# Patient Record
Sex: MALE | Race: WHITE | NOT HISPANIC OR LATINO | Employment: OTHER | URBAN - METROPOLITAN AREA
[De-identification: names, ages, dates, MRNs, and addresses within clinical notes are randomized per-mention and may not be internally consistent; named-entity substitution may affect disease eponyms.]

---

## 2017-02-10 ENCOUNTER — OFFICE VISIT (OUTPATIENT)
Dept: INTERNAL MEDICINE | Facility: CLINIC | Age: 82
End: 2017-02-10

## 2017-02-10 VITALS
TEMPERATURE: 98.2 F | BODY MASS INDEX: 25.99 KG/M2 | DIASTOLIC BLOOD PRESSURE: 73 MMHG | OXYGEN SATURATION: 97 % | HEART RATE: 80 BPM | RESPIRATION RATE: 16 BRPM | WEIGHT: 181.1 LBS | SYSTOLIC BLOOD PRESSURE: 125 MMHG

## 2017-02-10 DIAGNOSIS — H81.10 BPPV (BENIGN PAROXYSMAL POSITIONAL VERTIGO), UNSPECIFIED LATERALITY: Primary | ICD-10-CM

## 2017-02-10 ASSESSMENT — PAIN SCALES - GENERAL: PAINLEVEL: NO PAIN (0)

## 2017-02-10 NOTE — PATIENT INSTRUCTIONS
[facility] Medication Refill Request Information:  * Please contact your pharmacy regarding ANY request for medication refills.  ** Psychiatric Prescription Fax = [phone]  * Please allow 3 business days for routine medication refills.  * Please allow 5 business days for controlled substance medication refills.     [facility] Test Result notification information:  *You will be notified with in 7-10 days of your appointment day regarding the results of your test.  If you are on MyChart you will be notified as soon as the provider has reviewed the results and signed off on them.    [facility] [phone]

## 2017-02-10 NOTE — PROGRESS NOTES
S: Tom is here after having the a URI about 10 days ago. He slept most of that time. After that, he continued to have low blood pressure (which is not unusual for him). Then about 3 days ago, the room started spinning. He has never had this before. It happened getting in bed and lying down. It happened getting up in the morning when sitting on the side of the bed. These episodes lasted seconds. He has had this about 5-6 times.     ROS: No fevers/chills, no numbness/tingling, no weakness    PE:   /73 mmHg  Pulse 80  Temp(Src) 98.2  F (36.8  C) (Oral)  Resp 16  Wt 82.146 kg (181 lb 1.6 oz)  SpO2 97%  General: pleasant male, in NAD  Eyes: EOMI, PERRL  ENT: TMs normal bilaterally, oropharynx clear, MMM  Neck: No LAD  Resp: lungs CAB  CV: Heart RRR, II/VI systolic murmur at RUSB  Neuro: AOX3, CNI-XII intact, rapid-hand motion normal, finger-to-nose normal, no pronator drift.     A/P:   Tom was seen today for dizziness.    Diagnoses and all orders for this visit:    BPPV (benign paroxysmal positional vertigo), unspecified laterality. Suspect BPPV given history, normal neuro exam.   -     PHYSICAL THERAPY REFERRAL    Briseida Hernandez MD  02/10/2017

## 2017-02-10 NOTE — NURSING NOTE
Chief Complaint   Patient presents with     Dizziness     Here for vertigo, dizziness. Patient states had vertigo several times this week     Raphael Killian CMA at 7:36 AM on 2/10/2017

## 2017-02-10 NOTE — MR AVS SNAPSHOT
After Visit Summary   2/10/2017    Tom Saini    MRN: [id number]           Patient Information     Date Of Birth          1935        Visit Information        Provider Department      2/10/2017 7:30 AM Briseida Yanez MD [facility]        Today's Diagnoses     BPPV (benign paroxysmal positional vertigo), unspecified laterality    -  1       Care Instructions    Primary Care Center Medication Refill Request Information:  * Please contact your pharmacy regarding ANY request for medication refills.  ** [facility] Prescription Fax = [phone]  * Please allow 3 business days for routine medication refills.  * Please allow 5 business days for controlled substance medication refills.     Primary Care Center Test Result notification information:  *You will be notified with in 7-10 days of your appointment day regarding the results of your test.  If you are on MyChart you will be notified as soon as the provider has reviewed the results and signed off on them.    Primary Care Center [phone]           Follow-ups after your visit        Additional Services     PHYSICAL THERAPY REFERRAL       *This therapy referral will be filtered to a centralized scheduling office at [facility] and the patient will receive a call to schedule an appointment at a [place] location most convenient for them. *     [facility] provides Physical Therapy evaluation and treatment and many specialty services across the [place] system.  If requesting a specialty program, please choose from the list below.    If you have not heard from the scheduling office within 2 business days, please call [phone] for all locations, with the exception of [place], please call [phone].  Treatment: Evaluation & Treatment  Special Instructions/Modalities: as indicated  Special Programs: Balance/Vestibular    Please be aware that coverage of these services is subject  to the terms and limitations of your health insurance plan.  Call member services at your health plan with any benefit or coverage questions.      **Note to Provider:  If you are referring outside of [place] for the therapy appointment, please list the name of the location in the  special instructions  above, print the referral and give to the patient to schedule the appointment.                  Who to contact     Please call your clinic at [phone] to:    Ask questions about your health    Make or cancel appointments    Discuss your medicines    Learn about your test results    Speak to your doctor   If you have compliments or concerns about an experience at your clinic, or if you wish to file a complaint, please contact [facility] Patient Relations at [phone] or email us at [email or web address]         Additional Information About Your Visit        [employer] Information     [employer] is an electronic gateway that provides easy, online access to your medical records. With [employer], you can request a clinic appointment, read your test results, renew a prescription or communicate with your care team.     To sign up for [employer] visit the website at [email or web address]   You will be asked to enter the access code listed below, as well as some personal information. Please follow the directions to create your username and password.     Your access code is: [email or web address]  Expires: 2017  7:51 AM     Your access code will  in 90 days. If you need help or a new code, please contact your [facility] or call [phone] for assistance.        Care EveryWhere ID     This is your Care EveryWhere ID. This could be used by other organizations to access your [place] medical records  [id number]        Your Vitals Were     Pulse Temperature Respirations Pulse Oximetry          80 98.2  F (36.8  C) (Oral) 16 97%         Blood Pressure from  Last 3 Encounters:   02/10/17 125/73   10/05/16 123/69   02/02/16 118/73    Weight from Last 3 Encounters:   02/10/17 82.146 kg (181 lb 1.6 oz)   10/05/16 83.416 kg (183 lb 14.4 oz)   02/02/16 84.55 kg (186 lb 6.4 oz)              We Performed the Following     PHYSICAL THERAPY REFERRAL        Primary Care Provider Office Phone # Fax #    Angeli Robertson [phone]       [address] [place] [address]        Thank you!     Thank you for choosing [facility]  for your care. Our goal is always to provide you with excellent care. Hearing back from our patients is one way we can continue to improve our services. Please take a few minutes to complete the written survey that you may receive in the mail after your visit with us. Thank you!             Your Updated Medication List - Protect others around you: Learn how to safely use, store and throw away your medicines at www.disposemymeds.org.          This list is accurate as of: 2/10/17  7:51 AM.  Always use your most recent med list.                   Brand Name Dispense Instructions for use    aspirin 81 MG tablet      Take 1 tablet by mouth daily.       atorvastatin 10 MG tablet    LIPITOR    90 tablet    Take 1 tablet (10 mg) by mouth daily       cholecalciferol 1000 UNITS capsule    vitamin  -D     Take 1 capsule by mouth daily.       multivitamin Tabs tablet      Take 2 tablets by mouth 2 times daily.

## 2017-02-17 ENCOUNTER — HOSPITAL ENCOUNTER (OUTPATIENT)
Dept: PHYSICAL THERAPY | Facility: CLINIC | Age: 82
Setting detail: THERAPIES SERIES
End: 2017-02-17
Attending: INTERNAL MEDICINE
Payer: MEDICARE

## 2017-02-17 PROCEDURE — 95992 CANALITH REPOSITIONING PROC: CPT | Mod: GP | Performed by: PHYSICAL THERAPIST

## 2017-02-17 PROCEDURE — 97112 NEUROMUSCULAR REEDUCATION: CPT | Mod: GP | Performed by: PHYSICAL THERAPIST

## 2017-02-17 PROCEDURE — G8982 BODY POS GOAL STATUS: HCPCS | Mod: GP,CI | Performed by: PHYSICAL THERAPIST

## 2017-02-17 PROCEDURE — 97161 PT EVAL LOW COMPLEX 20 MIN: CPT | Mod: GP | Performed by: PHYSICAL THERAPIST

## 2017-02-17 PROCEDURE — G8981 BODY POS CURRENT STATUS: HCPCS | Mod: GP,CK | Performed by: PHYSICAL THERAPIST

## 2017-02-17 PROCEDURE — 40000840 ZZHC STATISTIC PT VESTIBULAR VISIT: Performed by: PHYSICAL THERAPIST

## 2017-02-21 NOTE — PROGRESS NOTES
02/17/17 0800   Quick Adds   Quick Adds Vestibular Eval   General Information   Start of Care Date 02/17/17   Referring Physician Briseida Hernandez   Orders Evaluate and Treat as Indicated   Order Date 02/10/17   Medical Diagnosis BPPV   Onset of illness/injury or Date of Surgery 02/07/17   Surgical/Medical history reviewed Yes   Pertinent history of current problem (include personal factors and/or comorbidities that impact the POC) i have vertigo getting in /out of bed. new to me in 2 wk.  Juaquin is L handed. he is here alone. drove here.  low bp.  glasses.  feels good otherwise.  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  travels alot.  does slide shows.  have been to all continents.  next trip [place], sept.  balance is good.  can walk miles.  sits at cpu alot.     Pertinent Visual History  glasses   Prior level of functional mobility Ambulation   Ambulation can walk longer dist   Current Community Support Family/friend caregiver   Patient role/Employment history Retired   Living environment [facility]   Assistive Devices Comments none   Patient/Family Goals Statement to not be dizzy.   General Information Comments pt Juaquin here alone - drove here; likes travel and do nature photography   Fall Risk Screen   Fall screen completed by PT   Per patient - Fall 2 or more times in past year? No   System Outcome Measures   Outcome Measures BPPV   AM-PAC  Basic Mobility Score Level  (Lower scores equate to lower levels of function) 84.33   AM-PAC  Daily Activity Score Level  (Lower scores equate to lower levels of function) 76.13   AM-PAC  Applied Cognitive Score Level  (Lower scores equate to lower levels of function) 57.54   Dizziness Handicap Inventory (score out of 100) A decrease in score by 17.18 or greater indicates a clinically significant change in symptoms. 6   Pain   Patient currently in pain No   Vital Signs   Vital Signs BP;Pulse   Pulse 78   /72  (121/69)   Cognitive Status  Examination   Orientation orientation to person, place and time   Level of Consciousness alert   Follows Commands and Answers Questions 100% of the time   Personal Safety and Judgment intact   Memory intact   Integumentary   Integumentary No deficits were identified   Posture   Posture Forward head position   Range of Motion (ROM)   ROM Quick Adds no deficits were identified   Strength   Manual Muscle Testing Quick Adds No deficits were identified   Bed Mobility   Bed Mobility Comments slow due to sxs   Transfer Skills   Transfer Comments sit/stand indep. upon sitting after vest testing, got dizzy and fell back on to mat table   Locomotion   Wheel Chair Mobility Comments n/a   Gait Special Tests   Gait Special Tests 25 FOOT TIMED WALK   Gait Special Tests 25 Foot Timed Walk   Seconds 8.4   Steps 13 Steps   Comments nl   Balance   Balance Comments not tested   Sensory Examination   Sensory Perception no deficits were identified   Coordination   Coordination no deficits were identified   Muscle Tone   Muscle Tone no deficits were identified   Cervicogenic Screen   Neck ROM wfls   Vertebral Artery Test Comments no issues   Oculomotor Exam   Smooth Pursuit Normal   Saccades Normal   VOR Normal   Rapid Head Thrust Normal   Infrared Goggle Exam or Frenzel Lense Exam   Vestibular Suppressant in Last 24 Hours? No   Exam completed with Infrared Goggles   Spontaneous Nystagmus Horizontal L   Head Shake Horizontal Nystagmus comments not tested   [place]-Hallpike (right) Upbeating R torsional   [place]-Hallpike (right) comments strong up and R w sxs x 3   [place]-Hallpike (Left) Negative   HSCC Supine Roll Test (Right) Horizontal L   HSCC Supine Roll Test (Right) Comments stronger than spontaneous   HSCC Supine Roll Test (Left) Negative   BPPV Canal(s) R Horizontal;R Posterior   BPPV Type Cupulolithasis;Canalithasis   Planned Therapy Interventions   Planned Therapy Interventions neuromuscular re-education   Planned Therapy Interventions  Comment crm's   Clinical Impression   Criteria for Skilled Therapeutic Interventions Met yes, treatment indicated   PT Diagnosis bppv of R post canalithiasis and R HC cupulolithiasis   Influenced by the following impairments nystagmus, position changes   Functional limitations due to impairments unable to change positions, rish of falling w this   Clinical Presentation Stable/Uncomplicated   Clinical Presentation Rationale above, no major pmh, able to take self to PT, active individual   Clinical Decision Making (Complexity) Low complexity   Therapy Frequency other (see comments)   Predicted Duration of Therapy Intervention (days/wks) up to 6x in 90 days   Risk & Benefits of therapy have been explained Yes   Patient, Family & other staff in agreement with plan of care Yes   Clinical Impression Comments as above   Education Assessment   Preferred Learning Style Demonstration   Barriers to Learning Physical   GOALS   PT Eval Goals 3;1;2   Goal 1   Goal Identifier DHI    Goal Description 2 or less for decreasing sxs for safe bed mobility   Target Date 05/16/17   Goal 2   Goal Identifier functional activities   Goal Description pt to report normal functional bending and reaching w head mov't and no sxs   Target Date 05/16/17   Total Evaluation Time   Total Evaluation Time (Minutes) 32

## 2017-02-21 NOTE — PROGRESS NOTES
[facility]        OUTPATIENT PHYSICAL THERAPY FUNCTIONAL EVALUATION  PLAN OF TREATMENT FOR OUTPATIENT REHABILITATION  (COMPLETE FOR INITIAL CLAIMS ONLY)  Patient's Last Name, First Name, M.I.  YOB: 1935  Tom Saini     Provider's Name   [facility]   Medical Record No.  [id number]     Start of Care Date:  02/17/17   Onset Date:  02/07/17   Type:     _X__PT   ____OT  ____SLP Medical Diagnosis:     bppv   PT Diagnosis:  bppv of R post canalithiasis and R HC cupulolithiasis Visits from SOC:  1                              __________________________________________________________________________________  Plan of Treatment/Functional Goals:  neuromuscular re-education  crm's        GOALS  DHI   2 or less for decreasing sxs for safe bed mobility  05/16/17    functional activities  pt to report normal functional bending and reaching w head mov't and no sxs  05/16/17            Therapy Frequency:  other (see comments)   Predicted Duration of Therapy Intervention:  up to 6x in 90 days    Angella West, PT                                    I CERTIFY THE NEED FOR THESE SERVICES FURNISHED UNDER        THIS PLAN OF TREATMENT AND WHILE UNDER MY CARE     (Physician co-signature of this document indicates review and certification of the therapy plan).                Certification Date From:    2/17/17  Certification Date To:   5/16/17  Referring Provider:  Briseida Hernandez    Initial Assessment  See Epic Evaluation- Start of Care Date: 02/17/17

## 2017-02-24 ENCOUNTER — HOSPITAL ENCOUNTER (OUTPATIENT)
Dept: PHYSICAL THERAPY | Facility: CLINIC | Age: 82
Setting detail: THERAPIES SERIES
End: 2017-02-24
Attending: INTERNAL MEDICINE
Payer: MEDICARE

## 2017-02-24 PROCEDURE — 40000840 ZZHC STATISTIC PT VESTIBULAR VISIT: Performed by: PHYSICAL THERAPIST

## 2017-02-24 PROCEDURE — 97112 NEUROMUSCULAR REEDUCATION: CPT | Mod: GP | Performed by: PHYSICAL THERAPIST

## 2017-02-24 PROCEDURE — 95992 CANALITH REPOSITIONING PROC: CPT | Mod: GP | Performed by: PHYSICAL THERAPIST

## 2017-02-24 NOTE — DISCHARGE INSTRUCTIONS
2/24/17    When sitting at computer / tv or in car -   Before standing -   Pump ankles,  Kick legs and march in place then stand   To move fluids/blood to help blood pressure    Angella ROBERTSON

## 2017-02-24 NOTE — IP AVS SNAPSHOT
"                  MRN:[id number]                      After Visit Summary   2017    Tom Saini    MRN: [id number]           Visit Information        Provider Department      2017 11:00 AM Angella West, PT [employer], [place], Physical Therapy - Outpatient          Further instructions from your care team       17    When sitting at computer / tv or in car -   Before standing -   Pump ankles,  Kick legs and march in place then stand   To move fluids/blood to help blood pressure    Angella  PT      MyChart Information     [employer] lets you send messages to your doctor, view your test results, renew your prescriptions, schedule appointments and more. To sign up, go to [email or web address] . Click on \"Log in\" on the left side of the screen, which will take you to the Welcome page. Then click on \"Sign up Now\" on the right side of the page.     You will be asked to enter the access code listed below, as well as some personal information. Please follow the directions to create your username and password.     Your access code is: [email or web address]  Expires: 2017  7:51 AM     Your access code will  in 90 days. If you need help or a new code, please call your [place] clinic or [phone].        Care EveryWhere ID     This is your Care EveryWhere ID. This could be used by other organizations to access your [place] medical records  [id number]        "

## 2017-03-03 ENCOUNTER — HOSPITAL ENCOUNTER (OUTPATIENT)
Dept: PHYSICAL THERAPY | Facility: CLINIC | Age: 82
Setting detail: THERAPIES SERIES
End: 2017-03-03
Attending: INTERNAL MEDICINE
Payer: MEDICARE

## 2017-03-03 PROCEDURE — 40000840 ZZHC STATISTIC PT VESTIBULAR VISIT: Performed by: PHYSICAL THERAPIST

## 2017-03-03 PROCEDURE — 97112 NEUROMUSCULAR REEDUCATION: CPT | Mod: GP | Performed by: PHYSICAL THERAPIST

## 2017-04-23 DIAGNOSIS — E78.5 HYPERLIPIDEMIA LDL GOAL <100: ICD-10-CM

## 2017-04-23 RX ORDER — ATORVASTATIN CALCIUM 10 MG/1
10 TABLET, FILM COATED ORAL DAILY
Qty: 90 TABLET | Refills: 3 | Status: SHIPPED | OUTPATIENT
Start: 2017-04-23 | End: 2018-03-22

## 2017-04-23 NOTE — TELEPHONE ENCOUNTER
atorvastatin (LIPITOR) 10 MG tablet  Last Written Prescription Date:  2/11/16  Last Fill Quantity: 90,   # refills: 3  Last Office Visit with [facility], [facility] or [facility] prescribing provider: 2/10/17  Future Office visit:   7/3/17

## 2017-04-25 NOTE — TELEPHONE ENCOUNTER
Last lipids 12/15.  Last physical 12/15.  Would not approve 12 months of refills.  Pt due for labs/physical.  Angeli Robertson MD  Internal Medicine

## 2017-04-27 ENCOUNTER — OFFICE VISIT (OUTPATIENT)
Dept: OPHTHALMOLOGY | Facility: CLINIC | Age: 82
End: 2017-04-27
Attending: OPHTHALMOLOGY
Payer: MEDICARE

## 2017-04-27 DIAGNOSIS — H53.031 STRABISMIC AMBLYOPIA OF RIGHT EYE: ICD-10-CM

## 2017-04-27 DIAGNOSIS — Z96.1 PSEUDOPHAKIA OF BOTH EYES: ICD-10-CM

## 2017-04-27 DIAGNOSIS — H35.3210: ICD-10-CM

## 2017-04-27 DIAGNOSIS — H35.30 MACULAR DEGENERATION: Primary | ICD-10-CM

## 2017-04-27 DIAGNOSIS — H35.3122 NONEXUDATIVE AGE-RELATED MACULAR DEGENERATION, LEFT EYE, INTERMEDIATE DRY STAGE: ICD-10-CM

## 2017-04-27 DIAGNOSIS — Z86.69 HISTORY OF RETINAL TEAR: ICD-10-CM

## 2017-04-27 PROCEDURE — 92134 CPTRZ OPH DX IMG PST SGM RTA: CPT | Mod: ZF | Performed by: STUDENT IN AN ORGANIZED HEALTH CARE EDUCATION/TRAINING PROGRAM

## 2017-04-27 ASSESSMENT — VISUAL ACUITY
OS_PH_CC: 20/40+2
OD_CC: 20/60+1
OD_SC: J7
OS_CC: 20/40-1
OS_SC: J2
METHOD: SNELLEN - LINEAR

## 2017-04-27 ASSESSMENT — EXTERNAL EXAM - LEFT EYE: OS_EXAM: NORMAL

## 2017-04-27 ASSESSMENT — CONF VISUAL FIELD
OD_NORMAL: 1
METHOD: COUNTING FINGERS
OS_NORMAL: 1

## 2017-04-27 ASSESSMENT — CUP TO DISC RATIO
OD_RATIO: 0.3
OS_RATIO: 0.3

## 2017-04-27 ASSESSMENT — TONOMETRY
IOP_METHOD: TONOPEN
OD_IOP_MMHG: 15
OS_IOP_MMHG: 13

## 2017-04-27 ASSESSMENT — SLIT LAMP EXAM - LIDS
COMMENTS: NORMAL
COMMENTS: NORMAL

## 2017-04-27 ASSESSMENT — EXTERNAL EXAM - RIGHT EYE: OD_EXAM: NORMAL

## 2017-04-27 NOTE — MR AVS SNAPSHOT
After Visit Summary   4/27/2017    Tom Saini    MRN: [id number]           Patient Information     Date Of Birth          1935        Visit Information        Provider Department      4/27/2017 8:00 AM Ying Infante MD [facility]        Today's Diagnoses     Macular degeneration - Both Eyes    -  1    Age-related macular degeneration, wet, right eye (H)        Nonexudative age-related macular degeneration, left eye, intermediate dry stage        Strabismic amblyopia of right eye        History of retinal tear        Pseudophakia of both eyes           Follow-ups after your visit        Follow-up notes from your care team     Return in about 1 week (around 5/4/2017) for Injection right eye, FA both eyes.      Your next 10 appointments already scheduled     May 04, 2017  8:15 AM CDT   RETURN GENERAL with Brigido Laura MD   Eye Clinic ([facility])    [place] [address] [address]   [phone]            Jun 26, 2017  9:30 AM CDT   (Arrive by 9:15 AM)   PHYSICAL with Angeli Robertson MD   [facility] ([facility])    [address]  [address] [facility] [address] [phone]              Who to contact     Please call your clinic at [phone] to:    Ask questions about your health    Make or cancel appointments    Discuss your medicines    Learn about your test results    Speak to your doctor   If you have compliments or concerns about an experience at your clinic, or if you wish to file a complaint, please contact [facility] Patient Relations at [phone] or email us at [email or web address]         Additional Information About Your Visit        [employer] Information     [employer] is an electronic gateway that provides easy, online access to your medical records. With [employer], you can request a clinic appointment,  read your test results, renew a prescription or communicate with your care team.     To sign up for [employer] visit the website at [email or web address]   You will be asked to enter the access code listed below, as well as some personal information. Please follow the directions to create your username and password.     Your access code is: [email or web address]  Expires: 2017  8:51 AM     Your access code will  in 90 days. If you need help or a new code, please contact your [facility] or call [phone] for assistance.        Care EveryWhere ID     This is your Care EveryWhere ID. This could be used by other organizations to access your [place] medical records  [id number]         Blood Pressure from Last 3 Encounters:   02/10/17 125/73   10/05/16 123/69   16 118/73    Weight from Last 3 Encounters:   02/10/17 82.1 kg (181 lb 1.6 oz)   10/05/16 83.4 kg (183 lb 14.4 oz)   16 84.6 kg (186 lb 6.4 oz)              We Performed the Following     OCT Retina Spectralis OU (both eyes)        Primary Care Provider Office Phone # Fax #    Angeli Robertson [phone]       [address] [address]        Thank you!     Thank you for choosing EYE CLINIC  for your care. Our goal is always to provide you with excellent care. Hearing back from our patients is one way we can continue to improve our services. Please take a few minutes to complete the written survey that you may receive in the mail after your visit with us. Thank you!             Your Updated Medication List - Protect others around you: Learn how to safely use, store and throw away your medicines at www.disposemymeds.org.          This list is accurate as of: 17  9:37 PM.  Always use your most recent med list.                   Brand Name Dispense Instructions for use    aspirin 81 MG tablet      Take 1 tablet by mouth daily.       atorvastatin 10 MG  tablet    LIPITOR    90 tablet    Take 1 tablet (10 mg) by mouth daily       cholecalciferol 1000 UNITS capsule    vitamin  -D     Take 1 capsule by mouth daily.       multivitamin Tabs tablet      Take 2 tablets by mouth 2 times daily.

## 2017-04-27 NOTE — PROGRESS NOTES
"HPI: Tom Saini is a 82 year old male who presents for evaluation of \"wavy lines\" in the left eye when looking at straight edges. He first noted this issue a few weeks ago. Feels that this is stable to mildly worsened over time. Hx of amblyopia in the right eye and unsure if any new waviness in the right eye. Denies eye pain, flashes, or new floaters. Hx of AMD ontinues to take AREDS. Denies hx of wet AMD or injections.     PMH:  BPPV    POHx:  Dry AMD   Hx of retinal tears s/p laser OS and cryo OS  Strabismus/amblyopia, right eye  S/p strabismus surgery in 1947 - right eye only per pt report  S/p cataract surgery OU - 2000/2001 with Dr. Avalos    Current Eye Medications:   None    FH:  Pt denies    SH:  Former smoker, occ EtOH use      OCT macula 4/27/17:  OD: coalescent drusen with areas of RPE dropout involving macula, few small subfoveal cystic collections of fluid, ERM most prominent inferiorly  OS: coalescent drusen with areas of RPE dropout involving subfoveal region of macula, two intraretinal cysts inferior to fovea likely related to underlying involutional changes, mild ERM inferiorly      Assessment & Plan   Tom Saini is a 82 year old male with the following diagnoses:     Wet AMD, right eye:  - Small collections of cystic fluid noted sub-foveally on OCT macula in the right eye today. Hx of previous dx of dry AMD in the right eye. Pt denies any new metamorphopsias in the right eye but does have hx of strabismic amblyopia in that eye (see below)  - Discussed r/b/a of intravitreal injection and pt elects to defer until next week, when he can bring someone with him to his appt  - Also recommend FA at next visit for further w/u  - Pt does not smoke  - Continue ocuvite   - Gave new Amsler grid, recommend daily home monitoring  - Discussed referral to low vision specialist. Pt states he is aware of the service and is not interested in a referral at this time    Dry AMD, left eye:   - Pt complains of " new metamorphopsias in the left eye but no fluid visualized on OCT macula today. Significant increase in amount of drusen compared to last OCT in 2012  - FA at next visit as above  - Continue ocuvite  - Pt does not smoke  - Amsler grid as above    Strabismic amblyopia, right eye:  - Longstanding (since childhood). Pt reports vision has always been worse in right eye  - S/p strab surgery in 1947    Hx of retinal tears, both eyes:  - Hx of myopia prior to cataract surgery  - S/p cryo OD and laser OS  - Discussed sx of retinal tear/detachment. Pt has no complaints today    Pseudophakia, both eyes:  - Stable  - Monitor    Patient seen and discussed with Dr. Laura.    RTC: 1 week for Avastin OD and Spectralis FA Both eyes (transit right); will consult low vision following injection series    Ying Infante MD   Ophthalmology PGY-2    Attending Physician Attestation: Complete documentation of historical and exam elements from today's encounter can be found in the full encounter summary report (not reduplicated in this progress note). I personally obtained the chief complaint(s) and history of present illness.  I confirmed and edited as necessary the review of systems, past medical/surgical history, family history, social history, and examination findings as documented by others; and I examined the patient myself. I personally reviewed the relevant tests, images, and reports as documented above. I formulated and edited as necessary the assessment and plan and discussed the findings and management plan with the patient and family. - Brigido Laura MD  4/27/2017   Attending Physician Image/Tesing Attestation: I have personally view and interpreted all testing/images as documented in imaging/procedures

## 2017-05-04 ENCOUNTER — OFFICE VISIT (OUTPATIENT)
Dept: OPHTHALMOLOGY | Facility: CLINIC | Age: 82
End: 2017-05-04
Attending: OPHTHALMOLOGY
Payer: MEDICARE

## 2017-05-04 DIAGNOSIS — H35.3290: Primary | ICD-10-CM

## 2017-05-04 DIAGNOSIS — H35.3290 AMD (AGE-RELATED MACULAR DEGENERATION), WET (H): ICD-10-CM

## 2017-05-04 DIAGNOSIS — H35.3290 AMD (AGE-RELATED MACULAR DEGENERATION), WET (H): Primary | ICD-10-CM

## 2017-05-04 DIAGNOSIS — H35.3231 EXUDATIVE AGE-RELATED MACULAR DEGENERATION OF BOTH EYES WITH ACTIVE CHOROIDAL NEOVASCULARIZATION (H): ICD-10-CM

## 2017-05-04 PROCEDURE — 92235 FLUORESCEIN ANGRPH MLTIFRAME: CPT | Mod: ZF | Performed by: OPHTHALMOLOGY

## 2017-05-04 PROCEDURE — 67028 INJECTION EYE DRUG: CPT | Mod: ZF | Performed by: OPHTHALMOLOGY

## 2017-05-04 PROCEDURE — 25000128 H RX IP 250 OP 636: Mod: ZF

## 2017-05-04 PROCEDURE — 99212 OFFICE O/P EST SF 10 MIN: CPT | Mod: 25,ZF

## 2017-05-04 PROCEDURE — C9257 BEVACIZUMAB INJECTION: HCPCS | Mod: ZF

## 2017-05-04 ASSESSMENT — TONOMETRY
OD_IOP_MMHG: 18
IOP_METHOD: TONOPEN
OS_IOP_MMHG: 12

## 2017-05-04 ASSESSMENT — CONF VISUAL FIELD
OS_NORMAL: 1
OD_NORMAL: 1

## 2017-05-04 ASSESSMENT — VISUAL ACUITY
OD_CC+: +2
OD_CC: 20/70
OS_CC: 20/40
OS_CC+: +2
METHOD: SNELLEN - LINEAR
OS_PH_CC: 20/30-1

## 2017-05-04 NOTE — NURSING NOTE
Chief Complaints and History of Present Illnesses   Patient presents with     Exudative Macular Degeneration Follow Up     HPI    Affected eye(s):  Both   Symptoms:     No decreased vision   No floaters   No flashes   No tearing   No Dryness   No itching         Do you have eye pain now?:  No      Comments:  Patient notes no changes to vision since last visit.  He denies pain, flashes or floaters in either eye.      JULIET Rollins, Student  I have reviewed all information that was taken. Noemi Epps COT 8:45 AM May 4, 2017

## 2017-05-04 NOTE — MR AVS SNAPSHOT
After Visit Summary   5/4/2017    Tom Saini    MRN: [id number]           Patient Information     Date Of Birth          1935        Visit Information        Provider Department      5/4/2017 8:15 AM Brigido Laura MD [facility]        Today's Diagnoses     Exudative macular degeneration (H) - Right Eye    -  1    Exudative age-related macular degeneration of both eyes with active choroidal neovascularization         AMD (age-related macular degeneration), wet (H)           Follow-ups after your visit        Follow-up notes from your care team     Return in about 1 month (around 6/4/2017) for Avastin right eye.      Your next 10 appointments already scheduled     Jun 06, 2017  8:45 AM CDT   RETURN GENERAL with Brigido Laura MD   Eye Clinic ([facility])    [facility] Richardson [address] [id number] [phone]            Jun 26, 2017  9:30 AM CDT   (Arrive by 9:15 AM)   PHYSICAL with Angeli Robertson MD   [facility] ([facility])    [address] [address] [phone]              Who to contact     Please call your clinic at [phone] to:    Ask questions about your health    Make or cancel appointments    Discuss your medicines    Learn about your test results    Speak to your doctor   If you have compliments or concerns about an experience at your clinic, or if you wish to file a complaint, please contact [facility] Patient Relations at [phone] or email us at [email or web address]         Additional Information About Your Visit        MyChart Information     [employer] is an electronic gateway that provides easy, online access to your medical records. With [employer], you can request a clinic appointment, read your test results, renew a prescription or communicate with your care team.     To sign up for  MyChart visit the website at [email or web address]   You will be asked to enter the access code listed below, as well as some personal information. Please follow the directions to create your username and password.     Your access code is: [email or web address]  Expires: 2017  8:51 AM     Your access code will  in 90 days. If you need help or a new code, please contact your [facility] or call [phone] for assistance.        Care EveryWhere ID     This is your Care EveryWhere ID. This could be used by other organizations to access your [place] medical records  [id number]         Blood Pressure from Last 3 Encounters:   02/10/17 125/73   10/05/16 123/69   16 118/73    Weight from Last 3 Encounters:   02/10/17 82.1 kg (181 lb 1.6 oz)   10/05/16 83.4 kg (183 lb 14.4 oz)   16 84.6 kg (186 lb 6.4 oz)              We Performed the Following     Avastin (Bevacizumab) 1.25MG Intravitreal Injection OD (right eye)     Fluorescein Angiography OU (both eyes)        Primary Care Provider Office Phone # Fax #    Angeli Robertson [phone]       [address]  [facility] [address]        Thank you!     Thank you for choosing EYE CLINIC  for your care. Our goal is always to provide you with excellent care. Hearing back from our patients is one way we can continue to improve our services. Please take a few minutes to complete the written survey that you may receive in the mail after your visit with us. Thank you!             Your Updated Medication List - Protect others around you: Learn how to safely use, store and throw away your medicines at www.disposemymeds.org.          This list is accurate as of: 17 11:59 PM.  Always use your most recent med list.                   Brand Name Dispense Instructions for use    aspirin 81 MG tablet      Take 1 tablet by mouth daily.       atorvastatin 10 MG tablet    LIPITOR    90 tablet     Take 1 tablet (10 mg) by mouth daily       cholecalciferol 1000 UNITS capsule    vitamin  -D     Take 1 capsule by mouth daily.       multivitamin Tabs tablet      Take 2 tablets by mouth 2 times daily.

## 2017-05-04 NOTE — PROGRESS NOTES
HPI: Tom Saini is a 82 year old male who presents for f/u wet AMD OD and dry AMD OS, FA OU, and possible Avastin OD. No new complaints or concerns today.    FA 5/4/17:  OD: Normal filling, macular pigment changes with area of leakage, small area of hypofluorescence superior to nerve  OS: Normal filling, macular pigment changes without leakage      Assessment & Plan   Tom Saini is a 82 year old male with the following diagnoses:     Wet AMD, right eye:  - Small collections of cystic fluid noted sub-foveally on OCT macula in the right eye at last visit. FA today shows small area of leakage in the macula of the right eye  - Discussed r/b/a of intravitreal injection and pt elects to proceed (see procedure note below)  - Pt does not smoke  - Continue ocuvite   - Continue home Amsler grid  - Previously discussed referral to low vision specialist. Pt states he is aware of the service and is not interested in a referral at this time  - Inj 1/3 today - f/u 1mo for repeat injection, 2mo for repeat inj, and 3mo for OCT macula     Dry AMD, left eye:   - Pt complains of new metamorphopsias in the left eye but no fluid visualized on OCT macula at last visit. Significant increase in amount of drusen compared to last OCT in 2012. FA today shows no leakage in the left eye  - Continue ocuvite  - Pt does not smoke  - Amsler grid as above     Strabismic amblyopia, right eye:  - Longstanding (since childhood), s/p strab surgery in 1947     Hx of retinal tears, both eyes:  - Hx of myopia prior to cataract surgery  - S/p cryo OD and laser OS     Pseudophakia, both eyes:  - Stable, monitor    Patient seen and discussed with Dr. Laura  Lot# [id number]    RTC: F/u 1mo for repeat injection right eye  Repeat Fluorescein angiography/OCT macula in 3 mo     Ying Infante MD   Ophthalmology PGY-2      Attending Physician Attestation: Complete documentation of historical and exam elements from today's encounter can be found in the full  encounter summary report (not reduplicated in this progress note). I personally obtained the chief complaint(s) and history of present illness.  I confirmed and edited as necessary the review of systems, past medical/surgical history, family history, social history, and examination findings as documented by others; and I examined the patient myself. I personally reviewed the relevant tests, images, and reports as documented above. I formulated and edited as necessary the assessment and plan and discussed the findings and management plan with the patient and family. - Brigido Laura MD  5/5/2017   Attending Physician Image/Tesing Attestation: I have personally view and interpreted all testing/images as documented in imaging/procedures   Attending Physician Procedure Attestation: I was present for the entire procedure

## 2017-05-05 ASSESSMENT — CUP TO DISC RATIO
OS_RATIO: 0.3
OD_RATIO: 0.3

## 2017-05-05 ASSESSMENT — SLIT LAMP EXAM - LIDS
COMMENTS: NORMAL
COMMENTS: NORMAL

## 2017-05-05 ASSESSMENT — EXTERNAL EXAM - LEFT EYE: OS_EXAM: NORMAL

## 2017-05-05 ASSESSMENT — EXTERNAL EXAM - RIGHT EYE: OD_EXAM: NORMAL

## 2017-06-06 ENCOUNTER — OFFICE VISIT (OUTPATIENT)
Dept: OPHTHALMOLOGY | Facility: CLINIC | Age: 82
End: 2017-06-06
Attending: OPHTHALMOLOGY
Payer: MEDICARE

## 2017-06-06 DIAGNOSIS — H35.3211 EXUDATIVE AGE-RELATED MACULAR DEGENERATION, RIGHT EYE, WITH ACTIVE CHOROIDAL NEOVASCULARIZATION (H): Primary | ICD-10-CM

## 2017-06-06 PROCEDURE — C9257 BEVACIZUMAB INJECTION: HCPCS | Mod: ZF

## 2017-06-06 PROCEDURE — 67028 INJECTION EYE DRUG: CPT | Mod: RT | Performed by: OPHTHALMOLOGY

## 2017-06-06 PROCEDURE — 25000128 H RX IP 250 OP 636: Mod: ZF

## 2017-06-06 PROCEDURE — 99212 OFFICE O/P EST SF 10 MIN: CPT | Mod: 25

## 2017-06-06 ASSESSMENT — REFRACTION_WEARINGRX
SPECS_TYPE: BIFOCAL
OS_SPHERE: -1.75
OD_AXIS: 175
OD_SPHERE: -2.50
OD_CYLINDER: +1.00
OS_AXIS: 175
OS_ADD: +3.00
OS_CYLINDER: +1.00
OD_ADD: +3.00

## 2017-06-06 ASSESSMENT — CUP TO DISC RATIO
OD_RATIO: 0.3
OS_RATIO: 0.3

## 2017-06-06 ASSESSMENT — SLIT LAMP EXAM - LIDS
COMMENTS: NORMAL
COMMENTS: NORMAL

## 2017-06-06 ASSESSMENT — EXTERNAL EXAM - LEFT EYE: OS_EXAM: NORMAL

## 2017-06-06 ASSESSMENT — VISUAL ACUITY
CORRECTION_TYPE: GLASSES
METHOD: SNELLEN - LINEAR
OD_CC: 20/100
OS_CC: 20/40

## 2017-06-06 ASSESSMENT — TONOMETRY
OD_IOP_MMHG: 16
OS_IOP_MMHG: 14
IOP_METHOD: TONOPEN

## 2017-06-06 ASSESSMENT — EXTERNAL EXAM - RIGHT EYE: OD_EXAM: NORMAL

## 2017-06-06 NOTE — PROGRESS NOTES
Assessment & Plan      Tom Saini is a 82 year old male with the following diagnoses:   1. Exudative age-related macular degeneration, right eye, with active choroidal neovascularization         Avastin injection #2 of 3 today  RBA reviewed, consent obtained  Infection precautions reviewed, call urgently should sign/symptoms develop      Patient disposition:   Return in about 4 weeks (around 7/4/2017) for Avastin injection right eye ([place] BALL or [place]).          Attending Physician Attestation: Complete documentation of historical and exam elements from today's encounter can be found in the full encounter summary report (not reduplicated in this progress note). I personally obtained the chief complaint(s) and history of present illness.  I confirmed and edited as necessary the review of systems, past medical/surgical history, family history, social history, and examination findings as documented by others; and I examined the patient myself. I personally reviewed the relevant tests, images, and reports as documented above. I formulated and edited as necessary the assessment and plan and discussed the findings and management plan with the patient and family. - Brigido Laura MD  6/6/2017   Attending Physician Procedure Attestation: I was present for the entire procedure

## 2017-06-06 NOTE — MR AVS SNAPSHOT
After Visit Summary   6/6/2017    Tom Saini    MRN: [id number]           Patient Information     Date Of Birth          1935        Visit Information        Provider Department      6/6/2017 8:45 AM Brigido Laura MD Eye Clinic        Today's Diagnoses     Exudative age-related macular degeneration, right eye, with active choroidal neovascularization    -  1       Follow-ups after your visit        Follow-up notes from your care team     Return in about 4 weeks (around 7/4/2017) for Avastin injection right eye ([place] BALL or [place]).      Your next 10 appointments already scheduled     Jun 26, 2017  9:30 AM CDT   (Arrive by 9:15 AM)   PHYSICAL with Angeli Robertson MD   [facility] ([facility])    [address] [address] [phone]            Jul 13, 2017  1:30 PM CDT   RETURN GENERAL with Brigido Laura MD   Eye Clinic ([facility])    [facility] Richardson [address]  [address]  [facility] [address] [phone]              Who to contact     Please call your clinic at [phone] to:    Ask questions about your health    Make or cancel appointments    Discuss your medicines    Learn about your test results    Speak to your doctor   If you have compliments or concerns about an experience at your clinic, or if you wish to file a complaint, please contact [facility] Patient Relations at [phone] or email us at [email or web address]         Additional Information About Your Visit        MyChart Information     [employer] is an electronic gateway that provides easy, online access to your medical records. With [employer], you can request a clinic appointment, read your test results, renew a prescription or communicate with your care team.     To sign up for [employer] visit the website at [email or web address]   You will be asked to  enter the access code listed below, as well as some personal information. Please follow the directions to create your username and password.     Your access code is: [email or web address]  Expires: 2017  9:54 AM     Your access code will  in 90 days. If you need help or a new code, please contact your [facility] or call [phone] for assistance.        Care EveryWhere ID     This is your Care EveryWhere ID. This could be used by other organizations to access your [place] medical records  [id number]         Blood Pressure from Last 3 Encounters:   02/10/17 125/73   10/05/16 123/69   16 118/73    Weight from Last 3 Encounters:   02/10/17 82.1 kg (181 lb 1.6 oz)   10/05/16 83.4 kg (183 lb 14.4 oz)   16 84.6 kg (186 lb 6.4 oz)              We Performed the Following     Avastin (Bevacizumab) 1.25MG Intravitreal Injection OD (right eye)        Primary Care Provider Office Phone # Fax #    Angeli Robertson [phone]       [address] [place] [id number]        Thank you!     Thank you for choosing EYE CLINIC  for your care. Our goal is always to provide you with excellent care. Hearing back from our patients is one way we can continue to improve our services. Please take a few minutes to complete the written survey that you may receive in the mail after your visit with us. Thank you!             Your Updated Medication List - Protect others around you: Learn how to safely use, store and throw away your medicines at www.disposemymeds.org.          This list is accurate as of: 17  9:54 AM.  Always use your most recent med list.                   Brand Name Dispense Instructions for use    aspirin 81 MG tablet      Take 1 tablet by mouth daily.       atorvastatin 10 MG tablet    LIPITOR    90 tablet    Take 1 tablet (10 mg) by mouth daily       cholecalciferol 1000 UNITS capsule    vitamin  -D     Take 1 capsule by mouth  daily.       multivitamin Tabs tablet      Take 2 tablets by mouth 2 times daily.

## 2017-06-06 NOTE — NURSING NOTE
Chief Complaints and History of Present Illnesses   Patient presents with     Follow Up For     AMD (age-related macular degeneration), wet (H) (Primary Dx)     HPI    Affected eye(s):  Right   Symptoms:     No blurred vision   Decreased vision   Distorted vision   Floaters   No flashes      Duration:  4 weeks   Frequency:  Constant       Do you have eye pain now?:  No      Comments:  States va is the same since last visit.  Humphrey Canales  9:06 AM June 6, 2017

## 2017-06-26 ENCOUNTER — OFFICE VISIT (OUTPATIENT)
Dept: INTERNAL MEDICINE | Facility: CLINIC | Age: 82
End: 2017-06-26

## 2017-06-26 VITALS
WEIGHT: 186.7 LBS | OXYGEN SATURATION: 95 % | RESPIRATION RATE: 20 BRPM | SYSTOLIC BLOOD PRESSURE: 123 MMHG | HEART RATE: 80 BPM | BODY MASS INDEX: 26.79 KG/M2 | DIASTOLIC BLOOD PRESSURE: 67 MMHG

## 2017-06-26 DIAGNOSIS — Z00.00 ROUTINE GENERAL MEDICAL EXAMINATION AT A HEALTH CARE FACILITY: ICD-10-CM

## 2017-06-26 DIAGNOSIS — E78.5 HYPERLIPIDEMIA LDL GOAL <100: ICD-10-CM

## 2017-06-26 DIAGNOSIS — Z71.84 TRAVEL ADVICE ENCOUNTER: ICD-10-CM

## 2017-06-26 DIAGNOSIS — L98.9 SKIN LESION: Primary | ICD-10-CM

## 2017-06-26 LAB
ALBUMIN SERPL-MCNC: 3.6 G/DL (ref 3.4–5)
ALBUMIN UR-MCNC: NEGATIVE MG/DL
ALP SERPL-CCNC: 73 U/L (ref 40–150)
ALT SERPL W P-5'-P-CCNC: 18 U/L (ref 0–70)
ANION GAP SERPL CALCULATED.3IONS-SCNC: 6 MMOL/L (ref 3–14)
APPEARANCE UR: CLEAR
AST SERPL W P-5'-P-CCNC: 15 U/L (ref 0–45)
BILIRUB SERPL-MCNC: 0.5 MG/DL (ref 0.2–1.3)
BILIRUB UR QL STRIP: NEGATIVE
BUN SERPL-MCNC: 18 MG/DL (ref 7–30)
CALCIUM SERPL-MCNC: 8.8 MG/DL (ref 8.5–10.1)
CHLORIDE SERPL-SCNC: 106 MMOL/L (ref 94–109)
CHOLEST SERPL-MCNC: 149 MG/DL
CO2 SERPL-SCNC: 28 MMOL/L (ref 20–32)
COLOR UR AUTO: NORMAL
CREAT SERPL-MCNC: 0.91 MG/DL (ref 0.66–1.25)
ERYTHROCYTE [DISTWIDTH] IN BLOOD BY AUTOMATED COUNT: 13.3 % (ref 10–15)
GFR SERPL CREATININE-BSD FRML MDRD: 80 ML/MIN/1.7M2
GLUCOSE SERPL-MCNC: 73 MG/DL (ref 70–99)
GLUCOSE UR STRIP-MCNC: NEGATIVE MG/DL
HCT VFR BLD AUTO: 42.4 % (ref 40–53)
HDLC SERPL-MCNC: 61 MG/DL
HGB BLD-MCNC: 13.8 G/DL (ref 13.3–17.7)
HGB UR QL STRIP: NEGATIVE
KETONES UR STRIP-MCNC: NEGATIVE MG/DL
LDLC SERPL CALC-MCNC: 70 MG/DL
LEUKOCYTE ESTERASE UR QL STRIP: NEGATIVE
MCH RBC QN AUTO: 30.7 PG (ref 26.5–33)
MCHC RBC AUTO-ENTMCNC: 32.5 G/DL (ref 31.5–36.5)
MCV RBC AUTO: 94 FL (ref 78–100)
NITRATE UR QL: NEGATIVE
NONHDLC SERPL-MCNC: 88 MG/DL
PH UR STRIP: 7 PH (ref 5–7)
PLATELET # BLD AUTO: 235 10E9/L (ref 150–450)
POTASSIUM SERPL-SCNC: 4.3 MMOL/L (ref 3.4–5.3)
PROT SERPL-MCNC: 7 G/DL (ref 6.8–8.8)
RBC # BLD AUTO: 4.5 10E12/L (ref 4.4–5.9)
RBC #/AREA URNS AUTO: 0 /HPF (ref 0–2)
SODIUM SERPL-SCNC: 140 MMOL/L (ref 133–144)
SP GR UR STRIP: 1.01 (ref 1–1.03)
TRIGL SERPL-MCNC: 90 MG/DL
URN SPEC COLLECT METH UR: NORMAL
UROBILINOGEN UR STRIP-MCNC: 0 MG/DL (ref 0–2)
WBC # BLD AUTO: 6.9 10E9/L (ref 4–11)
WBC #/AREA URNS AUTO: 0 /HPF (ref 0–2)

## 2017-06-26 RX ORDER — CIPROFLOXACIN 500 MG/1
500 TABLET, FILM COATED ORAL 2 TIMES DAILY
Qty: 6 TABLET | Refills: 0 | Status: SHIPPED | OUTPATIENT
Start: 2017-06-26 | End: 2017-09-28

## 2017-06-26 RX ORDER — ATOVAQUONE AND PROGUANIL HYDROCHLORIDE 250; 100 MG/1; MG/1
1 TABLET, FILM COATED ORAL DAILY
Qty: 23 TABLET | Refills: 0 | Status: SHIPPED | OUTPATIENT
Start: 2017-06-26 | End: 2017-09-28

## 2017-06-26 ASSESSMENT — PAIN SCALES - GENERAL: PAINLEVEL: NO PAIN (0)

## 2017-06-26 NOTE — MR AVS SNAPSHOT
After Visit Summary   6/26/2017    Tom Saini    MRN: [id number]           Patient Information     Date Of Birth          1935        Visit Information        Provider Department      6/26/2017 9:30 AM Angeli Robertson MD [facility]        Today's Diagnoses     Skin lesion    -  1    Travel advice encounter        Hyperlipidemia LDL goal <100        Routine general medical examination at a health care facility          Care Instructions    Primary Care Center Phone Number [phone]  Primary Care Center Medication Refill Request Information:  * Please contact your pharmacy regarding ANY request for medication refills.  ** [facility] Prescription Fax = [phone]  * Please allow 3 business days for routine medication refills.  * Please allow 5 business days for controlled substance medication refills.     Primary Care Center Test Result notification information:  *You will be notified with in 7-10 days of your appointment day regarding the results of your test.  If you are on MyChart you will be notified as soon as the provider has reviewed the results and signed off on them.            Schedule a Dermatology visit--Dr. Oscar  Labs today  I will send in a prescription for Malaria medication and antibiotics for traveler diarrhea--keep on file at pharmacy    Please schedule the following appointments:  Dermatology [phone] ([facility] 3rd floor)  [facility] [phone] ([address] [place] [address] 208)            Follow-ups after your visit        Additional Services     DERMATOLOGY REFERRAL       Your provider has referred you to: [facility]: Dermatology Clinic [facility] [phone]   [email or web address]    Please be aware that coverage of these services is subject to the terms and limitations of your health insurance plan.  Call member services at your health plan with any benefit or coverage questions.      Please bring the following  with you to your appointment:    (1) Any X-Rays, CTs or MRIs which have been performed.  Contact the facility where they were done to arrange for  prior to your scheduled appointment.    (2) List of current medications  (3) This referral request   (4) Any documents/labs given to you for this referral                  Your next 10 appointments already scheduled     Jun 26, 2017 10:15 AM CDT   LAB with  LAB   [facility] ([facility])    [address]  1st Floor  [facility] [id number] [phone]           Patient must bring picture ID.  Patient should be prepared to give a urine specimen  Please do not eat 10-12 hours before your appointment if you are coming in fasting for labs on lipids, cholesterol, or glucose (sugar).  Pregnant women should follow their Care Team instructions. Water with medications is okay. Do not drink coffee or other fluids.   If you have concerns about taking  your medications, please ask at office or if scheduling via Possibility Spacehart, send a message by clicking on Secure Messaging, Message Your Care Team.            Jul 13, 2017  1:30 PM CDT   RETURN GENERAL with Brigido Laura MD   Eye Clinic ([facility])    [address]  [address]  [address] [address]  [facility] [id number] [phone]              Future tests that were ordered for you today     Open Future Orders        Priority Expected Expires Ordered    UA with Micro reflex to Culture Routine 6/26/2017 6/26/2018 6/26/2017    Comprehensive metabolic panel Routine 6/26/2017 7/10/2017 6/26/2017    Lipid panel reflex to direct LDL Routine 6/26/2017 7/10/2017 6/26/2017    CBC with platelets Routine 6/26/2017 7/10/2017 6/26/2017            Who to contact     Please call your clinic at [phone] to:    Ask questions about your health    Make or cancel appointments    Discuss your medicines    Learn about your test results    Speak to your doctor   If you have  compliments or concerns about an experience at your clinic, or if you wish to file a complaint, please contact [facility] Patient Relations at [phone] or email us at [email or web address]         Additional Information About Your Visit        MeetMehart Information     Pathways Platformt is an electronic gateway that provides easy, online access to your medical records. With DTT, you can request a clinic appointment, read your test results, renew a prescription or communicate with your care team.     To sign up for DTT visit the website at [email or web address]   You will be asked to enter the access code listed below, as well as some personal information. Please follow the directions to create your username and password.     Your access code is: [email or web address]  Expires: 2017  9:54 AM     Your access code will  in 90 days. If you need help or a new code, please contact your [facility] Clinic or call [phone] for assistance.        Care EveryWhere ID     This is your Care EveryWhere ID. This could be used by other organizations to access your [place] medical records  [id number]        Your Vitals Were     Pulse Respirations Pulse Oximetry BMI (Body Mass Index)          80 20 95% 26.79 kg/m2         Blood Pressure from Last 3 Encounters:   17 123/67   02/10/17 125/73   10/05/16 123/69    Weight from Last 3 Encounters:   17 84.7 kg (186 lb 11.2 oz)   02/10/17 82.1 kg (181 lb 1.6 oz)   10/05/16 83.4 kg (183 lb 14.4 oz)              We Performed the Following     DERMATOLOGY REFERRAL     TYPHOID VACCINE, IM          Today's Medication Changes          These changes are accurate as of: 17 10:13 AM.  If you have any questions, ask your nurse or doctor.               Start taking these medicines.        Dose/Directions    atovaquone-proguanil 250-100 MG per tablet   Commonly known as:  MALARONE   Used for:  Travel advice  encounter   Started by:  Angeli Robertson MD        Dose:  1 tablet   Take 1 tablet by mouth daily Start 2 days before travel and continue 7 days after return.   Quantity:  23 tablet   Refills:  0       ciprofloxacin 500 MG tablet   Commonly known as:  CIPRO   Used for:  Travel advice encounter   Started by:  Angeli Robertson MD        Dose:  500 mg   Take 1 tablet (500 mg) by mouth 2 times daily If needed for severe travelers diarrhea   Quantity:  6 tablet   Refills:  0            Where to get your medicines      Some of these will need a paper prescription and others can be bought over the counter.  Ask your nurse if you have questions.     Bring a paper prescription for each of these medications     atovaquone-proguanil 250-100 MG per tablet    ciprofloxacin 500 MG tablet                Primary Care Provider Office Phone # Fax #    Angeli Robertson [phone]       [address] [place] [address]        Equal Access to Services     RAEANN WALLACE AH: Hadii kelly ku hadasho Soomaali, waaxda luqadaha, qaybta kaalmada adeegyada, waxay giovanyin haytennillen mary jo smith. So [facility] [phone].    ATENCIÓN: Si habla español, tiene a milton disposición servicios gratuitos de asistencia lingüística. [facility] [phone].    We comply with applicable federal civil rights laws and [place] laws. We do not discriminate on the basis of race, color, national origin, age, disability sex, sexual orientation or gender identity.            Thank you!     Thank you for choosing [facility] CL[facility]  for your care. Our goal is always to provide you with excellent care. Hearing back from our patients is one way we can continue to improve our services. Please take a few minutes to complete the written survey that you may receive in the mail after your visit with us. Thank you!             Your Updated Medication List - Protect others around you: Learn how to safely use,  store and throw away your medicines at www.disposemymeds.org.          This list is accurate as of: 6/26/17 10:13 AM.  Always use your most recent med list.                   Brand Name Dispense Instructions for use Diagnosis    aspirin 81 MG tablet      Take 1 tablet by mouth daily.        atorvastatin 10 MG tablet    LIPITOR    90 tablet    Take 1 tablet (10 mg) by mouth daily    Hyperlipidemia LDL goal <100       atovaquone-proguanil 250-100 MG per tablet    MALARONE    23 tablet    Take 1 tablet by mouth daily Start 2 days before travel and continue 7 days after return.    Travel advice encounter       cholecalciferol 1000 UNITS capsule    vitamin  -D     Take 1 capsule by mouth daily.        ciprofloxacin 500 MG tablet    CIPRO    6 tablet    Take 1 tablet (500 mg) by mouth 2 times daily If needed for severe travelers diarrhea    Travel advice encounter       multivitamin Tabs tablet      Take 2 tablets by mouth 2 times daily.

## 2017-06-26 NOTE — PATIENT INSTRUCTIONS
Primary Care Center Phone Number [phone]  Primary Care Center Medication Refill Request Information:  * Please contact your pharmacy regarding ANY request for medication refills.  ** [facility] Prescription Fax = [phone]  * Please allow 3 business days for routine medication refills.  * Please allow 5 business days for controlled substance medication refills.     Primary Care Center Test Result notification information:  *You will be notified with in 7-10 days of your appointment day regarding the results of your test.  If you are on MyChart you will be notified as soon as the provider has reviewed the results and signed off on them.            Schedule a Dermatology visit--Dr. Oscar  Labs today  I will send in a prescription for Malaria medication and antibiotics for traveler diarrhea--keep on file at pharmacy    Please schedule the following appointments:  Dermatology [phone] ([facility] 3rd floor)  [facility] [phone] ([address] [place] [address])

## 2017-06-26 NOTE — PROGRESS NOTES
Mr. Saini is a 82 year old male here for general follow up.    History of Present Illness:  Mr. Saini is a sixto retired  who is an avid  and traveler. He is here today for general health check-in.  He has no major concerns today.    No concerns or changes in health that he reports.  States energy good, he is making photo books of his life lately to document his history using in-design software.  He goes to group computer courses.  He does walk for exercise but no dedicated cardio/weights.  There are 27 steps to house which he walks.  He denies cardiopulmonary symptoms, no GI symptoms.  Has been seeing ophtho for macular degeneration lately.  Noted trouble reading small numbers in cross word puzzle.  Denies leg swelling, urinary symptoms, skin concerns.  Though he does have numerous SKs/nevi.  Reports last colonoscopy--no polyps.  No report on file.    He is going on a photo [employer] for 2 weeks in September.  Will be staying in [place], will be gone for 2 weeks.  He is going to [facility], [facility].  He got a new Sigma lens, Codility camera.  Wife is not going.    A full 10-pt Review of Systems was performed and is negative except as indicated in the HPI.      Past Medical History:  Past Medical History:   Diagnosis Date     Aortic insufficiency      Erectile dysfunction      Hyperlipidemia      Low back pain     disc disease s/p laminectomy in past     Macular degeneration      Siobhan 2013       Past Surgical History:  Past Surgical History:   Procedure Laterality Date     CATARACT IOL, RT/LT  2001     EYE SURGERY       LAMINECTOMY LUMBAR TWO LEVELS  1993    L4-L5     RECESSION RESECTION (REPAIR STRABISMUS)  1949       Active Meds:  Current Outpatient Prescriptions   Medication     atorvastatin (LIPITOR) 10 MG tablet     cholecalciferol (VITAMIN D3) 1000 UNITS capsule     multivitamin (OCUVITE) TABS     aspirin 81 MG tablet     Current Facility-Administered  Medications   Medication     bevacizumab (AVASTIN) intravitreal inj 1.25 mg     bevacizumab (AVASTIN) intravitreal inj 1.25 mg        Allergies:  Codeine sulfate    Family History:  family history includes CANCER in his father and mother; Skin Cancer in his mother.    Social History:  Social History   Substance Use Topics     Smoking status: Former Smoker     Packs/day: 2.00     Years: 2.00     Quit date: 6/25/1954     Smokeless tobacco: Never Used     Alcohol use Yes       Physical Exam:  Vitals: /67  Pulse 80  Resp 20  Wt 84.7 kg (186 lb 11.2 oz)  SpO2 95%  BMI 26.79 kg/m2  Constitutional: Alert, oriented, pleasant, no acute distress  Head: Normocephalic, atraumatic  ENT: EOMI, pupils reactive, OP with fair dentition, MMM  Neck: supple, no LAD  Cardiovascular: Regular rate and rhythm, 3/6 blowing systolic ejection murmur R/L SB, 1/6 early diastolic murmur, no rubs or gallops, peripheral pulses full/symmetric  Respiratory: Good air movement bilaterally, lungs clear, no wheezes/rales/rhonchi  Abd: Soft, nondistended, nontender, no masses  Musculoskeletal: Trace bilat pitting ankle/tibia edema, normal muscle tone, normal gait  Skin: Multiple dark colored SK on trunk, raised flesh colored papule on R forehead  Psychiatric: normal mentation, affect and mood          Assessment and Plan:  Tom was seen today for physical and travel clinic.    Diagnoses and all orders for this visit:    Skin lesion: Difficult to assess, numerous SK, some inflamed.  Lesion on forehead inflamed today and difficult to assess malignant potential. Advised that he should have Dermatology assessment given multiple lesions. Advised sun screen and hats nesha when traveling.  -     DERMATOLOGY REFERRAL    Travel advice encounter  -     TYPHOID VACCINE, IM  -     atovaquone-proguanil (MALARONE) 250-100 MG per tablet; Take 1 tablet by mouth daily Start 2 days before travel and continue 7 days after return.  -     ciprofloxacin (CIPRO)  500 MG tablet; Take 1 tablet (500 mg) by mouth 2 times daily If needed for severe travelers diarrhea    Hyperlipidemia LDL goal <100  -     Comprehensive metabolic panel; Future  -     Lipid panel reflex to direct LDL; Future    Routine general medical examination at a health care facility  -     UA with Micro reflex to Culture; Future--father had TCC but was also smoker  -     Comprehensive metabolic panel; Future  -     CBC with platelets; Future    #Routine Health Maintenence:  Immunizations (zoster, pneumovax, flu, Tdap, Hep A/B):   Most Recent Immunizations   Administered Date(s) Administered     Hepatitis B 08/19/2016     Influenza (High Dose) 3 valent vaccine 10/05/2016     Influenza (IIV3) 10/23/2014     Influenza Vaccine IM 3yrs+ 4 Valent IIV4 10/01/2013     Japanese Encephalitis IM 02/02/2016     Pneumococcal (PCV 13) 06/25/2015     Pneumococcal 23 valent 06/10/2014     TD (ADULT, 7+) 01/31/2011     TDAP Vaccine (Boostrix) 06/10/2014     Typhoid IM 06/25/2015     Yellow Fever 01/26/2011     Zoster vaccine, live 07/02/2009   Deferred Date(s) Deferred     Singaporean Encephalitis IM 08/19/2016     Lipids:   Recent Labs   Lab Test  12/04/13   0811  12/18/12   0806   CHOL  161  161   HDL  53  50   LDL  96  98   TRIG  62  68   CHOLHDLRATIO  3.1  3.2     PSA (50-75 yrs):   PSA   Date Value Ref Range Status   10/22/2008 1.07 0 - 4 ug/L Final      AAA Screening (65-75 yrs): n/a  Lung Ca Screening (>30 pk age 55-79 or >20 py age 50-79 + RF): n/a  Colonoscopy (50-75 yrs): previously normal per patient, no results  Dexa (>65W or 70M yrs): deferred  Advanced Directive: not discussed    Return to clinic:  Annually or prn    Angeli Robertson MD  Internal Medicine

## 2017-06-26 NOTE — NURSING NOTE
Chief Complaint   Patient presents with     Physical     pt is here for an annual physical     Travel Clinic     pt is here to discuss upcoming travel to [facility]        Kay Singh CMA at 9:29 AM on 6/26/2017

## 2017-07-13 ENCOUNTER — OFFICE VISIT (OUTPATIENT)
Dept: OPHTHALMOLOGY | Facility: CLINIC | Age: 82
End: 2017-07-13
Attending: OPHTHALMOLOGY
Payer: MEDICARE

## 2017-07-13 DIAGNOSIS — H35.3210 EXUDATIVE AGE-RELATED MACULAR DEGENERATION OF RIGHT EYE (H): Primary | ICD-10-CM

## 2017-07-13 PROCEDURE — 40000269 ZZH STATISTIC NO CHARGE FACILITY FEE: Mod: ZF

## 2017-07-13 PROCEDURE — 25000128 H RX IP 250 OP 636: Mod: ZF | Performed by: OPHTHALMOLOGY

## 2017-07-13 PROCEDURE — C9257 BEVACIZUMAB INJECTION: HCPCS | Mod: ZF | Performed by: OPHTHALMOLOGY

## 2017-07-13 PROCEDURE — 67028 INJECTION EYE DRUG: CPT | Mod: ZF | Performed by: OPHTHALMOLOGY

## 2017-07-13 RX ADMIN — Medication 1.25 MG: at 14:20

## 2017-07-13 ASSESSMENT — REFRACTION_WEARINGRX
OD_CYLINDER: +1.00
OD_AXIS: 175
OD_ADD: +3.00
OS_ADD: +3.00
OS_SPHERE: -1.75
SPECS_TYPE: BIFOCAL
OD_SPHERE: -2.50
OS_AXIS: 175
OS_CYLINDER: +1.00

## 2017-07-13 ASSESSMENT — TONOMETRY
OD_IOP_MMHG: 13
IOP_METHOD: TONOPEN
OS_IOP_MMHG: 11

## 2017-07-13 ASSESSMENT — VISUAL ACUITY
CORRECTION_TYPE: GLASSES
OS_PH_CC: 20/40-2
OS_CC: 20/50
OS_CC+: +1
METHOD: SNELLEN - LINEAR
OD_CC: 20/100
OD_PH_CC: 20/60-2
OD_CC+: -1

## 2017-07-13 ASSESSMENT — CONF VISUAL FIELD
METHOD: COUNTING FINGERS
OS_NORMAL: 1
OD_NORMAL: 1

## 2017-07-13 NOTE — PROGRESS NOTES
Assessment & Plan      Tom Saini is a 82 year old male with the following diagnoses:   1. Exudative age-related macular degeneration of right eye (H)         Avastin injection #3 of 3 today  RBA reviewed, consent obtained  Infection precautions reviewed, call urgently should sign/symptoms develop      Patient disposition:   Return in about 4 weeks (around 8/10/2017) for DFE, OCT Macula, FA transit right.          Attending Physician Attestation: Complete documentation of historical and exam elements from today's encounter can be found in the full encounter summary report (not reduplicated in this progress note). I personally obtained the chief complaint(s) and history of present illness.  I confirmed and edited as necessary the review of systems, past medical/surgical history, family history, social history, and examination findings as documented by others; and I examined the patient myself. I personally reviewed the relevant tests, images, and reports as documented above. I formulated and edited as necessary the assessment and plan and discussed the findings and management plan with the patient and family. - Brigido Laura MD  6/6/2017   Attending Physician Procedure Attestation: I was present for the entire procedure

## 2017-07-13 NOTE — MR AVS SNAPSHOT
After Visit Summary   7/13/2017    Tom Saini    MRN: [id number]           Patient Information     Date Of Birth          1935        Visit Information        Provider Department      7/13/2017 1:30 PM Brigido Laura MD Eye Clinic        Today's Diagnoses     Exudative age-related macular degeneration of right eye (H)    -  1       Follow-ups after your visit        Follow-up notes from your care team     Return in about 4 weeks (around 8/10/2017) for DFE, OCT Macula, FA transit right.      Your next 10 appointments already scheduled     Aug 10, 2017 10:30 AM CDT   RETURN GENERAL with Brigido Laura MD   Eye Clinic ([facility])    [address]  [address] [address]  [facility] [id number] [phone]            Sep 28, 2017  2:45 PM CDT   (Arrive by 2:30 PM)   Return Visit with King Oscar MD   [facility] ([facility])    [address]  [address] [facility] [address] [phone]              Who to contact     Please call your clinic at [phone] to:    Ask questions about your health    Make or cancel appointments    Discuss your medicines    Learn about your test results    Speak to your doctor   If you have compliments or concerns about an experience at your clinic, or if you wish to file a complaint, please contact [facility] Patient Relations at [phone] or email us at [email or web address]         Additional Information About Your Visit        MyChart Information     [employer] is an electronic gateway that provides easy, online access to your medical records. With [employer], you can request a clinic appointment, read your test results, renew a prescription or communicate with your care team.     To sign up for E-[employer] visit the website at [email or web address]   You will be asked to enter the access code listed below, as well as some  personal information. Please follow the directions to create your username and password.     Your access code is: [email or web address]  Expires: 2017  9:54 AM     Your access code will  in 90 days. If you need help or a new code, please contact your [facility] or call [phone] for assistance.        Care EveryWhere ID     This is your Care EveryWhere ID. This could be used by other organizations to access your [place] medical records  [id number]         Blood Pressure from Last 3 Encounters:   17 123/67   02/10/17 125/73   10/05/16 123/69    Weight from Last 3 Encounters:   17 84.7 kg (186 lb 11.2 oz)   02/10/17 82.1 kg (181 lb 1.6 oz)   10/05/16 83.4 kg (183 lb 14.4 oz)              We Performed the Following     Avastin (Bevacizumab) 1.25MG Intravitreal Injection OD (right eye)        Primary Care Provider Office Phone # Fax #    [place]illa Robertson [phone]       [address] [place] [address]        Equal Access to Services     RAEANN WALLACE AH: Hadii kelly nazarioo Soanna, waaxda luqadaha, qaybta kaalmada adeegyada, ayan smith. So [facility] [phone].    ATENCIÓN: Si habla español, tiene a milton disposición servicios gratuitos de asistencia lingüística. [facility] [phone].    We comply with applicable federal civil rights laws and [place] laws. We do not discriminate on the basis of race, color, national origin, age, disability sex, sexual orientation or gender identity.            Thank you!     Thank you for choosing EYE CLINIC  for your care. Our goal is always to provide you with excellent care. Hearing back from our patients is one way we can continue to improve our services. Please take a few minutes to complete the written survey that you may receive in the mail after your visit with us. Thank you!             Your Updated Medication List - Protect others around you: Learn  how to safely use, store and throw away your medicines at www.disposemymeds.org.          This list is accurate as of: 7/13/17  2:33 PM.  Always use your most recent med list.                   Brand Name Dispense Instructions for use Diagnosis    aspirin 81 MG tablet      Take 1 tablet by mouth daily.        atorvastatin 10 MG tablet    LIPITOR    90 tablet    Take 1 tablet (10 mg) by mouth daily    Hyperlipidemia LDL goal <100       atovaquone-proguanil 250-100 MG per tablet    MALARONE    23 tablet    Take 1 tablet by mouth daily Start 2 days before travel and continue 7 days after return.    Travel advice encounter       cholecalciferol 1000 UNITS capsule    vitamin  -D     Take 1 capsule by mouth daily.        ciprofloxacin 500 MG tablet    CIPRO    6 tablet    Take 1 tablet (500 mg) by mouth 2 times daily If needed for severe travelers diarrhea    Travel advice encounter       multivitamin Tabs tablet      Take 2 tablets by mouth 2 times daily.

## 2017-08-10 ENCOUNTER — OFFICE VISIT (OUTPATIENT)
Dept: OPHTHALMOLOGY | Facility: CLINIC | Age: 82
End: 2017-08-10
Attending: OPHTHALMOLOGY
Payer: MEDICARE

## 2017-08-10 DIAGNOSIS — H35.3210 EXUDATIVE AGE-RELATED MACULAR DEGENERATION OF RIGHT EYE (H): Primary | ICD-10-CM

## 2017-08-10 DIAGNOSIS — H35.3210 EXUDATIVE AGE-RELATED MACULAR DEGENERATION OF RIGHT EYE (H): ICD-10-CM

## 2017-08-10 PROCEDURE — 92134 CPTRZ OPH DX IMG PST SGM RTA: CPT | Mod: ZF | Performed by: OPHTHALMOLOGY

## 2017-08-10 PROCEDURE — 92235 FLUORESCEIN ANGRPH MLTIFRAME: CPT | Mod: ZF | Performed by: OPHTHALMOLOGY

## 2017-08-10 PROCEDURE — 99213 OFFICE O/P EST LOW 20 MIN: CPT | Mod: ZF

## 2017-08-10 ASSESSMENT — CUP TO DISC RATIO
OS_RATIO: 0.3
OD_RATIO: 0.3

## 2017-08-10 ASSESSMENT — TONOMETRY
OD_IOP_MMHG: 13
IOP_METHOD: TONOPEN
OS_IOP_MMHG: 11

## 2017-08-10 ASSESSMENT — REFRACTION_WEARINGRX
OS_SPHERE: -1.75
SPECS_TYPE: BIFOCAL
OD_SPHERE: -2.50
OS_ADD: +3.00
OS_CYLINDER: +1.00
OD_AXIS: 175
OS_AXIS: 175
OD_CYLINDER: +1.00
OD_ADD: +3.00

## 2017-08-10 ASSESSMENT — CONF VISUAL FIELD
METHOD: COUNTING FINGERS
OD_NORMAL: 1
OS_NORMAL: 1

## 2017-08-10 ASSESSMENT — VISUAL ACUITY
OS_CC+: -1
METHOD: SNELLEN - LINEAR
OD_PH_CC: 20/50+2
OD_CC: 20/50
OD_CC+: -1
OS_CC: 20/40

## 2017-08-10 ASSESSMENT — EXTERNAL EXAM - LEFT EYE: OS_EXAM: NORMAL

## 2017-08-10 ASSESSMENT — SLIT LAMP EXAM - LIDS
COMMENTS: NORMAL
COMMENTS: NORMAL

## 2017-08-10 ASSESSMENT — EXTERNAL EXAM - RIGHT EYE: OD_EXAM: NORMAL

## 2017-08-10 NOTE — PROGRESS NOTES
Assessment & Plan      Tom Saini is a 82 year old male with the following diagnoses:   1. Exudative age-related macular degeneration of right eye (H)         S/P Avastin injection x 3   Fluid resolved, no leakage on Fluorescein angiography  Discussed continued treatment vs treat and extend  Wishes to monitor and extend tx   Plan to recheck in 4 weeks with injection and OCT mac  Q8 week series if tolerated  Amsler/AREDS to continue    Patient disposition:   Return in about 4 weeks (around 9/7/2017) for Avastin Right, OCT Macula.          Attending Physician Attestation:  Complete documentation of historical and exam elements from today's encounter can be found in the full encounter summary report (not reduplicated in this progress note).  I personally obtained the chief complaint(s) and history of present illness.  I confirmed and edited as necessary the review of systems, past medical/surgical history, family history, social history, and examination findings as documented by others; and I examined the patient myself.  I personally reviewed the relevant tests, images, and reports as documented above.  I formulated and edited as necessary the assessment and plan and discussed the findings and management plan with the patient and family. Attending Physician Image/Tesing Attestation: I personally reviewed the ophthalmic test(s) associated with this encounter, agree with the interpretation(s) as documented by the resident/fellow, and have edited the corresponding report(s) as necessary.  . - Brigido Laura MD

## 2017-08-10 NOTE — MR AVS SNAPSHOT
After Visit Summary   8/10/2017    Tom Saini    MRN: [id number]           Patient Information     Date Of Birth          1935        Visit Information        Provider Department      8/10/2017 10:30 AM Brigido Laura MD Eye Clinic        Today's Diagnoses     Exudative age-related macular degeneration of right eye (H)           Follow-ups after your visit        Your next 10 appointments already scheduled     Aug 11, 2017  2:00 PM CDT   TECH with [facility] EYE TECH   Eye Clinic ([facility])    [address]  [address] [address]   [phone]            Sep 19, 2017  1:45 PM CDT   RETURN GENERAL with Brigido Laura MD   Eye Clinic ([facility])    [address]  [address] [place] [address]   [phone]            Sep 28, 2017  2:45 PM CDT   (Arrive by 2:30 PM)   Return Visit with King Oscar MD   [facility] Dermatology ([facility] [place])    [address] [place] [id number] [phone]              Who to contact     Please call your clinic at [phone] to:    Ask questions about your health    Make or cancel appointments    Discuss your medicines    Learn about your test results    Speak to your doctor   If you have compliments or concerns about an experience at your clinic, or if you wish to file a complaint, please contact [facility] Patient Relations at [phone] or email us at [email or web address]         Additional Information About Your Visit        MyChart Information     [employer] is an electronic gateway that provides easy, online access to your medical records. With Radius Networks, you can request a clinic appointment, read your test results, renew a prescription or communicate with your care team.     To sign up for [employer] visit the website at [email or web address]   You will  be asked to enter the access code listed below, as well as some personal information. Please follow the directions to create your username and password.     Your access code is: [email or web address]  Expires: 2017  9:54 AM     Your access code will  in 90 days. If you need help or a new code, please contact your [facility] or call [phone] for assistance.        Care EveryWhere ID     This is your Care EveryWhere ID. This could be used by other organizations to access your [place] medical records  [id number]         Blood Pressure from Last 3 Encounters:   17 123/67   02/10/17 125/73   10/05/16 123/69    Weight from Last 3 Encounters:   17 84.7 kg (186 lb 11.2 oz)   02/10/17 82.1 kg (181 lb 1.6 oz)   10/05/16 83.4 kg (183 lb 14.4 oz)              We Performed the Following     Fluorescein Angiography OU (both eyes)     OCT Retina Spectralis OU (both eyes)        Primary Care Provider Office Phone # Fax #    Angeli Robertson [phone]       [address] [place] [address] [address] [place] [address]        Equal Access to Services     RAEANN WALLACE AH: Hadii aad ku hadasho Soomaali, waaxda luqadaha, qaybta kaalmada adeegyada, ayan adairin hayanup smith. So [facility] [phone].    ATENCIÓN: Si habla español, tiene a milton disposición servicios gratuitos de asistencia lingüística. Llame al [phone].    We comply with applicable federal civil rights laws and [place] laws. We do not discriminate on the basis of race, color, national origin, age, disability sex, sexual orientation or gender identity.            Thank you!     Thank you for choosing EYE CLINIC  for your care. Our goal is always to provide you with excellent care. Hearing back from our patients is one way we can continue to improve our services. Please take a few minutes to complete the written survey that you may receive in the mail after your visit with us. Thank you!              Your Updated Medication List - Protect others around you: Learn how to safely use, store and throw away your medicines at www.disposemymeds.org.          This list is accurate as of: 8/10/17 12:00 PM.  Always use your most recent med list.                   Brand Name Dispense Instructions for use Diagnosis    aspirin 81 MG tablet      Take 1 tablet by mouth daily.        atorvastatin 10 MG tablet    LIPITOR    90 tablet    Take 1 tablet (10 mg) by mouth daily    Hyperlipidemia LDL goal <100       atovaquone-proguanil 250-100 MG per tablet    MALARONE    23 tablet    Take 1 tablet by mouth daily Start 2 days before travel and continue 7 days after return.    Travel advice encounter       cholecalciferol 1000 UNITS capsule    vitamin  -D     Take 1 capsule by mouth daily.        ciprofloxacin 500 MG tablet    CIPRO    6 tablet    Take 1 tablet (500 mg) by mouth 2 times daily If needed for severe travelers diarrhea    Travel advice encounter       multivitamin Tabs tablet      Take 2 tablets by mouth 2 times daily.

## 2017-08-10 NOTE — NURSING NOTE
Chief Complaints and History of Present Illnesses   Patient presents with     Follow Up For     Exudative age-related macular degeneration of right eye      HPI    Last Eye Exam:  7/13/17   Affected eye(s):  Right   Symptoms:        Unknown duration    Frequency:  Constant       Do you have eye pain now?:  No      Comments:  He is here today for a follow up of Exudative age-related macular degeneration of right eye   He is also here for an OCT and FA right transit.  He feels his vision has declined since last visit.    Tom Armijo COT 10:45 AM August 10, 2017

## 2017-08-11 ENCOUNTER — ALLIED HEALTH/NURSE VISIT (OUTPATIENT)
Dept: OPHTHALMOLOGY | Facility: CLINIC | Age: 82
End: 2017-08-11
Attending: OPHTHALMOLOGY
Payer: MEDICARE

## 2017-08-11 DIAGNOSIS — H35.3290 AMD (AGE-RELATED MACULAR DEGENERATION), WET (H): ICD-10-CM

## 2017-08-11 DIAGNOSIS — Z96.1 PSEUDOPHAKIA OF BOTH EYES: Primary | ICD-10-CM

## 2017-08-11 PROCEDURE — 99213 OFFICE O/P EST LOW 20 MIN: CPT | Mod: ZF

## 2017-08-11 PROCEDURE — 40000809 ZZH STATISTIC NO DOCUMENTATION TO SUPPORT CHARGE

## 2017-08-11 PROCEDURE — 92015 DETERMINE REFRACTIVE STATE: CPT | Mod: GY,ZF

## 2017-08-11 ASSESSMENT — REFRACTION_MANIFEST
OD_AXIS: 167
OD_SPHERE: -2.75
OD_ADD: +2.75
OS_AXIS: 010
OS_SPHERE: -1.50
OS_ADD: +2.75
OS_CYLINDER: +0.50
OD_CYLINDER: +1.00

## 2017-08-11 ASSESSMENT — REFRACTION_WEARINGRX
OS_CYLINDER: +1.00
OD_SPHERE: -2.50
SPECS_TYPE: BIFOCAL
OS_AXIS: 175
OD_ADD: +3.00
OD_CYLINDER: +1.00
OD_AXIS: 175
OS_SPHERE: -1.75
OS_ADD: +3.00

## 2017-08-11 ASSESSMENT — VISUAL ACUITY
OS_CC: 20/40
CORRECTION_TYPE: GLASSES
OD_CC+: -1
METHOD: SNELLEN - LINEAR
OS_CC+: -1
OD_CC: 20/50

## 2017-08-11 ASSESSMENT — CONF VISUAL FIELD
METHOD: COUNTING FINGERS
OD_NORMAL: 1
OS_NORMAL: 1

## 2017-08-11 NOTE — MR AVS SNAPSHOT
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 [id number] [phone]            Sep 28, 2017  2:45 PM CDT   (Arrive by 2:30 PM)   Return Visit with King Oscar MD   [facility] Dermatology ([facility] [place])    [address] [facility]  [address] [facility] [address] [phone]              Who to contact     Please call your clinic at [phone] to:    Ask questions about your health    Make or cancel appointments    Discuss your medicines    Learn about your test results    Speak to your doctor   If you have compliments or concerns about an experience at your clinic, or if you wish to file a complaint, please contact [facility] Patient Relations at [phone] or email us at [email or web address]         Additional Information About Your Visit        MyChart Information     IntelliDOTt is an electronic gateway that provides easy, online access to your medical records. With [employer], you can request a clinic appointment, read your test results, renew a prescription or communicate with your care team.     To sign up for IntelliDOTt visit the website at [email or web address]   You will be asked to enter the access code listed below, as well as some personal information. Please follow the directions to create your username and password.     Your access code is:  [email or web address]  Expires: 2017  9:54 AM     Your access code will  in 90 days. If you need help or a new code, please contact your [facility] or call [phone] for assistance.        Care EveryWhere ID     This is your Care EveryWhere ID. This could be used by other organizations to access your [facility] medical records  [id number]         Blood Pressure from Last 3 Encounters:   17 123/67   02/10/17 125/73   10/05/16 123/69    Weight from Last 3 Encounters:   17 84.7 kg (186 lb 11.2 oz)   02/10/17 82.1 kg (181 lb 1.6 oz)   10/05/16 83.4 kg (183 lb 14.4 oz)              Today, you had the following     No orders found for display       Primary Care Provider Office Phone # Fax #    Angeli Robertson [phone]       [address] [place] [address] [address] [place] [address]        Equal Access to Services     MILAN WALLACE : Hadii aad ku hadasho Soomaali, waaxda luqadaha, qaybta kaalmada adeegyada, ayan muniz hayanup taylor . So [facility] [phone].    ATENCIÓN: Si habla español, tiene a milton disposición servicios gratuitos de asistencia lingüística. Llame al [phone].    We comply with applicable federal civil rights laws and [place] laws. We do not discriminate on the basis of race, color, national origin, age, disability sex, sexual orientation or gender identity.            Thank you!     Thank you for choosing EYE CLINIC  for your care. Our goal is always to provide you with excellent care. Hearing back from our patients is one way we can continue to improve our services. Please take a few minutes to complete the written survey that you may receive in the mail after your visit with us. Thank you!             Your Updated Medication List - Protect others around you: Learn how to safely use, store and throw away your medicines at www.disposemymeds.org.          This list is accurate as of: 17 11:59 PM.  Always use your most recent  med list.                   Brand Name Dispense Instructions for use Diagnosis    aspirin 81 MG tablet      Take 1 tablet by mouth daily.        atorvastatin 10 MG tablet    LIPITOR    90 tablet    Take 1 tablet (10 mg) by mouth daily    Hyperlipidemia LDL goal <100       atovaquone-proguanil 250-100 MG per tablet    MALARONE    23 tablet    Take 1 tablet by mouth daily Start 2 days before travel and continue 7 days after return.    Travel advice encounter       cholecalciferol 1000 UNITS capsule    vitamin  -D     Take 1 capsule by mouth daily.        ciprofloxacin 500 MG tablet    CIPRO    6 tablet    Take 1 tablet (500 mg) by mouth 2 times daily If needed for severe travelers diarrhea    Travel advice encounter       multivitamin Tabs tablet      Take 2 tablets by mouth 2 times daily.

## 2017-08-11 NOTE — NURSING NOTE
Chief Complaints and History of Present Illnesses   Patient presents with     Follow Up For     Exudative age-related macular degeneration of right eye, refraction OU     HPI    Last Eye Exam:  8/10/17   Affected eye(s):  Both   Symptoms:        Unknown duration    Frequency:  Constant       Do you have eye pain now?:  No      Comments:  He is here today for a refraction from his last visit for Exudative age-related macular degeneration of right eye  His vision is the same as yesterday and he states no pain or discomfort.   His glasses do not work well for him and he is in need of a new glasses RX.    Tom Armijo COT 2:09 PM August 11, 2017

## 2017-08-11 NOTE — Clinical Note
We took our time with refraction since he is a  and was particular about choosing between 1 or 2.  Please review and close

## 2017-08-14 NOTE — PROGRESS NOTES
Tech visit for prescription only, not seen by physician.      Brigido Laura MD  , Comprehensive Ophthalmology  Department of Ophthalmology and Visual Neurosciences  [facility]

## 2017-09-19 ENCOUNTER — OFFICE VISIT (OUTPATIENT)
Dept: OPHTHALMOLOGY | Facility: CLINIC | Age: 82
End: 2017-09-19
Attending: OPHTHALMOLOGY
Payer: MEDICARE

## 2017-09-19 DIAGNOSIS — H35.3290 EXUDATIVE SENILE MACULAR DEGENERATION OF RETINA (H): Primary | ICD-10-CM

## 2017-09-19 DIAGNOSIS — H35.3211 EXUDATIVE AGE-RELATED MACULAR DEGENERATION OF RIGHT EYE WITH ACTIVE CHOROIDAL NEOVASCULARIZATION (H): ICD-10-CM

## 2017-09-19 DIAGNOSIS — H35.3122 NONEXUDATIVE AGE-RELATED MACULAR DEGENERATION, LEFT EYE, INTERMEDIATE DRY STAGE: ICD-10-CM

## 2017-09-19 PROCEDURE — 99212 OFFICE O/P EST SF 10 MIN: CPT | Mod: ZF

## 2017-09-19 PROCEDURE — 92134 CPTRZ OPH DX IMG PST SGM RTA: CPT | Mod: ZF | Performed by: OPHTHALMOLOGY

## 2017-09-19 PROCEDURE — C9257 BEVACIZUMAB INJECTION: HCPCS | Mod: ZF | Performed by: OPHTHALMOLOGY

## 2017-09-19 PROCEDURE — 25000128 H RX IP 250 OP 636: Mod: ZF | Performed by: OPHTHALMOLOGY

## 2017-09-19 PROCEDURE — 67028 INJECTION EYE DRUG: CPT | Mod: ZF | Performed by: OPHTHALMOLOGY

## 2017-09-19 RX ADMIN — Medication 1.25 MG: at 14:50

## 2017-09-19 ASSESSMENT — CUP TO DISC RATIO
OS_RATIO: 0.3
OD_RATIO: 0.3

## 2017-09-19 ASSESSMENT — VISUAL ACUITY
OS_CC: 20/30
OD_CC: 20/50-2
METHOD: SNELLEN - LINEAR
OD_PH_CC: 20/50-2+2
CORRECTION_TYPE: GLASSES

## 2017-09-19 ASSESSMENT — CONF VISUAL FIELD
OS_NORMAL: 1
METHOD: COUNTING FINGERS
OD_NORMAL: 1

## 2017-09-19 ASSESSMENT — TONOMETRY
OS_IOP_MMHG: 09
OD_IOP_MMHG: 10
IOP_METHOD: ICARE

## 2017-09-19 ASSESSMENT — EXTERNAL EXAM - RIGHT EYE: OD_EXAM: NORMAL

## 2017-09-19 ASSESSMENT — SLIT LAMP EXAM - LIDS
COMMENTS: NORMAL
COMMENTS: NORMAL

## 2017-09-19 ASSESSMENT — EXTERNAL EXAM - LEFT EYE: OS_EXAM: NORMAL

## 2017-09-19 NOTE — MR AVS SNAPSHOT
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      Your next 10 appointments already scheduled     Sep 28, 2017  2:45 PM CDT   (Arrive by 2:30 PM)   Return Visit with King Oscar MD   [facility] Dermatology ([facility])    [address] [facility]  3rd [facility] [id number] [phone]            Nov 21, 2017  1:15 PM CST   RETURN GENERAL with Brigido Laura MD   Eye Clinic ([facility])    [address]  [address] Clin 9a  [facility] [id number] [phone]              Future tests that were ordered for you today     Open Future Orders        Priority Expected Expires Ordered    OCT Retina Spectralis OU (both eyes) Routine  3/21/2019 9/19/2017            Who to contact     Please call your clinic at [phone] to:    Ask questions about your health    Make or cancel appointments    Discuss your medicines    Learn about your test results    Speak to your doctor   If you have compliments or concerns about an experience at your clinic, or if you wish to file a complaint, please contact [facility] Patient Relations at [phone] or email us at [email or web address]         Additional Information About Your Visit        MyChart Information      [employer] is an electronic gateway that provides easy, online access to your medical records. With Oktal[employer], you can request a clinic appointment, read your test results, renew a prescription or communicate with your care team.     To sign up for Oktalogic visit the website at [email or web address]   You will be asked to enter the access code listed below, as well as some personal information. Please follow the directions to create your username and password.     Your access code is: [email or web address]  Expires: 2017  6:30 AM     Your access code will  in 90 days. If you need help or a new code, please contact your [facility] or call [phone] for assistance.        Care EveryWhere ID     This is your Care EveryWhere ID. This could be used by other organizations to access your [facility] medical records  [id number]         Blood Pressure from Last 3 Encounters:   17 123/67   02/10/17 125/73   10/05/16 123/69    Weight from Last 3 Encounters:   17 84.7 kg (186 lb 11.2 oz)   02/10/17 82.1 kg (181 lb 1.6 oz)   10/05/16 83.4 kg (183 lb 14.4 oz)              We Performed the Following     Avastin (Bevacizumab) 1.25MG Intravitreal Injection OD (right eye)     OCT Retina Spectralis OU (both eyes)        Primary Care Provider Office Phone # Fax #    [place] Robertson [phone]       [address] [place] [address]        Equal Access to Services     RAEANN WALLACE : Hadtiffany nazarioo Soanna, waaxda luqadaha, qaybta kaalmada nilda, ayan smith. So [facility] [phone].    ATENCIÓN: Si habla español, tiene a milton disposición servicios gratuitos de asistencia lingüística. Llame al [phone].    We comply with applicable federal civil rights laws and [place] laws. We do not discriminate on the basis of race, color, national origin, age, disability sex, sexual orientation or gender identity.             Thank you!     Thank you for choosing EYE CLINIC  for your care. Our goal is always to provide you with excellent care. Hearing back from our patients is one way we can continue to improve our services. Please take a few minutes to complete the written survey that you may receive in the mail after your visit with us. Thank you!             Your Updated Medication List - Protect others around you: Learn how to safely use, store and throw away your medicines at www.disposemymeds.org.          This list is accurate as of: 9/19/17  3:16 PM.  Always use your most recent med list.                   Brand Name Dispense Instructions for use Diagnosis    aspirin 81 MG tablet      Take 1 tablet by mouth daily.        atorvastatin 10 MG tablet    LIPITOR    90 tablet    Take 1 tablet (10 mg) by mouth daily    Hyperlipidemia LDL goal <100       atovaquone-proguanil 250-100 MG per tablet    MALARONE    23 tablet    Take 1 tablet by mouth daily Start 2 days before travel and continue 7 days after return.    Travel advice encounter       cholecalciferol 1000 UNITS capsule    vitamin  -D     Take 1 capsule by mouth daily.        ciprofloxacin 500 MG tablet    CIPRO    6 tablet    Take 1 tablet (500 mg) by mouth 2 times daily If needed for severe travelers diarrhea    Travel advice encounter       multivitamin Tabs tablet      Take 2 tablets by mouth 2 times daily.

## 2017-09-19 NOTE — PROGRESS NOTES
Assessment & Plan      Tom Saini is a 82 year old male with the following diagnoses:   1. 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 Nonexudative age-related macular degeneration, left eye, intermediate dry stage         S/P Avastin injection x 3 right eye   Currently on treat and extend right eye   OCT remains dry with stable choroidal neovascular membrane subfoveally   RBA to repeat avastin today, right eye.  Consent obtained  Continue to see Q8 weeks with OCT mac and planned series x 3  Amsler/AREDS to continue    Patient disposition:   Return in about 8 weeks (around 11/14/2017) for OCT Macula, avastin RIGHT eye.          Attending Physician Attestation:  Complete documentation of historical and exam elements from today's encounter can be found in the full encounter summary report (not reduplicated in this progress note).  I personally obtained the chief complaint(s) and history of present illness.  I confirmed and edited as necessary the review of systems, past medical/surgical history, family history, social history, and examination findings as documented by others; and I examined the patient myself.  I personally reviewed the relevant tests, images, and reports as documented above.  I formulated and edited as necessary the assessment and plan and discussed the findings and management plan with the patient and family. Attending Physician Image/Tesing Attestation: I personally reviewed the ophthalmic test(s) associated with this encounter, agree with the interpretation(s) as documented by the resident/fellow, and have edited the corresponding report(s) as necessary.  Attending Physician Procedure Attestation: I was present for the entire procedure. - Brigido Laura MD

## 2017-09-19 NOTE — NURSING NOTE
Chief Complaints and History of Present Illnesses   Patient presents with     Exudative Macular Degeneration Follow Up     HPI    Affected eye(s):  Both   Symptoms:     Blurred vision   No decreased vision         Do you have eye pain now?:  No      Comments:  Pt just got back from [facility]. Got new glasses, seems to help. No VA changes.     Shirley BECKFORD September 19, 2017 1:49 PM

## 2017-09-28 ENCOUNTER — OFFICE VISIT (OUTPATIENT)
Dept: DERMATOLOGY | Facility: CLINIC | Age: 82
End: 2017-09-28

## 2017-09-28 DIAGNOSIS — D23.9 DERMATOFIBROMA: ICD-10-CM

## 2017-09-28 DIAGNOSIS — L82.0 SEBORRHEIC KERATOSES, INFLAMED: Primary | ICD-10-CM

## 2017-09-28 ASSESSMENT — PAIN SCALES - GENERAL
PAINLEVEL: MILD PAIN (2)
PAINLEVEL: MILD PAIN (2)

## 2017-09-28 NOTE — NURSING NOTE
Dermatology Rooming Note    Tom Saini's goals for this visit include:   Chief Complaint   Patient presents with     Derm Problem     Tom is here today for a skin check.  States he has no areas of concern today.         Renetta Hauser LPN

## 2017-09-28 NOTE — MR AVS SNAPSHOT
After Visit Summary   9/28/2017    Tom Saini    MRN: [id number]           Patient Information     Date Of Birth          1935        Visit Information        Provider Department      9/28/2017 2:45 PM King Oscar MD [facility] Dermatology        Care Instructions    Cryotherapy    What is it?    Use of a very cold liquid, such as liquid nitrogen, to freeze and destroy abnormal skin cells that need to be removed    What should I expect?    Tenderness and redness    A small blister that might grow and fill with dark purple blood. There may be crusting.    More than one treatment may be needed if the lesions do not go away.    How do I care for the treated area?    Gently wash the area with your hands when bathing.    Use a thin layer of Vaseline to help with healing. You may use a Band-Aid.     The area should heal within 7-10 days and may leave behind a pink or lighter color.     Do not use an antibiotic or Neosporin ointment.     You may take acetaminophen (Tylenol) for pain.     Call your Doctor if you have:    Severe pain    Signs of infection (warmth, redness, cloudy yellow drainage, and or a bad smell)    Questions or concerns    Who should I call with questions?       [facility]: [phone]       [facility]: [phone]       For urgent needs outside of business hours call the [facility] at [phone]        and ask for the dermatology resident on call          Follow-ups after your visit        Follow-up notes from your care team     Return in about 1 year (around 9/28/2018).      Your next 10 appointments already scheduled     Nov 21, 2017  1:15 PM CST   RETURN GENERAL with Brigido Laura MD   Eye Clinic ([facility])    [facility]  [address]  [address] [address] [facility] [address]  [facility] [address]   [phone]              Who to contact     Please call your clinic at [phone]  to:    Ask questions about your health    Make or cancel appointments    Discuss your medicines    Learn about your test results    Speak to your doctor   If you have compliments or concerns about an experience at your clinic, or if you wish to file a complaint, please contact [facility] Patient Relations at [phone] or email us at [email or web address]         Additional Information About Your Visit        Care EveryWhere ID     This is your Care EveryWhere ID. This could be used by other organizations to access your [place] medical records  [id number]         Blood Pressure from Last 3 Encounters:   06/26/17 123/67   02/10/17 125/73   10/05/16 123/69    Weight from Last 3 Encounters:   06/26/17 84.7 kg (186 lb 11.2 oz)   02/10/17 82.1 kg (181 lb 1.6 oz)   10/05/16 83.4 kg (183 lb 14.4 oz)              Today, you had the following     No orders found for display         Today's Medication Changes          These changes are accurate as of: 9/28/17  3:53 PM.  If you have any questions, ask your nurse or doctor.               Stop taking these medicines if you haven't already. Please contact your care team if you have questions.     atovaquone-proguanil 250-100 MG per tablet   Commonly known as:  MALARONE   Stopped by:  King Oscar MD           ciprofloxacin 500 MG tablet   Commonly known as:  CIPRO   Stopped by:  King Oscar MD                    Primary Care Provider Office Phone # Fax #    [place] [phone]       [address] [place] [address]        Equal Access to Services     [facility]: Hadii aad ku hadasho Soomaali, waaxda luqadaha, qaybta kaalmada ayan munguia . So [facility] [phone].    ATENCIÓN: Si habla español, tiene a milton disposición servicios gratuitos de asistencia lingüística. Llame al [phone].    We comply with applicable federal civil rights laws and  [place] laws. We do not discriminate on the basis of race, color, national origin, age, disability sex, sexual orientation or gender identity.            Thank you!     Thank you for choosing [facility] DERMATOLOGY  for your care. Our goal is always to provide you with excellent care. Hearing back from our patients is one way we can continue to improve our services. Please take a few minutes to complete the written survey that you may receive in the mail after your visit with us. Thank you!             Your Updated Medication List - Protect others around you: Learn how to safely use, store and throw away your medicines at www.disposemymeds.org.          This list is accurate as of: 9/28/17  3:53 PM.  Always use your most recent med list.                   Brand Name Dispense Instructions for use Diagnosis    aspirin 81 MG tablet      Take 1 tablet by mouth daily.        atorvastatin 10 MG tablet    LIPITOR    90 tablet    Take 1 tablet (10 mg) by mouth daily    Hyperlipidemia LDL goal <100       cholecalciferol 1000 UNITS capsule    vitamin  -D     Take 1 capsule by mouth daily.        multivitamin Tabs tablet      Take 2 tablets by mouth 2 times daily.

## 2017-09-28 NOTE — PROGRESS NOTES
[facility] Dermatology Note    Dermatology Problem List:  1. Seborrheic keratosis    Encounter Date: Sep 28, 2017    CC:  Chief Complaint   Patient presents with     Derm Problem     Tom is here today for a skin check.  States he has no areas of concern today.         History of Present Illness:  Mr. Tom Saini is a 82 year old male with no history of skin cancer who presents as a follow-up for an annual skin exam. The patient was last seen 8/23/2016 when we biopsied a few lesions that all came back as SKs.    Today, he states that he has no lesions of concern. He states that one SK near his left nipple and one on his left shoulder have been very itchy. He has been doing well. No spots have been growing, bleeding or changing. Overall he feels well and denies fevers, chills, night sweats and weight loss.    Past Medical History:   Patient Active Problem List   Diagnosis     Eczema     Pure hypercholesterolemia     Male erectile disorder     Past Medical History:   Diagnosis Date     Aortic insufficiency      Erectile dysfunction      Hyperlipidemia      Low back pain     disc disease s/p laminectomy in past     Macular degeneration      Shingles 2013     Past Surgical History:   Procedure Laterality Date     CATARACT IOL, RT/LT  2001     EYE SURGERY       LAMINECTOMY LUMBAR TWO LEVELS  1993    L4-L5     RECESSION RESECTION (REPAIR STRABISMUS)  1949       Social History:  The patient is retired. He is an was an  that worked at the [employer] for 32 years. He went to [employer].    Family History:  There is no family history of skin cancer.    Medications:  Current Outpatient Prescriptions   Medication Sig Dispense Refill     atorvastatin (LIPITOR) 10 MG tablet Take 1 tablet (10 mg) by mouth daily 90 tablet 3     cholecalciferol (VITAMIN D3) 1000 UNITS capsule Take 1 capsule by mouth daily.       multivitamin (OCUVITE) TABS Take 2 tablets by mouth 2 times daily.       aspirin 81 MG tablet Take 1  tablet by mouth daily.  3     Allergies   Allergen Reactions     Codeine Sulfate Nausea and Vomiting       Review of Systems:  -As per HPI  -Constitutional: The patient denies fatigue, fevers.  -Skin: As above in HPI. No additional skin concerns.    Physical exam:  Vitals: There were no vitals taken for this visit.  GEN: This is a well developed, well-nourished male in no acute distress, in a pleasant mood.    SKIN: Total skin excluding the undergarment areas was performed. The exam included the head/face, neck, both arms, chest, back, abdomen, both legs, digits and/or nails.   -Benign, uniformly dark pigmented, papules diffusely throughout upper arms, chest, abdomen, and back. Several are large >1cm, raised, stuck on appearance.  -Benign appearing nevi throughout chest, abdomen, back, and upper arms  -Left leg with a firm pink papule with positive dimple sign.  -Benign appearing lentigines along arms, neck   -No other lesions of concern on areas examined.     Impression/Plan:  1. Seborrheic keratosis, symptomatic with itchiness left shoulder and above left nipple.   -Cryotherapy procedure note (performed by faculty): After verbal consent and discussion of risks and benefits including but no limited to dyspigmentation/scar, blister, infection, recurrence, 2 were treated with 1-2mm freeze border for 2 cycles with liquid nitrogen. Post cryotherapy instructions were provided.     2.   Multiple seborrheic keratoses, non-irritated.   - Benign nature was discussed. No further intervention required at this time    3.   Dermatofibroma.    -Benign nature was discussed. No further intervention required at this time    CC Dr. Angeli Robertson on close of this encounter.  Follow-up in one year.    Staff Involved:  Scribed by Fransisco Ho, MS4 for Dr. Oscar.      The medical student acted as a scribe with respect to this patient.  I have performed all the key elements of the history and physical, as well as all  procedures.    King Oscar MD  Dermatology Attending

## 2017-09-28 NOTE — LETTER
9/28/2017       RE: Tom Saini  [address] [facility] [address]     Dear Colleague,    Thank you for referring your patient, Tom Saini, to the [facility] DERMATOLOGY at [facility]. Please see a copy of my visit note below.    [facility] Dermatology Note    Dermatology Problem List:  1. Seborrheic keratosis    Encounter Date: Sep 28, 2017    CC:  Chief Complaint   Patient presents with     Derm Problem     Tom is here today for a skin check.  States he has no areas of concern today.         History of Present Illness:  Mr. Tom Saini is a 82 year old male with no history of skin cancer who presents as a follow-up for an annual skin exam. The patient was last seen 8/23/2016 when we biopsied a few lesions that all came back as SKs.    Today, he states that he has no lesions of concern. He states that one SK near his left nipple and one on his left shoulder have been very itchy. He has been doing well. No spots have been growing, bleeding or changing. Overall he feels well and denies fevers, chills, night sweats and weight loss.    Past Medical History:   Patient Active Problem List   Diagnosis     Eczema     Pure hypercholesterolemia     Male erectile disorder     Past Medical History:   Diagnosis Date     Aortic insufficiency      Erectile dysfunction      Hyperlipidemia      Low back pain     disc disease s/p laminectomy in past     Macular degeneration      Shingles 2013     Past Surgical History:   Procedure Laterality Date     CATARACT IOL, RT/LT  2001     EYE SURGERY       LAMINECTOMY LUMBAR TWO LEVELS  1993    L4-L5     RECESSION RESECTION (REPAIR STRABISMUS)  1949       Social History:  The patient is retired. He is an was an  that worked at the [employer] for 32 years. He went to [employer].    Family History:  There is no family history of skin cancer.    Medications:  Current Outpatient Prescriptions   Medication Sig Dispense Refill      atorvastatin (LIPITOR) 10 MG tablet Take 1 tablet (10 mg) by mouth daily 90 tablet 3     cholecalciferol (VITAMIN D3) 1000 UNITS capsule Take 1 capsule by mouth daily.       multivitamin (OCUVITE) TABS Take 2 tablets by mouth 2 times daily.       aspirin 81 MG tablet Take 1 tablet by mouth daily.  3     Allergies   Allergen Reactions     Codeine Sulfate Nausea and Vomiting       Review of Systems:  -As per HPI  -Constitutional: The patient denies fatigue, fevers.  -Skin: As above in HPI. No additional skin concerns.    Physical exam:  Vitals: There were no vitals taken for this visit.  GEN: This is a well developed, well-nourished male in no acute distress, in a pleasant mood.    SKIN: Total skin excluding the undergarment areas was performed. The exam included the head/face, neck, both arms, chest, back, abdomen, both legs, digits and/or nails.   -Benign, uniformly dark pigmented, papules diffusely throughout upper arms, chest, abdomen, and back. Several are large >1cm, raised, stuck on appearance.  -Benign appearing nevi throughout chest, abdomen, back, and upper arms  -Left leg with a firm pink papule with positive dimple sign.  -Benign appearing lentigines along arms, neck   -No other lesions of concern on areas examined.     Impression/Plan:  1. Seborrheic keratosis, symptomatic with itchiness left shoulder and above left nipple.   -Cryotherapy procedure note (performed by faculty): After verbal consent and discussion of risks and benefits including but no limited to dyspigmentation/scar, blister, infection, recurrence, 2 were treated with 1-2mm freeze border for 2 cycles with liquid nitrogen. Post cryotherapy instructions were provided.     2.   Multiple seborrheic keratoses, non-irritated.   - Benign nature was discussed. No further intervention required at this time    3.   Dermatofibroma.    -Benign nature was discussed. No further intervention required at this time    CC Dr. Angeli Robertson on close of  this encounter.  Follow-up in one year.    Staff Involved:  Scribed by Fransisco Ho, MS4 for Dr. Oscar.      The medical student acted as a scribe with respect to this patient.  I have performed all the key elements of the history and physical, as well as all procedures.    King Oscar MD  Dermatology Attending

## 2017-09-28 NOTE — PATIENT INSTRUCTIONS
Cryotherapy    What is it?    Use of a very cold liquid, such as liquid nitrogen, to freeze and destroy abnormal skin cells that need to be removed    What should I expect?    Tenderness and redness    A small blister that might grow and fill with dark purple blood. There may be crusting.    More than one treatment may be needed if the lesions do not go away.    How do I care for the treated area?    Gently wash the area with your hands when bathing.    Use a thin layer of Vaseline to help with healing. You may use a Band-Aid.     The area should heal within 7-10 days and may leave behind a pink or lighter color.     Do not use an antibiotic or Neosporin ointment.     You may take acetaminophen (Tylenol) for pain.     Call your Doctor if you have:    Severe pain    Signs of infection (warmth, redness, cloudy yellow drainage, and or a bad smell)    Questions or concerns    Who should I call with questions?       [facility]: [phone]       [facility]: [phone]       For urgent needs outside of business hours call the [facility] at [phone]        and ask for the dermatology resident on call

## 2017-10-15 PROBLEM — L82.0 SEBORRHEIC KERATOSES, INFLAMED: Status: ACTIVE | Noted: 2017-10-15

## 2017-10-15 PROBLEM — D23.9 DERMATOFIBROMA: Status: ACTIVE | Noted: 2017-10-15

## 2017-11-21 ENCOUNTER — OFFICE VISIT (OUTPATIENT)
Dept: OPHTHALMOLOGY | Facility: CLINIC | Age: 82
End: 2017-11-21
Attending: OPHTHALMOLOGY
Payer: MEDICARE

## 2017-11-21 DIAGNOSIS — H35.3211 EXUDATIVE AGE-RELATED MACULAR DEGENERATION OF RIGHT EYE WITH ACTIVE CHOROIDAL NEOVASCULARIZATION (H): Primary | ICD-10-CM

## 2017-11-21 DIAGNOSIS — H35.3290 EXUDATIVE SENILE MACULAR DEGENERATION OF RETINA (H): Primary | ICD-10-CM

## 2017-11-21 DIAGNOSIS — H35.3122 NONEXUDATIVE AGE-RELATED MACULAR DEGENERATION, LEFT EYE, INTERMEDIATE DRY STAGE: ICD-10-CM

## 2017-11-21 PROCEDURE — 25000128 H RX IP 250 OP 636: Mod: ZF | Performed by: OPHTHALMOLOGY

## 2017-11-21 PROCEDURE — 99213 OFFICE O/P EST LOW 20 MIN: CPT | Mod: ZF

## 2017-11-21 PROCEDURE — C9257 BEVACIZUMAB INJECTION: HCPCS | Mod: ZF | Performed by: OPHTHALMOLOGY

## 2017-11-21 PROCEDURE — 92134 CPTRZ OPH DX IMG PST SGM RTA: CPT | Mod: ZF | Performed by: OPHTHALMOLOGY

## 2017-11-21 PROCEDURE — 67028 INJECTION EYE DRUG: CPT | Mod: ZF | Performed by: OPHTHALMOLOGY

## 2017-11-21 RX ADMIN — Medication 1.25 MG: at 14:30

## 2017-11-21 ASSESSMENT — VISUAL ACUITY
OS_CC: 20/30
CORRECTION_TYPE: GLASSES
OS_CC+: -3
OD_CC: 20/60
OD_PH_CC: 20/40-3
METHOD: SNELLEN - LINEAR

## 2017-11-21 ASSESSMENT — EXTERNAL EXAM - LEFT EYE: OS_EXAM: NORMAL

## 2017-11-21 ASSESSMENT — CONF VISUAL FIELD
OS_NORMAL: 1
OD_NORMAL: 1

## 2017-11-21 ASSESSMENT — REFRACTION_WEARINGRX
OS_SPHERE: -1.75
OS_CYLINDER: +1.00
OD_ADD: +3.00
OD_SPHERE: -2.50
OD_CYLINDER: +1.00
OD_AXIS: 175
OS_AXIS: 175
OS_ADD: +3.00

## 2017-11-21 ASSESSMENT — TONOMETRY
IOP_METHOD: TONOPEN
OS_IOP_MMHG: 12
OD_IOP_MMHG: 10

## 2017-11-21 ASSESSMENT — CUP TO DISC RATIO
OS_RATIO: 0.3
OD_RATIO: 0.3

## 2017-11-21 ASSESSMENT — SLIT LAMP EXAM - LIDS
COMMENTS: NORMAL
COMMENTS: NORMAL

## 2017-11-21 ASSESSMENT — EXTERNAL EXAM - RIGHT EYE: OD_EXAM: NORMAL

## 2017-11-21 NOTE — NURSING NOTE
Chief Complaints and History of Present Illnesses   Patient presents with     Follow Up For     AMD      HPI    Last Eye Exam:  9/19/17   Affected eye(s):  Both   Symptoms:     Blurred vision   (Comment: Still look the same as befor, come and go )   No flashes      Duration:  2 months   Frequency:  Constant       Do you have eye pain now?:  No      Comments:  Pt returns for Injection only RE. Pt notes that vision is slowly decreasing and feels that he cannot focus or see fine detail anymore RE. Still seeing some floaters, which come and go. Denies any flashing lights. ILDEFONSO YU, COA 1:56 PM 11/21/2017

## 2017-11-21 NOTE — MR AVS SNAPSHOT
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      Your next 10 appointments already scheduled     Jan 16, 2018  1:15 PM CST   RETURN GENERAL with Brigido Laura MD   Eye Clinic ([facility])    [address]  [address]  [facility] [id number] [phone]              Future tests that were ordered for you today     Open Future Orders        Priority Expected Expires Ordered    OCT Retina Spectralis OU (both eyes) Routine  5/23/2019 11/21/2017            Who to contact     Please call your clinic at [phone] to:    Ask questions about your health    Make or cancel appointments    Discuss your medicines    Learn about your test results    Speak to your doctor   If you have compliments or concerns about an experience at your clinic, or if you wish to file a complaint, please contact [facility] Patient Relations at [phone] or email us at [email or web address]         Additional Information About Your Visit        Care EveryWhere ID     This is your Care EveryWhere ID. This could be used by other organizations to access your [place] medical records  [id number]         Blood Pressure from Last 3 Encounters:   06/26/17 123/67   02/10/17 125/73   10/05/16 123/69    Weight from Last 3 Encounters:   06/26/17 84.7 kg (186 lb 11.2 oz)   02/10/17 82.1 kg (181 lb 1.6 oz)    10/05/16 83.4 kg (183 lb 14.4 oz)              We Performed the Following     OCT Retina Spectralis OU (both eyes)        Primary Care Provider Office Phone # Fax #    Angeli Robertson [phone]       [address] [place] [address]        Equal Access to Services     RAE[facility] WALLACE : Hadii aad ku hadasho Soomaali, waaxda luqadaha, qaybta kaalmada adeegyada, waxay idiin hayaan adeginny kharash lajennifern . So [facility] [phone].    ATENCIÓN: Si habla español, tiene a milton disposición servicios gratuitos de asistencia lingüística. Llame al [phone].    We comply with applicable federal civil rights laws and [place] laws. We do not discriminate on the basis of race, color, national origin, age, disability, sex, sexual orientation, or gender identity.            Thank you!     Thank you for choosing EYE CLINIC  for your care. Our goal is always to provide you with excellent care. Hearing back from our patients is one way we can continue to improve our services. Please take a few minutes to complete the written survey that you may receive in the mail after your visit with us. Thank you!             Your Updated Medication List - Protect others around you: Learn how to safely use, store and throw away your medicines at www.disposemymeds.org.          This list is accurate as of: 11/21/17  2:41 PM.  Always use your most recent med list.                   Brand Name Dispense Instructions for use Diagnosis    aspirin 81 MG tablet      Take 1 tablet by mouth daily.        atorvastatin 10 MG tablet    LIPITOR    90 tablet    Take 1 tablet (10 mg) by mouth daily    Hyperlipidemia LDL goal <100       cholecalciferol 1000 UNITS capsule    vitamin  -D     Take 1 capsule by mouth daily.        multivitamin Tabs tablet      Take 2 tablets by mouth 2 times daily.

## 2017-11-21 NOTE — PROGRESS NOTES
Assessment & Plan      Tom Saini is a 82 year old male with the following diagnoses:   1. 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  Consent obtained  Continue to see Q8 weeks with OCT mac and planned series x 3  Amsler/AREDS to continue    Patient disposition:   Return in about 2 months (around 1/21/2018) for OCT Macula; Avastin RIGHT eye.          Attending Physician Attestation:  Complete documentation of historical and exam elements from today's encounter can be found in the full encounter summary report (not reduplicated in this progress note).  I personally obtained the chief complaint(s) and history of present illness.  I confirmed and edited as necessary the review of systems, past medical/surgical history, family history, social history, and examination findings as documented by others; and I examined the patient myself.  I personally reviewed the relevant tests, images, and reports as documented above.  I formulated and edited as necessary the assessment and plan and discussed the findings and management plan with the patient and family. Attending Physician Image/Tesing Attestation: I personally reviewed the ophthalmic test(s) associated with this encounter, agree with the interpretation(s) as documented by the resident/fellow, and have edited the corresponding report(s) as necessary.  Attending Physician Procedure Attestation: I was present for the entire procedure. - Brigido Laura MD

## 2018-01-15 DIAGNOSIS — H35.3290 EXUDATIVE SENILE MACULAR DEGENERATION OF RETINA (H): Primary | ICD-10-CM

## 2018-01-16 ENCOUNTER — OFFICE VISIT (OUTPATIENT)
Dept: OPHTHALMOLOGY | Facility: CLINIC | Age: 83
End: 2018-01-16
Attending: OPHTHALMOLOGY
Payer: MEDICARE

## 2018-01-16 DIAGNOSIS — Z96.1 PSEUDOPHAKIA OF BOTH EYES: ICD-10-CM

## 2018-01-16 DIAGNOSIS — H35.3290 EXUDATIVE SENILE MACULAR DEGENERATION OF RETINA (H): Primary | ICD-10-CM

## 2018-01-16 DIAGNOSIS — H02.413 MECHANICAL PTOSIS OF EYELID OF BOTH EYES: ICD-10-CM

## 2018-01-16 DIAGNOSIS — L30.9 PERIOCULAR DERMATITIS: ICD-10-CM

## 2018-01-16 DIAGNOSIS — H35.3122 NONEXUDATIVE AGE-RELATED MACULAR DEGENERATION, LEFT EYE, INTERMEDIATE DRY STAGE: ICD-10-CM

## 2018-01-16 DIAGNOSIS — H53.031 STRABISMIC AMBLYOPIA OF RIGHT EYE: ICD-10-CM

## 2018-01-16 DIAGNOSIS — H52.212 IRREGULAR ASTIGMATISM OF LEFT EYE: ICD-10-CM

## 2018-01-16 PROCEDURE — 92015 DETERMINE REFRACTIVE STATE: CPT | Mod: 52,ZF

## 2018-01-16 PROCEDURE — 92134 CPTRZ OPH DX IMG PST SGM RTA: CPT | Mod: ZF | Performed by: OPHTHALMOLOGY

## 2018-01-16 PROCEDURE — 92025 CPTRIZED CORNEAL TOPOGRAPHY: CPT | Mod: ZF | Performed by: OPHTHALMOLOGY

## 2018-01-16 PROCEDURE — G0463 HOSPITAL OUTPT CLINIC VISIT: HCPCS | Mod: ZF

## 2018-01-16 ASSESSMENT — VISUAL ACUITY
OD_CC+: -2
OS_CC+: -1
CORRECTION_TYPE: GLASSES
OD_CC: 20/40
OS_CC: 20/30
METHOD: SNELLEN - LINEAR

## 2018-01-16 ASSESSMENT — REFRACTION_MANIFEST
OS_AXIS: 174
OS_SPHERE: -1.25
OS_ADD: +2.75
OD_AXIS: 170
OS_CYLINDER: +2.25
OD_CYLINDER: +1.00
OD_ADD: +2.75
OD_SPHERE: -2.75

## 2018-01-16 ASSESSMENT — CONF VISUAL FIELD
OD_NORMAL: 1
OS_NORMAL: 1

## 2018-01-16 ASSESSMENT — REFRACTION_WEARINGRX
OS_SPHERE: -1.50
OS_ADD: +2.75
OD_ADD: +2.75
OD_SPHERE: -2.75
OD_CYLINDER: +1.00
OD_AXIS: 170
OS_AXIS: 010
OS_CYLINDER: +0.50

## 2018-01-16 ASSESSMENT — TONOMETRY
IOP_METHOD: TONOPEN
OS_IOP_MMHG: 15
OD_IOP_MMHG: 15

## 2018-01-16 ASSESSMENT — CUP TO DISC RATIO
OD_RATIO: 0.3
OS_RATIO: 0.3

## 2018-01-16 ASSESSMENT — SLIT LAMP EXAM - LIDS: COMMENTS: PTOSIS OS > OD

## 2018-01-16 ASSESSMENT — EXTERNAL EXAM - RIGHT EYE: OD_EXAM: NORMAL

## 2018-01-16 ASSESSMENT — EXTERNAL EXAM - LEFT EYE: OS_EXAM: ET

## 2018-01-16 NOTE — MR AVS SNAPSHOT
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)           Follow-ups after your visit        Your next 10 appointments already scheduled     Jan 30, 2018  2:00 PM CST   TECH with [facility] EYE TECH   Eye Clinic ([facility])    [address]  [address]  [address] [address]  [facility] [address]   [phone]            Mar 13, 2018  1:30 PM CDT   RETURN GENERAL with Brigido Laura MD   Eye Clinic ([facility])    [address]  [address]  [address] [address]  [facility] [address]   [phone]              Future tests that were ordered for you today     Open Future Orders        Priority Expected Expires Ordered    OCT Retina Spectralis OU (both eyes) Routine  7/17/2019 1/15/2018            Who to contact     Please call your clinic at [phone] to:    Ask questions about your health    Make or cancel appointments    Discuss your medicines    Learn about your test results    Speak to your doctor   If you have compliments or concerns about an experience at your clinic, or if you wish to file a complaint, please contact [facility] Physicians Patient Relations at [phone] or email us at [email or web address]         Additional Information About Your Visit        MyChart Information     Mashalot is an electronic gateway that provides easy, online access to your medical records. With Mashalot, you can request a clinic appointment, read your test results, renew a prescription or communicate with your care team.     To sign up for Mashalot visit the website at [email or web address]   You will be asked to enter the access code listed below, as well as  some personal information. Please follow the directions to create your username and password.     Your access code is: [email or web address]  Expires: 2018  4:18 PM     Your access code will  in 90 days. If you need help or a new code, please contact your [facility] or call [phone] for assistance.        Care EveryWhere ID     This is your Care EveryWhere ID. This could be used by other organizations to access your [place] medical records  [id number]         Blood Pressure from Last 3 Encounters:   17 123/67   02/10/17 125/73   10/05/16 123/69    Weight from Last 3 Encounters:   17 84.7 kg (186 lb 11.2 oz)   02/10/17 82.1 kg (181 lb 1.6 oz)   10/05/16 83.4 kg (183 lb 14.4 oz)              We Performed the Following     OCT Retina Spectralis OU (both eyes)        Primary Care Provider Office Phone # Fax #    [place] Robertson [phone]       [address] [place] [address]        Equal Access to Services     [facility]: Hadii aad ku hadasho Sopriscilaali, waaxda luqadaha, qaybta kaalmada adeegyada, ayan taylor . So [facility] [phone].    ATENCIÓN: Si habla español, tiene a milton disposición servicios gratuitos de asistencia lingüística. Ll[facility] [phone].    We comply with applicable federal civil rights laws and [place] laws. We do not discriminate on the basis of race, color, national origin, age, disability, sex, sexual orientation, or gender identity.            Thank you!     Thank you for choosing EYE CLINIC  for your care. Our goal is always to provide you with excellent care. Hearing back from our patients is one way we can continue to improve our services. Please take a few minutes to complete the written survey that you may receive in the mail after your visit with us. Thank you!             Your Updated Medication List - Protect others around you: Learn how to safely use, store and throw  away your medicines at www.disposemymeds.org.          This list is accurate as of: 1/16/18  4:18 PM.  Always use your most recent med list.                   Brand Name Dispense Instructions for use Diagnosis    aspirin 81 MG tablet      Take 1 tablet by mouth daily.        atorvastatin 10 MG tablet    LIPITOR    90 tablet    Take 1 tablet (10 mg) by mouth daily    Hyperlipidemia LDL goal <100       cholecalciferol 1000 UNITS capsule    vitamin  -D     Take 1 capsule by mouth daily.        multivitamin Tabs tablet      Take 2 tablets by mouth 2 times daily.

## 2018-01-16 NOTE — NURSING NOTE
Chief Complaints and History of Present Illnesses   Patient presents with     Macular Degeneration Follow Up     HPI    Affected eye(s):  Both   Symptoms:     No decreased vision   No floaters   No flashes         Do you have eye pain now?:  No      Comments:  Macular degeneration follow up and possible injection.    SHAKEEL Luna 1:33 PM 01/16/2018

## 2018-01-16 NOTE — PROGRESS NOTES
Assessment & Plan      Tom Saini is a 82 year old male with the following diagnoses:   1. 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 Nonexudative age-related macular degeneration, left eye, intermediate dry stage    3. Pseudophakia of both eyes    4. Strabismic amblyopia of right eye    5. Irregular astigmatism of left eye    6. 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 Periocular dermatitis - Both Eyes         S/P Avastin injection x 5 right eye   Now on injection #3/3 at Q8 weeks (treat and extend right eye)  OCT remains dry with stable choroidal neovascular membrane subfoveally; new tubulations seen today right eye   RBA to repeat avastin today, right eye.  Consent obtained  Continue to see Q8 weeks with OCT mac for now; if still dry next check may consider further extending to [id number]  Amsler/AREDS to continue    Significant change in visual acuity left eye with monocular diplopia and large cylinder change  Some dryness and mechanical upper lid position contribution  Recommend artificial tears twice a day and as needed   Recheck refraction in 2-3 weeks; if stable update refraction  Not interested in lid surgery at this time    Patient disposition:   Return in about 2 months (around 3/16/2018) for OCT Macula.          Attending Physician Attestation:  Complete documentation of historical and exam elements from today's encounter can be found in the full encounter summary report (not reduplicated in this progress note).  I personally obtained the chief complaint(s) and history of present illness.  I confirmed and edited as necessary the review of systems, past medical/surgical history, family history, social history, and examination findings as documented by others; and I examined the patient myself.  I personally reviewed the relevant tests, images, and reports as documented above.  I formulated and edited as necessary the assessment and plan and discussed the findings and management plan  with the patient and family. Attending Physician Image/Tesing Attestation: I personally reviewed the ophthalmic test(s) associated with this encounter, agree with the interpretation(s) as documented by the resident/fellow, and have edited the corresponding report(s) as necessary.  Attending Physician Procedure Attestation: I was present for the entire procedure. - Brigido Laura MD

## 2018-01-31 ENCOUNTER — ALLIED HEALTH/NURSE VISIT (OUTPATIENT)
Dept: OPHTHALMOLOGY | Facility: CLINIC | Age: 83
End: 2018-01-31
Attending: OPHTHALMOLOGY
Payer: MEDICARE

## 2018-01-31 DIAGNOSIS — H35.3290 EXUDATIVE SENILE MACULAR DEGENERATION OF RETINA (H): Primary | ICD-10-CM

## 2018-01-31 PROCEDURE — 40000269 ZZH STATISTIC NO CHARGE FACILITY FEE: Mod: ZF

## 2018-01-31 ASSESSMENT — REFRACTION_WEARINGRX
OD_AXIS: 170
OD_CYLINDER: +1.00
OD_ADD: +2.75
OS_ADD: +2.75
OS_SPHERE: -1.50
OS_AXIS: 010
OS_CYLINDER: +0.50
OD_SPHERE: -2.75

## 2018-01-31 ASSESSMENT — REFRACTION_MANIFEST
OS_AXIS: 176
OS_ADD: +2.75
OD_ADD: +2.75
OD_AXIS: 170
OD_CYLINDER: +1.00
OS_SPHERE: -1.50
OD_SPHERE: -2.75
OS_CYLINDER: +2.50

## 2018-01-31 NOTE — MR AVS SNAPSHOT
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)    -  1       Follow-ups after your visit        Your next 10 appointments already scheduled     Mar 15, 2018  1:15 PM CDT   RETURN GENERAL with Brigido Laura MD   Eye Clinic ([facility])    [address]  [address]  [address] Clin [address]  [facility] [address]   [phone]              Who to contact     Please call your clinic at [phone] to:    Ask questions about your health    Make or cancel appointments    Discuss your medicines    Learn about your test results    Speak to your doctor   If you have compliments or concerns about an experience at your clinic, or if you wish to file a complaint, please contact [facility] Physicians Patient Relations at [phone] or email us at [email or web address]         Additional Information About Your Visit        MyChart Information     Team-Match is an electronic gateway that provides easy, online access to your medical records. With Team-Match, you can request a clinic appointment, read your test results, renew a prescription or communicate with your care team.     To sign up for Team-Match visit the website at [email or web address]   You will be asked to enter the access code listed below, as well as some personal information. Please follow the directions to create your username and password.     Your access code is: [email or web address]  Expires: 2018  4:18 PM     Your access code will  in 90 days. If you need help or a new code, please contact your [facility] Clinic or call [phone] for assistance.        Care EveryWhere ID     This is your Care EveryWhere  ID. This could be used by other organizations to access your [place] medical records  [id number]         Blood Pressure from Last 3 Encounters:   06/26/17 123/67   02/10/17 125/73   10/05/16 123/69    Weight from Last 3 Encounters:   06/26/17 84.7 kg (186 lb 11.2 oz)   02/10/17 82.1 kg (181 lb 1.6 oz)   10/05/16 83.4 kg (183 lb 14.4 oz)              Today, you had the following     No orders found for display       Primary Care Provider Office Phone # Fax #    Angeli Robertson [phone]       [address] [place] [address]  [facility] [address]        Equal Access to Services     RAEANN WALLACE : Hadii kelly nazarioo Soanna, waaxda luqadaha, qaybta kaalmada adeegyada, ayan taylor . So [facility] [phone].    ATENCIÓN: Si habla español, tiene a milton disposición servicios gratuitos de asistencia lingüística. Manuel al [phone].    We comply with applicable federal civil rights laws and [place] laws. We do not discriminate on the basis of race, color, national origin, age, disability, sex, sexual orientation, or gender identity.            Thank you!     Thank you for choosing EYE CLINIC  for your care. Our goal is always to provide you with excellent care. Hearing back from our patients is one way we can continue to improve our services. Please take a few minutes to complete the written survey that you may receive in the mail after your visit with us. Thank you!             Your Updated Medication List - Protect others around you: Learn how to safely use, store and throw away your medicines at www.disposemymeds.org.          This list is accurate as of 1/31/18  2:38 PM.  Always use your most recent med list.                   Brand Name Dispense Instructions for use Diagnosis    aspirin 81 MG tablet      Take 1 tablet by mouth daily.        atorvastatin 10 MG tablet    LIPITOR    90 tablet    Take 1 tablet (10 mg) by mouth daily    Hyperlipidemia LDL goal <100        cholecalciferol 1000 UNITS capsule    vitamin  -D     Take 1 capsule by mouth daily.        multivitamin Tabs tablet      Take 2 tablets by mouth 2 times daily.

## 2018-01-31 NOTE — NURSING NOTE
Chief Complaints and History of Present Illnesses   Patient presents with     Follow Up For     Refraction today.     HPI    Affected eye(s):  Both   Symptoms:        Duration:  2 weeks   Frequency:  Constant       Do you have eye pain now?:  No      Comments:  Pt. States that he has been seeing double with LE lately.  BE are still dry,  No change in VA BE.  Lorna Turner COT 1:47 PM January 31, 2018

## 2018-01-31 NOTE — PROGRESS NOTES
Tech visit for prescription only, not seen by physician.      Brigido Laura MD  , Comprehensive Ophthalmology  Department of Ophthalmology and Visual Neurosciences  [facility]

## 2018-02-08 NOTE — TELEPHONE ENCOUNTER
APPT INFO     Date /Time:  02/15/18 8:00am   Reason for Appt: Bilateral Drooping Eyes   Ref Provider/Clinic: Tim Laura   Internal Records 01/16/18 - Office Notes  11/21/17- Office notes  09/19/17- Office Notes   External Records    Records Requested?:    Done?: YES

## 2018-02-15 ENCOUNTER — PRE VISIT (OUTPATIENT)
Dept: OPHTHALMOLOGY | Facility: CLINIC | Age: 83
End: 2018-02-15

## 2018-02-15 ENCOUNTER — OFFICE VISIT (OUTPATIENT)
Dept: OPHTHALMOLOGY | Facility: CLINIC | Age: 83
End: 2018-02-15
Payer: MEDICARE

## 2018-02-15 ENCOUNTER — DOCUMENTATION ONLY (OUTPATIENT)
Dept: OPHTHALMOLOGY | Facility: CLINIC | Age: 83
End: 2018-02-15

## 2018-02-15 VITALS — HEIGHT: 71 IN | WEIGHT: 185 LBS | BODY MASS INDEX: 25.9 KG/M2

## 2018-02-15 DIAGNOSIS — H35.3290 EXUDATIVE SENILE MACULAR DEGENERATION OF RETINA (H): ICD-10-CM

## 2018-02-15 DIAGNOSIS — H02.423 MYOGENIC PTOSIS OF EYELID OF BOTH EYES: ICD-10-CM

## 2018-02-15 DIAGNOSIS — H02.836 DERMATOCHALASIS OF EYELIDS OF BOTH EYES: Primary | ICD-10-CM

## 2018-02-15 DIAGNOSIS — H02.833 DERMATOCHALASIS OF EYELIDS OF BOTH EYES: Primary | ICD-10-CM

## 2018-02-15 ASSESSMENT — EXTERNAL EXAM - LEFT EYE: OS_EXAM: ET

## 2018-02-15 ASSESSMENT — SLIT LAMP EXAM - LIDS
COMMENTS: HEAVY DERMATOCHALASIS RESTING ON LASHES, TRUE PTOSIS
COMMENTS: HEAVY DERMATOCHALASIS RESTING ON LASHES, TRUE PTOSIS

## 2018-02-15 ASSESSMENT — VISUAL ACUITY
OD_CC: 20/40
METHOD: SNELLEN - LINEAR
OS_CC: 20/40
CORRECTION_TYPE: GLASSES
OD_CC+: -2
OS_CC+: +2

## 2018-02-15 ASSESSMENT — CONF VISUAL FIELD
METHOD: COUNTING FINGERS
OD_NORMAL: 1
OS_NORMAL: 1

## 2018-02-15 ASSESSMENT — TONOMETRY
OD_IOP_MMHG: 10
IOP_METHOD: ICARE
OS_IOP_MMHG: 10

## 2018-02-15 ASSESSMENT — MARGIN REFLEX DISTANCE
OD_MRD1: .5
OS_MRD1: .5

## 2018-02-15 ASSESSMENT — EXTERNAL EXAM - RIGHT EYE: OD_EXAM: NORMAL

## 2018-02-15 NOTE — PROGRESS NOTES
Oculoplastic Clinic New Patient    Patient: Tom Saini MRN# [id number]   YOB: 1935 Age: 83 year old   Date of Visit: Feb 15, 2018    CC: Droopy eyelids obstructing vision.  Chief Complaints and History of Present Illnesses   Patient presents with     Dermatochalasis Evaluation                 HPI:     Tom Saini is a 83 year old male who has noted gradual onset of droopy eyelids over the past years. The droopy eyelid is interfering with activities of daily living including driving, and reading. He notes monocular diplopia. The patient denies binocular double vision or variability of the eyelid position. With Dr. Laura he did have corneal topography performed and repeated with the eyelid taped up, and his aberations improved. Additionally he notes the double vision gets better when he pulls up on his forehead. This indicated that his monocular diplopia is related to the weight of his upper eyelid, and obstruction of the visual axis.     He does have his tory of strab surgery decades ago in [place], retinal tears, and is receiving avastin for wet Age related macular degeneration.    EXAM:     MRD1: 0.5mm/0.5mm  Dermatochalasis with excess skin touching eyelashes   Brow ptosis with brow resting below superior orbital rim    VISUAL FIELD:  Right eye untaped:10 degrees Right eye taped:40 degrees  Left eye untaped:10 degrees Left eye taped:40 degrees    Assessment & Plan     Tom Saini is a 83 year old male with the following diagnoses:   1. Dermatochalasis of eyelids of both eyes    2. Myogenic ptosis of eyelid of both eyes    3. Brow ptosis - Both Eyes    4. 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 I personally obtained the chief complaint(s) and history of present illness.  I confirmed and edited as necessary the review of systems, past medical/surgical history, family history, social history, and examination findings as documented by others; and I examined the patient myself. I personally reviewed the relevant tests, images, and reports as documented above. I formulated and edited as necessary the assessment and plan and discussed the findings and management plan with the patient and family. - Anuj Good MD      Today with Tom Saini, I reviewed the indications, risks, benefits, and alternatives of the proposed surgical procedure including, but not limited to, failure obtain the desired result  and need for additional surgery, bleeding, infection, loss of vision, loss of the eye, and the remote possibility of permanent damage to any organ system or death with the use of anesthesia.  I provided multiple opportunities for the questions, answered all questions to the best of my ability, and confirmed that my answers and my discussion were understood.

## 2018-02-15 NOTE — PROGRESS NOTES
Met with patient to schedule surgery with Dr. Anuj Good.   Surgery was scheduled on 04/06 @ [facility] due to 's schedule    Patient will have H&P at [facility]  Post-Op care appointment was scheduled on 04/20 w/Dr Rainey  Patient is aware a / is needed day of surgery.   Patient received surgery packet has my direct contact information for any further questions.

## 2018-02-15 NOTE — NURSING NOTE
Chief Complaints and History of Present Illnesses   Patient presents with     Dermatochalasis Evaluation     HPI    Affected eye(s):  Both   Symptoms:     No blurred vision      Frequency:  Constant       Do you have eye pain now?:  No      Comments:  Pt states is getting double vision because of how eyelids are resting on eyes. Pt would like lids to be lifted. No pain.     Yamini Quach COT 7:59 AM February 15, 2018

## 2018-02-15 NOTE — LETTER
2/15/2018         RE:  :  MRN: Tom Saini  1935  [id number]     Dear Dr. Laura,    Thank you for asking me to see your patient, Tom Saini, for an oculoplastic   consultation.  My assessment and plan are below.  For further details, please see my attached clinic note.               HPI:     Tom Saini is a 83 year old male who has noted gradual onset of droopy eyelids over the past years. The droopy eyelid is interfering with activities of daily living including driving, and reading. He notes monocular diplopia. The patient denies binocular double vision or variability of the eyelid position. With Dr. Laura he did have corneal topography performed and repeated with the eyelid taped up, and his aberations improved. Additionally he notes the double vision gets better when he pulls up on his forehead. This indicated that his monocular diplopia is related to the weight of his upper eyelid, and obstruction of the visual axis.     He does have his tory of strab surgery decades ago in [place], retinal tears, and is receiving avastin for wet Age related macular degeneration.    EXAM:     MRD1: 0.5mm/0.5mm  Dermatochalasis with excess skin touching eyelashes   Brow ptosis with brow resting below superior orbital rim    VISUAL FIELD:  Right eye untaped:10 degrees Right eye taped:40 degrees  Left eye untaped:10 degrees Left eye taped:40 degrees    Assessment & Plan     Tom Saini is a 83 year old male with the following diagnoses:   1. Dermatochalasis of eyelids of both eyes    2. Myogenic ptosis of eyelid of both eyes    3. Brow ptosis - Both Eyes    4. 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      Sincerely,    Anuj Good MD  Department of  Ophthalmology and Visual Neurosciences  [facility]    CC: Brigido Laura MD  [address] [place] [address]  VIA In Basket

## 2018-02-15 NOTE — MR AVS SNAPSHOT
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) - Both Eyes           Follow-ups after your visit        Your next 10 appointments already scheduled     Mar 15, 2018  1:15 PM CDT   RETURN GENERAL with Brigido Laura MD   Eye Clinic ([facility])    [address]  [address] Clin [address]  [facility] [address] [phone]            2018  9:30 AM CDT   (Arrive by 9:15 AM)   Post-Op with Kalyani Rainey MD   [facility] Ophthalmology ([facility])    [address]  [address] [facility] [address] [phone]              Who to contact     Please call your clinic at [phone] to:    Ask questions about your health    Make or cancel appointments    Discuss your medicines    Learn about your test results    Speak to your doctor            Additional Information About Your Visit        MyChart Information     [email or web address] is an electronic gateway that provides easy, online access to your medical records. With Taglocity, you can request a clinic appointment, read your test results, renew a prescription or communicate with your care team.     To sign up for [email or web address] visit the website at [email or web address]   You will be asked to enter the access code listed below, as well as some personal information. Please follow the directions to create your username and password.     Your access code is: [email or web address]  Expires: 2018  4:18 PM     Your access code will  in 90 days. If you need help or a new code, please contact your  "[facility] or call [phone] for assistance.        Care EveryWhere ID     This is your Care EveryWhere ID. This could be used by other organizations to access your [place] medical records  [id number]        Your Vitals Were     Height BMI (Body Mass Index)                1.803 m (5' 11\") 25.8 kg/m2           Blood Pressure from Last 3 Encounters:   06/26/17 123/67   02/10/17 125/73   10/05/16 123/69    Weight from Last 3 Encounters:   02/15/18 83.9 kg (185 lb)   06/26/17 84.7 kg (186 lb 11.2 oz)   02/10/17 82.1 kg (181 lb 1.6 oz)              We Performed the Following     External Photos OU (both eyes)     Encinas VF Ptosis OU     Kaylene-Operative Worksheet (Plastics)        Primary Care Provider Office Phone # Fax #    Angeli Robertson [phone]       [address] [address]        Equal Access to Services     MILAN WALLACE : Hadii aad ku hadasho Soomaali, waaxda luqadaha, qaybta kaalmada adeegyada, ayan taylor . So [facility] [phone].    ATENCIÓN: Si habla español, tiene a milton disposición servicios gratuitos de asistencia lingüística. Ll[facility] [phone].    We comply with applicable federal civil rights laws and [place] laws. We do not discriminate on the basis of race, color, national origin, age, disability, sex, sexual orientation, or gender identity.            Thank you!     Thank you for choosing [facility] OPHTHALMOLOGY  for your care. Our goal is always to provide you with excellent care. Hearing back from our patients is one way we can continue to improve our services. Please take a few minutes to complete the written survey that you may receive in the mail after your visit with us. Thank you!             Your Updated Medication List - Protect others around you: Learn how to safely use, store and throw away your medicines at www.disposemymeds.org.          This list is accurate as of 2/15/18  " 9:13 AM.  Always use your most recent med list.                   Brand Name Dispense Instructions for use Diagnosis    aspirin 81 MG tablet      Take 1 tablet by mouth daily.        atorvastatin 10 MG tablet    LIPITOR    90 tablet    Take 1 tablet (10 mg) by mouth daily    Hyperlipidemia LDL goal <100       cholecalciferol 1000 UNITS capsule    vitamin  -D     Take 1 capsule by mouth daily.        multivitamin Tabs tablet      Take 2 tablets by mouth 2 times daily.

## 2018-03-15 ENCOUNTER — OFFICE VISIT (OUTPATIENT)
Dept: OPHTHALMOLOGY | Facility: CLINIC | Age: 83
End: 2018-03-15
Attending: OPHTHALMOLOGY
Payer: MEDICARE

## 2018-03-15 DIAGNOSIS — H35.3122 NONEXUDATIVE AGE-RELATED MACULAR DEGENERATION, LEFT EYE, INTERMEDIATE DRY STAGE: Primary | ICD-10-CM

## 2018-03-15 DIAGNOSIS — H35.3290 EXUDATIVE SENILE MACULAR DEGENERATION OF RETINA (H): Primary | ICD-10-CM

## 2018-03-15 DIAGNOSIS — Z96.1 PSEUDOPHAKIA OF BOTH EYES: ICD-10-CM

## 2018-03-15 DIAGNOSIS — H02.413 MECHANICAL PTOSIS OF EYELID OF BOTH EYES: ICD-10-CM

## 2018-03-15 DIAGNOSIS — H35.3211 EXUDATIVE AGE-RELATED MACULAR DEGENERATION OF RIGHT EYE WITH ACTIVE CHOROIDAL NEOVASCULARIZATION (H): ICD-10-CM

## 2018-03-15 DIAGNOSIS — H35.3290 EXUDATIVE SENILE MACULAR DEGENERATION OF RETINA (H): ICD-10-CM

## 2018-03-15 PROCEDURE — 25000128 H RX IP 250 OP 636: Mod: ZF | Performed by: OPHTHALMOLOGY

## 2018-03-15 PROCEDURE — 92134 CPTRZ OPH DX IMG PST SGM RTA: CPT | Mod: ZF | Performed by: OPHTHALMOLOGY

## 2018-03-15 PROCEDURE — 67028 INJECTION EYE DRUG: CPT | Mod: RT | Performed by: OPHTHALMOLOGY

## 2018-03-15 PROCEDURE — G0463 HOSPITAL OUTPT CLINIC VISIT: HCPCS | Mod: ZF

## 2018-03-15 PROCEDURE — C9257 BEVACIZUMAB INJECTION: HCPCS | Mod: ZF | Performed by: OPHTHALMOLOGY

## 2018-03-15 RX ADMIN — Medication 1.25 MG: at 15:24

## 2018-03-15 ASSESSMENT — EXTERNAL EXAM - RIGHT EYE: OD_EXAM: NORMAL

## 2018-03-15 ASSESSMENT — VISUAL ACUITY
METHOD: SNELLEN - LINEAR
OD_CC: 20/50
OS_CC: 20/40
OD_CC+: +2
OD_PH_CC: 20/40-1
CORRECTION_TYPE: GLASSES
OS_CC+: +2

## 2018-03-15 ASSESSMENT — SLIT LAMP EXAM - LIDS: COMMENTS: PTOSIS OS > OD

## 2018-03-15 ASSESSMENT — TONOMETRY
OS_IOP_MMHG: 15
OD_IOP_MMHG: 14
IOP_METHOD: APPLANATION

## 2018-03-15 ASSESSMENT — CUP TO DISC RATIO
OD_RATIO: 0.3
OS_RATIO: 0.3

## 2018-03-15 ASSESSMENT — CONF VISUAL FIELD
OS_SUPERIOR_TEMPORAL_RESTRICTION: 3
OS_INFERIOR_TEMPORAL_RESTRICTION: 3

## 2018-03-15 ASSESSMENT — EXTERNAL EXAM - LEFT EYE: OS_EXAM: ET

## 2018-03-15 NOTE — PROGRESS NOTES
Assessment & Plan      Tom Saini is a 83 year old male with the following diagnoses:   1. Nonexudative age-related macular degeneration, left eye, intermediate dry stage    2. 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 Pseudophakia of both eyes    5. 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  Consent obtained  Extend to Q10 weeks with planned series of 3   Amsler/AREDS to continue     Significant change in visual acuity left eye with monocular diplopia and large cylinder change  Some dryness and mechanical upper lid position contribution  Recommend artificial tears twice a day and as needed     Patient to have lid ptosis repair with Dr. Good in April      Patient disposition:   Return in about 10 weeks (around 5/24/2018) for OCT Macula.         Tony Breaux M.D.  PGY-2, Ophthalmology    Attending Physician Attestation:  Complete documentation of historical and exam elements from today's encounter can be found in the full encounter summary report (not reduplicated in this progress note).  I personally obtained the chief complaint(s) and history of present illness.  I confirmed and edited as necessary the review of systems, past medical/surgical history, family history, social history, and examination findings as documented by others; and I examined the patient myself.  I personally reviewed the relevant tests, images, and reports as documented above.  I formulated and edited as necessary the assessment and plan and discussed the findings and management plan with the patient and family. Attending Physician Image/Tesing Attestation: I  personally reviewed the ophthalmic test(s) associated with this encounter, agree with the interpretation(s) as documented by the resident/fellow, and have edited the corresponding report(s) as necessary.  Attending Physician Procedure Attestation: I was present for the entire procedure      . - Brigido Laura MD

## 2018-03-15 NOTE — MR AVS SNAPSHOT
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      Your next 10 appointments already scheduled     Mar 22, 2018  8:55 AM CDT   (Arrive by 8:40 AM)   PRE-OP with Angeli Robertson MD   [facility] ([facility] [place])    [address]  4th [facility] [id number] [phone]            Apr 06, 2018   Procedure with Anuj Good MD   [facility] PeriOP Services (--)    [address] [place] [address] Ave, [address]  [facility] [address]   [phone]            Apr 20, 2018  9:30 AM CDT   (Arrive by 9:15 AM)   Post-Op with Kalyani Rainey MD   [facility] Ophthalmology ([facility] [place])    [address]  4th [facility] [address] [phone]            May 24, 2018  9:15 AM CDT   RETURN GENERAL with Brigido Larua MD   Eye Clinic ([facility])    [address]  [facility] [id number] [phone]              Future tests that were ordered for you today     Open Future Orders        Priority Expected Expires Ordered    OCT Retina Spectralis OU (both eyes) Routine  9/14/2019 3/15/2018            Who to contact     Please call your clinic at [phone] to:    Ask questions about your health    Make or cancel appointments    Discuss your medicines    Learn about your test results    Speak to your  doctor            Additional Information About Your Visit        [employer] Information     [employer] gives you secure access to your electronic health record. If you see a primary care provider, you can also send messages to your care team and make appointments. If you have questions, please call your primary care clinic.  If you do not have a primary care provider, please call [phone] and they will assist you.      [employer] is an electronic gateway that provides easy, online access to your medical records. With [employer], you can request a clinic appointment, read your test results, renew a prescription or communicate with your care team.     To access your existing account, please contact your [facility] or call [phone] for assistance.        Care EveryWhere ID     This is your Care EveryWhere ID. This could be used by other organizations to access your [place] medical records  [id number]         Blood Pressure from Last 3 Encounters:   06/26/17 123/67   02/10/17 125/73   10/05/16 123/69    Weight from Last 3 Encounters:   02/15/18 83.9 kg (185 lb)   06/26/17 84.7 kg (186 lb 11.2 oz)   02/10/17 82.1 kg (181 lb 1.6 oz)              We Performed the Following     OCT Retina Spectralis OU (both eyes)        Primary Care Provider Office Phone # Fax #    SewellCamilla Robertson [phone] [address] [address]        Equal Access to Services     RAEANN WALLACE : Hadii aad ku hadasho Sopriscilaali, waaxda luqadaha, qaybta kaalmada adeegyada, ayan taylor . So [facility] [phone].    ATENCIÓN: Si habla español, tiene a milton disposición servicios gratuitos de asistencia lingüística. Llame al [phone].    We comply with applicable federal civil rights laws and [place] laws. We do not discriminate on the basis of race, color, national origin, age, disability, sex, sexual orientation, or gender identity.            Thank  you!     Thank you for choosing EYE CLINIC  for your care. Our goal is always to provide you with excellent care. Hearing back from our patients is one way we can continue to improve our services. Please take a few minutes to complete the written survey that you may receive in the mail after your visit with us. Thank you!             Your Updated Medication List - Protect others around you: Learn how to safely use, store and throw away your medicines at www.disposemymeds.org.          This list is accurate as of 3/15/18  3:45 PM.  Always use your most recent med list.                   Brand Name Dispense Instructions for use Diagnosis    aspirin 81 MG tablet      Take 1 tablet by mouth daily.        atorvastatin 10 MG tablet    LIPITOR    90 tablet    Take 1 tablet (10 mg) by mouth daily    Hyperlipidemia LDL goal <100       cholecalciferol 1000 UNITS capsule    vitamin  -D     Take 1 capsule by mouth daily.        multivitamin Tabs tablet      Take 2 tablets by mouth 2 times daily.

## 2018-03-15 NOTE — NURSING NOTE
Chief Complaints and History of Present Illnesses   Patient presents with     Follow Up For     2mo f/u Exudative senile macular degeneration OU, OCT     HPI    Affected eye(s):  Both   Symptoms:     No blurred vision   Double vision   No floaters   No flashes         Do you have eye pain now?:  No      Comments:  2mo f/u Exudative senile macular degeneration OU, OCT.    Pt updated gls last month with most current  Does still have a bit of monocular diplopia OS, gls help.   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

## 2018-03-22 ENCOUNTER — OFFICE VISIT (OUTPATIENT)
Dept: INTERNAL MEDICINE | Facility: CLINIC | Age: 83
End: 2018-03-22
Payer: MEDICARE

## 2018-03-22 VITALS
HEART RATE: 98 BPM | SYSTOLIC BLOOD PRESSURE: 107 MMHG | DIASTOLIC BLOOD PRESSURE: 64 MMHG | BODY MASS INDEX: 25.4 KG/M2 | WEIGHT: 182.1 LBS

## 2018-03-22 DIAGNOSIS — Z01.818 PRE-OPERATIVE GENERAL PHYSICAL EXAMINATION: Primary | ICD-10-CM

## 2018-03-22 DIAGNOSIS — Z71.84 TRAVEL ADVICE ENCOUNTER: ICD-10-CM

## 2018-03-22 DIAGNOSIS — E78.5 HYPERLIPIDEMIA LDL GOAL <100: ICD-10-CM

## 2018-03-22 RX ORDER — ATORVASTATIN CALCIUM 10 MG/1
10 TABLET, FILM COATED ORAL DAILY
Qty: 90 TABLET | Refills: 3 | Status: SHIPPED | OUTPATIENT
Start: 2018-03-22 | End: 2019-05-07

## 2018-03-22 ASSESSMENT — PAIN SCALES - GENERAL: PAINLEVEL: NO PAIN (0)

## 2018-03-22 NOTE — PROGRESS NOTES
Surgeon (please enter first and last name):  Dr. Anuj Good  Fax number for Preop Evaluation:  [phone]  Location of Surgery: [facility]  Date of Surgery:  4/6/18  Procedure:  BILATERAL UPPER EYELIDS BLEPHAROPLASTY AND PTOSIS REPAIR   History of reaction to anesthesia?  No

## 2018-03-22 NOTE — NURSING NOTE
Chief Complaint   Patient presents with     Pre-Op Exam     Patient here for pre op exam.       Heaven Krause CMA at 8:58 AM on 3/22/2018.

## 2018-03-22 NOTE — MR AVS SNAPSHOT
After Visit Summary   3/22/2018    Tom Saini    MRN: [id number]           Patient Information     Date Of Birth          1935        Visit Information        Provider Department      3/22/2018 8:55 AM Angeli Robertson MD [facility]        Today's Diagnoses     Hyperlipidemia LDL goal <100          Care Instructions    Primary [facility]: [phone]     Primary Care Center Medication Refill Request Information:  * Please contact your pharmacy regarding ANY request for medication refills.  ** [facility] Prescription Fax = [phone]  * Please allow 3 business days for routine medication refills.  * Please allow 5 business days for controlled substance medication refills.     Primary Care Center Test Result notification information:  *You will be notified with in 7-10 days of your appointment day regarding the results of your test.  If you are on MyChart you will be notified as soon as the provider has reviewed the results and signed off on them.      Call clinic in October for flu shot, typhoid shot and malaria prophylaxis.    Stop your aspirin 7 days prior to your eye surgery.          Follow-ups after your visit        Your next 10 appointments already scheduled     Apr 06, 2018   Procedure with Anuj Good MD   [facility] PeriOP Services (--)    [address]  [facility] [address]   [phone]            Apr 20, 2018  9:30 AM CDT   (Arrive by 9:15 AM)   Post-Op with Kalyani Rainey MD   [facility] Op[facility] ([facility])    [address] [facility]  4th [facility] [id number] [phone]            May 24, 2018  3:00 PM CDT   RETURN GENERAL with Brigido Laura MD   Eye Clinic ([facility])    [place] [address]  [address] [address]  [facility] [id number] [phone]              Who to contact     Please call your clinic at [phone] to:    Ask questions  about your health    Make or cancel appointments    Discuss your medicines    Learn about your test results    Speak to your doctor            Additional Information About Your Visit        [employer] Information     [employer] gives you secure access to your electronic health record. If you see a primary care provider, you can also send messages to your care team and make appointments. If you have questions, please call your primary care clinic.  If you do not have a primary care provider, please call [phone] and they will assist you.      [employer]abybox is an electronic gateway that provides easy, online access to your medical records. With [employer]box, you can request a clinic appointment, read your test results, renew a prescription or communicate with your care team.     To access your existing account, please contact your [facility] or call [phone] for assistance.        Care EveryWhere ID     This is your Care EveryWhere ID. This could be used by other organizations to access your [place] medical records  [id number]        Your Vitals Were     Pulse BMI (Body Mass Index)                98 25.4 kg/m2           Blood Pressure from Last 3 Encounters:   03/22/18 107/64   06/26/17 123/67   02/10/17 125/73    Weight from Last 3 Encounters:   03/22/18 82.6 kg (182 lb 1.6 oz)   02/15/18 83.9 kg (185 lb)   06/26/17 84.7 kg (186 lb 11.2 oz)              Today, you had the following     No orders found for display         Where to get your medicines      These medications were sent to [address] [address]     Phone:  [phone]     atorvastatin 10 MG tablet          Primary Care Provider Office Phone # Fax #    Angeli Robertson [phone]       [address] [address]        Equal Access to Services     RAEANN WALLACE AH: liza Lamb qaybta  ayan alanginny taylor ah. So [facility] [phone].    ATENCIÓN: Si alexia garcia, tiene a milton disposición servicios gratuitos de asistencia lingüística. Manuel al [phone].    We comply with applicable federal civil rights laws and [place] laws. We do not discriminate on the basis of race, color, national origin, age, disability, sex, sexual orientation, or gender identity.            Thank you!     Thank you for choosing [facility]IMARY [facility]ARE CLINIC  for your care. Our goal is always to provide you with excellent care. Hearing back from our patients is one way we can continue to improve our services. Please take a few minutes to complete the written survey that you may receive in the mail after your visit with us. Thank you!             Your Updated Medication List - Protect others around you: Learn how to safely use, store and throw away your medicines at www.disposemymeds.org.          This list is accurate as of 3/22/18  9:23 AM.  Always use your most recent med list.                   Brand Name Dispense Instructions for use Diagnosis    aspirin 81 MG tablet      Take 1 tablet by mouth daily.        atorvastatin 10 MG tablet    LIPITOR    90 tablet    Take 1 tablet (10 mg) by mouth daily    Hyperlipidemia LDL goal <100       cholecalciferol 1000 UNITS capsule    vitamin  -D     Take 1 capsule by mouth daily.        multivitamin Tabs tablet      Take 2 tablets by mouth 2 times daily.

## 2018-03-22 NOTE — PATIENT INSTRUCTIONS
[facility]: [phone]     [facility] Medication Refill Request Information:  * Please contact your pharmacy regarding ANY request for medication refills.  ** [facility] Prescription Fax = [phone]  * Please allow 3 business days for routine medication refills.  * Please allow 5 business days for controlled substance medication refills.     [facility] Test Result notification information:  *You will be notified with in 7-10 days of your appointment day regarding the results of your test.  If you are on MyChart you will be notified as soon as the provider has reviewed the results and signed off on them.      Call clinic in October for flu shot, typhoid shot and malaria prophylaxis.    Stop your aspirin 7 days prior to your eye surgery.

## 2018-03-22 NOTE — PROGRESS NOTES
"[facility] [place]   Angeli Robertson MD  03/22/2018     Chief Complaint:   Pre-Op Exam.      History of Present Illness:   Tom Saini is 83 year old male with a history of aortic insufficiency and hyperlipidemia here at the request of Dr. Anuj Good for cardiovascular, pulmonary, and perioperative risk assessment prior to surgery. The intended surgical procedure is bilateral upper eyelid blepharoplasty and ptosis repair to be performed on 4/6/2018. A copy of this note will be sent to the surgeon. He denies any recent bowel or bladder changes, or abdominal pain.     Other concerns discussed:  1. The patient plants to travel to [place] and [facility] next summer. He has questions regarding future travel vaccinations.   2. He further mentions concern for worsening \"ridges\" on his toenails; denies any recent injuries.      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 This is beginning to obstruct his activities of daily living as he occasionally \"sees double.\" Specifically, he has difficulties with driving and reading.     Cardiovascular Risk:  This patient does not have chest pain with ambulation or walking up a flight of stairs. There is not any chest pain with exercise. He does not have a history of known cardiac disease. There is not a history " of stroke or valvular disease. He had a normal echo in 5/14.    Pulmonary Risk:  The patient does not have a history of lung problems.    Perioperative Complications:  The patient does not have a history of bleeding or clotting problems in the past. He has not had complications from past surgeries. The patient does not have a family history of any anesthesia or surgical complications.    Review of Systems:   Pertinent items are noted in HPI, remainder of complete ROS is negative.      Active Medications:      atorvastatin (LIPITOR) 10 MG tablet, Take 1 tablet (10 mg) by mouth daily, Disp: 90 tablet, Rfl: 3     cholecalciferol (VITAMIN D3) 1000 UNITS capsule, Take 1 capsule by mouth daily., Disp: , Rfl:      multivitamin (OCUVITE) TABS, Take 2 tablets by mouth 2 times daily., Disp: , Rfl:      aspirin 81 MG tablet, Take 1 tablet by mouth daily., Disp: , Rfl: 3     Allergies:   Codeine sulfate.      Past Medical History:  Aortic insufficiency.  Erectile dysfunction.   Hyperlipidemia.   Low back pain secondary to disc disease.   Macular degeneration.   Shingles.   Eczema.   Dermatofibroma.   Seborrheic keratoses, inflamed.       Past Surgical History:  Cataract removal.   Laminectomy, lumbar two levels.   Recession resection, repair strabismus.     Family History:   Father: Positive for bladder cancer.   Mother: Positive for skin cancer.       Social History:   The patient is a former smoker; he quit in 1954. He endorses alcohol use. He enjoys photography. Plans to travel to [facility] and [facility] this summer.      Physical Exam:   /64  Pulse 98  Wt 82.6 kg (182 lb 1.6 oz)  BMI 25.4 kg/m2   Constitutional: Alert, oriented, pleasant, no acute distress  Head: Normocephalic, atraumatic  Eyes: Extra-ocular movements intact, no scleral icterus  ENT: Oropharynx clear, moist mucus membranes, good dentition  Neck: Supple, no lymphadenopathy, no thyromegaly   Cardiovascular: Regular rate and rhythm, no murmurs, rubs or  gallops, peripheral pulses full/symmetric  Respiratory: Good air movement bilaterally, lungs clear, no wheezes/rales/rhonchi  GI: Abdomen soft, bowel sounds present, nondistended, nontender, no organomegaly or masses, no rebound/guarding  Musculoskeletal: No edema, normal muscle tone, normal gait  Neurologic: Alert and oriented, cranial nerves 2-12 intact.  Skin: No rashes/lesions  Psychiatric: normal mentation, affect and mood      EKG:    EKG was not indicated based on risk assessment.      Assessment and Plan:    Pre-operative assessment for bilateral upper eyelid blepharoplasty and ptosis repair to be performed on 4/6/2018  The patient is at LOW risk for cardiovascular complications and at LOW risk for pulmonary complications of this LOW risk surgery.   --Approval given to proceed with proposed procedure, without further diagnostic evaluation    The patient has been told to not take any aspirin or NSAIDs for 10 days prior to surgery. The patient has been instructed as to what to do with medications prior to surgery.    Hyperlipidemia:   I have refilled the patient's prescription for Lipitor.   - atorvastatin (LIPITOR) 10 MG tablet  Dispense: 90 tablet; Refill: 3    Travel advice encounter:   Reviewed his upcoming trips and necessary vaccination and precautions. The only vaccine he needs is typhoid. He will wait closer to his departure to obtain this. He will also need malaria prophylaxis for a two week trip to [facility]. He will call the clinic next fall for these orders.       Follow-up: Will return for typhoid vaccination, flu vaccination, and malaria prophylaxis prior to traveling to [place] next summer.         Scribe Disclosure:   Nell HASTINGS, am serving as a scribe to document services personally performed by Angeli Robertson MD at this visit, based upon the provider's statements to me. All documentation has been reviewed by the aforementioned provider prior to being entered into the official  medical record.     Portions of this medical record were completed by a scribe. UPON MY REVIEW AND AUTHENTICATION BY ELECTRONIC SIGNATURE, this confirms (a) I performed the applicable clinical services, and (b) the record is accurate.    Angeli Robertson MD  Internal Medicine

## 2018-04-03 NOTE — H&P (VIEW-ONLY)
"[facility]   Angeli Robertson MD  03/22/2018     Chief Complaint:   Pre-Op Exam.      History of Present Illness:   Tom Saini is 83 year old male with a history of aortic insufficiency and hyperlipidemia here at the request of Dr. Anuj Good for cardiovascular, pulmonary, and perioperative risk assessment prior to surgery. The intended surgical procedure is bilateral upper eyelid blepharoplasty and ptosis repair to be performed on 4/6/2018. A copy of this note will be sent to the surgeon. He denies any recent bowel or bladder changes, or abdominal pain.     Other concerns discussed:  1. The patient plants to travel to [facility] and [facility] next summer. He has questions regarding future travel vaccinations.   2. He further mentions concern for worsening \"ridges\" on his toenails; denies any recent injuries.      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 This is beginning to obstruct his activities of daily living as he occasionally \"sees double.\" Specifically, he has difficulties with driving and reading.     Cardiovascular Risk:  This patient does not have chest pain with ambulation or walking up a flight of stairs. There is not any chest pain with exercise. He does not have a history of known cardiac disease. There is not a history " of stroke or valvular disease. He had a normal echo in 5/14.    Pulmonary Risk:  The patient does not have a history of lung problems.    Perioperative Complications:  The patient does not have a history of bleeding or clotting problems in the past. He has not had complications from past surgeries. The patient does not have a family history of any anesthesia or surgical complications.    Review of Systems:   Pertinent items are noted in HPI, remainder of complete ROS is negative.      Active Medications:      atorvastatin (LIPITOR) 10 MG tablet, Take 1 tablet (10 mg) by mouth daily, Disp: 90 tablet, Rfl: 3     cholecalciferol (VITAMIN D3) 1000 UNITS capsule, Take 1 capsule by mouth daily., Disp: , Rfl:      multivitamin (OCUVITE) TABS, Take 2 tablets by mouth 2 times daily., Disp: , Rfl:      aspirin 81 MG tablet, Take 1 tablet by mouth daily., Disp: , Rfl: 3     Allergies:   Codeine sulfate.      Past Medical History:  Aortic insufficiency.  Erectile dysfunction.   Hyperlipidemia.   Low back pain secondary to disc disease.   Macular degeneration.   Shingles.   Eczema.   Dermatofibroma.   Seborrheic keratoses, inflamed.       Past Surgical History:  Cataract removal.   Laminectomy, lumbar two levels.   Recession resection, repair strabismus.     Family History:   Father: Positive for bladder cancer.   Mother: Positive for skin cancer.       Social History:   The patient is a former smoker; he quit in 1954. He endorses alcohol use. He enjoys photography. Plans to travel to [facility] and [facility] this summer.      Physical Exam:   /64  Pulse 98  Wt 82.6 kg (182 lb 1.6 oz)  BMI 25.4 kg/m2   Constitutional: Alert, oriented, pleasant, no acute distress  Head: Normocephalic, atraumatic  Eyes: Extra-ocular movements intact, no scleral icterus  ENT: Oropharynx clear, moist mucus membranes, good dentition  Neck: Supple, no lymphadenopathy, no thyromegaly   Cardiovascular: Regular rate and rhythm, no murmurs, rubs or  gallops, peripheral pulses full/symmetric  Respiratory: Good air movement bilaterally, lungs clear, no wheezes/rales/rhonchi  GI: Abdomen soft, bowel sounds present, nondistended, nontender, no organomegaly or masses, no rebound/guarding  Musculoskeletal: No edema, normal muscle tone, normal gait  Neurologic: Alert and oriented, cranial nerves 2-12 intact.  Skin: No rashes/lesions  Psychiatric: normal mentation, affect and mood      EKG:    EKG was not indicated based on risk assessment.      Assessment and Plan:    Pre-operative assessment for bilateral upper eyelid blepharoplasty and ptosis repair to be performed on 4/6/2018  The patient is at LOW risk for cardiovascular complications and at LOW risk for pulmonary complications of this LOW risk surgery.   --Approval given to proceed with proposed procedure, without further diagnostic evaluation    The patient has been told to not take any aspirin or NSAIDs for 10 days prior to surgery. The patient has been instructed as to what to do with medications prior to surgery.    Hyperlipidemia:   I have refilled the patient's prescription for Lipitor.   - atorvastatin (LIPITOR) 10 MG tablet  Dispense: 90 tablet; Refill: 3    Travel advice encounter:   Reviewed his upcoming trips and necessary vaccination and precautions. The only vaccine he needs is typhoid. He will wait closer to his departure to obtain this. He will also need malaria prophylaxis for a two week trip to [facility]. He will call the clinic next fall for these orders.       Follow-up: Will return for typhoid vaccination, flu vaccination, and malaria prophylaxis prior to traveling to [place] next summer.         Scribe Disclosure:   Nell HASTINGS, am serving as a scribe to document services personally performed by Angeli Robertson MD at this visit, based upon the provider's statements to me. All documentation has been reviewed by the aforementioned provider prior to being entered into the official  medical record.     Portions of this medical record were completed by a scribe. UPON MY REVIEW AND AUTHENTICATION BY ELECTRONIC SIGNATURE, this confirms (a) I performed the applicable clinical services, and (b) the record is accurate.    Angeli Robertson MD  Internal Medicine

## 2018-04-06 ENCOUNTER — HOSPITAL ENCOUNTER (OUTPATIENT)
Facility: CLINIC | Age: 83
Discharge: HOME | End: 2018-04-06
Attending: OPHTHALMOLOGY | Admitting: OPHTHALMOLOGY
Payer: MEDICARE

## 2018-04-06 ENCOUNTER — ANESTHESIA (OUTPATIENT)
Dept: SURGERY | Facility: CLINIC | Age: 83
End: 2018-04-06
Payer: MEDICARE

## 2018-04-06 ENCOUNTER — ANESTHESIA EVENT (OUTPATIENT)
Dept: SURGERY | Facility: CLINIC | Age: 83
End: 2018-04-06
Payer: MEDICARE

## 2018-04-06 ENCOUNTER — SURGERY (OUTPATIENT)
Age: 83
End: 2018-04-06

## 2018-04-06 VITALS
RESPIRATION RATE: 16 BRPM | OXYGEN SATURATION: 96 % | SYSTOLIC BLOOD PRESSURE: 119 MMHG | HEIGHT: 71 IN | WEIGHT: 182 LBS | TEMPERATURE: 97.4 F | BODY MASS INDEX: 25.48 KG/M2 | DIASTOLIC BLOOD PRESSURE: 81 MMHG

## 2018-04-06 DIAGNOSIS — Z98.890 POSTOPERATIVE EYE STATE: Primary | ICD-10-CM

## 2018-04-06 PROCEDURE — 25000125 ZZHC RX 250: Performed by: OPHTHALMOLOGY

## 2018-04-06 PROCEDURE — 25000128 H RX IP 250 OP 636: Performed by: NURSE ANESTHETIST, CERTIFIED REGISTERED

## 2018-04-06 PROCEDURE — 36000056 ZZH SURGERY LEVEL 3 1ST 30 MIN: Performed by: OPHTHALMOLOGY

## 2018-04-06 PROCEDURE — 71000027 ZZH RECOVERY PHASE 2 EACH 15 MINS: Performed by: OPHTHALMOLOGY

## 2018-04-06 PROCEDURE — 37000009 ZZH ANESTHESIA TECHNICAL FEE, EACH ADDTL 15 MIN: Performed by: OPHTHALMOLOGY

## 2018-04-06 PROCEDURE — 71000012 ZZH RECOVERY PHASE 1 LEVEL 1 FIRST HR: Performed by: OPHTHALMOLOGY

## 2018-04-06 PROCEDURE — 36000058 ZZH SURGERY LEVEL 3 EA 15 ADDTL MIN: Performed by: OPHTHALMOLOGY

## 2018-04-06 PROCEDURE — 27210794 ZZH OR GENERAL SUPPLY STERILE: Performed by: OPHTHALMOLOGY

## 2018-04-06 PROCEDURE — 40000170 ZZH STATISTIC PRE-PROCEDURE ASSESSMENT II: Performed by: OPHTHALMOLOGY

## 2018-04-06 PROCEDURE — 25000125 ZZHC RX 250: Performed by: NURSE ANESTHETIST, CERTIFIED REGISTERED

## 2018-04-06 PROCEDURE — 37000008 ZZH ANESTHESIA TECHNICAL FEE, 1ST 30 MIN: Performed by: OPHTHALMOLOGY

## 2018-04-06 RX ORDER — FENTANYL CITRATE 50 UG/ML
25-50 INJECTION, SOLUTION INTRAMUSCULAR; INTRAVENOUS
Status: DISCONTINUED | OUTPATIENT
Start: 2018-04-06 | End: 2018-04-06 | Stop reason: HOSPADM

## 2018-04-06 RX ORDER — SODIUM CHLORIDE, SODIUM LACTATE, POTASSIUM CHLORIDE, CALCIUM CHLORIDE 600; 310; 30; 20 MG/100ML; MG/100ML; MG/100ML; MG/100ML
INJECTION, SOLUTION INTRAVENOUS CONTINUOUS
Status: DISCONTINUED | OUTPATIENT
Start: 2018-04-06 | End: 2018-04-06 | Stop reason: HOSPADM

## 2018-04-06 RX ORDER — SODIUM CHLORIDE, SODIUM LACTATE, POTASSIUM CHLORIDE, CALCIUM CHLORIDE 600; 310; 30; 20 MG/100ML; MG/100ML; MG/100ML; MG/100ML
INJECTION, SOLUTION INTRAVENOUS CONTINUOUS PRN
Status: DISCONTINUED | OUTPATIENT
Start: 2018-04-06 | End: 2018-04-06

## 2018-04-06 RX ORDER — PHYSOSTIGMINE SALICYLATE 1 MG/ML
1.2 INJECTION INTRAVENOUS
Status: DISCONTINUED | OUTPATIENT
Start: 2018-04-06 | End: 2018-04-06 | Stop reason: HOSPADM

## 2018-04-06 RX ORDER — NALOXONE HYDROCHLORIDE 0.4 MG/ML
.1-.4 INJECTION, SOLUTION INTRAMUSCULAR; INTRAVENOUS; SUBCUTANEOUS
Status: DISCONTINUED | OUTPATIENT
Start: 2018-04-06 | End: 2018-04-06 | Stop reason: HOSPADM

## 2018-04-06 RX ORDER — FENTANYL CITRATE 50 UG/ML
25-50 INJECTION, SOLUTION INTRAMUSCULAR; INTRAVENOUS EVERY 5 MIN PRN
Status: DISCONTINUED | OUTPATIENT
Start: 2018-04-06 | End: 2018-04-06 | Stop reason: HOSPADM

## 2018-04-06 RX ORDER — LIDOCAINE HYDROCHLORIDE 20 MG/ML
INJECTION, SOLUTION INFILTRATION; PERINEURAL PRN
Status: DISCONTINUED | OUTPATIENT
Start: 2018-04-06 | End: 2018-04-06

## 2018-04-06 RX ORDER — LIDOCAINE HYDROCHLORIDE AND EPINEPHRINE BITARTRATE 20; .01 MG/ML; MG/ML
INJECTION, SOLUTION SUBCUTANEOUS
Status: DISCONTINUED
Start: 2018-04-06 | End: 2018-04-06 | Stop reason: HOSPADM

## 2018-04-06 RX ORDER — DEXAMETHASONE SODIUM PHOSPHATE 4 MG/ML
INJECTION, SOLUTION INTRA-ARTICULAR; INTRALESIONAL; INTRAMUSCULAR; INTRAVENOUS; SOFT TISSUE PRN
Status: DISCONTINUED | OUTPATIENT
Start: 2018-04-06 | End: 2018-04-06

## 2018-04-06 RX ORDER — BUPIVACAINE HYDROCHLORIDE 5 MG/ML
INJECTION, SOLUTION EPIDURAL; INTRACAUDAL
Status: DISCONTINUED
Start: 2018-04-06 | End: 2018-04-06 | Stop reason: HOSPADM

## 2018-04-06 RX ORDER — NEOMYCIN SULFATE, POLYMYXIN B SULFATE AND DEXAMETHASONE 3.5; 10000; 1 MG/ML; [USP'U]/ML; MG/ML
1 SUSPENSION/ DROPS OPHTHALMIC 3 TIMES DAILY
Qty: 1 BOTTLE | Refills: 0 | Status: SHIPPED | OUTPATIENT
Start: 2018-04-06 | End: 2018-08-14

## 2018-04-06 RX ORDER — MEPERIDINE HYDROCHLORIDE 25 MG/ML
12.5 INJECTION INTRAMUSCULAR; INTRAVENOUS; SUBCUTANEOUS
Status: DISCONTINUED | OUTPATIENT
Start: 2018-04-06 | End: 2018-04-06 | Stop reason: HOSPADM

## 2018-04-06 RX ORDER — ERYTHROMYCIN 5 MG/G
OINTMENT OPHTHALMIC PRN
Status: DISCONTINUED | OUTPATIENT
Start: 2018-04-06 | End: 2018-04-06 | Stop reason: HOSPADM

## 2018-04-06 RX ORDER — PROPOFOL 10 MG/ML
INJECTION, EMULSION INTRAVENOUS PRN
Status: DISCONTINUED | OUTPATIENT
Start: 2018-04-06 | End: 2018-04-06

## 2018-04-06 RX ORDER — ERYTHROMYCIN 5 MG/G
OINTMENT OPHTHALMIC
Status: DISCONTINUED
Start: 2018-04-06 | End: 2018-04-06 | Stop reason: HOSPADM

## 2018-04-06 RX ORDER — FENTANYL CITRATE 50 UG/ML
INJECTION, SOLUTION INTRAMUSCULAR; INTRAVENOUS PRN
Status: DISCONTINUED | OUTPATIENT
Start: 2018-04-06 | End: 2018-04-06

## 2018-04-06 RX ORDER — ONDANSETRON 4 MG/1
4 TABLET, ORALLY DISINTEGRATING ORAL EVERY 30 MIN PRN
Status: DISCONTINUED | OUTPATIENT
Start: 2018-04-06 | End: 2018-04-06 | Stop reason: HOSPADM

## 2018-04-06 RX ORDER — ERYTHROMYCIN 5 MG/G
OINTMENT OPHTHALMIC
Qty: 3.5 G | Refills: 0 | Status: SHIPPED | OUTPATIENT
Start: 2018-04-06 | End: 2018-08-14

## 2018-04-06 RX ORDER — TETRACAINE HYDROCHLORIDE 5 MG/ML
SOLUTION OPHTHALMIC
Status: DISCONTINUED
Start: 2018-04-06 | End: 2018-04-06 | Stop reason: HOSPADM

## 2018-04-06 RX ORDER — HYDROMORPHONE HYDROCHLORIDE 1 MG/ML
.3-.5 INJECTION, SOLUTION INTRAMUSCULAR; INTRAVENOUS; SUBCUTANEOUS EVERY 10 MIN PRN
Status: DISCONTINUED | OUTPATIENT
Start: 2018-04-06 | End: 2018-04-06 | Stop reason: HOSPADM

## 2018-04-06 RX ORDER — TETRACAINE HYDROCHLORIDE 5 MG/ML
SOLUTION OPHTHALMIC PRN
Status: DISCONTINUED | OUTPATIENT
Start: 2018-04-06 | End: 2018-04-06 | Stop reason: HOSPADM

## 2018-04-06 RX ORDER — ONDANSETRON 2 MG/ML
INJECTION INTRAMUSCULAR; INTRAVENOUS PRN
Status: DISCONTINUED | OUTPATIENT
Start: 2018-04-06 | End: 2018-04-06

## 2018-04-06 RX ORDER — ONDANSETRON 2 MG/ML
4 INJECTION INTRAMUSCULAR; INTRAVENOUS EVERY 30 MIN PRN
Status: DISCONTINUED | OUTPATIENT
Start: 2018-04-06 | End: 2018-04-06 | Stop reason: HOSPADM

## 2018-04-06 RX ADMIN — DEXAMETHASONE SODIUM PHOSPHATE 4 MG: 4 INJECTION, SOLUTION INTRA-ARTICULAR; INTRALESIONAL; INTRAMUSCULAR; INTRAVENOUS; SOFT TISSUE at 07:32

## 2018-04-06 RX ADMIN — DEXMEDETOMIDINE HYDROCHLORIDE 4 MCG: 100 INJECTION, SOLUTION INTRAVENOUS at 07:38

## 2018-04-06 RX ADMIN — ERYTHROMYCIN 2 G: 5 OINTMENT OPHTHALMIC at 07:34

## 2018-04-06 RX ADMIN — FENTANYL CITRATE 25 MCG: 50 INJECTION, SOLUTION INTRAMUSCULAR; INTRAVENOUS at 07:21

## 2018-04-06 RX ADMIN — FENTANYL CITRATE 25 MCG: 50 INJECTION, SOLUTION INTRAMUSCULAR; INTRAVENOUS at 07:50

## 2018-04-06 RX ADMIN — PROPOFOL 10 MG: 10 INJECTION, EMULSION INTRAVENOUS at 07:38

## 2018-04-06 RX ADMIN — LIDOCAINE HYDROCHLORIDE,EPINEPHRINE BITARTRATE 7 ML: 20; .01 INJECTION, SOLUTION INFILTRATION; PERINEURAL at 07:34

## 2018-04-06 RX ADMIN — DEXMEDETOMIDINE HYDROCHLORIDE 8 MCG: 100 INJECTION, SOLUTION INTRAVENOUS at 07:27

## 2018-04-06 RX ADMIN — SODIUM CHLORIDE, POTASSIUM CHLORIDE, SODIUM LACTATE AND CALCIUM CHLORIDE: 600; 310; 30; 20 INJECTION, SOLUTION INTRAVENOUS at 07:20

## 2018-04-06 RX ADMIN — DEXMEDETOMIDINE HYDROCHLORIDE 4 MCG: 100 INJECTION, SOLUTION INTRAVENOUS at 07:44

## 2018-04-06 RX ADMIN — LIDOCAINE HYDROCHLORIDE 40 MG: 20 INJECTION, SOLUTION INFILTRATION; PERINEURAL at 07:26

## 2018-04-06 RX ADMIN — FENTANYL CITRATE 25 MCG: 50 INJECTION, SOLUTION INTRAMUSCULAR; INTRAVENOUS at 07:45

## 2018-04-06 RX ADMIN — FENTANYL CITRATE 25 MCG: 50 INJECTION, SOLUTION INTRAMUSCULAR; INTRAVENOUS at 07:54

## 2018-04-06 RX ADMIN — TETRACAINE HYDROCHLORIDE 2 DROP: 5 SOLUTION OPHTHALMIC at 07:25

## 2018-04-06 RX ADMIN — PROPOFOL 10 MG: 10 INJECTION, EMULSION INTRAVENOUS at 07:29

## 2018-04-06 RX ADMIN — ONDANSETRON 4 MG: 2 INJECTION INTRAMUSCULAR; INTRAVENOUS at 07:32

## 2018-04-06 RX ADMIN — PROPOFOL 30 MG: 10 INJECTION, EMULSION INTRAVENOUS at 07:27

## 2018-04-06 RX ADMIN — DEXMEDETOMIDINE HYDROCHLORIDE 4 MCG: 100 INJECTION, SOLUTION INTRAVENOUS at 07:47

## 2018-04-06 RX ADMIN — MIDAZOLAM 0.5 MG: 1 INJECTION INTRAMUSCULAR; INTRAVENOUS at 07:21

## 2018-04-06 NOTE — IP AVS SNAPSHOT
[facility] Day [facility]    [address]    [place] [address]    Phone:  [phone]    Fax:  [phone]                                       After Visit Summary   4/6/2018    Tom Saini    MRN: [id number]           After Visit Summary Signature Page     I have received my discharge instructions, and my questions have been answered. I have discussed any challenges I see with this plan with the nurse or doctor.    ..........................................................................................................................................  Patient/Patient Representative Signature      ..........................................................................................................................................  Patient Representative Print Name and Relationship to Patient    ..................................................               ................................................  Date                                            Time    ..........................................................................................................................................  Reviewed by Signature/Title    ...................................................              ..............................................  Date                                                            Time

## 2018-04-06 NOTE — IP AVS SNAPSHOT
MRN:[id number]                      After Visit Summary   4/6/2018    Tom Saini    MRN: [id number]           Thank you!     Thank you for choosing [facility] for your care. Our goal is always to provide you with excellent care. Hearing back from our patients is one way we can continue to improve our services. Please take a few minutes to complete the written survey that you may receive in the mail after you visit with us. Thank you!        Patient Information     Date Of Birth          1935        About your hospital stay     You were admitted on:  April 6, 2018 You last received care in the:  [facility]    You were discharged on:  April 6, 2018       Who to Call     For medical emergencies, please call 911.  For non-urgent questions about your medical care, please call your primary care provider or clinic, [phone]  For questions related to your surgery, please call your surgery clinic        Attending Provider     Provider Specialty    Anuj Good MD Ophthalmology       Primary Care Provider Office Phone # Fax #    [place] MD Marcelo [phone]      After Care Instructions     Discharge Medication Instructions       Do NOT take aspirin or medications containing NSAIDS for 72 hours after procedure.            Ice to affected area       Apply cold pack for 15 minutes on, 15 minutes off, for 48 hours while awake.                  Your next 10 appointments already scheduled     Apr 20, 2018  9:30 AM CDT   (Arrive by 9:15 AM)   Post-Op with Kalyani Rainey MD   [facility] ([facility])    [address]  [address] [facility] [id number] [phone]            May 24, 2018  3:00 PM CDT   RETURN GENERAL with Brigido Laura MD   Eye Clinic ([facility])    [address]  [address] [address]  [facility] [id number] [phone]               Further instructions from your care team       Post-operative Instructions  Ophthalmic Plastic and Reconstructive Surgery    Anuj Good M.D.     All instructions apply to the operated eye(s) or eyelid(s).    Wound care and personal care  ? If a patch or bandage has been placed, please leave this in place until seen by your physician. Ensure that the bandage does not get wet when you take a shower.  ? Apply ice compresses 15 minutes on 15 minutes off while awake for 2 days, then switch to warm water compresses 4 times a day until seen by your physician. For warm packs you can place a cup of dry uncooked rice in a clean cotton sock. Then place sock in microwave 30 seconds to one minute. Next place the warm sock into a plastic bag and wrap the bag with clean warm wet washcloth and place over operated eye.    ? You may shower or wash your hair the day after surgery. Do not bathe or go swimming for 1 week to prevent contamination of your wounds.  ? Do not apply make-up to the eyes or eyelids for 2 weeks after surgery.  ? Expect some swelling, bruising, black eye (even into the lower eyelids and cheeks). Also expect serum caking, crusting and discharge from the eye and/or incisions. A small amount of surface bleeding is normal for the first 48 hours.  ? Your eye(s) and eyelid(s) may be painful and tender. This is normal after surgery.  Use the pain medication as prescribed. If your pain does not improve despite the  medication, contact the office.    Contact information and follow-up  ? Return to the Eye Clinic for a follow-up appointment with your physician as  scheduled. If no appointment has been scheduled:   - [facility] eye clinic: [phone] for an appointment with Dr. Good within 1 to 2 weeks from your date of surgery.   -  [facility] eye clinic: [phone] for an appointment with Dr. Good within 1 to 2 weeks from your date of surgery.     ? For severe pain,  bleeding, or loss of vision, call the [facility] at [phone] or [facility] at [phone].     After hours or on weekends and holidays, call [phone] and ask to speak with the ophthalmologist on call.    An on call person can be reached after hours for concerns. The on call doctor should not call in medication refill requests after hours or on weekends, so please plan accordingly. An effort has been made to provide adequate pain medications following every surgery, and refills will not be provided in most instances. Narcotic pain medications cannot be called in.     Activity restrictions and driving  ? Avoid heavy lifting, bending, exercise or strenuous activity for 1 week after surgery.  You may resume other activities and return to work as tolerated.  ? You may not resume driving until have you stopped using narcotic pain medications (such as Norco, Percocet, Tylenol #3).    Medications  ? Restart all your regular home medications and eye drops. If you take Plavix or  Aspirin on a regular basis, wait for 72 hours after your surgery before restarting these in order to decrease the risk of bleeding complications.  ? Avoid aspirin and aspirin-like medications (Motrin, Aleve, Ibuprofen, Odilia-  Ballwin etc) for 72 hours to reduce the risk of bleeding. You may take Tylenol  (acetaminophen) for pain.  ? In addition to your home medications, take the following post-operative medications as prescribed by your physician.    ? Apply antibiotic ointment to all sutures three times a day, and into the operated eye(s) at night.  ? Instill eye drops 3 times a day for 10 days.   ? Take tylenol (acetaminophen) as needed for pain.    ? WARNING:  You must not take more than 4,000 mg of acetaminophen per  24-hour period. If you take other over-the-counter medications containing acetaminophen, you must take the amount of acetaminophen into account and reduce the number of prescribed pain  pills accordingly.      Same Day Surgery Discharge Instructions for  Sedation and General Anesthesia       It's not unusual to feel dizzy, light-headed or faint for up to 24 hours after surgery or while taking pain medication.  If you have these symptoms: sit for a few minutes before standing and have someone assist you when you get up to walk or use the bathroom.      You should rest and relax for the next 24 hours. We recommend you make arrangements to have an adult stay with you for at least 24 hours after your discharge.  Avoid hazardous and strenuous activity.      DO NOT DRIVE any vehicle or operate mechanical equipment for 24 hours following the end of your surgery.  Even though you may feel normal, your reactions may be affected by the medication you have received.      Do not drink alcoholic beverages for 24 hours following surgery.       Slowly progress to your regular diet as you feel able. It's not unusual to feel nauseated and/or vomit after receiving anesthesia.  If you develop these symptoms, drink clear liquids (apple juice, ginger ale, broth, 7-up, etc. ) until you feel better.  If your nausea and vomiting persists for 24 hours, please notify your surgeon.        All narcotic pain medications, along with inactivity and anesthesia, can cause constipation. Drinking plenty of liquids and increasing fiber intake will help.      For any questions of a medical nature, call your surgeon.      Do not make important decisions for 24 hours.      If you had general anesthesia, you may have a sore throat for a couple of days related to the breathing tube used during surgery.  You may use Cepacol lozenges to help with this discomfort.  If it worsens or if you develop a fever, contact your surgeon.       If you feel your pain is not well managed with the pain medications prescribed by your surgeon, please contact your surgeon's office to let them know so they can address your concerns.           Pending Results   "   No orders found from 4/4/2018 to 4/7/2018.            Admission Information     Date & Time Provider Department Dept. Phone    4/6/2018 Anuj Good MD [facility] Same Day Surgery [phone]      Your Vitals Were     Blood Pressure Temperature Respirations Height Weight Pulse Oximetry    112/66 97.4  F (36.3  C) (Temporal) 16 1.803 m (5' 11\") 82.6 kg (182 lb) 96%    BMI (Body Mass Index)                   25.38 kg/m2           MyChart Information     [employer] gives you secure access to your electronic health record. If you see a primary care provider, you can also send messages to your care team and make appointments. If you have questions, please call your primary care clinic.  If you do not have a primary care provider, please call [phone] and they will assist you.        Care EveryWhere ID     This is your Care EveryWhere ID. This could be used by other organizations to access your [facility] medical records  [id number]        Equal Access to Services     RAEANN WALLACE AH: Hadii kelly nazarioo Soanna, waaxda luqadaha, qaybta kaalmada adewilma, ayan smith. So [facility] [phone].    ATENCIÓN: Si habla español, tiene a milton disposición servicios gratuitos de asistencia lingüística. Llame al [phone].    We comply with applicable federal civil rights laws and [place] laws. We do not discriminate on the basis of race, color, national origin, age, disability, sex, sexual orientation, or gender identity.               Review of your medicines      START taking        Dose / Directions    erythromycin ophthalmic ointment   Commonly known as:  ROMYCIN   Used for:  Postoperative eye state        Apply thin strip to incision site three times a day and a thin strip into the eye at bedtime.   Quantity:  3.5 g   Refills:  0       neomycin-polymyxin-dexamethasone 3.5-53264-2.1 Susp ophthalmic susp   Commonly known as:  MAXITROL   Used for:  Postoperative eye state        " Dose:  1 drop   Place 1 drop into both eyes 3 times daily   Quantity:  1 Bottle   Refills:  0         CONTINUE these medicines which have NOT CHANGED        Dose / Directions    aspirin 81 MG tablet        Dose:  1 tablet   Take 1 tablet by mouth daily.   Refills:  3       atorvastatin 10 MG tablet   Commonly known as:  LIPITOR   Used for:  Hyperlipidemia LDL goal <100        Dose:  10 mg   Take 1 tablet (10 mg) by mouth daily   Quantity:  90 tablet   Refills:  3       cholecalciferol 1000 UNITS capsule   Commonly known as:  vitamin  -D        Dose:  1 capsule   Take 1 capsule by mouth daily.   Refills:  0       multivitamin Tabs tablet        Dose:  2 tablet   Take 2 tablets by mouth 2 times daily.   Refills:  0            Where to get your medicines      These medications were sent to [facility] - [address]  [address], [place] [address]     Phone:  [phone]     erythromycin ophthalmic ointment    neomycin-polymyxin-dexamethasone 3.5-32841-4.1 Susp ophthalmic susp                Protect others around you: Learn how to safely use, store and throw away your medicines at www.disposemymeds.org.             Medication List: This is a list of all your medications and when to take them. Check marks below indicate your daily home schedule. Keep this list as a reference.      Medications           Morning Afternoon Evening Bedtime As Needed    aspirin 81 MG tablet   Take 1 tablet by mouth daily.                                atorvastatin 10 MG tablet   Commonly known as:  LIPITOR   Take 1 tablet (10 mg) by mouth daily                                cholecalciferol 1000 UNITS capsule   Commonly known as:  vitamin  -D   Take 1 capsule by mouth daily.                                erythromycin ophthalmic ointment   Commonly known as:  ROMYCIN   Apply thin strip to incision site three times a day and a thin strip into the eye at bedtime.   Last time this was given:  2 g  on 4/6/2018  7:34 AM                                multivitamin Tabs tablet   Take 2 tablets by mouth 2 times daily.                                neomycin-polymyxin-dexamethasone 3.5-95540-6.1 Susp ophthalmic susp   Commonly known as:  MAXITROL   Place 1 drop into both eyes 3 times daily

## 2018-04-06 NOTE — OP NOTE
PREOPERATIVE DIAGNOSIS:   1. Bilateral upper eyelid dermatochalasis.   2. Bilateral upper eyelid ptosis.   POSTOPERATIVE DIAGNOSES:   1. Bilateral upper eyelid dermatochalasis.   2. Bilateral upper eyelid ptosis.   PROCEDURE PERFORMED:   1. Bilateral upper blepharoplasty.   2. Bilateral upper eyelid ptosis repair by Bradshaw's muscle conjunctival resection.   SURGEON: Anuj Good MD   ASSISTANT: Lisa Machado MD  ANESTHESIA: Monitored with local infiltration of a 50/50 mixture of 2% lidocaine with epinephrine and 0.5% Marcaine.   COMPLICATIONS: None.   ESTIMATED BLOOD LOSS: Less than 5 mL.   HISTORY: Tom Saini  presented with upper lid drooping interfering with his superior visual field and activities of daily living. After the risks, benefits and alternatives to the proposed procedure were explained, informed consent was obtained.   DESCRIPTION OF PROCEDURE: Tom Saini  was brought to the operating room and placed supine on the operating table. IV sedation was given. The upper eyelid crease and excess upper eyelid skin was marked with a marking pen and infiltrated with local anesthetic.  The area was prepped and draped in the typical sterile ophthalmic fashion. Attention was directed to the right side. Skin was incised following marked lines. Skin and orbicularis muscle flap were excised with high temperature cautery. A 4-0 silk suture was placed to the eyelid margin and the eyelid everted over a Desmarres retractor. A 6-0 silk suture was threaded 4.75 mm from the superior tarsal border and used to elevate the conjunctiva and Bradshaw's muscle which was clamped with a Putterman clamp. A 6-0 plain gut suture was run in a horizontal mattress fashion 1 mm below the clamp. This was run from lateral to medial and then medial to lateral and the clamped tissues were excised with a 15 blade. The suture was externalized through the blepharoplasty incision and tied in a permanent fashion. The blepharoplasty  incision was closed with running 6-0 plain gut suture. The same procedure was performed on the left side.The patient tolerated the procedure well. Antibiotic ointment was applied. Tom Saini left the operating room in stable condition.   Anuj Good MD

## 2018-04-06 NOTE — ANESTHESIA PREPROCEDURE EVALUATION
"  Anesthesia Evaluation     . Pt has had prior anesthetic.            ROS/MED HX    ENT/Pulmonary:       Neurologic:     (+)other neuro macular degeneration    Cardiovascular:     (+) Dyslipidemia, ----. : . . . :. 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       METS/Exercise Tolerance:     Hematologic:         Musculoskeletal:         GI/Hepatic:         Renal/Genitourinary:         Endo:         Psychiatric:         Infectious Disease:         Malignancy:         Other:                     Physical Exam  Normal systems: cardiovascular and pulmonary    Airway   Mallampati: I  TM distance: >3 FB  Neck ROM: limited    Dental     Cardiovascular       Pulmonary                     Anesthesia Plan      History & Physical Review  History and physical reviewed and following examination; no interval change.    ASA Status:  2 .    NPO Status:  > 8 hours    Plan for MAC          Postoperative Care  Postoperative pain management:  IV analgesics and Oral pain medications.      Consents  Anesthetic plan, risks, benefits and alternatives discussed with:  Patient..      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   Current Outpatient Prescriptions on File Prior to Encounter:  cholecalciferol (VITAMIN D3) 1000 UNITS capsule Take 1 capsule by mouth daily.   multivitamin (OCUVITE) TABS Take 2 tablets by mouth 2 times daily.   aspirin 81 MG tablet Take 1 tablet by mouth daily.       "

## 2018-04-06 NOTE — BRIEF OP NOTE
[facility] Brief Operative Note    Pre-operative diagnosis: DERMMATOCHALASIS  OF BOTH EYELIDS MYOGENIC PTOSIS OF BOTH EYES    Post-operative diagnosis Same   Procedure: Procedure(s):  BILATERAL UPPER EYELIDS BLEPHAROPLASTY AND PTOSIS REPAIR  - Wound Class: I-Clean   Surgeon: Anuj Good M.D.    Assistants(s): Lisa Machado M.D.   Estimated blood loss: Less than 10 mL   Specimens: None   Findings: As expected

## 2018-04-06 NOTE — ANESTHESIA CARE TRANSFER NOTE
Patient: Tom Saini    Procedure(s):  BILATERAL UPPER EYELIDS BLEPHAROPLASTY AND PTOSIS REPAIR  - Wound Class: I-Clean    Diagnosis: DERMMATOCHALASIS  OF BOTH EYELIDS MYOGENIC PTOSIS OF BOTH EYES   Diagnosis Additional Information: No value filed.    Anesthesia Type:   MAC     Note:  Airway :Room Air  Patient transferred to:PACU  Comments: At end of procedure, spontaneous respirations, patient alert to voice, able to follow commands. Patient breathing room air to PACU. Oxygen tubing connected to wall O2 in PACU, SpO2, NiBP, and EKG monitors and alarms on and functioning, Rachel Hugger warmer connected to patient gown, report on patient's clinical status given to PACU RN, RN questions answered.Handoff Report: Identifed the Patient, Identified the Reponsible Provider, Reviewed the pertinent medical history, Discussed the surgical course, Reviewed Intra-OP anesthesia mangement and issues during anesthesia, Set expectations for post-procedure period and Allowed opportunity for questions and acknowledgement of understanding      Vitals: (Last set prior to Anesthesia Care Transfer)    CRNA VITALS  4/6/2018 0734 - 4/6/2018 0809      4/6/2018             Resp Rate (set): 10                Electronically Signed By: HEYDI Gleason CRNA  April 6, 2018  8:09 AM

## 2018-04-06 NOTE — ANESTHESIA POSTPROCEDURE EVALUATION
Patient: Tom Saini    Procedure(s):  BILATERAL UPPER EYELIDS BLEPHAROPLASTY AND PTOSIS REPAIR  - Wound Class: I-Clean    Diagnosis:DERMMATOCHALASIS  OF BOTH EYELIDS MYOGENIC PTOSIS OF BOTH EYES   Diagnosis Additional Information: No value filed.    Anesthesia Type:  MAC    Note:  Anesthesia Post Evaluation    Patient location during evaluation: PACU  Patient participation: Able to fully participate in evaluation  Level of consciousness: awake  Pain management: adequate  Airway patency: patent  Cardiovascular status: acceptable  Respiratory status: acceptable  Hydration status: acceptable  PONV: none     Anesthetic complications: None          Last vitals:  Vitals:    04/06/18 0800 04/06/18 0815 04/06/18 0830   BP: 112/66 114/71 122/73   Resp: 16 13 14   Temp: 36.3  C (97.4  F)     SpO2: 96% 97% 94%         Electronically Signed By: Yazan Crawford MD  April 6, 2018  8:55 AM

## 2018-04-06 NOTE — DISCHARGE INSTRUCTIONS
Post-operative Instructions  Ophthalmic Plastic and Reconstructive Surgery    Anuj Good M.D.     All instructions apply to the operated eye(s) or eyelid(s).    Wound care and personal care  ? If a patch or bandage has been placed, please leave this in place until seen by your physician. Ensure that the bandage does not get wet when you take a shower.  ? Apply ice compresses 15 minutes on 15 minutes off while awake for 2 days, then switch to warm water compresses 4 times a day until seen by your physician. For warm packs you can place a cup of dry uncooked rice in a clean cotton sock. Then place sock in microwave 30 seconds to one minute. Next place the warm sock into a plastic bag and wrap the bag with clean warm wet washcloth and place over operated eye.    ? You may shower or wash your hair the day after surgery. Do not bathe or go swimming for 1 week to prevent contamination of your wounds.  ? Do not apply make-up to the eyes or eyelids for 2 weeks after surgery.  ? Expect some swelling, bruising, black eye (even into the lower eyelids and cheeks). Also expect serum caking, crusting and discharge from the eye and/or incisions. A small amount of surface bleeding is normal for the first 48 hours.  ? Your eye(s) and eyelid(s) may be painful and tender. This is normal after surgery.  Use the pain medication as prescribed. If your pain does not improve despite the  medication, contact the office.    Contact information and follow-up  ? Return to the Eye Clinic for a follow-up appointment with your physician as  scheduled. If no appointment has been scheduled:   - [facility] eye clinic: [phone] for an appointment with Dr. Good within 1 to 2 weeks from your date of surgery.   -  [facility] eye clinic: [phone] for an appointment with Dr. Good within 1 to 2 weeks from your date of surgery.     ? For severe pain, bleeding, or loss of vision, call the [facility]  [place] [facility] at [phone] or [facility] at [phone].     After hours or on weekends and holidays, call [phone] and ask to speak with the ophthalmologist on call.    An on call person can be reached after hours for concerns. The on call doctor should not call in medication refill requests after hours or on weekends, so please plan accordingly. An effort has been made to provide adequate pain medications following every surgery, and refills will not be provided in most instances. Narcotic pain medications cannot be called in.     Activity restrictions and driving  ? Avoid heavy lifting, bending, exercise or strenuous activity for 1 week after surgery.  You may resume other activities and return to work as tolerated.  ? You may not resume driving until have you stopped using narcotic pain medications (such as Norco, Percocet, Tylenol #3).    Medications  ? Restart all your regular home medications and eye drops. If you take Plavix or  Aspirin on a regular basis, wait for 72 hours after your surgery before restarting these in order to decrease the risk of bleeding complications.  ? Avoid aspirin and aspirin-like medications (Motrin, Aleve, Ibuprofen, Odilia-  [place] etc) for 72 hours to reduce the risk of bleeding. You may take Tylenol  (acetaminophen) for pain.  ? In addition to your home medications, take the following post-operative medications as prescribed by your physician.    ? Apply antibiotic ointment to all sutures three times a day, and into the operated eye(s) at night.  ? Instill eye drops 3 times a day for 10 days.   ? Take tylenol (acetaminophen) as needed for pain.    ? WARNING:  You must not take more than 4,000 mg of acetaminophen per  24-hour period. If you take other over-the-counter medications containing acetaminophen, you must take the amount of acetaminophen into account and reduce the number of prescribed pain pills accordingly.      Same Day Surgery Discharge  Instructions for  Sedation and General Anesthesia       It's not unusual to feel dizzy, light-headed or faint for up to 24 hours after surgery or while taking pain medication.  If you have these symptoms: sit for a few minutes before standing and have someone assist you when you get up to walk or use the bathroom.      You should rest and relax for the next 24 hours. We recommend you make arrangements to have an adult stay with you for at least 24 hours after your discharge.  Avoid hazardous and strenuous activity.      DO NOT DRIVE any vehicle or operate mechanical equipment for 24 hours following the end of your surgery.  Even though you may feel normal, your reactions may be affected by the medication you have received.      Do not drink alcoholic beverages for 24 hours following surgery.       Slowly progress to your regular diet as you feel able. It's not unusual to feel nauseated and/or vomit after receiving anesthesia.  If you develop these symptoms, drink clear liquids (apple juice, ginger ale, broth, 7-up, etc. ) until you feel better.  If your nausea and vomiting persists for 24 hours, please notify your surgeon.        All narcotic pain medications, along with inactivity and anesthesia, can cause constipation. Drinking plenty of liquids and increasing fiber intake will help.      For any questions of a medical nature, call your surgeon.      Do not make important decisions for 24 hours.      If you had general anesthesia, you may have a sore throat for a couple of days related to the breathing tube used during surgery.  You may use Cepacol lozenges to help with this discomfort.  If it worsens or if you develop a fever, contact your surgeon.       If you feel your pain is not well managed with the pain medications prescribed by your surgeon, please contact your surgeon's office to let them know so they can address your concerns.

## 2018-04-10 ENCOUNTER — TELEPHONE (OUTPATIENT)
Dept: OPHTHALMOLOGY | Facility: CLINIC | Age: 83
End: 2018-04-10

## 2018-04-10 NOTE — TELEPHONE ENCOUNTER
[facility] Call Center    Phone Message    May a detailed message be left on voicemail: yes    Reason for Call: Other: The pt is asking about erythromycin (ROMYCIN) ophthalmic ointment. He wants to know how long - how many days he is to use this med. Is it until he comes back in, etc. Please call the pt - you can leave a detailed mssg on his #     Action Taken: Message routed to:  [facility]  eye clinic-plastics

## 2018-04-10 NOTE — TELEPHONE ENCOUNTER
Pt may use erythromycin ointment until gone and change to lubricating eye ointment (e.g. Petroleum jelly/aquaphor ointment)     Left message at 1047 stated to change to opal lubricating ointment/Vaseline for example until post-op visit to incision site  Direct number provided for further questions    Josh Araiza RN 10:26 AM 04/10/18

## 2018-04-17 ENCOUNTER — TELEPHONE (OUTPATIENT)
Dept: OPHTHALMOLOGY | Facility: CLINIC | Age: 83
End: 2018-04-17

## 2018-04-17 NOTE — TELEPHONE ENCOUNTER
[facility] Call Center    Phone Message    May a detailed message be left on voicemail: yes    Reason for Call: Other: Pt wondering if he can drive himself to appt, or if he should have wife drive him if they are testing his eyes, please call pt at home #.     Action Taken: Message routed to:  [facility] Ophthamology

## 2018-04-18 NOTE — TELEPHONE ENCOUNTER
He will not be having any testing at the postop visit. He can drive himself. He may be dilated at his appointment with Adeola in May.

## 2018-04-18 NOTE — TELEPHONE ENCOUNTER
Reviewed with pt  May drive himself if comfortable  No testing/dilation planned this Friday and post-op    Pt aware may be dilated in may with dr. Keya Araiza RN 3:34 PM 04/18/18

## 2018-04-20 ENCOUNTER — OFFICE VISIT (OUTPATIENT)
Dept: OPHTHALMOLOGY | Facility: CLINIC | Age: 83
End: 2018-04-20
Payer: MEDICARE

## 2018-04-20 DIAGNOSIS — Z98.890 POSTOPERATIVE EYE STATE: Primary | ICD-10-CM

## 2018-04-20 ASSESSMENT — VISUAL ACUITY
CORRECTION_TYPE: GLASSES
METHOD: SNELLEN - LINEAR
OS_CC: 20/30
OD_CC: 20/40
OD_CC+: -2
OS_CC+: -2

## 2018-04-20 ASSESSMENT — REFRACTION_WEARINGRX
OS_CYLINDER: +0.50
OD_AXIS: 170
OD_CYLINDER: +1.00
OD_SPHERE: -2.75
OD_ADD: +2.75
OS_ADD: +2.75
OS_SPHERE: -1.50
OS_AXIS: 010

## 2018-04-20 ASSESSMENT — EXTERNAL EXAM - LEFT EYE: OS_EXAM: ET

## 2018-04-20 ASSESSMENT — TONOMETRY
IOP_METHOD: ICARE
OD_IOP_MMHG: 11
OS_IOP_MMHG: 12

## 2018-04-20 ASSESSMENT — SLIT LAMP EXAM - LIDS
COMMENTS: INCISION C/D/I
COMMENTS: INCISION C/D/I

## 2018-04-20 ASSESSMENT — EXTERNAL EXAM - RIGHT EYE: OD_EXAM: NORMAL

## 2018-04-20 NOTE — MR AVS SNAPSHOT
After Visit Summary   4/20/2018    Tom Saini    MRN: [id number]           Patient Information     Date Of Birth          1935        Visit Information        Provider Department      4/20/2018 9:30 AM Kalyani Rainey MD [facility] Ophthalmology        Today's Diagnoses     Postoperative eye state    -  1       Follow-ups after your visit        Your next 10 appointments already scheduled     May 24, 2018  3:00 PM CDT   RETURN GENERAL with Brigido Laura MD   Eye Clinic ([facility])    [address]  [facility] [address]   [phone]              Who to contact     Please call your clinic at [phone] to:    Ask questions about your health    Make or cancel appointments    Discuss your medicines    Learn about your test results    Speak to your doctor            Additional Information About Your Visit        MyChart Information     [employer] gives you secure access to your electronic health record. If you see a primary care provider, you can also send messages to your care team and make appointments. If you have questions, please call your primary care clinic.  If you do not have a primary care provider, please call [phone] and they will assist you.      [employer] is an electronic gateway that provides easy, online access to your medical records. With [employer], you can request a clinic appointment, read your test results, renew a prescription or communicate with your care team.     To access your existing account, please contact your [facility] or call [phone] for assistance.        Care EveryWhere ID     This is your Care EveryWhere ID. This could be used by other organizations to access your [facility] medical records  [id number]         Blood Pressure from Last 3 Encounters:   04/06/18 119/81   03/22/18 107/64   06/26/17 123/67    Weight from Last 3 Encounters:   04/06/18 82.6 kg  (182 lb)   03/22/18 82.6 kg (182 lb 1.6 oz)   02/15/18 83.9 kg (185 lb)              Today, you had the following     No orders found for display       Primary Care Provider Office Phone # Fax #    Angeli Robertson [phone] [address] [address]        Equal Access to Services     RAEANN WALLACE : Hadii aad ku hadasho Soomaali, waaxda luqadaha, qaybta kaalmada adeegyada, waxay idiin hayaan adeeg kharash la'aan ah. So [facility] [phone].    ATENCIÓN: Si habphill garcia, tiene a milton disposición servicios gratuitos de asistencia lingüística. Llame al [phone].    We comply with applicable federal civil rights laws and [place] laws. We do not discriminate on the basis of race, color, national origin, age, disability, sex, sexual orientation, or gender identity.            Thank you!     Thank you for choosing [facility] OPHTHALMOLOGY  for your care. Our goal is always to provide you with excellent care. Hearing back from our patients is one way we can continue to improve our services. Please take a few minutes to complete the written survey that you may receive in the mail after your visit with us. Thank you!             Your Updated Medication List - Protect others around you: Learn how to safely use, store and throw away your medicines at www.disposemymeds.org.          This list is accurate as of 4/20/18  9:33 AM.  Always use your most recent med list.                   Brand Name Dispense Instructions for use Diagnosis    aspirin 81 MG tablet      Take 1 tablet by mouth daily.        atorvastatin 10 MG tablet    LIPITOR    90 tablet    Take 1 tablet (10 mg) by mouth daily    Hyperlipidemia LDL goal <100       cholecalciferol 1000 units capsule    vitamin  -D     Take 1 capsule by mouth daily.        erythromycin ophthalmic ointment    ROMYCIN    3.5 g    Apply thin strip to incision site three times a day and a thin strip into the eye at bedtime.    Postoperative  eye state       multivitamin Tabs tablet      Take 2 tablets by mouth 2 times daily.        neomycin-polymyxin-dexamethasone [id number] Susp ophthalmic susp    MAXITROL    1 Bottle    Place 1 drop into both eyes 3 times daily    Postoperative eye state

## 2018-04-20 NOTE — PROGRESS NOTES
Tom Saini is 2.5 weeks status post Bilateral upper lid blepharoplasty and MMCR.  Doing well, no issues.  The incision(s) are healing well.  The lid(s)  are  in excellent position.    Plan:  -Continue antibiotic ointment or bland lubricating ointment (eg vaseline or aquaphor) to the incision site BID.  -Massage along the incision BID.  -Warm soaks QID until all edema and ecchymoses resolve  -Return to clinic in 6 weeks     Donell Lemus MD  PGY-2 Ophthalmology Resident    Attending Physician Attestation:  Complete documentation of historical and exam elements from today's encounter can be found in the full encounter summary report (not reduplicated in this progress note). I personally obtained the chief complaint(s) and history of present illness.  I confirmed and edited as necessary the review of systems, past medical/surgical history, family history, social history, and examination findings as documented by others; and I examined the patient myself. I personally reviewed the relevant tests, images, and reports as documented above. I formulated and edited as necessary the assessment and plan and discussed the findings and management plan with the patient and family.  -Kalyani Rainey MD 9:32 AM 4/20/2018

## 2018-04-20 NOTE — NURSING NOTE
Chief Complaints and History of Present Illnesses   Patient presents with     Post Op (Ophthalmology) Both Eyes     POW#2 s/p BULB and BUL ptosis repair by MMCR     HPI    Affected eye(s):  Both   Symptoms:     No blurred vision   No floaters   No flashes   Foreign body sensation (Comment: patient notes scratchyness LE>RE)         Do you have eye pain now?:  No      Comments:    Patient notes he is not using EES as often as he should because it blurs VA.    Kristel Farooq April 20, 2018 9:11 AM

## 2018-05-03 ENCOUNTER — TELEPHONE (OUTPATIENT)
Dept: INTERNAL MEDICINE | Facility: CLINIC | Age: 83
End: 2018-05-03

## 2018-05-03 ENCOUNTER — RECORDS - HEALTHEAST (OUTPATIENT)
Dept: LAB | Facility: CLINIC | Age: 83
End: 2018-05-03

## 2018-05-03 LAB
ANION GAP SERPL CALCULATED.3IONS-SCNC: 8 MMOL/L (ref 5–18)
BASOPHILS # BLD AUTO: 0.1 THOU/UL (ref 0–0.2)
BASOPHILS NFR BLD AUTO: 1 % (ref 0–2)
BUN SERPL-MCNC: 21 MG/DL (ref 8–28)
CALCIUM SERPL-MCNC: 9.1 MG/DL (ref 8.5–10.5)
CHLORIDE BLD-SCNC: 104 MMOL/L (ref 98–107)
CO2 SERPL-SCNC: 25 MMOL/L (ref 22–31)
CREAT SERPL-MCNC: 0.77 MG/DL (ref 0.7–1.3)
EOSINOPHIL # BLD AUTO: 0.2 THOU/UL (ref 0–0.4)
EOSINOPHIL NFR BLD AUTO: 2 % (ref 0–6)
ERYTHROCYTE [DISTWIDTH] IN BLOOD BY AUTOMATED COUNT: 13.2 % (ref 11–14.5)
GFR SERPL CREATININE-BSD FRML MDRD: >60 ML/MIN/1.73M2
GLUCOSE BLD-MCNC: 89 MG/DL (ref 70–125)
HCT VFR BLD AUTO: 40.4 % (ref 40–54)
HGB BLD-MCNC: 13.1 G/DL (ref 14–18)
LYMPHOCYTES # BLD AUTO: 1.5 THOU/UL (ref 0.8–4.4)
LYMPHOCYTES NFR BLD AUTO: 16 % (ref 20–40)
MCH RBC QN AUTO: 31.6 PG (ref 27–34)
MCHC RBC AUTO-ENTMCNC: 32.4 G/DL (ref 32–36)
MCV RBC AUTO: 97 FL (ref 80–100)
MONOCYTES # BLD AUTO: 0.9 THOU/UL (ref 0–0.9)
MONOCYTES NFR BLD AUTO: 10 % (ref 2–10)
NEUTROPHILS # BLD AUTO: 6.5 THOU/UL (ref 2–7.7)
NEUTROPHILS NFR BLD AUTO: 71 % (ref 50–70)
PLATELET # BLD AUTO: 337 THOU/UL (ref 140–440)
PMV BLD AUTO: 10.2 FL (ref 8.5–12.5)
POTASSIUM BLD-SCNC: 4.3 MMOL/L (ref 3.5–5)
RBC # BLD AUTO: 4.15 MILL/UL (ref 4.4–6.2)
SODIUM SERPL-SCNC: 137 MMOL/L (ref 136–145)
WBC: 9.2 THOU/UL (ref 4–11)

## 2018-05-03 NOTE — LETTER
Tom PATTERSON Parveen  [address]  [facility] [address]  : 1935  MRN:  [id number]      May 7, 2018          Dear Tom,    We have not been able to reach you by phone.  Due to your recent hospital stay, we would like to reach out to you to suggest a visit with Dr. Robertson.  This visit would go over your hospital visit and coordinate any care that needs to be done at this time.  Also, to follow up on how you are doing after discharge. Please contact our scheduling department at [phone], they will help you make an appointment.    Sincerely,    Katlin LING LPN

## 2018-05-03 NOTE — TELEPHONE ENCOUNTER
I received notice that patient was recently hospitalized at [facility] for multiple trauma and abormal CT findings. I would like to offer him a post-hospital discharge f/u appt.  Can we call to coordinate cares and schedule appt?  Thanks,  Angeli Robertson MD  Internal Medicine

## 2018-05-07 ENCOUNTER — TELEPHONE (OUTPATIENT)
Dept: INTERNAL MEDICINE | Facility: CLINIC | Age: 83
End: 2018-05-07

## 2018-05-07 NOTE — TELEPHONE ENCOUNTER
[facility] Call Center    Phone Message    May a detailed message be left on voicemail: yes    Reason for Call: Other: Pt wife returned missed call from Dr. Robertson. Pt wife didnt want to schedule hosp follow becasue pt is in rehab and not able to make it but would still like to speak to a nurse. Please follow up.     Action Taken: Message routed to:  [facility] roxana pcc

## 2018-06-05 DIAGNOSIS — H35.3290 EXUDATIVE SENILE MACULAR DEGENERATION OF RETINA (H): Primary | ICD-10-CM

## 2018-06-05 DIAGNOSIS — H35.3231 EXUDATIVE AGE-RELATED MACULAR DEGENERATION OF BOTH EYES WITH ACTIVE CHOROIDAL NEOVASCULARIZATION (H): ICD-10-CM

## 2018-06-06 ENCOUNTER — OFFICE VISIT (OUTPATIENT)
Dept: OPHTHALMOLOGY | Facility: CLINIC | Age: 83
End: 2018-06-06
Attending: OPHTHALMOLOGY
Payer: MEDICARE

## 2018-06-06 DIAGNOSIS — H35.3290 EXUDATIVE SENILE MACULAR DEGENERATION OF RETINA (H): Primary | ICD-10-CM

## 2018-06-06 PROCEDURE — G0463 HOSPITAL OUTPT CLINIC VISIT: HCPCS | Mod: ZF

## 2018-06-06 PROCEDURE — 40000809 ZZH STATISTIC NO DOCUMENTATION TO SUPPORT CHARGE

## 2018-06-06 PROCEDURE — 67028 INJECTION EYE DRUG: CPT | Mod: ZF | Performed by: OPHTHALMOLOGY

## 2018-06-06 PROCEDURE — 25000128 H RX IP 250 OP 636: Mod: ZF | Performed by: OPHTHALMOLOGY

## 2018-06-06 RX ADMIN — AFLIBERCEPT 2 MG: 40 INJECTION, SOLUTION INTRAVITREAL at 13:20

## 2018-06-06 ASSESSMENT — VISUAL ACUITY
OS_CC: 20/40
CORRECTION_TYPE: GLASSES
OD_CC: 20/40
METHOD: SNELLEN - LINEAR
OD_CC+: +2
OS_CC+: +3

## 2018-06-06 ASSESSMENT — REFRACTION_WEARINGRX
OD_ADD: +2.75
OS_ADD: +2.75
OS_SPHERE: -1.50
OS_AXIS: 010
OD_CYLINDER: +1.00
OD_SPHERE: -2.75
OD_AXIS: 170
OS_CYLINDER: +0.50

## 2018-06-06 ASSESSMENT — CONF VISUAL FIELD
OS_SUPERIOR_TEMPORAL_RESTRICTION: 3
OS_INFERIOR_TEMPORAL_RESTRICTION: 3

## 2018-06-06 ASSESSMENT — TONOMETRY
OD_IOP_MMHG: 17
OS_IOP_MMHG: 14
IOP_METHOD: TONOPEN

## 2018-06-06 NOTE — PROGRESS NOTES
Assessment & Plan      Tom Saini is a 83 year old male with the following diagnoses:   1. 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  Consent obtained  Injection #1/3 of Q10 weeks with planned series of 3   Repeat OCT mac at next visit and continue treat and extend if stable  Amsler/AREDS to continue           Patient disposition:   Return in about 10 weeks (around 8/15/2018) for OCT Macula.           Attending Physician Attestation:  Complete documentation of historical and exam elements from today's encounter can be found in the full encounter summary report (not reduplicated in this progress note).  I personally obtained the chief complaint(s) and history of present illness.  I confirmed and edited as necessary the review of systems, past medical/surgical history, family history, social history, and examination findings as documented by others; and I examined the patient myself.  I personally reviewed the relevant tests, images, and reports as documented above.  I formulated and edited as necessary the assessment and plan and discussed the findings and management plan with the patient and family.  Attending Physician Procedure Attestation: I was present for the entire procedure  . - Brigido Laura MD

## 2018-06-06 NOTE — NURSING NOTE
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     HPI    Affected eye(s):  Both   Symptoms:     No blurred vision   No decreased vision   No floaters   No flashes      Duration:  3 months   Frequency:  Constant       Do you have eye pain now?:  No      Comments:  States va is the same since last visit.  Using no drops  Humphrey Canales  12:58 PM June 6, 2018

## 2018-06-06 NOTE — MR AVS SNAPSHOT
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      Your next 10 appointments already scheduled     Jun 26, 2018  1:15 PM CDT   (Arrive by 1:00 PM)   RETURN PLASTICS with Anuj Good MD   [facility] ([facility] [place])    [address] [place] [address]  4th [facility] [id number] [phone]            Jun 29, 2018  8:25 AM CDT   (Arrive by 8:10 AM)   Return Visit with Angeli Robertson MD   [facility] ([place] [facility])    [address] [place] [address]  4th [facility] [address] [phone]            Jul 05, 2018  2:15 PM CDT   RETURN GENERAL with Brigido Laura MD   Eye Clinic ([facility])    [address]  [address] Clin 9a  [facility] [address] [phone]              Who to contact     Please call your clinic at [phone] to:    Ask questions about your health    Make or cancel appointments    Discuss your medicines    Learn about your test results    Speak to your doctor            Additional Information About Your Visit        [employer] Information     Kofax gives you secure access to your electronic health record. If you see a primary care provider, you can also send messages to your care team and make appointments. If you have questions, please call your primary care clinic.  If you do not have a primary care provider, please call [phone] and they will assist you.      Kofax is an electronic gateway that provides easy, online  access to your medical records. With [employer], you can request a clinic appointment, read your test results, renew a prescription or communicate with your care team.     To access your existing account, please contact your [facility] or call [phone] for assistance.        Care EveryWhere ID     This is your Care EveryWhere ID. This could be used by other organizations to access your [place] medical records  [id number]         Blood Pressure from Last 3 Encounters:   04/06/18 119/81   03/22/18 107/64   06/26/17 123/67    Weight from Last 3 Encounters:   04/06/18 82.6 kg (182 lb)   03/22/18 82.6 kg (182 lb 1.6 oz)   02/15/18 83.9 kg (185 lb)              We Performed the Following     Eylea (Aflibercept) 2 mg Intravitreal Injection OD (right eye)        Primary Care Provider Office Phone # Fax #    [place] [phone]        [address] [address]        Equal Access to Services     [facility] : Hadii aad ku hadasho Soomaali, waaxda luqadaha, qaybta kaalmada adeegyada, waxay cristy hayanup taylor . So [facility] [phone].    ATENCIÓN: Si habla español, tiene a milton disposición servicios gratuitos de asistencia lingüística. Llame al [phone].    We comply with applicable federal civil rights laws and [place] laws. We do not discriminate on the basis of race, color, national origin, age, disability, sex, sexual orientation, or gender identity.            Thank you!     Thank you for choosing EYE CLINIC  for your care. Our goal is always to provide you with excellent care. Hearing back from our patients is one way we can continue to improve our services. Please take a few minutes to complete the written survey that you may receive in the mail after your visit with us. Thank you!             Your Updated Medication List - Protect others around you: Learn how to safely use, store and throw away your medicines at  www.disposemymeds.org.          This list is accurate as of 6/6/18  2:35 PM.  Always use your most recent med list.                   Brand Name Dispense Instructions for use Diagnosis    aspirin 81 MG tablet      Take 1 tablet by mouth daily.        atorvastatin 10 MG tablet    LIPITOR    90 tablet    Take 1 tablet (10 mg) by mouth daily    Hyperlipidemia LDL goal <100       cholecalciferol 1000 units capsule    vitamin  -D     Take 1 capsule by mouth daily.        erythromycin ophthalmic ointment    ROMYCIN    3.5 g    Apply thin strip to incision site three times a day and a thin strip into the eye at bedtime.    Postoperative eye state       multivitamin Tabs tablet      Take 2 tablets by mouth 2 times daily.        neomycin-polymyxin-dexamethasone 3.5-68019-2.1 Susp ophthalmic susp    MAXITROL    1 Bottle    Place 1 drop into both eyes 3 times daily    Postoperative eye state

## 2018-06-26 ENCOUNTER — OFFICE VISIT (OUTPATIENT)
Dept: OPHTHALMOLOGY | Facility: CLINIC | Age: 83
End: 2018-06-26
Payer: MEDICARE

## 2018-06-26 DIAGNOSIS — Z98.890 POSTOPERATIVE EYE STATE: Primary | ICD-10-CM

## 2018-06-26 ASSESSMENT — VISUAL ACUITY
OD_CC+: -2
METHOD: SNELLEN - LINEAR
CORRECTION_TYPE: GLASSES
OD_CC: 20/30
OS_CC: 20/30

## 2018-06-26 ASSESSMENT — SLIT LAMP EXAM - LIDS
COMMENTS: NORMAL
COMMENTS: NORMAL

## 2018-06-26 ASSESSMENT — TONOMETRY
OD_IOP_MMHG: 10
OS_IOP_MMHG: 10
IOP_METHOD: ICARE

## 2018-06-26 ASSESSMENT — EXTERNAL EXAM - RIGHT EYE: OD_EXAM: NORMAL

## 2018-06-26 ASSESSMENT — EXTERNAL EXAM - LEFT EYE: OS_EXAM: ET

## 2018-06-26 NOTE — MR AVS SNAPSHOT
After Visit Summary   6/26/2018    Tom Saini    MRN: [id number]           Patient Information     Date Of Birth          1935        Visit Information        Provider Department      6/26/2018 1:15 PM Anuj Good MD [facility] Op[facility]        Today's Diagnoses     Postoperative eye state    -  1       Follow-ups after your visit        Follow-up notes from your care team     Return if symptoms worsen or fail to improve.      Your next 10 appointments already scheduled     Jun 29, 2018  8:25 AM CDT   (Arrive by 8:10 AM)   Return Visit with Angeli Robertson MD   [facility] ([facility])    [address] [facility] [address]  4th Floor  [facility] [address] [phone]            Aug 14, 2018  9:45 AM CDT   RETURN GENERAL with Brigido Laura MD   Eye Clinic ([facility])    [address]  [address] Clin 9a  [facility] [address] [phone]              Who to contact     Please call your clinic at [phone] to:    Ask questions about your health    Make or cancel appointments    Discuss your medicines    Learn about your test results    Speak to your doctor            Additional Information About Your Visit        [employer] Information     Flapshare gives you secure access to your electronic health record. If you see a primary care provider, you can also send messages to your care team and make appointments. If you have questions, please call your primary care clinic.  If you do not have a primary care provider, please call [phone] and they will assist you.      Flapshare is an electronic gateway that provides easy, online access to your medical records. With Flap[employer], you can request a clinic appointment, read your test results, renew a prescription or communicate with your care team.     To access your existing account, please contact your [facility] or call  [phone] for assistance.        Care EveryWhere ID     This is your Care EveryWhere ID. This could be used by other organizations to access your [place] medical records  [id number]         Blood Pressure from Last 3 Encounters:   04/06/18 119/81   03/22/18 107/64   06/26/17 123/67    Weight from Last 3 Encounters:   04/06/18 82.6 kg (182 lb)   03/22/18 82.6 kg (182 lb 1.6 oz)   02/15/18 83.9 kg (185 lb)              Today, you had the following     No orders found for display       Primary Care Provider Office Phone # Fax #    Angeli Robertson [phone]       [address] [address]        Equal Access to Services     RAEANN WALLACE : Edna nazarioo Soanna, waaxda luqadaha, qaybta kaalmada adeegyada, ayan taylor . So [facility] [phone].    ATENCIÓN: Si habla español, tiene a milton disposición servicios gratuitos de asistencia lingüística. Llame al [phone].    We comply with applicable federal civil rights laws and [place] laws. We do not discriminate on the basis of race, color, national origin, age, disability, sex, sexual orientation, or gender identity.            Thank you!     Thank you for choosing [facility] OPHTHALMOLOGY  for your care. Our goal is always to provide you with excellent care. Hearing back from our patients is one way we can continue to improve our services. Please take a few minutes to complete the written survey that you may receive in the mail after your visit with us. Thank you!             Your Updated Medication List - Protect others around you: Learn how to safely use, store and throw away your medicines at www.disposemymeds.org.          This list is accurate as of 6/26/18  1:56 PM.  Always use your most recent med list.                   Brand Name Dispense Instructions for use Diagnosis    aspirin 81 MG tablet      Take 1 tablet by mouth daily.        atorvastatin 10 MG tablet    LIPITOR    90  tablet    Take 1 tablet (10 mg) by mouth daily    Hyperlipidemia LDL goal <100       cholecalciferol 1000 units capsule    vitamin  -D     Take 1 capsule by mouth daily.        erythromycin ophthalmic ointment    ROMYCIN    3.5 g    Apply thin strip to incision site three times a day and a thin strip into the eye at bedtime.    Postoperative eye state       multivitamin Tabs tablet      Take 2 tablets by mouth 2 times daily.        neomycin-polymyxin-dexamethasone 3.5-19865-1.1 Susp ophthalmic susp    MAXITROL    1 Bottle    Place 1 drop into both eyes 3 times daily    Postoperative eye state

## 2018-06-26 NOTE — PROGRESS NOTES
Tom Saini is ~3 months status post Bilateral upper lid blepharoplasty and MMCR.  Doing well, no issues.  The incision(s) are healing well.  The lid(s)  are  in excellent position.    Plan:  -Can follow with Dr. Laura  -Follow here as needed    Donell Lemus MD  PGY-2 Ophthalmology Resident    Attending Physician Attestation:  Complete documentation of historical and exam elements from today's encounter can be found in the full encounter summary report (not reduplicated in this progress note).  I personally obtained the chief complaint(s) and history of present illness.  I confirmed and edited as necessary the review of systems, past medical/surgical history, family history, social history, and examination findings as documented by others; and I examined the patient myself.  I personally reviewed the relevant tests, images, and reports as documented above.  I formulated and edited as necessary the assessment and plan and discussed the findings and management plan with the patient and family. I personally reviewed the ophthalmic test(s) associated with this encounter, agree with the interpretation(s) as documented by the resident/fellow, and have edited the corresponding report(s) as necessary. - Anuj Good MD

## 2018-06-26 NOTE — NURSING NOTE
Chief Complaints and History of Present Illnesses   Patient presents with     Follow Up For     status post Bilateral upper lid blepharoplasty and MMCR     HPI    Affected eye(s):  Both   Symptoms:     No blurred vision      Frequency:  Constant       Do you have eye pain now?:  No      Comments:  status post Bilateral upper lid blepharoplasty and MMCR 4/6/18. Patient states vision has been stable since last eye exam, both eyes. Denies eye pain or irritation. Does not take eye drops.      Asia Ragsdale COT 1:26 PM June 26, 2018

## 2018-06-29 ENCOUNTER — OFFICE VISIT (OUTPATIENT)
Dept: INTERNAL MEDICINE | Facility: CLINIC | Age: 83
End: 2018-06-29
Payer: MEDICARE

## 2018-06-29 VITALS
RESPIRATION RATE: 16 BRPM | DIASTOLIC BLOOD PRESSURE: 61 MMHG | SYSTOLIC BLOOD PRESSURE: 105 MMHG | BODY MASS INDEX: 23.72 KG/M2 | WEIGHT: 170.1 LBS | HEART RATE: 94 BPM

## 2018-06-29 DIAGNOSIS — R93.89 ABNORMAL CT SCAN: ICD-10-CM

## 2018-06-29 DIAGNOSIS — T07.XXXA MULTIPLE TRAUMA: Primary | ICD-10-CM

## 2018-06-29 DIAGNOSIS — I87.2 VENOUS (PERIPHERAL) INSUFFICIENCY: ICD-10-CM

## 2018-06-29 DIAGNOSIS — B35.3 TINEA PEDIS OF BOTH FEET: ICD-10-CM
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
## 2018-06-29 LAB
ALBUMIN SERPL-MCNC: 3.2 G/DL (ref 3.4–5)
ANION GAP SERPL CALCULATED.3IONS-SCNC: 8 MMOL/L (ref 3–14)
BUN SERPL-MCNC: 15 MG/DL (ref 7–30)
CALCIUM SERPL-MCNC: 9 MG/DL (ref 8.5–10.1)
CHLORIDE SERPL-SCNC: 106 MMOL/L (ref 94–109)
CO2 SERPL-SCNC: 27 MMOL/L (ref 20–32)
CREAT SERPL-MCNC: 0.99 MG/DL (ref 0.66–1.25)
GFR SERPL CREATININE-BSD FRML MDRD: 72 ML/MIN/1.7M2
GLUCOSE SERPL-MCNC: 90 MG/DL (ref 70–99)
POTASSIUM SERPL-SCNC: 4.6 MMOL/L (ref 3.4–5.3)
SODIUM SERPL-SCNC: 141 MMOL/L (ref 133–144)

## 2018-06-29 ASSESSMENT — PAIN SCALES - GENERAL: PAINLEVEL: NO PAIN (0)

## 2018-06-29 NOTE — MR AVS SNAPSHOT
After Visit Summary   6/29/2018    Tom Saini    MRN: [id number]           Patient Information     Date Of Birth          1935        Visit Information        Provider Department      6/29/2018 8:25 AM Angeli Robertson MD [facility]        Today's Diagnoses     Multiple trauma    -  1    Fall, sequela        Venous (peripheral) insufficiency        Abnormal CT scan        Tinea pedis of both feet          Care Instructions    Primary Care Center: [phone]     Primary Care Center Medication Refill Request Information:  * Please contact your pharmacy regarding ANY request for medication refills.  ** [facility] Prescription Fax = [phone]  * Please allow 3 business days for routine medication refills.  * Please allow 5 business days for controlled substance medication refills.     Primary Care Center Test Result notification information:  *You will be notified with in 7-10 days of your appointment day regarding the results of your test.  If you are on MyChart you will be notified as soon as the provider has reviewed the results and signed off on them.            Follow-ups after your visit        Follow-up notes from your care team     Return if symptoms worsen or fail to improve.      Your next 10 appointments already scheduled     Jul 07, 2018  7:00 AM CDT   MR ABDOMEN & PELVIS W/O & W CONTRAST with [address] [facility] ([facility])    [address] [place] [address] [phone]           Take your medicines as usual, unless your doctor tells you not to. Bring a list of your current medicines to your exam (including vitamins, minerals and over-the-counter drugs). Also bring the results of similar scans you may have had.    You may or may not receive IV contrast for this exam pending the discretion of the Radiologist.   Do not eat or drink for 6 hours prior to exam.  The MRI machine uses a strong  magnet. Please wear clothes without metal (snaps, zippers). A sweatsuit works well, or we may give you a hospital gown.  Please remove any body piercings and hair extensions before you arrive. You will also remove watches, jewelry, hairpins, wallets, dentures, partial dental plates and hearing aids. You may wear contact lenses, and you may be able to wear your rings. We have a safe place to keep your personal items, but it is safer to leave them at home.  **IMPORTANT** THE INSTRUCTIONS BELOW ARE ONLY FOR THOSE PATIENTS WHO HAVE BEEN PRESCRIBED SEDATION OR GENERAL ANESTHESIA DURING THEIR MRI PROCEDURE:  IF YOUR DOCTOR PRESCRIBED ORAL SEDATION (take medicine to help you relax during your exam):   You must get the medicine from your doctor (oral medication) before you arrive. Bring the medicine to the exam. Do not take it at home. You ll be told when to take it upon arriving for your exam.   Arrive one hour early. Bring someone who can take you home after the test. Your medicine will make you sleepy. After the exam, you may not drive, take a bus or take a taxi by yourself.  IF YOUR DOCTOR PRESCRIBED IV SEDATION:   Arrive one hour early. Bring someone who can take you home after the test. Your medicine will make you sleepy. After the exam, you may not drive, take a bus or take a taxi by yourself.   No eating 6 hours before your exam. You may have clear liquids up until 4 hours before your exam. (Clear liquids include water, clear tea, black coffee and fruit juice without pulp.)  IF YOUR DOCTOR PRESCRIBED ANESTHESIA (be asleep for your exam):   Arrive 1 1/2 hours early. Bring someone who can take you home after the test. You may not drive, take a bus or take a taxi by yourself.   No eating 8 hours before your exam. You may have clear liquids up until 4 hours before your exam. (Clear liquids include water, clear tea, black coffee and fruit juice without pulp.)   You will spend four to five hours in the recovery room.  If  you have any questions, please contact your Imaging Department exam site.            Aug 14, 2018  9:45 AM CDT   RETURN GENERAL with Brigido Laura MD   Eye Clinic ([facility])    [address]  [facility] [address]   [phone]              Future tests that were ordered for you today     Open Future Orders        Priority Expected Expires Ordered    MRI Abdomen & Pelvis w/o & w contrast Routine  6/29/2019 6/29/2018    Basic metabolic panel Routine 6/29/2018 7/13/2018 6/29/2018    Albumin level Routine 6/29/2018 6/29/2019 6/29/2018            Who to contact     Please call your clinic at [phone] to:    Ask questions about your health    Make or cancel appointments    Discuss your medicines    Learn about your test results    Speak to your doctor            Additional Information About Your Visit        [employer] Medical Information     [employer]te[employer]ream gives you secure access to your electronic health record. If you see a primary care provider, you can also send messages to your care team and make appointments. If you have questions, please call your primary care clinic.  If you do not have a primary care provider, please call [phone] and they will assist you.      ConteX[employer]ream is an electronic gateway that provides easy, online access to your medical records. With [employer], you can request a clinic appointment, read your test results, renew a prescription or communicate with your care team.     To access your existing account, please contact your [facility] or call [phone] for assistance.        Care EveryWhere ID     This is your Care EveryWhere ID. This could be used by other organizations to access your [place] medical records  [id number]        Your Vitals Were     Pulse Respirations BMI (Body Mass Index)             94 16 23.72 kg/m2          Blood Pressure from Last 3 Encounters:   06/29/18 105/61   04/06/18 119/81    03/22/18 107/64    Weight from Last 3 Encounters:   06/29/18 77.2 kg (170 lb 1.6 oz)   04/06/18 82.6 kg (182 lb)   03/22/18 82.6 kg (182 lb 1.6 oz)                 Today's Medication Changes          These changes are accurate as of 6/29/18  9:31 AM.  If you have any questions, ask your nurse or doctor.               Start taking these medicines.        Dose/Directions    order for DME   Used for:  Venous (peripheral) insufficiency   Started by:  Angeli Robertson MD        Equipment being ordered: Compression stockings, knee high, measured for patient, 20-30mmHg   Quantity:  3 Units   Refills:  3            Where to get your medicines      Some of these will need a paper prescription and others can be bought over the counter.  Ask your nurse if you have questions.     Bring a paper prescription for each of these medications     order for DME                Primary Care Provider Office Phone # Fax #    Angeli Robertson [phone]       [address] [address]        Equal Access to Services     [facility]: Hadii kelly sanches hadasho Soomaali, waaxda luqadaha, qaybta kaalmada adeegyada, ayan taylor . So [facility] [phone].    ATENCIÓN: Si habla español, tiene a milton disposición servicios gratuitos de asistencia lingüística. Llame al [phone].    We comply with applicable federal civil rights laws and [place] laws. We do not discriminate on the basis of race, color, national origin, age, disability, sex, sexual orientation, or gender identity.            Thank you!     Thank you for choosing [facility]IMARY [facility]ARE CLINIC  for your care. Our goal is always to provide you with excellent care. Hearing back from our patients is one way we can continue to improve our services. Please take a few minutes to complete the written survey that you may receive in the mail after your visit with us. Thank you!             Your Updated Medication List -  Protect others around you: Learn how to safely use, store and throw away your medicines at www.disposemymeds.org.          This list is accurate as of 6/29/18  9:31 AM.  Always use your most recent med list.                   Brand Name Dispense Instructions for use Diagnosis    aspirin 81 MG tablet      Take 1 tablet by mouth daily.        atorvastatin 10 MG tablet    LIPITOR    90 tablet    Take 1 tablet (10 mg) by mouth daily    Hyperlipidemia LDL goal <100       cholecalciferol 1000 units capsule    vitamin  -D     Take 1 capsule by mouth daily.        erythromycin ophthalmic ointment    ROMYCIN    3.5 g    Apply thin strip to incision site three times a day and a thin strip into the eye at bedtime.    Postoperative eye state       multivitamin Tabs tablet      Take 2 tablets by mouth 2 times daily.        neomycin-polymyxin-dexamethasone 3.5-23327-8.1 Susp ophthalmic susp    MAXITROL    1 Bottle    Place 1 drop into both eyes 3 times daily    Postoperative eye state       order for DME     3 Units    Equipment being ordered: Compression stockings, knee high, measured for patient, 20-30mmHg    Venous (peripheral) insufficiency

## 2018-06-29 NOTE — NURSING NOTE
Chief Complaint   Patient presents with     Fall     pt states he is here following a big fall     Mass     pt states he has a mass on lower abdomin     Derm Problem     pt would like a spot on left arm       Eva Szymanski CMA at 8:31 AM on 6/29/2018.

## 2018-06-29 NOTE — PATIENT INSTRUCTIONS
[facility]: [phone]     [facility] Medication Refill Request Information:  * Please contact your pharmacy regarding ANY request for medication refills.  ** [facility] Prescription Fax = [phone]  * Please allow 3 business days for routine medication refills.  * Please allow 5 business days for controlled substance medication refills.     [facility] Test Result notification information:  *You will be notified with in 7-10 days of your appointment day regarding the results of your test.  If you are on MyChart you will be notified as soon as the provider has reviewed the results and signed off on them.

## 2018-06-29 NOTE — PROGRESS NOTES
[facility]   Angeli Robertson MD  06/29/2018      Chief Complaint:   Fall; Mass; and Derm Problem       History of Present Illness:   Tom Saini is a 83 year old male with a history of aortic insufficiency and is ~3 months s/p bilateral upper lid blepharoplasty and MMCR who presents with his wife Annamarie for an ER follow up.     Fall: In April, the patient spent 4 nights in the ER at [facility] after falling 16 feet off a wall while trying to fix a sign. After his fall, he obtained a right radial fracture, a left thumb dislocation, a crack in his pelvis, and a crack in 8th rib. He had surgery done on his right wrist and left thumb dislocation during his stay. Today he notes his right hand feels very stiff, however his wife notes that his mobility has improved as he has been doing exercises that [facility] advised him to do. He notes that he cannot flatten his palms at all. He expresses some concern as he is unable to cup his hands to splash water in his face. However, he is starting hand therapy soon at [facility] with hopes of further increasing the mobility of his hand.     Abnormal CT finding:   Impression:   1. Right superior and inferior pubic rami fracture, the former demonstrating question intra-articular extension to the right medial acetabular wall.  2.Homogeneous 3.9 x 2.3 cm soft tissue mass adjacent to the right adrenal gland. Dedicated abdominal MRI with gadolinium if possible recommended for more complete characterization.  3.Multiple nonobstructing left renal calculi.  4.Diverticulosis without acute diverticulitis.  5.Question medullary lesion in the left subtrochanteric femur. Although the superior extent of this region can be seen with normal yellow marrow, in light of the right retroperitoneal soft tissue mass a metastatic lesion should be excluded.  During his stay at [facility], the patient had a CT done in which a questionable right adrenal mass. He notes that he sleeps on his right side which  hurts at times but believes this is due to his rib fracture and notes this pain is alleviated as time goes on. He denies any flank pain. He denies hematuria and dysuria. He denies having any problems with bowel movements.     Athlete's Foot: The patient's wife expresses concern for dryness and peeling on the bottoms of both of his feet. He has been using a powder for this.His wife also expresses concern for a possible toenail fungus. He notes he has to wear orthotics and thus his feet do not receive much air.       Other concerns discussed:  1. Annual dermatology visits  2. Lost 14 lbs     Review of Systems:   Pertinent items are noted in HPI, remainder of complete ROS is negative.      Active Medications:      aspirin 81 MG tablet, Take 1 tablet by mouth daily., Disp: , Rfl: 3     atorvastatin (LIPITOR) 10 MG tablet, Take 1 tablet (10 mg) by mouth daily, Disp: 90 tablet, Rfl: 3     cholecalciferol (VITAMIN D3) 1000 UNITS capsule, Take 1 capsule by mouth daily., Disp: , Rfl:      erythromycin (ROMYCIN) ophthalmic ointment, Apply thin strip to incision site three times a day and a thin strip into the eye at bedtime., Disp: 3.5 g, Rfl: 0     multivitamin (OCUVITE) TABS, Take 2 tablets by mouth 2 times daily., Disp: , Rfl:      neomycin-polymyxin-dexamethasone (MAXITROL) 3.5-10630-4.1 SUSP ophthalmic susp, Place 1 drop into both eyes 3 times daily, Disp: 1 Bottle, Rfl: 0     Allergies:   Codeine sulfate      Past Medical History:  Aortic insufficiency   Erectile dysfunction  Hyperlipidemia  Low back pain   Macular degeneration  Shingles  Eczema  Pure hypercholesterolemia  Dermatofibroma  Seborrheic keratoses, inflamed     Past Surgical History:  Cataract IOL, RT/LT  Combined repair ptosis with blepharoplasty bilateral  Laminectomy lumbar two levels, L4-L5  Recession resection (repair strabismus)    Family History:   Bladder cancer (father)  Skin cancer (mother)      Social History:   The patient is , a former  smoker (quit date 6/25/1954), and consumes alcohol. He is a retired  and an avid  and traveler.        Physical Exam:   /61  Pulse 94  Resp 16  Wt 77.2 kg (170 lb 1.6 oz)  BMI 23.72 kg/m2   Constitutional: Alert, oriented, pleasant, no acute distress  Head: Normocephalic, atraumatic  Eyes: Extra-ocular movements intact, no scleral icterus  ENT: Oropharynx clear, moist mucus membranes, good dentition  Neck: Supple, no lymphadenopathy, no thyromegaly   Cardiovascular: Regular rate and rhythm, 1/6 systolic ejection murmur, rubs or gallops, peripheral pulses full/symmetric  Respiratory: Good air movement bilaterally, lungs clear, no wheezes/rales/rhonchi  GI: Abdomen soft, bowel sounds present, nondistended, nontender, no organomegaly or masses, no rebound/guarding  Musculoskeletal: Bilateral 1+ pinning edema above his sock line, right distal radius scar well healed with some residual swelling and minimal erythema and warmth, active range of motion of his bilateral wrist and hands is limited, normal muscle tone, normal gait  Neurologic: Alert and oriented, cranial nerves 2-12 intact.  Skin: No rashes/lesions, right arm pigmented lesion consistent with SK, several Sks on scalp unchanged  Psychiatric: normal mentation, affect and mood        Assessment and Plan:  1. Fall with multiple traumas including Rt distal radial fracture, left thumb dislocation, pelvic rami fracture  He just returned from 50 days in rehab, he is progressing well. He will start hand therapy next week. Pain is controlled, no further concerns at this time.     2. Abnormal CT scan  At [facility] in April, he was found to have a questionable right adrenal mass vs hematoma which needs further follow up.   - Basic metabolic panel  - MRI Abdomen & Pelvis w/o & w contrast    3. Venous (peripheral) insufficiency  Discussed options for management including increased protein intake, elevation, and compression.  -  order for DME  Dispense: 3 Units; Refill: 3  - Albumin level    4. Athlete's foot  Recommended Lotrimin powder       Routine Health Maintenance  Immunizations (zoster, pneumovax, flu, Tdap, Hep A/B):        Most Recent Immunizations   Administered Date(s) Administered     Hepatitis B 08/19/2016     Influenza (High Dose) 3 valent vaccine 10/05/2016     Influenza (IIV3) 10/23/2014     Influenza Vaccine IM 3yrs+ 4 Valent IIV4 10/01/2013     Japanese Encephalitis IM 02/02/2016     Pneumococcal (PCV 13) 06/25/2015     Pneumococcal 23 valent 06/10/2014     TD (ADULT, 7+) 01/31/2011     TDAP Vaccine (Boostrix) 06/10/2014     Typhoid IM 06/25/2015     Yellow Fever 01/26/2011     Zoster vaccine, live 07/02/2009   Deferred Date(s) Deferred     Russian Encephalitis IM 08/19/2016      Lipids:        Recent Labs   Lab Test  12/04/13   0811  12/18/12   0806   CHOL  161  161   HDL  53  50   LDL  96  98   TRIG  62  68   CHOLHDLRATIO  3.1  3.2      PSA (50-75 yrs):         PSA   Date Value Ref Range Status   10/22/2008 1.07 0 - 4 ug/L Final      AAA Screening (65-75 yrs): n/a  Lung Ca Screening (>30 pk age 55-79 or >20 py age 50-79 + RF): n/a  Colonoscopy (50-75 yrs): previously normal per patient, no results  Dexa (>65W or 70M yrs): deferred  Advanced Directive: not discussed    Follow-up: Return if symptoms worsen or fail to improve.         Scribe Disclosure:   I, Jacki Stevenson, am serving as a scribe to document services personally performed by Angeli Robertson MD at this visit, based upon the provider's statements to me. All documentation has been reviewed by the aforementioned provider prior to being entered into the official medical record.     Portions of this medical record were completed by a scribe. UPON MY REVIEW AND AUTHENTICATION BY ELECTRONIC SIGNATURE, this confirms (a) I performed the applicable clinical services, and (b) the record is accurate.   Angeli Robertson MD  Internal Medicine    40 min spent face to  face, of which >50% time spent on counseling/coordinating care exclusive of any procedure time

## 2018-07-07 ENCOUNTER — RADIANT APPOINTMENT (OUTPATIENT)
Dept: MRI IMAGING | Facility: CLINIC | Age: 83
End: 2018-07-07
Attending: INTERNAL MEDICINE
Payer: MEDICARE

## 2018-07-07 DIAGNOSIS — R93.89 ABNORMAL CT SCAN: ICD-10-CM

## 2018-07-07 RX ORDER — GADOBUTROL 604.72 MG/ML
7.5 INJECTION INTRAVENOUS ONCE
Status: COMPLETED | OUTPATIENT
Start: 2018-07-07 | End: 2018-07-07

## 2018-07-07 RX ADMIN — GADOBUTROL 7.5 ML: 604.72 INJECTION INTRAVENOUS at 07:44

## 2018-07-07 NOTE — DISCHARGE INSTRUCTIONS

## 2018-07-11 ENCOUNTER — TELEPHONE (OUTPATIENT)
Dept: INTERNAL MEDICINE | Facility: CLINIC | Age: 83
End: 2018-07-11

## 2018-07-11 NOTE — TELEPHONE ENCOUNTER
[facility] Call Center    Phone Message    May a detailed message be left on voicemail: yes    Reason for Call: Other: Pt requesting that Dr. Angeli Robertson call him regarding the results of the MRI from Saturday, 7/7/18.  Please call pt on his home ph#.     Action Taken: Message routed to:  [facility] ([facility] Clinic

## 2018-08-14 ENCOUNTER — OFFICE VISIT (OUTPATIENT)
Dept: OPHTHALMOLOGY | Facility: CLINIC | Age: 83
End: 2018-08-14
Attending: OPHTHALMOLOGY
Payer: MEDICARE

## 2018-08-14 DIAGNOSIS — H35.3211 EXUDATIVE AGE-RELATED MACULAR DEGENERATION OF RIGHT EYE WITH ACTIVE CHOROIDAL NEOVASCULARIZATION (H): Primary | ICD-10-CM

## 2018-08-14 DIAGNOSIS — H35.3290 EXUDATIVE SENILE MACULAR DEGENERATION OF RETINA (H): ICD-10-CM

## 2018-08-14 PROCEDURE — 92134 CPTRZ OPH DX IMG PST SGM RTA: CPT | Mod: ZF | Performed by: OPHTHALMOLOGY

## 2018-08-14 PROCEDURE — G0463 HOSPITAL OUTPT CLINIC VISIT: HCPCS | Mod: 25,ZF

## 2018-08-14 PROCEDURE — 67028 INJECTION EYE DRUG: CPT | Mod: RT,ZF | Performed by: OPHTHALMOLOGY

## 2018-08-14 PROCEDURE — C9257 BEVACIZUMAB INJECTION: HCPCS | Mod: ZF | Performed by: OPHTHALMOLOGY

## 2018-08-14 PROCEDURE — 25000128 H RX IP 250 OP 636: Mod: ZF | Performed by: OPHTHALMOLOGY

## 2018-08-14 RX ADMIN — Medication 1.25 MG: at 11:33

## 2018-08-14 ASSESSMENT — VISUAL ACUITY
OD_PH_CC: 20/30-1+2
OD_CC: 20/30
OS_CC: 20/30
OS_PH_CC: 20/25+3
OD_CC+: -1
METHOD: SNELLEN - LINEAR

## 2018-08-14 ASSESSMENT — TONOMETRY
OD_IOP_MMHG: 15
IOP_METHOD: TONOPEN
OD_IOP_MMHG: 18
OS_IOP_MMHG: 15
IOP_METHOD: TONOPEN

## 2018-08-14 ASSESSMENT — CONF VISUAL FIELD
OD_NORMAL: 1
OS_INFERIOR_TEMPORAL_RESTRICTION: 3
OS_SUPERIOR_TEMPORAL_RESTRICTION: 3
METHOD: COUNTING FINGERS

## 2018-08-14 NOTE — PROGRESS NOTES
Assessment & Plan      Tom Saini is a 83 year old male with the following diagnoses:   1. 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  Consent obtained  Injection #2/3 of Q10 weeks with planned series of 3   Amsler/AREDS to continue           Patient disposition:   Return in about 10 weeks (around 10/23/2018) for AVASTIN RIGHT EYE, VT only.  Due for OCT and dilated fundus exam in 5 mo           Attending Physician Attestation:  Complete documentation of historical and exam elements from today's encounter can be found in the full encounter summary report (not reduplicated in this progress note).  I personally obtained the chief complaint(s) and history of present illness.  I confirmed and edited as necessary the review of systems, past medical/surgical history, family history, social history, and examination findings as documented by others; and I examined the patient myself.  I personally reviewed the relevant tests, images, and reports as documented above.  I formulated and edited as necessary the assessment and plan and discussed the findings and management plan with the patient and family.  Attending Physician Procedure Attestation: I was present for the entire procedure  . - Brigido Laura MD

## 2018-08-14 NOTE — MR AVS SNAPSHOT
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      Your next 10 appointments already scheduled     Oct 23, 2018  1:15 PM CDT   RETURN GENERAL with Brigido Laura MD   Eye Clinic ([facility])    [address] [id number] [phone]              Who to contact     Please call your clinic at [phone] to:    Ask questions about your health    Make or cancel appointments    Discuss your medicines    Learn about your test results    Speak to your doctor            Additional Information About Your Visit        MyChart Information     [employer] gives you secure access to your electronic health record. If you see a primary care provider, you can also send messages to your care team and make appointments. If you have questions, please call your primary care clinic.  If you do not have a primary care provider, please call [phone] and they will assist you.      In[employer]atel is an electronic gateway that provides easy, online access to your medical records. With [employer], you can request a clinic appointment, read your test results, renew a prescription or communicate with your care team.     To access your existing account, please contact your [facility] or call [phone] for assistance.        Care EveryWhere ID     This is your Care EveryWhere ID.  This could be used by other organizations to access your [facility] medical records  [id number]         Blood Pressure from Last 3 Encounters:   06/29/18 105/61   04/06/18 119/81   03/22/18 107/64    Weight from Last 3 Encounters:   06/29/18 77.2 kg (170 lb 1.6 oz)   04/06/18 82.6 kg (182 lb)   03/22/18 82.6 kg (182 lb 1.6 oz)              We Performed the Following     Avastin (Bevacizumab) 1.25MG Intravitreal Injection OD (right eye)     OCT Retina Spectralis OU (both eyes)        Primary Care Provider Office Phone # Fax #    Angeli Robertson [phone]       [address] [address]        Equal Access to Services     RAEANN WALLACE : Edna lawton Soanna, waaxda luqadaha, qaybta kaalmada adeginnyyaradha, ayan smith. So [facility] [phone].    ATENCIÓN: Si habla español, tiene a milton disposición servicios gratuitos de asistencia lingüística. Llame al [phone].    We comply with applicable federal civil rights laws and [place] laws. We do not discriminate on the basis of race, color, national origin, age, disability, sex, sexual orientation, or gender identity.            Thank you!     Thank you for choosing EYE CLINIC  for your care. Our goal is always to provide you with excellent care. Hearing back from our patients is one way we can continue to improve our services. Please take a few minutes to complete the written survey that you may receive in the mail after your visit with us. Thank you!             Your Updated Medication List - Protect others around you: Learn how to safely use, store and throw away your medicines at www.disposemymeds.org.          This list is accurate as of 8/14/18 11:37 AM.  Always use your most recent med list.                   Brand Name Dispense Instructions for use Diagnosis    aspirin 81 MG tablet      Take 1 tablet by mouth daily.        atorvastatin 10 MG tablet    LIPITOR    90 tablet    Take 1  tablet (10 mg) by mouth daily    Hyperlipidemia LDL goal <100       cholecalciferol 1000 units capsule    vitamin  -D     Take 1 capsule by mouth daily.        multivitamin Tabs tablet      Take 2 tablets by mouth 2 times daily.        order for DME     3 Units    Equipment being ordered: Compression stockings, knee high, measured for patient, 20-30mmHg    Venous (peripheral) insufficiency

## 2018-08-14 NOTE — NURSING NOTE
Chief Complaints and History of Present Illnesses   Patient presents with     Follow Up For     Exudative senile macular degeneration of retina, OCT Macula     HPI    Affected eye(s):  Both   Symptoms:     No floaters   No flashes   No tearing   No Dryness   No itching   No burning         Do you have eye pain now?:  No      Comments:  Pt states vision is getting worse when reading.      Issa Barrera August 14, 2018 10:24 AM  Alma WHYTE August 14, 2018 10:56 AM

## 2018-10-18 ENCOUNTER — ALLIED HEALTH/NURSE VISIT (OUTPATIENT)
Dept: INTERNAL MEDICINE | Facility: CLINIC | Age: 83
End: 2018-10-18
Payer: MEDICARE

## 2018-10-18 DIAGNOSIS — Z23 NEED FOR INFLUENZA VACCINATION: Primary | ICD-10-CM

## 2018-10-18 NOTE — NURSING NOTE
Tom Saini comes into clinic today at the request of Dr Robertson Ordering Provider for flu vaccine.        This service provided today was under the supervising provider of the day Dr Jimenez, who was available if needed.    Eva Szymanski

## 2018-10-18 NOTE — NURSING NOTE
Tom Saini      1.  Has the patient received the information for the influenza vaccine? YES    2.  Does the patient have any of the following contraindications?     Allergy to eggs? No     Allergic reaction to previous influenza vaccines? No     Any other problems to previous influenza vaccines? No     Paralyzed by Guillain-Effie syndrome? No     Currently pregnant? NO     Current moderate or severe illness? No     Allergy to contact lens solution? No    3.  The vaccine has been administered in the usual fashion and the patient was instructed to wait 20 minutes before leaving the building in the event of an allergic reaction: YES    Vaccination given by Eva Szymanski CMA at 9:06 AM on 10/18/2018.    Recorded by Eva Szymanski

## 2018-10-18 NOTE — MR AVS SNAPSHOT
After Visit Summary   10/18/2018    Tom Saini    MRN: [id number]           Patient Information     Date Of Birth          1935        Visit Information        Provider Department      10/18/2018 9:00 AM Nurse, Jonny [facility]        Today's Diagnoses     Need for influenza vaccination    -  1       Follow-ups after your visit        Your next 10 appointments already scheduled     Oct 23, 2018  1:15 PM CDT   RETURN GENERAL with Brigido Laura MD   Eye Clinic ([facility])    [address]  [address] [address]  [facility] [id number] [phone]            Dec 04, 2018 10:00 AM CST   (Arrive by 9:45 AM)   Return Visit with HEYDI Aguirre [facility] ([facility])    [address] [facility]  [address] [facility] [address] [phone]              Who to contact     Please call your clinic at [phone] to:    Ask questions about your health    Make or cancel appointments    Discuss your medicines    Learn about your test results    Speak to your doctor            Additional Information About Your Visit        MyChart Information     Rebls gives you secure access to your electronic health record. If you see a primary care provider, you can also send messages to your care team and make appointments. If you have questions, please call your primary care clinic.  If you do not have a primary care provider, please call [phone] and they will assist you.      Rebls is an electronic gateway that provides easy, online access to your medical records. With Rebl[employer], you can request a clinic appointment, read your test results, renew a prescription or communicate with your care team.     To access your existing account, please contact your [facility] or call [phone] for assistance.        Care EveryWhere ID     This is your Care  EveryWhere ID. This could be used by other organizations to access your [facility] medical records  [id number]         Blood Pressure from Last 3 Encounters:   06/29/18 105/61   04/06/18 119/81   03/22/18 107/64    Weight from Last 3 Encounters:   06/29/18 77.2 kg (170 lb 1.6 oz)   04/06/18 82.6 kg (182 lb)   03/22/18 82.6 kg (182 lb 1.6 oz)              We Performed the Following     FLU VACCINE HIGH DOSE PRESERVATIVE FREE, AGE =>65 YR        Primary Care Provider Office Phone # Fax #    Angeli Robertson [phone]       [address] [address]        Equal Access to Services     RAEANN WALLACE : Hadtiffany nazarioo Soanna, waaxda luqadaha, qaybta kaalmada adeegyada, ayan taylor . So [facility] [phone].    ATENCIÓN: Si habla español, tiene a milton disposición servicios gratuitos de asistencia lingüística. LlGalion [facility] [phone].    We comply with applicable federal civil rights laws and [place] laws. We do not discriminate on the basis of race, color, national origin, age, disability, sex, sexual orientation, or gender identity.            Thank you!     Thank you for choosing [facility]IMARY [facility] CLINIC  for your care. Our goal is always to provide you with excellent care. Hearing back from our patients is one way we can continue to improve our services. Please take a few minutes to complete the written survey that you may receive in the mail after your visit with us. Thank you!             Your Updated Medication List - Protect others around you: Learn how to safely use, store and throw away your medicines at www.disposemymeds.org.          This list is accurate as of 10/18/18  9:28 AM.  Always use your most recent med list.                   Brand Name Dispense Instructions for use Diagnosis    aspirin 81 MG tablet      Take 1 tablet by mouth daily.        atorvastatin 10 MG tablet    LIPITOR    90 tablet    Take 1 tablet (10 mg) by mouth  daily    Hyperlipidemia LDL goal <100       cholecalciferol 1000 units capsule    vitamin  -D     Take 1 capsule by mouth daily.        multivitamin Tabs tablet      Take 2 tablets by mouth 2 times daily.        order for DME     3 Units    Equipment being ordered: Compression stockings, knee high, measured for patient, 20-30mmHg    Venous (peripheral) insufficiency

## 2018-10-23 ENCOUNTER — OFFICE VISIT (OUTPATIENT)
Dept: OPHTHALMOLOGY | Facility: CLINIC | Age: 83
End: 2018-10-23
Attending: OPHTHALMOLOGY
Payer: MEDICARE

## 2018-10-23 DIAGNOSIS — H35.3211 EXUDATIVE AGE-RELATED MACULAR DEGENERATION OF RIGHT EYE WITH ACTIVE CHOROIDAL NEOVASCULARIZATION (H): Primary | ICD-10-CM

## 2018-10-23 PROCEDURE — G0463 HOSPITAL OUTPT CLINIC VISIT: HCPCS | Mod: ZF

## 2018-10-23 PROCEDURE — C9257 BEVACIZUMAB INJECTION: HCPCS | Mod: ZF | Performed by: OPHTHALMOLOGY

## 2018-10-23 PROCEDURE — 25000128 H RX IP 250 OP 636: Mod: ZF | Performed by: OPHTHALMOLOGY

## 2018-10-23 PROCEDURE — 40000269 ZZH STATISTIC NO CHARGE FACILITY FEE: Mod: ZF

## 2018-10-23 PROCEDURE — 67028 INJECTION EYE DRUG: CPT | Mod: ZF | Performed by: OPHTHALMOLOGY

## 2018-10-23 RX ADMIN — Medication 1.25 MG: at 14:43

## 2018-10-23 ASSESSMENT — VISUAL ACUITY
CORRECTION_TYPE: GLASSES
OD_CC+: -1
METHOD: SNELLEN - LINEAR
OD_CC: 20/30
OS_CC: 20/25

## 2018-10-23 ASSESSMENT — TONOMETRY
OS_IOP_MMHG: 11
IOP_METHOD: TONOPEN
OD_IOP_MMHG: 12

## 2018-10-23 ASSESSMENT — CONF VISUAL FIELD
OS_INFERIOR_TEMPORAL_RESTRICTION: 3
OD_NORMAL: 1

## 2018-10-23 NOTE — PROGRESS NOTES
Assessment & Plan      Tom Saini is a 83 year old male with the following diagnoses:   1. Exudative age related macular degeneration (H)    2. 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  Consent obtained  Injection #3/3 of Q10 weeks with planned series of 3   Amsler/AREDS to continue       Patient disposition:   Return in about 10 weeks (around 1/1/2019) for DFE, OCT Macula, OCT-A.         Attending Physician Attestation:  Complete documentation of historical and exam elements from today's encounter can be found in the full encounter summary report (not reduplicated in this progress note).  I personally obtained the chief complaint(s) and history of present illness.  I confirmed and edited as necessary the review of systems, past medical/surgical history, family history, social history, and examination findings as documented by others; and I examined the patient myself.  I personally reviewed the relevant tests, images, and reports as documented above.  I formulated and edited as necessary the assessment and plan and discussed the findings and management plan with the patient and family.  Attending Physician Procedure Attestation: I was present for the entire procedure  . - Brigido Laura MD

## 2018-10-23 NOTE — NURSING NOTE
Chief Complaints and History of Present Illnesses   Patient presents with     Macular Degeneration Follow Up     HPI    Affected eye(s):  Both   Symptoms:     No blurred vision   No flashes      Duration:  2 months      Do you have eye pain now?:  No      Comments:  Macular degeneration follow up.    SHAKEEL Luna 1:49 PM 10/23/2018

## 2018-10-23 NOTE — MR AVS SNAPSHOT
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 [address] [place] [address] [phone]              Who to contact     Please call your clinic at [phone] to:    Ask questions about your health    Make or cancel appointments    Discuss your medicines    Learn about your test results    Speak to your doctor            Additional Information About Your Visit        Mobile [employer] Information     [employer] gives you secure access to your electronic health record. If you see a primary care provider, you can also send messages to your care team and make appointments. If you have questions, please call your primary care clinic.  If you do not have a primary care provider, please call [phone] and they will assist you.      [employer] is an electronic gateway that provides easy, online access to your medical records. With [employer], you can request a clinic appointment, read your test results, renew a prescription or communicate with your care team.     To access your existing account, please contact your [facility]  [place] [facility] or call [phone] for assistance.        Care EveryWhere ID     This is your Care EveryWhere ID. This could be used by other organizations to access your [place] medical records  [id number]         Blood Pressure from Last 3 Encounters:   06/29/18 105/61   04/06/18 119/81   03/22/18 107/64    Weight from Last 3 Encounters:   06/29/18 77.2 kg (170 lb 1.6 oz)   04/06/18 82.6 kg (182 lb)   03/22/18 82.6 kg (182 lb 1.6 oz)              Today, you had the following     No orders found for display       Primary Care Provider Office Phone # Fax #    Angeli Robertson [phone]       [address] [address]        Equal Access to Services     RAEANN WALLACE : Hadii aad ku hadasho Soanna, waaxda luqadaha, qaybta kaalmada adeginnyyaradha, ayan taylor . So [facility] [phone].    ATENCIÓN: Si habla español, tiene a milton disposición servicios gratuitos de asistencia lingüística. Manuel al [phone].    We comply with applicable federal civil rights laws and [place] laws. We do not discriminate on the basis of race, color, national origin, age, disability, sex, sexual orientation, or gender identity.            Thank you!     Thank you for choosing EYE CLINIC  for your care. Our goal is always to provide you with excellent care. Hearing back from our patients is one way we can continue to improve our services. Please take a few minutes to complete the written survey that you may receive in the mail after your visit with us. Thank you!             Your Updated Medication List - Protect others around you: Learn how to safely use, store and throw away your medicines at www.disposemymeds.org.          This list is accurate as of 10/23/18  2:58 PM.  Always use your most recent med list.                   Brand Name Dispense Instructions for use Diagnosis    aspirin 81 MG tablet      Take 1 tablet by mouth daily.        atorvastatin 10  MG tablet    LIPITOR    90 tablet    Take 1 tablet (10 mg) by mouth daily    Hyperlipidemia LDL goal <100       cholecalciferol 1000 units capsule    vitamin  -D     Take 1 capsule by mouth daily.        multivitamin Tabs tablet      Take 2 tablets by mouth 2 times daily.        order for DME     3 Units    Equipment being ordered: Compression stockings, knee high, measured for patient, 20-30mmHg    Venous (peripheral) insufficiency

## 2018-12-04 ENCOUNTER — OFFICE VISIT (OUTPATIENT)
Dept: INTERNAL MEDICINE | Facility: CLINIC | Age: 83
End: 2018-12-04
Payer: MEDICARE

## 2018-12-04 VITALS
WEIGHT: 180.8 LBS | DIASTOLIC BLOOD PRESSURE: 67 MMHG | OXYGEN SATURATION: 97 % | BODY MASS INDEX: 25.22 KG/M2 | HEART RATE: 85 BPM | SYSTOLIC BLOOD PRESSURE: 109 MMHG

## 2018-12-04 DIAGNOSIS — Z71.84 COUNSELING FOR TRAVEL: Primary | ICD-10-CM

## 2018-12-04 RX ORDER — AZITHROMYCIN 500 MG/1
TABLET, FILM COATED ORAL
Qty: 3 TABLET | Refills: 0 | Status: SHIPPED | OUTPATIENT
Start: 2018-12-04 | End: 2019-03-19

## 2018-12-04 RX ORDER — ATOVAQUONE AND PROGUANIL HYDROCHLORIDE 250; 100 MG/1; MG/1
1 TABLET, FILM COATED ORAL DAILY
Qty: 25 TABLET | Refills: 0 | Status: SHIPPED | OUTPATIENT
Start: 2018-12-04 | End: 2019-03-19

## 2018-12-04 ASSESSMENT — PAIN SCALES - GENERAL: PAINLEVEL: NO PAIN (0)

## 2018-12-04 NOTE — NURSING NOTE
Chief Complaint   Patient presents with     Travel Clinic     [facility] in Feb, [place] in June/July. Needs malaria pills and typhoid shot       Hung Garcia, EMT 10:13 AM on 12/4/2018.

## 2018-12-04 NOTE — MR AVS SNAPSHOT
After Visit Summary   12/4/2018    Tom Saini    MRN: [id number]           Patient Information     Date Of Birth          1935        Visit Information        Provider Department      12/4/2018 10:00 AM Angella Dodson, APRN CNP [facility]        Today's Diagnoses     Counseling for travel    -  1      Care Instructions    Primary Care Center Medication Refill Request Information:  * Please contact your pharmacy regarding ANY request for medication refills.  ** [facility] Prescription Fax = [phone]  * Please allow 3 business days for routine medication refills.  * Please allow 5 business days for controlled substance medication refills.     Primary Care Center Test Result notification information:  *You will be notified with in 7-10 days of your appointment day regarding the results of your test.  If you are on MyChart you will be notified as soon as the provider has reviewed the results and signed off on them.    [facility]: [phone]             Follow-ups after your visit        Your next 10 appointments already scheduled     Jan 03, 2019  9:30 AM CST   RETURN GENERAL with Brigido Laura MD   Eye Clinic ([facility])    [address] [address] [phone]              Who to contact     Please call your clinic at [phone] to:    Ask questions about your health    Make or cancel appointments    Discuss your medicines    Learn about your test results    Speak to your doctor            Additional Information About Your Visit        MyChart Information     MyChart gives you secure access to your electronic health record. If you see a primary care provider, you can also send messages to your care team and make appointments. If you have questions, please call your primary care clinic.  If you do not have a primary care provider, please call [phone] and they will assist you.       [employer] is an electronic gateway that provides easy, online access to your medical records. With [employer], you can request a clinic appointment, read your test results, renew a prescription or communicate with your care team.     To access your existing account, please contact your [facility] or call [phone] for assistance.        Care EveryWhere ID     This is your Care EveryWhere ID. This could be used by other organizations to access your [place] medical records  [id number]        Your Vitals Were     Pulse Pulse Oximetry BMI (Body Mass Index)             85 97% 25.22 kg/m2          Blood Pressure from Last 3 Encounters:   12/04/18 109/67   06/29/18 105/61   04/06/18 119/81    Weight from Last 3 Encounters:   12/04/18 82 kg (180 lb 12.8 oz)   06/29/18 77.2 kg (170 lb 1.6 oz)   04/06/18 82.6 kg (182 lb)              Today, you had the following     No orders found for display         Today's Medication Changes          These changes are accurate as of 12/4/18 10:53 AM.  If you have any questions, ask your nurse or doctor.               Start taking these medicines.        Dose/Directions    atovaquone-proguanil 250-100 MG tablet   Commonly known as:  MALARONE   Used for:  Counseling for travel   Started by:  Angella Dodson APRN CNP        Dose:  1 tablet   Take 1 tablet by mouth daily Start 2 days before travel and continue 7 days after return.   Quantity:  25 tablet   Refills:  0       azithromycin 500 MG tablet   Commonly known as:  ZITHROMAX   Used for:  Counseling for travel   Started by:  Angella Dodson APRN CNP        Take 1 tablet a day until severe diarrhea resolves.   Quantity:  3 tablet   Refills:  0            Where to get your medicines      Some of these will need a paper prescription and others can be bought over the counter.  Ask your nurse if you have questions.     Bring a paper prescription for each of these medications     atovaquone-proguanil  250-100 MG tablet    azithromycin 500 MG tablet                Primary Care Provider Office Phone # Fax #    CalciumCamilla Robertson [phone]       [address] [address]        Equal Access to Services     RAEANN WALLACE : Edna kelly sanches mansio Sopriscilaali, waaxda luqadaha, qaybta kaalmada adeegyada, ayan menezesn mary jo vazquez laMisaanup smith. So [facility] [phone].    ATENCIÓN: Si habla español, tiene a milton disposición servicios gratuitos de asistencia lingüística. Llame al [phone].    We comply with applicable federal civil rights laws and [place] laws. We do not discriminate on the basis of race, color, national origin, age, disability, sex, sexual orientation, or gender identity.            Thank you!     Thank you for choosing [facility]  for your care. Our goal is always to provide you with excellent care. Hearing back from our patients is one way we can continue to improve our services. Please take a few minutes to complete the written survey that you may receive in the mail after your visit with us. Thank you!             Your Updated Medication List - Protect others around you: Learn how to safely use, store and throw away your medicines at www.disposemymeds.org.          This list is accurate as of 12/4/18 10:53 AM.  Always use your most recent med list.                   Brand Name Dispense Instructions for use Diagnosis    aspirin 81 MG tablet    ASA     Take 1 tablet by mouth daily.        atorvastatin 10 MG tablet    LIPITOR    90 tablet    Take 1 tablet (10 mg) by mouth daily    Hyperlipidemia LDL goal <100       atovaquone-proguanil 250-100 MG tablet    MALARONE    25 tablet    Take 1 tablet by mouth daily Start 2 days before travel and continue 7 days after return.    Counseling for travel       azithromycin 500 MG tablet    ZITHROMAX    3 tablet    Take 1 tablet a day until severe diarrhea resolves.    Counseling for travel        cholecalciferol 1000 units (25 mcg) capsule    VITAMIN D3     Take 1 capsule by mouth daily.        multivitamin Tabs tablet      Take 2 tablets by mouth 2 times daily.        order for DME     3 Units    Equipment being ordered: Compression stockings, knee high, measured for patient, 20-30mmHg    Venous (peripheral) insufficiency

## 2018-12-04 NOTE — PROGRESS NOTES
[facility]  Primary Care Center   Angella LOPEZJorge BettyHEYDI rosario CNP  12/04/2018      Chief Complaint:   Travel consultation    Tom Saini is a 83 year old year old male seen today in preparation for travel, leaving on February 8th and staying for 14 days in [facility] and 15 days in [facility] [place]. Patient will be staying at a hotel in [place] and [facility] [place]. Reason for travel: Wildlife photography group. Country of birth: [place]. They will be traveling with: Wildlife photography group. Planned activities during travel: Wildlife photography expedition.     History of Present Illness:   Tom Saini is a 83 year old male with a history of aortic insufficiency and is ~3 months s/p bilateral upper lid blepharoplasty and MMCR  who presents for evaluation of travel clinic.    Fall: He states that he fell in April 16 feet off a wall where he had a right radial fracture, a left thumb dislocation, a crack in his pelvis, and a crack in his right rib. He states that he was in rehabilitation for 50 days after this. Futher he states that he was suppose to have a follow-up MRI in 6 months, and believes this is coming up. He would like to schedule this.    Travel: He is going to [place] for 14 days and [facility] [place] for 15 days, and is leaving for [place] on February 8th . He would like to get up to date on the typhoid vaccine and preventative medication for malaria. 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     Other concerns discussed:  - He would like to know if he should be getting the new Shingles vaccine.   - He would like to know if he should continue taking a baby Aspirin.  - His wife states there is a spot on his head, and he would like this looked at.        Risk level qualifier: low     REVIEW OF SYSTEMS:    Mefloquine contraindications: none  Current problems which may be worsened by Mefloquine: none     Possible vaccine contraindications: none      Angeli Valadez for his  primary care.     Active Medications:      aspirin 81 MG tablet, Take 1 tablet by mouth daily., Disp: , Rfl: 3     atorvastatin (LIPITOR) 10 MG tablet, Take 1 tablet (10 mg) by mouth daily, Disp: 90 tablet, Rfl: 3     cholecalciferol (VITAMIN D3) 1000 UNITS capsule, Take 1 capsule by mouth daily., Disp: , Rfl:      multivitamin (OCUVITE) TABS, Take 2 tablets by mouth 2 times daily., Disp: , Rfl:      Aflibercept (EYLEA) injection 2 mg, 2 mg, Intravitreal, Q28 Days, Brigido Laura MD, 2 mg at 06/06/18 1320     bevacizumab (AVASTIN) intravitreal inj 1.25 mg, 1.25 mg, Intravitreal, Q28 Days, Brigido Laura MD, 1.25 mg at 10/23/18 1443      Allergies:   Codeine sulfate      Past Medical History:  Aortic insufficiency  Erectile dysfunction  Hyperlipidemia   Low back pain  Macular degeneration  Pancreatic cyst  Shingles   Eczema  Hypercholesterolemia  Dermatofibroma   Seborrheic keratoses, inflamed    ADDITIONAL PAST MEDICAL HISTORY:   Allergies:  Penicillins, Sulfonamide derivatives, Amoxicillin, Clindamycin hcl and Shellfish allergy  Cancers or Blood Disorders: none  Endocrine System: none  Heart or Cardiovascular: hyperlipidemia and aortic insufficiency  Immune System: none  Intestinal: pancreatic cyst  Joint: low back pain  Kidney: none  Liver: none  Lung: none  Neurologic or Psychiatric:none  Obstetric and Gynecologic: none  Skin: Dermatofibroma and seborrheic keratoses, inflamed  Does the patient live or work with anyone with immune deficiency? No      Past Surgical History:  Cataract  Combined repair ptosis with blepharoplasty bilateral  Eye surgery  Laminectomy lumbar two levels, L4-5  Recession resection (repair strabismus)    Family History:   Bladder cancer - father  Skin cancer - mother      Social History:   This patient presented alone.   Smoking status: former smoker of 2 years, 2 packs per day, sonya 6/25/1954  Smokeless tobacco:  never  Alcohol use: yes     Immunization History   Administered Date(s) Administered     HepB 12/31/2015, 02/02/2016, 08/19/2016     Influenza (High Dose) 3 valent vaccine 10/23/2014, 10/16/2015, 10/05/2016, 10/18/2018     Influenza (IIV3) PF 11/13/2003, 10/25/2004, 11/01/2006, 10/29/2007, 10/22/2008, 01/25/2010, 10/01/2012, 10/23/2014     Influenza Vaccine IM 3yrs+ 4 Valent IIV4 10/01/2013     Japanese Encephalitis IM 01/06/2016, 02/02/2016     Pneumo Conj 13-V (2010&after) 06/25/2015     Pneumococcal 23 valent 10/26/2004, 06/10/2014     TD (ADULT, 7+) 02/12/1999, 01/31/2011     TDAP Vaccine (Boostrix) 06/10/2014     Typhoid IM 02/12/1999, 10/22/2008, 06/25/2015, 06/26/2017     Yellow Fever 07/19/2006, 01/26/2011     Zoster vaccine, live 07/02/2009       Physical Exam:   /67 (BP Location: Right arm, Patient Position: Sitting, Cuff Size: Adult Regular)  Pulse 85  Wt 82 kg (180 lb 12.8 oz)  SpO2 97%  BMI 25.22 kg/m2   Wt Readings from Last 1 Encounters:   12/04/18 82 kg (180 lb 12.8 oz)     Constitutional: no distress, comfortable, pleasant   Eyes: anicteric   MS: ambulates w/o aids  Skin:grossly normal.   Neurological:  normal speech, no tremor. A and O x 3, good historian.  Psychological: appropriate mood, good eye contact, normal affect    ASSESSMENT AND PLAN:    Tom Saini is a 83 year old year old male whom I had the pleasure of seeing today for counseling for international travel.    Counseling on vaccinations for travel:     Typhoid: Discussed and immunity thought to actually be longer than 2 years.  Other vaccines UTD    Counseling on malaria and other insect borne diseases     He should begin taking Malarone 2 days before leaving, continue taking throughout his trip and 7 days after his trip.      We also discussed general precautions for mosquito, other insect borne and ecto-parasite avoidance.    Counseling on traveler's diarrhea    We discussed precautions for clean water and food  preparation.    We discussed medications for management of traveler's diarrhea, once symptomatic:  - Provided him with antibiotics (aithromycin 500 mg tabs)  if develops severe diarrhea > 4 stools a day.     Counseling on the epidemiology of travel related morbidity and mortality    Discussed precautions for accident avoidance: n/a    Discussed flying: no problems    Discussed health concerns overseas    Discussed safety and security    Other counseling for travel    Recommended checking medicine cabinet - looking for any meds normally used at home for common illnesses.    Provided education regarding sun exposure and use of high SPF sunscreen.    High altitude sickness: n/a     Motion sickness: n/a     Jet lag: n/a     HIV Post Exposure : n/a    Assessment and Plan:  Counseling for travel  We discussed his travel plans. He is leaving for [facility] on February 8th for 14 days and [facility] for 15 afterwards. He should begin taking Malarone 2 days before leaving, continue taking throughout his trip and 7 days after his trip. His typhoid vaccination is up to date. He was given a prescription of antibiotics in case of developing infection.   - atovaquone-proguanil (MALARONE) 250-100 MG tablet  Dispense: 25 tablet; Refill: 0  - azithromycin (ZITHROMAX) 500 MG tablet  Dispense: 3 tablet; Refill: 0       Recommended that he talk to Dr. Robertson about continuing the baby Aspirin, given that she is his PCC and knows him better. Also, will tell Dr. Robertson's nurse to schedule his abdomen MRI to ensure she will get the results.     FOLLOW-UP PLAN:    The patient was also encouraged to be seen for any post-travel illness, or with future travel plans.    The total time spent during this visit for individual counseling was 30 minutes, all of which was spent in counseling time, including reviewing the past medical and vaccination history, the travel itinerary, the risks of travel and suggested precautions, and the recommended vaccines  and medicines with their side effects -- all for upcoming travel.     Scribe Disclosure:   I, Gisel Stovall, am serving as a scribe to document services personally performed by HEYDI Anderson CNP at this visit, based upon the provider's statements to me. All documentation has been reviewed by the aforementioned provider prior to being entered into the official medical record.     Portions of this medical record were completed by a scribe. UPON MY REVIEW AND AUTHENTICATION BY ELECTRONIC SIGNATURE, this confirms (a) I performed the applicable clinical services, and (b) the record is accurate.   Angella SOLIZ, CNP

## 2018-12-04 NOTE — PATIENT INSTRUCTIONS
[facility] Medication Refill Request Information:  * Please contact your pharmacy regarding ANY request for medication refills.  ** [facility] Prescription Fax = [phone]  * Please allow 3 business days for routine medication refills.  * Please allow 5 business days for controlled substance medication refills.     [facility] Test Result notification information:  *You will be notified with in 7-10 days of your appointment day regarding the results of your test.  If you are on MyChart you will be notified as soon as the provider has reviewed the results and signed off on them.    [facility]: [phone]

## 2018-12-05 ENCOUNTER — TELEPHONE (OUTPATIENT)
Dept: INTERNAL MEDICINE | Facility: CLINIC | Age: 83
End: 2018-12-05

## 2018-12-05 DIAGNOSIS — K86.9 LESION OF PANCREAS: Primary | ICD-10-CM

## 2018-12-05 NOTE — TELEPHONE ENCOUNTER
Pt is wondering if he could take asa 81 mg continuously. Also he wants to have 6 month f/u MRI.   Thanks.    Soon-Mi

## 2018-12-06 NOTE — TELEPHONE ENCOUNTER
MRI pancreas ordered for January.  He can take ASA as long as not upsetting stomach or causing bleeding.  Happy to discuss in more detail in office visit.  Thanks  Angeli Robertson MD  Internal Medicine

## 2018-12-31 DIAGNOSIS — H35.3290 EXUDATIVE AGE RELATED MACULAR DEGENERATION (H): Primary | ICD-10-CM

## 2019-01-03 ENCOUNTER — ANCILLARY PROCEDURE (OUTPATIENT)
Dept: MRI IMAGING | Facility: CLINIC | Age: 84
End: 2019-01-03
Attending: INTERNAL MEDICINE
Payer: MEDICARE

## 2019-01-03 ENCOUNTER — OFFICE VISIT (OUTPATIENT)
Dept: OPHTHALMOLOGY | Facility: CLINIC | Age: 84
End: 2019-01-03
Attending: OPHTHALMOLOGY
Payer: MEDICARE

## 2019-01-03 DIAGNOSIS — H35.3290 EXUDATIVE AGE RELATED MACULAR DEGENERATION (H): ICD-10-CM

## 2019-01-03 DIAGNOSIS — K86.9 LESION OF PANCREAS: ICD-10-CM

## 2019-01-03 PROCEDURE — 92134 CPTRZ OPH DX IMG PST SGM RTA: CPT | Mod: ZF | Performed by: OPHTHALMOLOGY

## 2019-01-03 PROCEDURE — 25000128 H RX IP 250 OP 636: Mod: ZF | Performed by: OPHTHALMOLOGY

## 2019-01-03 PROCEDURE — 67028 INJECTION EYE DRUG: CPT | Mod: RT,ZF | Performed by: OPHTHALMOLOGY

## 2019-01-03 PROCEDURE — C9257 BEVACIZUMAB INJECTION: HCPCS | Mod: ZF | Performed by: OPHTHALMOLOGY

## 2019-01-03 PROCEDURE — G0463 HOSPITAL OUTPT CLINIC VISIT: HCPCS | Mod: ZF,25

## 2019-01-03 RX ORDER — GADOBUTROL 604.72 MG/ML
7.5 INJECTION INTRAVENOUS ONCE
Status: COMPLETED | OUTPATIENT
Start: 2019-01-03 | End: 2019-01-03

## 2019-01-03 RX ADMIN — GADOBUTROL 7.5 ML: 604.72 INJECTION INTRAVENOUS at 14:29

## 2019-01-03 RX ADMIN — Medication 1.25 MG: at 11:08

## 2019-01-03 ASSESSMENT — REFRACTION_WEARINGRX
OS_AXIS: 010
OD_CYLINDER: +1.00
OS_ADD: +2.75
OD_ADD: +2.75
OS_CYLINDER: +0.50
OD_SPHERE: -2.75
OD_AXIS: 170
OS_SPHERE: -1.50

## 2019-01-03 ASSESSMENT — VISUAL ACUITY
OD_CC+: +1
OD_CC: 20/30
METHOD: SNELLEN - LINEAR
OS_CC+: +2
CORRECTION_TYPE: GLASSES
OS_CC: 20/30

## 2019-01-03 ASSESSMENT — CONF VISUAL FIELD
OS_NORMAL: 1
OD_NORMAL: 1
METHOD: COUNTING FINGERS

## 2019-01-03 ASSESSMENT — TONOMETRY
OS_IOP_MMHG: 14
OD_IOP_MMHG: 14
IOP_METHOD: TONOPEN

## 2019-01-03 NOTE — NURSING NOTE
Chief Complaints and History of Present Illnesses   Patient presents with     Macular Degeneration Follow Up     Chief Complaint(s) and History of Present Illness(es)     Macular Degeneration Follow Up     Laterality: both eyes    Onset: gradual    Onset: months ago    Location: States va is the same since last visit      Frequency: constantly    Associated symptoms: Negative for floaters, flashes, dryness and redness    Treatments tried: no treatments    Pain scale: 0/10              Comments     Noemi Epps COT 9:58 AM January 3, 2019

## 2019-01-03 NOTE — DISCHARGE INSTRUCTIONS
MRI Contrast Discharge Instructions    The IV contrast you received today will pass out of your body in your  urine. This will happen in the next 24 hours. You will not feel this process.  Your urine will not change color.    Drink at least 4 extra glasses of water or juice today (unless your doctor  has restricted your fluids). This reduces the stress on your kidneys.  You may take your regular medicines.    If you are on dialysis: It is best to have dialysis today.    If you have a reaction: Most reactions happen right away. If you have  any new symptoms after leaving the hospital (such as hives or swelling),  call your hospital at the correct number below. Or call your family doctor.  If you have breathing distress or wheezing, call 911.    Special instructions: ***    I have read and understand the above information.    Signature:______________________________________ Date:___________    Staff:__________________________________________ Date:___________     Time:__________    [facility] Departments:    ___[place]: [phone]  ___[facility]: [phone]  ___[place]: [phone] ___[facility]: [phone]  ___[facility]: [phone]  ___[facility]: [phone]  ___[phone]  ___[facility]: [phone]  ___[phone]

## 2019-01-04 NOTE — PROGRESS NOTES
Chief Complaint(s) and History of Present Illness(es)     Macular Degeneration Follow Up     Laterality: both eyes    Onset: gradual    Onset: months ago    Location: [place] is the same since last visit      Frequency: constantly    Associated symptoms: Negative for floaters, flashes, dryness and redness    Treatments tried: no treatments    Pain scale: 0/10              Comments     Noemi Epps COT 9:58 AM January 3, 2019               Review of systems for the eyes was negative other than the pertinent positives/negatives listed in the HPI.      Assessment & Plan      Tom Saini is a 83 year old male with the following diagnoses:   1. 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  Consent obtained  Injection #1/3 of Q10 weeks with planned series of 3   Amsler/AREDS to continue       Patient disposition:   Return in about 10 weeks (around 3/14/2019) for VT only; AVASTIN RIGHT.          Attending Physician Attestation:  Complete documentation of historical and exam elements from today's encounter can be found in the full encounter summary report (not reduplicated in this progress note).  I personally obtained the chief complaint(s) and history of present illness.  I confirmed and edited as necessary the review of systems, past medical/surgical history, family history, social history, and examination findings as documented by others; and I examined the patient myself.  I personally reviewed the relevant tests, images, and reports as documented above.  I formulated and edited as necessary the assessment and plan and discussed the findings and management plan with the  patient and family. . - Brigido Laura MD

## 2019-03-14 ENCOUNTER — OFFICE VISIT (OUTPATIENT)
Dept: OPHTHALMOLOGY | Facility: CLINIC | Age: 84
End: 2019-03-14
Attending: OPHTHALMOLOGY
Payer: MEDICARE

## 2019-03-14 DIAGNOSIS — H35.3212 EXUDATIVE AGE-RELATED MACULAR DEGENERATION OF RIGHT EYE WITH INACTIVE CHOROIDAL NEOVASCULARIZATION (H): Primary | ICD-10-CM

## 2019-03-14 PROCEDURE — C9257 BEVACIZUMAB INJECTION: HCPCS | Mod: ZF | Performed by: OPHTHALMOLOGY

## 2019-03-14 PROCEDURE — 40000269 ZZH STATISTIC NO CHARGE FACILITY FEE: Mod: ZF

## 2019-03-14 PROCEDURE — 67028 INJECTION EYE DRUG: CPT | Mod: RT,ZF | Performed by: OPHTHALMOLOGY

## 2019-03-14 PROCEDURE — 25000128 H RX IP 250 OP 636: Mod: ZF | Performed by: OPHTHALMOLOGY

## 2019-03-14 RX ADMIN — Medication 1.25 MG: at 09:55

## 2019-03-14 ASSESSMENT — CONF VISUAL FIELD
OD_NORMAL: 1
OS_NORMAL: 1

## 2019-03-14 ASSESSMENT — REFRACTION_WEARINGRX
OD_SPHERE: -2.75
OD_CYLINDER: +1.00
OD_AXIS: 170
OD_ADD: +2.75
OS_CYLINDER: +0.50
OS_ADD: +2.75
OS_AXIS: 010
OS_SPHERE: -1.50

## 2019-03-14 ASSESSMENT — VISUAL ACUITY
OD_CC: 20/40
METHOD: SNELLEN - LINEAR
OS_CC: 20/30
OD_CC+: +2

## 2019-03-14 ASSESSMENT — TONOMETRY
OS_IOP_MMHG: 13
IOP_METHOD: TONOPEN
OD_IOP_MMHG: 12

## 2019-03-14 NOTE — PROGRESS NOTES
Chief Complaint(s) and History of Present Illness(es)     Macular Degeneration Follow Up     Laterality: both eyes    Onset: gradual    Onset: months ago    Location: [place] is [place] same since last visit      Frequency: constantly    Associated symptoms: Negative for floaters, flashes, dryness and redness    Treatments tried: no treatments    Pain scale: 0/10              Comments     Noemi Epps COT 9:58 AM January 3, 2019               Review of systems for the eyes was negative other than the pertinent positives/negatives listed in the HPI.      Assessment & Plan      Tom Saini is a 84 year old male with the following diagnoses:   1. 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  Consent obtained  Injection #2/3 of Q10-12 weeks with planned series of 3   Amsler/AREDS to continue       Patient disposition:   Return in about 3 months (around 6/14/2019) for VT only, AVASTIN right.          Attending Physician Attestation:  Complete documentation of historical and exam elements from today's encounter can be found in the full encounter summary report (not reduplicated in this progress note).  I personally obtained the chief complaint(s) and history of present illness.  I confirmed and edited as necessary the review of systems, past medical/surgical history, family history, social history, and examination findings as documented by others; and I examined the patient myself.  I personally reviewed the relevant tests, images, and reports as documented above.  I formulated and edited as necessary the assessment and plan and discussed the findings and management plan with the patient and family. . - Brigido Laura MD

## 2019-03-14 NOTE — NURSING NOTE
Chief Complaints and History of Present Illnesses   Patient presents with     Follow Up     Chief Complaint(s) and History of Present Illness(es)     Follow Up     Laterality: both eyes    Associated symptoms: floaters.  Negative for eye pain, flashes, haloes and glare    Pain scale: 0/10              Comments     Macular Degeneration Follow Up  Pt reports no change or concerns  Dulce BECKFORD 9:16 AM March 14, 2019

## 2019-03-19 ENCOUNTER — APPOINTMENT (OUTPATIENT)
Dept: CT IMAGING | Facility: CLINIC | Age: 84
End: 2019-03-19
Attending: EMERGENCY MEDICINE
Payer: MEDICARE

## 2019-03-19 ENCOUNTER — HOSPITAL ENCOUNTER (OUTPATIENT)
Facility: CLINIC | Age: 84
Setting detail: OBSERVATION
Discharge: HOME | End: 2019-03-20
Attending: EMERGENCY MEDICINE | Admitting: EMERGENCY MEDICINE
Payer: MEDICARE

## 2019-03-19 ENCOUNTER — TELEPHONE (OUTPATIENT)
Dept: CALL CENTER | Age: 84
End: 2019-03-19

## 2019-03-19 ENCOUNTER — APPOINTMENT (OUTPATIENT)
Dept: GENERAL RADIOLOGY | Facility: CLINIC | Age: 84
End: 2019-03-19
Attending: EMERGENCY MEDICINE
Payer: MEDICARE

## 2019-03-19 DIAGNOSIS — R07.89 OTHER CHEST PAIN: ICD-10-CM

## 2019-03-19 DIAGNOSIS — M25.551 RIGHT HIP PAIN: ICD-10-CM

## 2019-03-19 DIAGNOSIS — R07.9 CHEST PAIN, UNSPECIFIED TYPE: ICD-10-CM

## 2019-03-19 DIAGNOSIS — E78.5 HYPERLIPIDEMIA, UNSPECIFIED HYPERLIPIDEMIA TYPE: ICD-10-CM

## 2019-03-19 DIAGNOSIS — M79.644 PAIN IN FINGER OF RIGHT HAND: ICD-10-CM

## 2019-03-19 LAB
ANION GAP SERPL CALCULATED.3IONS-SCNC: 7 MMOL/L (ref 3–14)
BASOPHILS # BLD AUTO: 0.1 10E9/L (ref 0–0.2)
BASOPHILS NFR BLD AUTO: 1.6 %
BUN SERPL-MCNC: 19 MG/DL (ref 7–30)
CALCIUM SERPL-MCNC: 8.7 MG/DL (ref 8.5–10.1)
CHLORIDE SERPL-SCNC: 107 MMOL/L (ref 94–109)
CO2 SERPL-SCNC: 27 MMOL/L (ref 20–32)
CREAT SERPL-MCNC: 0.92 MG/DL (ref 0.66–1.25)
D DIMER PPP FEU-MCNC: 0.8 UG/ML FEU (ref 0–0.5)
DIFFERENTIAL METHOD BLD: ABNORMAL
EOSINOPHIL # BLD AUTO: 0.3 10E9/L (ref 0–0.7)
EOSINOPHIL NFR BLD AUTO: 4.4 %
ERYTHROCYTE [DISTWIDTH] IN BLOOD BY AUTOMATED COUNT: 13.3 % (ref 10–15)
GFR SERPL CREATININE-BSD FRML MDRD: 77 ML/MIN/{1.73_M2}
GLUCOSE SERPL-MCNC: 105 MG/DL (ref 70–99)
HCT VFR BLD AUTO: 43 % (ref 40–53)
HGB BLD-MCNC: 13.4 G/DL (ref 13.3–17.7)
IMM GRANULOCYTES # BLD: 0 10E9/L (ref 0–0.4)
IMM GRANULOCYTES NFR BLD: 0.3 %
LYMPHOCYTES # BLD AUTO: 2 10E9/L (ref 0.8–5.3)
LYMPHOCYTES NFR BLD AUTO: 28.7 %
MCH RBC QN AUTO: 30.5 PG (ref 26.5–33)
MCHC RBC AUTO-ENTMCNC: 31.2 G/DL (ref 31.5–36.5)
MCV RBC AUTO: 98 FL (ref 78–100)
MONOCYTES # BLD AUTO: 0.7 10E9/L (ref 0–1.3)
MONOCYTES NFR BLD AUTO: 9.7 %
NEUTROPHILS # BLD AUTO: 3.8 10E9/L (ref 1.6–8.3)
NEUTROPHILS NFR BLD AUTO: 55.3 %
NRBC # BLD AUTO: 0 10*3/UL
NRBC BLD AUTO-RTO: 0 /100
PLATELET # BLD AUTO: 220 10E9/L (ref 150–450)
POTASSIUM SERPL-SCNC: 4.4 MMOL/L (ref 3.4–5.3)
RBC # BLD AUTO: 4.4 10E12/L (ref 4.4–5.9)
SODIUM SERPL-SCNC: 142 MMOL/L (ref 133–144)
TROPONIN I SERPL-MCNC: <0.015 UG/L (ref 0–0.04)
TROPONIN I SERPL-MCNC: <0.015 UG/L (ref 0–0.04)
WBC # BLD AUTO: 6.8 10E9/L (ref 4–11)

## 2019-03-19 PROCEDURE — 25000128 H RX IP 250 OP 636: Performed by: EMERGENCY MEDICINE

## 2019-03-19 PROCEDURE — 99220 ZZC INITIAL OBSERVATION CARE,LEVL III: CPT | Mod: Z6 | Performed by: EMERGENCY MEDICINE

## 2019-03-19 PROCEDURE — 25000132 ZZH RX MED GY IP 250 OP 250 PS 637: Mod: GY | Performed by: NURSE PRACTITIONER

## 2019-03-19 PROCEDURE — 71260 CT THORAX DX C+: CPT

## 2019-03-19 PROCEDURE — 80048 BASIC METABOLIC PNL TOTAL CA: CPT | Performed by: EMERGENCY MEDICINE

## 2019-03-19 PROCEDURE — 93010 ELECTROCARDIOGRAM REPORT: CPT | Mod: Z6 | Performed by: EMERGENCY MEDICINE

## 2019-03-19 PROCEDURE — 85379 FIBRIN DEGRADATION QUANT: CPT | Performed by: EMERGENCY MEDICINE

## 2019-03-19 PROCEDURE — A9270 NON-COVERED ITEM OR SERVICE: HCPCS | Mod: GY | Performed by: NURSE PRACTITIONER

## 2019-03-19 PROCEDURE — 93005 ELECTROCARDIOGRAM TRACING: CPT | Performed by: EMERGENCY MEDICINE

## 2019-03-19 PROCEDURE — G0378 HOSPITAL OBSERVATION PER HR: HCPCS

## 2019-03-19 PROCEDURE — 84484 ASSAY OF TROPONIN QUANT: CPT | Performed by: EMERGENCY MEDICINE

## 2019-03-19 PROCEDURE — 85025 COMPLETE CBC W/AUTO DIFF WBC: CPT | Performed by: EMERGENCY MEDICINE

## 2019-03-19 PROCEDURE — 71046 X-RAY EXAM CHEST 2 VIEWS: CPT

## 2019-03-19 PROCEDURE — 84484 ASSAY OF TROPONIN QUANT: CPT | Mod: 91 | Performed by: NURSE PRACTITIONER

## 2019-03-19 PROCEDURE — 99285 EMERGENCY DEPT VISIT HI MDM: CPT | Mod: 25 | Performed by: EMERGENCY MEDICINE

## 2019-03-19 PROCEDURE — 36415 COLL VENOUS BLD VENIPUNCTURE: CPT | Performed by: NURSE PRACTITIONER

## 2019-03-19 RX ORDER — ASPIRIN 81 MG/1
81 TABLET ORAL DAILY
Status: DISCONTINUED | OUTPATIENT
Start: 2019-03-20 | End: 2019-03-20 | Stop reason: HOSPADM

## 2019-03-19 RX ORDER — NITROGLYCERIN 0.4 MG/1
0.4 TABLET SUBLINGUAL EVERY 5 MIN PRN
Status: DISCONTINUED | OUTPATIENT
Start: 2019-03-19 | End: 2019-03-20 | Stop reason: HOSPADM

## 2019-03-19 RX ORDER — NALOXONE HYDROCHLORIDE 0.4 MG/ML
.1-.4 INJECTION, SOLUTION INTRAMUSCULAR; INTRAVENOUS; SUBCUTANEOUS
Status: DISCONTINUED | OUTPATIENT
Start: 2019-03-19 | End: 2019-03-20 | Stop reason: HOSPADM

## 2019-03-19 RX ORDER — ATORVASTATIN CALCIUM 10 MG/1
10 TABLET, FILM COATED ORAL DAILY
Status: DISCONTINUED | OUTPATIENT
Start: 2019-03-20 | End: 2019-03-20 | Stop reason: HOSPADM

## 2019-03-19 RX ORDER — LIDOCAINE 40 MG/G
CREAM TOPICAL
Status: DISCONTINUED | OUTPATIENT
Start: 2019-03-19 | End: 2019-03-20 | Stop reason: HOSPADM

## 2019-03-19 RX ORDER — ACETAMINOPHEN 325 MG/1
650 TABLET ORAL EVERY 4 HOURS PRN
Status: DISCONTINUED | OUTPATIENT
Start: 2019-03-19 | End: 2019-03-20 | Stop reason: HOSPADM

## 2019-03-19 RX ORDER — ASPIRIN 81 MG/1
81 TABLET ORAL DAILY
Status: DISCONTINUED | OUTPATIENT
Start: 2019-03-20 | End: 2019-03-19

## 2019-03-19 RX ORDER — IOPAMIDOL 755 MG/ML
62 INJECTION, SOLUTION INTRAVASCULAR ONCE
Status: COMPLETED | OUTPATIENT
Start: 2019-03-19 | End: 2019-03-19

## 2019-03-19 RX ORDER — ASPIRIN 81 MG/1
162 TABLET, CHEWABLE ORAL ONCE
Status: COMPLETED | OUTPATIENT
Start: 2019-03-19 | End: 2019-03-19

## 2019-03-19 RX ORDER — ALUMINA, MAGNESIA, AND SIMETHICONE 2400; 2400; 240 MG/30ML; MG/30ML; MG/30ML
30 SUSPENSION ORAL EVERY 4 HOURS PRN
Status: DISCONTINUED | OUTPATIENT
Start: 2019-03-19 | End: 2019-03-20 | Stop reason: HOSPADM

## 2019-03-19 RX ADMIN — IOPAMIDOL 62 ML: 755 INJECTION, SOLUTION INTRAVENOUS at 20:00

## 2019-03-19 RX ADMIN — ASPIRIN 81 MG CHEWABLE TABLET 162 MG: 81 TABLET CHEWABLE at 23:45

## 2019-03-19 ASSESSMENT — ENCOUNTER SYMPTOMS
PALPITATIONS: 0
DIFFICULTY URINATING: 0
WEAKNESS: 0
BRUISES/BLEEDS EASILY: 0
NAUSEA: 0
LIGHT-HEADEDNESS: 0
DIAPHORESIS: 0
EYE REDNESS: 0
COLOR CHANGE: 0
ADENOPATHY: 0
BACK PAIN: 0
AGITATION: 0
DIZZINESS: 0
SHORTNESS OF BREATH: 0
POLYDIPSIA: 0

## 2019-03-19 ASSESSMENT — MIFFLIN-ST. JEOR: SCORE: 1516.37

## 2019-03-19 NOTE — ED NOTES
[facility], [place]   ED Nurse to Floor Handoff     Tom Saini is a 84 year old male who speaks English and lives with a spouse,  in a home  They arrived in the ED by car from home    ED Chief Complaint: Chest Pain    ED Dx;   Final diagnoses:   Chest pain, unspecified type         Needed?: No    Allergies:   Allergies   Allergen Reactions     Codeine Sulfate Nausea and Vomiting   .  Past Medical Hx:   Past Medical History:   Diagnosis Date     Aortic insufficiency      Erectile dysfunction      Hyperlipidemia      Low back pain     disc disease s/p laminectomy in past     Macular degeneration      Pancreatic cyst 07/2018    13 mm, needs 6 months follow up MRI     Shingles 2013      Baseline Mental status: WDL  Current Mental Status changes: at basesline    Infection present or suspected this encounter: no  Sepsis suspected: No  Isolation type: No active isolations     Activity level - Baseline/Home:  Independent  Activity Level - Current:   Independent    Bariatric equipment needed?: No    In the ED these meds were given: Medications - No data to display    Drips running?  No    Home pump  No    Current LDAs  Peripheral IV 03/19/19 Left Upper forearm (Active)   Site Assessment [facility] 3/19/2019  4:29 PM   Number of days: 0       Incision/Surgical Site 04/06/18 Left Upper Eyelid (Active)   Number of days: 347       Incision/Surgical Site 04/06/18 Right Upper Eyelid (Active)   Number of days: 347       Labs results:   Labs Ordered and Resulted from Time of ED Arrival Up to the Time of Departure from the ED   BASIC METABOLIC PANEL - Abnormal; Notable for the following components:       Result Value    Glucose 105 (*)     All other components within normal limits   CBC WITH PLATELETS DIFFERENTIAL - Abnormal; Notable for the following components:    MCHC 31.2 (*)     All other components within normal limits   TROPONIN I   PERIPHERAL IV CATHETER   CARDIAC CONTINUOUS MONITORING  "  PULSE OXIMETRY NURSING       Imaging Studies:   Recent Results (from the past 24 hour(s))   XR Chest 2 Views    Narrative    PRELIMINARY REPORT - The following report is a preliminary  interpretation.      Impression    IMPRESSION: No acute cardiopulmonary findings.        Recent vital signs:   /58   Pulse 67   Temp 97.5  F (36.4  C) (Oral)   Resp 18   Ht 1.803 m (5' 11\")   Wt 79.9 kg (176 lb 3.2 oz)   SpO2 99%   BMI 24.57 kg/m      Cardiac Rhythm: Normal Sinus  Pt needs tele? Yes  Skin/wound Issues: None    Code Status: Full Code    Pain control: good    Nausea control: pt had none    Abnormal labs/tests/findings requiring intervention: NA    Family present during ED course? Yes   Family Comments/Social Situation comments: NA    Tasks needing completion: None    Antonietta Kelly RN    [phone] [facility]    "

## 2019-03-19 NOTE — ED TRIAGE NOTES
Pt arrived in triage with concerns of having a couple short episodes of chest pain earlier that have since resolved. Pt called the clinic and was told to come in to get checked out.    VSS.

## 2019-03-19 NOTE — TELEPHONE ENCOUNTER
"[facility]: Nurse Triage Note  SITUATION/BACKGROUND                                                      Tom Saini is a 84 year old male who calls with pain with heart beat - new on set.    Description:  Pain he can feel in his chest with each heart beat - not constant  Onset/duration:  This happened for a few minutes 2-3 hours earlier today for the first time and then again just now for a few minutes  Precip. factors:  None identified  Associated symptoms:  none  Improves/worsens symptoms:  Resolves on its own.  Pain scale (0-10)   2/10    MEDICATIONS:   Taking medication(s) as prescribed? N/A  Taking over the counter medication(s?) N/A  Any barriers to taking medication(s) as prescribed?  N/A  Medication(s) improving/managing symptoms?  N/A    Allergies:   Allergies   Allergen Reactions     Codeine Sulfate Nausea and Vomiting       ASSESSMENT      Patient calls today with first time \"chest pain\". It has happened only twice and both times for only 2-3 minutes (it had stopped by the time he called. It also happened once 2-3 hours earlier. The pain is only a 2/10 and he feels the pain with the heart beat - not constant. His heart beat is not fast or slow - it feels normal. He is not weak, light headed or dizzy. No nausea, does not feel ill in any way. No numbness or tingling of any kind, no SOB.  Patient is given an appointment for tomorrow in [facility] and given instructions to go to the ER should this happen again today.    RECOMMENDATION/PLAN                                                      RECOMMENDED DISPOSITION:  See in 24 hours - 8:30 tomorrow morning with Dr BRANDON Escudero  Will comply with recommendation: Yes    If further questions/concerns or if symptoms do not improve, worsen or new symptoms develop, call your PCP or [phone] to talk with the Resident on call, as soon as possible.    Guideline used: Chest Pain and Heart Rate Problems  Telephone Triage Protocols for Nurses, Fifth " Edition, Julie Briggs Kathleen M. Doege, RN

## 2019-03-19 NOTE — ED PROVIDER NOTES
[facility] EMERGENCY DEPARTMENT ([facility])  March 19, 2019    History     Chief Complaint   Patient presents with     Chest Pain     The history is provided by the patient.     Tom Saini is a 84 year old male with history of aortic insufifficency and hyperlipidemia, who presents to the Emergency Department accompanied by spouse for evaluation of chest pain. Patient complains of acute onset of brief, intermittent, mild, nonradiating left-sided chest pain that he describes as a pulsating sensation.  Nothing made the pain better or worse.  Patient reports that he has had three episodes in total with the first occurring 10:00 am while he was sitting and lasting approximately two to three minutes before subsiding. He had another episode identical to previous right after eating lunch around noon. Patient states that he subsequently contacted his clinic after that who instructed he go to the ED if he has a recurrent episode. Patient states that he had a shorter episode around 3:30 PM prompting his arrival. He denies any palpitations, shortness of breath, light-headedness, dizziness, nausea, diaphoresis, weakness or numbness. He denies previous history of MI or stenting. He has not had any recent stress testing and denies any recent surgeries or hospitalizations. He denies any aggravating or alleviating factors. Of note, patient states that he was in [place] last month. Patient has not attempted any over the counter therapies for management.     Per chart review, patient's last echocardiogram was in 2014 and showed EF of 60-65% and otherwise normal.     PAST MEDICAL HISTORY  Past Medical History:   Diagnosis Date     Aortic insufficiency      Erectile dysfunction      Hyperlipidemia      Low back pain     disc disease s/p laminectomy in past     Macular degeneration      Pancreatic cyst 07/2018    13 mm, needs 6 months follow up MRI     Shingles 2013     PAST SURGICAL HISTORY  Past Surgical History:    Procedure Laterality Date     CATARACT IOL, RT/LT       COMBINED REPAIR PTOSIS WITH BLEPHAROPLASTY BILATERAL Bilateral 2018    Procedure: COMBINED REPAIR PTOSIS WITH BLEPHAROPLASTY BILATERAL;  BILATERAL UPPER EYELIDS BLEPHAROPLASTY AND PTOSIS REPAIR ;  Surgeon: Anuj Good MD;  Location: [facility]     EYE SURGERY       LAMINECTOMY LUMBAR TWO LEVELS      L4-L5     RECESSION RESECTION (REPAIR STRABISMUS)  1949     FAMILY HISTORY  Family History   Problem Relation Age of Onset     Cancer Father         bladder     Cancer Mother         unknown skin cancer     Skin Cancer Mother      Glaucoma No family hx of      Macular Degeneration No family hx of      SOCIAL HISTORY  Social History     Tobacco Use     Smoking status: Former Smoker     Packs/day: 2.00     Years: 2.00     Pack years: 4.00     Last attempt to quit: 1954     Years since quittin.7     Smokeless tobacco: Never Used   Substance Use Topics     Alcohol use: Yes     MEDICATIONS  Current Facility-Administered Medications   Medication     bevacizumab (AVASTIN) intravitreal inj 1.25 mg     Current Outpatient Medications   Medication     aspirin 81 MG tablet     atorvastatin (LIPITOR) 10 MG tablet     Cholecalciferol (VITAMIN D3) 1000 units CAPS     multivitamin (OCUVITE) TABS     order for DME     ALLERGIES  Allergies   Allergen Reactions     Codeine Sulfate Nausea and Vomiting         I have reviewed the Medications, Allergies, Past Medical and Surgical History, and Social History in the Epic system.    Review of Systems   Constitutional: Negative for diaphoresis.   Eyes: Negative for redness and visual disturbance.   Respiratory: Negative for shortness of breath.    Cardiovascular: Positive for chest pain (left-sided). Negative for palpitations.   Gastrointestinal: Negative for nausea.   Endocrine: Negative for polydipsia and polyuria.   Genitourinary: Negative for difficulty urinating.   Musculoskeletal: Negative for back pain.  "  Skin: Negative for color change.   Neurological: Negative for dizziness, weakness and light-headedness.   Hematological: Negative for adenopathy. Does not bruise/bleed easily.   Psychiatric/Behavioral: Negative for agitation.   All other systems reviewed and are negative.      Physical Exam   BP: 111/58  Pulse: 67  Temp: 97.5  F (36.4  C)  Resp: 18  Height: 180.3 cm (5' 11\")  Weight: 79.9 kg (176 lb 3.2 oz)  SpO2: 99 %      Physical Exam   Constitutional: He is oriented to person, place, and time. He appears well-developed and well-nourished. No distress.   HENT:   Head: Normocephalic and atraumatic.   Mouth/Throat: Oropharynx is clear and moist.   Eyes: EOM are normal. No scleral icterus.   Neck: Normal range of motion.   Cardiovascular: Normal rate, normal heart sounds and intact distal pulses.   Pulmonary/Chest: Effort normal and breath sounds normal. No respiratory distress.   Abdominal: Soft. Bowel sounds are normal. There is no tenderness.   Musculoskeletal: Normal range of motion. He exhibits no edema or tenderness.   Neurological: He is alert and oriented to person, place, and time. No cranial nerve deficit. He exhibits normal muscle tone. Coordination normal.   Skin: Skin is warm. No rash noted. He is not diaphoretic.   Psychiatric: He has a normal mood and affect. His behavior is normal. Judgment and thought content normal.   Nursing note and vitals reviewed.      ED Course        Procedures             EKG Interpretation:      Interpreted by Taras Love  Time reviewed: 4:13 PM  Symptoms at time of EKG: Chest pain  Rhythm: normal sinus   Rate: normal  Axis: LAD  Ectopy: none  Conduction: Incomplete RBBB  ST Segments/ T Waves: No ST-T wave changes  Q Waves: none  Comparison to prior: Unchanged from old EKG done on June 9, 2013    Clinical Impression: NSR with no acute ischemia          Critical Care time:  none             Labs Ordered and Resulted from Time of ED Arrival Up to the Time of Departure " from the ED   BASIC METABOLIC PANEL - Abnormal; Notable for the following components:       Result Value    Glucose 105 (*)     All other components within normal limits   CBC WITH PLATELETS DIFFERENTIAL - Abnormal; Notable for the following components:    MCHC 31.2 (*)     All other components within normal limits   TROPONIN I   D DIMER QUANTITATIVE   PERIPHERAL IV CATHETER   CARDIAC CONTINUOUS MONITORING   PULSE OXIMETRY NURSING            Assessments & Plan (with Medical Decision Making)   This is a 84 year old male presenting with intermittent left sided chest pain since this mornig. Differential diagnosis: ACS, GERD, pneumonia, pneumothorax, esophageal spasm, unlikely PE or aortic dissection.    After thorough history and physical examination, patient appears to be in no acute distress. I will obtain laboratory studies, EKG, chest x-ray. He and his wife agree with the plan.    Patient's laboratory studies returned without any evidence of leukocytosis, WBC is normal at 6800. There is no evidence of anemia, hemoglobin is normal at 13.4.  Electrolytes show no evidence of dehydration, creatinine is normal at 0.92. Troponin is undetectable and EKG showed no acute ischemic changes. I reviewed patient's chest x-ray and I read the Radiology report; there is no evidence of pneumonia, pneumothorax, or widened mediastinum.at this time, patient will be admitted to the observation unit for further evaluation and management; he and his wife agree with the plan.    8:33 PM  Patient's d-dimer returned somewhat elevated.  CT angiogram was obtained which I reviewed along with the radiology report; no evidence of pulmonary embolus noted.     This part of the medical record was transcribed by Soy Melo Scribyuliya, from a dictation done by Jermaine Love MD.           I have reviewed the nursing notes.    I have reviewed the findings, diagnosis, plan and need for follow up with the patient.       Medication List      There are  no discharge medications for this visit.         Final diagnoses:   Chest pain, unspecified type     I, Melissa Hughes, am serving as a trained medical scribe to document services personally performed by Jermaine Love MD, based on the provider's statements to me.   I, Jermaine Love MD, was physically present and have reviewed and verified the accuracy of this note documented by Melissa Hughes.    3/19/2019   [facility], [place], EMERGENCY DEPARTMENT     Taras Love MD  03/19/19 1840       Taras Love MD  03/19/19 2034

## 2019-03-19 NOTE — PHARMACY-ADMISSION MEDICATION HISTORY
Admission medication history interview status for the 3/19/2019 admission is complete. See Epic admission navigator for allergy information, pharmacy, prior to admission medications and immunization status.     Medication history interview sources:  Patient, wife at bedside, chart review    Changes made to PTA medication list (reason)  Added: None  Deleted: None  Changed: Ocuvite (updated instructions)    Additional medication history information (including reliability of information, actions taken by pharmacist):  -Patient is excellent historian  -He received last Avastin intravitreal injection on 3/14/19 at [facility]    Prior to Admission medications    Medication Sig Last Dose Taking? Auth Provider   aspirin 81 MG tablet Take 1 tablet by mouth daily. 3/19/2019 at AM Yes Mercedes Riddle MD   atorvastatin (LIPITOR) 10 MG tablet Take 1 tablet (10 mg) by mouth daily 3/18/2019 at PM Yes Angeli Robertson MD   cholecalciferol (VITAMIN D3) 1000 UNITS capsule Take 1 capsule by mouth daily. 3/19/2019 at AM Yes Reported, Patient   multivitamin (OCUVITE) TABS Take 1 tablet by mouth 2 times daily  3/19/2019 at AM Yes Reported, Patient   order for DME Equipment being ordered: Compression stockings, knee high, measured for patient, 20-30mmHg   Angeli Robertson MD       Medication history completed by: Bri Bess, PD4 pharmacy student

## 2019-03-20 ENCOUNTER — APPOINTMENT (OUTPATIENT)
Dept: NUCLEAR MEDICINE | Facility: CLINIC | Age: 84
End: 2019-03-20
Attending: NURSE PRACTITIONER
Payer: MEDICARE

## 2019-03-20 ENCOUNTER — APPOINTMENT (OUTPATIENT)
Dept: CARDIOLOGY | Facility: CLINIC | Age: 84
End: 2019-03-20
Attending: NURSE PRACTITIONER
Payer: MEDICARE

## 2019-03-20 VITALS
OXYGEN SATURATION: 98 % | BODY MASS INDEX: 24.67 KG/M2 | TEMPERATURE: 98 F | RESPIRATION RATE: 16 BRPM | HEART RATE: 63 BPM | DIASTOLIC BLOOD PRESSURE: 74 MMHG | SYSTOLIC BLOOD PRESSURE: 120 MMHG | WEIGHT: 176.2 LBS | HEIGHT: 71 IN

## 2019-03-20 LAB
GLUCOSE BLDC GLUCOMTR-MCNC: 71 MG/DL (ref 70–99)
INTERPRETATION ECG - MUSE: NORMAL
TROPONIN I SERPL-MCNC: <0.015 UG/L (ref 0–0.04)

## 2019-03-20 PROCEDURE — 78452 HT MUSCLE IMAGE SPECT MULT: CPT | Mod: 26 | Performed by: INTERNAL MEDICINE

## 2019-03-20 PROCEDURE — 99217 ZZC OBSERVATION CARE DISCHARGE: CPT | Mod: Z6 | Performed by: NURSE PRACTITIONER

## 2019-03-20 PROCEDURE — 93016 CV STRESS TEST SUPVJ ONLY: CPT | Performed by: INTERNAL MEDICINE

## 2019-03-20 PROCEDURE — G0378 HOSPITAL OBSERVATION PER HR: HCPCS

## 2019-03-20 PROCEDURE — 00000146 ZZHCL STATISTIC GLUCOSE BY METER IP

## 2019-03-20 PROCEDURE — 78452 HT MUSCLE IMAGE SPECT MULT: CPT

## 2019-03-20 PROCEDURE — 93018 CV STRESS TEST I&R ONLY: CPT | Performed by: INTERNAL MEDICINE

## 2019-03-20 PROCEDURE — A9502 TC99M TETROFOSMIN: HCPCS | Performed by: EMERGENCY MEDICINE

## 2019-03-20 PROCEDURE — A9270 NON-COVERED ITEM OR SERVICE: HCPCS | Mod: GY | Performed by: NURSE PRACTITIONER

## 2019-03-20 PROCEDURE — 34300033 ZZH RX 343: Performed by: EMERGENCY MEDICINE

## 2019-03-20 PROCEDURE — 93017 CV STRESS TEST TRACING ONLY: CPT

## 2019-03-20 PROCEDURE — 25000132 ZZH RX MED GY IP 250 OP 250 PS 637: Mod: GY | Performed by: NURSE PRACTITIONER

## 2019-03-20 RX ADMIN — ASPIRIN 81 MG: 81 TABLET, COATED ORAL at 08:15

## 2019-03-20 RX ADMIN — TETROFOSMIN 10.5 MCI.: 1.38 INJECTION, POWDER, LYOPHILIZED, FOR SOLUTION INTRAVENOUS at 16:10

## 2019-03-20 RX ADMIN — TETROFOSMIN 41 MCI.: 1.38 INJECTION, POWDER, LYOPHILIZED, FOR SOLUTION INTRAVENOUS at 16:11

## 2019-03-20 NOTE — H&P
ED OBSERVATION HISTORY & PHYSICAL    Admission Date: 03/19/2019    REASON FOR ADMISSION:   Chief Complaint   Patient presents with     Chest Pain         HPI:    Tom Saini is a 84 year old male with history of aortic insufifficency and hyperlipidemia who presented tot he ED with chest pain.     Tom reports 4 episodes of brief chest pain today. Three prior to presentation to the ED and one since arrival. He currently denies pain. He He describes the pain as acute onset of brief, intermittent, mild, nonradiating left-sided chest pain. States this a pulsating sensation or pocking an open wound.  Nothing made the pain better or worse. The first episode occurred at 10:00 am while he was sitting and lasted ~ 2-3 minutes before subsiding. He had another episode identical to previous right after eating lunch around noon. He then contacted his clinic who instructed him to report to the ED if he had a recurrent episode. He then had an episode ~ 1530 PM prompting his arrival. He denies any palpitations, shortness of breath, light-headedness, dizziness, nausea, diaphoresis, weakness or numbness. He denies previous history of MI or stenting. He has not had any recent stress testing and denies any recent surgeries or hospitalizations. He denies any aggravating or alleviating factors. Of note, patient was in [place] last month. He returned at the end of February. Denies acute lower extremity pain or edema. Patient has not attempted any over the counter therapies for management.      Per chart review, patient's last echocardiogram was in 2014 and showed EF of 60-65% and otherwise normal.     In the ED his troponin was negative x 2, EKG without acute changes. No leukocytosis or anemia. Chest x-ray was clear. D. Dimer was elevated and CT showed was negative for PE. Did show pulmonary nodules.     On admission to the observation unit the patient was stable. Cal chest pain/pressure. Feels back to baseline. He had chronic  BLE edema, denies acute changes. No calf redness or tenderness.     Of note, he fell on the ice ~ 1 week ago. Notes some right hip pain and right thumb pain. Able to walk without difficulty. Primarily notes the pain when he he tries to go to sleep.     ROS:    CONSTITUTIONAL: Generally feels well. Denies fever, chills  SKIN: Denies rash  EYES: Denies visual changes  EARS/NOSE/THROAT: Denies sinus pressure/drainage, PND, nasal congestion  RESPIRATORY: Denies dyspnea at rest or with activity, cough, or hemoptysis.  CARDIOVASCULAR: Denies palpitations,  orthopnea, edema or open areas on extremities.  GASTROINTESTINAL: Good appetite and PO intake. Denies dysphagia, heartburn, nausea, vomiting, abdominal pain, constipation, or diarrhea.  MUSCULOSKELTAL: Denies focal muscle/joint pain and weakness  NEUROLOGIC: Denies headaches, numbness or tingling of hands and feet, confusion, memory changes, lightheadedness/dizziness or difficulties with balance.  PSYCHIATRIC: Denies  change in mood.  HEME/LYMPH: Denies active bleeding, swollen nodes  VASCULAR ACCESS: Denies pain, redness, or discharge.    ROS negative other than the symptoms noted above.    History:    Past Medical History:   Diagnosis Date     Aortic insufficiency      Erectile dysfunction      Hyperlipidemia      Low back pain     disc disease s/p laminectomy in past     Macular degeneration      Pancreatic cyst 07/2018    13 mm, needs 6 months follow up MRI     Shingles 2013       Past Surgical History:   Procedure Laterality Date     CATARACT IOL, RT/LT  2001     COMBINED REPAIR PTOSIS WITH BLEPHAROPLASTY BILATERAL Bilateral 4/6/2018    Procedure: COMBINED REPAIR PTOSIS WITH BLEPHAROPLASTY BILATERAL;  BILATERAL UPPER EYELIDS BLEPHAROPLASTY AND PTOSIS REPAIR ;  Surgeon: Anuj Good MD;  Location: [facility]     EYE SURGERY       LAMINECTOMY LUMBAR TWO LEVELS  1993    L4-L5     RECESSION RESECTION (REPAIR STRABISMUS)  1949       Family History   Problem Relation  Age of Onset     Cancer Father         bladder     Cancer Mother         unknown skin cancer     Skin Cancer Mother      Glaucoma No family hx of      Macular Degeneration No family hx of        Social History     Socioeconomic History     Marital status:      Spouse name: Not on file     Number of children: Not on file     Years of education: Not on file     Highest education level: Not on file   Occupational History     Not on file   Social Needs     Financial resource strain: Not on file     Food insecurity:     Worry: Not on file     Inability: Not on file     Transportation needs:     Medical: Not on file     Non-medical: Not on file   Tobacco Use     Smoking status: Former Smoker     Packs/day: 2.00     Years: 2.00     Pack years: 4.00     Last attempt to quit: 1954     Years since quittin.7     Smokeless tobacco: Never Used   Substance and Sexual Activity     Alcohol use: Yes     Drug use: No     Sexual activity: Not on file   Lifestyle     Physical activity:     Days per week: Not on file     Minutes per session: Not on file     Stress: Not on file   Relationships     Social connections:     Talks on phone: Not on file     Gets together: Not on file     Attends Yarsani service: Not on file     Active member of club or organization: Not on file     Attends meetings of clubs or organizations: Not on file     Relationship status: Not on file     Intimate partner violence:     Fear of current or ex partner: Not on file     Emotionally abused: Not on file     Physically abused: Not on file     Forced sexual activity: Not on file   Other Topics Concern     Parent/sibling w/ CABG, MI or angioplasty before 65F 55M? Not Asked   Social History Narrative    Retired     [employer]  and traveler         Current Facility-Administered Medications on File Prior to Encounter:  bevacizumab (AVASTIN) intravitreal inj 1.25 mg     Current Outpatient Medications on File  "Prior to Encounter:  aspirin 81 MG tablet Take 1 tablet by mouth daily.   atorvastatin (LIPITOR) 10 MG tablet Take 1 tablet (10 mg) by mouth daily   Cholecalciferol (VITAMIN D3) 1000 units CAPS Take 1 capsule by mouth daily.   multivitamin (OCUVITE) TABS Take 1 tablet by mouth 2 times daily    order for DME Equipment being ordered: Compression stockings, knee high, measured for patient, 20-30mmHg       Exam:  /69   Pulse 63   Temp 97.5  F (36.4  C) (Oral)   Resp 18   Ht 1.803 m (5' 11\")   Wt 79.9 kg (176 lb 3.2 oz)   SpO2 97%   BMI 24.57 kg/m        All vital signs were reviewed.  GENERAL APPEARENCE:  A/O x4. NAD.  SKIN: Clean, dry, and intact   HEENT: NCAT w/out masses, lesions, or abnormalities. Sclera anicteric, PERRLA, EOMI.  Oral mucosa pink and moist without erythema, exudate, lesions, ulcerations, or thrush. Teeth and gums normal.    NECK: Supple, no masses. No jugular venous distention.   CARDIOVASCULAR: S1, S2 RRR. No murmurs, rubs, or gallops.   RESPIRATORY: Respiratory effort WNL. CTA  bilaterally without crackles/rales/wheeze   GI: Active BS in all 4 quadrants. Abdomen soft and non-tender. No masses or hepatosplenomegaly.  : Deferred  MUSCULOSKELETAL:  Strength 5/5 in major muscle groups of bilateral UE and LE.  No TTP to bilateral hips. No TTP to thumb. Extremities normal, no gross deformities noted, non-tender to palpation.   PV: 2+ bilateral radial and pedal pulses. Sock line edema bilaterally. No edema noted.   NEURO: CN II-XII grossly intact. Speech normal. Appropriate throughout interview.   HEME/LYMPH: No visible bleeding.  PSYCHIATRIC: Mentation and affect appear normal  VASCULAR ACCESS: CDI without erythema or discharge. Non-tender.    Data:    Results for orders placed or performed during the hospital encounter of 03/19/19   XR Chest 2 Views    Narrative    Exam: XR CHEST 2 VW, 3/19/2019 4:41 PM    Indication: chest pain    Comparison: 6/23/2014    PA and lateral upright view of " the chest. The cardiomediastinal  silhouette and upper abdomen are unremarkable.    There is no pleural effusion. No pneumothorax. No focal airspace  opacities. Mild elevation of the right hemidiaphragm, unchanged.      Impression    IMPRESSION: No acute cardiopulmonary findings.     I have personally reviewed the examination and initial interpretation  and I agree with the findings.    DARIUS PRICE MD   CT Chest Pulmonary Embolism w Contrast    Narrative    CT CHEST PULMONARY EMBOLISM W CONTRAST, 3/19/2019 8:11 PM    History: elevated D-dimer    Comparison: Correlations made with same-day chest x-ray.    Technique: Helical acquisition of CT images of the chest from the lung  apices to the kidneys were acquired after the administration of  intravenous contrast according to the CT pulmonary angiogram protocol.  Axial images were reconstructed in 1 and 3 mm slice thickness. Coronal  reconstructions performed. Three-dimensional (3D) post-processed  angiographic images were reconstructed, archived to PACS and used in  the interpretation of this study.    Contrast dose: iopamidol (ISOVUE-370) solution 62 mL     Findings:   The contrast bolus is adnexal.  There is no pulmonary embolism. No  evidence of infection. Multiple punctate pulmonary nodules the largest  in the right upper lobe measuring 2 mm (series 8 image 163). Bibasilar  atelectasis.  No pleural effusion or pneumothorax.    Mediastinum: Normal thyroid. Normal configuration of the great  vessels. Diffuse atherosclerotic changes of the thoracic aorta and  coronary vasculature. Central tracheobronchial tree is patent.  Scattered calcified mediastinal lymph nodes. Normal-sized heart, aorta  and pulmonary artery.    Upper abdomen: The appearance of the upper abdomen is unremarkable.    Bones and soft tissues: Atherosclerotic changes of the thoracolumbar  spine.       Impression    Impression:  1. No pulmonary embolism identified.  2. Several punctate solid  pulmonary nodules. Per 2017 Fleischner  criteria in the low risk patients no follow-up is necessary, and in  the high risk patient optional 12 month CT follow-up.    I have personally reviewed the examination and initial interpretation  and I agree with the findings.    DARIUS PRICE MD   Basic metabolic panel   Result Value Ref Range    Sodium 142 133 - 144 mmol/L    Potassium 4.4 3.4 - 5.3 mmol/L    Chloride 107 94 - 109 mmol/L    Carbon Dioxide 27 20 - 32 mmol/L    Anion Gap 7 3 - 14 mmol/L    Glucose 105 (H) 70 - 99 mg/dL    Urea Nitrogen 19 7 - 30 mg/dL    Creatinine 0.92 0.66 - 1.25 mg/dL    GFR Estimate 77 >60 mL/min/[1.73_m2]    GFR Estimate If Black 89 >60 mL/min/[1.73_m2]    Calcium 8.7 8.5 - 10.1 mg/dL   Troponin I   Result Value Ref Range    Troponin I ES <0.015 0.000 - 0.045 ug/L   CBC with platelets differential   Result Value Ref Range    WBC 6.8 4.0 - 11.0 10e9/L    RBC Count 4.40 4.4 - 5.9 10e12/L    Hemoglobin 13.4 13.3 - 17.7 g/dL    Hematocrit 43.0 40.0 - 53.0 %    MCV 98 78 - 100 fl    MCH 30.5 26.5 - 33.0 pg    MCHC 31.2 (L) 31.5 - 36.5 g/dL    RDW 13.3 10.0 - 15.0 %    Platelet Count 220 150 - 450 10e9/L    Diff Method Automated Method     % Neutrophils 55.3 %    % Lymphocytes 28.7 %    % Monocytes 9.7 %    % Eosinophils 4.4 %    % Basophils 1.6 %    % Immature Granulocytes 0.3 %    Nucleated RBCs 0 0 /100    Absolute Neutrophil 3.8 1.6 - 8.3 10e9/L    Absolute Lymphocytes 2.0 0.8 - 5.3 10e9/L    Absolute Monocytes 0.7 0.0 - 1.3 10e9/L    Absolute Eosinophils 0.3 0.0 - 0.7 10e9/L    Absolute Basophils 0.1 0.0 - 0.2 10e9/L    Abs Immature Granulocytes 0.0 0 - 0.4 10e9/L    Absolute Nucleated RBC 0.0    D dimer quantitative   Result Value Ref Range    D Dimer 0.8 (H) 0.0 - 0.50 ug/ml FEU   Troponin I   Result Value Ref Range    Troponin I ES <0.015 0.000 - 0.045 ug/L   EKG 12 lead   Result Value Ref Range    Interpretation ECG Click View Image link to view waveform and result              EKG  Interpretation:      Interpreted by Taras Love  Time reviewed: 4:13 PM  Symptoms at time of EKG: Chest pain  Rhythm: normal sinus   Rate: normal  Axis: LAD  Ectopy: none  Conduction: Incomplete RBBB  ST Segments/ T Waves: No ST-T wave changes  Q Waves: none  Comparison to prior: Unchanged from old EKG done on June 9, 2013     Clinical Impression: NSR with no acute ischemia    Assessment/Plan:  Tom Saini is a 84 year old male with history of aortic insufifficency and hyperlipidemia who presented tot he ED with chest pain.     1. Chest Pain: Now resolved. 4 episodes of brief pain. Denies radiation. Chest x-ray, EKG, and Troponin negative.  D. Dimer elevated, CT negative for PE. Patient believes he can exercise. Discussed with Cardiology who recommended Exercise Lexiscan in the am. Risk factors: age, gender, HLD.   -Brent to observation  -Trend troponin x 2 more  -TELE  -EKG PRN Pain  -Exercise Lexiscan in the am.  -ASA   -NPO at 0000    2. HLD: Resume hope lipitor    3. Fall: Right hip and thumb pain.  Full ROM. Able to ambulate without difficulty. Right hip pain when trying to sleep only.   -x-ray right hand, Pelvis, and right hip     FEN:  -Cardiac Diet, no caffeine    -Monitor BMP     Prophy:  -No VTE prophy as patient is up ad magdalena and anticipate short observation stay   -Encourage ambulation as tolerated     Consults: Cardiology PRN     CODE STATUS:  FULL CODE       DISPOSITION: Pending Cardiac work-up, anticipate back to previous living situation.       Abiola Day APRN, CNP  Nurse Practitioner   Emergency Department Observation Unit

## 2019-03-20 NOTE — PLAN OF CARE
Outpatient/Observation goals to be met before discharge home:    - Serial troponins and stress test complete: NO, stress test in AM    - Seen and cleared by consultant if applicable: NA    - Adequate pain control on oral analgesia: YES    - Vital signs normal or at patient baseline: YES    - Safe disposition plan has been identified: PENDING

## 2019-03-20 NOTE — PROGRESS NOTES
"S: Patient admitted for 4 episodes of some chest pain.  Symptoms now resolved at this point.  Patient evaluated CT scan of the chest was negative for PE troponins been negative etc.  Patient currently feeling fine at this point.  O:/74 (BP Location: Left arm)   Pulse 63   Temp 98  F (36.7  C) (Oral)   Resp 16   Ht 1.803 m (5' 11\")   Wt 79.9 kg (176 lb 3.2 oz)   SpO2 98%   BMI 24.57 kg/m    In general patient is alert and oriented he is eating breakfast currently at this point.  In no distress symptom-free lungs are clear chest nontender cardiac exam regular rhythm.  Abdomen nontender extremities negative  Results for orders placed or performed during the hospital encounter of 03/19/19   XR Chest 2 Views    Narrative    Exam: XR CHEST 2 VW, 3/19/2019 4:41 PM    Indication: chest pain    Comparison: 6/23/2014    PA and lateral upright view of the chest. The cardiomediastinal  silhouette and upper abdomen are unremarkable.    There is no pleural effusion. No pneumothorax. No focal airspace  opacities. Mild elevation of the right hemidiaphragm, unchanged.      Impression    IMPRESSION: No acute cardiopulmonary findings.     I have personally reviewed the examination and initial interpretation  and I agree with the findings.    DARIUS PRICE MD   CT Chest Pulmonary Embolism w Contrast    Narrative    CT CHEST PULMONARY EMBOLISM W CONTRAST, 3/19/2019 8:11 PM    History: elevated D-dimer    Comparison: Correlations made with same-day chest x-ray.    Technique: Helical acquisition of CT images of the chest from the lung  apices to the kidneys were acquired after the administration of  intravenous contrast according to the CT pulmonary angiogram protocol.  Axial images were reconstructed in 1 and 3 mm slice thickness. Coronal  reconstructions performed. Three-dimensional (3D) post-processed  angiographic images were reconstructed, archived to PACS and used in  the interpretation of this study.    Contrast dose: " iopamidol (ISOVUE-370) solution 62 mL     Findings:   The contrast bolus is adnexal.  There is no pulmonary embolism. No  evidence of infection. Multiple punctate pulmonary nodules the largest  in the right upper lobe measuring 2 mm (series 8 image 163). Bibasilar  atelectasis.  No pleural effusion or pneumothorax.    Mediastinum: Normal thyroid. Normal configuration of the great  vessels. Diffuse atherosclerotic changes of the thoracic aorta and  coronary vasculature. Central tracheobronchial tree is patent.  Scattered calcified mediastinal lymph nodes. Normal-sized heart, aorta  and pulmonary artery.    Upper abdomen: The appearance of the upper abdomen is unremarkable.    Bones and soft tissues: Atherosclerotic changes of the thoracolumbar  spine.       Impression    Impression:  1. No pulmonary embolism identified.  2. Several punctate solid pulmonary nodules. Per 2017 Fleischner  criteria in the low risk patients no follow-up is necessary, and in  the high risk patient optional 12 month CT follow-up.    I have personally reviewed the examination and initial interpretation  and I agree with the findings.    DARIUS PRICE MD   NM Exercise stress test (nuc card)    Addendum: 3/20/2019    Correction.  Indication for this study was Chest Pain.    SHAHZAD KELLY MD      Narrative    INDICATION:  Pre-op evaluation.    PROTOCOL:    Rest and stress myocardial perfusion imaging was performed using  Tc-99m tetrafosmin intravenously. Pharmacological stress was performed  with 0.4 mg of regadenoson intravenously. The EKG showed normal sinus  rhythm at rest. No significant abnormalities were noted with infusion  of regadenoson.    FINDINGS:  1.  Overall quality of the study: Diagnostic.   2.  Left ventricular cavity is Normal on the rest and stress studies.  3.  SPECT images demonstrate uniform radiotracer uptake of the  myocardium on both stress and rest images.  4.  Left ventricular ejection fraction is 71%. Left  ventricular  end-diastolic volume is 64 mL. End-systolic volume is 24 mL      Impression    IMPRESSION:  1.  Normal  myocardial SPECT study with a summed stress score of   1 .  No ischemia or infarct identified. Normal LV function.    SHAHZAD KELLY MD   Basic metabolic panel   Result Value Ref Range    Sodium 142 133 - 144 mmol/L    Potassium 4.4 3.4 - 5.3 mmol/L    Chloride 107 94 - 109 mmol/L    Carbon Dioxide 27 20 - 32 mmol/L    Anion Gap 7 3 - 14 mmol/L    Glucose 105 (H) 70 - 99 mg/dL    Urea Nitrogen 19 7 - 30 mg/dL    Creatinine 0.92 0.66 - 1.25 mg/dL    GFR Estimate 77 >60 mL/min/[1.73_m2]    GFR Estimate If Black 89 >60 mL/min/[1.73_m2]    Calcium 8.7 8.5 - 10.1 mg/dL   Troponin I   Result Value Ref Range    Troponin I ES <0.015 0.000 - 0.045 ug/L   CBC with platelets differential   Result Value Ref Range    WBC 6.8 4.0 - 11.0 10e9/L    RBC Count 4.40 4.4 - 5.9 10e12/L    Hemoglobin 13.4 13.3 - 17.7 g/dL    Hematocrit 43.0 40.0 - 53.0 %    MCV 98 78 - 100 fl    MCH 30.5 26.5 - 33.0 pg    MCHC 31.2 (L) 31.5 - 36.5 g/dL    RDW 13.3 10.0 - 15.0 %    Platelet Count 220 150 - 450 10e9/L    Diff Method Automated Method     % Neutrophils 55.3 %    % Lymphocytes 28.7 %    % Monocytes 9.7 %    % Eosinophils 4.4 %    % Basophils 1.6 %    % Immature Granulocytes 0.3 %    Nucleated RBCs 0 0 /100    Absolute Neutrophil 3.8 1.6 - 8.3 10e9/L    Absolute Lymphocytes 2.0 0.8 - 5.3 10e9/L    Absolute Monocytes 0.7 0.0 - 1.3 10e9/L    Absolute Eosinophils 0.3 0.0 - 0.7 10e9/L    Absolute Basophils 0.1 0.0 - 0.2 10e9/L    Abs Immature Granulocytes 0.0 0 - 0.4 10e9/L    Absolute Nucleated RBC 0.0    D dimer quantitative   Result Value Ref Range    D Dimer 0.8 (H) 0.0 - 0.50 ug/ml FEU   Troponin I   Result Value Ref Range    Troponin I ES <0.015 0.000 - 0.045 ug/L   Troponin I - Now then in 4 hours x 2   Result Value Ref Range    Troponin I ES <0.015 0.000 - 0.045 ug/L   Glucose by meter   Result Value Ref Range    Glucose  71 70 - 99 mg/dL   EKG 12 lead   Result Value Ref Range    Interpretation ECG Click View Image link to view waveform and result        A: Chest pain.  Currently awaiting stress Lexiscan.  P: Pending results will make disposition if normal will treat his atypical pain it may be more chest wall type etiology also.

## 2019-03-20 NOTE — PROGRESS NOTES
"/74 (BP Location: Left arm)   Pulse 63   Temp 98  F (36.7  C) (Oral)   Resp 16   Ht 1.803 m (5' 11\")   Wt 79.9 kg (176 lb 3.2 oz)   SpO2 98%   BMI 24.57 kg/m    Patient's condition and vital Signs are stable/WNL.  Discharge instructions reviewed with patient and questions answered. Patient verbalizes understanding. IV removed. Pain under control.  Patient is tolerating regular diet and denies any N/V. Patient to be discharged to home via spouse. Patient has all belongings.      "

## 2019-03-20 NOTE — PROGRESS NOTES
Outpatient/Observation goals to be met before discharge home:     - Serial troponins and stress test complete: No, troponin negative x 2. Stress test scheduled for 1330.   - Seen and cleared by consultant if applicable: No    - Adequate pain control on oral analgesia: YES    - Vital signs normal or at patient baseline: YES    - Safe disposition plan has been identified: PENDING

## 2019-03-20 NOTE — DISCHARGE SUMMARY
[facility], [place]  Hospitalist Discharge Summary       Date of Admission:  3/19/2019  Date of Discharge:  3/20/2019  Discharging Provider: HEYDI Lock CNP  Discharge Team: Emergency Department Observation Unit    Discharge Diagnoses   Chest pain    Follow-ups Needed After Discharge   Follow-up Appointments     Adult [facility] Follow-up and recommended labs and tests      Follow up with PCP in one week, return to the ER if having chest pain,   syncope, shortness of breath or fever greater than 101F.     Appointments on [place] and/or [facility] (with [facility] or [facility]   provider or service). Call [phone] if you haven't heard regarding   these appointments within 7 days of discharge.         {Additional follow-up instructions/to-do's for PCP    : Hospital follow up.     Unresulted Labs Ordered in the Past 30 Days of this Admission     No orders found for last 61 day(s).      These results will be followed up by PCP    Discharge Disposition   Discharged to home  Condition at discharge: Stable    Hospital Course   Assessment/Plan:  Tom Saini is a 84 year old male with history of aortic insufifficency and hyperlipidemia who presented tot he ED with chest pain.      1. Chest Pain: Now resolved. 4 episodes of brief pain. Denies radiation. Chest x-ray, EKG, and Troponin negative x3.   D. Dimer elevated, CT negative for PE. Showed pulmonary nodules. Patient recommended to follow up with PCP.  Patient believes he can exercise. Discussed with Cardiology who recommended Exercise Lexiscan which showed  Normal  myocardial SPECT study with a summed stress score of 1 .  No ischemia or infarct identified. Normal LV function     2. HLD: Resume hope lipitor     3. Fall: Right hip and thumb pain.  Full ROM. Able to ambulate without difficulty. Patient declined xrays.     Consultations This Hospital Stay   MEDICATION HISTORY IP PHARMACY CONSULT    Code Status   No Order    Time Spent  on this Encounter   I, Bri Carreon, personally saw the patient today and spent less than or equal to 30 minutes discharging this patient.       HEYDI Lock [facility], [place]  ______________________________________________________________________    Physical Exam   Vital Signs: Temp: 98  F (36.7  C) Temp src: Oral BP: 120/74 Pulse: 63 Heart Rate: 57 Resp: 16 SpO2: 98 % O2 Device: None (Room air)    Weight: 176 lbs 3.2 oz  GENERAL APPEARENCE:  A/O x4. NAD.  SKIN: Clean, dry, and intact   HEENT: NCAT w/out masses, lesions, or abnormalities. Sclera anicteric, PERRLA, EOMI.  Oral mucosa pink and moist without erythema, exudate, lesions, ulcerations, or thrush. Teeth and gums normal.    NECK: Supple, no masses. No jugular venous distention.   CARDIOVASCULAR: S1, S2 RRR. No murmurs, rubs, or gallops.   RESPIRATORY: Respiratory effort WNL. CTA  bilaterally without crackles/rales/wheeze   GI: Active BS in all 4 quadrants. Abdomen soft and non-tender. No masses or hepatosplenomegaly.  : Deferred  MUSCULOSKELETAL:  Strength 5/5 in major muscle groups of bilateral UE and LE.  No TTP to bilateral hips. No TTP to thumb. Extremities normal, no gross deformities noted, non-tender to palpation.   PV: 2+ bilateral radial and pedal pulses. Sock line edema bilaterally. No edema noted.   NEURO: CN II-XII grossly intact. Speech normal. Appropriate throughout interview.   HEME/LYMPH: No visible bleeding.           Primary Care Physician   Angeli Robertson    Immunizations       Discharge Orders      Reason for your hospital stay    Chest pain     Adult [facility] Follow-up and recommended labs and tests    Follow up with PCP in one week, return to the ER if having chest pain, syncope, shortness of breath or fever greater than 101F.     Appointments on [place] and/or [facility] (with [facility] or [facility] provider or service). Call [phone] if you haven't heard regarding these  appointments within 7 days of discharge.     Activity    Your activity upon discharge: activity as tolerated     When to contact your care team    Please go to your nearest emergency room If you were to have a change in the type of chest pain i.e more severe, lasting longer or radiating to your shoulder, arm, neck, jaw or back, shortness of breath or increased pain with breathing, coughing up blood, feel dizzy or lightheaded, or notice swelling in one leg.     Discharge Instructions    The chest pain you came into the emergency room for does not appear to be cardiac chest pain. Your heart enzymes were normal which tells us there was no damage to the heart muscle. Your stress test was normal. Your CT was negative for blood clots in the lungs. Recommend follow up with your primary care provider for further follow up. Follow up with your primary care doctor in one week for hospital follow up in one week.     Diet    Follow this diet upon discharge: Regular       Significant Results and Procedures   Results for orders placed or performed during the hospital encounter of 03/19/19   XR Chest 2 Views    Narrative    Exam: XR CHEST 2 VW, 3/19/2019 4:41 PM    Indication: chest pain    Comparison: 6/23/2014    PA and lateral upright view of the chest. The cardiomediastinal  silhouette and upper abdomen are unremarkable.    There is no pleural effusion. No pneumothorax. No focal airspace  opacities. Mild elevation of the right hemidiaphragm, unchanged.      Impression    IMPRESSION: No acute cardiopulmonary findings.     I have personally reviewed the examination and initial interpretation  and I agree with the findings.    DARIUS PRICE MD   CT Chest Pulmonary Embolism w Contrast    Narrative    CT CHEST PULMONARY EMBOLISM W CONTRAST, 3/19/2019 8:11 PM    History: elevated D-dimer    Comparison: Correlations made with same-day chest x-ray.    Technique: Helical acquisition of CT images of the chest from the lung  apices to  the kidneys were acquired after the administration of  intravenous contrast according to the CT pulmonary angiogram protocol.  Axial images were reconstructed in 1 and 3 mm slice thickness. Coronal  reconstructions performed. Three-dimensional (3D) post-processed  angiographic images were reconstructed, archived to PACS and used in  the interpretation of this study.    Contrast dose: iopamidol (ISOVUE-370) solution 62 mL     Findings:   The contrast bolus is adnexal.  There is no pulmonary embolism. No  evidence of infection. Multiple punctate pulmonary nodules the largest  in the right upper lobe measuring 2 mm (series 8 image 163). Bibasilar  atelectasis.  No pleural effusion or pneumothorax.    Mediastinum: Normal thyroid. Normal configuration of the great  vessels. Diffuse atherosclerotic changes of the thoracic aorta and  coronary vasculature. Central tracheobronchial tree is patent.  Scattered calcified mediastinal lymph nodes. Normal-sized heart, aorta  and pulmonary artery.    Upper abdomen: The appearance of the upper abdomen is unremarkable.    Bones and soft tissues: Atherosclerotic changes of the thoracolumbar  spine.       Impression    Impression:  1. No pulmonary embolism identified.  2. Several punctate solid pulmonary nodules. Per 2017 Fleischner  criteria in the low risk patients no follow-up is necessary, and in  the high risk patient optional 12 month CT follow-up.    I have personally reviewed the examination and initial interpretation  and I agree with the findings.    DARIUS PRICE MD   NM Exercise stress test (nuc card)    Addendum: 3/20/2019    Correction.  Indication for this study was Chest Pain.    SHAHZAD KELLY MD      Narrative    INDICATION:  Pre-op evaluation.    PROTOCOL:    Rest and stress myocardial perfusion imaging was performed using  Tc-99m tetrafosmin intravenously. Pharmacological stress was performed  with 0.4 mg of regadenoson intravenously. The EKG showed normal  sinus  rhythm at rest. No significant abnormalities were noted with infusion  of regadenoson.    FINDINGS:  1.  Overall quality of the study: Diagnostic.   2.  Left ventricular cavity is Normal on the rest and stress studies.  3.  SPECT images demonstrate uniform radiotracer uptake of the  myocardium on both stress and rest images.  4.  Left ventricular ejection fraction is 71%. Left ventricular  end-diastolic volume is 64 mL. End-systolic volume is 24 mL      Impression    IMPRESSION:  1.  Normal  myocardial SPECT study with a summed stress score of   1 .  No ischemia or infarct identified. Normal LV function.    SHAHZAD KELLY MD       Discharge Medications   Current Discharge Medication List      CONTINUE these medications which have NOT CHANGED    Details   aspirin 81 MG tablet Take 1 tablet by mouth daily.  Refills: 3      atorvastatin (LIPITOR) 10 MG tablet Take 1 tablet (10 mg) by mouth daily  Qty: 90 tablet, Refills: 3    Associated Diagnoses: Hyperlipidemia LDL goal <100      Cholecalciferol (VITAMIN D3) 1000 units CAPS Take 1 capsule by mouth daily.      multivitamin (OCUVITE) TABS Take 1 tablet by mouth 2 times daily       order for DME Equipment being ordered: Compression stockings, knee high, measured for patient, 20-30mmHg  Qty: 3 Units, Refills: 3    Associated Diagnoses: Venous (peripheral) insufficiency           Allergies   Allergies   Allergen Reactions     Codeine Sulfate Nausea and Vomiting

## 2019-03-20 NOTE — PLAN OF CARE
Outpatient/Observation goals to be met before discharge home:     - Serial troponins and stress test complete: NO, stress test scheduled for 1330  - Seen and cleared by consultant if applicable: No    - Adequate pain control on oral analgesia: YES    - Vital signs normal or at patient baseline: YES    - Safe disposition plan has been identified: PENDING

## 2019-03-22 ENCOUNTER — OFFICE VISIT (OUTPATIENT)
Dept: INTERNAL MEDICINE | Facility: CLINIC | Age: 84
End: 2019-03-22
Payer: MEDICARE

## 2019-03-22 VITALS
OXYGEN SATURATION: 95 % | DIASTOLIC BLOOD PRESSURE: 61 MMHG | HEIGHT: 71 IN | HEART RATE: 81 BPM | BODY MASS INDEX: 24.36 KG/M2 | WEIGHT: 174 LBS | SYSTOLIC BLOOD PRESSURE: 105 MMHG

## 2019-03-22 DIAGNOSIS — Z09 HOSPITAL DISCHARGE FOLLOW-UP: Primary | ICD-10-CM

## 2019-03-22 DIAGNOSIS — R91.8 PULMONARY NODULES: ICD-10-CM

## 2019-03-22 ASSESSMENT — MIFFLIN-ST. JEOR: SCORE: 1506.13

## 2019-03-22 ASSESSMENT — PAIN SCALES - GENERAL: PAINLEVEL: NO PAIN (0)

## 2019-03-22 NOTE — PATIENT INSTRUCTIONS
[facility] Medication Refill Request Information:  * Please contact your pharmacy regarding ANY request for medication refills.  ** [facility] Prescription Fax = [phone]  * Please allow 3 business days for routine medication refills.  * Please allow 5 business days for controlled substance medication refills.     [facility] Test Result notification information:  *You will be notified with in 7-10 days of your appointment day regarding the results of your test.  If you are on MyChart you will be notified as soon as the provider has reviewed the results and signed off on them.    [facility]: [phone]

## 2019-03-22 NOTE — PROGRESS NOTES
[facility]   Marlene MCWILLIAMSJorge Levin, HEYDI CNP  03/22/2019      Chief Complaint:   Hospital follow-up      History of Present Illness:   Tom Saini is a 84 year old male with a history of aortic insufficiency and hyperlipidemia who presents for follow-up after hospitalization. The patient was recently hospitalized from 3/19-3/20 for evaluation of chest pain. Patient underwent chest XR, Troponin x 3, and myocardial SPECT study each without any remarkable findings. He had an elevated d-dimer with subsequent CT that was negative for PE, though did show pulmonary nodules. He was discharged with no new medications.     Today, the patient reports that he has been well since hospitalization. He has had no chest pain since that time and denies any appetite change or worsening leg swelling. He has no other concerns.     Review of Systems:   Pertinent items are noted in HPI, remainder of complete ROS is negative.      Active Medications:     Current Outpatient Medications:      aspirin 81 MG tablet, Take 1 tablet by mouth daily., Disp: , Rfl: 3     atorvastatin (LIPITOR) 10 MG tablet, Take 1 tablet (10 mg) by mouth daily, Disp: 90 tablet, Rfl: 3     Cholecalciferol (VITAMIN D3) 1000 units CAPS, Take 1 capsule by mouth daily., Disp: , Rfl:      multivitamin (OCUVITE) TABS, Take 1 tablet by mouth 2 times daily , Disp: , Rfl:      order for DME, Equipment being ordered: Compression stockings, knee high, measured for patient, 20-30mmHg, Disp: 3 Units, Rfl: 3    Current Facility-Administered Medications:      bevacizumab (AVASTIN) intravitreal inj 1.25 mg, 1.25 mg, Intravitreal, Q28 Days, Brigido Laura MD, 1.25 mg at 03/14/19 8398      Allergies:   Codeine sulfate       Past Medical History:  Aortic insufficiency   Erectile dysfunction   Hyperlipidemia   Macular degeneration   Pancreatic cyst  Shingles   Eczema   Dermatofibroma   Seborrheic keratoses      Past Surgical History:  Cataract   Repair ptosis  "with blepharoplasty bilateral   Eye surgery   Laminectomy lumbar two levels L4-5   Recession resection     Family History:   Father - bladder cancer   Mother - skin cancer       Social History:   Presents alone    Tobacco use: Former smoker, quit 1954   Alcohol use: Yes   PCP: Angeli Robertson       Physical Exam:   /61 (BP Location: Right arm, Patient Position: Sitting, Cuff Size: Adult Regular)   Pulse 81   Ht 1.803 m (5' 10.98\")   Wt 78.9 kg (174 lb)   SpO2 95%   BMI 24.28 kg/m     Constitutional: Alert, oriented, pleasant, no acute distress  Head: Normocephalic, atraumatic  Eyes: Extra-ocular movements intact, pupils equally round and reactive bilaterally, no scleral icterus  Ears: tympanic membranes pearly gray with positive light reflex  ENT: Oropharynx clear, moist mucus membranes, good dentition  Neck: Supple, no lymphadenopathy, no thyromegaly  Cardiovascular: Regular rate and rhythm, no murmurs, rubs or gallops  Respiratory: Good air movement bilaterally, lungs clear, no wheezes/rales/rhonchi  GI: Abdomen soft, bowel sounds present, nondistended, nontender, no organomegaly or masses, no rebound/guarding  Musculoskeletal: Mild edema in BLE, normal muscle tone, normal gait  Neurologic: Alert and oriented, cranial nerves 2-12 grossly intact, clear speech, no tremor.   Psychiatric: normal mentation, affect and mood       Assessment and Plan:  Hospital discharge follow-up  Pulmonary nodules  Reviewed with pt lab results, cardiac studies and CT results. Pulmonary nodules are small, with the largest measuring 2 mm. Per Fleischner criteria, no further follow-up is necessary; he does have a remote 4-pack smoking history (quit >60 years ago). Reviewed if any chronic cough or respiratory symptoms develop, would have low threshold for repeating low-dose CT for screening. He is otherwise doing very well since his hospital discharge.     Follow-up: as needed       Scribe Disclosure:   I, Emigdio Seals, " am serving as a scribe to document services personally performed by HEYDI Escamilla CNP at this visit, based upon the provider's statements to me. All documentation has been reviewed by the aforementioned provider prior to being entered into the official medical record.     Portions of this medical record were completed by a scribe. UPON MY REVIEW AND AUTHENTICATION BY ELECTRONIC SIGNATURE, this confirms (a) I performed the applicable clinical services, and (b) the record is accurate.      HEYDI Escamilla CNP

## 2019-03-22 NOTE — NURSING NOTE
Chief Complaint   Patient presents with     Hospital F/U     patient is here after a hospital visit for chest pain      Recheck Medication     patient would like to discuss recent imaging where lung nodules were found        Radha Tobin EMT at 1:43 PM on 3/22/2019.

## 2019-05-06 DIAGNOSIS — E78.5 HYPERLIPIDEMIA LDL GOAL <100: ICD-10-CM

## 2019-05-07 RX ORDER — ATORVASTATIN CALCIUM 10 MG/1
10 TABLET, FILM COATED ORAL DAILY
Qty: 90 TABLET | Refills: 0 | Status: SHIPPED | OUTPATIENT
Start: 2019-05-07 | End: 2019-08-08

## 2019-05-07 NOTE — TELEPHONE ENCOUNTER
atorvastatin (LIPITOR) 10 MG tablet      Last Written Prescription Date:  3/22/18  Last Fill Quantity: 90,   # refills: 3  Last Office Visit : 3/22/19  Future Office visit:  none    90 day to pharmacy. Scheduling has been notified to contact the pt for labs.

## 2019-05-29 ENCOUNTER — ANCILLARY PROCEDURE (OUTPATIENT)
Dept: ULTRASOUND IMAGING | Facility: CLINIC | Age: 84
End: 2019-05-29
Attending: NURSE PRACTITIONER
Payer: MEDICARE

## 2019-05-29 ENCOUNTER — OFFICE VISIT (OUTPATIENT)
Dept: INTERNAL MEDICINE | Facility: CLINIC | Age: 84
End: 2019-05-29
Payer: MEDICARE

## 2019-05-29 ENCOUNTER — HOSPITAL ENCOUNTER (EMERGENCY)
Facility: CLINIC | Age: 84
Discharge: HOME | End: 2019-05-29
Payer: MEDICARE

## 2019-05-29 ENCOUNTER — ANCILLARY PROCEDURE (OUTPATIENT)
Dept: GENERAL RADIOLOGY | Facility: CLINIC | Age: 84
End: 2019-05-29
Attending: NURSE PRACTITIONER
Payer: MEDICARE

## 2019-05-29 VITALS
BODY MASS INDEX: 25.02 KG/M2 | OXYGEN SATURATION: 97 % | HEART RATE: 67 BPM | DIASTOLIC BLOOD PRESSURE: 65 MMHG | SYSTOLIC BLOOD PRESSURE: 112 MMHG | WEIGHT: 179.3 LBS

## 2019-05-29 VITALS
HEART RATE: 65 BPM | RESPIRATION RATE: 16 BRPM | HEIGHT: 71 IN | WEIGHT: 179.4 LBS | TEMPERATURE: 97.9 F | BODY MASS INDEX: 25.11 KG/M2 | DIASTOLIC BLOOD PRESSURE: 67 MMHG | SYSTOLIC BLOOD PRESSURE: 131 MMHG | OXYGEN SATURATION: 99 %

## 2019-05-29 DIAGNOSIS — M79.662 PAIN OF LEFT CALF: ICD-10-CM

## 2019-05-29 DIAGNOSIS — I82.432 ACUTE DEEP VEIN THROMBOSIS (DVT) OF POPLITEAL VEIN OF LEFT LOWER EXTREMITY (H): ICD-10-CM

## 2019-05-29 DIAGNOSIS — M25.562 ACUTE PAIN OF LEFT KNEE: ICD-10-CM

## 2019-05-29 DIAGNOSIS — M25.562 ACUTE PAIN OF LEFT KNEE: Primary | ICD-10-CM

## 2019-05-29 LAB — RADIOLOGIST FLAGS: ABNORMAL

## 2019-05-29 ASSESSMENT — MIFFLIN-ST. JEOR: SCORE: 1530.88

## 2019-05-29 ASSESSMENT — PAIN SCALES - GENERAL: PAINLEVEL: MILD PAIN (2)

## 2019-05-29 NOTE — NURSING NOTE
Chief Complaint   Patient presents with     Musculoskeletal Problem     difficulty bending left knee        Raphael Killian CMA (AAMA) at 3:44 PM on 5/29/2019

## 2019-05-29 NOTE — Clinical Note
Fouzia Cohen, I'm sending this to you since Jailene is out. I've tried calling Tom twice today and not been able to reach him. Can you clarify: -that he picked up his Eliquis, and is taking as prescribed (10 mg BID x7 days, then 5 mg BID)-see if he would rather follow-up with Dr. Robertson or if he wants me to put a hematology referral in? I talked with Dr. Robertson, she said either would be fine. -right now he has an appointment with me in 1 week. That is fine if he has further questions/concerns, but would want him to still f/u with PCP or heme.Thanks!Marlene

## 2019-05-29 NOTE — ED NOTES
Spoke with clinic physician.  She will start him on oral blood thinners.  Prescription called into his pharmacy. Does not require to be seen in ED.  Will f/u in clinic in about 1 week.  Pt aware and understands and does not want to be seen in ED at this time.

## 2019-05-29 NOTE — PATIENT INSTRUCTIONS
Try Tylenol 500-1000 mg for the knee pain.    Use the Voltaren gel to the knee up to 4 times per day.    Heat or cold application for 20 minutes at a time as needed, just allow for 2 hour break in between.

## 2019-05-29 NOTE — ED TRIAGE NOTES
Sent from [facility]. Patient reports that he has blood clot in left calf and was sent here for blood thinners.

## 2019-05-29 NOTE — PROGRESS NOTES
[facility]  Primary Care Center   Marlene LJorge Levin, HEYDI CNP  05/29/2019      Chief Complaint:   Musculoskeletal Problem     History of Present Illness:   Tom Saini is a 84 year old male with a history of aortic insufficiency, hyperlipidemia and vitamin D deficiency who presents for evaluation of left knee pain.     Left knee pain: 5 days ago he started to experience gradually increasing pain in his left knee and calf which he notices when putting weight on it or walking up steps. Today, he had to put both feet on each step to be able to get up the stairs d/t limited bending in the left knee. He has not tried any OTC medications as he was waiting to be seen in the clinic for recommendations. He has not changed his activity level recently; he is fairly sedentary, sitting at the computer much of the day. He denies pain at rest, bruising, CP or shortness of breath.      Review of Systems:   Pertinent items are noted in HPI, remainder of complete ROS is negative.      Active Medications:      aspirin 81 MG tablet, Take 1 tablet by mouth daily., Disp: , Rfl: 3     atorvastatin (LIPITOR) 10 MG tablet, Take 1 tablet (10 mg) by mouth daily, Disp: 90 tablet, Rfl: 0     Cholecalciferol (VITAMIN D3) 1000 units CAPS, Take 1 capsule by mouth daily., Disp: , Rfl:      multivitamin (OCUVITE) TABS, Take 1 tablet by mouth 2 times daily , Disp: , Rfl:     Current Facility-Administered Medications:      bevacizumab (AVASTIN) intravitreal inj 1.25 mg, 1.25 mg, Intravitreal, Q28 Days, Brigido Laura MD, 1.25 mg at 03/14/19 0991      Allergies:   Codeine sulfate      Past Medical History:  Aortic insufficiency   Hyperlipidemia   Trigger finger   Vitamin D deficiency   Erectile dysfunction   Pancreatic cyst   Eczema   Seborrheic keratosis      Past Surgical History:  Strabismus repair-1949   Laminectomy-1993   Cataract IOL; bilateral-2001   Ptosis repair with Blepharoplasty; bilateral-2018     Family History:   Father: bladder  cancer   Mother: skin cancer      Social History:   The patient was alone  Smoking Status: former; 2 packs per day for 2 years; 65 years since quitting   Smokeless Tobacco: never    Alcohol Use: yes       Physical Exam:   /65 (BP Location: Right arm, Patient Position: Sitting, Cuff Size: Adult Regular)   Pulse 67   Wt 81.3 kg (179 lb 4.8 oz)   SpO2 97%   BMI 25.02 kg/m     Constitutional: no distress, comfortable, pleasant, well-groomed  Cardiovascular: regular rate and rhythm, normal S1 and S2, 2/6 systolic murmur, no rubs or gallops  Respiratory: clear to auscultation with good air movement bilaterally, no wheezes or crackles, non-labored  Musculoskeletal: full range of motion, strength 5/5,mild edema in LLE, negative Festus's and negative anterior/posterior drawer, tenderness over L proximal anterior tibia, mild edema and tenderness over popliteal fossa on left, negative Kuldip's sign   Upper calf measurements:   Left: 41.5 cm   Right: 41.5 cm   Lower calf measurements:   Left: 29 cm   Right: 28.5     Assessment and Plan:  Acute pain of left knee  5 days of gradual increase of left knee and calf pain with weight and bending limiting his mobility. He will have an X-ray performed today to assess for osteoarthritis. Given mild swelling in his left lower leg and associated calf pain will also evaluate for DVT, though my suspicion is low for this. While results are pending he was recommended Tylenol, Voltaren gel and heat/ice as needed for pain.  - XR Knee Left 3 Views  - diclofenac (VOLTAREN) 1 % topical gel  Dispense: 100 g; Refill: 1  - US Lower Extremity Venous Duplex Left    I was paged 1 hour later with US results that showed a non-obstructing DVT in the peroneal vein. He was initially sent to the ED to start anticoagulation as I was no longer in clinic, but I was able to reach him from home while he waited to be seen in the ED. Will start Apixaban 10 mg BID x7 days, then 5 mg BID for a minimum of 3  months. Encouraged gradually increase activity as tolerated.    Will have him follow-up with his PCP or hematology to determine length of treatment.       Scribe Disclosure:  I, Gerri Ruiz, am serving as a scribe to document services personally performed by HEYDI Escamilla CNP at this visit, based upon the provider's statements to me. All documentation has been reviewed by the aforementioned provider prior to being entered into the official medical record.     Portions of this medical record were completed by a scribe. UPON MY REVIEW AND AUTHENTICATION BY ELECTRONIC SIGNATURE, this confirms (a) I performed the applicable clinical services, and (b) the record is accurate.     HEYDI Escamilla CNP

## 2019-05-30 ENCOUNTER — TELEPHONE (OUTPATIENT)
Dept: INTERNAL MEDICINE | Facility: CLINIC | Age: 84
End: 2019-05-30

## 2019-05-30 DIAGNOSIS — I82.432 ACUTE DEEP VEIN THROMBOSIS (DVT) OF POPLITEAL VEIN OF LEFT LOWER EXTREMITY (H): Primary | ICD-10-CM

## 2019-05-30 NOTE — TELEPHONE ENCOUNTER
Prior Authorization Retail Medication Request    Medication/Dose: diclofenac (VOLTAREN) 1 % topical gel   ICD code (if different than what is on RX):    Previously Tried and Failed:  unknown  Rationale:  none    Insurance Name:  Medicare  Insurance ID:  [id number]       Pharmacy Information (if different than what is on RX)  Name:    Phone:

## 2019-05-30 NOTE — TELEPHONE ENCOUNTER
Spoke to patient to relay providers message. Patient states verbal understanding and that he will follow up with Marlene Levin next week. Sally Marks LPN 5/30/2019 2:21 PM

## 2019-05-30 NOTE — TELEPHONE ENCOUNTER
Health Call Center    Phone Message    May a detailed message be left on voicemail: yes    Reason for Call: Other: .  pt found out he has a blood clot yesterday at 5pm. He is requesting a call back to discuss what he should or shouldn't be doing until his next appt.    Pt is taking apixaban ANTICOAGULANT (ELIQUIS) 5 MG tablet but fine print states issues with a spine surgery - which the pt had 26 years ago.    Action Taken: Message routed to:  [facility] ([facility] DANA

## 2019-05-30 NOTE — TELEPHONE ENCOUNTER
Prior Authorization Approval    Authorization Effective Date: 3/1/2019  Authorization Expiration Date: 5/29/2020  Medication: diclofenac (VOLTAREN) 1 % topical gel-PA APPROVED    Approved Dose/Quantity:   Reference #:     Insurance Company: [employer] - Phone [phone] Fax [phone]  Expected CoPay:       CoPay Card Available:      Foundation Assistance Needed:    Which Pharmacy is filling the prescription (Not needed for infusion/clinic administered): [facility] [address] [place] [address] [address] [place] AT [facility]  Pharmacy Notified: Yes- **Instructed pharmacy to notify patient when script is ready to /ship.**  Patient Notified: Yes

## 2019-05-30 NOTE — TELEPHONE ENCOUNTER
Central Prior Authorization Team   [phone]    PA Initiation    Medication: diclofenac (VOLTAREN) 1 % topical gel   Insurance Company: [employer] - Phone [phone] Fax [phone]  Pharmacy Filling the Rx: [facility] [address] [place] [address] [address] AT [facility]  Filling Pharmacy Phone: [phone]  Filling Pharmacy Fax: [phone]  Start Date: 5/30/2019    Initiated PA by phone at [phone]. Case # [id number]

## 2019-05-30 NOTE — TELEPHONE ENCOUNTER
Marlene saw this patient and I believe she was going to contact him, but I see no contraindication to taking eliquis as long as no recent surgeries/injections in last 1-2 weeks. He should start it and schedule f/u with me in a month or so.  Thanks,  Angeli Robertson MD  Internal Medicine

## 2019-06-04 ENCOUNTER — OFFICE VISIT (OUTPATIENT)
Dept: HEMATOLOGY | Facility: CLINIC | Age: 84
End: 2019-06-04
Attending: PHYSICIAN ASSISTANT
Payer: MEDICARE

## 2019-06-04 VITALS
HEART RATE: 85 BPM | OXYGEN SATURATION: 95 % | TEMPERATURE: 97.8 F | HEIGHT: 71 IN | WEIGHT: 178.4 LBS | BODY MASS INDEX: 24.98 KG/M2 | SYSTOLIC BLOOD PRESSURE: 123 MMHG | DIASTOLIC BLOOD PRESSURE: 72 MMHG | RESPIRATION RATE: 18 BRPM

## 2019-06-04 DIAGNOSIS — I82.4Z2 DEEP VEIN THROMBOSIS (DVT) OF DISTAL VEIN OF LEFT LOWER EXTREMITY, UNSPECIFIED CHRONICITY (H): Primary | ICD-10-CM

## 2019-06-04 PROCEDURE — 99204 OFFICE O/P NEW MOD 45 MIN: CPT | Performed by: PHYSICIAN ASSISTANT

## 2019-06-04 PROCEDURE — G0463 HOSPITAL OUTPT CLINIC VISIT: HCPCS

## 2019-06-04 PROCEDURE — 99207 ZZC CONSULT E&M CHANGED TO INITIAL LEVEL: CPT | Performed by: PHYSICIAN ASSISTANT

## 2019-06-04 ASSESSMENT — PAIN SCALES - GENERAL: PAINLEVEL: NO PAIN (0)

## 2019-06-04 ASSESSMENT — MIFFLIN-ST. JEOR: SCORE: 1526.35

## 2019-06-04 NOTE — PROGRESS NOTES
[facility]  [address] [place] [id number]  Main: [phone], Fax: [phone]    [facility] at: [facility] at [address] [place] [address]    Outpatient Visit Note:    Patient: Tom Saini  MRN: [id number]  : 1935  KEN: 2019    Reason:  Recent diagnosis of left distal lower extremity DVT. Here to discuss anticoagulation therapy.     HPI:  This is a 84 year old male with a history of hyperlipidemia, aortic insufficiency, who recently is diagnosed with a left distal lower extremity DVT back on 2019, referred by his primary care provider, Dr. Angeli Robertson for consultation in regard to duration of anticoagulation therapy. Tom had been apparently having about a one week history of pain of the left calf and was having difficulty bending his left knee due to pain prior to his diagnosis of left distal lower extremity DVT on 2019. He denies any long distance travel or any trauma prior to his diagnosis of DVT. On 2019, he was evaluated by his primary care clinic for which he was sent for an ultrasound of the left leg. The ultrasound showed: nonocclusive DVT of one of the paired peroneal veins in the mid calf and a Baker's cyst measuring 4.4 x 1.8 x 3.4 cm. Because of the DVT, he was started on Eliquis at 10 mg PO BID x 7 days, then 5 mg PO BID thereafter.     He is also then referred to this clinic for further evaluation / consultation.     Past Medical History:  Past Medical History:   Diagnosis Date     Aortic insufficiency      Erectile dysfunction      Hyperlipidemia      Low back pain     disc disease s/p laminectomy in past     Macular degeneration      Pancreatic cyst 2018    13 mm, needs 6 months follow up MRI     Shingles 2013       Past Surgical History:  Past Surgical History:   Procedure Laterality Date     CATARACT IOL, RT/LT       COMBINED REPAIR PTOSIS WITH BLEPHAROPLASTY  BILATERAL Bilateral 4/6/2018    Procedure: COMBINED REPAIR PTOSIS WITH BLEPHAROPLASTY BILATERAL;  BILATERAL UPPER EYELIDS BLEPHAROPLASTY AND PTOSIS REPAIR ;  Surgeon: Anuj Good MD;  Location: [facility]     EYE SURGERY       LAMINECTOMY LUMBAR TWO LEVELS  1993    L4-L5     RECESSION RESECTION (REPAIR STRABISMUS)  1949       Medications:  Current Outpatient Medications   Medication Sig Dispense Refill     apixaban ANTICOAGULANT (ELIQUIS) 5 MG tablet Take 1 tablet (5 mg) by mouth 2 times daily 120 tablet 0     apixaban ANTICOAGULANT (ELIQUIS) 5 MG tablet Take 2 tablets (10 mg) by mouth 2 times daily for 7 days, THEN 1 tablet (5 mg) 2 times daily for 23 days. 74 tablet 0     aspirin 81 MG tablet Take 1 tablet by mouth daily.  3     atorvastatin (LIPITOR) 10 MG tablet Take 1 tablet (10 mg) by mouth daily 90 tablet 0     Cholecalciferol (VITAMIN D3) 1000 units CAPS Take 1 capsule by mouth daily.       diclofenac (VOLTAREN) 1 % topical gel Place 4 g onto the skin 4 times daily as needed for moderate pain (apply to left knee) 100 g 1     multivitamin (OCUVITE) TABS Take 1 tablet by mouth 2 times daily        order for DME Equipment being ordered: Compression stockings, knee high, measured for patient, 20-30mmHg 3 Units 3        Allergies:  Allergies   Allergen Reactions     Codeine Sulfate Nausea and Vomiting       ROS:  Denies any bleeding issues. No gum bleeding, No nose bleed. Denies any hematuria or blood in stools. Denies any ecchymosis. Denies any lower extremities swelling or pain. Denies any fever, no chest pain. Denies any shortness of breath.     Social History:  Denies any tobacco use. No significant alcohol use. Denies any illicit drug use. Patient is mostly retired but is doing a lot of photography as a hobby and he does travel quite a bit around the world. He is planning to go to [place] in a month to watch the total eclipse.     Family History:  He reports no family history of DVT. He has 3 daughters in  "their 40-50s with no history of DVTs.     Objectives:  Pleasant 84 year old male in no acute distress.  Vitals: /72 (BP Location: Right arm, Patient Position: Sitting, Cuff Size: Adult Regular)   Pulse 85   Temp 97.8  F (36.6  C) (Oral)   Resp 18   Ht 1.803 m (5' 11\")   Wt 80.9 kg (178 lb 6.4 oz)   SpO2 95%   BMI 24.88 kg/m    Exam:   There is some trace amount of pitting edema in both lower extremities. There is no significant swelling of the left leg. There is no calf pain on palpation. There is no pain on palpation on the posterior knee fossa. No evidence of any venous stasis changes of the lower extremity.     Labs:  Component      Latest Ref Rng & Units 3/19/2019   WBC      4.0 - 11.0 10e9/L 6.8   RBC Count      4.4 - 5.9 10e12/L 4.40   Hemoglobin      13.3 - 17.7 g/dL 13.4   Hematocrit      40.0 - 53.0 % 43.0   MCV      78 - 100 fl 98   MCH      26.5 - 33.0 pg 30.5   MCHC      31.5 - 36.5 g/dL 31.2 (L)   RDW      10.0 - 15.0 % 13.3   Platelet Count      150 - 450 10e9/L 220   Diff Method       Automated Method   % Neutrophils      % 55.3   % Lymphocytes      % 28.7   % Monocytes      % 9.7   % Eosinophils      % 4.4   % Basophils      % 1.6   % Immature Granulocytes      % 0.3   Nucleated RBCs      0 /100 0   Absolute Neutrophil      1.6 - 8.3 10e9/L 3.8   Absolute Lymphocytes      0.8 - 5.3 10e9/L 2.0   Absolute Monocytes      0.0 - 1.3 10e9/L 0.7   Absolute Eosinophils      0.0 - 0.7 10e9/L 0.3   Absolute Basophils      0.0 - 0.2 10e9/L 0.1   Abs Immature Granulocytes      0 - 0.4 10e9/L 0.0   Absolute Nucleated RBC       0.0   Sodium      133 - 144 mmol/L 142   Potassium      3.4 - 5.3 mmol/L 4.4   Chloride      94 - 109 mmol/L 107   Carbon Dioxide      20 - 32 mmol/L 27   Anion Gap      3 - 14 mmol/L 7   Glucose      70 - 99 mg/dL 105 (H)   Urea Nitrogen      7 - 30 mg/dL 19   Creatinine      0.66 - 1.25 mg/dL 0.92   GFR Estimate      >60 mL/min/1.73:m2 77   GFR Estimate If Black      >60 " mL/min/1.73:m2 89   Calcium      8.5 - 10.1 mg/dL 8.7       Imagin2019, Ultrasound of the left leg:  In the left lower extremity, the common femoral, femoral, popliteal and posterior tibial veins demonstrate normal compressibility and  blood flow. Nonocclusive thrombus of one of the peroneal veins in the mid calf. Baker's cyst cyst measuring 4.4 x 1.8 x 3.4 cm.    Assessment:  In summary, Tom is a 84 year old male who has a history of aortic insufficiency, who is found to have an unprovoked left distal lower extremity DVT on 2019, referred to this clinic for consultation in regard to anticoagulation therapy management. He has been started on anticoagulation therapy with Eliquis since 2019 and has been doing well without any bleeding complications. Although his DVT was an unprovoked event, this thrombus is confined to the distal portion of his left leg.     Diagnosis:  1. Isolated distal left lower extremity DVT found on 2019.    Plan:  This writer had a long discussion with the patient today in regard to our plan and recommendation.     This writer also answered most of the patient's questions to his satisfaction today.    I indicate to Tom that usually distal lower extremity (below the knee) DVTs are more benign and less symptomatic as compare to proximal lower extremity DVTs. As such, current CHEST guidelines recommends 3 months of anticoagulation therapy should be suffice regardless if the thrombus was provoked or unprovoked. Our clinic's usual approach is to have the patient obtain a repeat ultrasound of the affected leg after 3 months of full anticoagulation therapy, then if there are any residual thrombus, might consider extending the therapy for another 3 months of a total of 6 months.     We have no opposition for this patient to be on a DOAC (I.e. Eliquis) for his mainstay of anticoagulation therapy for this acute DVT. Thus, he will complete his 7 days of Eliquis of 10 mg PO  BID dosing and then switch over to 5 mg PO BID for the remainder of his anticoagulation therapy duration.     We educated the patient about anticoagulation and it's potential risk of bleeding as a complications. The patient is planning to travel to [facility] in a month for which we educated him about mechanical DVT/PE prophylaxis strategy during his long distance flight. We have no opposition for him to travel or to do snorkeling while in [place]. However, we do recommend against deep sea diving.     Will plan on seeing him back in 3 months in our clinic and with repeat ultrasound done a few days prior or on the same day of his return clinic visit with me.     It is our clinic's recommendation that all patients with a history of DVT/PE should undergo age appropriate cancer screening. Will defer this to his primary care provider.     The patient is given our center's contact information and is instructed to call if he should have any further questions or concerns.  Otherwise, we will plan on seeing him back in 3 months as mentioned above.     Radha Maher, nurse clinician is present throughout the entire clinic visit with the patient today.  Patient understands and agrees with the above plan and recommendation.    Total Time Spent:  49 minutes, all 49 minutes was spent on face-to-face consultation of the patient and coordination of care in regard to his recent diagnosis of distal left leg DVT and anticoagulation therapy management.     Time IN: 12:49  Time OUT: 13:31      Ahmet Márquez PA-C, MPAS  Physician Assistant  [facility].

## 2019-06-04 NOTE — PATIENT INSTRUCTIONS
1. Please start taking 5 mg Eliquis every 12 hours starting tomorrow evening.   2. Do not take aspirin until you are off of your blood thinner.  3. Things to remember/ be aware of:   A. While traveling please try to get up and move around every 2 hours.   B. While using your computer try to get up 1x every hour.   C. Try to cross your legs less, this can cut off circulation.   D. Staying hydrated and wearing compression stockings can also help decrease your risk of developing a new blood clot.  4. After 3 months we would like to see you back in clinic. Prior to this visit we will get an ultrasound of your leg to see the state of your clot. We will use this to guide whether or not we need to continue anticoagulation.  5. If you are still experiencing instability at this time we may consider having you talk to your primary care provider about an orthopedic surgery referral to determine whether or not the bakers cyst could be contributing to this.  6. Please call our [facility] with any further questions, comments or concerns, [phone].    Radha Maher RN - Nurse Clinician - [facility] - [phone]

## 2019-06-04 NOTE — PROGRESS NOTES
Tom LEONARDO Parveen is here for a consult visit with PALLAVI Smith. Juaquin is being seen for anticoagulation guidance for recent distal DVT.     RN welcomed and introduced Juaquin to our center and provided him with a contact card containing our information.    Medications, and allergies were then reviewed, updated and reconciled with outside records.    I asked Juaquin if he had any specific concerns that he wanted to address and communicated these with the provider. I then reviewed which provider he was going to be seeing today and the expected timing of that provider.    Together we reviewed the plan that he would continue taking Eliquis 5 mg BID for 3 months. After 3 months he will have an ultrasound and then revisit Ahmet in clinic to determine whether or not he needs to continue with anticoagulation. The AVS instructions were then updated and given to the patient.    I then thanked Juaquin for coming and encouraged him to call should he have any questions or concerns.    Radha Maher RN - Nurse Clinician - [facility] - [phone]

## 2019-06-05 ENCOUNTER — OFFICE VISIT (OUTPATIENT)
Dept: INTERNAL MEDICINE | Facility: CLINIC | Age: 84
End: 2019-06-05
Payer: MEDICARE

## 2019-06-05 VITALS
TEMPERATURE: 97.5 F | SYSTOLIC BLOOD PRESSURE: 108 MMHG | DIASTOLIC BLOOD PRESSURE: 61 MMHG | RESPIRATION RATE: 18 BRPM | WEIGHT: 178.8 LBS | HEART RATE: 70 BPM | BODY MASS INDEX: 24.94 KG/M2

## 2019-06-05 DIAGNOSIS — I82.4Z2 ACUTE DEEP VEIN THROMBOSIS (DVT) OF DISTAL VEIN OF LEFT LOWER EXTREMITY (H): Primary | ICD-10-CM

## 2019-06-05 ASSESSMENT — PAIN SCALES - GENERAL: PAINLEVEL: NO PAIN (0)

## 2019-06-05 NOTE — PROGRESS NOTES
[facility]  Primary Care Center   Marlene MCWILLIAMSJorge Levin, HEYDI CNP  06/06/2019      Chief Complaint: DVT Follow Up     History of Present Illness:   Tom Saini is a 84 year old male with a history of aortic insufficiency, hypercholesterolemia, and recent DVT who presents for follow-up on his DVT. The patient reported to clinic on 5/29 with 1 week of left knee and calf pain with trouble bending at the knee. He was sent for ultrasound with results showing nonocclusive DVT of one of the paired peroneal veins in the mid calf and a Baker's cyst measuring 4.4 x 1.8 x 3.4 cm. He was started on Eliquis at 10 mg PO BID x 7 days, then 5 mg PO BID thereafter. He followed up with hematology yesterday and they agreed to a 3 month regimen of Eliquis 5 mg BID. At that time he will have an ultrasound and return to the hematology clinic to determine if continued anticoagulation is indicated.     Today the patient reports he is doing better than prior. He is feeling slightly weaker today than yesterday, but denies any s/s bleeding. He endorses some anterior knee pain, especially with climbing the stairs. He is still having to put both feet on each step to be able to get up the stairs secondary to limited bending in the left knee, but he notes less so than last week. He denies shortness of breath. He is currently holding his aspirin d/t concerns of bleeding risk. He is taking several trips throughout the month of June to visit family, which he is looking forward to.    Review of Systems:   Pertinent items are noted in HPI, remainder of complete ROS is negative.      Active Medications:      apixaban ANTICOAGULANT (ELIQUIS) 5 MG tablet, Take 1 tablet (5 mg) by mouth 2 times daily, Disp: 120 tablet, Rfl: 0     aspirin 81 MG tablet, Take 1 tablet by mouth daily., Disp: , Rfl: 3     atorvastatin (LIPITOR) 10 MG tablet, Take 1 tablet (10 mg) by mouth daily, Disp: 90 tablet, Rfl: 0     Cholecalciferol (VITAMIN D3) 1000 units CAPS, Take 1  capsule by mouth daily., Disp: , Rfl:      multivitamin (OCUVITE) TABS, Take 1 tablet by mouth 2 times daily , Disp: , Rfl:      Allergies:   Codeine sulfate      Past Medical History:  Aortic insufficiency  Macular degeneration   Pancreatic cyst  Shingles   Eczema  Pure hypercholesterolemia  Male erectile disorder  Dermatofibroma  Seborrheic keratoses, inflamed     Past Surgical History:  Cataract IOL  Combined ptosis repair with blepharoplasty, bilateral  Lumbar laminectomy L4-L5  Strabismus repair    Family History:   Father - bladder cancer  Mother - skin cancer     Social History:   Presents to clinic alone.   Tobacco Use: Former smoker, quit in 1954  Alcohol Use: Yes  PCP: Angeli Robertson     Physical Exam:   /61 (BP Location: Right arm, Patient Position: Sitting, Cuff Size: Adult Regular)   Pulse 70   Temp 97.5  F (36.4  C) (Oral)   Resp 18   Wt 81.1 kg (178 lb 12.8 oz)   BMI 24.94 kg/m     Constitutional: Alert, oriented, pleasant, no acute distress  Head: Normocephalic, atraumatic  Cardiovascular: Regular rate and rhythm, no murmurs, rubs or gallops  Respiratory: Good air movement bilaterally, lungs clear, no wheezes/rales/rhonchi  Musculoskeletal: 2+ edema in bilateral lower extremities, normal muscle tone, normal gait, no calf tenderness elicited on exam  Skin: No rashes/lesions, no erythema of LLE  Psychiatric: normal mentation, affect and mood      Assessment and Plan:  Acute deep vein thrombosis (DVT) of distal vein of left lower extremity (H)  Patient doing well since starting Eliquis. Continue with 5 mg BID dosing for 3 months and follow-up with Ahmet Márquez in Hematology and US as scheduled. Elevate leg when sitting to help reduce residual swelling, and activity a tolerated.    Follow-up: as needed for any changes or concerns        Scribe Disclosure:  Renetta HASTINGS, am serving as a scribe to document services personally performed by HEYDI Escamilla CNP at this visit,  based upon the provider's statements to me. All documentation has been reviewed by the aforementioned provider prior to being entered into the official medical record.    Portions of this medical record were completed by a scribe. UPON MY REVIEW AND AUTHENTICATION BY ELECTRONIC SIGNATURE, this confirms (a) I performed the applicable clinical services, and (b) the record is accurate.     HEYDI Escamilla CNP

## 2019-06-05 NOTE — NURSING NOTE
Chief Complaint   Patient presents with     Deep Vein Thrombosis     follow up       Raphael Killian CMA (AAMA) at 12:21 PM on 6/5/2019

## 2019-06-18 ENCOUNTER — OFFICE VISIT (OUTPATIENT)
Dept: OPHTHALMOLOGY | Facility: CLINIC | Age: 84
End: 2019-06-18
Attending: OPHTHALMOLOGY
Payer: MEDICARE

## 2019-06-18 DIAGNOSIS — H35.3211 EXUDATIVE AGE-RELATED MACULAR DEGENERATION OF RIGHT EYE WITH ACTIVE CHOROIDAL NEOVASCULARIZATION (H): Primary | ICD-10-CM

## 2019-06-18 DIAGNOSIS — H35.3211 EXUDATIVE AGE-RELATED MACULAR DEGENERATION OF RIGHT EYE WITH ACTIVE CHOROIDAL NEOVASCULARIZATION (H): ICD-10-CM

## 2019-06-18 PROCEDURE — 25000128 H RX IP 250 OP 636: Mod: ZF | Performed by: OPHTHALMOLOGY

## 2019-06-18 PROCEDURE — 67028 INJECTION EYE DRUG: CPT | Mod: RT,ZF | Performed by: OPHTHALMOLOGY

## 2019-06-18 PROCEDURE — 40000269 ZZH STATISTIC NO CHARGE FACILITY FEE: Mod: ZF

## 2019-06-18 PROCEDURE — C9257 BEVACIZUMAB INJECTION: HCPCS | Mod: ZF | Performed by: OPHTHALMOLOGY

## 2019-06-18 RX ADMIN — Medication 1.25 MG: at 13:31

## 2019-06-18 ASSESSMENT — VISUAL ACUITY
OS_CC: 20/30
OS_CC+: -2
OD_CC+: -1
OD_PH_CC: 20/30
CORRECTION_TYPE: GLASSES
METHOD: SNELLEN - LINEAR
OD_CC: 20/40

## 2019-06-18 ASSESSMENT — REFRACTION_WEARINGRX
OS_ADD: +2.75
OD_AXIS: 170
OS_SPHERE: -1.50
OD_CYLINDER: +1.00
OS_AXIS: 010
OS_CYLINDER: +0.50
OD_SPHERE: -2.75
OD_ADD: +2.75

## 2019-06-18 ASSESSMENT — TONOMETRY
OD_IOP_MMHG: 15
IOP_METHOD: TONOPEN
OS_IOP_MMHG: 14

## 2019-06-18 NOTE — PROGRESS NOTES
Chief Complaint(s) and History of Present Illness(es)     Follow Up     Laterality: right eye    Onset: 3 months ago    Associated symptoms: Negative for flashes, floaters and eye pain    Pain scale: 0/10              Comments     Pt here for macular degeneration f/u - reports the vision seems to be   getting worse    Of note, pt had a DVT in his leg discovered a few weeks ago and was   started on Eliquis    Odalys Beaner COA 12:43 PM June 18, 2019             Review of systems for the eyes was negative other than the pertinent positives/negatives listed in the HPI.      Assessment & Plan      Tom Saini is a 84 year old male with the following diagnoses:   1. Exudative age-related macular degeneration of right eye with active choroidal neovascularization (H)     2. Exudative age-related macular degeneration of right eye with active choroidal neovascularization (H)         S/P Avastin injection x 10 right eye - last injection 3/14/2019  S/P Eylea x 1 (during avastin syringe back order)    RBA to repeat Avastin today, right eye.  Consent obtained  Injection #3/3 of Q12 weeks with planned series of 3   Amsler/AREDS to continue       Patient disposition:   Return in about 3 months (around 9/18/2019) for DFE, OCT Macula.           Attending Physician Attestation:  Complete documentation of historical and exam elements from today's encounter can be found in the full encounter summary report (not reduplicated in this progress note).  I personally obtained the chief complaint(s) and history of present illness.  I confirmed and edited as necessary the review of systems, past medical/surgical history, family history, social history, and examination findings as documented by others; and I examined the patient myself.  I personally reviewed the relevant tests, images, and reports as documented above.  I formulated and edited as necessary the assessment and plan and discussed the findings and management plan with the  patient and family. . - Brigido Laura MD

## 2019-06-18 NOTE — NURSING NOTE
Chief Complaints and History of Present Illnesses   Patient presents with     Follow Up     Chief Complaint(s) and History of Present Illness(es)     Follow Up     Laterality: right eye    Onset: 3 months ago    Associated symptoms: Negative for flashes, floaters and eye pain    Pain scale: 0/10              Comments     Pt here for macular degeneration f/u - reports the vision seems to be getting worse    Of note, pt had a DVT in his leg discovered a few weeks ago and was started on Eliquis    Odalys Beaner COA 12:43 PM June 18, 2019

## 2019-08-08 DIAGNOSIS — E78.5 HYPERLIPIDEMIA LDL GOAL <100: ICD-10-CM

## 2019-08-09 RX ORDER — ATORVASTATIN CALCIUM 10 MG/1
10 TABLET, FILM COATED ORAL DAILY
Qty: 90 TABLET | Refills: 0 | Status: SHIPPED | OUTPATIENT
Start: 2019-08-09 | End: 2019-11-11

## 2019-08-09 NOTE — TELEPHONE ENCOUNTER
Last Clinic Visit: 6/5/2019  [facility]  LDL overdue - clinic notified

## 2019-08-27 ENCOUNTER — OFFICE VISIT (OUTPATIENT)
Dept: HEMATOLOGY | Facility: CLINIC | Age: 84
End: 2019-08-27
Attending: PHYSICIAN ASSISTANT
Payer: MEDICARE

## 2019-08-27 ENCOUNTER — HOSPITAL ENCOUNTER (OUTPATIENT)
Dept: ULTRASOUND IMAGING | Facility: CLINIC | Age: 84
Discharge: HOME | End: 2019-08-27
Attending: PHYSICIAN ASSISTANT | Admitting: PHYSICIAN ASSISTANT
Payer: MEDICARE

## 2019-08-27 VITALS
BODY MASS INDEX: 25.47 KG/M2 | HEART RATE: 71 BPM | WEIGHT: 181.9 LBS | SYSTOLIC BLOOD PRESSURE: 132 MMHG | TEMPERATURE: 98.1 F | RESPIRATION RATE: 14 BRPM | HEIGHT: 71 IN | DIASTOLIC BLOOD PRESSURE: 63 MMHG | OXYGEN SATURATION: 96 %

## 2019-08-27 DIAGNOSIS — I82.5Z2 CHRONIC DEEP VEIN THROMBOSIS (DVT) OF DISTAL VEIN OF LEFT LOWER EXTREMITY (H): Primary | ICD-10-CM

## 2019-08-27 DIAGNOSIS — I82.4Z2 DEEP VEIN THROMBOSIS (DVT) OF DISTAL VEIN OF LEFT LOWER EXTREMITY, UNSPECIFIED CHRONICITY (H): ICD-10-CM

## 2019-08-27 DIAGNOSIS — I82.4Z2 DEEP VEIN THROMBOSIS (DVT) OF DISTAL VEIN OF LEFT LOWER EXTREMITY, UNSPECIFIED CHRONICITY (H): Primary | ICD-10-CM

## 2019-08-27 LAB — RADIOLOGIST FLAGS: NORMAL

## 2019-08-27 PROCEDURE — 93971 EXTREMITY STUDY: CPT | Mod: LT

## 2019-08-27 PROCEDURE — 99213 OFFICE O/P EST LOW 20 MIN: CPT | Performed by: PHYSICIAN ASSISTANT

## 2019-08-27 RX ORDER — DABIGATRAN ETEXILATE 150 MG/1
150 CAPSULE ORAL 2 TIMES DAILY
Qty: 90 CAPSULE | Refills: 0 | Status: SHIPPED | OUTPATIENT
Start: 2019-08-27 | End: 2019-09-23

## 2019-08-27 ASSESSMENT — PAIN SCALES - GENERAL: PAINLEVEL: NO PAIN (0)

## 2019-08-27 ASSESSMENT — MIFFLIN-ST. JEOR: SCORE: 1542.22

## 2019-08-27 NOTE — PROGRESS NOTES
[facility]  [address] [place] [address], [place] [address]  Main: [phone], Fax: [phone]    Patient seen at: [facility] at [address] [place] [address]    Outpatient Visit Note:    Patient: Tom Saini  MRN: [id number]  : 1935  KEN: 2019    Reason:  Left distal lower extremity DVT at the end of May 2019. Here for his follow up clinic visit completed about 3 months of anticoagulation therapy.      Clinical History Summary:  This is a 84 year old male with a history of hyperlipidemia, aortic insufficiency, who recently is diagnosed with a left distal lower extremity DVT back on 2019, returns to clinic today after his 3 months of anticoagulation therapy with Eliquis. This writer saw this patient last back in 2019, please see my previous note for this patient's detail clinical history.    In summary, Tom had been apparently having about a one week history of pain of the left calf and was having difficulty bending his left knee due to pain prior to his diagnosis of left distal lower extremity DVT on 2019. He did not have any recent long distance travel or any trauma prior to his diagnosis of DVT. On 2019, he was evaluated by his primary care clinic for which he was sent for an ultrasound of the left leg. The ultrasound showed: nonocclusive DVT of one of the paired peroneal veins in the mid calf and a Baker's cyst measuring 4.4 x 1.8 x 3.4 cm. Because of the DVT, he was started on Eliquis at 10 mg PO BID x 7 days, then 5 mg PO BID thereafter.      He is also then referred to this clinic for further evaluation / consultation.     Interim History:  During my visit with Tom back in 2019, my recommendation is to have him complete 3 months of anticoagulation therapy with Eliquis and then repeat an ultrasound of the left leg. He returns today after 3 months of anticoagulation therapy. He reports no  "issues with bleeding complications while on Eliquis in the past 3 months.    ROS:  Denies any frequent epistaxis. No oral mucosal bleeding. Denies any hematuria or blood in stools. Denies any chest pain. No abdominal pain. Denies any lower extremity pain or swelling.     Medication, Allergies and PmHx:  All have been reviewed by this writer in the electronic medical records.    Social History and Family History:  Deferred.    Objective:  Pleasant in no acute distress.  Vitals: /63 (BP Location: Right arm, Patient Position: Sitting, Cuff Size: Adult Regular)   Pulse 71   Temp 98.1  F (36.7  C) (Oral)   Resp 14   Ht 1.803 m (5' 11\")   Wt 82.5 kg (181 lb 14.4 oz)   SpO2 96%   BMI 25.37 kg/m    Exam:  There is very mild swelling over both of his lower extremities. No pre-tibial area venous stasis changes.     Imaging:  US venous left leg done on 5/29/2019:  Nonocclusive DVT of one of the paired peroneal veins in the mid calf. Baker's cyst.    Assessment:  In summary, Tom is a 84 year old male who has a history of aortic insufficiency, who is found to have an unprovoked left distal lower extremity DVT on 5/29/2019, returns to clinic today after close to 3 months of anticoagulation therapy. His left calf pain symptoms has completely resolved. He has done well on Eliquis in the past 3 months without any bleeding complications.     Diagnosis:  Isolated distal left lower extremity DVT found on 5/29/2019 (unprovoked event).    Plan:  Will repeat ultrasound of the left leg today. If there thrombus has resolved, will stop anticoagulation therapy. If there is residual thrombus, will consider extending therapy for another 3 months, then repeat ultrasound and consider stopping anticoagulation at that time.     We will contact the patient once his ultrasound result is back from today.    Radha Maher, nurse clinician is present for part of today's clinic visit.      Ahmet Márquez PA-C, MPAS  Physician " Assistant  [facility].     Addendum 2019 at 16:50:  Ultrasound done today of the left le.  There is completely occlusive thrombus the paired peroneal veins from the proximal to distal calf, this has progressed from prior. 2.  Unchanged Baker's cyst.    This writer spoke with the radiologist who read the ultrasound report today and confirmed with her that his thrombus has extended since his previous imaging study done back in May 2019.     Because of the extension of the thrombus, I called the patient personally at 16:50 on 2019 and communicated to the patient about the result and the plan.     I confirm with the patient that he has been taking his Eliquis as directed. Thus, he clearly has failed Eliquis. Will have him stop Eliquis and start Pradaxa (which has a different mechanism of action as compared to Eliquis, Xarelto and Edoxaban). Rx of Pradaxa at 150 mg PO BID is sent to patient's preferred pharmacy. I will have the patient return to clinic to see me and to arrange for a repeat ultrasound of the left leg in 6 weeks.     Component      Latest Ref Rng & Units 3/19/2019   Sodium      133 - 144 mmol/L 142   Potassium      3.4 - 5.3 mmol/L 4.4   Chloride      94 - 109 mmol/L 107   Carbon Dioxide      20 - 32 mmol/L 27   Anion Gap      3 - 14 mmol/L 7   Glucose      70 - 99 mg/dL 105 (H)   Urea Nitrogen      7 - 30 mg/dL 19   Creatinine      0.66 - 1.25 mg/dL 0.92   GFR Estimate      >60 mL/min/1.73:m2 77   GFR Estimate If Black      >60 mL/min/1.73:m2 89   Calcium      8.5 - 10.1 mg/dL 8.7       Ahmet Márquez PA-C, MPAS  Physician Assistant  [facility].

## 2019-09-19 ENCOUNTER — NURSE TRIAGE (OUTPATIENT)
Dept: NURSING | Facility: CLINIC | Age: 84
End: 2019-09-19

## 2019-09-19 ENCOUNTER — HOSPITAL ENCOUNTER (EMERGENCY)
Facility: CLINIC | Age: 84
Discharge: HOME | End: 2019-09-19
Attending: EMERGENCY MEDICINE | Admitting: EMERGENCY MEDICINE
Payer: MEDICARE

## 2019-09-19 ENCOUNTER — TELEPHONE (OUTPATIENT)
Dept: INTERNAL MEDICINE | Facility: CLINIC | Age: 84
End: 2019-09-19

## 2019-09-19 ENCOUNTER — APPOINTMENT (OUTPATIENT)
Dept: CT IMAGING | Facility: CLINIC | Age: 84
End: 2019-09-19
Attending: EMERGENCY MEDICINE
Payer: MEDICARE

## 2019-09-19 VITALS
OXYGEN SATURATION: 100 % | DIASTOLIC BLOOD PRESSURE: 64 MMHG | TEMPERATURE: 97 F | SYSTOLIC BLOOD PRESSURE: 130 MMHG | HEIGHT: 71 IN | WEIGHT: 180.6 LBS | BODY MASS INDEX: 25.28 KG/M2 | RESPIRATION RATE: 18 BRPM | HEART RATE: 60 BPM

## 2019-09-19 DIAGNOSIS — R20.2 PARESTHESIA: Primary | ICD-10-CM

## 2019-09-19 LAB
ANION GAP SERPL CALCULATED.3IONS-SCNC: 5 MMOL/L (ref 3–14)
APTT PPP: 52 SEC (ref 22–37)
BASOPHILS # BLD AUTO: 0.1 10E9/L (ref 0–0.2)
BASOPHILS NFR BLD AUTO: 1.8 %
BUN SERPL-MCNC: 17 MG/DL (ref 7–30)
CALCIUM SERPL-MCNC: 8.6 MG/DL (ref 8.5–10.1)
CHLORIDE SERPL-SCNC: 108 MMOL/L (ref 94–109)
CHOLEST SERPL-MCNC: 138 MG/DL
CO2 SERPL-SCNC: 28 MMOL/L (ref 20–32)
CREAT BLD-MCNC: 0.9 MG/DL (ref 0.66–1.25)
CREAT SERPL-MCNC: 0.9 MG/DL (ref 0.66–1.25)
DIFFERENTIAL METHOD BLD: ABNORMAL
EOSINOPHIL # BLD AUTO: 0.4 10E9/L (ref 0–0.7)
EOSINOPHIL NFR BLD AUTO: 5.6 %
ERYTHROCYTE [DISTWIDTH] IN BLOOD BY AUTOMATED COUNT: 13.6 % (ref 10–15)
GFR SERPL CREATININE-BSD FRML MDRD: 78 ML/MIN/{1.73_M2}
GFR SERPL CREATININE-BSD FRML MDRD: 81 ML/MIN/{1.73_M2}
GLUCOSE SERPL-MCNC: 85 MG/DL (ref 70–99)
HBA1C MFR BLD: 5.7 % (ref 0–5.6)
HCT VFR BLD AUTO: 41.9 % (ref 40–53)
HDLC SERPL-MCNC: 61 MG/DL
HGB BLD-MCNC: 13.1 G/DL (ref 13.3–17.7)
IMM GRANULOCYTES # BLD: 0 10E9/L (ref 0–0.4)
IMM GRANULOCYTES NFR BLD: 0.3 %
LDLC SERPL CALC-MCNC: 66 MG/DL
LYMPHOCYTES # BLD AUTO: 2.2 10E9/L (ref 0.8–5.3)
LYMPHOCYTES NFR BLD AUTO: 32.5 %
MCH RBC QN AUTO: 30.2 PG (ref 26.5–33)
MCHC RBC AUTO-ENTMCNC: 31.3 G/DL (ref 31.5–36.5)
MCV RBC AUTO: 97 FL (ref 78–100)
MONOCYTES # BLD AUTO: 0.6 10E9/L (ref 0–1.3)
MONOCYTES NFR BLD AUTO: 8.8 %
NEUTROPHILS # BLD AUTO: 3.4 10E9/L (ref 1.6–8.3)
NEUTROPHILS NFR BLD AUTO: 51 %
NONHDLC SERPL-MCNC: 77 MG/DL
NRBC # BLD AUTO: 0 10*3/UL
NRBC BLD AUTO-RTO: 0 /100
PLATELET # BLD AUTO: 240 10E9/L (ref 150–450)
POTASSIUM SERPL-SCNC: 4.1 MMOL/L (ref 3.4–5.3)
RBC # BLD AUTO: 4.34 10E12/L (ref 4.4–5.9)
SODIUM SERPL-SCNC: 141 MMOL/L (ref 133–144)
TRIGL SERPL-MCNC: 53 MG/DL
WBC # BLD AUTO: 6.7 10E9/L (ref 4–11)

## 2019-09-19 PROCEDURE — 99285 EMERGENCY DEPT VISIT HI MDM: CPT | Mod: 25 | Performed by: EMERGENCY MEDICINE

## 2019-09-19 PROCEDURE — 85730 THROMBOPLASTIN TIME PARTIAL: CPT | Performed by: EMERGENCY MEDICINE

## 2019-09-19 PROCEDURE — 82565 ASSAY OF CREATININE: CPT | Mod: 91

## 2019-09-19 PROCEDURE — 93005 ELECTROCARDIOGRAM TRACING: CPT | Performed by: EMERGENCY MEDICINE

## 2019-09-19 PROCEDURE — 85025 COMPLETE CBC W/AUTO DIFF WBC: CPT | Performed by: EMERGENCY MEDICINE

## 2019-09-19 PROCEDURE — 25000128 H RX IP 250 OP 636: Performed by: EMERGENCY MEDICINE

## 2019-09-19 PROCEDURE — 70498 CT ANGIOGRAPHY NECK: CPT

## 2019-09-19 PROCEDURE — 93010 ELECTROCARDIOGRAM REPORT: CPT | Mod: Z6 | Performed by: EMERGENCY MEDICINE

## 2019-09-19 PROCEDURE — 80061 LIPID PANEL: CPT | Performed by: EMERGENCY MEDICINE

## 2019-09-19 PROCEDURE — 83036 HEMOGLOBIN GLYCOSYLATED A1C: CPT | Performed by: EMERGENCY MEDICINE

## 2019-09-19 PROCEDURE — 70450 CT HEAD/BRAIN W/O DYE: CPT | Mod: XS

## 2019-09-19 PROCEDURE — 80048 BASIC METABOLIC PNL TOTAL CA: CPT | Performed by: EMERGENCY MEDICINE

## 2019-09-19 RX ORDER — IOPAMIDOL 755 MG/ML
75 INJECTION, SOLUTION INTRAVASCULAR ONCE
Status: COMPLETED | OUTPATIENT
Start: 2019-09-19 | End: 2019-09-19

## 2019-09-19 RX ADMIN — IOPAMIDOL 75 ML: 755 INJECTION, SOLUTION INTRAVENOUS at 21:14

## 2019-09-19 ASSESSMENT — ENCOUNTER SYMPTOMS
FEVER: 0
EYE REDNESS: 0
HEADACHES: 0
BACK PAIN: 0
COLOR CHANGE: 0
NUMBNESS: 1
ABDOMINAL PAIN: 0
CONFUSION: 0
DIFFICULTY URINATING: 0
NECK PAIN: 0
DIZZINESS: 0
ARTHRALGIAS: 0
SHORTNESS OF BREATH: 0
NECK STIFFNESS: 0

## 2019-09-19 ASSESSMENT — MIFFLIN-ST. JEOR: SCORE: 1536.33

## 2019-09-19 NOTE — ED AVS SNAPSHOT
[facility], [place], Emergency Department  [address] [place] [id number]  Phone:  [phone]                                    Tom Saini   MRN: [id number]    Department:  [facility], Emergency Department   Date of Visit:  9/19/2019           After Visit Summary Signature Page    I have received my discharge instructions, and my questions have been answered. I have discussed any challenges I see with this plan with the nurse or doctor.    ..........................................................................................................................................  Patient/Patient Representative Signature      ..........................................................................................................................................  Patient Representative Print Name and Relationship to Patient    ..................................................               ................................................  Date                                   Time    ..........................................................................................................................................  Reviewed by Signature/Title    ...................................................              ..............................................  Date                                               Time          22EPIC Rev 08/18

## 2019-09-19 NOTE — TELEPHONE ENCOUNTER
[facility]: Nurse Triage Note  SITUATION/BACKGROUND                                                      Tom Saini is a 84 year old male who calls with left cheek numbness from side of nose to curve of cheek and left eyelid feeling swollen. He awoke with this today at 4 AM.  -Facial sensation has not changed since that time. No pain noted.  -He can close LEFT eye purposefully. He is not by mirror so cannot see if smile or facial movements are asymmetric, however, he is not drooling nor having oral incompetency.  -He can lift arms and legs equally.No headache.  -No numbness or tingling in any extremity noted.  -No chest pain nor SOB,palpitations, dizziness, weakness, confusion or slurred speech, headache, confusion.Negative FAST symptoms.  He is being treated for LLE DVT since 5/29/2019 , recent change ( 8/27/2019) from Eliquis to Pradaxa.     -No change in vision: ( generally worsening vision see Optho,Avastin injection for macular degeneration) but no acute change, negative field cut,blurriness, diplopia, floaters/ flasher.    -No change in hearing, ear pain, facial pain or rashes noted.  PMH:  hyperlipidemia, aortic insufficiency, who recently is diagnosed with a left distal lower extremity DVT     MEDICATIONS: Pradaxa, Lipitor  Taking medication(s) as prescribed? Yes       Allergies:   Allergies   Allergen Reactions     Codeine Sulfate Nausea and Vomiting       ASSESSMENT       LEFT upper cheek numbness from nose to curve of cheek and sensation of swelling left eye lid since 0400 today without resolution nor worsening. Closes Left eye purposefully.  No FAST Sx, rash/shingles type pain.  Positive for DVT LLE 5/29/19 now treated with Pradaxa( changed on 8/27/2019)  Will go to ED to be assessed, concern for facial palsy vs vascular event.    RECOMMENDATION/PLAN                                                      RECOMMENDED DISPOSITION:  To ED, another person to drive.  Will comply with  recommendation: Yes    If further questions/concerns or if symptoms do not improve, worsen or new symptoms develop, call your PCP or [phone] to talk with the Resident on call, as soon as possible.    Guideline used: pp numbness and tingling  Telephone Triage Protocols for Nurses, Fifth Edition, Cynthia Gonzalez, RN, RN

## 2019-09-19 NOTE — TELEPHONE ENCOUNTER
Left cheek by nose not quite by his ear numb  Left eye can close it has to be purpose.   Not confused, taking pradaxa  Taking for a DVT.   Possibly some sort of palsy however I recommended an ER. Griselda Sandoval Paramedic on 9/19/2019 at 2:05 PM

## 2019-09-19 NOTE — ED TRIAGE NOTES
Pt arrives to triage with complaints of L cheek numbness. Pt noticed numbness at 4:00 this morning when he went to the bathroom. Pt called clinic and was instructed to come to ED. Pt denies any numbness or tingling anywhere else. Denies any vision changes. A&O, VSS in triage.

## 2019-09-20 NOTE — ED PROVIDER NOTES
[facility] DE[facility] ([facility])  September 19, 2019    History     Chief Complaint   Patient presents with     Numbness     HPI  Tom Saini is a 84 year old male with a history of aortic insufficiency, hyperlipidemia, lower back pain s/p laminectomy, shingles, and macular degeneration who presents to the ED for evaluation of left-sided facial numbness. Patient reports he woke up this morning at 4:00 AM and noticed his face felt numb. He states he called his clinic and they recommended he come to the ED, because it could be Bell's palsy or something else. Here, patient reports his face is not as numb as this morning, but it is still numb. He denies previous history of facial numbness. Patient denies headache, dizziness, neck pain, or back pain. He denies previous history of heart problems or stroke. Of note, patient reports he has a DVT in his left leg and is taking Pradaxa.      PAST MEDICAL HISTORY  Past Medical History:   Diagnosis Date     Aortic insufficiency      Erectile dysfunction      Hyperlipidemia      Low back pain     disc disease s/p laminectomy in past     Macular degeneration      Pancreatic cyst 07/2018    13 mm, needs 6 months follow up MRI     Shingles 2013     PAST SURGICAL HISTORY  Past Surgical History:   Procedure Laterality Date     CATARACT IOL, RT/LT  2001     COMBINED REPAIR PTOSIS WITH BLEPHAROPLASTY BILATERAL Bilateral 4/6/2018    Procedure: COMBINED REPAIR PTOSIS WITH BLEPHAROPLASTY BILATERAL;  BILATERAL UPPER EYELIDS BLEPHAROPLASTY AND PTOSIS REPAIR ;  Surgeon: Anuj Good MD;  Location: [facility]     EYE SURGERY       LAMINECTOMY LUMBAR TWO LEVELS  1993    L4-L5     RECESSION RESECTION (REPAIR STRABISMUS)  1949     FAMILY HISTORY  Family History   Problem Relation Age of Onset     Cancer Father         bladder     Cancer Mother         unknown skin cancer     Skin Cancer Mother      Glaucoma No family hx of      Macular Degeneration No family hx of   "    SOCIAL HISTORY  Social History     Tobacco Use     Smoking status: Former Smoker     Packs/day: 2.00     Years: 2.00     Pack years: 4.00     Last attempt to quit: 1954     Years since quittin.2     Smokeless tobacco: Never Used   Substance Use Topics     Alcohol use: Yes     Comment: glass of wine with dinner     MEDICATIONS  No current facility-administered medications for this encounter.      Current Outpatient Medications   Medication     dabigatran ANTICOAGULANT (PRADAXA) 150 MG capsule     aspirin 81 MG tablet     atorvastatin (LIPITOR) 10 MG tablet     Cholecalciferol (VITAMIN D3) 1000 units CAPS     multivitamin (OCUVITE) TABS     order for DME     ALLERGIES  Allergies   Allergen Reactions     Codeine Sulfate Nausea and Vomiting       I have reviewed the Medications, Allergies, Past Medical and Surgical History, and Social History in the Epic system.    Review of Systems   Constitutional: Negative for fever.   HENT: Negative for congestion.    Eyes: Negative for redness.   Respiratory: Negative for shortness of breath.    Cardiovascular: Negative for chest pain.   Gastrointestinal: Negative for abdominal pain.   Genitourinary: Negative for difficulty urinating.   Musculoskeletal: Negative for arthralgias, back pain, neck pain and neck stiffness.   Skin: Negative for color change.   Neurological: Positive for numbness. Negative for dizziness and headaches.   Psychiatric/Behavioral: Negative for confusion.       Physical Exam   BP: 116/67  Pulse: 74  Temp: 97.9  F (36.6  C)  Resp: 16  Height: 180.3 cm (5' 11\")  Weight: 81.9 kg (180 lb 9.6 oz)  SpO2: 95 %      Physical Exam   Constitutional: He is oriented to person, place, and time. No distress.   HENT:   Head: Atraumatic.   Mouth/Throat: Oropharynx is clear and moist.   Eyes: Pupils are equal, round, and reactive to light. No scleral icterus.   Neck: Normal range of motion. Neck supple.   Cardiovascular: Normal heart sounds and intact distal " pulses.   Pulmonary/Chest: Breath sounds normal. No respiratory distress.   Abdominal: Soft. Bowel sounds are normal. There is no tenderness.   Musculoskeletal: He exhibits no edema or tenderness.   Neurological: He is alert and oriented to person, place, and time. A sensory deficit is present. No cranial nerve deficit. He exhibits normal muscle tone. Coordination normal.   Left-sided facial numbness.  NIH 1.   Skin: Skin is warm. Capillary refill takes less than 2 seconds. No rash noted. He is not diaphoretic.   Psychiatric: He has a normal mood and affect.       ED Course        Procedures             EKG Interpretation:      Interpreted by Maycol Mercado DO  Time reviewed: 2045  Symptoms at time of EKG: numbness   Rhythm: normal sinus   Rate: normal  Axis: normal  Ectopy: none  Conduction: normal  ST Segments/ T Waves: No ST-T wave changes  Q Waves: none  Comparison to prior: No old EKG available    Clinical Impression: normal EKG                 Labs Ordered and Resulted from Time of ED Arrival Up to the Time of Departure from the ED   CBC WITH PLATELETS DIFFERENTIAL - Abnormal; Notable for the following components:       Result Value    RBC Count 4.34 (*)     Hemoglobin 13.1 (*)     MCHC 31.3 (*)     All other components within normal limits   PARTIAL THROMBOPLASTIN TIME - Abnormal; Notable for the following components:    PTT 52 (*)     All other components within normal limits   HEMOGLOBIN A1C - Abnormal; Notable for the following components:    Hemoglobin A1C 5.7 (*)     All other components within normal limits   BASIC METABOLIC PANEL   LIPID REFLEX TO DIRECT LDL PANEL   CREATININE POCT   ISTAT CREATININE NURSING POCT            Assessments & Plan (with Medical Decision Making)   Presented for evaluation of left-sided facial numbness.  On arrival, he has normal vital signs.  Is not particularly distressed.  He does have an NIH of 1 just for the subjective sensory deficit.  The rest of his physical exam is  unremarkable.  CT and CT angios which were unremarkable.  His lab work is unremarkable as well.  I consulted neuro who evaluated patient at bedside.  They felt that patient would be safe to follow-up as an outpatient.  They have ordered an outpatient MRI and echocardiogram with bubble study.  They will see the patient in their office.  He also requested that I order a lipid panel and A1c which I have ordered.  On reassessment, patient states his symptoms have improved.  It is possible that he had a mild TIA, but he is low risk for subsequent stroke, is already on anticoagulation.  Neurology recommended outpatient follow-up which I believe is appropriate.  All findings discussed with the patient, he understands and is comfortable with this plan.      I have reviewed the nursing notes.    I have reviewed the findings, diagnosis, plan and need for follow up with the patient.    Discharge Medication List as of 9/19/2019 11:39 PM          Final diagnoses:   Paresthesia     Ariela HASTINGS, am serving as a trained medical scribe to document services personally performed by Maycol Mercado DO, based on the provider's statements to me.      Maycol HASTINGS DO, was physically present and have reviewed and verified the accuracy of this note documented by Ariela Sumner.     9/19/2019   [facility], [place], EMERGENCY DEPARTMENT     Maycol Mercado DO  09/20/19 0022

## 2019-09-20 NOTE — CONSULTS
[facility], [place]    Stroke Consult Note    Reason for Consult:  Left facial numbness    Chief Complaint: Numbness       HPI  Tom Saini is a 84 year old left handed male with history of HTN, HLD, aortic insufficiency, and shingles who presents with left facial tingling concerning for stroke. It was first noted when he awoke at 0400 today and has persisted throughout the day, but is somewhat better now than prior. It involves the left cheek and eyelid, but does not affect the forehead, jaw, or inside of the mouth. He denies associated facial droop, arm or leg numbness, tingling, or weakness, speech difficulty, or visual complaints. He denies recent illness, palpitations, chest pain, dyspnea, rash, or trauma.     He has history of LLE DVT for which he previously took apixaban, but it was found to have extended despite this and he now takes dabigatran and is to follow up with hematology in a few weeks to determine further duration. It is not clear if this is thought to be provoked DVT from sedentary behavior, or if he is thought to have hypercoagulability. 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 He denies bruising or bleeding side effects secondary to either of these anticoagulants.    He is a former smoker (4 pack years), and has no personal history of stroke or cardiovascular disease, however his younger brother had what sounds like a L MCA distribution ischemic stroke at age 77.    Impression  Mild stroke vs TIA, likely lacunar/small vessel occlusion if there is in fact diffusion restriction. Symptoms are mild, improving spontaneously, and non-disabling. Not eligible for acute stroke intervention due to time and NIHSS score 1. CT/CTA reassuring. Already on therapeutic anticoagulation, 81 mg ASA, and atorvastatin. ABCD2 score 3, low risk  for recurrent stroke or TIA in the next 2, 7, 90 days. Outpatient expedited stroke evaluation is reasonable as the outcome of MRI and echo will not acutely .    Recommendations  CT/CTA head and neck obtained in ED  HgbA1c and lipid panel obtained in ED  MRI brain stroke protocol as an outpatient  TTE with bubble study as an outpatient  Continue dabigitran, ASA 81 mg daily, and atorvastatin 10 mg daily  Follow up in General Neurology clinic, Dr. Johnson, appts available 10/24, will f/u studies prior and communicate over the phone as necessary.    Patient Follow-up    - in 2-4 weeks weeks at the [facility]  [phone]     Thank you for this consult. This patient will be followed in the clinic.    The patient was discussed with Stroke Fellow, Dr. Mitchell.  The Stroke Staff is Dr. Ortega.    Bela Johnson MD  Neurology Resident, PGY-4  Pager:  [phone]  _____________________________________________________    Past Medical History   Past Medical History:   Diagnosis Date     Aortic insufficiency      Erectile dysfunction      Hyperlipidemia      Low back pain     disc disease s/p laminectomy in past     Macular degeneration      Pancreatic cyst 07/2018    13 mm, needs 6 months follow up MRI     Shingles 2013     Past Surgical History   Past Surgical History:   Procedure Laterality Date     CATARACT IOL, RT/LT  2001     COMBINED REPAIR PTOSIS WITH BLEPHAROPLASTY BILATERAL Bilateral 4/6/2018    Procedure: COMBINED REPAIR PTOSIS WITH BLEPHAROPLASTY BILATERAL;  BILATERAL UPPER EYELIDS BLEPHAROPLASTY AND PTOSIS REPAIR ;  Surgeon: Anuj Good MD;  Location: [facility]     EYE SURGERY       LAMINECTOMY LUMBAR TWO LEVELS  1993    L4-L5     RECESSION RESECTION (REPAIR STRABISMUS)  1949     Medications   Home Meds  Prior to Admission medications    Medication Sig Start Date End Date Taking? Authorizing Provider   dabigatran ANTICOAGULANT (PRADAXA) 150 MG capsule Take 1 capsule (150 mg) by mouth  2 times daily Store in original 's bottle or blister pack; use within 120 days of opening. 8/27/19 10/11/19 Yes Ahmet Márquez, EDWIN   apixaban ANTICOAGULANT (ELIQUIS) 5 MG tablet Take 2 tablets (10 mg) by mouth 2 times daily for 7 days, THEN 1 tablet (5 mg) 2 times daily for 23 days. 19  Marlene Levin APRN CNP   aspirin 81 MG tablet Take 1 tablet by mouth daily. 11   Mercedes Riddle MD   atorvastatin (LIPITOR) 10 MG tablet Take 1 tablet (10 mg) by mouth daily 19   Angeli Robertson MD   Cholecalciferol (VITAMIN D3) 1000 units CAPS Take 1 capsule by mouth daily.    Reported, Patient   multivitamin (OCUVITE) TABS Take 1 tablet by mouth 2 times daily     Reported, Patient   order for DME Equipment being ordered: Compression stockings, knee high, measured for patient, 20-30mmHg 18   Angeli Robertson MD     Allergies   Allergies   Allergen Reactions     Codeine Sulfate Nausea and Vomiting     Family History   Family History   Problem Relation Age of Onset     Cancer Father         bladder     Cancer Mother         unknown skin cancer     Skin Cancer Mother      Glaucoma No family hx of      Macular Degeneration No family hx of      Social History   Social History     Tobacco Use     Smoking status: Former Smoker     Packs/day: 2.00     Years: 2.00     Pack years: 4.00     Last attempt to quit: 1954     Years since quittin.2     Smokeless tobacco: Never Used   Substance Use Topics     Alcohol use: Yes     Comment: glass of wine with dinner     Drug use: No       Review of Systems   The 10 point Review of Systems is negative other than noted in the HPI or here.      PHYSICAL EXAMINATION   Temp:  [97.9  F (36.6  C)] 97.9  F (36.6  C)  Pulse:  [74] 74  Resp:  [16] 16  BP: (116)/(67) 116/67  SpO2:  [95 %] 95 %    Neurologic  Mental Status:  alert, oriented x 3, follows commands, speech clear and fluent, naming and repetition normal. No extinction of double  simultaneous visual, auditory, or tactile stimuli.  Cranial Nerves:  visual fields intact, PERRL, EOMI with normal smooth pursuit, facial sensation intact and symmetric to light touch, pin, and vibration, facial movements symmetric, hearing not formally tested but intact to conversation, palate elevation symmetric and uvula midline, no dysarthria, shoulder shrug strong bilaterally, tongue protrusion midline.  Motor:  normal muscle tone and bulk, no abnormal movements, able to move all limbs spontaneously, strength 5/5 throughout upper and lower extremities, no pronator drift. No slowing of rapid finger tapping.  Reflexes:  2+ and symmetric at biceps, brachioradialis, and patellae, absent at ankles, toes down  Sensory:  sensation intact to light touch, pin, and vibration in bilateral upper and lower extremities, diminished to pin in distal lowers, but symmetrically so. Minimally off target finger to nose with eyes closed on L, on target on R.  Coordination:  normal finger-to-nose and heel-to-shin bilaterally without dysmetria, rapid alternating movements symmetric  Station/Gait:  normal width, turn, arm swing    Dysphagia Screen  Passed screening, no dysarthria - Regular Diet with thin liquids  9/19/19 2235    Stroke Scales    NIHSS  Interval baseline (09/19/19 2248)   Interval Comments     1a. Level of Consciousness 0-->Alert, keenly responsive   1b. LOC Questions 0-->Answers both questions correctly   1c. LOC Commands 0-->Performs both tasks correctly   2.   Best Gaze 0-->Normal   3.   Visual 0-->No visual loss   4.   Facial Palsy 0-->Normal symmetrical movements   5a. Motor Arm, Left 0-->No drift, limb holds 90 (or 45) degrees for full 10 secs   5b. Motor Arm, Right 0-->No drift, limb holds 90 (or 45) degrees for full 10 secs   6a. Motor Leg, Left 0-->No drift, leg holds 30 degree position for full 5 secs   6b. Motor Leg, right 0-->No drift, leg holds 30 degree position for full 5 secs   7.   Limb Ataxia  0-->Absent   8.   Sensory 0-->Normal, no sensory loss   9.   Best Language 0-->No aphasia, normal   10. Dysarthria 0-->Normal   11. Extinction and Inattention  0-->No abnormality   Total 0 (09/19/19 2248)       Imaging  I personally reviewed all imaging; relevant findings per HPI.    Labs Data   CBC  No results for input(s): WBC, RBC, HGB, HCT, PLT in the last 168 hours.  Basic Metabolic Panel   No results for input(s): NA, POTASSIUM, CHLORIDE, CO2, BUN, CR, GLC, DAVIDE in the last 168 hours.  Liver Panel  Recent Labs   Lab Test 06/29/18  0949 06/26/17  1041 12/28/15  0837 05/16/13  1509   PROTTOTAL  --  7.0 6.8 7.2   ALBUMIN 3.2* 3.6 3.7 3.8   BILITOTAL  --  0.5 0.5 0.4   ALKPHOS  --  73 75 61   AST  --  15 17 23   ALT  --  18 16 24     INRNo lab results found.   Lipid Profile  Recent Labs   Lab Test 06/26/17  1041 12/28/15  0837 12/04/13  0811 12/18/12  0806 08/05/11  0835   CHOL 149 151 161 161 139   HDL 61 54 53 50 50   LDL 70 84 96 98 79   TRIG 90 64 62 68 51   CHOLHDLRATIO  --   --  3.1 3.2 2.8     A1CNo lab results found.  Troponin Yara results for input(s): TROPI in the last 168 hours.       Stroke Code / Stroke Consult Data Data This was a non-emergent, non-tele stroke consult.

## 2019-09-20 NOTE — DISCHARGE INSTRUCTIONS
Please make an appointment to follow up with Neurology Clinic (phone: [phone]) as soon as possible to schedule your follow up appointment.  They have ordered an MRI and cardiac echocardiogram which will be scheduled/performed prior to your appointment.  They should contact you regarding scheduling of these tests, but call their office to confirm if you do not hear from them within 2 days.  Return to the ED with any new or worsening symptoms.

## 2019-09-23 DIAGNOSIS — I82.5Z2 CHRONIC DEEP VEIN THROMBOSIS (DVT) OF DISTAL VEIN OF LEFT LOWER EXTREMITY (H): ICD-10-CM

## 2019-09-23 LAB — INTERPRETATION ECG - MUSE: NORMAL

## 2019-09-23 RX ORDER — DABIGATRAN ETEXILATE 150 MG/1
150 CAPSULE ORAL 2 TIMES DAILY
Qty: 90 CAPSULE | Refills: 0 | Status: SHIPPED | OUTPATIENT
Start: 2019-09-23 | End: 2019-10-11

## 2019-09-23 NOTE — TELEPHONE ENCOUNTER
Last seen 8/27/19. Plan to repeat ultrasound on 10/3, if residual thrombus present continue for 3 months, if no thrombus left then stop. Will give 1 month prescription to get patient to this ultrasound.     Radha Maher RN - Nurse Clinician - [facility] - [phone]

## 2019-09-24 ENCOUNTER — PRE VISIT (OUTPATIENT)
Dept: NEUROLOGY | Facility: CLINIC | Age: 84
End: 2019-09-24

## 2019-09-24 NOTE — TELEPHONE ENCOUNTER
FUTURE VISIT INFORMATION      FUTURE VISIT INFORMATION:    Date: 10/24/2019    Time: 1pm    [place]: [facility] [place]  REFERRAL INFORMATION:    Referring provider:  Dr. Johnson    Referring providers clinic:  [facility] ED     Reason for visit/diagnosis  Paresthesia     RECORDS REQUESTED FROM:       Clinic name Comments Records Status Imaging Status   [facility] [place]   Care Everywhere N/A

## 2019-09-26 ENCOUNTER — OFFICE VISIT (OUTPATIENT)
Dept: OPHTHALMOLOGY | Facility: CLINIC | Age: 84
End: 2019-09-26
Attending: OPHTHALMOLOGY
Payer: MEDICARE

## 2019-09-26 DIAGNOSIS — H35.3211 EXUDATIVE AGE-RELATED MACULAR DEGENERATION OF RIGHT EYE WITH ACTIVE CHOROIDAL NEOVASCULARIZATION (H): Primary | ICD-10-CM

## 2019-09-26 DIAGNOSIS — Z96.1 PSEUDOPHAKIA: ICD-10-CM

## 2019-09-26 DIAGNOSIS — H35.3122 NONEXUDATIVE AGE-RELATED MACULAR DEGENERATION, LEFT EYE, INTERMEDIATE DRY STAGE: ICD-10-CM

## 2019-09-26 PROCEDURE — 92134 CPTRZ OPH DX IMG PST SGM RTA: CPT | Mod: ZF | Performed by: OPHTHALMOLOGY

## 2019-09-26 PROCEDURE — G0463 HOSPITAL OUTPT CLINIC VISIT: HCPCS | Mod: ZF

## 2019-09-26 ASSESSMENT — VISUAL ACUITY
CORRECTION_TYPE: GLASSES
OS_PH_CC+: -2
OD_CC: 20/40
OD_CC+: -2
OS_CC+: -1
OS_PH_CC: 20/25
OS_CC: 20/30
METHOD: SNELLEN - LINEAR

## 2019-09-26 ASSESSMENT — CUP TO DISC RATIO
OD_RATIO: 0.2
OS_RATIO: 0.2

## 2019-09-26 ASSESSMENT — SLIT LAMP EXAM - LIDS
COMMENTS: PTOSIS OS > OD
COMMENTS: PTOSIS OS > OD

## 2019-09-26 ASSESSMENT — REFRACTION_WEARINGRX
OS_CYLINDER: +0.50
SPECS_TYPE: BIFOCAL
OD_SPHERE: -2.75
OS_ADD: +2.75
OD_AXIS: 170
OS_AXIS: 010
OS_SPHERE: -1.50
OD_CYLINDER: +1.00
OD_ADD: +2.75

## 2019-09-26 ASSESSMENT — EXTERNAL EXAM - RIGHT EYE: OD_EXAM: NORMAL

## 2019-09-26 ASSESSMENT — EXTERNAL EXAM - LEFT EYE: OS_EXAM: ET

## 2019-09-26 ASSESSMENT — TONOMETRY
OS_IOP_MMHG: 10
OD_IOP_MMHG: 13
IOP_METHOD: TONOPEN

## 2019-09-26 ASSESSMENT — CONF VISUAL FIELD
OS_INFERIOR_NASAL_RESTRICTION: 3
METHOD: COUNTING FINGERS
OD_NORMAL: 1

## 2019-09-26 NOTE — NURSING NOTE
Chief Complaint(s) and History of Present Illness(es)     Follow Up     In right eye.  Associated symptoms include Negative for dryness, eye pain, redness and tearing.              Comments     3 month f/u for Exudative age-related macular degeneration of right eye with active choroidal neovascularization. Pt feels like his vision is getting gradually worse over the last 3 months with glasses. Focus with glasses is not as clear x the last 3 months.     Ocular meds: none    PATO Rodriguez 1:57 PM September 26, 2019

## 2019-09-26 NOTE — PROGRESS NOTES
Chief Complaint(s) and History of Present Illness(es)     Follow Up     Laterality: right eye    Associated symptoms: Negative for dryness, eye pain, redness and tearing              Comments     3 month f/u for Exudative age-related macular degeneration of right eye   with active choroidal neovascularization. Pt feels like his vision is   getting gradually worse over the last 3 months with glasses. Focus with   glasses is not as clear x the last 3 months.     Ocular meds: none    Polly Rangel, COMT 1:57 PM September 26, 2019              Review of systems for the eyes was negative other than the pertinent positives/negatives listed in the HPI.      Assessment & Plan      Tom Saini is a 84 year old male with the following diagnoses:   1. Exudative age-related macular degeneration of right eye with active choroidal neovascularization (H)     2. Nonexudative age-related macular degeneration, left eye, intermediate dry stage    3. Pseudophakia - Both Eyes       S/P Avastin injection x 11 right eye - last injection 6/18/2019  S/P Eylea x 1 (during avastin syringe back order)    Doing great, no fluid again today.  Will extend to Q4mo injections if tolerated   A[place]ler/AREDS to continue  Low vision consult for new glasses prescription       Patient disposition:   Return in about 1 month (around 10/26/2019) for Low vision refraction with Dickens first, then OCT macula, Avastin Right with [place], [place] only.      Jordon Presley MD  Ophthalmology PGY-2  [facility]  Pager: [phone]     Attending Physician Attestation:  Complete documentation of historical and exam elements from today's encounter can be found in the full encounter summary report (not reduplicated in this progress note).  I personally obtained the chief complaint(s) and history of present illness.  I confirmed and edited as necessary the review of systems, past medical/surgical history, family history, social history, and examination findings as  documented by others; and I examined the patient myself.  I personally reviewed the relevant tests, images, and reports as documented above.  I formulated and edited as necessary the assessment and plan and discussed the findings and management plan with the patient and family. Attending Physician Image/Tesing Attestation: I personally reviewed the ophthalmic test(s) associated with this encounter, agree with the interpretation(s) as documented by the resident/fellow, and have edited the corresponding report(s) as necessary.  . - Brigido Laura MD

## 2019-10-01 ENCOUNTER — HEALTH MAINTENANCE LETTER (OUTPATIENT)
Age: 84
End: 2019-10-01

## 2019-10-03 ENCOUNTER — ANCILLARY PROCEDURE (OUTPATIENT)
Dept: MRI IMAGING | Facility: CLINIC | Age: 84
End: 2019-10-03
Attending: STUDENT IN AN ORGANIZED HEALTH CARE EDUCATION/TRAINING PROGRAM
Payer: MEDICARE

## 2019-10-03 DIAGNOSIS — R20.2 PARESTHESIA: ICD-10-CM

## 2019-10-03 RX ORDER — GADOBUTROL 604.72 MG/ML
7.5 INJECTION INTRAVENOUS ONCE
Status: COMPLETED | OUTPATIENT
Start: 2019-10-03 | End: 2019-10-03

## 2019-10-03 RX ADMIN — GADOBUTROL 7 ML: 604.72 INJECTION INTRAVENOUS at 17:50

## 2019-10-03 NOTE — DISCHARGE INSTRUCTIONS
MRI Contrast Discharge Instructions    The IV contrast you received today will pass out of your body in your  urine. This will happen in the next 24 hours. You will not feel this process.  Your urine will not change color.    Drink at least 4 extra glasses of water or juice today (unless your doctor  has restricted your fluids). This reduces the stress on your kidneys.  You may take your regular medicines.    If you are on dialysis: It is best to have dialysis today.    If you have a reaction: Most reactions happen right away. If you have  any new symptoms after leaving the hospital (such as hives or swelling),  call your hospital at the correct number below. Or call your family doctor.  If you have breathing distress or wheezing, call 911.    Special instructions: ***    I have read and understand the above information.    Signature:______________________________________ Date:___________    Staff:__________________________________________ Date:___________     Time:__________    [place] [facility] Departments:    ___[place]: [phone]  ___[facility]: [phone]  ___[place]: [phone] ___[facility]: [phone]  ___[facility]: [phone]  ___[facility]: [phone]  ___[phone]  ___[facility]: [phone]  ___[phone]

## 2019-10-11 ENCOUNTER — OFFICE VISIT (OUTPATIENT)
Dept: HEMATOLOGY | Facility: CLINIC | Age: 84
End: 2019-10-11
Attending: PHYSICIAN ASSISTANT
Payer: MEDICARE

## 2019-10-11 ENCOUNTER — HOSPITAL ENCOUNTER (OUTPATIENT)
Dept: ULTRASOUND IMAGING | Facility: CLINIC | Age: 84
Discharge: HOME | End: 2019-10-11
Attending: PHYSICIAN ASSISTANT | Admitting: PHYSICIAN ASSISTANT
Payer: MEDICARE

## 2019-10-11 VITALS
OXYGEN SATURATION: 99 % | SYSTOLIC BLOOD PRESSURE: 138 MMHG | BODY MASS INDEX: 24.9 KG/M2 | HEIGHT: 71 IN | RESPIRATION RATE: 12 BRPM | TEMPERATURE: 97.8 F | WEIGHT: 177.9 LBS | DIASTOLIC BLOOD PRESSURE: 72 MMHG | HEART RATE: 69 BPM

## 2019-10-11 DIAGNOSIS — I82.5Z2 CHRONIC DEEP VEIN THROMBOSIS (DVT) OF DISTAL VEIN OF LEFT LOWER EXTREMITY (H): Primary | ICD-10-CM

## 2019-10-11 DIAGNOSIS — Z79.01 ANTICOAGULATED: ICD-10-CM

## 2019-10-11 DIAGNOSIS — I82.4Z2 DEEP VEIN THROMBOSIS (DVT) OF DISTAL VEIN OF LEFT LOWER EXTREMITY, UNSPECIFIED CHRONICITY (H): ICD-10-CM

## 2019-10-11 PROCEDURE — G0463 HOSPITAL OUTPT CLINIC VISIT: HCPCS

## 2019-10-11 PROCEDURE — G0463 HOSPITAL OUTPT CLINIC VISIT: HCPCS | Mod: 25

## 2019-10-11 PROCEDURE — 99214 OFFICE O/P EST MOD 30 MIN: CPT | Performed by: PHYSICIAN ASSISTANT

## 2019-10-11 PROCEDURE — 93971 EXTREMITY STUDY: CPT | Mod: LT

## 2019-10-11 RX ORDER — DABIGATRAN ETEXILATE 150 MG/1
150 CAPSULE ORAL 2 TIMES DAILY
Qty: 60 CAPSULE | Refills: 0 | Status: SHIPPED | OUTPATIENT
Start: 2019-10-11 | End: 2019-12-05

## 2019-10-11 ASSESSMENT — PAIN SCALES - GENERAL: PAINLEVEL: NO PAIN (0)

## 2019-10-11 ASSESSMENT — MIFFLIN-ST. JEOR: SCORE: 1524.08

## 2019-10-11 NOTE — PROGRESS NOTES
[facility]  [address] [place] [address], [place] [address]  Main: [phone], Fax: [phone]    Patient seen at: [facility] at [address] [place] [address]    Outpatient Visit Note:    Patient: Tom Saini  MRN: [id number]  : 1935  KEN: 2019    Reason:  Left distal lower extremity DVT at the end of May 2019 with extension of left leg DVT despite 3 months of anticoagulation with Eliquis. Now on Pradaxa, here for follow up visit.      Clinical History Summary:  This is a 84 year old male with a history of hyperlipidemia, aortic insufficiency, who recently is diagnosed with a left distal lower extremity DVT back on 2019, who found to have an extension of his left leg DVT despite being on 3 months of apixaban, returns to clinic today after he was placed on dabigatran back on Aug 2019. This writer saw this patient last back in 2019, please see my previous note for this patient's detail clinical history.     In summary, Tom had been apparently having about a one week history of pain of the left calf and was having difficulty bending his left knee due to pain prior to his diagnosis of left distal lower extremity DVT on 2019. He did not have any long distance travel or any trauma prior to his diagnosis of DVT. On 2019, he was evaluated by his primary care clinic for which he was sent for an ultrasound of the left leg. The ultrasound showed: nonocclusive DVT of one of the paired peroneal veins in the mid calf and a Baker's cyst measuring 4.4 x 1.8 x 3.4 cm. Because of the DVT, he was started on Eliquis at 10 mg PO BID x 7 days, then 5 mg PO BID thereafter.      He is also then referred to this clinic for further evaluation / consultation.      During my visit with Tom back in 2019, my recommendation is to have him complete 3 months of anticoagulation therapy with Eliquis and then repeat an  "ultrasound of the left leg.     Interim History:  Then during my visit with him back on Aug 2019, the repeat ultrasound at the time showed an unexpected clear extension of his thrombus within the left peroneal vein. Thus it was determined that he had failed Eliquis and this writer (after discussion with Dr. Josh Mayes, staff hematologist) switched the patient to Pradaxa and planned to have him return in 6 weeks for repeat ultrasound of the left leg.     The patient has completed 6 weeks of Pradaxa. He did well and he has no issues with bleeding. He reports that he only had missed one dose. He denies any bleeding issues while on Pradaxa.     Most recently, on 9/19/2019, he presented to the emergency department for left sided facial numbness. MRI head on 10/3/2019 showed no evidence for CVA.      ROS:  Denies any shortness of breath. No chest pain. Denies any abdominal pain. Denies any fever. No recent weight gain or loss. Denies any loss of appetite. Denies any leg pain. No pain of the knees. No frequent epistaxis. Denies any oral mucosal bleeding. Denies any hematuria or blood in stools.     Medication, Allergies and PmHx:  All have been reviewed by this writer in the electronic medical records.    Social History and Family History:  Deferred.    Objective:  Pleasant in no acute distress.  Vitals: /72 (BP Location: Right arm, Patient Position: Sitting, Cuff Size: Adult Regular)   Pulse 69   Temp 97.8  F (36.6  C) (Oral)   Resp 12   Ht 1.803 m (5' 11\")   Wt 80.7 kg (177 lb 14.4 oz)   SpO2 99%   BMI 24.81 kg/m    Exam:    Lungs: Clear to auscultation bilaterally.    Card: S1 S2. RRR. No murmur rubs or gallops.    Abd: Non tender. Non distended. Positive bowel sound. Soft.    Ext: no obvious swelling of the lower extremities. There is no obvious mass of the left knee.     Labs:  Component      Latest Ref Rng & Units 9/19/2019   WBC      4.0 - 11.0 10e9/L 6.7   RBC Count      4.4 - 5.9 10e12/L 4.34 (L) "   Hemoglobin      13.3 - 17.7 g/dL 13.1 (L)   Hematocrit      40.0 - 53.0 % 41.9   MCV      78 - 100 fl 97   MCH      26.5 - 33.0 pg 30.2   MCHC      31.5 - 36.5 g/dL 31.3 (L)   RDW      10.0 - 15.0 % 13.6   Platelet Count      150 - 450 10e9/L 240   Diff Method       Automated Method   % Neutrophils      % 51.0   % Lymphocytes      % 32.5   % Monocytes      % 8.8   % Eosinophils      % 5.6   % Basophils      % 1.8   % Immature Granulocytes      % 0.3   Nucleated RBCs      0 /100 0   Absolute Neutrophil      1.6 - 8.3 10e9/L 3.4   Absolute Lymphocytes      0.8 - 5.3 10e9/L 2.2   Absolute Monocytes      0.0 - 1.3 10e9/L 0.6   Absolute Eosinophils      0.0 - 0.7 10e9/L 0.4   Absolute Basophils      0.0 - 0.2 10e9/L 0.1   Abs Immature Granulocytes      0 - 0.4 10e9/L 0.0   Absolute Nucleated RBC       0.0   Sodium      133 - 144 mmol/L 141   Potassium      3.4 - 5.3 mmol/L 4.1   Chloride      94 - 109 mmol/L 108   Carbon Dioxide      20 - 32 mmol/L 28   Anion Gap      3 - 14 mmol/L 5   Glucose      70 - 99 mg/dL 85   Urea Nitrogen      7 - 30 mg/dL 17   Creatinine      0.66 - 1.25 mg/dL 0.90   GFR Estimate      >60 mL/min/1.73:m2 78   GFR Estimate If Black      >60 mL/min/1.73:m2 >90   Calcium      8.5 - 10.1 mg/dL 8.6   PTT      22 - 37 sec 52 (H)       Imaging:  US venous left leg done on 2019:  Nonocclusive DVT of one of the paired peroneal veins in the mid calf. Baker's cyst.    Ultrasound done on 2019 of the left le.  There is completely occlusive thrombus the paired peroneal veins from the proximal to distal calf, this has progressed from prior. 2.  Unchanged Baker's cyst.    Ultrasound done today of the left le. Left leg peroneal vein thrombus has decreased proximally but remains present in the mid peroneal veins. Distal peroneal veins are not well seen and likely remain occluded. 2. Continued medial knee cystic structures which may represent extension of a Baker's cyst. If this is clinically  symptomatic consider MRI evaluation.     Assessment:  In summary, Tom is an 84 year old male who has a history of aortic insufficiency, who is found to have an unprovoked left distal lower extremity DVT on 5/29/2019, who was found to have an extension of his DVT despite being on 3 months of Eliquis, currently on Pradaxa for the past 6 weeks, returns to clinic today for a follow up visit. His repeat ultrasound today showed improvement of his left leg DVT suggesting Pradaxa is effective. This also deemed that this patient has failed Eliquis.     He is tolerating Pradaxa well without any bleeding issues.      Diagnosis:  1. Isolated distal left lower extremity DVT found on 5/29/2019 (unprovoked event).  2. Extension of left leg DVT found on 8/27/2019 despite being on Eliquis.   3. Improvement of DVT on today's ultrasound while on Pradaxa for the past 6 weeks.     Plan:  Since there is documented improvement of his left leg DVT while on Pradaxa, we will continue him with Pradaxa at this time to complete a total of 3 months Pradaxa course. He will have 6 more weeks to complete the 3 months course. Will repeat ultrasound again at the end of Nov 2019. Then will likely consider stopping anticoagulation therapy at that time.     We do recommend age appropriate cancer screening for all of our patients with a history of DVT/PE. The patient reports to me that his last colonoscopy was done about 5 years ago and it was normal. He was told at the time that he does not need further screening colonoscopy. I will defer further age appropriate cancer screening to his primary care physician, for whom Tom has not seen for quite some time. I have instructed the patient to call Dr. Robertson' office to schedule an appointment with her for routine physical evaluation.     Florence Ramires, nurse clinician is present throughout the entire clinic visit.    Case discussed with Dr. Josh Mayes, staff hematologist. He agrees with the above  plan and recommendation.       Ahmet Márquez PA-C, MPAS  Physician Assistant  [facility].

## 2019-10-11 NOTE — PATIENT INSTRUCTIONS
1. Continue Pradaxa 150 mg every 12 hours until next appointment.   2. Return to clinic on November 26th to see Ahmet Márquez PA-C after a repeat ultrasound

## 2019-10-22 ENCOUNTER — ANCILLARY PROCEDURE (OUTPATIENT)
Dept: CARDIOLOGY | Facility: CLINIC | Age: 84
End: 2019-10-22
Attending: STUDENT IN AN ORGANIZED HEALTH CARE EDUCATION/TRAINING PROGRAM
Payer: MEDICARE

## 2019-10-22 DIAGNOSIS — R20.2 PARESTHESIA: ICD-10-CM

## 2019-10-22 RX ORDER — ACYCLOVIR 200 MG/1
10 CAPSULE ORAL ONCE
Status: COMPLETED | OUTPATIENT
Start: 2019-10-22 | End: 2019-10-22

## 2019-10-22 RX ADMIN — ACYCLOVIR 10 ML: 200 CAPSULE ORAL at 18:25

## 2019-10-24 ENCOUNTER — OFFICE VISIT (OUTPATIENT)
Dept: NEUROLOGY | Facility: CLINIC | Age: 84
End: 2019-10-24
Attending: STUDENT IN AN ORGANIZED HEALTH CARE EDUCATION/TRAINING PROGRAM
Payer: MEDICARE

## 2019-10-24 VITALS — DIASTOLIC BLOOD PRESSURE: 59 MMHG | SYSTOLIC BLOOD PRESSURE: 117 MMHG | HEART RATE: 72 BPM | OXYGEN SATURATION: 97 %

## 2019-10-24 DIAGNOSIS — R20.0 FACIAL NUMBNESS: Primary | ICD-10-CM

## 2019-10-24 SDOH — HEALTH STABILITY: MENTAL HEALTH: HOW MANY DRINKS CONTAINING ALCOHOL DO YOU HAVE ON A TYPICAL DAY WHEN YOU ARE DRINKING?: 1 OR 2

## 2019-10-24 SDOH — HEALTH STABILITY: MENTAL HEALTH: HOW OFTEN DO YOU HAVE A DRINK CONTAINING ALCOHOL?: 4 OR MORE TIMES A WEEK

## 2019-10-24 ASSESSMENT — PAIN SCALES - GENERAL: PAINLEVEL: NO PAIN (0)

## 2019-10-24 NOTE — NURSING NOTE
Chief Complaint   Patient presents with     Consult     [facility] - PARESTHESIA, HOSPITAL FOLLOW UP, PARESTHESIA       Ilir Keen, EMT

## 2019-10-24 NOTE — LETTER
10/24/2019       RE: Tom Saini  [address] [facility] [id number]     Dear Colleague,    Thank you for referring your patient, Tom Saini, to the [facility] NEUROLOGY at [facility]. Please see a copy of my visit note below.    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 He underwent CT/CTA in the ED which were without abnormality or high grade stenosis. He was discharged prior to MRI because he was already anticoagulated and treatment would not change based on MRI. He has since undergone MRI which was without evidence of restricted diffusion consistent with stroke. His symptoms are largely resolved, but sometimes recur when he brushes his left V1 or V2 distribution, it feels about 5% as numb as it did when he was first evaluated. TTE was also performed and there was no evidence of intracardiac embolic source or PFO, however his previously diagnosed aortic insufficiency was demonstrated to be somewhat progressed. He denies chest pain, palpitations, dyspnea, lightheadedness, or loss of consciousness. He has had no further or different neurologic symptoms in the mean time. A1c is 5.7, LDL is 66 on atorvastatin 10 mg daily. He has some dry eyes, but this has only been since he started avastin injections for macular degeneration, and he denies dry mouth.    ASSESSMENT AND PLAN:  #Left facial sensory disturbance, V1 and V2 distribution- persistent symptoms and no DWI lesion rules out  stroke, other than that a small lesion could be missed by 5 mm slices through the brainstem, however V1 innnervation is crossed and a single brainstem infarct would not cause sensory loss in V1. No evidence of contrast enhancement of the cranial nerves as they exit the brainstem to suggest a demyelinating process such as sarcoid or Sjogren's. Low index of suspicion for neuro lyme. No suggestion of DM from A1c 5.7 to cause a diabetic cranial neuropathy. A mild peripheral cranial trigeminal neuropathy could explain his symptoms, but high risk etiologies have been ruled out and symptoms have been improving/minimal without any treatment.     TTE negative bubble, mild LAE  MRI negative for DWI lesion, no superficial siderosis  A1c 5.7  LDL 66    Plan:  - no need for further evaluation at this time  - no need for follow up, can return to clinic PRN  - will forward TTE result to PCP to discuss progression of AI (appointment in December for a physical)  - if Heme/Onc decides to discontinue anticoagulation, this is fine from a neurologic perspective as there has been no stroke, and TIA does not fit with timing/pattern/anatomy, but would continue ASA 81 mg lifelong.    Patient was seen and discussed with Dr. Mon.    Bela Johnson MD  PGY-4 Resident  [facility] Department of Neurology  Pager: [phone]    PHYSICAL EXAMINATION:  Vitals: /59 (BP Location: Right arm, Patient Position: Sitting, Cuff Size: Adult Regular)   Pulse 72   SpO2 97%   General: elderly male patient, seated on chair, NAD, unaccompanied  HEENT: at/nc, oropharynx clear, mucosa moist  Cardiac: RRR, no m/r/g  Pulmonary: CTAB, nonlabored on RA  Abdomen: soft, nontender, nondistended, BS+  Extremities: warm, dry, w/o edema  Skin: no jaundice or rash  Psych: pleasant affect and congruent mood, thought content w/o abnormality, process linear and logical  Neuro: fundi benign   Mental status: alert and oriented to person, place,  and time; language is fluent with intact comprehension and naming   Cranial nerves: VFF, PERRL, EOMI, no facial asymmetry, facial sensation minimally diminished to light touch L V1, V2, intact to pin, vibration and temperature there and elsewhere, tongue and uvula are midline, no dysarthria   Motor: No abnormal posture, tone, atrophy, or movements/fasciculations.        Biceps Triceps Deltoid Hip flexor Knee extension Knee flexion Ankle extension Ankle flexion   Right 5 5 5 5 5 5 5 5 5   Left 5 5 5 5 5 5 5 5 5    Reflexes: Absent Babinski. Absent Davidson.   Brachioradialis Biceps Triceps Patellar Achilles   Right 2+ 2+ 2+ 2+ absent   Left 2+ 2+ 2+ 2+ absent    Sensory: Intact to light touch, pin prick, and vibration in all extremities. Romberg exam within normal limits.   Coordination: Intact FNF and heel-shin. No Dysmetria. No Dysdiadochokinesia.   Gait: Normal width, stride length, turn, and arm swing.    INVESTIGATIONS:   MRI brain w/ and w/o for stroke 10/3/19  Impression:  1. No acute intracranial pathology. No evidence of superficial  siderosis.  2. No evidence of vascular compression of the trigeminal nerves.  3. Mild leukoaraiosis.  4. Head MRA demonstrates no definite aneurysm or stenosis of the major  intracranial arteries.  5. Neck MRA demonstrates patent major cervical arteries.    TTE with bubble study 10/22/19  Interpretation Summary  Global and regional left ventricular function is normal with an EF of 60-65%.  The right ventricle is normal in function and size.  The atrial septum is intact as assessed by color Doppler and agitated dextrose  bubble study .  Mild aortic insufficiency is present.  No pericardial effusion is present.  Proximal ascending aorta is dilated measuring 4cm (indexed value of 2cm/m2).  Sinuses of valsalva measure 3.7cm.     This study was compared with the study from 5/23/14 the aorta is larger in  size.  .  _____________________________________________________________________________     Left Ventricle  Left ventricular size is normal. Global and regional left ventricular function  is normal with an EF of 60-65%. Relative wall thickness is increased  consistent with concentric remodeling. Left ventricular diastolic function is  indeterminate. No regional wall motion abnormalities are seen.     Right Ventricle  The right ventricle is normal size. Global right ventricular function is  normal.     Atria  The right atria appears normal. Mild left atrial enlargement is present. The  atrial septum is intact as assessed by color Doppler and agitated dextrose  bubble study .     Mitral Valve  Mild mitral annular calcification is present. Trace mitral insufficiency is  present.     Aortic Valve  The aortic valve is tricuspid. Trileaflet aortic sclerosis without stenosis.  Mild aortic insufficiency is present.     Tricuspid Valve  The tricuspid valve is normal. Trace to mild tricuspid insufficiency is  present. Right ventricular systolic pressure is 26mmHg above the right atrial  pressure.     Pulmonic Valve  The pulmonic valve is normal.     Vessels  Proximal ascending aorta is dilated measuring 4cm (indexed value of 2cm/m2).  Sinuses of valsalva measure 3.7cm. The inferior vena cava was normal in size  with preserved respiratory variability. The pulmonary artery and bifurcation  cannot be assessed. IVC diameter <2.1 cm collapsing >50% with sniff suggests a  normal RA pressure of 3 mmHg.     Pericardium  No pericardial effusion is present.     Compared to Previous Study  This study was compared with the study from 5/23/14 the aorta is larger in  size. . There has been no change.    REVIEW OF SYSTEMS: 12-point RoS negative except as per HPI.    ALLERGIES:  Allergies   Allergen Reactions     Codeine Sulfate Nausea and Vomiting     MEDICATIONS:  Current Outpatient Medications   Medication Sig Dispense Refill     atorvastatin  (LIPITOR) 10 MG tablet Take 1 tablet (10 mg) by mouth daily 90 tablet 0     Cholecalciferol (VITAMIN D3) 1000 units CAPS Take 1 capsule by mouth daily.       dabigatran ANTICOAGULANT (PRADAXA) 150 MG capsule Take 1 capsule (150 mg) by mouth 2 times daily Store in original 's bottle or blister pack; use within 120 days of opening. 60 capsule 0     multivitamin (OCUVITE) TABS Take 1 tablet by mouth 2 times daily        aspirin 81 MG tablet Take 1 tablet by mouth daily.  3     order for DME Equipment being ordered: Compression stockings, knee high, measured for patient, 20-30mmHg 3 Units 3       PAST MEDICAL HISTORY:  Past Medical History:   Diagnosis Date     Aortic insufficiency      Erectile dysfunction      Hyperlipidemia      Low back pain     disc disease s/p laminectomy in past     Macular degeneration      Pancreatic cyst 07/2018    13 mm, needs 6 months follow up MRI     Shingles 2013       PAST SURGICAL HISTORY:  Past Surgical History:   Procedure Laterality Date     CATARACT IOL, RT/LT  2001     COMBINED REPAIR PTOSIS WITH BLEPHAROPLASTY BILATERAL Bilateral 4/6/2018    Procedure: COMBINED REPAIR PTOSIS WITH BLEPHAROPLASTY BILATERAL;  BILATERAL UPPER EYELIDS BLEPHAROPLASTY AND PTOSIS REPAIR ;  Surgeon: Anuj Good MD;  Location: [facility]     EYE SURGERY       LAMINECTOMY LUMBAR TWO LEVELS  1993    L4-L5     RECESSION RESECTION (REPAIR STRABISMUS)  1949             SOCIAL HISTORY:  Social History     Socioeconomic History     Marital status:      Spouse name: Not on file     Number of children: Not on file     Years of education: Not on file     Highest education level: Not on file   Occupational History     Not on file   Social Needs     Financial resource strain: Not on file     Food insecurity:     Worry: Not on file     Inability: Not on file     Transportation needs:     Medical: Not on file     Non-medical: Not on file   Tobacco Use     Smoking status: Former Smoker     Packs/day:  2.00     Years: 2.00     Pack years: 4.00     Last attempt to quit: 1954     Years since quittin.3     Smokeless tobacco: Never Used   Substance and Sexual Activity     Alcohol use: Yes     Frequency: 4 or more times a week     Drinks per session: 1 or 2     Comment: glass of wine with dinner     Drug use: No     Sexual activity: Not on file   Lifestyle     Physical activity:     Days per week: Not on file     Minutes per session: Not on file     Stress: Not on file   Relationships     Social connections:     Talks on phone: Not on file     Gets together: Not on file     Attends Yarsani service: Not on file     Active member of club or organization: Not on file     Attends meetings of clubs or organizations: Not on file     Relationship status: Not on file     Intimate partner violence:     Fear of current or ex partner: Not on file     Emotionally abused: Not on file     Physically abused: Not on file     Forced sexual activity: Not on file   Other Topics Concern     Parent/sibling w/ CABG, MI or angioplasty before 65F 55M? Not Asked   Social History Narrative    Retired     [employer]  and traveler       FAMILY HISTORY:  Family History   Problem Relation Age of Onset     Cancer Father         bladder     Cancer Mother         unknown skin cancer     Skin Cancer Mother      Glaucoma No family hx of      Macular Degeneration No family hx of        Again, thank you for allowing me to participate in the care of your patient.  Sincerely,    Bela Johnson MD

## 2019-10-24 NOTE — PATIENT INSTRUCTIONS
No need for further testing or treatment at this time.    If your symptoms change or worsen (new sensory abnormalities, numbness, tingling, weakness, speech difficulty) please contact us and we would be happy to see you again.    I will notify your primary doctor about the results of your echocardiogram.

## 2019-10-24 NOTE — PROGRESS NOTES
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 He underwent CT/CTA in the ED which were without abnormality or high grade stenosis. He was discharged prior to MRI because he was already anticoagulated and treatment would not change based on MRI. He has since undergone MRI which was without evidence of restricted diffusion consistent with stroke. His symptoms are largely resolved, but sometimes recur when he brushes his left V1 or V2 distribution, it feels about 5% as numb as it did when he was first evaluated. TTE was also performed and there was no evidence of intracardiac embolic source or PFO, however his previously diagnosed aortic insufficiency was demonstrated to be somewhat progressed. He denies chest pain, palpitations, dyspnea, lightheadedness, or loss of consciousness. He has had no further or different neurologic symptoms in the mean time. A1c is 5.7, LDL is 66 on atorvastatin 10 mg daily. He has some dry eyes, but this has only been since he started avastin injections for macular degeneration, and he denies dry mouth.    ASSESSMENT AND PLAN:  #Left facial sensory disturbance, V1 and V2 distribution- persistent symptoms and no DWI lesion rules out stroke, other than that a small lesion could be missed by 5 mm slices through the brainstem, however V1 innnervation is crossed and a single brainstem infarct would not cause sensory loss in V1. No evidence of contrast enhancement of the cranial nerves as they exit the brainstem  to suggest a demyelinating process such as sarcoid or Sjogren's. Low index of suspicion for neuro lyme. No suggestion of DM from A1c 5.7 to cause a diabetic cranial neuropathy. A mild peripheral cranial trigeminal neuropathy could explain his symptoms, but high risk etiologies have been ruled out and symptoms have been improving/minimal without any treatment.     TTE negative bubble, mild LAE  MRI negative for DWI lesion, no superficial siderosis  A1c 5.7  LDL 66    Plan:  - no need for further evaluation at this time  - no need for follow up, can return to clinic PRN  - will forward TTE result to PCP to discuss progression of AI (appointment in December for a physical)  - if Heme/Onc decides to discontinue anticoagulation, this is fine from a neurologic perspective as there has been no stroke, and TIA does not fit with timing/pattern/anatomy, but would continue ASA 81 mg lifelong.    Patient was seen and discussed with Dr. Mon.    Bela Johnson MD  PGY-4 Resident  [facility] Department of Neurology  Pager: [phone]    This encounter is being closed for administrative reasons. Dr. Kassi Villalobos no longer works at the institution nor has access to Epic. I was not involved in supervising the resident physicians care. No charge for visit.   Orquidea Forbes MD    PHYSICAL EXAMINATION:  Vitals: /59 (BP Location: Right arm, Patient Position: Sitting, Cuff Size: Adult Regular)   Pulse 72   SpO2 97%   General: elderly male patient, seated on chair, NAD, unaccompanied  HEENT: at/nc, oropharynx clear, mucosa moist  Cardiac: RRR, no m/r/g  Pulmonary: CTAB, nonlabored on RA  Abdomen: soft, nontender, nondistended, BS+  Extremities: warm, dry, w/o edema  Skin: no jaundice or rash  Psych: pleasant affect and congruent mood, thought content w/o abnormality, process linear and logical  Neuro: fundi benign   Mental status: alert and oriented to person, place, and time; language is fluent with  intact comprehension and naming   Cranial nerves: VFF, PERRL, EOMI, no facial asymmetry, facial sensation minimally diminished to light touch L V1, V2, intact to pin, vibration and temperature there and elsewhere, tongue and uvula are midline, no dysarthria   Motor: No abnormal posture, tone, atrophy, or movements/fasciculations.    Biceps Triceps Deltoid Hip flexor Knee extension Knee flexion Ankle extension Ankle flexion   Right 5 5 5 5 5 5 5 5 5   Left 5 5 5 5 5 5 5 5 5    Reflexes: Absent Babinski. Absent Davidson.   Brachioradialis Biceps Triceps Patellar Achilles   Right 2+ 2+ 2+ 2+ absent   Left 2+ 2+ 2+ 2+ absent    Sensory: Intact to light touch, pin prick, and vibration in all extremities. Romberg exam within normal limits.   Coordination: Intact FNF and heel-shin. No Dysmetria. No Dysdiadochokinesia.   Gait: Normal width, stride length, turn, and arm swing.    INVESTIGATIONS:   MRI brain w/ and w/o for stroke 10/3/19  Impression:  1. No acute intracranial pathology. No evidence of superficial  siderosis.  2. No evidence of vascular compression of the trigeminal nerves.  3. Mild leukoaraiosis.  4. Head MRA demonstrates no definite aneurysm or stenosis of the major  intracranial arteries.  5. Neck MRA demonstrates patent major cervical arteries.    TTE with bubble study 10/22/19  Interpretation Summary  Global and regional left ventricular function is normal with an EF of 60-65%.  The right ventricle is normal in function and size.  The atrial septum is intact as assessed by color Doppler and agitated dextrose  bubble study .  Mild aortic insufficiency is present.  No pericardial effusion is present.  Proximal ascending aorta is dilated measuring 4cm (indexed value of 2cm/m2).  Sinuses of valsalva measure 3.7cm.     This study was compared with the study from 5/23/14 the aorta is larger in  size. .        _____________________________________________________________________________  __        Left  Ventricle  Left ventricular size is normal. Global and regional left ventricular function  is normal with an EF of 60-65%. Relative wall thickness is increased  consistent with concentric remodeling. Left ventricular diastolic function is  indeterminate. No regional wall motion abnormalities are seen.     Right Ventricle  The right ventricle is normal size. Global right ventricular function is  normal.     Atria  The right atria appears normal. Mild left atrial enlargement is present. The  atrial septum is intact as assessed by color Doppler and agitated dextrose  bubble study .     Mitral Valve  Mild mitral annular calcification is present. Trace mitral insufficiency is  present.        Aortic Valve  The aortic valve is tricuspid. Trileaflet aortic sclerosis without stenosis.  Mild aortic insufficiency is present.     Tricuspid Valve  The tricuspid valve is normal. Trace to mild tricuspid insufficiency is  present. Right ventricular systolic pressure is 26mmHg above the right atrial  pressure.     Pulmonic Valve  The pulmonic valve is normal.     Vessels  Proximal ascending aorta is dilated measuring 4cm (indexed value of 2cm/m2).  Sinuses of valsalva measure 3.7cm. The inferior vena cava was normal in size  with preserved respiratory variability. The pulmonary artery and bifurcation  cannot be assessed. IVC diameter <2.1 cm collapsing >50% with sniff suggests a  normal RA pressure of 3 mmHg.     Pericardium  No pericardial effusion is present.        Compared to Previous Study  This study was compared with the study from 5/23/14 the aorta is larger in  size. . There has been no change.    REVIEW OF SYSTEMS: 12-point RoS negative except as per HPI.    ALLERGIES:  Allergies   Allergen Reactions     Codeine Sulfate Nausea and Vomiting     MEDICATIONS:  Current Outpatient Medications   Medication Sig Dispense Refill     atorvastatin (LIPITOR) 10 MG tablet Take 1 tablet (10 mg) by mouth daily 90 tablet 0      Cholecalciferol (VITAMIN D3) 1000 units CAPS Take 1 capsule by mouth daily.       dabigatran ANTICOAGULANT (PRADAXA) 150 MG capsule Take 1 capsule (150 mg) by mouth 2 times daily Store in original 's bottle or blister pack; use within 120 days of opening. 60 capsule 0     multivitamin (OCUVITE) TABS Take 1 tablet by mouth 2 times daily        aspirin 81 MG tablet Take 1 tablet by mouth daily.  3     order for DME Equipment being ordered: Compression stockings, knee high, measured for patient, 20-30mmHg 3 Units 3     PAST MEDICAL HISTORY:  Past Medical History:   Diagnosis Date     Aortic insufficiency      Erectile dysfunction      Hyperlipidemia      Low back pain     disc disease s/p laminectomy in past     Macular degeneration      Pancreatic cyst 07/2018    13 mm, needs 6 months follow up MRI     Shingles 2013     PAST SURGICAL HISTORY:  Past Surgical History:   Procedure Laterality Date     CATARACT IOL, RT/LT  2001     COMBINED REPAIR PTOSIS WITH BLEPHAROPLASTY BILATERAL Bilateral 4/6/2018    Procedure: COMBINED REPAIR PTOSIS WITH BLEPHAROPLASTY BILATERAL;  BILATERAL UPPER EYELIDS BLEPHAROPLASTY AND PTOSIS REPAIR ;  Surgeon: Anuj Good MD;  Location: [facility]     EYE SURGERY       LAMINECTOMY LUMBAR TWO LEVELS  1993    L4-L5     RECESSION RESECTION (REPAIR STRABISMUS)  1949     SOCIAL HISTORY:  Social History     Socioeconomic History     Marital status:      Spouse name: Not on file     Number of children: Not on file     Years of education: Not on file     Highest education level: Not on file   Occupational History     Not on file   Social Needs     Financial resource strain: Not on file     Food insecurity:     Worry: Not on file     Inability: Not on file     Transportation needs:     Medical: Not on file     Non-medical: Not on file   Tobacco Use     Smoking status: Former Smoker     Packs/day: 2.00     Years: 2.00     Pack years: 4.00     Last attempt to quit: 6/25/1954     Years  since quittin.3     Smokeless tobacco: Never Used   Substance and Sexual Activity     Alcohol use: Yes     Frequency: 4 or more times a week     Drinks per session: 1 or 2     Comment: glass of wine with dinner     Drug use: No     Sexual activity: Not on file   Lifestyle     Physical activity:     Days per week: Not on file     Minutes per session: Not on file     Stress: Not on file   Relationships     Social connections:     Talks on phone: Not on file     Gets together: Not on file     Attends Holiness service: Not on file     Active member of club or organization: Not on file     Attends meetings of clubs or organizations: Not on file     Relationship status: Not on file     Intimate partner violence:     Fear of current or ex partner: Not on file     Emotionally abused: Not on file     Physically abused: Not on file     Forced sexual activity: Not on file   Other Topics Concern     Parent/sibling w/ CABG, MI or angioplasty before 65F 55M? Not Asked   Social History Narrative    Retired     [employer]  and traveler     FAMILY HISTORY:  Family History   Problem Relation Age of Onset     Cancer Father         bladder     Cancer Mother         unknown skin cancer     Skin Cancer Mother      Glaucoma No family hx of      Macular Degeneration No family hx of

## 2019-10-24 NOTE — Clinical Note
10/24/2019       RE: Tom Saini  [address] [facility] [address]     Dear Colleague,    Thank you for referring your patient, Tom Saini, to the [facility] NEUROLOGY at [facility]. Please see a copy of my visit note below.      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 He underwent CT/CTA in the ED which were without abnormality or high grade stenosis. He was discharged prior to MRI because he was already anticoagulated and treatment would not change based on MRI. He has since undergone MRI which was without evidence of restricted diffusion consistent with stroke. His symptoms are largely resolved, but sometimes recur when he brushes his left V1 or V2 distribution, it feels about 5% as numb as it did when he was first evaluated. TTE was also performed and there was no evidence of intracardiac embolic source or PFO, however his previously diagnosed aortic insufficiency was demonstrated to be somewhat progressed. He denies chest pain, palpitations, dyspnea, lightheadedness, or loss of consciousness. He has had no further or different neurologic symptoms in the mean time. A1c is 5.7, LDL is 66 on atorvastatin 10 mg daily. He has some dry eyes, but this has only been since he started avastin injections for macular degeneration, and he denies dry mouth.    ASSESSMENT AND PLAN:  #Left facial sensory disturbance, V1 and V2 distribution- persistent symptoms and no DWI lesion rules out  stroke, other than that a small lesion could be missed by 5 mm slices through the brainstem, however V1 innnervation is crossed and a single brainstem infarct would not cause sensory loss in V1. No evidence of contrast enhancement of the cranial nerves as they exit the brainstem to suggest a demyelinating process such as sarcoid or Sjogren's. Low index of suspicion for neuro lyme. No suggestion of DM from A1c 5.7 to cause a diabetic cranial neuropathy. A mild peripheral cranial trigeminal neuropathy could explain his symptoms, but high risk etiologies have been ruled out and symptoms have been improving/minimal without any treatment.     TTE negative bubble, mild LAE  MRI negative for DWI lesion, no superficial siderosis  A1c 5.7  LDL 66    Plan:  - no need for further evaluation at this time  - no need for follow up, can return to clinic PRN  - will forward TTE result to PCP to discuss progression of AI (appointment in December for a physical)  - if Heme/Onc decides to discontinue anticoagulation, this is fine from a neurologic perspective as there has been no stroke, and TIA does not fit with timing/pattern/anatomy, but would continue ASA 81 mg lifelong.    Patient was seen and discussed with Dr. Mon.    eBla Johnson MD  PGY-4 Resident  [facility] Department of Neurology  Pager: [phone]    PHYSICAL EXAMINATION:  Vitals: /59 (BP Location: Right arm, Patient Position: Sitting, Cuff Size: Adult Regular)   Pulse 72   SpO2 97%   General: elderly male patient, seated on chair, NAD, unaccompanied  HEENT: at/nc, oropharynx clear, mucosa moist  Cardiac: RRR, no m/r/g  Pulmonary: CTAB, nonlabored on RA  Abdomen: soft, nontender, nondistended, BS+  Extremities: warm, dry, w/o edema  Skin: no jaundice or rash  Psych: pleasant affect and congruent mood, thought content w/o abnormality, process linear and logical  Neuro: fundi benign   Mental status: alert and oriented to person, place,  and time; language is fluent with intact comprehension and naming   Cranial nerves: VFF, PERRL, EOMI, no facial asymmetry, facial sensation minimally diminished to light touch L V1, V2, intact to pin, vibration and temperature there and elsewhere, tongue and uvula are midline, no dysarthria   Motor: No abnormal posture, tone, atrophy, or movements/fasciculations.    Biceps Triceps Deltoid Hip flexor Knee extension Knee flexion Ankle extension Ankle flexion   Right 5 5 5 5 5 5 5 5 5   Left 5 5 5 5 5 5 5 5 5    Reflexes: Absent Babinski. Absent Davidson.   Brachioradialis Biceps Triceps Patellar Achilles   Right 2+ 2+ 2+ 2+ absent   Left 2+ 2+ 2+ 2+ absent    Sensory: Intact to light touch, pin prick, and vibration in all extremities. Romberg exam within normal limits.   Coordination: Intact FNF and heel-shin. No Dysmetria. No Dysdiadochokinesia.   Gait: Normal width, stride length, turn, and arm swing.    INVESTIGATIONS:   MRI brain w/ and w/o for stroke 10/3/19  Impression:  1. No acute intracranial pathology. No evidence of superficial  siderosis.  2. No evidence of vascular compression of the trigeminal nerves.  3. Mild leukoaraiosis.  4. Head MRA demonstrates no definite aneurysm or stenosis of the major  intracranial arteries.  5. Neck MRA demonstrates patent major cervical arteries.    TTE with bubble study 10/22/19  Interpretation Summary  Global and regional left ventricular function is normal with an EF of 60-65%.  The right ventricle is normal in function and size.  The atrial septum is intact as assessed by color Doppler and agitated dextrose  bubble study .  Mild aortic insufficiency is present.  No pericardial effusion is present.  Proximal ascending aorta is dilated measuring 4cm (indexed value of 2cm/m2).  Sinuses of valsalva measure 3.7cm.     This study was compared with the study from 5/23/14 the aorta is larger in  size. .         _____________________________________________________________________________  __        Left Ventricle  Left ventricular size is normal. Global and regional left ventricular function  is normal with an EF of 60-65%. Relative wall thickness is increased  consistent with concentric remodeling. Left ventricular diastolic function is  indeterminate. No regional wall motion abnormalities are seen.     Right Ventricle  The right ventricle is normal size. Global right ventricular function is  normal.     Atria  The right atria appears normal. Mild left atrial enlargement is present. The  atrial septum is intact as assessed by color Doppler and agitated dextrose  bubble study .     Mitral Valve  Mild mitral annular calcification is present. Trace mitral insufficiency is  present.        Aortic Valve  The aortic valve is tricuspid. Trileaflet aortic sclerosis without stenosis.  Mild aortic insufficiency is present.     Tricuspid Valve  The tricuspid valve is normal. Trace to mild tricuspid insufficiency is  present. Right ventricular systolic pressure is 26mmHg above the right atrial  pressure.     Pulmonic Valve  The pulmonic valve is normal.     Vessels  Proximal ascending aorta is dilated measuring 4cm (indexed value of 2cm/m2).  Sinuses of valsalva measure 3.7cm. The inferior vena cava was normal in size  with preserved respiratory variability. The pulmonary artery and bifurcation  cannot be assessed. IVC diameter <2.1 cm collapsing >50% with sniff suggests a  normal RA pressure of 3 mmHg.     Pericardium  No pericardial effusion is present.        Compared to Previous Study  This study was compared with the study from 5/23/14 the aorta is larger in  size. . There has been no change.    REVIEW OF SYSTEMS: 12-point RoS negative except as per HPI.    ALLERGIES:  Allergies   Allergen Reactions     Codeine Sulfate Nausea and Vomiting     MEDICATIONS:  Current Outpatient Medications   Medication Sig Dispense Refill      atorvastatin (LIPITOR) 10 MG tablet Take 1 tablet (10 mg) by mouth daily 90 tablet 0     Cholecalciferol (VITAMIN D3) 1000 units CAPS Take 1 capsule by mouth daily.       dabigatran ANTICOAGULANT (PRADAXA) 150 MG capsule Take 1 capsule (150 mg) by mouth 2 times daily Store in original 's bottle or blister pack; use within 120 days of opening. 60 capsule 0     multivitamin (OCUVITE) TABS Take 1 tablet by mouth 2 times daily        aspirin 81 MG tablet Take 1 tablet by mouth daily.  3     order for DME Equipment being ordered: Compression stockings, knee high, measured for patient, 20-30mmHg 3 Units 3     PAST MEDICAL HISTORY:  Past Medical History:   Diagnosis Date     Aortic insufficiency      Erectile dysfunction      Hyperlipidemia      Low back pain     disc disease s/p laminectomy in past     Macular degeneration      Pancreatic cyst 07/2018    13 mm, needs 6 months follow up MRI     Shingles 2013     PAST SURGICAL HISTORY:  Past Surgical History:   Procedure Laterality Date     CATARACT IOL, RT/LT  2001     COMBINED REPAIR PTOSIS WITH BLEPHAROPLASTY BILATERAL Bilateral 4/6/2018    Procedure: COMBINED REPAIR PTOSIS WITH BLEPHAROPLASTY BILATERAL;  BILATERAL UPPER EYELIDS BLEPHAROPLASTY AND PTOSIS REPAIR ;  Surgeon: Anuj Good MD;  Location: [facility]     EYE SURGERY       LAMINECTOMY LUMBAR TWO LEVELS  1993    L4-L5     RECESSION RESECTION (REPAIR STRABISMUS)  1949     SOCIAL HISTORY:  Social History     Socioeconomic History     Marital status:      Spouse name: Not on file     Number of children: Not on file     Years of education: Not on file     Highest education level: Not on file   Occupational History     Not on file   Social Needs     Financial resource strain: Not on file     Food insecurity:     Worry: Not on file     Inability: Not on file     Transportation needs:     Medical: Not on file     Non-medical: Not on file   Tobacco Use     Smoking status: Former Smoker     Packs/day:  2.00     Years: 2.00     Pack years: 4.00     Last attempt to quit: 1954     Years since quittin.3     Smokeless tobacco: Never Used   Substance and Sexual Activity     Alcohol use: Yes     Frequency: 4 or more times a week     Drinks per session: 1 or 2     Comment: glass of wine with dinner     Drug use: No     Sexual activity: Not on file   Lifestyle     Physical activity:     Days per week: Not on file     Minutes per session: Not on file     Stress: Not on file   Relationships     Social connections:     Talks on phone: Not on file     Gets together: Not on file     Attends [employer] service: Not on file     Active member of club or organization: Not on file     Attends meetings of clubs or organizations: Not on file     Relationship status: Not on file     Intimate partner violence:     Fear of current or ex partner: Not on file     Emotionally abused: Not on file     Physically abused: Not on file     Forced sexual activity: Not on file   Other Topics Concern     Parent/sibling w/ CABG, MI or angioplasty before 65F 55M? Not Asked   Social History Narrative    Retired     [employer]  and traveler     FAMILY HISTORY:  Family History   Problem Relation Age of Onset     Cancer Father         bladder     Cancer Mother         unknown skin cancer     Skin Cancer Mother      Glaucoma No family hx of      Macular Degeneration No family hx of        Again, thank you for allowing me to participate in the care of your patient.      Sincerely,    Bela Johnson MD

## 2019-10-31 ENCOUNTER — OFFICE VISIT (OUTPATIENT)
Dept: OPTOMETRY | Facility: CLINIC | Age: 84
End: 2019-10-31
Payer: MEDICARE

## 2019-10-31 ENCOUNTER — OFFICE VISIT (OUTPATIENT)
Dept: OPHTHALMOLOGY | Facility: CLINIC | Age: 84
End: 2019-10-31
Attending: OPHTHALMOLOGY
Payer: MEDICARE

## 2019-10-31 DIAGNOSIS — H35.3211 EXUDATIVE AGE-RELATED MACULAR DEGENERATION OF RIGHT EYE WITH ACTIVE CHOROIDAL NEOVASCULARIZATION (H): Primary | ICD-10-CM

## 2019-10-31 DIAGNOSIS — H52.4 PRESBYOPIA: ICD-10-CM

## 2019-10-31 DIAGNOSIS — H35.3122 INTERMEDIATE STAGE NONEXUDATIVE AGE-RELATED MACULAR DEGENERATION OF LEFT EYE: ICD-10-CM

## 2019-10-31 DIAGNOSIS — H35.3211 EXUDATIVE AGE-RELATED MACULAR DEGENERATION OF RIGHT EYE WITH ACTIVE CHOROIDAL NEOVASCULARIZATION (H): ICD-10-CM

## 2019-10-31 PROCEDURE — C9257 BEVACIZUMAB INJECTION: HCPCS | Mod: ZF | Performed by: OPHTHALMOLOGY

## 2019-10-31 PROCEDURE — 25000128 H RX IP 250 OP 636: Mod: ZF | Performed by: OPHTHALMOLOGY

## 2019-10-31 PROCEDURE — 40000269 ZZH STATISTIC NO CHARGE FACILITY FEE: Mod: ZF

## 2019-10-31 PROCEDURE — 67028 INJECTION EYE DRUG: CPT | Mod: RT,ZF | Performed by: OPHTHALMOLOGY

## 2019-10-31 PROCEDURE — 92134 CPTRZ OPH DX IMG PST SGM RTA: CPT | Mod: ZF | Performed by: OPHTHALMOLOGY

## 2019-10-31 RX ADMIN — Medication 1.25 MG: at 15:13

## 2019-10-31 ASSESSMENT — SLIT LAMP EXAM - LIDS
COMMENTS: PTOSIS OS > OD

## 2019-10-31 ASSESSMENT — TONOMETRY
OD_IOP_MMHG: 10
OS_IOP_MMHG: 10
OD_IOP_MMHG: 10
OS_IOP_MMHG: 10
IOP_METHOD: TONOPEN
IOP_METHOD: TONOPEN

## 2019-10-31 ASSESSMENT — REFRACTION_WEARINGRX
OD_AXIS: 170
OD_SPHERE: -2.75
OS_AXIS: 010
OD_CYLINDER: +1.00
OS_SPHERE: -1.50
OD_ADD: +2.75
OS_ADD: +2.75
OS_CYLINDER: +0.50
SPECS_TYPE: BIFOCAL

## 2019-10-31 ASSESSMENT — VISUAL ACUITY
OD_CC+: -2
METHOD: SNELLEN - LINEAR
METHOD: SNELLEN - LINEAR
OS_CC: 20/30
OD_CC: 20/40
CORRECTION_TYPE: GLASSES
OS_CC: 20/30
OD_CC+: -2
CORRECTION_TYPE: GLASSES
OD_CC: 20/40

## 2019-10-31 ASSESSMENT — EXTERNAL EXAM - RIGHT EYE
OD_EXAM: NORMAL
OD_EXAM: NORMAL

## 2019-10-31 ASSESSMENT — CONF VISUAL FIELD
OD_NORMAL: 1
METHOD: COUNTING FINGERS
OS_NORMAL: 1
OD_NORMAL: 1
OS_NORMAL: 1

## 2019-10-31 ASSESSMENT — REFRACTION_MANIFEST
OS_CYLINDER: +2.50
OD_ADD: +2.25
OS_AXIS: 180
OD_CYLINDER: +1.75
OS_ADD: +2.25
OD_AXIS: 178
OD_SPHERE: -3.00
OS_SPHERE: -1.25

## 2019-10-31 ASSESSMENT — EXTERNAL EXAM - LEFT EYE
OS_EXAM: ET
OS_EXAM: ET

## 2019-10-31 NOTE — NURSING NOTE
Chief Complaints and History of Present Illnesses   Patient presents with     Macular Degeneration Follow Up     Chief Complaint(s) and History of Present Illness(es)     Macular Degeneration Follow Up     Laterality: both eyes    Onset: gradual    Onset: months ago    Quality: Difficulty with dist and near    Frequency: constantly    Associated symptoms: Negative for floaters and flashes    Pain scale: 0/10              Comments     Noemi Epps COT 2:47 PM October 31, 2019

## 2019-10-31 NOTE — PROGRESS NOTES
Chief Complaint(s) and History of Present Illness(es)     Macular Degeneration Follow Up     Laterality: both eyes    Onset: gradual    Onset: months ago    Quality: Difficulty with dist and near    Frequency: constantly    Associated symptoms: Negative for floaters and flashes    Pain scale: 0/10              Comments     Noemi Mich COT 2:47 PM October 31, 2019               Review of systems for the eyes was negative other than the pertinent positives/negatives listed in the HPI.      Assessment & Plan      Tom Saini is a 84 year old male with the following diagnoses:   1. Exudative age-related macular degeneration of right eye with active choroidal neovascularization (H)     2. Exudative age-related macular degeneration of right eye with active choroidal neovascularization (H)          S/P Avastin injection x 12 right eye - last injection 6/18/2019  S/P Eylea x 1 (during avastin syringe back order)    Doing great, stable OCT today  No change in vision   Ok to extend to Q4mo injections   Amsler/AREDS to continue  Appreciate Low vision consult for new glasses prescription     RBA to Avastin right eye discussed, consent obtained, will proceed today.   Dilate annually (due 9/2020)    Patient disposition:   Return in about 4 months (around 2/29/2020) for VT only, OCT Macula, Avastin RIGHT.           Attending Physician Attestation:  Complete documentation of historical and exam elements from today's encounter can be found in the full encounter summary report (not reduplicated in this progress note).  I personally obtained the chief complaint(s) and history of present illness.  I confirmed and edited as necessary the review of systems, past medical/surgical history, family history, social history, and examination findings as documented by others; and I examined the patient myself.  I personally reviewed the relevant tests, images, and reports as documented above.  I formulated and edited as necessary the  assessment and plan and discussed the findings and management plan with the patient and family. . - Brigido Laura MD

## 2019-10-31 NOTE — PROGRESS NOTES
Assessment/Plan  (H35.8746) Exudative age-related macular degeneration of right eye with active choroidal neovascularization (H)  (primary encounter diagnosis)  Comment: Best corrected vision 20/40- OD, 20/25- OS  Plan: Discussed findings with patient. Patient is aware that ocular health remains the source of his visual blur. Some improvement to both distance and near vision were seen today with careful updated refraction. Patient is following up with Dr. Laura later this afternoon for Avastin injection OD. Patient is welcome to return to clinic as needed if vision changes or with prolonged adaptation difficulties to new glasses.     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     (H52.4) Presbyopia  Plan: REFRACTION [29909]        See above.       Complete documentation of historical and exam elements from today's encounter can  be found in the full encounter summary report (not reduplicated in this progress  note). I personally obtained the chief complaint(s) and history of present illness. I  confirmed and edited as necessary the review of systems, past medical/surgical  history, family history, social history, and examination findings as documented by  others; and I examined the patient myself. I personally reviewed the relevant tests,  images, and reports as documented above. I formulated and edited as necessary the  assessment and plan and discussed the findings and management plan with the  patient and family.    Ayan Dickens, OD

## 2019-11-01 ENCOUNTER — TELEPHONE (OUTPATIENT)
Dept: OPHTHALMOLOGY | Facility: CLINIC | Age: 84
End: 2019-11-01

## 2019-11-01 NOTE — TELEPHONE ENCOUNTER
Spoke to pt at 1700    Pt would like and needs per acutual update prescription for Computer distance on top and reading on bottom bifocal    Stated would ask Dr. Dickens to amend last note and another prescription (Rx 2) for computer distance and reading bottom    :[facility] fax:  [phone]

## 2019-11-01 NOTE — TELEPHONE ENCOUNTER
[facility] Call Center    Phone Message    May a detailed message be left on voicemail: yes    Reason for Call: Symptoms or Concerns     If patient has red-flag symptoms, warm transfer to triage line    Current symptom or concern: Patient states he has the Rx that is on MyChart and [facility] are requesting one that include rx for biofocals where top part sees at a two foot distance so he can read the computer. Patient states he was at [facility] for an hour and a half today and still wasn't able to order his glasses. Please fax rx to [facility].     [address]  [place] [address]  Phone: [phone]  Fax: [phone]      Action Taken: Message routed to:  [facility] Ophthalmology

## 2019-11-01 NOTE — TELEPHONE ENCOUNTER
[facility]    Phone Message    May a detailed message be left on voicemail: yes    Reason for Call: Other: Pt saw Maninder yesterday for an eye exam. Pt forgot to ask for a rx for a single vision, computer glasses for a distance of two ft.     Pt would like this to be sent to [facility], [address] [place] [address], [facility].  #: [phone]. Pt unable to provide fax number.     Action Taken: Message routed to:  [facility] eye

## 2019-11-01 NOTE — TELEPHONE ENCOUNTER
Glasses prescription has information for distance prescription and add power for near vision     Left message at 1634  Reviewed glasses Rx has enough information for optical shop to make distance, reading, lined bifocal, progressive bifocal    Pt may take glasses RX to optica shop of choosing and review type of glasses would like to purchase    Reviewed may print from [employer] if does not have Rx already in hand  Josh Araiza RN RN 4:38 PM 11/01/19

## 2019-11-01 NOTE — TELEPHONE ENCOUNTER
[facility]    Phone Message    May a detailed message be left on voicemail: yes    Reason for Call: Other: Pt called to change his previous message - he would like to request an Rx for bifocal lenses instead of single vision.    Action Taken: Message routed to:  [facility] Eye Clinic

## 2019-11-06 ENCOUNTER — TELEPHONE (OUTPATIENT)
Dept: INTERNAL MEDICINE | Facility: CLINIC | Age: 84
End: 2019-11-06

## 2019-11-06 NOTE — TELEPHONE ENCOUNTER
[facility]    Phone Message    May a detailed message be left on voicemail: yes    Reason for Call: Symptoms or Concerns     If patient has red-flag symptoms, warm transfer to triage line    Current symptom or concern: Blister On Harriet     Symptoms have been present for:  10 day(s)    Has patient previously been seen for this? No    By : n/a    Date: n/a    Are there any new or worsening symptoms? Yes: The Blister on right side of harriet on hairline, it s getting more painful, its not due to any Allergic Reaction, to the patients current knowledge.       Action Taken: Message routed to:  [facility] josue

## 2019-11-07 ENCOUNTER — OFFICE VISIT (OUTPATIENT)
Dept: DERMATOLOGY | Facility: CLINIC | Age: 84
End: 2019-11-07
Payer: MEDICARE

## 2019-11-07 ENCOUNTER — OFFICE VISIT (OUTPATIENT)
Dept: FAMILY MEDICINE | Facility: CLINIC | Age: 84
End: 2019-11-07
Payer: MEDICARE

## 2019-11-07 VITALS
TEMPERATURE: 98 F | SYSTOLIC BLOOD PRESSURE: 103 MMHG | OXYGEN SATURATION: 96 % | HEART RATE: 85 BPM | HEIGHT: 71 IN | RESPIRATION RATE: 16 BRPM | WEIGHT: 185.3 LBS | DIASTOLIC BLOOD PRESSURE: 68 MMHG | BODY MASS INDEX: 25.94 KG/M2

## 2019-11-07 DIAGNOSIS — R21 RASH: Primary | ICD-10-CM

## 2019-11-07 DIAGNOSIS — L82.0 SEBORRHEIC KERATOSES, INFLAMED: Primary | ICD-10-CM

## 2019-11-07 DIAGNOSIS — R20.8 SKIN TENDERNESS: ICD-10-CM

## 2019-11-07 ASSESSMENT — MIFFLIN-ST. JEOR: SCORE: 1557.65

## 2019-11-07 ASSESSMENT — ENCOUNTER SYMPTOMS
FATIGUE: 0
FEVER: 0
CHILLS: 0

## 2019-11-07 ASSESSMENT — PAIN SCALES - GENERAL
PAINLEVEL: NO PAIN (0)
PAINLEVEL: NO PAIN (1)

## 2019-11-07 NOTE — PROGRESS NOTES
"       HPI       Tom Saini is a 84 year old man who presents with some changes to his skin on his right temple area. This area is tender to the touch and feels like a \"lump.\" Tom denies any history of skin cancer. He presents today for examination.   Chief Complaint   Patient presents with     Mass     Pt complains of a painful lump on the side of head.     Problem, Medication and Allergy Lists were reviewed and updated if needed.    Patient is an established patient of this clinic.           Review of Systems:   Review of Systems     Constitutional:  Negative for fever, chills and fatigue.   Skin:  Positive for rash.               Physical Exam:     Vitals:    11/07/19 1025   BP: 103/68   BP Location: Right arm   Patient Position: Sitting   Cuff Size: Adult Regular   Pulse: 85   Resp: 16   Temp: 98  F (36.7  C)   TempSrc: Oral   SpO2: 96%   Weight: 84.1 kg (185 lb 4.8 oz)   Height: 1.803 m (5' 11\")     Body mass index is 25.84 kg/m .  Vitals were reviewed and were normal     Physical Exam  Constitutional:       Appearance: Normal appearance.   HENT:      Head: Normocephalic.   Musculoskeletal: Normal range of motion.   Skin:     General: Skin is warm and dry.      Findings: Lesion and rash present.             Comments: Skin color changes with palpable vesicular like lesion, right temple.    Neurological:      General: No focal deficit present.      Mental Status: He is alert and oriented to person, place, and time.   Psychiatric:         Mood and Affect: Mood normal.         Behavior: Behavior normal.           Results:     No diagnostics ordered today.   Assessment and Plan     1. Rash      2. Skin tenderness          There are no discontinued medications.  Fortunately, Dermatology graciously agreed to see patient today. Tom will go to dermatology from this appt. He will call our clinic with any concerns. Options for treatment and follow-up care were reviewed with the patient. Tom Saini  engaged " in the decision making process and verbalized understanding of the options discussed and agreed with the final plan.  HEYDI Rojas, CNP

## 2019-11-07 NOTE — NURSING NOTE
Chief Complaint   Patient presents with     Mass     Pt complains of a painful lump on the side of head.         Stewart Viera, EMT on 11/7/2019 at 10:25 AM

## 2019-11-07 NOTE — PATIENT INSTRUCTIONS

## 2019-11-07 NOTE — PATIENT INSTRUCTIONS
Cryotherapy    What is it?    Use of a very cold liquid, such as liquid nitrogen, to freeze and destroy abnormal skin cells that need to be removed    What should I expect?    Tenderness and redness    A small blister that might grow and fill with dark purple blood. There may be crusting.    More than one treatment may be needed if the lesions do not go away.    How do I care for the treated area?    Gently wash the area with your hands when bathing.    Use a thin layer of Vaseline to help with healing. You may use a Band-Aid.     The area should heal within 7-10 days and may leave behind a pink or lighter color.     Do not use an antibiotic or Neosporin ointment.     You may take acetaminophen (Tylenol) for pain.     Call your Doctor if you have:    Severe pain    Signs of infection (warmth, redness, cloudy yellow drainage, and or a bad smell)    Questions or concerns    Who should I call with questions?       [facility]: [phone]       [facility]: [phone]       For urgent needs outside of business hours call the [facility] at [phone]        and ask for the dermatology resident on call

## 2019-11-07 NOTE — NURSING NOTE
Chief Complaint   Patient presents with     Derm Problem     Patient is here today for a spot on right temple, no personal history of skin cancer.      Melodie Root CMA

## 2019-11-07 NOTE — PROGRESS NOTES
[facility] Dermatology Note      Dermatology Problem List:  1. SKs. Cryo.    CC:   Chief Complaint   Patient presents with     Derm Problem     Patient is here today for a spot on right temple, no personal history of skin cancer.          Encounter Date: Nov 7, 2019    History of Present Illness:  Mr. Tom Saini is a 84 year old male who presents for evaluation of lesion on right temple. Has been present for many years but about 10 days ago, it began to get irritated and painful. He is considered it is basal cell. No other painful, bleeding, non-healing, or otherwise symptomatic lesions. No personal history of skin cancer, mom had unknown type. Otherwise in usual state of health. No additional skin concerns.    Past Medical History:   Patient Active Problem List   Diagnosis     Eczema     Pure hypercholesterolemia     Male erectile disorder     Dermatofibroma     Seborrheic keratoses, inflamed     Past Medical History:   Diagnosis Date     Aortic insufficiency      Erectile dysfunction      Hyperlipidemia      Low back pain     disc disease s/p laminectomy in past     Macular degeneration      Pancreatic cyst 07/2018    13 mm, needs 6 months follow up MRI     Past Surgical History:   Procedure Laterality Date     CATARACT IOL, RT/LT  2001     COMBINED REPAIR PTOSIS WITH BLEPHAROPLASTY BILATERAL Bilateral 4/6/2018    Procedure: COMBINED REPAIR PTOSIS WITH BLEPHAROPLASTY BILATERAL;  BILATERAL UPPER EYELIDS BLEPHAROPLASTY AND PTOSIS REPAIR ;  Surgeon: Anuj Good MD;  Location: [facility]     EYE SURGERY       LAMINECTOMY LUMBAR TWO LEVELS  1993    L4-L5     RECESSION RESECTION (REPAIR STRABISMUS)  1949       Social History:  Patient reports that he quit smoking about 65 years ago. He has a 4.00 pack-year smoking history. He has never used smokeless tobacco. He reports current alcohol use. He reports that he does not use drugs.    Family History:  Family History   Problem Relation Age of Onset      Cancer Father         bladder     Cancer Mother         unknown skin cancer     Skin Cancer Mother      Glaucoma No family hx of      Macular Degeneration No family hx of        Medications:  Current Outpatient Medications   Medication Sig Dispense Refill     atorvastatin (LIPITOR) 10 MG tablet Take 1 tablet (10 mg) by mouth daily 90 tablet 0     Cholecalciferol (VITAMIN D3) 1000 units CAPS Take 1 capsule by mouth daily.       dabigatran ANTICOAGULANT (PRADAXA) 150 MG capsule Take 1 capsule (150 mg) by mouth 2 times daily Store in original 's bottle or blister pack; use within 120 days of opening. 60 capsule 0     multivitamin (OCUVITE) TABS Take 1 tablet by mouth 2 times daily        aspirin 81 MG tablet Take 1 tablet by mouth daily.  3     order for DME Equipment being ordered: Compression stockings, knee high, measured for patient, 20-30mmHg (Patient not taking: Reported on 11/7/2019) 3 Units 3     Allergies   Allergen Reactions     Codeine Sulfate Nausea and Vomiting         Review of Systems:  -Constitutional: Otherwise feeling well today, in usual state of health.  -Skin: As above in HPI. No additional skin concerns.    Physical exam:  GEN: This is a well developed, well-nourished male in no acute distress, in a pleasant mood.    SKIN: Focused examination of the face was performed.  -Cota skin type: II  -There is a large tan to brown waxy stuck on papule with surrounding erythema on the right temple.   -No other lesions of concern on areas examined.     Impression/Plan:  1. Seborrheic keratosis, inflamed. Given symptomatic nature, will treat with cryo today. May need multiple cycles given large size.    Cryotherapy procedure note: After verbal consent and discussion of risks and benefits including but no limited to dyspigmentation/scar, blister, and pain, 1 lesion was(were) treated with 1-2mm freeze border for 2 cycles with liquid nitrogen. Post cryotherapy instructions were  provided.    Recheck in 1 month with FBSE.      CC Nayana Lira, OZZY  [facility] NP CLINIC [facility]  [address], [address]  [place] [address] on close of this encounter.    Follow-up in 1 months, earlier for new or changing lesions.       Staff Involved:  Staff Only    Pauline Freed MD    Department of Dermatology  [facility]: Phone: [phone], Fax: [phone]

## 2019-11-07 NOTE — TELEPHONE ENCOUNTER
Spoke to patient who states that he has a tender spot above right ear his ear about 3 1/4inch, slightly raised. Wife states that it has been there for a while. Advised patient that he would need to be seen in clinic to have it looked at. Scheduled appointment for patient to be seen in clinic 11/7/2019. Sally Marks LPN 11/7/2019 8:43 AM

## 2019-11-11 DIAGNOSIS — E78.5 HYPERLIPIDEMIA LDL GOAL <100: Primary | ICD-10-CM

## 2019-11-11 RX ORDER — ATORVASTATIN CALCIUM 10 MG/1
10 TABLET, FILM COATED ORAL DAILY
Qty: 90 TABLET | Refills: 2 | Status: SHIPPED | OUTPATIENT
Start: 2019-11-11 | End: 2020-08-20

## 2019-11-26 ENCOUNTER — OFFICE VISIT (OUTPATIENT)
Dept: HEMATOLOGY | Facility: CLINIC | Age: 84
End: 2019-11-26
Attending: PHYSICIAN ASSISTANT
Payer: MEDICARE

## 2019-11-26 ENCOUNTER — HOSPITAL ENCOUNTER (OUTPATIENT)
Dept: ULTRASOUND IMAGING | Facility: CLINIC | Age: 84
Discharge: HOME | End: 2019-11-26
Attending: PHYSICIAN ASSISTANT | Admitting: PHYSICIAN ASSISTANT
Payer: MEDICARE

## 2019-11-26 VITALS
DIASTOLIC BLOOD PRESSURE: 68 MMHG | RESPIRATION RATE: 14 BRPM | HEART RATE: 79 BPM | SYSTOLIC BLOOD PRESSURE: 126 MMHG | OXYGEN SATURATION: 96 % | WEIGHT: 178.1 LBS | BODY MASS INDEX: 24.93 KG/M2 | HEIGHT: 71 IN | TEMPERATURE: 97.7 F

## 2019-11-26 DIAGNOSIS — I82.5Z2 CHRONIC DEEP VEIN THROMBOSIS (DVT) OF DISTAL VEIN OF LEFT LOWER EXTREMITY (H): ICD-10-CM

## 2019-11-26 DIAGNOSIS — Z86.718 PERSONAL HISTORY OF DVT (DEEP VEIN THROMBOSIS): Primary | ICD-10-CM

## 2019-11-26 DIAGNOSIS — Z79.01 ANTICOAGULATED: ICD-10-CM

## 2019-11-26 PROCEDURE — G0463 HOSPITAL OUTPT CLINIC VISIT: HCPCS | Mod: 25

## 2019-11-26 PROCEDURE — G0463 HOSPITAL OUTPT CLINIC VISIT: HCPCS

## 2019-11-26 PROCEDURE — 93971 EXTREMITY STUDY: CPT | Mod: LT

## 2019-11-26 PROCEDURE — 99211 OFF/OP EST MAY X REQ PHY/QHP: CPT | Performed by: PHYSICIAN ASSISTANT

## 2019-11-26 ASSESSMENT — MIFFLIN-ST. JEOR: SCORE: 1524.99

## 2019-11-26 ASSESSMENT — PAIN SCALES - GENERAL: PAINLEVEL: NO PAIN (0)

## 2019-11-26 NOTE — PROGRESS NOTES
[facility]  [address] [place] [address], [place] [address]  Main: [phone], Fax: [phone]    Patient seen at: [facility] at [address] [place] [address]    Outpatient Visit Note:    Patient: Tom Saini  MRN: [id number]  : 1935  KEN: 2019    Reason:  Left distal lower extremity DVT at the end of May 2019 with extension of left leg DVT despite 3 months of anticoagulation with Eliquis. Now on Pradaxa, here for follow up visit.      Clinical History Summary:  This is a 84 year old male with a history of hyperlipidemia, aortic insufficiency, who was diagnosed with a left distal lower extremity DVT back on 2019, who found to have an extension of his left leg DVT despite being on 3 months of apixaban, returns to clinic today after he was placed on dabigatran back on Aug 2019. This writer saw this patient last back in 2019, please see my previous note for this patient's detail clinical history.     In summary, Tom had been apparently having about a one week history of pain of the left calf and was having difficulty bending his left knee due to pain prior to his diagnosis of left distal lower extremity DVT on 2019. He did not have any long distance travel or any trauma prior to his diagnosis of DVT. On 2019, he was evaluated by his primary care clinic for which he was sent for an ultrasound of the left leg. The ultrasound showed: nonocclusive DVT of one of the paired peroneal veins in the mid calf and a Baker's cyst measuring 4.4 x 1.8 x 3.4 cm. Because of the DVT, he was started on Eliquis at 10 mg PO BID x 7 days, then 5 mg PO BID thereafter.      He is also then referred to this clinic for further evaluation / consultation.      During my visit with Tom back in 2019, my recommendation is to have him complete 3 months of anticoagulation therapy with Eliquis and then repeat an ultrasound of  "the left leg.      Then during my visit with him back on Aug 2019, the repeat ultrasound at the time showed an unexpected clear extension of his thrombus within the left peroneal vein. Thus it was determined that he had failed Eliquis and this writer (after discussion with Dr. Josh Mayes, staff hematologist) switched the patient to Pradaxa and planned to have him return in 6 weeks for repeat ultrasound of the left leg.      Interim History:  During his visit with this writer back on 10/11/2019, the repeat ultrasound at the time showed improvement of the thrombus. Thus, this writer recommend that he stays on Pradaxa for another 6 weeks to complete a total of 3 months of effective anticoagulation therapy. He returns today after his repeat ultrasound done this morning.     ROS:  He has no issues with bleeding while either Eliquis or Pradaxa. He denies any shortness of breath or chest pain. Denies any issues with lower extremity swelling.     Medication, Allergies and PmHx:  All have been reviewed by this writer in the electronic medical records.    Social History and Family History:  Deferred.    Objective:  Pleasant in no acute distress.  Vitals: /68 (BP Location: Right arm, Patient Position: Sitting, Cuff Size: Adult Regular)   Pulse 79   Temp 97.7  F (36.5  C) (Oral)   Resp 14   Ht 1.803 m (5' 11\")   Wt 80.8 kg (178 lb 1.6 oz)   SpO2 96%   BMI 24.84 kg/m    Exam:  Complete exam is not performed today.    Imaging:  Ultrasound done today left lower extremity:  No DVT in the left lower extremity.     Assessment:  In summary, Tom is an 84 year old male who has a history of aortic insufficiency, who is found to have an unprovoked left distal lower extremity DVT on 5/29/2019, who was found to have an extension of his DVT despite being on 3 months of Eliquis, completed Pradaxa for the past 3 months, returns to clinic today for a follow up visit.      He has now completed the 3 months treatment of his left " distal lower extremity DVT with the effective anticoagulation medication of Pradaxa. It is unclear why he'd failed Eliquis.      Diagnosis:  1. Isolated distal left lower extremity DVT found on 5/29/2019 (unprovoked event).  2. Extension of left leg DVT found on 8/27/2019 despite being on Eliquis.   3. S/P 3 months of Pradaxa and repeat ultrasound showed resolved DVT.     Plan:  Although his DVT event back in May 2019 was an unprovoked event, it was confined to the distal portion of his left leg and thus 3 months of effective anticoagulation therapy should be suffice and it is also in accordance to current guidelines. It is unclear why Eliquis was not effective for this patient but Pradaxa was deemed to be effective. Considering his age, he does have risk of bleeding, and considering that this DVT event was confined to the distal portion of his left leg and that the thrombus has completely resolved, I do not feel that there is any further indications for anticoagulation therapy. Will stop all his anticoagulation therapy as of today. He can resume is daily Aspirin as he was taking prior to starting anticoagulation therapy.     I do recommend that this patient should receive aggressive mechanical DVT/PE and/or pharmacological DVT/PE prophylaxis in the future if he should be in situation for increase risk of venous thromboembolism. These situations include but not limited to: 1) Prolong immobility or hospitalization of >24 hours; 2) Surgery, especially orthopedic type surgery; 3) Traumatic injury etc....     Otherwise, at this time, I have no further plans to see this patient back on a routine basis.     Total Time Spent:   5 minutes, all 5 minutes was spent on face-to-face consultation with the patient and coordination of care in regard to this patient's DVT and anticoagulation therapy management.    Time IN: 14:13  Time OUT: 14:18      Ahmet Márquez PA-C, MPAS  Physician Assistant  [facility]  [facility].

## 2019-12-03 ENCOUNTER — OFFICE VISIT (OUTPATIENT)
Dept: DERMATOLOGY | Facility: CLINIC | Age: 84
End: 2019-12-03
Payer: MEDICARE

## 2019-12-03 DIAGNOSIS — L82.0 SEBORRHEIC KERATOSIS, INFLAMED: ICD-10-CM

## 2019-12-03 DIAGNOSIS — L82.1 SEBORRHEIC KERATOSIS: Primary | ICD-10-CM

## 2019-12-03 DIAGNOSIS — L57.0 ACTINIC KERATOSIS: ICD-10-CM

## 2019-12-03 ASSESSMENT — PAIN SCALES - GENERAL: PAINLEVEL: NO PAIN (0)

## 2019-12-03 NOTE — LETTER
12/3/2019       RE: Tom Saini  [address] [facility] [address]     Dear Colleague,    Thank you for referring your patient, Tom Saini, to the [facility] DERMATOLOGY at [facility]. Please see a copy of my visit note below.    [facility] Dermatology Note      Dermatology Problem List:  1. SKs.   - Large symptomatic SK, cryo 11/07/2019, repeat on 12/03/2019  2. AKs. Cryo.    Encounter Date: Dec 3, 2019    CC:  Chief Complaint   Patient presents with     Skin Check     Tom is here today for a skin check. He is still concerned about the area on the right side of his head         History of Present Illness:  Mr. Tom Saini is a 84 year old male who presents today for a FBSE.  He was last seen on 11/07/19 where he had an inflamed SK on his right temple treated with cryotherapy. Today he reports the previously treated SK is still sore because his glasses run up against his temples, and he also sleeps on his right side. He notes that the spots appear slightly smaller. He has not noticed any other new bothersome lesions. He denies a history of skin cancer but reports his mother had skin cancer, but is unsure of what kind of skin cancer. Patient is otherwise feeling well, no additional skin concerns.    Patient's [birth year] birthday was on the inauguration on FDR's second term.  He went to [employer] and someone had his same name and created a scholarship. His mom's neighbor thought that he made the scholarship.    Past Medical History:   Patient Active Problem List   Diagnosis     Eczema     Pure hypercholesterolemia     Male erectile disorder     Dermatofibroma     Seborrheic keratoses, inflamed     Past Medical History:   Diagnosis Date     Aortic insufficiency      Erectile dysfunction      Hyperlipidemia      Low back pain     disc disease s/p laminectomy in past     Macular degeneration      Pancreatic cyst 07/2018    13 mm, needs 6 months follow up MRI      Past Surgical History:   Procedure Laterality Date     CATARACT IOL, RT/LT  2001     COMBINED REPAIR PTOSIS WITH BLEPHAROPLASTY BILATERAL Bilateral 4/6/2018    Procedure: COMBINED REPAIR PTOSIS WITH BLEPHAROPLASTY BILATERAL;  BILATERAL UPPER EYELIDS BLEPHAROPLASTY AND PTOSIS REPAIR ;  Surgeon: Anuj Good MD;  Location: [facility]     EYE SURGERY       LAMINECTOMY LUMBAR TWO LEVELS  1993    L4-L5     RECESSION RESECTION (REPAIR STRABISMUS)  1949       Social History:  Patient reports that he quit smoking about 65 years ago. He has a 4.00 pack-year smoking history. He has never used smokeless tobacco. He reports current alcohol use. He reports that he does not use drugs.    Family History:  Family History   Problem Relation Age of Onset     Cancer Father         bladder     Cancer Mother         unknown skin cancer     Skin Cancer Mother      Glaucoma No family hx of      Macular Degeneration No family hx of        Medications:  Current Outpatient Medications   Medication Sig Dispense Refill     aspirin 81 MG tablet Take 1 tablet by mouth daily.  3     atorvastatin (LIPITOR) 10 MG tablet Take 1 tablet (10 mg) by mouth daily 90 tablet 2     Cholecalciferol (VITAMIN D3) 1000 units CAPS Take 1 capsule by mouth daily.       multivitamin (OCUVITE) TABS Take 1 tablet by mouth 2 times daily        order for DME Equipment being ordered: Compression stockings, knee high, measured for patient, 20-30mmHg 3 Units 3     dabigatran ANTICOAGULANT (PRADAXA) 150 MG capsule Take 1 capsule (150 mg) by mouth 2 times daily Store in original 's bottle or blister pack; use within 120 days of opening. (Patient not taking: Reported on 12/3/2019) 60 capsule 0       Allergies   Allergen Reactions     Codeine Sulfate Nausea and Vomiting       Review of Systems:  -Skin Establ Pt: The patient denies any new rash, pruritus, or lesions that are symptomatic, changing or bleeding, except as per HPI.  -Constitutional: The patient is  feeling generally well.    Physical exam:  GEN: This is a well developed, well-nourished male in no acute distress, in a pleasant mood.    SKIN: Total skin excluding the undergarment areas was performed. The exam included the head/face, neck, both arms, chest, back, abdomen, both legs, digits and/or nails.   - There are many waxy stuck on tan to brown papule on the trunk and extremities, including lesion on right temple.  - There is an erythematous macule with overyling adherent scale on the right forehead.   - No other lesions of concern on areas examined.           Impression/Plan:  1. Seborrheic keratosis, symptomatic, responding to cryo. Given symptomatic nature, will treat with cryo again today. May need multiple cycles given large size.  - Cryotherapy procedure note: After verbal consent and discussion of risks and benefits including but no limited to dyspigmentation/scar, blister, and pain, 1 lesion was(were) treated with 1-2mm freeze border for 2 cycles with liquid nitrogen. Post cryotherapy instructions were provided.  - Photo taken today to aid in monitoring.    2. Actinic keratosis.  - Cryotherapy procedure note: After verbal consent and discussion of risks and benefits including but no limited to dyspigmentation/scar, blister, and pain, 1 lesion was(were) treated with 1-2mm freeze border for 2 cycles with liquid nitrogen. Post cryotherapy instructions were provided.    Follow-up in 1 month to treat SK on R temple., earlier for new or changing lesions.       Staff Involved:    Scribe Disclosure  I, Narda Crane, am serving as a scribe to document services personally performed by Dr. Pauline Freed MD, based on data collection and the provider's statements to me.     Provider Disclosure:   The documentation recorded by the scribe accurately reflects the services I personally performed and the decisions made by me.    Pauline Freed MD    Department of Dermatology  [facility]  [facility]: Phone: [phone], Fax: [phone]

## 2019-12-03 NOTE — NURSING NOTE
.  Dermatology Rooming Note    Tom Saini's goals for this visit include:   Chief Complaint   Patient presents with     Skin Check     Tom is here today for a skin check. He is still concerned about the area on the right side of his head     Edwin Gipson CMA

## 2019-12-03 NOTE — PROGRESS NOTES
[facility] Dermatology Note      Dermatology Problem List:  1. SKs.   - Large symptomatic SK, cryo 11/07/2019, repeat on 12/03/2019  2. AKs. Cryo.    Encounter Date: Dec 3, 2019    CC:  Chief Complaint   Patient presents with     Skin Check     Tom is here today for a skin check. He is still concerned about the area on the right side of his head         History of Present Illness:  Mr. Tom Saini is a 84 year old male who presents today for a FBSE.  He was last seen on 11/07/19 where he had an inflamed SK on his right temple treated with cryotherapy. Today he reports the previously treated SK is still sore because his glasses run up against his temples, and he also sleeps on his right side. He notes that the spots appear slightly smaller. He has not noticed any other new bothersome lesions. He denies a history of skin cancer but reports his mother had skin cancer, but is unsure of what kind of skin cancer. Patient is otherwise feeling well, no additional skin concerns.    Patient's [birth year] birthday was on the inauguration on FDR's second term.  He went to [employer] and someone had his same name and created a scholarship. His mom's neighbor thought that he made the scholarship.    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 He has a 4.00 pack-year smoking history. He has never used smokeless tobacco. He reports current alcohol use. He reports that he does not use drugs.    Family History:  Family History   Problem Relation Age of Onset     Cancer Father         bladder     Cancer Mother         unknown skin cancer     Skin Cancer Mother      Glaucoma No family hx of      Macular Degeneration No family hx of        Medications:  Current Outpatient Medications   Medication Sig Dispense Refill     aspirin 81 MG tablet Take 1 tablet by mouth daily.  3     atorvastatin (LIPITOR) 10 MG tablet Take 1 tablet (10 mg) by mouth daily 90 tablet 2     Cholecalciferol (VITAMIN D3) 1000 units CAPS Take 1 capsule by mouth daily.       multivitamin (OCUVITE) TABS Take 1 tablet by mouth 2 times daily        order for DME Equipment being ordered: Compression stockings, knee high, measured for patient, 20-30mmHg 3 Units 3     dabigatran ANTICOAGULANT (PRADAXA) 150 MG capsule Take 1 capsule (150 mg) by mouth 2 times daily Store in original 's bottle or blister pack; use within 120 days of opening. (Patient not taking: Reported on 12/3/2019) 60 capsule 0       Allergies   Allergen Reactions     Codeine Sulfate Nausea and Vomiting       Review of Systems:  -Skin Establ Pt: The patient denies any new rash, pruritus, or lesions that are symptomatic, changing or bleeding, except as per HPI.  -Constitutional: The patient is feeling generally well.    Physical exam:  GEN: This is a well developed, well-nourished male in no acute distress, in a pleasant mood.    SKIN: Total skin excluding the undergarment areas was performed. The exam included the head/face, neck, both arms, chest, back, abdomen, both  legs, digits and/or nails.   - There are many waxy stuck on tan to brown papule on the trunk and extremities, including lesion on right temple.  - There is an erythematous macule with overyling adherent scale on the right forehead.   - No other lesions of concern on areas examined.           Impression/Plan:  1. Seborrheic keratosis, symptomatic, responding to cryo. Given symptomatic nature, will treat with cryo again today. May need multiple cycles given large size.  - Cryotherapy procedure note: After verbal consent and discussion of risks and benefits including but no limited to dyspigmentation/scar, blister, and pain, 1 lesion was(were) treated with 1-2mm freeze border for 2 cycles with liquid nitrogen. Post cryotherapy instructions were provided.  - Photo taken today to aid in monitoring.    2. Actinic keratosis.  - Cryotherapy procedure note: After verbal consent and discussion of risks and benefits including but no limited to dyspigmentation/scar, blister, and pain, 1 lesion was(were) treated with 1-2mm freeze border for 2 cycles with liquid nitrogen. Post cryotherapy instructions were provided.    Follow-up in 1 month to treat SK on R temple., earlier for new or changing lesions.       Staff Involved:    Scribe Disclosure  I, Narda Crane, am serving as a scribe to document services personally performed by Dr. Pauline Freed MD, based on data collection and the provider's statements to me.     Provider Disclosure:   The documentation recorded by the scribe accurately reflects the services I personally performed and the decisions made by me.    Pauline Freed MD    Department of Dermatology  [facility]: Phone: [phone], Fax: [phone]

## 2019-12-03 NOTE — PATIENT INSTRUCTIONS
Cryotherapy    What is it?    Use of a very cold liquid, such as liquid nitrogen, to freeze and destroy abnormal skin cells that need to be removed    What should I expect?    Tenderness and redness    A small blister that might grow and fill with dark purple blood. There may be crusting.    More than one treatment may be needed if the lesions do not go away.    How do I care for the treated area?    Gently wash the area with your hands when bathing.    Use a thin layer of Vaseline to help with healing. You may use a Band-Aid.     The area should heal within 7-10 days and may leave behind a pink or lighter color.     Do not use an antibiotic or Neosporin ointment.     You may take acetaminophen (Tylenol) for pain.     Call your Doctor if you have:    Severe pain    Signs of infection (warmth, redness, cloudy yellow drainage, and or a bad smell)    Questions or concerns    Who should I call with questions?       [facility]: [phone]       [facility]: [phone]       For urgent needs outside of business hours call the [facility] at [phone]        and ask for the dermatology resident on call

## 2019-12-05 ENCOUNTER — OFFICE VISIT (OUTPATIENT)
Dept: INTERNAL MEDICINE | Facility: CLINIC | Age: 84
End: 2019-12-05
Payer: MEDICARE

## 2019-12-05 VITALS
TEMPERATURE: 97.8 F | RESPIRATION RATE: 18 BRPM | DIASTOLIC BLOOD PRESSURE: 67 MMHG | SYSTOLIC BLOOD PRESSURE: 116 MMHG | BODY MASS INDEX: 25.38 KG/M2 | HEART RATE: 68 BPM | HEIGHT: 71 IN | WEIGHT: 181.3 LBS | OXYGEN SATURATION: 97 %

## 2019-12-05 DIAGNOSIS — Z13.820 SCREENING FOR OSTEOPOROSIS: ICD-10-CM

## 2019-12-05 DIAGNOSIS — I82.5Z2 CHRONIC DEEP VEIN THROMBOSIS (DVT) OF DISTAL VEIN OF LEFT LOWER EXTREMITY (H): ICD-10-CM

## 2019-12-05 DIAGNOSIS — S32.9XXS CLOSED NONDISPLACED FRACTURE OF PELVIS, UNSPECIFIED PART OF PELVIS, SEQUELA: ICD-10-CM

## 2019-12-05 DIAGNOSIS — Z00.00 ENCOUNTER FOR MEDICARE ANNUAL WELLNESS EXAM: Primary | ICD-10-CM

## 2019-12-05 DIAGNOSIS — R93.5 ABNORMAL MRI OF ABDOMEN: ICD-10-CM

## 2019-12-05 DIAGNOSIS — R29.890 LOSS OF HEIGHT: ICD-10-CM

## 2019-12-05 DIAGNOSIS — M89.9 DISORDER OF BONE, UNSPECIFIED: ICD-10-CM

## 2019-12-05 ASSESSMENT — PAIN SCALES - GENERAL: PAINLEVEL: NO PAIN (0)

## 2019-12-05 ASSESSMENT — MIFFLIN-ST. JEOR: SCORE: 1531.56

## 2019-12-05 NOTE — PROGRESS NOTES
[facility]  Primary Care Center   Angeli Robertson MD  12/05/2019      Chief Complaint:   Wellness Visit     History of Present Illness:   Tom Saini is a 84 year old male with a history of aortic insufficiency, hypercholesterolemia, and DVT who presents for an annual physical exam.     Other concerns discussed:  ***     Health Maintenance   Mammogram (females age 40 - 75): yearly or every 2 years - {Health Maintenance options:990051}  Pap (females age 21 - 65): every 3 years, every 5 years after age 35 if cotesting done - {Health Maintenance options:341982}  Colon Cancer screening (age 50 - 75): yearly FIT or colonoscopy every 5 - 10 years - {Health Maintenance options:788441}  Lung Cancer screening (age 55 - 80 with 30 pack-year history who currently smoke or quit in last 15 years: yearly or per recommendation - {Health Maintenance options:444634}  Dexa (females age ? 65, males age ? 70): every 2 years - {Health Maintenance options:529647}  AAA Screening (males ages 65 - 75 who have ever smoked): once per lifetime - {Health Maintenance options:674403}  Glucose: every 5 years, yearly if risk factors - {Health Maintenance options:976948}  Lipid panel: every 5 years, yearly if risk factors - {Health Maintenance options:907306}  GC/Chlamydia (sexually active females age <24 years): yearly - {Health Maintenance options:316371}  Hepatitis C (adults born 1944 - 1965): once per lifetime - {Health Maintenance options:746500}  HIV (ages 13 - 64): once per lifetime, yearly if MSM or other risk factors - {Health Maintenance options:367695}  Immunizations (Tetanus every 10 years, Influenza yearly, Pneumonia PPV-23 and PCV-13 age ? 65 or sooner if risk factors) - {Health Maintenance options:072216}  Immunization History   Administered Date(s) Administered     HepB 12/31/2015, 02/02/2016, 08/19/2016     Influenza (High Dose) 3 valent vaccine 10/23/2014, 10/16/2015, 10/05/2016, 10/18/2018, 10/11/2019     Influenza (IIV3) PF  11/13/2003, 10/25/2004, 11/01/2006, 10/29/2007, 10/22/2008, 01/25/2010, 10/01/2012, 10/23/2014     Influenza Vaccine IM > 6 months Valent IIV4 10/01/2013     Japanese Encephalitis IM 01/06/2016, 02/02/2016     Pneumo Conj 13-V (2010&after) 06/25/2015     Pneumococcal 23 valent 10/26/2004, 06/10/2014     TD (ADULT, 7+) 02/12/1999, 01/31/2011     TDAP Vaccine (Boostrix) 06/10/2014     Typhoid IM 02/12/1999, 10/22/2008, 06/25/2015, 06/26/2017     Yellow Fever 07/19/2006, 01/26/2011     Zoster vaccine recombinant adjuvanted (SHINGRIX) 10/11/2019     Zoster vaccine, live 07/02/2009        The following health maintenance issues were also reviewed and addressed as needed:  Blood pressure (>18 years annually)  Depression - PHQ-2 Score:   PHQ-2 ( 1999 [employer]) 9/26/2019 5/29/2019   Q1: Little interest or pleasure in doing things 0 0   Q2: Feeling down, depressed or hopeless 0 0   PHQ-2 Score 0 0   Q1: Little interest or pleasure in doing things - -   Q2: Feeling down, depressed or hopeless - -   PHQ-2 Score - -     ***/6  Lifestyle habits (including exercise, diet, weight)  Health risk behaviors (sexual history, alcohol, tobacco, drug use, partner violence)  Routine eye, dental, hearing, and skin exams  Advanced directives    Review of Systems:   Pertinent items are noted in HPI or as in patient entered ROS below, remainder of complete ROS is negative.     Active Medications:     Current Outpatient Medications:      aspirin 81 MG tablet, Take 1 tablet by mouth daily., Disp: , Rfl: 3     atorvastatin (LIPITOR) 10 MG tablet, Take 1 tablet (10 mg) by mouth daily, Disp: 90 tablet, Rfl: 2     Cholecalciferol (VITAMIN D3) 1000 units CAPS, Take 1 capsule by mouth daily., Disp: , Rfl:      multivitamin (OCUVITE) TABS, Take 1 tablet by mouth 2 times daily , Disp: , Rfl:      order for DME, Equipment being ordered: Compression stockings, knee high, measured for patient, 20-30mmHg, Disp: 3 Units, Rfl: 3      Allergies:   Codeine  sulfate      Past Medical History:  Aortic insufficiency  Macular degeneration   Pancreatic cyst  Shingles            Eczema  Pure hypercholesterolemia  Male erectile disorder  Dermatofibroma  Seborrheic keratoses, inflamed     Past Surgical History:  Cataract IOL  Combined ptosis repair with blepharoplasty, bilateral  Lumbar laminectomy L4-L5  Strabismus repair    Family History:   Father - bladder cancer  Mother - skin cancer      Social History:   The patient was accompanied to the appointment by: *** .  Smoking Status: Former smoker, 2 PPD for 4 years, quit 1954   Smokeless Tobacco: Never used   Alcohol Use: Yes  Marital Status: ***   Employment status: ***   Home: ***       Physical Exam:   There were no vitals taken for this visit.     Constitutional: Alert, oriented, pleasant, no acute distress  Head: Normocephalic, atraumatic  Eyes: Extra-ocular movements intact, no scleral icterus  ENT: Oropharynx clear, moist mucus membranes, good dentition  Neck: Supple, no lymphadenopathy  Cardiovascular: Regular rate and rhythm, no murmurs, rubs or gallops, peripheral pulses full/symmetric  Respiratory: Good air movement bilaterally, lungs clear, no wheezes/rales/rhonchi  Musculoskeletal: No edema, normal muscle tone, normal gait  Neurologic: Alert and oriented, cranial nerves 2-12 intact.  Skin: No rashes/lesions  Psychiatric: normal mentation, affect and mood   ***      Assessment and Plan:  There are no diagnoses linked to this encounter.     Follow-up: No follow-ups on file.       ***  Scribe Disclosure:  Geri HASTINGS, am serving as a scribe to document services personally performed by Angeli Robertson MD at this visit, based upon the provider's statements to me. All documentation has been reviewed by the aforementioned provider prior to being entered into the official medical record.     Portions of this medical record were completed by a scribe. UPON MY REVIEW AND AUTHENTICATION BY ELECTRONIC SIGNATURE,  this confirms (a) I performed the applicable clinical services, and (b) the record is accurate.

## 2019-12-05 NOTE — PATIENT INSTRUCTIONS
Radiology (Imaging Center) [phone] ([facility] [place] 1st Floor S) - Complete Smart Text for MRI, CT ask fasting status  Radiology ([facility]) [phone] (2nd Floor [facility])   Primary Care Center Medication Refill Request Information:  * Please contact your pharmacy regarding ANY request for medication refills.  ** [facility] Fax = [phone]  * Please allow 3 business days for routine medication refills.  * Please allow 5 business days for controlled substance medication refills.     Primary Care Center Test Result notification information:  *You will be notified with in 7-10 days of your appointment day regarding the results of your test.  If you are on MyChart you will be notified as soon as the provider has reviewed the results and signed off on them.    Personalized Prevention Plan  You are due for the preventive services outlined below.  Your care team is available to assist you in scheduling these services.  If you have already completed any of these items, please share that information with your care team to update in your medical record.  Health Maintenance Due   Topic Date Due     Discuss Advance Care Planning  1935     Annual Wellness Visit  06/26/2018     Zoster (Shingles) Vaccine (3 of 3) 12/06/2019     Preventive Health Recommendations  See your health care provider every year to    Review health changes.     Discuss preventive care.      Review your medicines if your doctor has prescribed any.    Talk with your health care provider about whether you should have a test to screen for prostate cancer (PSA).    Every 3 years, have a diabetes test (fasting glucose). If you are at risk for diabetes, you should have this test more often.    Every 5 years, have a cholesterol test. Have this test more often if you are at risk for high cholesterol or heart disease.     Every 10 years, have a colonoscopy. Or, have a yearly FIT test (stool test). These exams will check for colon cancer.    Talk  to with your health care provider about screening for Abdominal Aortic Aneurysm if you have a family history of AAA or have a history of smoking.    Shots:     Get a flu shot each year.     Get a tetanus shot every 10 years.     Talk to your doctor about your pneumonia vaccines. There are now two you should receive - Pneumovax (PPSV 23) and Prevnar (PCV 13).    Talk to your pharmacist about a shingles vaccine.     Talk to your doctor about the hepatitis B vaccine.    Nutrition:     Eat at least 5 servings of fruits and vegetables each day.     Eat whole-grain bread, whole-wheat pasta and brown rice instead of white grains and rice.     Get adequate Calcium and Vitamin D.     Lifestyle    Exercise for at least 150 minutes a week (30 minutes a day, 5 days a week). This will help you control your weight and prevent disease.     Limit alcohol to one drink per day.     No smoking.     Wear sunscreen to prevent skin cancer.     See your dentist every six months for an exam and cleaning.     See your eye doctor every 1 to 2 years to screen for conditions such as glaucoma, macular degeneration and cataracts.    Personalized Prevention Plan  You are due for the preventive services outlined below.  Your care team is available to assist you in scheduling these services.  If you have already completed any of these items, please share that information with your care team to update in your medical record.  Health Maintenance Due   Topic Date Due     Discuss Advance Care Planning  1935     Annual Wellness Visit  06/26/2018     Zoster (Shingles) Vaccine (3 of 3) 12/06/2019

## 2019-12-05 NOTE — PROGRESS NOTES
"[facility]  Primary Care Center   Established Patient Encounter   Angeli Robertson MD  12/05/2019     Medicare Annual Wellness Visit     History of Present Illness:   Tom Saini is a 84 year old male with a history of aortic insufficiency, hypercholesterolemia, and DVT who presents for an annual medicare wellness exam.      DVT  He was diagnosed with a left distal lower extremity DVT back on 5/29/2019, who found to have an extension of his left leg DVT despite being on 3 months of apixaban. He was placed on dabigatran back on Aug 2019. He saw VARUN Márquez in hematology on 11/26/19 and was taken off Pradaxa. He is now taking a baby aspirin daily. He recalls that he originally felt odd when he struggled to walk up the steps and saw Marlene here in May 2019. Knee XR was stable, but leg ultrasound revealed DVT.     Facial Numbness   He additionally saw neurology on 10/24/19 for left facial numbness and tingling sensation around his left eye that started around 6 months ago. There is no twitching, weak, or droopy feeling. He does not notice any nerve compression. He has never had a rash, herpes, or shingles in the area. He has not had any eye irritation or swelling, but endorses dry eyes. Brain MRI on 10/3/19 was unremarkable. Dr. Johnson ruled out high risk etiologies and did not have a plan for follow-up.     Macular Degeneration   He has macular degeneration and has received Avastin injections in his right eye for treatment. He notes losing his vision more, which is more difficult to treat. He finds this frustrating, as he is a \"visual\" person.     Other Concerns Discussed:  1. Memory - He has trouble with names, which has worsened over the years.   2. Pancreatic Cyst - He had an MRI Pancreas (see results below) on 1/3/19 which revealed multiple cysts in the abdomen.   3. Immunizations - He received the first Shingrix vaccine and is due for the second one in the series in one week.   4. Cardiology  - His energy is good and " he has not had any cardiac symptoms. Denies feeling lightheaded.   5. Urinary - He does use the restroom once at nighttime. He denies any urgency or frequency symptoms.   6. Height Loss - He notes he used to be 6 feet and is around 5 foot 10 now and wonders why. Denies back pain and recent fractures not related to a fall off a wall this past year.      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  Fall with multiple traumas including Rt distal radial fracture, left thumb dislocation, pelvic rami fracture in 2018  Pancreatic cystic neoplasm: Stable on imaging 2018     Routine Health Maintenance  Immunizations (zoster, pneumovax, flu, Tdap, Hep A/B):           Most Recent Immunizations   Administered Date(s) Administered     Hepatitis B 08/19/2016     Influenza (High Dose) 3 valent vaccine 10/05/2016     Influenza (IIV3) 10/23/2014     Influenza Vaccine IM 3yrs+ 4 Valent IIV4 10/01/2013     Japanese Encephalitis IM 02/02/2016     Pneumococcal (PCV 13) 06/25/2015     Pneumococcal 23 valent 06/10/2014     TD (ADULT, 7+) 01/31/2011     TDAP Vaccine (Boostrix) 06/10/2014     Typhoid IM 06/25/2015     Yellow Fever 01/26/2011     Zoster vaccine, live 07/02/2009   Deferred Date(s) Deferred     Yoruba Encephalitis IM 08/19/2016      Lipids:           Recent Labs   Lab Test  12/04/13   0811  12/18/12   0806   CHOL  161  161   HDL  53  50   LDL  96  98   TRIG  62  68   CHOLHDLRATIO  3.1  3.2      Recent Labs   Lab Test 09/19/19  2026 06/26/17  1041  12/04/13  0811 12/18/12  0806   CHOL 138 149   < > 161 161   HDL 61 61   < > 53 50   LDL 66 70   < > 96 98   TRIG 53 90   < > 62 68   CHOLHDLRATIO  --   --   --  3.1 3.2    < > = values in this interval not displayed.     PSA (50-75 yrs):             PSA   Date Value Ref Range Status   10/22/2008 1.07 0 - 4 ug/L Final      AAA Screening (65-75 yrs): n/a  Lung Ca Screening (>30  pk age 55-79 or >20 py age 50-79 + RF): n/a  Colonoscopy (50-75 yrs): previously normal per patient, no results  Dexa (>65W or 70M yrs): ordered  Advanced Directive: not discussed    Review of Systems:   Constitutional:  Fevers, night sweats or unintentional weight change ?  NO      Eyes:  Vision change, diplopia or red eyes?  NO      Ears, Nose, Mouth, Throat:  Tinnitus or hearing change, epistaxis or nasal discharge, oral lesions, throat pain ?  NO      Neck:  Stiffness?  NO           Cardiovascular:   Chest pain, palpitations, or pain with walking, orthopnea or PND?  NO   Breasts:  Any bumps or unusual discharge?     NO         Respiratory:  Dyspnea, cough, shortness of breath or wheezing?  NO         GI:  Nausea, vomiting, diarrhea or constipation, abdominal pain ?  NO         :  Change in urine,  dysuria or hematuria, sexual dysfunction ?  NO        Musculoskeletal:   Joint or muscle pain or swelling?  NO            Skin:  Concerning lesions or moles?  NO           Nervous System:  Loss of strength or sensation, numbness or tingling, tremor,  dizziness,  headache?  YES  Endocrine/Hormone:  Polyuria or polydipsia, temperature intolerance?  NO            Blood and Lymphnodes:  Concerning bumps, bleeding problems?  NO            Allergy:  Environmental allergies?  NO            Mental Health: Depression or anxiety, sleep problems?  NO              Medical Care      Have you been to an ER or a hospital in the last year? Yes  What other specialists or organizations are involved in your medical care?  See below   Current providers sharing in care for this patient include:  Patient Care Team:  Patient Care Team       Relationship Specialty Notifications Start End    Dayanaeakuldip, Angeli Sampson MD PCP - General Internal Medicine  6/10/14     Phone: [phone] Fax: [phone]         [address] [address]    Jignesh Ruiz MD MD Dermatology  5/8/15     Phone: [phone] Fax: [phone]          [facility] [address] [facility] [address] [facility] [address]    Haider Macedo MD Referring Physician [facility]  5/28/15     REFERRING TO HAND SURGERY    Phone: [phone] Fax: [phone]         [address] [facility] [address]    Kalyan Suero MD MD Internal Medicine  5/28/15     Phone: [phone] Pager: [phone] Fax: [phone]        [address] [facility] [address] [facility] [address]    Lacie Boswell MD MD Ophthalmology  5/28/15     Phone: [phone] Fax: [phone]         [address] [facility] [address] [facility] [address]    Cecy Polanco MD MD Orthopedics  11/16/15     Phone: [phone] Fax: [phone]         [address] [place] [address] [address] [facility] [address]    Briseida Yanez MD MD Internal Medicine  2/9/17     Phone: [phone] Fax: [phone]         [address] [facility] [address]    Brigido Laura MD MD Ophthalmology  7/10/17     Phone: [phone] Fax: [phone]         [address] [facility] [address]    King Oscar MD MD Dermapathology  7/10/17     Phone: [phone] Fax: [phone]         [address] [facility] [address] [facility] [address]    Anuj Good MD Resident Ophthalmology  2/7/18     Phone: [phone] Fax: [phone]         [address] [facility] [address] [facility] [address]    Marlene Levin APRN CNP Nurse Practitioner Nurse Practitioner - Family  3/22/19     Phone: [phone] Fax: [phone]         [address] [facility] [address]         Active Medications:      aspirin 81 MG tablet, Take 1 tablet by mouth daily., Disp: , Rfl: 3     atorvastatin (LIPITOR) 10 MG tablet, Take 1 tablet (10 mg) by mouth daily, Disp: 90 tablet, Rfl: 2     Cholecalciferol (VITAMIN D3) 1000 units CAPS, Take 1 capsule by mouth daily., Disp: , Rfl:      multivitamin (OCUVITE) TABS, Take 1 tablet by mouth 2 times daily , Disp: , Rfl:       Allergies:   Codeine sulfate      Past  Medical History:  Aortic insufficiency  Macular degeneration   Pancreatic cyst  Shingles            Eczema  Pure hypercholesterolemia  Male erectile disorder  Dermatofibroma  Seborrheic keratoses, inflamed     Past Surgical History:  Cataract IOL  Combined ptosis repair with blepharoplasty, bilateral  Lumbar laminectomy L4-L5  Strabismus repair    Family History:   Father - bladder cancer  Mother - skin cancer     He has 3 children and 2 step-children.   He has 7 grandchildren.      Social History:   The patient was alone.   Smoking Status: Former smoker, 2 PPD for 4 years, quit 1954   Smokeless Tobacco: Never used   Alcohol Use: Yes     Marital Status:   Who lives in your household? Wife, Kylah   Does your home have any of the following safety concerns? Loose rugs in the hallway, no grab bars in the bathroom, no handrails on the stairs or have poorly lit areas? Yes, loose rugs, no grab bars   Do you feel threatened or controlled by a partner, ex-partner or anyone in your life? No  Has anyone hurt you physically, for example by pushing, hitting, slapping or kicking you   or forcing you to have sex? No  Do you need help with the phone, transportation, shopping, preparing meals, housework, laundry, medications or managing money? No   Have you noticed any hearing difficulties? No      Risk Behaviors and Healthy Habits      How many servings of fruits and vegetables do you eat a day? 2 servings a day  How often do you exercise and what do you do? 20 min a week of walking  Do you frequently ride without a seatbelt? No  Do you use tobacco?  No  Do you use any other drugs? No                                                                                                             Do you use alcohol? Yes, 1 glass of wine with dinner 7 days a week    PHQ-2 Score:     PHQ-2 ( 1999 Pfizer) 12/5/2019 9/26/2019   Q1: Little interest or pleasure in doing things 0 0   Q2: Feeling down, depressed or hopeless 0 0   PHQ-2  Score 0 0   Q1: Little interest or pleasure in doing things - -   Q2: Feeling down, depressed or hopeless - -   PHQ-2 Score - -     Sexual Health      Are you sexually active?  No      FUNCTIONAL ABILITY/SAFETY SCREENING      Was the patient's timed Up & Go test (Get up from chair walk, 10 feet turn, return to chair and sit down) unsteady or longer than 30 seconds? No     Hearing evaluation if done: No     EVALUATION OF COGNITIVE FUNCTION      Mood/affect: Normal  Appearance: Normal    Mini Cog Scoring   Normal, Recall 2 points  Clock Draw Test result:  Normal     SCREENING FOR PREVENTION and EARLY DETECTION      ECG (if done): not performed     Corrected Visual acuity: UTD    CV Risk based on Pooled Cohort Risk:  The ASCVD Risk score (Rj JIMENEZ Jr., et al., 2013) failed to calculate for the following reasons:    The 2013 ASCVD risk score is only valid for ages 40 to 79      Advanced Directives: Discussed and patient desires to to have further information and discuss with family.      Immunization status: up to date and documented.  Immunization History   Administered Date(s) Administered     HepB 12/31/2015, 02/02/2016, 08/19/2016     Influenza (High Dose) 3 valent vaccine 10/23/2014, 10/16/2015, 10/05/2016, 10/18/2018, 10/11/2019     Influenza (IIV3) PF 11/13/2003, 10/25/2004, 11/01/2006, 10/29/2007, 10/22/2008, 01/25/2010, 10/01/2012, 10/23/2014     Influenza Vaccine IM > 6 months Valent IIV4 10/01/2013     Japanese Encephalitis IM 01/06/2016, 02/02/2016     Pneumo Conj 13-V (2010&after) 06/25/2015     Pneumococcal 23 valent 10/26/2004, 06/10/2014     TD (ADULT, 7+) 02/12/1999, 01/31/2011     TDAP Vaccine (Boostrix) 06/10/2014     Typhoid IM 02/12/1999, 10/22/2008, 06/25/2015, 06/26/2017     Yellow Fever 07/19/2006, 01/26/2011     Zoster vaccine recombinant adjuvanted (SHINGRIX) 10/11/2019     Zoster vaccine, live 07/02/2009      Reviewed Immunization Record Today  Pneumoccocal Vaccine: UTD  Varicella Vaccine:  "UTD  TDaP:UTD    Physical Exam:   /67 (BP Location: Right arm, Patient Position: Chair, Cuff Size: Adult Regular)   Pulse 68   Temp 97.8  F (36.6  C) (Oral)   Resp 18   Ht 1.791 m (5' 10.5\")   Wt 82.2 kg (181 lb 4.8 oz)   SpO2 97%   BMI 25.65 kg/m       Constitutional: Alert, oriented, pleasant, no acute distress  Head: Normocephalic, atraumatic  Eyes: Extra-ocular movements intact, no scleral icterus  ENT: Oropharynx clear, moist mucus membranes, good dentition  Neck: Supple, no lymphadenopathy  Cardiovascular: Regular rate and rhythm, no murmurs, rubs or gallops, peripheral pulses full/symmetric  Respiratory: Good air movement bilaterally, lungs clear, no wheezes/rales/rhonchi  GI: Abdomen soft, bowel sounds present, nondistended, nontender, no organomegaly or masses, no rebound/guarding  Musculoskeletal: No edema, normal muscle tone, normal gait  Neurologic: Alert and oriented, cranial nerves 2-12 intact.  Psychiatric: normal mentation, affect and mood     Recent Imaging:   From MR Pancreas on 1/3/19  IMPRESSION:   1. 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 No  suspicious features. See below for follow-up recommendations.  2. Few hepatic hemangiomas. Additional linear T2 hyperintense area at  the right hepatic dome is indeterminate but unchanged from prior and  does not demonstrate suspicious features.  3. 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 No abdominal aortic aneurysm.     I have personally reviewed the examination and initial interpretation  and I agree with the findings.     JACKIE ANGELO MD     Assessment and Plan:    Encounter for Medicare annual wellness exam    Abnormal MRI of abdomen  Due for monitoring of pancreatic cystic lesions.   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 He has completed 6 months of anticoagulation and is without current symptoms.     Screening for osteoporosis  Loss of height  Closed nondisplaced fracture of pelvis, unspecified part of pelvis, sequela  Disorder of bone, unspecified   Given he is an active elderly male, we discussed the benefits of osteoporosis screening and management and he is amenable, particularly given recent fractures.   -     Dexa hip/pelvis/spine*; Future  -     Dexa vertebral fracture analysis; Future    Follow-up: PRN     Scribe Disclosure:  I, Geri Soto, am serving as a scribe to document services personally performed by Angeli Robertson MD at this visit, based upon the provider's statements to me. All documentation has been reviewed by the aforementioned provider prior to being entered into the official medical record.    Portions of this medical record were completed by a scribe. UPON MY REVIEW AND AUTHENTICATION BY ELECTRONIC SIGNATURE, this confirms (a) I performed the applicable clinical services, and (b) the record is accurate.  Angeli Robertson MD  Internal Medicine

## 2019-12-13 NOTE — LETTER
11/7/2019       RE: Tom Saiin  [address] [facility] [address]     Dear Colleague,    Thank you for referring your patient, Tom Saini, to the [facility] DERMATOLOGY at [facility]. Please see a copy of my visit note below.    [facility] Dermatology Note      Dermatology Problem List:  1. SKs. Cryo.    CC:   Chief Complaint   Patient presents with     Derm Problem     Patient is here today for a spot on right temple, no personal history of skin cancer.          Encounter Date: Nov 7, 2019    History of Present Illness:  Mr. Tom Saini is a 84 year old male who presents for evaluation of lesion on right temple. Has been present for many years but about 10 days ago, it began to get irritated and painful. He is considered it is basal cell. No other painful, bleeding, non-healing, or otherwise symptomatic lesions. No personal history of skin cancer, mom had unknown type. Otherwise in usual state of health. No additional skin concerns.    Past Medical History:   Patient Active Problem List   Diagnosis     Eczema     Pure hypercholesterolemia     Male erectile disorder     Dermatofibroma     Seborrheic keratoses, inflamed     Past Medical History:   Diagnosis Date     Aortic insufficiency      Erectile dysfunction      Hyperlipidemia      Low back pain     disc disease s/p laminectomy in past     Macular degeneration      Pancreatic cyst 07/2018    13 mm, needs 6 months follow up MRI     Past Surgical History:   Procedure Laterality Date     CATARACT IOL, RT/LT  2001     COMBINED REPAIR PTOSIS WITH BLEPHAROPLASTY BILATERAL Bilateral 4/6/2018    Procedure: COMBINED REPAIR PTOSIS WITH BLEPHAROPLASTY BILATERAL;  BILATERAL UPPER EYELIDS BLEPHAROPLASTY AND PTOSIS REPAIR ;  Surgeon: Anuj Good MD;  Location: [facility]     EYE SURGERY       LAMINECTOMY LUMBAR TWO LEVELS  1993    L4-L5     RECESSION RESECTION (REPAIR STRABISMUS)  1949       Social  Requested Prescriptions     Pending Prescriptions Disp Refills    atenolol-chlorthalidone (TENORETIC) 100-25 mg per tablet [Pharmacy Med Name: ATENOLOL-CHLORTHALIDONE 100-25] 90 Tab 0     Sig: take 1 tablet by mouth every morning      Last OV 03/14/2017  Next OV  12/18/2019    Pt request please process refill or enough to last until he sees you for OV 12/18/2019. Per pt you know his history and he can't be without his blood pressure medication. He said he has 4 pills left    He is requesting a return call once you have reviewed.      Pharmacy is [facility], [address] History:  Patient reports that he quit smoking about 65 years ago. He has a 4.00 pack-year smoking history. He has never used smokeless tobacco. He reports current alcohol use. He reports that he does not use drugs.    Family History:  Family History   Problem Relation Age of Onset     Cancer Father         bladder     Cancer Mother         unknown skin cancer     Skin Cancer Mother      Glaucoma No family hx of      Macular Degeneration No family hx of        Medications:  Current Outpatient Medications   Medication Sig Dispense Refill     atorvastatin (LIPITOR) 10 MG tablet Take 1 tablet (10 mg) by mouth daily 90 tablet 0     Cholecalciferol (VITAMIN D3) 1000 units CAPS Take 1 capsule by mouth daily.       dabigatran ANTICOAGULANT (PRADAXA) 150 MG capsule Take 1 capsule (150 mg) by mouth 2 times daily Store in original 's bottle or blister pack; use within 120 days of opening. 60 capsule 0     multivitamin (OCUVITE) TABS Take 1 tablet by mouth 2 times daily        aspirin 81 MG tablet Take 1 tablet by mouth daily.  3     order for DME Equipment being ordered: Compression stockings, knee high, measured for patient, 20-30mmHg (Patient not taking: Reported on 11/7/2019) 3 Units 3     Allergies   Allergen Reactions     Codeine Sulfate Nausea and Vomiting         Review of Systems:  -Constitutional: Otherwise feeling well today, in usual state of health.  -Skin: As above in HPI. No additional skin concerns.    Physical exam:  GEN: This is a well developed, well-nourished male in no acute distress, in a pleasant mood.    SKIN: Focused examination of the face was performed.  -Cota skin type: II  -There is a large tan to brown waxy stuck on papule with surrounding erythema on the right temple.   -No other lesions of concern on areas examined.     Impression/Plan:  1. Seborrheic keratosis, inflamed. Given symptomatic nature, will treat with cryo today. May need multiple cycles given large  size.    Cryotherapy procedure note: After verbal consent and discussion of risks and benefits including but no limited to dyspigmentation/scar, blister, and pain, 1 lesion was(were) treated with 1-2mm freeze border for 2 cycles with liquid nitrogen. Post cryotherapy instructions were provided.    Recheck in 1 month with FBSE.      CC Nayana Lira NP  [facility] NP [facility] [address], [address]  [place] [address] on close of this encounter.    Follow-up in 1 months, earlier for new or changing lesions.       Staff Involved:  Staff Only    Pauline Freed MD    Department of Dermatology  [facility]: Phone: [phone], Fax: [phone]

## 2019-12-19 ENCOUNTER — ANCILLARY PROCEDURE (OUTPATIENT)
Dept: BONE DENSITY | Facility: CLINIC | Age: 84
End: 2019-12-19
Attending: INTERNAL MEDICINE
Payer: MEDICARE

## 2019-12-19 ENCOUNTER — ANCILLARY PROCEDURE (OUTPATIENT)
Dept: MRI IMAGING | Facility: CLINIC | Age: 84
End: 2019-12-19
Attending: INTERNAL MEDICINE
Payer: MEDICARE

## 2019-12-19 DIAGNOSIS — M89.9 DISORDER OF BONE, UNSPECIFIED: ICD-10-CM

## 2019-12-19 DIAGNOSIS — R29.890 LOSS OF HEIGHT: ICD-10-CM

## 2019-12-19 DIAGNOSIS — R93.5 ABNORMAL MRI OF ABDOMEN: ICD-10-CM

## 2019-12-19 DIAGNOSIS — S32.9XXS CLOSED NONDISPLACED FRACTURE OF PELVIS, UNSPECIFIED PART OF PELVIS, SEQUELA: ICD-10-CM

## 2019-12-19 DIAGNOSIS — Z13.820 SCREENING FOR OSTEOPOROSIS: ICD-10-CM

## 2019-12-19 RX ORDER — GADOBUTROL 604.72 MG/ML
7.5 INJECTION INTRAVENOUS ONCE
Status: COMPLETED | OUTPATIENT
Start: 2019-12-19 | End: 2019-12-19

## 2019-12-19 RX ADMIN — GADOBUTROL 7.5 ML: 604.72 INJECTION INTRAVENOUS at 16:08

## 2019-12-20 NOTE — DISCHARGE INSTRUCTIONS
MRI Contrast Discharge Instructions    The IV contrast you received today will pass out of your body in your  urine. This will happen in the next 24 hours. You will not feel this process.  Your urine will not change color.    Drink at least 4 extra glasses of water or juice today (unless your doctor  has restricted your fluids). This reduces the stress on your kidneys.  You may take your regular medicines.    If you are on dialysis: It is best to have dialysis today.    If you have a reaction: Most reactions happen right away. If you have  any new symptoms after leaving the hospital (such as hives or swelling),  call your hospital at the correct number below. Or call your family doctor.  If you have breathing distress or wheezing, call 911.    Special instructions: ***    I have read and understand the above information.    Signature:______________________________________ Date:___________    Staff:__________________________________________ Date:___________     Time:__________    [facility] Departments:    ___[place]: [phone]  ___[facility]: [phone]  ___[place]: [phone] ___[facility]: [phone]  ___[facility]: [phone]  ___[facility]: [phone]  ___[phone]  ___[facility]: [phone]  ___[phone]

## 2020-01-07 ENCOUNTER — ALLIED HEALTH/NURSE VISIT (OUTPATIENT)
Dept: INTERNAL MEDICINE | Facility: CLINIC | Age: 85
End: 2020-01-07
Payer: MEDICARE

## 2020-01-07 ENCOUNTER — OFFICE VISIT (OUTPATIENT)
Dept: DERMATOLOGY | Facility: CLINIC | Age: 85
End: 2020-01-07
Payer: MEDICARE

## 2020-01-07 DIAGNOSIS — L82.1 SEBORRHEIC KERATOSIS: Primary | ICD-10-CM

## 2020-01-07 DIAGNOSIS — L57.0 ACTINIC KERATOSIS: ICD-10-CM

## 2020-01-07 DIAGNOSIS — Z23 NEED FOR SHINGLES VACCINE: Primary | ICD-10-CM

## 2020-01-07 ASSESSMENT — PAIN SCALES - GENERAL: PAINLEVEL: NO PAIN (0)

## 2020-01-07 NOTE — NURSING NOTE
Dermatology Rooming Note    Tom Saini's goals for this visit include:   Chief Complaint   Patient presents with     Derm Problem     Seborrheic keratosis f/u - Tom states that things are going well     Юлия Hughes, EMT

## 2020-01-07 NOTE — PATIENT INSTRUCTIONS
I am glad spots look good.    Return in 1 year (~December 2020) for skin check, sooner if concerns.    The ABCDEs of Melanoma  Asymmetry, Border (irregularity), Color (not uniform, changes in color), Diameter (greater than 6 mm which is about the size of a pencil eraser), and Evolving (any changes in preexisting moles)    Skin cancer can develop anywhere on the skin. Ask someone for help when checking your skin, especially in hard to see places. If you notice a mole different from others, or that changes, enlarges, itches, or bleeds (even if it is small), you should see a dermatologist.

## 2020-01-07 NOTE — PROGRESS NOTES
[facility] Dermatology Note      Dermatology Problem List:  1. SKs.   - Large symptomatic SK, cryo 11/07/2019, repeat on 12/03/2019.   2. AKs.   - R forehead, cryo 12/03/2019.    Encounter Date: Jan 7, 2020    CC:  Chief Complaint   Patient presents with     Derm Problem     Seborrheic keratosis f/u - Tom states that things are going well         History of Present Illness:  Mr. Tom Saini is a 84 year old male who presents today as a follow up of an SK on his R temple. He was last seen on 12/3/2019 where he received cryotherapy for symptomatic SK on his R temple, as well as an AK on his R forehead. Today he notes that the AK on his R forehead did not scab over but he can't feel any roughness. The SK on his R temple has improved and is not bothering him anymore. No other concerns today. Patient is otherwise feeling well, no additional skin concerns.    Past Medical History:   Patient Active Problem List   Diagnosis     Eczema     Pure hypercholesterolemia     Male erectile disorder     Dermatofibroma     Seborrheic keratoses, inflamed     Past Medical History:   Diagnosis Date     Aortic insufficiency      DVT (deep vein thrombosis) in pregnancy 05/2019    distal DVT with extension, completed 6 months of AC (failed eliquis)     Erectile dysfunction      History of pelvic fracture      Hyperlipidemia      Low back pain     disc disease s/p laminectomy in past     Macular degeneration     on Avastin, R eye     Pancreatic cyst 07/2018    next MR due 12/20, then consider 2 year interval imaging     Radius fracture 2018    after fall from wall     Past Surgical History:   Procedure Laterality Date     CATARACT IOL, RT/LT  2001     COMBINED REPAIR PTOSIS WITH BLEPHAROPLASTY BILATERAL Bilateral 4/6/2018    Procedure: COMBINED REPAIR PTOSIS WITH BLEPHAROPLASTY BILATERAL;  BILATERAL UPPER EYELIDS BLEPHAROPLASTY AND PTOSIS REPAIR ;  Surgeon: Anuj Good MD;  Location: [facility]     EYE SURGERY        LAMINECTOMY LUMBAR TWO LEVELS  1993    L4-L5     RECESSION RESECTION (REPAIR STRABISMUS)  1949       Social History:  Patient reports that he quit smoking about 65 years ago. He has a 4.00 pack-year smoking history. He has never used smokeless tobacco. He reports current alcohol use. He reports that he does not use drugs.    Family History:  Family History   Problem Relation Age of Onset     Cancer Father         bladder     Cancer Mother         unknown skin cancer     Skin Cancer Mother      Glaucoma No family hx of      Macular Degeneration No family hx of        Medications:  Current Outpatient Medications   Medication Sig Dispense Refill     aspirin 81 MG tablet Take 1 tablet by mouth daily.  3     atorvastatin (LIPITOR) 10 MG tablet Take 1 tablet (10 mg) by mouth daily 90 tablet 2     Cholecalciferol (VITAMIN D3) 1000 units CAPS Take 1 capsule by mouth daily.       multivitamin (OCUVITE) TABS Take 1 tablet by mouth 2 times daily          Allergies   Allergen Reactions     Codeine Sulfate Nausea and Vomiting       Review of Systems:  -Skin Establ Pt: The patient denies any new rash, pruritus, or lesions that are symptomatic, changing or bleeding, except as per HPI.  -Constitutional: The patient is feeling generally well.    Physical exam:  GEN: This is a well developed, well-nourished male in no acute distress, in a pleasant mood.    SKIN: Focused examination of the face and scalp was performed.  - No grittiness on forehead.  - Large waxy stuck on brown plaque on R temple, improved from prior.  - No other lesions of concern on areas examined.     Impression/Plan:  1. Seborrheic keratosis, R temple. Improved with cryo. No longer symptomatic. He is not interested in cryo today. Advised to return to clinic for repeat of cryo if becomes symptomatic again.      2. Actinic keratosis. Resolved with cryo. No further treatment required.    Follow-up in December 2020 for FBSE, earlier for new or changing lesions.        Staff Involved:    Scribe Disclosure  I, Narda Crane, am serving as a scribe to document services personally performed by Dr. Pauline Freed MD, based on data collection and the provider's statements to me.     Provider Disclosure:   The documentation recorded by the scribe accurately reflects the services I personally performed and the decisions made by me.    Pauline Freed MD    Department of Dermatology  [facility]: Phone: [phone], Fax: [phone]

## 2020-01-07 NOTE — LETTER
1/7/2020       RE: Tom Saini  [address] [facility] [address]     Dear Colleague,    Thank you for referring your patient, Tom Saini, to the [facility] DERMATOLOGY at [facility]. Please see a copy of my visit note below.    [facility] Dermatology Note      Dermatology Problem List:  1. SKs.   - Large symptomatic SK, cryo 11/07/2019, repeat on 12/03/2019.   2. AKs.   - R forehead, cryo 12/03/2019.    Encounter Date: Jan 7, 2020    CC:  Chief Complaint   Patient presents with     Derm Problem     Seborrheic keratosis f/u - Tom states that things are going well         History of Present Illness:  Mr. Tom Saini is a 84 year old male who presents today as a follow up of an SK on his R temple. He was last seen on 12/3/2019 where he received cryotherapy for symptomatic SK on his R temple, as well as an AK on his R forehead. Today he notes that the AK on his R forehead did not scab over but he can't feel any roughness. The SK on his R temple has improved and is not bothering him anymore. No other concerns today. Patient is otherwise feeling well, no additional skin concerns.    Past Medical History:   Patient Active Problem List   Diagnosis     Eczema     Pure hypercholesterolemia     Male erectile disorder     Dermatofibroma     Seborrheic keratoses, inflamed     Past Medical History:   Diagnosis Date     Aortic insufficiency      DVT (deep vein thrombosis) in pregnancy 05/2019    distal DVT with extension, completed 6 months of AC (failed eliquis)     Erectile dysfunction      History of pelvic fracture      Hyperlipidemia      Low back pain     disc disease s/p laminectomy in past     Macular degeneration     on Avastin, R eye     Pancreatic cyst 07/2018    next MR due 12/20, then consider 2 year interval imaging     Radius fracture 2018    after fall from wall     Past Surgical History:   Procedure Laterality Date     CATARACT IOL,  RT/LT  2001     COMBINED REPAIR PTOSIS WITH BLEPHAROPLASTY BILATERAL Bilateral 4/6/2018    Procedure: COMBINED REPAIR PTOSIS WITH BLEPHAROPLASTY BILATERAL;  BILATERAL UPPER EYELIDS BLEPHAROPLASTY AND PTOSIS REPAIR ;  Surgeon: Anuj Good MD;  Location: [facility]     EYE SURGERY       LAMINECTOMY LUMBAR TWO LEVELS  1993    L4-L5     RECESSION RESECTION (REPAIR STRABISMUS)  1949       Social History:  Patient reports that he quit smoking about 65 years ago. He has a 4.00 pack-year smoking history. He has never used smokeless tobacco. He reports current alcohol use. He reports that he does not use drugs.    Family History:  Family History   Problem Relation Age of Onset     Cancer Father         bladder     Cancer Mother         unknown skin cancer     Skin Cancer Mother      Glaucoma No family hx of      Macular Degeneration No family hx of        Medications:  Current Outpatient Medications   Medication Sig Dispense Refill     aspirin 81 MG tablet Take 1 tablet by mouth daily.  3     atorvastatin (LIPITOR) 10 MG tablet Take 1 tablet (10 mg) by mouth daily 90 tablet 2     Cholecalciferol (VITAMIN D3) 1000 units CAPS Take 1 capsule by mouth daily.       multivitamin (OCUVITE) TABS Take 1 tablet by mouth 2 times daily          Allergies   Allergen Reactions     Codeine Sulfate Nausea and Vomiting       Review of Systems:  -Skin Establ Pt: The patient denies any new rash, pruritus, or lesions that are symptomatic, changing or bleeding, except as per HPI.  -Constitutional: The patient is feeling generally well.    Physical exam:  GEN: This is a well developed, well-nourished male in no acute distress, in a pleasant mood.    SKIN: Focused examination of the face and scalp was performed.  - No grittiness on forehead.  - Large waxy stuck on brown plaque on R temple, improved from prior.  - No other lesions of concern on areas examined.     Impression/Plan:  1. Seborrheic keratosis, R temple. Improved with cryo. No longer  symptomatic. He is not interested in cryo today. Advised to return to clinic for repeat of cryo if becomes symptomatic again.      2. Actinic keratosis. Resolved with cryo. No further treatment required.    Follow-up in December 2020 for FBSE, earlier for new or changing lesions.       Staff Involved:    Scribe Disclosure  I, Narda Crane, am serving as a scribe to document services personally performed by Dr. Pauline Freed MD, based on data collection and the provider's statements to me.     Provider Disclosure:   The documentation recorded by the scribe accurately reflects the services I personally performed and the decisions made by me.    Pauline Freed MD    Department of Dermatology  [facility]: Phone: [phone], Fax: [phone]

## 2020-01-07 NOTE — NURSING NOTE
Chief Complaint   Patient presents with     Allied Health Visit     Pt comes in for second round of shingles vaccine, first dose received in clinic      MAVERICK Crespo at 12:28 PM sign on 1/7/2020      Patient received SHINGRIX vaccine. Vaccine was given under the supervision of Dr. Jimenez. See immunization list for administration details. Pt was advised to remain in [facility] lobby for 15 minutes in case of an averse reaction. Given by Monica Worley CMA, EMT 12:28 PM 1/7/2020

## 2020-01-15 ENCOUNTER — ALLIED HEALTH/NURSE VISIT (OUTPATIENT)
Dept: PHARMACY | Facility: CLINIC | Age: 85
End: 2020-01-15
Payer: COMMERCIAL

## 2020-01-15 DIAGNOSIS — Z79.82 LONG-TERM USE OF ASPIRIN THERAPY: ICD-10-CM

## 2020-01-15 DIAGNOSIS — Z78.9 TAKES DIETARY SUPPLEMENTS: ICD-10-CM

## 2020-01-15 DIAGNOSIS — E78.00 PURE HYPERCHOLESTEROLEMIA: ICD-10-CM

## 2020-01-15 DIAGNOSIS — M81.0 AGE-RELATED OSTEOPOROSIS WITHOUT CURRENT PATHOLOGICAL FRACTURE: Primary | ICD-10-CM

## 2020-01-15 PROCEDURE — 99605 MTMS BY PHARM NP 15 MIN: CPT | Performed by: PHARMACIST

## 2020-01-15 PROCEDURE — 99607 MTMS BY PHARM ADDL 15 MIN: CPT | Performed by: PHARMACIST

## 2020-01-15 RX ORDER — CALCIUM CARBONATE 500(1250)
1 TABLET ORAL 2 TIMES DAILY
Status: CANCELLED | OUTPATIENT
Start: 2020-01-15

## 2020-01-15 NOTE — PROGRESS NOTES
SUBJECTIVE/OBJECTIVE:                           Tom Saini is a 84 year old male called for an initial visit for Medication Therapy Management.  He was referred to me from Angeli Robertson.    Chief Complaint: Osteoporosis treatment.    Allergies/ADRs: Reviewed in Epic  Tobacco:  reports that he quit smoking about 65 years ago. He has a 4.00 pack-year smoking history. He has never used smokeless tobacco.  Alcohol: 7-9 beverages / week  Caffeine: 4-5 cups/day of coffee  Activity: walks many days of the week  PMH: Reviewed in Epic    Medication Adherence/Access:  no issues reported    Osteoporosis: Current therapy includes: Vitamin D supplements: 1000 international unit(s) dial. Pt is not experiencing side effects. He has had previous DEXA scans with an epidemiology study but he is under the understanding that they cannot be compared because these scans were completed using a different machine. We spend the majority of our times discussing treatment for osteoporosis and how treatment is beneficial. When we discuss the various options, he reports that he prefers the once-weekly tablet. He isn't sure if he is getting calcium in his food; he reports little dairy intake. He would like more information regarding this.   Pt is getting the following sources of dietary calcium: none that the patient can identify  Last vitamin D level:     Lab Results   Component Value Date    VITDT 39 12/18/2012     DEXA History: 12/19/19 T score = -2.9 at R Femoral neck  Risk factors: history of fragility fractures, pt-reported loss in height    Hyperlipidemia: Current therapy includes atorvastatin 10 mg once daily.  Pt reports no significant myalgias or other side effects..  The ASCVD Risk score ([place] TONY Jr., et al., 2013) failed to calculate for the following reasons:    The 2013 ASCVD risk score is only valid for ages 40 to 79     Recent Labs   Lab Test 09/19/19 2026 06/26/17  1041  12/04/13  0811 12/18/12  0806   CHOL 138 149   < > 161  "161   HDL 61 61   < > 53 50   LDL 66 70   < > 96 98   TRIG 53 90   < > 62 68   CHOLHDLRATIO  --   --   --  3.1 3.2    < > = values in this interval not displayed.     Liver Function Studies -   Recent Labs   Lab Test 06/29/18  0949 06/26/17  1041   PROTTOTAL  --  7.0   ALBUMIN 3.2* 3.6   BILITOTAL  --  0.5   ALKPHOS  --  73   AST  --  15   ALT  --  18     DVT prevention: Current medications include aspirin 81 mg daily. He has historically been on DOACs for treatment but remains on aspirin long-term per hematology. He has no concerns or complaints today.     Vitamins: Current medications include Ocuvite daily. He has not questions or concerns today.     Today's Vitals: There were no vitals taken for this visit. - telemed     BP Readings from Last 1 Encounters:   12/05/19 116/67     Pulse Readings from Last 1 Encounters:   12/05/19 68     Wt Readings from Last 1 Encounters:   12/05/19 181 lb 4.8 oz (82.2 kg)     Ht Readings from Last 1 Encounters:   12/05/19 5' 10.5\" (1.791 m)     Estimated body mass index is 25.65 kg/m  as calculated from the following:    Height as of 12/5/19: 5' 10.5\" (1.791 m).    Weight as of 12/5/19: 181 lb 4.8 oz (82.2 kg).    Temp Readings from Last 1 Encounters:   12/05/19 97.8  F (36.6  C) (Oral)     ASSESSMENT:                              Medication Adherence: excellent, no issues identified    Osteoporosis: Needs Improvement. Pt is not meeting RDI of calcium 1200mg/day. He would benefit from assessing his diet for how much calcium he is getting and adding a supplement to make 1200 mg/d. Pt would benefit from:bisphosphonate therapy (once-weekly alendronate 70 mg). IF he does not tolerate oral bisphosphonate therapy, consider IV therapy next. Could also consider teriparatide or denosumab.     Hyperlipidemia: Stable.     DVT prevention: Stable.     Vitamins: Stable.     PLAN:                            1. Will discuss starting alendronate 70 mg once weekly with Dr. Robertson.  2. Will send " patient list of calcium rich foods so we can establish how much he needs in a supplement.     I spent 60 minutes with this patient today. A copy of the visit note was provided to the patient's primary care provider.    Will follow up when I have heard from Dr. Robertson.    The patient was sent via igobubble a summary of these recommendations as an after visit summary.     Moriah Wells, Pharm.D., [facility]  Medication Therapy Management Pharmacist  Page/VM:  [phone]

## 2020-01-20 NOTE — PATIENT INSTRUCTIONS
Recommendations from today's MTM visit:                                                    MTM (medication therapy management) is a service provided by a clinical pharmacist designed to help you get the most of out of your medicines.   Today we reviewed what your medicines are for, how to know if they are working, that your medicines are safe and how to make your medicine regimen as easy as possible.     1. We will likely start Fosamax 70 mg once weekly to help build up your bones (reduce additional bone loss). I will let you know when this prescription is approved.    2. Here is a list of calcium-rich foods. Take a look and estimate your servings of calcium daily. We can establish a good supplement dose after that.        A more comprehensive list can be found at this link:    https://www.nof.org/patients/treatment/calciumvitamin-d/a-guide-to-calcium-rich-foods/        It was great to speak with you today.  I value your experience and would be very thankful for your time with providing feedback on our clinic survey. You may receive a survey via email or text message in the next few days.     Next MTM visit: I will call you when I have heard back from Dr. Robertson.     To schedule another MTM appointment, please call the clinic directly or you may call the MTM scheduling line at [phone] or toll-free at [phone].     My Clinical Pharmacist's contact information:                                                      It was a pleasure talking with you today!  Please feel free to contact me with any questions or concerns you have.      Moriah Wells, Pharm.D., [facility]  Medication Therapy Management Pharmacist  Page/VM:  [phone]

## 2020-01-22 ENCOUNTER — TELEPHONE (OUTPATIENT)
Dept: PHARMACY | Facility: CLINIC | Age: 85
End: 2020-01-22

## 2020-01-22 DIAGNOSIS — M81.0 AGE-RELATED OSTEOPOROSIS WITHOUT CURRENT PATHOLOGICAL FRACTURE: Primary | ICD-10-CM

## 2020-01-22 RX ORDER — ALENDRONATE SODIUM 70 MG/1
70 TABLET ORAL
Qty: 12 TABLET | Refills: 3 | Status: SHIPPED | OUTPATIENT
Start: 2020-01-22 | End: 2020-02-17 | Stop reason: ALTCHOICE

## 2020-01-24 ENCOUNTER — TELEPHONE (OUTPATIENT)
Dept: INTERNAL MEDICINE | Facility: CLINIC | Age: 85
End: 2020-01-24

## 2020-01-24 NOTE — TELEPHONE ENCOUNTER
Health Call Center    Phone Message    May a detailed message be left on voicemail: yes    Reason for Call: Medication Question or concern regarding medication   Prescription Clarification  Name of Medication: alendronate (FOSAMAX) 70 MG tablet     Prescribing Provider: Angeli Robertson MD    Pharmacy: [id number] [place], [place] - [address] AT [facility]   What on the order needs clarification? The patient wanted to speak to the nurse because he took this medication and now it feels like its stuck in his throat, he said he will eat a cracker because that always helps him to swallow pills. Please call patient to provide medical direction.          Action Taken: Message routed to:  [facility] pcc

## 2020-01-24 NOTE — TELEPHONE ENCOUNTER
patient feels the medication has been stuck behind his tongue. He has had 3 glasses of water a cracker. Still feels it. Advised that sometimes he will feel a residual feeling of a pill being there after it gets stuck. Try a bite a bread to get it go to down, hydrate the mouth and throat prior to taking medications. He does not taste a bitter taste which suggests to me that it is not stuck behind his tongue. He will call back if any more problems. Griselda Sandoval Paramedic on 1/24/2020 at 9:05 AM

## 2020-02-12 ENCOUNTER — ALLIED HEALTH/NURSE VISIT (OUTPATIENT)
Dept: PHARMACY | Facility: CLINIC | Age: 85
End: 2020-02-12
Payer: COMMERCIAL

## 2020-02-12 DIAGNOSIS — M81.0 AGE-RELATED OSTEOPOROSIS WITHOUT CURRENT PATHOLOGICAL FRACTURE: Primary | ICD-10-CM

## 2020-02-12 PROCEDURE — 99606 MTMS BY PHARM EST 15 MIN: CPT | Performed by: PHARMACIST

## 2020-02-12 NOTE — PROGRESS NOTES
[facility]  SUBJECTIVE/OBJECTIVE:                Tom Saini is a 85 year old male called for a follow-up visit.  He was referred to me from Angeli Robertson.     Chief Complaint: Follow up from [facility] visit on 1/15/20.  We are following up on new start of alendronate.     Tobacco:  reports that he quit smoking about 65 years ago. He has a 4.00 pack-year smoking history. He has never used smokeless tobacco.  Alcohol: 7-9 beverages / week    Medication Adherence/Access:  no issues reported    Osteoporosis: Current therapy includes: Calcium 600 mg/800 units 1 tablet in the morning and 0.5 tablet in the evening (recent dose increase), alendronate (Fosamax) 70mg weekly (Pt has been on current therapy for 0 years; started last month). When he takes the alendronate, he feels that it gets stuck at the back of his tongue. He doesn't think it is not related to pill size but more the outside texture. Other than this, he is not having problems with side effects. He has also started taking calcium tablets (see above) based on our last conversation.  Otherwise, pt is not experiencing side effects.   Pt is getting the following sources of dietary calcium: no significant sources  Last vitamin D level:     Lab Results   Component Value Date    VITDT 39 12/18/2012     DEXA History: 12/19/19 T score = -2.9 at R Femoral neck  Risk factors: history of fragility fractures, pt-reported loss in height    Today's Vitals: There were no vitals taken for this visit. - telemed    BP Readings from Last 1 Encounters:   12/05/19 116/67     Pulse Readings from Last 1 Encounters:   12/05/19 68     Wt Readings from Last 1 Encounters:   12/05/19 181 lb 4.8 oz (82.2 kg)     Ht Readings from Last 1 Encounters:   12/05/19 5' 10.5\" (1.791 m)     Estimated body mass index is 25.65 kg/m  as calculated from the following:    Height as of 12/5/19: 5' 10.5\" (1.791 m).    Weight as of 12/5/19: 181 lb 4.8 oz (82.2 kg).    Temp Readings from Last 1 Encounters: "   12/05/19 97.8  F (36.6  C) (Oral)     ASSESSMENT:                  Medication Adherence: good, no issues identified    Osteoporosis: Stable. Sensation of the tablet getting stuck behind is tongue is likely a side effect of alendronate.  recommendations suggest that patient should avoid eating with the medication if possible, limiting options to make administration more comfortable with the tablet. Alendronate is manufactured as an effervescent tablet and may be useful to avoid this discomfort and encourage long-term adherence.     PLAN:                  1. Effervescent alendronate is offered to the patient.     I spent 15 minutes with this patient today. All changes were made via collaborative practice agreement with Angeli Robertson. A copy of the visit note was provided to the patient's primary care provider.     Will follow up when he has responded on MyChart.    The patient declined a summary of these recommendations.     Moriah Wells, Pharm.D., [facility]  Medication Therapy Management Pharmacist  Page/VM:  [phone]

## 2020-02-26 ENCOUNTER — TELEPHONE (OUTPATIENT)
Dept: PHARMACY | Facility: CLINIC | Age: 85
End: 2020-02-26

## 2020-02-26 DIAGNOSIS — M81.0 AGE-RELATED OSTEOPOROSIS WITHOUT CURRENT PATHOLOGICAL FRACTURE: Primary | ICD-10-CM

## 2020-02-26 RX ORDER — ALENDRONATE SODIUM 70 MG/75ML
70 SOLUTION ORAL
Qty: 900 ML | Refills: 3 | Status: SHIPPED | OUTPATIENT
Start: 2020-02-26 | End: 2020-04-08 | Stop reason: ALTCHOICE

## 2020-02-26 NOTE — TELEPHONE ENCOUNTER
Subjective/Objective:  Patient unable to  prescription for effervescent Alendronate tablets. Spoke with Imelda at pharmacy and she reported that the pharmacy is unable to fill this prescription because their vendor can't supply it and it is on backorder. They can get alendronate 70 mg/75 mL oral solution and per them, patient is willing to take this.     Assessment/Plan:  Sent a new prescription for Alendronate 70 mg/75 mL oral solution to patient's pharmacy.     Silvia Syed, Pharm-D4.     Moriah Wells Pharm.D., [facility]  Medication Therapy Management Pharmacist  Page/VM:  [phone]

## 2020-02-26 NOTE — TELEPHONE ENCOUNTER
[facility] Call Center    Phone Message    May a detailed message be left on voicemail: no     Reason for Call: Medication Question or concern regarding medication   Prescription Clarification  Name of Medication: Alendronate Sodium 70 MG TBEF     Prescribing Provider: Moriah Wells   [facility]acy: [facility]   What on the order needs clarification? Pharmacy needing call back to see if this script can be changed to a liquid, pt has a hard time swallowing pills          Action Taken: Message routed to:  [facility] pcc    Travel Screening: Not Applicable

## 2020-03-03 DIAGNOSIS — H35.3211 EXUDATIVE AGE-RELATED MACULAR DEGENERATION OF RIGHT EYE WITH ACTIVE CHOROIDAL NEOVASCULARIZATION (H): Primary | ICD-10-CM

## 2020-03-05 ENCOUNTER — OFFICE VISIT (OUTPATIENT)
Dept: OPHTHALMOLOGY | Facility: CLINIC | Age: 85
End: 2020-03-05
Attending: OPHTHALMOLOGY
Payer: MEDICARE

## 2020-03-05 DIAGNOSIS — H35.3122 NONEXUDATIVE AGE-RELATED MACULAR DEGENERATION, LEFT EYE, INTERMEDIATE DRY STAGE: ICD-10-CM

## 2020-03-05 DIAGNOSIS — H35.3212 EXUDATIVE AGE-RELATED MACULAR DEGENERATION OF RIGHT EYE WITH INACTIVE CHOROIDAL NEOVASCULARIZATION (H): Primary | ICD-10-CM

## 2020-03-05 DIAGNOSIS — Z96.1 PSEUDOPHAKIA OF BOTH EYES: ICD-10-CM

## 2020-03-05 PROCEDURE — 92235 FLUORESCEIN ANGRPH MLTIFRAME: CPT | Mod: ZF | Performed by: OPHTHALMOLOGY

## 2020-03-05 PROCEDURE — G0463 HOSPITAL OUTPT CLINIC VISIT: HCPCS | Mod: ZF

## 2020-03-05 PROCEDURE — 92134 CPTRZ OPH DX IMG PST SGM RTA: CPT | Mod: ZF | Performed by: OPHTHALMOLOGY

## 2020-03-05 ASSESSMENT — VISUAL ACUITY
OD_CC+: -2
OD_CC: 20/40
OS_CC: 20/30
OS_CC+: -2
METHOD: SNELLEN - LINEAR
CORRECTION_TYPE: GLASSES

## 2020-03-05 ASSESSMENT — TONOMETRY
IOP_METHOD: TONOPEN
OS_IOP_MMHG: 11
OD_IOP_MMHG: 14

## 2020-03-05 ASSESSMENT — REFRACTION_WEARINGRX
OS_SPHERE: -1.50
OD_ADD: +2.75
SPECS_TYPE: BIFOCAL
OD_SPHERE: -2.75
OD_AXIS: 170
OS_AXIS: 010
OS_ADD: +2.75
OS_CYLINDER: +0.50
OD_CYLINDER: +1.00

## 2020-03-05 ASSESSMENT — CUP TO DISC RATIO
OD_RATIO: 0.2
OS_RATIO: 0.2

## 2020-03-05 ASSESSMENT — EXTERNAL EXAM - LEFT EYE: OS_EXAM: ET

## 2020-03-05 ASSESSMENT — SLIT LAMP EXAM - LIDS
COMMENTS: PTOSIS OS > OD
COMMENTS: PTOSIS OS > OD

## 2020-03-05 ASSESSMENT — CONF VISUAL FIELD
METHOD: COUNTING FINGERS
OS_NORMAL: 1
OD_NORMAL: 1

## 2020-03-05 ASSESSMENT — EXTERNAL EXAM - RIGHT EYE: OD_EXAM: NORMAL

## 2020-03-05 NOTE — NURSING NOTE
Chief Complaints and History of Present Illnesses   Patient presents with     Macular Degeneration Follow Up     Chief Complaint(s) and History of Present Illness(es)     Macular Degeneration Follow Up     Laterality: both eyes    Onset: gradual    Onset: months ago    Quality: States va is the same since last visit      Associated symptoms: dryness.  Negative for floaters, flashes, redness and tearing    Treatments tried: artificial tears    Pain scale: 0/10              Comments     Noemi Epps COT 2:54 PM March 5, 2020

## 2020-03-06 NOTE — PROGRESS NOTES
Chief Complaint(s) and History of Present Illness(es)     Macular Degeneration Follow Up     Laterality: both eyes    Onset: gradual    Onset: months ago    Quality: States va is the same since last visit      Associated symptoms: dryness.  Negative for floaters, flashes, redness   and tearing    Treatments tried: artificial tears    Pain scale: 0/10              Comments     Noemi Munozrashadon COT 2:54 PM March 5, 2020               Review of systems for the eyes was negative other than the pertinent positives/negatives listed in the HPI.      Assessment & Plan      Tom Saini is a 85 year old male with the following diagnoses:   1. 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 Nonexudative age-related macular degeneration, left eye, intermediate dry stage    3. Pseudophakia of both eyes       S/P Avastin injection x 13 right eye - last injection 10/31/2019  S/P Eylea x 1 (during avastin syringe back order)    Doing great, stable OCT today  No change in vision   Now 4 mo from last injection.  Fluorescein angiography shows no evidence of leakage  Offered continued injection for prophylaxis versus close observation  Importance of Amsler reviewed   AREDS to continue    Wishes to monitor closely  Return precautions reviewed     Patient disposition:   Return in about 2 months (around 5/5/2020) for VT only, OCT Macula.           Attending Physician Attestation:  Complete documentation of historical and exam elements from today's encounter can be found in the full encounter summary report (not reduplicated in this progress note).  I personally obtained the chief complaint(s) and history of present illness.  I confirmed and edited as necessary the review of systems, past medical/surgical history, family history, social history, and examination findings as documented by others; and I examined the patient myself.  I personally reviewed the relevant tests,  images, and reports as documented above.  I formulated and edited as necessary the assessment and plan and discussed the findings and management plan with the patient and family. . - Brigido Laura MD

## 2020-04-08 ENCOUNTER — TELEPHONE (OUTPATIENT)
Dept: OPHTHALMOLOGY | Facility: CLINIC | Age: 85
End: 2020-04-08

## 2020-04-08 NOTE — TELEPHONE ENCOUNTER
H/o macular degeneratin and last injection about 5 months ago    Spoke to pt at 1300  Rescheduled 8 week f/u to may 12th with Dr. Laura from April 30th    No new vision/amsler grid changes noted    Pt aware to call for any changes in vision/amsler changes    Able to review any COVID scheduling changes again in may if indication    Pt verbally demonstrated understanding and seemed comfortable with plan    Note to Dr. Laura for review/amendmend if applicable  Josh Araiza RN 1:14 PM 04/08/20          [facility]    Phone Message    May a detailed message be left on voicemail: yes     Reason for Call: Other: pt requesting call back from his care team to verify if he still needs to be seen at the clinic on 4/30 by Dr. laura or can the appt be pushed out     Action Taken: Message routed to:  [facility]    Travel Screening: Not Applicable

## 2020-04-28 ENCOUNTER — TELEPHONE (OUTPATIENT)
Dept: OPHTHALMOLOGY | Facility: CLINIC | Age: 85
End: 2020-04-28

## 2020-04-28 NOTE — TELEPHONE ENCOUNTER
H/o wet macular degeneration  Last injection in octobe 2019  Last visit 3-5-2020 with recommended f/u in 8 weeks    Pt would prefer to hold on appt during COVID    Scheduled in July after reviewing possible timeframe and pt comfortable with July.    Pt has Amsler grid and will periodically use each week and call for any changes in amsler grid or vision changes    Note to Dr. Laura for review and amendment if applicable  Josh Araiza RN 3:21 PM 04/28/20      [facility]    Phone Message    May a detailed message be left on voicemail: yes     Reason for Call: Other: Pt calling to see If he should Keep Appt would like to discuss with Clinic     Thank you,    Action Taken: Message routed to:  [facility]    Travel Screening: Not Applicable                                                                       no

## 2020-06-18 ENCOUNTER — VIRTUAL VISIT (OUTPATIENT)
Dept: INTERNAL MEDICINE | Facility: CLINIC | Age: 85
End: 2020-06-18
Payer: MEDICARE

## 2020-06-18 DIAGNOSIS — M72.2 PLANTAR FASCIITIS: Primary | ICD-10-CM

## 2020-06-18 ASSESSMENT — PAIN SCALES - GENERAL: PAINLEVEL: NO PAIN (1)

## 2020-06-18 NOTE — PROGRESS NOTES
"  This patient is being evaluated via a billable telephone visit; THIS VISIT WAS INITIATED BY THE PT, as AN ALTERNATIVE TO IN PERSON VISIT .       The patient has has been notified of following:      \"This billable telephone visit will be conducted via a call between you and your physician/provider. We have found that certain health care needs can be provided without the need for a physical exam.  This service lets us provide the care you need with a short phone conversation.  If a prescription is necessary we can send it directly to your pharmacy.  If lab work is needed we can place an order for that and you can then stop by our lab to have the test done at a later time. We can also place orders for a limited number of imaging tests if they are deemed urgently necessary.     If during the course of the call the physician/provider feels a telephone visit is not appropriate, you will not be charged for this service.\"     Due to efforts to reduce the spread of COVID-19 in the clinic, state, nation, telephone visits are encouraged currently. Patient understands that diagnose and advice is limited by the inability to exam him/her/them face-to-face.    Patient has given verbal consent for the virtual audio visit? Yes  Did patient initiate this virtual visit? Yes    Person spoken to: patient    This was a synchronous virtual visit  Time call initiated:  8:35  Time call ended:  8:55  Total length of call: 20                               [facility]       SUBJECTIVE:  Tom Saini is a 85 year old m here with foot pan      Left foot, started 2 weeks ago,  was much worse, after switching from the boots he is been using for the past 30 years. Now much better after going back to the boots. Usually wear ankle rock edison boots with orthotics because he has one leg shorter than the other. Was wearing sandals when this pain started. Pain is sharp / dull going from his heel at the center of his foot towards his toes. " "\"feels like a bone in that area is about to pop out\". Pain and this sensation has been improving after he goes on walks. Going downstairs makes it worse. No traumas. He last saw podiatry for his orthotic over 20 years ago and is wondering about another options for the summer.         Medications and allergies reviewed by me today.     ROS:   Constitutional, HEENT, cardiovascular, pulmonary, gi and gu systems are negative, except as otherwise noted.    OBJECTIVE:    There were no vitals taken for this visit.   Wt Readings from Last 1 Encounters:   12/05/19 82.2 kg (181 lb 4.8 oz)       This is a phone virtual visit. Exam differed     ASSESSMENT/PLAN:    Tom was seen today for ankle/foot left.    Diagnoses and all orders for this visit:    Plantar fasciitis  Hx is highly suspicious for plantar fasciitis. Might have been exacerbated in changes in mechanics while not wearing his orthotics. Recommended to continue with his boots for now as they seem to have helped. Also discussed stretching exercises and recommended heel cup. He would also benefit from seeing podiatry to discuss possible new options for the orthotic he has been wearing for the past 20 years.   -     [facility]  Referral; Future           Tamara Norman MD  Jun 18, 2020    Pt was seen and plan of care discussed with Dr. Jacques.     Pt was reviewed and discussed with Dr. Norman.  I agree with his documentation as noted above.    My additional comments: None    Moustapha Jacques MD, MD      "

## 2020-06-18 NOTE — NURSING NOTE
Chief Complaint   Patient presents with     Ankle/Foot left     Pt is in left foot pain duration about 1 week.      Fiorella Valdivia LPN at 7:47 AM on 6/18/2020.

## 2020-06-19 ENCOUNTER — TELEPHONE (OUTPATIENT)
Dept: INTERNAL MEDICINE | Facility: CLINIC | Age: 85
End: 2020-06-19

## 2020-07-09 ENCOUNTER — OFFICE VISIT (OUTPATIENT)
Dept: OPHTHALMOLOGY | Facility: CLINIC | Age: 85
End: 2020-07-09
Attending: OPHTHALMOLOGY
Payer: MEDICARE

## 2020-07-09 DIAGNOSIS — H35.3212 EXUDATIVE AGE-RELATED MACULAR DEGENERATION OF RIGHT EYE WITH INACTIVE CHOROIDAL NEOVASCULARIZATION (H): ICD-10-CM

## 2020-07-09 DIAGNOSIS — H35.3221 EXUDATIVE AGE-RELATED MACULAR DEGENERATION OF LEFT EYE WITH ACTIVE CHOROIDAL NEOVASCULARIZATION (H): Primary | ICD-10-CM

## 2020-07-09 PROCEDURE — G0463 HOSPITAL OUTPT CLINIC VISIT: HCPCS | Mod: 25

## 2020-07-09 PROCEDURE — 92134 CPTRZ OPH DX IMG PST SGM RTA: CPT | Mod: ZF | Performed by: OPHTHALMOLOGY

## 2020-07-09 PROCEDURE — 67028 INJECTION EYE DRUG: CPT | Mod: LT,ZF | Performed by: OPHTHALMOLOGY

## 2020-07-09 PROCEDURE — 25000128 H RX IP 250 OP 636: Mod: ZF | Performed by: OPHTHALMOLOGY

## 2020-07-09 RX ADMIN — Medication 1.25 MG: at 11:02

## 2020-07-09 ASSESSMENT — EXTERNAL EXAM - LEFT EYE: OS_EXAM: ET

## 2020-07-09 ASSESSMENT — CUP TO DISC RATIO
OS_RATIO: 0.2
OD_RATIO: 0.2

## 2020-07-09 ASSESSMENT — VISUAL ACUITY
OD_CC: 20/40
OD_CC+: -2
OS_CC: 20/30
OS_CC+: -1
OD_PH_CC: 20/30
METHOD: SNELLEN - LINEAR

## 2020-07-09 ASSESSMENT — REFRACTION_WEARINGRX
OS_AXIS: 010
OD_CYLINDER: +1.00
OD_ADD: +2.75
OD_AXIS: 170
OS_ADD: +2.75
OS_CYLINDER: +0.50
OS_SPHERE: -1.50
SPECS_TYPE: BIFOCAL
OD_SPHERE: -2.75

## 2020-07-09 ASSESSMENT — CONF VISUAL FIELD
METHOD: COUNTING FINGERS
OS_NORMAL: 1
OD_NORMAL: 1

## 2020-07-09 ASSESSMENT — SLIT LAMP EXAM - LIDS
COMMENTS: PTOSIS OS > OD
COMMENTS: PTOSIS OS > OD

## 2020-07-09 ASSESSMENT — TONOMETRY
OS_IOP_MMHG: 12
OD_IOP_MMHG: 13
IOP_METHOD: TONOPEN

## 2020-07-09 ASSESSMENT — EXTERNAL EXAM - RIGHT EYE: OD_EXAM: NORMAL

## 2020-07-09 NOTE — PROGRESS NOTES
Chief Complaint(s) and History of Present Illness(es)     Macular Degeneration Follow Up     Associated symptoms: floaters (no new ones) and dryness (uses gtts).    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   He feels vision is about the same as last visit.   He checks his Amsler grid at home and has seen no change.     Tom Armijo COT 8:56 AM July 9, 2020               Review of systems for the eyes was negative other than the pertinent positives/negatives listed in the HPI.      Assessment & Plan      Tom Saini is a 85 year old male with the following diagnoses:   1. Exudative age-related macular degeneration of left eye with active choroidal neovascularization (H)    2. 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   #1/3, plan for [id number]    Continue Amsler reviewed   AREDS to continue  Return precautions reviewed       Patient disposition:   Return in about 4 weeks (around 8/6/2020) for VT only, Avastin LEFT.           Attending Physician Attestation:  Complete documentation of historical and exam elements from today's encounter can be found in the full encounter summary report (not reduplicated in this progress note).  I personally obtained the chief complaint(s) and history of present illness.  I confirmed and edited as necessary the review of systems, past medical/surgical history, family history, social history, and examination findings as documented  by others; and I examined the patient myself.  I personally reviewed the relevant tests, images, and reports as documented above.  I formulated and edited as necessary the assessment and plan and discussed the findings and management plan with the patient and family. . - Brigido Laura MD

## 2020-07-09 NOTE — NURSING NOTE
Chief Complaints and History of Present Illnesses   Patient presents with     Macular Degeneration Follow Up     Chief Complaint(s) and History of Present Illness(es)     Macular Degeneration Follow Up     Associated symptoms: floaters (no new ones) and dryness (uses gtts).  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 He feels vision is about the same as last visit.   He checks his Amsler grid at home and has seen no change.     Tom Armijo COT 8:56 AM July 9, 2020

## 2020-08-17 DIAGNOSIS — E78.5 HYPERLIPIDEMIA LDL GOAL <100: ICD-10-CM

## 2020-08-20 ENCOUNTER — OFFICE VISIT (OUTPATIENT)
Dept: OPHTHALMOLOGY | Facility: CLINIC | Age: 85
End: 2020-08-20
Attending: OPHTHALMOLOGY
Payer: MEDICARE

## 2020-08-20 DIAGNOSIS — H35.3221 EXUDATIVE AGE-RELATED MACULAR DEGENERATION OF LEFT EYE WITH ACTIVE CHOROIDAL NEOVASCULARIZATION (H): Primary | ICD-10-CM

## 2020-08-20 PROCEDURE — 40000269 ZZH STATISTIC NO CHARGE FACILITY FEE: Mod: ZF

## 2020-08-20 PROCEDURE — 67028 INJECTION EYE DRUG: CPT | Mod: LT,ZF | Performed by: OPHTHALMOLOGY

## 2020-08-20 PROCEDURE — G0463 HOSPITAL OUTPT CLINIC VISIT: HCPCS | Mod: 25,ZF

## 2020-08-20 PROCEDURE — 25000128 H RX IP 250 OP 636: Mod: ZF | Performed by: OPHTHALMOLOGY

## 2020-08-20 RX ORDER — ATORVASTATIN CALCIUM 10 MG/1
10 TABLET, FILM COATED ORAL DAILY
Qty: 90 TABLET | Refills: 0 | Status: SHIPPED | OUTPATIENT
Start: 2020-08-20 | End: 2020-11-23

## 2020-08-20 RX ADMIN — Medication 1.25 MG: at 09:54

## 2020-08-20 ASSESSMENT — REFRACTION_WEARINGRX
OD_SPHERE: -2.75
OS_CYLINDER: +0.50
OD_AXIS: 170
OS_SPHERE: -1.50
OS_AXIS: 010
SPECS_TYPE: BIFOCAL
OD_ADD: +2.75
OD_CYLINDER: +1.00
OS_ADD: +2.75

## 2020-08-20 ASSESSMENT — TONOMETRY
IOP_METHOD: TONOPEN
OD_IOP_MMHG: 15
OS_IOP_MMHG: 15

## 2020-08-20 ASSESSMENT — VISUAL ACUITY
OS_CC: 20/30
CORRECTION_TYPE: GLASSES
OD_CC: 20/30
METHOD: SNELLEN - LINEAR
OD_CC+: -2

## 2020-08-20 ASSESSMENT — CONF VISUAL FIELD
METHOD: COUNTING FINGERS
OD_NORMAL: 1
OS_NORMAL: 1

## 2020-08-20 NOTE — PROGRESS NOTES
Chief Complaint(s) and History of Present Illness(es)     Macular Degeneration Follow Up     Laterality: both eyes    Onset: gradual    Onset: months ago    Quality: States va is the same since last visit      Associated symptoms: Negative for floaters, flashes and photophobia    Pain scale: 0/10              Comments     Noemi Mich COT 9:07 AM August 20, 2020               Review of systems for the eyes was negative other than the pertinent positives/negatives listed in the HPI.      Assessment & Plan      Tom Saini is a 85 year old male with the following diagnoses:   1. Exudative age-related macular degeneration of left eye with active choroidal neovascularization (H)         S/P Avastin injection x 13 right eye - last injection 10/31/2019  S/P Avastin injection x 1 left eye - last injection 7/9/2020  S/P Eylea x 1 (during avastin syringe back order)    No vision changes  OCT with new intraretinal fluid left eye   OCT-A new Choroidal neovascular membrane left eye     RBA to avastin left eye discussed, consent obtained, will proceed today.   #2/3, plan for [id number]    Continue Amsler reviewed   AREDS to continue  Return precautions reviewed       Patient disposition:   Return in about 4 weeks (around 9/17/2020) for VT only, Avastin left.           Attending Physician Attestation:  Complete documentation of historical and exam elements from today's encounter can be found in the full encounter summary report (not reduplicated in this progress note).  I personally obtained the chief complaint(s) and history of present illness.  I confirmed and edited as necessary the review of systems, past medical/surgical history, family history, social history, and examination findings as documented by others; and I examined the patient myself.  I personally reviewed the relevant tests, images, and reports as documented above.  I formulated and edited as necessary the assessment and plan and discussed the findings and  management plan with the patient and family. . - Brigido Laura MD

## 2020-08-20 NOTE — NURSING NOTE
Chief Complaints and History of Present Illnesses   Patient presents with     Macular Degeneration Follow Up     Chief Complaint(s) and History of Present Illness(es)     Macular Degeneration Follow Up     Laterality: both eyes    Onset: gradual    Onset: months ago    Quality: States va is the same since last visit      Associated symptoms: Negative for floaters, flashes and photophobia    Pain scale: 0/10              Comments     Noemi Epps COT 9:07 AM August 20, 2020

## 2020-09-17 ENCOUNTER — OFFICE VISIT (OUTPATIENT)
Dept: OPHTHALMOLOGY | Facility: CLINIC | Age: 85
End: 2020-09-17
Attending: OPHTHALMOLOGY
Payer: MEDICARE

## 2020-09-17 DIAGNOSIS — H35.3221 EXUDATIVE AGE-RELATED MACULAR DEGENERATION OF LEFT EYE WITH ACTIVE CHOROIDAL NEOVASCULARIZATION (H): Primary | ICD-10-CM

## 2020-09-17 PROCEDURE — 67028 INJECTION EYE DRUG: CPT | Mod: LT,ZF | Performed by: OPHTHALMOLOGY

## 2020-09-17 PROCEDURE — G0463 HOSPITAL OUTPT CLINIC VISIT: HCPCS | Mod: ZF

## 2020-09-17 PROCEDURE — 25000128 H RX IP 250 OP 636: Mod: ZF | Performed by: OPHTHALMOLOGY

## 2020-09-17 RX ADMIN — Medication 1.25 MG: at 10:40

## 2020-09-17 ASSESSMENT — VISUAL ACUITY
OD_CC+: -1/+2
METHOD: SNELLEN - LINEAR
OD_PH_CC: 20/40
OS_PH_CC+: +2
OS_CC+: -1
CORRECTION_TYPE: GLASSES
OD_PH_CC+: +2
OD_CC: 20/50
OS_CC: 20/30
OS_PH_CC: 20/30

## 2020-09-17 ASSESSMENT — TONOMETRY
OD_IOP_MMHG: 9
IOP_METHOD: ICARE
OS_IOP_MMHG: 8

## 2020-09-17 ASSESSMENT — CONF VISUAL FIELD
OS_NORMAL: 1
OD_NORMAL: 1

## 2020-09-17 NOTE — NURSING NOTE
Chief Complaints and History of Present Illnesses   Patient presents with     Macular Degeneration Follow Up     Chief Complaint(s) and History of Present Illness(es)     Macular Degeneration Follow Up     Laterality: left eye    Quality: blurred vision    Frequency: constantly    Timing: throughout the day    Course: stable    Associated symptoms: Negative for eye pain, tearing, itching and burning    Pain scale: 0/10              Comments     Probable Avastin injection to LE today.  Pt states no problems following last visit's injection.  Feels no changes since last eye exam.  BRI Martin COT 10:05 AM 09/17/2020

## 2020-09-17 NOTE — PROGRESS NOTES
Chief Complaint(s) and History of Present Illness(es)     Macular Degeneration Follow Up     Laterality: left eye    Quality: blurred vision    Frequency: constantly    Timing: throughout the day    Course: stable    Associated symptoms: Negative for eye pain, tearing, itching and burning    Pain scale: 0/10              Comments     Probable Avastin injection to LE today.  Pt states no problems following last visit's injection.  Feels no changes since last eye exam.  Nataly Singh, COT COT 10:05 AM 09/17/2020              Review of systems for the eyes was negative other than the pertinent positives/negatives listed in the HPI.      Assessment & Plan      Tom Saini is a 85 year old male with the following diagnoses:   1. 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   #3/3, plan for [id number]    Continue Amsler reviewed   AREDS to continue  Return precautions reviewed         Patient disposition:   Return in about 4 weeks (around 10/15/2020) for OCT Macula, DFE.           Attending Physician Attestation:  Complete documentation of historical and exam elements from today's encounter can be found in the full encounter summary report (not reduplicated in this progress note).  I personally obtained the chief complaint(s) and history of present illness.  I confirmed and edited as necessary the review of systems, past medical/surgical history, family history, social history, and examination findings as documented by others; and I examined the patient myself.  I personally reviewed the  relevant tests, images, and reports as documented above.  I formulated and edited as necessary the assessment and plan and discussed the findings and management plan with the patient and family. . - Brigido Laura MD

## 2020-10-22 ENCOUNTER — OFFICE VISIT (OUTPATIENT)
Dept: OPHTHALMOLOGY | Facility: CLINIC | Age: 85
End: 2020-10-22
Attending: OPHTHALMOLOGY
Payer: MEDICARE

## 2020-10-22 DIAGNOSIS — H35.3221 EXUDATIVE AGE-RELATED MACULAR DEGENERATION OF LEFT EYE WITH ACTIVE CHOROIDAL NEOVASCULARIZATION (H): Primary | ICD-10-CM

## 2020-10-22 PROCEDURE — 67028 INJECTION EYE DRUG: CPT | Mod: LT | Performed by: OPHTHALMOLOGY

## 2020-10-22 PROCEDURE — 92134 CPTRZ OPH DX IMG PST SGM RTA: CPT | Performed by: OPHTHALMOLOGY

## 2020-10-22 PROCEDURE — 250N000011 HC RX IP 250 OP 636: Performed by: OPHTHALMOLOGY

## 2020-10-22 PROCEDURE — 99213 OFFICE O/P EST LOW 20 MIN: CPT | Mod: 25 | Performed by: OPHTHALMOLOGY

## 2020-10-22 PROCEDURE — G0463 HOSPITAL OUTPT CLINIC VISIT: HCPCS | Mod: 25

## 2020-10-22 RX ADMIN — Medication 1.25 MG: at 11:04

## 2020-10-22 ASSESSMENT — REFRACTION_WEARINGRX
OD_CYLINDER: +1.00
OS_CYLINDER: +0.50
OS_ADD: +2.75
OD_ADD: +2.75
OS_AXIS: 010
OD_SPHERE: -2.75
SPECS_TYPE: BIFOCAL
OS_SPHERE: -1.50
OD_AXIS: 170

## 2020-10-22 ASSESSMENT — CONF VISUAL FIELD
OD_NORMAL: 1
METHOD: COUNTING FINGERS
OS_NORMAL: 1

## 2020-10-22 ASSESSMENT — VISUAL ACUITY
OS_CC+: -1
OS_CC: 20/30
OD_CC: 20/30
OD_CC+: -1+1
METHOD: SNELLEN - LINEAR
OS_PH_CC+: +1
CORRECTION_TYPE: GLASSES
OS_PH_CC: 20/30

## 2020-10-22 ASSESSMENT — SLIT LAMP EXAM - LIDS
COMMENTS: PTOSIS OS > OD
COMMENTS: PTOSIS OS > OD

## 2020-10-22 ASSESSMENT — TONOMETRY
IOP_METHOD: ICARE
OD_IOP_MMHG: 10
OS_IOP_MMHG: 10

## 2020-10-22 ASSESSMENT — EXTERNAL EXAM - RIGHT EYE: OD_EXAM: NORMAL

## 2020-10-22 ASSESSMENT — CUP TO DISC RATIO
OS_RATIO: 0.2
OD_RATIO: 0.2

## 2020-10-22 ASSESSMENT — EXTERNAL EXAM - LEFT EYE: OS_EXAM: ET

## 2020-10-22 NOTE — NURSING NOTE
Chief Complaints and History of Present Illnesses   Patient presents with     Follow Up     Exudative age-related macular degeneration of left eye with active choroidal neovascularization      Chief Complaint(s) and History of Present Illness(es)     Follow Up     Laterality: left eye    Onset: gradual    Onset: years ago    Course: stable    Associated symptoms: floaters (No change.) and dryness.  Negative for flashes, eye pain, tearing, photophobia, glare and haloes    Treatments tried: artificial tears    Pain scale: 0/10    Comments: Exudative age-related macular degeneration of left eye with active choroidal neovascularization               Comments     Pt states vision is stable since last visit.  Pt only uses AT's.    BRI Mejia October 22, 2020 9:46 AM

## 2020-10-22 NOTE — PROGRESS NOTES
Chief Complaint(s) and History of Present Illness(es)     Follow Up     Laterality: left eye    Onset: gradual    Onset: years ago    Course: stable    Associated symptoms: floaters (No change.) and dryness.  Negative for   flashes, eye pain, tearing, photophobia, glare and haloes    Treatments tried: artificial tears    Pain scale: 0/10    Comments: Exudative age-related macular degeneration of left eye with   active choroidal neovascularization               Comments     Pt states vision is stable since last visit.  Pt only uses AT's.    BRI Mejia October 22, 2020 9:46 AM              Review of systems for the eyes was negative other than the pertinent positives/negatives listed in the HPI.      Assessment & Plan      Tom Saini is a 85 year old male with the following diagnoses:   1. 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    Nery Cooper MD  Ophthalmology Resident, PGY-4    Attending Physician Attestation:  Complete documentation of historical and exam elements from today's encounter can be found in the full encounter summary report (not reduplicated in this progress note).  I personally obtained the chief complaint(s) and history of present illness.  I confirmed and edited as necessary the review of systems, past medical/surgical history, family history, social history, and examination findings as documented by others; and I examined the patient myself.  I personally reviewed the relevant tests, images, and reports as documented above.  I formulated and edited as necessary the  assessment and plan and discussed the findings and management plan with the patient and family. Attending Physician Image/Tesing Attestation: I personally reviewed the ophthalmic test(s) associated with this encounter, agree with the interpretation(s) as documented by the resident/fellow, and have edited the corresponding report(s) as necessary.  Attending Physician Procedure Attestation: I was present for the entire procedure. - Brigido Laura MD

## 2020-11-19 DIAGNOSIS — E78.5 HYPERLIPIDEMIA LDL GOAL <100: ICD-10-CM

## 2020-11-23 RX ORDER — ATORVASTATIN CALCIUM 10 MG/1
10 TABLET, FILM COATED ORAL DAILY
Qty: 90 TABLET | Refills: 0 | Status: SHIPPED | OUTPATIENT
Start: 2020-11-23 | End: 2021-02-24

## 2020-11-23 NOTE — TELEPHONE ENCOUNTER
Last Clinic Visit: 6/18/20, no upcoming appointments.  Overdue for LDL.  90 day refill provided, routed to clinic for follow up

## 2020-12-03 ENCOUNTER — OFFICE VISIT (OUTPATIENT)
Dept: OPHTHALMOLOGY | Facility: CLINIC | Age: 85
End: 2020-12-03
Attending: OPHTHALMOLOGY
Payer: MEDICARE

## 2020-12-03 DIAGNOSIS — H35.3221 EXUDATIVE AGE-RELATED MACULAR DEGENERATION OF LEFT EYE WITH ACTIVE CHOROIDAL NEOVASCULARIZATION (H): Primary | ICD-10-CM

## 2020-12-03 PROCEDURE — G0463 HOSPITAL OUTPT CLINIC VISIT: HCPCS

## 2020-12-03 PROCEDURE — 92134 CPTRZ OPH DX IMG PST SGM RTA: CPT | Performed by: OPHTHALMOLOGY

## 2020-12-03 PROCEDURE — 67028 INJECTION EYE DRUG: CPT | Mod: LT | Performed by: OPHTHALMOLOGY

## 2020-12-03 PROCEDURE — 250N000011 HC RX IP 250 OP 636: Performed by: OPHTHALMOLOGY

## 2020-12-03 RX ADMIN — Medication 1.25 MG: at 10:18

## 2020-12-03 ASSESSMENT — VISUAL ACUITY
OS_CC: 20/25
METHOD: SNELLEN - LINEAR
OD_PH_CC: 20/30
OD_CC: 20/40
OS_CC+: -2
OD_CC+: -2
CORRECTION_TYPE: GLASSES
OD_PH_CC+: -1

## 2020-12-03 ASSESSMENT — TONOMETRY
IOP_METHOD: TONOPEN
OD_IOP_MMHG: 16
OS_IOP_MMHG: 13

## 2020-12-03 ASSESSMENT — REFRACTION_WEARINGRX
OS_ADD: +2.75
OS_CYLINDER: +0.50
OD_ADD: +2.75
OD_SPHERE: -2.75
OS_AXIS: 010
OD_CYLINDER: +1.00
SPECS_TYPE: BIFOCAL
OS_SPHERE: -1.50
OD_AXIS: 170

## 2020-12-03 ASSESSMENT — CONF VISUAL FIELD
OS_NORMAL: 1
METHOD: COUNTING FINGERS
OD_NORMAL: 1

## 2020-12-03 NOTE — NURSING NOTE
Chief Complaints and History of Present Illnesses   Patient presents with     Macular Degeneration Follow Up     6 week follow up AMD, both eyes     Chief Complaint(s) and History of Present Illness(es)     Macular Degeneration Follow Up     Laterality: both eyes    Course: stable    Associated symptoms: Negative for eye pain, tearing, flashes, floaters and discharge    Pain scale: 0/10    Comments: 6 week follow up AMD, both eyes              Comments     Pt denies any significant vision changes in either eye since last visit.  Denies any flashes, floaters, pain, irritation, discharge, and tearing.  Ocular Meds: AT's at bedtime BE    Edna Eric OT 9:15 AM December 3, 2020

## 2020-12-03 NOTE — PROGRESS NOTES
Chief Complaint(s) and History of Present Illness(es)     Macular Degeneration Follow Up     Laterality: both eyes    Course: stable    Associated symptoms: Negative for eye pain, tearing, flashes, floaters   and discharge    Pain scale: 0/10    Comments: 6 week follow up AMD, both eyes              Comments     Pt denies any significant vision changes in either eye since last visit.  Denies any flashes, floaters, pain, irritation, discharge, and tearing.  Ocular Meds: AT's at bedtime BE    Edna Eric OT 9:15 AM December 3, 2020               Review of systems for the eyes was negative other than the pertinent positives/negatives listed in the HPI.      Assessment & Plan      Tom Saini is a 85 year old male with the following diagnoses:   1. 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           Attending Physician Attestation:  Complete documentation of historical and exam elements from today's encounter can be found in the full encounter summary report (not reduplicated in this progress note).  I personally obtained the chief complaint(s) and history of present illness.  I confirmed and edited as necessary the review of systems, past medical/surgical history, family history, social history, and examination findings as documented by others; and I examined the patient myself.  I personally reviewed the relevant tests, images, and reports as documented above.  I formulated and edited as necessary the assessment and plan and discussed the findings and management plan with the patient and family. . - Brigido Laura MD

## 2021-01-15 ENCOUNTER — HEALTH MAINTENANCE LETTER (OUTPATIENT)
Age: 86
End: 2021-01-15

## 2021-01-21 ENCOUNTER — OFFICE VISIT (OUTPATIENT)
Dept: OPHTHALMOLOGY | Facility: CLINIC | Age: 86
End: 2021-01-21
Attending: OPHTHALMOLOGY
Payer: MEDICARE

## 2021-01-21 DIAGNOSIS — H35.3221 EXUDATIVE AGE-RELATED MACULAR DEGENERATION OF LEFT EYE WITH ACTIVE CHOROIDAL NEOVASCULARIZATION (H): Primary | ICD-10-CM

## 2021-01-21 PROCEDURE — G0463 HOSPITAL OUTPT CLINIC VISIT: HCPCS

## 2021-01-21 PROCEDURE — 67028 INJECTION EYE DRUG: CPT | Mod: LT | Performed by: OPHTHALMOLOGY

## 2021-01-21 PROCEDURE — 250N000011 HC RX IP 250 OP 636: Performed by: OPHTHALMOLOGY

## 2021-01-21 PROCEDURE — 999N000103 HC STATISTIC NO CHARGE FACILITY FEE

## 2021-01-21 RX ADMIN — Medication 1.25 MG: at 11:12

## 2021-01-21 ASSESSMENT — VISUAL ACUITY
METHOD: SNELLEN - LINEAR
CORRECTION_TYPE: GLASSES
OS_CC: 20/25
OD_PH_CC: 20/30
OD_CC: 20/40
OS_CC+: -2
OD_CC+: -2+1
OD_PH_CC+: -2

## 2021-01-21 ASSESSMENT — REFRACTION_WEARINGRX
OS_ADD: +2.75
OS_SPHERE: -1.50
OS_CYLINDER: +0.50
OD_ADD: +2.75
OS_AXIS: 010
OD_CYLINDER: +1.00
OD_SPHERE: -2.75
OD_AXIS: 170
SPECS_TYPE: BIFOCAL

## 2021-01-21 ASSESSMENT — TONOMETRY
OD_IOP_MMHG: 16
OS_IOP_MMHG: 14
IOP_METHOD: TONOPEN

## 2021-01-21 ASSESSMENT — CONF VISUAL FIELD
OD_NORMAL: 1
METHOD: COUNTING FINGERS
OS_NORMAL: 1

## 2021-01-21 NOTE — PROGRESS NOTES
Chief Complaint(s) and History of Present Illness(es)     Follow Up     Laterality: both eyes    Course: stable    Associated symptoms: Negative for floaters, flashes and eye pain    Pain scale: 0/10    Comments: 7 week follow up Exudative age-related macular degeneration of   left eye with active choroidal neovascularization               Comments     Pt denies any significant vision changes in either eye since last visit.  Denies any flashes, floaters, pain, or irritation.  Ocular meds: AT's at bedtime BE    Edna Eric OT 10:38 AM January 21, 2021               Review of systems for the eyes was negative other than the pertinent positives/negatives listed in the HPI.      Assessment & Plan      Tom Saini is a 86 year old male with the following diagnoses:   1. Exudative age-related macular degeneration of left eye with active choroidal neovascularization (H)         Finishing 3/3 Q6week avastin left eye today   RBA reviewed, consent obtained  Return precautions reviewed       Patient disposition:   Return in about 6 weeks (around 3/4/2021) for VT only, OCT Macula.           Attending Physician Attestation:  Complete documentation of historical and exam elements from today's encounter can be found in the full encounter summary report (not reduplicated in this progress note).  I personally obtained the chief complaint(s) and history of present illness.  I confirmed and edited as necessary the review of systems, past medical/surgical history, family history, social history, and examination findings as documented by others; and I examined the patient myself.  I personally reviewed the relevant tests, images, and reports as documented above.  I formulated and edited as necessary the assessment and plan and discussed the findings and management plan with the patient and family. . - Brigido Laura MD

## 2021-01-21 NOTE — NURSING NOTE
Chief Complaints and History of Present Illnesses   Patient presents with     Follow Up     7 week follow up Exudative age-related macular degeneration of left eye with active choroidal neovascularization      Chief Complaint(s) and History of Present Illness(es)     Follow Up     Laterality: both eyes    Course: stable    Associated symptoms: Negative for floaters, flashes and eye pain    Pain scale: 0/10    Comments: 7 week follow up Exudative age-related macular degeneration of left eye with active choroidal neovascularization               Comments     Pt denies any significant vision changes in either eye since last visit.  Denies any flashes, floaters, pain, or irritation.  Ocular meds: AT's at bedtime ERYN Eric OT 10:38 AM January 21, 2021

## 2021-01-30 ENCOUNTER — IMMUNIZATION (OUTPATIENT)
Dept: NURSING | Facility: CLINIC | Age: 86
End: 2021-01-30
Payer: MEDICARE

## 2021-01-30 PROCEDURE — 0001A PR COVID VAC PFIZER DIL RECON 30 MCG/0.3 ML IM: CPT

## 2021-01-30 PROCEDURE — 91300 PR COVID VAC PFIZER DIL RECON 30 MCG/0.3 ML IM: CPT

## 2021-02-01 ENCOUNTER — OFFICE VISIT (OUTPATIENT)
Dept: INTERNAL MEDICINE | Facility: CLINIC | Age: 86
End: 2021-02-01
Payer: MEDICARE

## 2021-02-01 VITALS
HEIGHT: 61 IN | HEART RATE: 72 BPM | WEIGHT: 162 LBS | DIASTOLIC BLOOD PRESSURE: 72 MMHG | BODY MASS INDEX: 30.58 KG/M2 | SYSTOLIC BLOOD PRESSURE: 119 MMHG | OXYGEN SATURATION: 98 %

## 2021-02-01 DIAGNOSIS — R93.5 ABNORMAL MRI OF ABDOMEN: ICD-10-CM

## 2021-02-01 DIAGNOSIS — M81.0 AGE-RELATED OSTEOPOROSIS WITHOUT CURRENT PATHOLOGICAL FRACTURE: ICD-10-CM

## 2021-02-01 DIAGNOSIS — Z12.5 ENCOUNTER FOR SCREENING FOR MALIGNANT NEOPLASM OF PROSTATE: ICD-10-CM

## 2021-02-01 DIAGNOSIS — R63.4 LOSS OF WEIGHT: Primary | ICD-10-CM

## 2021-02-01 DIAGNOSIS — R79.9 ABNORMAL FINDING OF BLOOD CHEMISTRY, UNSPECIFIED: ICD-10-CM

## 2021-02-01 PROCEDURE — 99215 OFFICE O/P EST HI 40 MIN: CPT | Performed by: INTERNAL MEDICINE

## 2021-02-01 RX ORDER — ALENDRONATE SODIUM 70 MG/1
70 TABLET ORAL WEEKLY
Status: CANCELLED | OUTPATIENT
Start: 2021-02-01

## 2021-02-01 RX ORDER — ZOLEDRONIC ACID 5 MG/100ML
5 INJECTION, SOLUTION INTRAVENOUS ONCE
Qty: 100 ML | Refills: 0 | Status: CANCELLED | OUTPATIENT
Start: 2021-02-01 | End: 2021-02-01

## 2021-02-01 ASSESSMENT — MIFFLIN-ST. JEOR: SCORE: 1288.29

## 2021-02-01 NOTE — PROGRESS NOTES
"  History of Present Illness:    Tom is a 86 y.o. male with history of osteoporosis, hyperlipidemia, seborrheic and actinic keratoses, and asymptomatic pancreatic cysts who presents to clinic today with 2 main concerns, outlined below. Otherwise, states he is doing well. He received the first dose of his COVID vaccine a few days ago.     Osteoporosis:   Tom takes Fosamax for his osteoporosis but states he has difficulty swallowing the pills. No issues with choking but just has the sensation it is stuck at the back of his throat. He has always had this issue with pills but usually gets around it by eating food immediately after, which he is aware you cannot do with bisphosphonates. Several glasses of water do not improve the sensation. He states he got a prescription for the liquid version of Fosamax but it is not covered by his insurance. Tom is also wondering when his next DEXA scan is since he is wondering when the efficacy of the medication is checked. Last scan was in 12/2019.     Weight loss:   Tom noticed he lost weight recently since he has continually had to go down a size in his belt and his wedding ring no longer fits. He does not know how much weight or what the time course of this has been, though notes he weighs 162 lbs in clinic today and is usually around 180-185. He states this is not purposeful and there have been no major changes to his activity level or diet. He and his wife have been trying to eat less carbs for a long time, and he is very \"particular\" about what foods he can eat since many give him GI upset, but no significant recent changes. He states he has a long history of diarrhea and rectal bleeding after he eats certain foods (examples included specific vegetables) but this is well-controlled with diet. Last episode of rectal bleeding was around one month ago. He states all his colonoscopies have been normal, with no history of polyps, and he was told he was done with " screening. Aside from the episodes of diarrhea, he denies loose stools or constipation, nausea, vomiting, and abdominal pain. No urinary symptoms including frequency, urgency, incontinence, and hematuria. Denies fatigue, and states energy level and mood have been good. No recent or recurrent illnesses. Denies fevers, chills, night sweats, swelling of lymph nodes, rashes, headaches, or URI symptoms. He does sometimes have a dry cough, but thinks this is normal for him and is unsure how long it has been going on for. Denies chest pain or SOB. No musculoskeletal pain. He really has no complaints that he can think of. No recent changes to health history.       A full 10-pt Review of Systems was performed, verified and is negative except as documented in the HPI.  All health questionnaires were reviewed, verified and relevant information documented above.      Past Medical History:  Past Medical History:   Diagnosis Date     Aortic insufficiency      DVT (deep vein thrombosis) in pregnancy 05/2019    distal DVT with extension, completed 6 months of AC (failed eliquis)     Erectile dysfunction      History of pelvic fracture      Hyperlipidemia      Low back pain     disc disease s/p laminectomy in past     Macular degeneration     on Avastin, R eye     Pancreatic cyst 07/2018    next MR due 12/20, then consider 2 year interval imaging     Radius fracture 2018    after fall from wall       Past Surgical History:  Past Surgical History:   Procedure Laterality Date     CATARACT IOL, RT/LT  2001     COMBINED REPAIR PTOSIS WITH BLEPHAROPLASTY BILATERAL Bilateral 4/6/2018    Procedure: COMBINED REPAIR PTOSIS WITH BLEPHAROPLASTY BILATERAL;  BILATERAL UPPER EYELIDS BLEPHAROPLASTY AND PTOSIS REPAIR ;  Surgeon: Anuj Good MD;  Location: [facility]     EYE SURGERY       LAMINECTOMY LUMBAR TWO LEVELS  1993    L4-L5     RECESSION RESECTION (REPAIR STRABISMUS)  1949       Active Meds:  Current Outpatient Medications   Medication  "    alendronate (FOSAMAX) 70 MG tablet     aspirin 81 MG tablet     atorvastatin (LIPITOR) 10 MG tablet     calcium carbonate (OS-DAVIDE) 1500 (600 Ca) MG tablet     Cholecalciferol (VITAMIN D3) 1000 units CAPS     multivitamin (OCUVITE) TABS     Current Facility-Administered Medications   Medication     bevacizumab (AVASTIN) intravitreal inj 1.25 mg      Allergies:  Codeine sulfate    Family History:  family history includes Cancer in his father and mother; Skin Cancer in his mother.    Social History:  Social History     Tobacco Use     Smoking status: Former Smoker     Packs/day: 2.00     Years: 2.00     Pack years: 4.00     Quit date: 1954     Years since quittin.6     Smokeless tobacco: Never Used   Substance Use Topics     Alcohol use: Yes     Frequency: 4 or more times a week     Drinks per session: 1 or 2     Comment: glass of wine with dinner     Drug use: No       Physical Exam:  Vitals: /72 (BP Location: Right arm, Patient Position: Sitting, Cuff Size: Adult Regular)   Pulse 72   Ht 1.558 m (5' 1.32\")   Wt 73.5 kg (162 lb)   SpO2 98%   BMI 30.29 kg/m    Constitutional: Alert, oriented, pleasant, no acute distress  Head: Normocephalic, atraumatic  Eyes: Extra-ocular movements intact, no scleral icterus  ENT: Oropharynx clear, moist mucus membranes, good dentition  Neck: Supple, no lymphadenopathy, no thyromegaly, no JVD  Cardiovascular: Regular rate and rhythm, no murmurs, rubs or gallops  Respiratory: Good air movement bilaterally, lungs clear, no wheezes/rales/rhonchi  GI: Abdomen soft, nondistended, nontender, no organomegaly or masses, no rebound/guarding  Musculoskeletal: No edema, normal muscle tone  Neurologic: Alert and oriented, cranial nerves 2-12 grossly intact  Skin: No rashes/lesions, not jaundiced  Psychiatric: Normal mentation, affect and mood      Assessment and Plan:    Tom is an 86 y.o. male who was seen today for the following.    Diagnoses and all orders for this " "visit:    Age-related osteoporosis without current pathological fracture  To avoid oTm's difficulty with taking Fosfamax pills, we discussed either trying for his insurance to cover the liquid form or IV bisphosphonate treatment annually. Tom would like to pursue the IV route, so we will set that up for him to start soon. His most recent DEXA scan was in 12/2019 with recommended follow-up in two years, so reviewed with Tom that his next one will be around 12/21.     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 Per guidelines he needs one more study without significant change and then can lengthen interval of follow-up.   -     MRI Abdomen w/o & w contrast; Future    Loss of weight  Tom has unintentionally lost 19 lbs in the past year, raising concern for the possibility of malignancy. As does not have specific risk factors in history and has an unremarkable exam today, the differential for malignancy remains broad. His somewhat unclear history of diarrhea and rectal bleeding is worrisome for colorectal cancer. He reports all his routine colonoscopies were negative without polyps, but unknown date of last one and cannot find any on chart review. The only other symptom endorsed in full ROS was dry cough for an unknown period of time. Lung cancer is possible, though he only has <5 pack year smoking history several decades ago. His incidentally diagnosed IPMNs raise concern for pancreatic cancer, though stable on last imaging and have characteristics less often associated with malignancy. History of multiple seborrheic and actinic keratoses could make melanoma more difficult to detect. There are no other signs or symptoms to point towards any of these or any other " specific malignancy, including no B symptoms to indicate leukemia or lymphoma, and no symptoms of prostate cancer. Also nothing on history or exam to point towards any type of chronic inflammatory disease. Depression was considered as cause of weight loss, and though he reported good mood and energy today, no formal screening or evaluation was done. With this broad differential, plan today is to order basic labs including CBC with diff, CMP, TSH, ESR and ferritin to assess for inflammation, and PSA. Abdominal MRI ordered (per above) may also ascertain any concerning features compared to one year ago.   -     CBC with platelets differential; Future  -     Erythrocyte sedimentation rate; Future  -     Comprehensive metabolic panel; Future  -     TSH with free T4 reflex; Future  -     Ferritin; Future  -     PSA screen; Future    #Routine Health Maintenence:  Immunizations (zoster, pneumovax, flu, Tdap, Hep A/B):   Most Recent Immunizations   Administered Date(s) Administered     COVID-19,PF,Pfizer 01/30/2021     HepB 08/19/2016     Influenza (High Dose) 3 valent vaccine 10/11/2019     Influenza (IIV3) PF 10/23/2014     Influenza Vaccine IM > 6 months Valent IIV4 10/01/2013     Japanese Encephalitis IM 02/02/2016     Pneumo Conj 13-V (2010&after) 06/25/2015     Pneumococcal 23 valent 06/10/2014     TD (ADULT, 7+) 01/31/2011     TDAP Vaccine (Boostrix) 06/10/2014     Typhoid IM 06/26/2017     Yellow Fever 01/26/2011     Zoster vaccine recombinant adjuvanted (SHINGRIX) 01/07/2020     Zoster vaccine, live 07/02/2009   Deferred Date(s) Deferred     German Encephalitis IM 08/19/2016     Lipids:   Recent Labs   Lab Test 09/19/19 2026 06/26/17  1041 12/04/13  0811 12/04/13  0811 12/18/12  0806   CHOL 138 149   < > 161 161   HDL 61 61   < > 53 50   LDL 66 70   < > 96 98   TRIG 53 90   < > 62 68   CHOLHDLRATIO  --   --   --  3.1 3.2    < > = values in this interval not displayed.     PSA (50-75 yrs):   PSA   Date Value Ref  Range Status   10/22/2008 1.07 0 - 4 ug/L Final      Lung Ca Screening (>30 pk age 55-79 or >20 py age 50-79 + RF): n/a   Colonoscopy (50-75 yrs): reportedly all negative (could not find in chart review)  Dexa (>65W or 70M yrs): last scan in 12/2019, due in 12/2021   Safety/Lifestyle: lives at home with wife, healthy diet, stays active with walking     Return to clinic: for follow-up of test results     The patient was seen and discussed with Dr. Marcelo Mccord, MS3      Attending Addendum:  The medical student acted as scribe.  The patient was seen and examined with medical student.  The history and physical were independently verified by myself.  The above documentation represents our joint assessment and plan.  Weight loss may also be related to dietary changes in setting of COVID 19 pandemic. Garcia chairez as per above.  Angeli Robertson MD  Internal Medicine        >40 minutes spent today performing chart review, history and exam, documentation and further activities as noted above.

## 2021-02-01 NOTE — NURSING NOTE
Chief Complaint   Patient presents with     RECHECK     follow up       Zenobia Thomson MA, at 3:50 PM on 2/1/2021.

## 2021-02-02 ENCOUNTER — TELEPHONE (OUTPATIENT)
Dept: INTERNAL MEDICINE | Facility: CLINIC | Age: 86
End: 2021-02-02

## 2021-02-02 DIAGNOSIS — Z13.9 SCREENING FOR CONDITION: Primary | ICD-10-CM

## 2021-02-02 RX ORDER — HEPARIN SODIUM,PORCINE 10 UNIT/ML
5 VIAL (ML) INTRAVENOUS
Status: CANCELLED | OUTPATIENT
Start: 2021-02-22

## 2021-02-02 RX ORDER — HEPARIN SODIUM (PORCINE) LOCK FLUSH IV SOLN 100 UNIT/ML 100 UNIT/ML
5 SOLUTION INTRAVENOUS
Status: CANCELLED | OUTPATIENT
Start: 2021-02-22

## 2021-02-02 RX ORDER — ZOLEDRONIC ACID 5 MG/100ML
5 INJECTION, SOLUTION INTRAVENOUS ONCE
Status: CANCELLED
Start: 2021-02-22 | End: 2021-02-22

## 2021-02-02 NOTE — TELEPHONE ENCOUNTER
Please call patient to help him schedule:  MRI  Labs    After labs[facility]--will need covid test prior.    Thanks,  Angeli Robertson MD  Internal Medicine

## 2021-02-03 NOTE — TELEPHONE ENCOUNTER
Patient already schedule MRI for Saturday Feb 27 at [facility]. Adding labs prior to MRI.  Patient aware.     Give patient phone number for scheduling Infusion. Also route to [facility] to assist.

## 2021-02-04 DIAGNOSIS — Z11.59 ENCOUNTER FOR SCREENING FOR OTHER VIRAL DISEASES: Primary | ICD-10-CM

## 2021-02-04 DIAGNOSIS — Z13.9 SCREENING FOR CONDITION: ICD-10-CM

## 2021-02-05 DIAGNOSIS — R63.4 LOSS OF WEIGHT: ICD-10-CM

## 2021-02-05 DIAGNOSIS — Z12.5 ENCOUNTER FOR SCREENING FOR MALIGNANT NEOPLASM OF PROSTATE: ICD-10-CM

## 2021-02-05 DIAGNOSIS — R79.9 ABNORMAL FINDING OF BLOOD CHEMISTRY, UNSPECIFIED: ICD-10-CM

## 2021-02-05 LAB
LABORATORY COMMENT REPORT: NORMAL
SARS-COV-2 RNA RESP QL NAA+PROBE: NEGATIVE
SARS-COV-2 RNA RESP QL NAA+PROBE: NORMAL
SPECIMEN SOURCE: NORMAL
SPECIMEN SOURCE: NORMAL

## 2021-02-05 PROCEDURE — U0003 INFECTIOUS AGENT DETECTION BY NUCLEIC ACID (DNA OR RNA); SEVERE ACUTE RESPIRATORY SYNDROME CORONAVIRUS 2 (SARS-COV-2) (CORONAVIRUS DISEASE [COVID-19]), AMPLIFIED PROBE TECHNIQUE, MAKING USE OF HIGH THROUGHPUT TECHNOLOGIES AS DESCRIBED BY CMS-2020-01-R: HCPCS | Performed by: INTERNAL MEDICINE

## 2021-02-07 LAB
SARS-COV-2 RNA RESP QL NAA+PROBE: NORMAL
SPECIMEN SOURCE: NORMAL

## 2021-02-08 RX ORDER — HEPARIN SODIUM,PORCINE 10 UNIT/ML
5 VIAL (ML) INTRAVENOUS
Status: CANCELLED | OUTPATIENT
Start: 2021-02-08

## 2021-02-08 RX ORDER — HEPARIN SODIUM (PORCINE) LOCK FLUSH IV SOLN 100 UNIT/ML 100 UNIT/ML
5 SOLUTION INTRAVENOUS
Status: CANCELLED | OUTPATIENT
Start: 2021-02-08

## 2021-02-08 RX ORDER — ZOLEDRONIC ACID 5 MG/100ML
5 INJECTION, SOLUTION INTRAVENOUS ONCE
Status: CANCELLED
Start: 2021-02-08 | End: 2021-02-08

## 2021-02-09 ENCOUNTER — APPOINTMENT (OUTPATIENT)
Dept: LAB | Facility: CLINIC | Age: 86
End: 2021-02-09
Attending: INTERNAL MEDICINE
Payer: MEDICARE

## 2021-02-09 ENCOUNTER — INFUSION THERAPY VISIT (OUTPATIENT)
Dept: INFUSION THERAPY | Facility: CLINIC | Age: 86
End: 2021-02-09
Attending: INTERNAL MEDICINE
Payer: MEDICARE

## 2021-02-09 VITALS
RESPIRATION RATE: 16 BRPM | TEMPERATURE: 96.8 F | SYSTOLIC BLOOD PRESSURE: 122 MMHG | HEART RATE: 64 BPM | OXYGEN SATURATION: 94 % | DIASTOLIC BLOOD PRESSURE: 73 MMHG | BODY MASS INDEX: 30.65 KG/M2 | WEIGHT: 163.9 LBS

## 2021-02-09 DIAGNOSIS — M81.0 AGE-RELATED OSTEOPOROSIS WITHOUT CURRENT PATHOLOGICAL FRACTURE: Primary | ICD-10-CM

## 2021-02-09 LAB
CALCIUM SERPL-MCNC: 9.3 MG/DL (ref 8.5–10.1)
CREAT SERPL-MCNC: 0.84 MG/DL (ref 0.66–1.25)
GFR SERPL CREATININE-BSD FRML MDRD: 80 ML/MIN/{1.73_M2}

## 2021-02-09 PROCEDURE — 82310 ASSAY OF CALCIUM: CPT | Performed by: INTERNAL MEDICINE

## 2021-02-09 PROCEDURE — 250N000011 HC RX IP 250 OP 636: Performed by: INTERNAL MEDICINE

## 2021-02-09 PROCEDURE — 36415 COLL VENOUS BLD VENIPUNCTURE: CPT

## 2021-02-09 PROCEDURE — 96365 THER/PROPH/DIAG IV INF INIT: CPT

## 2021-02-09 PROCEDURE — 82565 ASSAY OF CREATININE: CPT | Performed by: INTERNAL MEDICINE

## 2021-02-09 RX ORDER — ZOLEDRONIC ACID 5 MG/100ML
5 INJECTION, SOLUTION INTRAVENOUS ONCE
Status: COMPLETED | OUTPATIENT
Start: 2021-02-09 | End: 2021-02-09

## 2021-02-09 RX ADMIN — ZOLEDRONIC ACID 5 MG: 0.05 INJECTION, SOLUTION INTRAVENOUS at 16:02

## 2021-02-09 ASSESSMENT — PAIN SCALES - GENERAL: PAINLEVEL: NO PAIN (0)

## 2021-02-09 NOTE — PROGRESS NOTES
Nursing Note  Tom Saini presents [facility] for:   Chief Complaint   Patient presents with     Infusion     IV Reclast     During today's [facility] appointment, orders from Dr. Robertson were completed.  Frequency: once, first dose    Progress note:  Patient identification verified by name and date of birth.  Assessment completed.  Vitals recorded in Doc Flowsheets.  Patient was provided with education regarding medication/procedure and possible side effects.  Patient verbalized understanding.     present during visit today: Not Applicable.    Treatment Conditions: Patient's Creatinine Clearance and calcium are within paramaters to administer medication per orders.     Premedications: were not ordered.    Drug Waste Record: No    Infusion length and rate:  infusion given over approximately 15 minutes    Labs: drawn today, prior to appointment in second floor lab.    Vascular access: peripheral IV placed today in lab.     Is the next appt scheduled? No, therapy complete.   Asymptomatic COVID test completed? No    Post Infusion Assessment:  Patient tolerated infusion without incident.  Patient observed for 20 minutes post Reclast due to first dose.   Blood return noted pre and post infusion.  Site patent and intact, free from redness, edema or discomfort.  No evidence of extravasations.  Access discontinued per protocol.     Discharge Plan:   AVS given to patient.   Follow up plan of care with: ordering provider.  Discharge instructions were reviewed with patient.  Patient/representative verbalized understanding of discharge instructions and all questions answered.  Patient discharged from [facility] in stable condition.    Administrations This Visit     zoledronic Acid (RECLAST) infusion 5 mg     Admin Date  02/09/2021 Action  New Bag Dose  5 mg Rate  400 mL/hr Route  Intravenous Administered By  Yesica  "ALIVIA Baxter              Vital signs:  Temp: 96.8  F (36  C) Temp src: Oral BP: 130/75 Pulse: 71     SpO2: 94 %       Weight: 74.3 kg (163 lb 14.4 oz)  Estimated body mass index is 30.65 kg/m  as calculated from the following:    Height as of 2/1/21: 1.558 m (5' 1.32\").    Weight as of this encounter: 74.3 kg (163 lb 14.4 oz).          "

## 2021-02-09 NOTE — PATIENT INSTRUCTIONS
Dear Tom Saini    Thank you for choosing [facility] for your infusion.  The following information is a summary of our appointment as well as important reminders.    What is this medicine?  ZOLEDRONIC ACID (LEXIEMARYLU clemons ik AS id) lowers the amount of calcium loss from bone. It is used to treat Paget's disease and osteoporosis in women.  This medicine may be used for other purposes; ask your health care provider or pharmacist if you have questions.  What should I tell my health care provider before I take this medicine?  They need to know if you have any of these conditions:    aspirin-sensitive asthma    cancer, especially if you are receiving medicines used to treat cancer    dental disease or wear dentures    infection    kidney disease    low levels of calcium in the blood    past surgery on the parathyroid gland or intestines    receiving corticosteroids like dexamethasone or prednisone    an unusual or allergic reaction to zoledronic acid, other medicines, foods, dyes, or preservatives    pregnant or trying to get pregnant    breast-feeding  How should I use this medicine?  This medicine is for infusion into a vein. It is given by a health care professional in a hospital or clinic setting.  Talk to your pediatrician regarding the use of this medicine in children. This medicine is not approved for use in children.  Overdosage: If you think you have taken too much of this medicine contact a poison control center or emergency room at once.  NOTE: This medicine is only for you. Do not share this medicine with others.  What if I miss a dose?  It is important not to miss your dose. Call your doctor or health care professional if you are unable to keep an appointment.  What may interact with this medicine?    certain antibiotics given by injection    NSAIDs, medicines for pain and inflammation, like ibuprofen or naproxen    some diuretics like  bumetanide, furosemide    teriparatide  This list may not describe all possible interactions. Give your health care provider a list of all the medicines, herbs, non-prescription drugs, or dietary supplements you use. Also tell them if you smoke, drink alcohol, or use illegal drugs. Some items may interact with your medicine.  What should I watch for while using this medicine?  Visit your doctor or health care professional for regular checkups. It may be some time before you see the benefit from this medicine. Do not stop taking your medicine unless your doctor tells you to. Your doctor may order blood tests or other tests to see how you are doing.  Women should inform their doctor if they wish to become pregnant or think they might be pregnant. There is a potential for serious side effects to an unborn child. Talk to your health care professional or pharmacist for more information.  You should make sure that you get enough calcium and vitamin D while you are taking this medicine. Discuss the foods you eat and the vitamins you take with your health care professional.  Some people who take this medicine have severe bone, joint, and/or muscle pain. This medicine may also increase your risk for jaw problems or a broken thigh bone. Tell your doctor right away if you have severe pain in your jaw, bones, joints, or muscles. Tell your doctor if you have any pain that does not go away or that gets worse.  Tell your dentist and dental surgeon that you are taking this medicine. You should not have major dental surgery while on this medicine. See your dentist to have a dental exam and fix any dental problems before starting this medicine. Take good care of your teeth while on this medicine. Make sure you see your dentist for regular follow-up appointments.  What side effects may I notice from receiving this medicine?  Side effects that you should report to your doctor or health care professional as soon as possible:    allergic  reactions like skin rash, itching or hives, swelling of the face, lips, or tongue    anxiety, confusion, or depression    breathing problems    changes in vision    eye pain    feeling faint or lightheaded, falls    jaw pain, especially after dental work    mouth sores    muscle cramps, stiffness, or weakness    redness, blistering, peeling or loosening of the skin, including inside the mouth    trouble passing urine or change in the amount of urine  Side effects that usually do not require medical attention (report to your doctor or health care professional if they continue or are bothersome):    bone, joint, or muscle pain    constipation    diarrhea    fever    hair loss    irritation at site where injected    loss of appetite    nausea, vomiting    stomach upset    trouble sleeping    trouble swallowing    weak or tired  This list may not describe all possible side effects. Call your doctor for medical advice about side effects. You may report side effects to FDA at 4-111-BPZ-7570.  Where should I keep my medicine?  This drug is given in a hospital or clinic and will not be stored at home.  NOTE:This sheet is a summary. It may not cover all possible information. If you have questions about this medicine, talk to your doctor, pharmacist, or health care provider. Copyright  2016 Gold Standard             We look forward in seeing you on your next appointment here at [facility] ([facility]).  Please don t hesitate to call us at [phone] to reschedule any of your appointments or to speak with one of the [facility] registered nurses.  It was a pleasure taking care of you today.    Sincerely,    [facility]  [facility]  [address] [place] [address]  [address]  Phone:  [phone]

## 2021-02-09 NOTE — PROGRESS NOTES
Chief Complaint   Patient presents with     Infusion     IV Reclast     Blood Draw     PIV placed, labs drawn, vitals taken and checked into next appointment     Blood drawn from right arm.    -Chanell LAWS, CMA

## 2021-02-09 NOTE — LETTER
2/9/2021         RE: Tom Saiin  [address] [facility] [id number]        Dear Colleague,    Thank you for referring your patient, Tom Saini, to the [facility]. Please see a copy of my visit note below.    Chief Complaint   Patient presents with     Infusion     IV Reclast     Blood Draw     PIV placed, labs drawn, vitals taken and checked into next appointment     Blood drawn from right arm.    -Chanell LAWS CMA    Nursing Note  Tom Saini presents [facility] for:   Chief Complaint   Patient presents with     Infusion     IV Reclast     During today's [facility] appointment, orders from Dr. Robertson were completed.  Frequency: once, first dose    Progress note:  Patient identification verified by name and date of birth.  Assessment completed.  Vitals recorded in Doc Flowsheets.  Patient was provided with education regarding medication/procedure and possible side effects.  Patient verbalized understanding.     present during visit today: Not Applicable.    Treatment Conditions: Patient's Creatinine Clearance and calcium are within paramaters to administer medication per orders.     Premedications: were not ordered.    Drug Waste Record: No    Infusion length and rate:  infusion given over approximately 15 minutes    Labs: drawn today, prior to appointment in second floor lab.    Vascular access: peripheral IV placed today in lab.     Is the next appt scheduled? No, therapy complete.   Asymptomatic COVID test completed? No    Post Infusion Assessment:  Patient tolerated infusion without incident.  Patient observed for 20 minutes post Reclast due to first dose.   Blood return noted pre and post infusion.  Site patent and intact, free from redness, edema or discomfort.  No evidence of extravasations.  Access discontinued per protocol.     Discharge Plan:   AVS given to patient.  "  Follow up plan of care with: ordering provider.  Discharge instructions were reviewed with patient.  Patient/representative verbalized understanding of discharge instructions and all questions answered.  Patient discharged [facility] in stable condition.    Administrations This Visit     zoledronic Acid (RECLAST) infusion 5 mg     Admin Date  02/09/2021 Action  New Bag Dose  5 mg Rate  400 mL/hr Route  Intravenous Administered By  Vee Robert RN              Vital signs:  Temp: 96.8  F (36  C) Temp src: Oral BP: 130/75 Pulse: 71     SpO2: 94 %       Weight: 74.3 kg (163 lb 14.4 oz)  Estimated body mass index is 30.65 kg/m  as calculated from the following:    Height as of 2/1/21: 1.558 m (5' 1.32\").    Weight as of this encounter: 74.3 kg (163 lb 14.4 oz).              Again, thank you for allowing me to participate in the care of your patient.        Sincerely,        [facility] [place]    "

## 2021-02-20 ENCOUNTER — IMMUNIZATION (OUTPATIENT)
Dept: NURSING | Facility: CLINIC | Age: 86
End: 2021-02-20
Attending: INTERNAL MEDICINE
Payer: MEDICARE

## 2021-02-20 PROCEDURE — 91300 PR COVID VAC PFIZER DIL RECON 30 MCG/0.3 ML IM: CPT

## 2021-02-20 PROCEDURE — 0002A PR COVID VAC PFIZER DIL RECON 30 MCG/0.3 ML IM: CPT

## 2021-02-23 DIAGNOSIS — E78.5 HYPERLIPIDEMIA LDL GOAL <100: ICD-10-CM

## 2021-02-24 RX ORDER — ATORVASTATIN CALCIUM 10 MG/1
10 TABLET, FILM COATED ORAL DAILY
Qty: 90 TABLET | Refills: 0 | Status: SHIPPED | OUTPATIENT
Start: 2021-02-24 | End: 2021-05-26

## 2021-02-24 NOTE — TELEPHONE ENCOUNTER
Last Clinic Visit: 2/1/2021  [facility] [place]    Lab(s)- LDL past due.  Alexa refill of Atorvastatin was sent for a 90 day supply and FYI to [facility] clinic.

## 2021-02-27 ENCOUNTER — ANCILLARY PROCEDURE (OUTPATIENT)
Dept: MRI IMAGING | Facility: CLINIC | Age: 86
End: 2021-02-27
Attending: INTERNAL MEDICINE
Payer: MEDICARE

## 2021-02-27 ENCOUNTER — MYC MEDICAL ADVICE (OUTPATIENT)
Dept: INTERNAL MEDICINE | Facility: CLINIC | Age: 86
End: 2021-02-27

## 2021-02-27 DIAGNOSIS — R63.4 LOSS OF WEIGHT: ICD-10-CM

## 2021-02-27 DIAGNOSIS — Z12.5 ENCOUNTER FOR SCREENING FOR MALIGNANT NEOPLASM OF PROSTATE: ICD-10-CM

## 2021-02-27 DIAGNOSIS — R79.9 ABNORMAL FINDING OF BLOOD CHEMISTRY, UNSPECIFIED: ICD-10-CM

## 2021-02-27 DIAGNOSIS — R93.5 ABNORMAL MRI OF ABDOMEN: ICD-10-CM

## 2021-02-27 DIAGNOSIS — R97.20 ELEVATED PROSTATE SPECIFIC ANTIGEN (PSA): Primary | ICD-10-CM

## 2021-02-27 LAB
ALBUMIN SERPL-MCNC: 3.4 G/DL (ref 3.4–5)
ALP SERPL-CCNC: 98 U/L (ref 40–150)
ALT SERPL W P-5'-P-CCNC: 15 U/L (ref 0–70)
ANION GAP SERPL CALCULATED.3IONS-SCNC: 4 MMOL/L (ref 3–14)
AST SERPL W P-5'-P-CCNC: 12 U/L (ref 0–45)
BASOPHILS # BLD AUTO: 0.1 10E9/L (ref 0–0.2)
BASOPHILS NFR BLD AUTO: 1.3 %
BILIRUB SERPL-MCNC: 0.5 MG/DL (ref 0.2–1.3)
BUN SERPL-MCNC: 20 MG/DL (ref 7–30)
CALCIUM SERPL-MCNC: 9 MG/DL (ref 8.5–10.1)
CHLORIDE SERPL-SCNC: 106 MMOL/L (ref 94–109)
CO2 SERPL-SCNC: 30 MMOL/L (ref 20–32)
CREAT SERPL-MCNC: 0.97 MG/DL (ref 0.66–1.25)
DIFFERENTIAL METHOD BLD: ABNORMAL
EOSINOPHIL # BLD AUTO: 0.3 10E9/L (ref 0–0.7)
EOSINOPHIL NFR BLD AUTO: 3.3 %
ERYTHROCYTE [DISTWIDTH] IN BLOOD BY AUTOMATED COUNT: 13 % (ref 10–15)
ERYTHROCYTE [SEDIMENTATION RATE] IN BLOOD BY WESTERGREN METHOD: 33 MM/H (ref 0–20)
FERRITIN SERPL-MCNC: 326 NG/ML (ref 26–388)
GFR SERPL CREATININE-BSD FRML MDRD: 71 ML/MIN/{1.73_M2}
GLUCOSE SERPL-MCNC: 88 MG/DL (ref 70–99)
HCT VFR BLD AUTO: 40.3 % (ref 40–53)
HGB BLD-MCNC: 13 G/DL (ref 13.3–17.7)
IMM GRANULOCYTES # BLD: 0 10E9/L (ref 0–0.4)
IMM GRANULOCYTES NFR BLD: 0.4 %
LYMPHOCYTES # BLD AUTO: 1.8 10E9/L (ref 0.8–5.3)
LYMPHOCYTES NFR BLD AUTO: 22.2 %
MCH RBC QN AUTO: 30.7 PG (ref 26.5–33)
MCHC RBC AUTO-ENTMCNC: 32.3 G/DL (ref 31.5–36.5)
MCV RBC AUTO: 95 FL (ref 78–100)
MONOCYTES # BLD AUTO: 0.7 10E9/L (ref 0–1.3)
MONOCYTES NFR BLD AUTO: 8.3 %
NEUTROPHILS # BLD AUTO: 5.1 10E9/L (ref 1.6–8.3)
NEUTROPHILS NFR BLD AUTO: 64.5 %
NRBC # BLD AUTO: 0 10*3/UL
NRBC BLD AUTO-RTO: 0 /100
PLATELET # BLD AUTO: 278 10E9/L (ref 150–450)
POTASSIUM SERPL-SCNC: 4.5 MMOL/L (ref 3.4–5.3)
PROT SERPL-MCNC: 7.3 G/DL (ref 6.8–8.8)
PSA SERPL-ACNC: 8.45 UG/L (ref 0–4)
RBC # BLD AUTO: 4.24 10E12/L (ref 4.4–5.9)
SODIUM SERPL-SCNC: 140 MMOL/L (ref 133–144)
TSH SERPL DL<=0.005 MIU/L-ACNC: 0.9 MU/L (ref 0.4–4)
WBC # BLD AUTO: 7.9 10E9/L (ref 4–11)

## 2021-02-27 PROCEDURE — 36415 COLL VENOUS BLD VENIPUNCTURE: CPT | Performed by: PATHOLOGY

## 2021-02-27 PROCEDURE — 80050 GENERAL HEALTH PANEL: CPT | Performed by: PATHOLOGY

## 2021-02-27 PROCEDURE — 74183 MRI ABD W/O CNTR FLWD CNTR: CPT | Mod: MG | Performed by: RADIOLOGY

## 2021-02-27 PROCEDURE — G1004 CDSM NDSC: HCPCS | Performed by: RADIOLOGY

## 2021-02-27 PROCEDURE — G0103 PSA SCREENING: HCPCS | Performed by: PATHOLOGY

## 2021-02-27 PROCEDURE — 82728 ASSAY OF FERRITIN: CPT | Performed by: PATHOLOGY

## 2021-02-27 PROCEDURE — A9585 GADOBUTROL INJECTION: HCPCS | Performed by: RADIOLOGY

## 2021-02-27 PROCEDURE — 85652 RBC SED RATE AUTOMATED: CPT | Performed by: PATHOLOGY

## 2021-02-27 RX ORDER — GADOBUTROL 604.72 MG/ML
7.5 INJECTION INTRAVENOUS ONCE
Status: COMPLETED | OUTPATIENT
Start: 2021-02-27 | End: 2021-02-27

## 2021-02-27 RX ADMIN — GADOBUTROL 7.5 ML: 604.72 INJECTION INTRAVENOUS at 11:58

## 2021-02-27 NOTE — DISCHARGE INSTRUCTIONS
MRI Contrast Discharge Instructions    The IV contrast you received today will pass out of your body in your  urine. This will happen in the next 24 hours. You will not feel this process.  Your urine will not change color.    Drink at least 4 extra glasses of water or juice today (unless your doctor  has restricted your fluids). This reduces the stress on your kidneys.  You may take your regular medicines.    If you are on dialysis: It is best to have dialysis today.    If you have a reaction: Most reactions happen right away. If you have  any new symptoms after leaving the hospital (such as hives or swelling),  call your hospital at the correct number below. Or call your family doctor.  If you have breathing distress or wheezing, call 911.    Special instructions: ***    I have read and understand the above information.    Signature:______________________________________ Date:___________    Staff:__________________________________________ Date:___________     Time:__________    [place] Radiology Departments:    ___[place]: [phone]  ___[facility]: [phone]  ___[place]: [phone] ___[facility]: [phone]  ___[facility]: [phone]  ___[facility]: [phone]  ___[phone]  ___[facility]: [phone]  ___[phone]

## 2021-03-02 NOTE — TELEPHONE ENCOUNTER
[facility] Call Center    Phone Message    May a detailed message be left on voicemail: yes     Reason for Call: Other: Patient is calling to FU on message below. Patient is very concern about his PSA level. Please call patient to advise. Thank you.      Action Taken: Message routed to:  [facility]    Travel Screening: Not Applicable

## 2021-03-03 NOTE — TELEPHONE ENCOUNTER
Discussed results with patient. Will proceed with prostate MRI imaging.  Angeli Robertson MD  Internal Medicine

## 2021-03-04 ENCOUNTER — OFFICE VISIT (OUTPATIENT)
Dept: OPHTHALMOLOGY | Facility: CLINIC | Age: 86
End: 2021-03-04
Attending: OPHTHALMOLOGY
Payer: MEDICARE

## 2021-03-04 DIAGNOSIS — H35.3221 EXUDATIVE AGE-RELATED MACULAR DEGENERATION OF LEFT EYE WITH ACTIVE CHOROIDAL NEOVASCULARIZATION (H): Primary | ICD-10-CM

## 2021-03-04 PROCEDURE — 92134 CPTRZ OPH DX IMG PST SGM RTA: CPT | Performed by: OPHTHALMOLOGY

## 2021-03-04 PROCEDURE — 92012 INTRM OPH EXAM EST PATIENT: CPT | Mod: GC | Performed by: OPHTHALMOLOGY

## 2021-03-04 PROCEDURE — G0463 HOSPITAL OUTPT CLINIC VISIT: HCPCS

## 2021-03-04 ASSESSMENT — VISUAL ACUITY
OD_CC+: -2
CORRECTION_TYPE: GLASSES
OD_CC: 20/30 SEARCHING
OS_PH_CC+: -2
OS_PH_CC: 20/25 SLOW
OS_CC: 20/30
METHOD: SNELLEN - LINEAR
OS_CC+: +2

## 2021-03-04 ASSESSMENT — CONF VISUAL FIELD
OS_NORMAL: 1
METHOD: COUNTING FINGERS
OD_NORMAL: 1

## 2021-03-04 ASSESSMENT — REFRACTION_WEARINGRX
OD_ADD: +2.75
OS_AXIS: 010
SPECS_TYPE: BIFOCAL
OS_SPHERE: -1.50
OS_ADD: +2.75
OS_CYLINDER: +0.50
OD_SPHERE: -2.75
OD_AXIS: 170
OD_CYLINDER: +1.00

## 2021-03-04 ASSESSMENT — EXTERNAL EXAM - RIGHT EYE: OD_EXAM: NORMAL

## 2021-03-04 ASSESSMENT — EXTERNAL EXAM - LEFT EYE: OS_EXAM: ET

## 2021-03-04 ASSESSMENT — SLIT LAMP EXAM - LIDS
COMMENTS: PTOSIS OS > OD
COMMENTS: PTOSIS OS > OD

## 2021-03-04 ASSESSMENT — TONOMETRY
IOP_METHOD: TONOPEN
OS_IOP_MMHG: 16
OD_IOP_MMHG: 16

## 2021-03-04 NOTE — PROGRESS NOTES
Chief Complaint(s) and History of Present Illness(es)     Follow Up     Laterality: both eyes    Course: stable    Associated symptoms: Negative for floaters, flashes and eye pain    Pain scale: 0/10    Comments: 7 week follow up Exudative age-related macular degeneration of   left eye with active choroidal neovascularization               Comments     Pt denies any significant vision changes in either eye since last visit.  Denies any flashes, floaters, pain, or irritation.  Ocular meds: AT's at bedtime BE    Edna Eric OT 10:38 AM January 21, 2021      LCV (1/21/21), received HUGO left eye at that visit.     He endorses stable vision in both eyes. He denies new eye pain, irritation, flashes/change in floaters. He is hoping to avoid an HUGO injection at this visit.     Review of systems for the eyes was negative other than the pertinent positives/negatives listed in the HPI.      Assessment & Plan      Tom Saini is a 86 year old male with the following diagnoses:   1. Exudative age-related macular degeneration of left eye with active choroidal neovascularization (H)         S/p 3/3 Q6week avastin left eye (1/21/21)  OCT mac looks good today.  Offered to treat and extend further.  He would like to return in 2 weeks to recheck OCT prior to injection    Return precautions reviewed   Kaiden to continue       Patient disposition:   Return in about 2 weeks (around 3/18/2021) for VT only, OCT Macula.      Tess Morales MD  Ophthalmology Resident, PGY-2    Attending Physician Attestation:  Complete documentation of historical and exam elements from today's encounter can be found in the full encounter summary report (not reduplicated in this progress note).  I personally obtained the chief complaint(s) and history of present illness.  I confirmed and edited as necessary the review of systems, past medical/surgical history, family history, social history, and examination findings as documented by others; and I  examined the patient myself.  I personally reviewed the relevant tests, images, and reports as documented above.  I formulated and edited as necessary the assessment and plan and discussed the findings and management plan with the patient and family. . - Brigido Laura MD

## 2021-03-04 NOTE — NURSING NOTE
Chief Complaint(s) and History of Present Illness(es)     Macular Degeneration Follow Up     In left eye.  Associated symptoms include Negative for dryness, eye pain, redness and tearing.  Pain was noted as 0/10.              Comments     6 week f/u for Exudative age-related macular degeneration of left eye with active choroidal neovascularization. Pt notes vision has been stable x the last few weeks. Pt denies any other changes or concerns.     Ocular meds: ATs at bedtime BE    PATO Rodriguez 10:10 AM March 4, 2021

## 2021-03-18 ENCOUNTER — OFFICE VISIT (OUTPATIENT)
Dept: OPHTHALMOLOGY | Facility: CLINIC | Age: 86
End: 2021-03-18
Attending: OPHTHALMOLOGY
Payer: MEDICARE

## 2021-03-18 DIAGNOSIS — H35.3221 EXUDATIVE AGE-RELATED MACULAR DEGENERATION OF LEFT EYE WITH ACTIVE CHOROIDAL NEOVASCULARIZATION (H): Primary | ICD-10-CM

## 2021-03-18 DIAGNOSIS — Z96.1 PSEUDOPHAKIA OF BOTH EYES: ICD-10-CM

## 2021-03-18 DIAGNOSIS — H35.3212 EXUDATIVE AGE-RELATED MACULAR DEGENERATION OF RIGHT EYE WITH INACTIVE CHOROIDAL NEOVASCULARIZATION (H): ICD-10-CM

## 2021-03-18 PROCEDURE — 67028 INJECTION EYE DRUG: CPT | Mod: LT | Performed by: OPHTHALMOLOGY

## 2021-03-18 PROCEDURE — 92134 CPTRZ OPH DX IMG PST SGM RTA: CPT | Performed by: OPHTHALMOLOGY

## 2021-03-18 PROCEDURE — 250N000011 HC RX IP 250 OP 636: Performed by: OPHTHALMOLOGY

## 2021-03-18 PROCEDURE — G0463 HOSPITAL OUTPT CLINIC VISIT: HCPCS | Mod: 25

## 2021-03-18 RX ADMIN — Medication 1.25 MG: at 10:20

## 2021-03-18 ASSESSMENT — REFRACTION_WEARINGRX
OD_ADD: +2.75
OS_CYLINDER: +0.50
OS_SPHERE: -1.50
OS_ADD: +2.75
OD_CYLINDER: +1.00
OS_AXIS: 010
OD_AXIS: 170
OD_SPHERE: -2.75
SPECS_TYPE: BIFOCAL

## 2021-03-18 ASSESSMENT — VISUAL ACUITY
OD_CC: 20/40
METHOD: SNELLEN - LINEAR
CORRECTION_TYPE: GLASSES
OS_CC+: +2
OD_PH_CC+: -1
OS_PH_CC+: -1
OS_PH_CC: 20/30
OS_CC: 20/40
OD_PH_CC: 20/30
OD_CC+: +2

## 2021-03-18 ASSESSMENT — TONOMETRY
OD_IOP_MMHG: 15
OS_IOP_MMHG: 14
IOP_METHOD: TONOPEN

## 2021-03-18 ASSESSMENT — CONF VISUAL FIELD
OS_NORMAL: 1
OD_NORMAL: 1
METHOD: COUNTING FINGERS

## 2021-03-18 NOTE — PROGRESS NOTES
Chief Complaint(s) and History of Present Illness(es)     Exudative Macular Degeneration Follow Up     Laterality: left eye    Associated symptoms: dryness.  Negative for eye pain, redness and tearing      Pain scale: 0/10              Comments     2 week follow up for Macular degeneration, LE. Pt notes the vision has   been the same x the last 2 weeks. BE are slightly dry, using ATs to help.   Pt denies any other changes or concerns.     Ocular meds: ATs prn BE    Polly Rangel, COMT 9:07 AM March 18, 2021               Review of systems for the eyes was negative other than the pertinent positives/negatives listed in the HPI.      Assessment & Plan      Tom Saini is a 86 year old male with the following diagnoses:   1. Exudative age-related macular degeneration of left eye with active choroidal neovascularization (H)    2. Exudative age-related macular degeneration of right eye with inactive choroidal neovascularization (H)    3. Pseudophakia of both eyes         Right eye remains quiescent  Left eye  Undergoing treat and extend for wet age related macular degeneration   S/p 3/3 Q6week avastin left eye (last injectio 1/21/21)  OCT mac looks good today at 8 weeks    Plan to proceed with Q8 week series left eye   RBA to Avastin intravitreal injection reviewed, consent obtained  #1/3 today  Return precautions reviewed   Amsler to continue     Patient disposition:   Return in about 2 months (around 5/18/2021) for VT only; Avastin left eye.           Attending Physician Attestation:  Complete documentation of historical and exam elements from today's encounter can be found in the full encounter summary report (not reduplicated in this progress note).  I personally obtained the chief complaint(s) and history of present illness.  I confirmed and edited as necessary the review of systems, past medical/surgical history, family history, social history, and examination findings as documented by others; and I examined the  patient myself.  I personally reviewed the relevant tests, images, and reports as documented above.  I formulated and edited as necessary the assessment and plan and discussed the findings and management plan with the patient and family. . - Brigido Laura MD

## 2021-03-18 NOTE — NURSING NOTE
Chief Complaint(s) and History of Present Illness(es)     Exudative Macular Degeneration Follow Up     In left eye.  Associated symptoms include dryness.  Negative for eye pain, redness and tearing.  Pain was noted as 0/10.              Comments     2 week follow up for Macular degeneration, LE. Pt notes the vision has been the same x the last 2 weeks. BE are slightly dry, using ATs to help. Pt denies any other changes or concerns.     Ocular meds: ATs prn BE    PATO Rodriguez 9:07 AM March 18, 2021

## 2021-03-24 ENCOUNTER — OFFICE VISIT (OUTPATIENT)
Dept: DERMATOLOGY | Facility: CLINIC | Age: 86
End: 2021-03-24
Payer: MEDICARE

## 2021-03-24 ENCOUNTER — HOSPITAL ENCOUNTER (OUTPATIENT)
Dept: MRI IMAGING | Facility: CLINIC | Age: 86
Discharge: HOME | End: 2021-03-24
Attending: INTERNAL MEDICINE | Admitting: INTERNAL MEDICINE
Payer: MEDICARE

## 2021-03-24 DIAGNOSIS — R97.20 ELEVATED PROSTATE SPECIFIC ANTIGEN (PSA): ICD-10-CM

## 2021-03-24 DIAGNOSIS — D48.9 NEOPLASM OF UNCERTAIN BEHAVIOR: Primary | ICD-10-CM

## 2021-03-24 DIAGNOSIS — L82.1 SEBORRHEIC KERATOSIS: ICD-10-CM

## 2021-03-24 PROCEDURE — 72197 MRI PELVIS W/O & W/DYE: CPT | Mod: 26 | Performed by: RADIOLOGY

## 2021-03-24 PROCEDURE — 72197 MRI PELVIS W/O & W/DYE: CPT | Mod: MG

## 2021-03-24 PROCEDURE — 88305 TISSUE EXAM BY PATHOLOGIST: CPT | Performed by: DERMATOLOGY

## 2021-03-24 PROCEDURE — G1004 CDSM NDSC: HCPCS | Performed by: RADIOLOGY

## 2021-03-24 PROCEDURE — A9585 GADOBUTROL INJECTION: HCPCS | Performed by: STUDENT IN AN ORGANIZED HEALTH CARE EDUCATION/TRAINING PROGRAM

## 2021-03-24 PROCEDURE — G1004 CDSM NDSC: HCPCS

## 2021-03-24 PROCEDURE — 11102 TANGNTL BX SKIN SINGLE LES: CPT | Performed by: DERMATOLOGY

## 2021-03-24 PROCEDURE — 99212 OFFICE O/P EST SF 10 MIN: CPT | Mod: 25 | Performed by: DERMATOLOGY

## 2021-03-24 PROCEDURE — 255N000002 HC RX 255 OP 636: Performed by: STUDENT IN AN ORGANIZED HEALTH CARE EDUCATION/TRAINING PROGRAM

## 2021-03-24 RX ORDER — GADOBUTROL 604.72 MG/ML
7.5 INJECTION INTRAVENOUS ONCE
Status: COMPLETED | OUTPATIENT
Start: 2021-03-24 | End: 2021-03-24

## 2021-03-24 RX ADMIN — GADOBUTROL 7.5 ML: 604.72 INJECTION INTRAVENOUS at 13:29

## 2021-03-24 ASSESSMENT — PAIN SCALES - GENERAL: PAINLEVEL: NO PAIN (0)

## 2021-03-24 NOTE — PATIENT INSTRUCTIONS

## 2021-03-24 NOTE — LETTER
3/24/2021       RE: Tom Saini  [address] [facility] [address]     Dear Colleague,    Thank you for referring your patient, Tom Saini, to the [facility] [place] at [facility]. Please see a copy of my visit note below.    [facility] Dermatology Note  Encounter Date: Mar 24, 2021  Office Visit     Dermatology Problem List:  1. SKs.   - Large symptomatic SK, R Religion - cryo 11/07/2019, repeat on 12/03/2019.   2. AKs.   - Cryo  # Neoplasm of uncertain behavior, R forehead, s/p shave bx 3/24/21  ____________________________________________    Assessment & Plan:     # Neoplasm of uncertain behavior, R forehead - ddx BCC v SK.  - See shave bx procedure below    # Seborrheic keratosis. Discussed the natural history and benign nature of this lesion. Reassurance provided that no additional treatment is necessary.     Procedures Performed:   - Shave biopsy procedure note, location(s): see above. After discussion of benefits and risks including but not limited to bleeding, infection, scar, incomplete removal, recurrence, and non-diagnostic biopsy, written consent and photographs were obtained. The area was cleaned with isopropyl alcohol. 0.5mL of 1% lidocaine with epinephrine was injected to obtain adequate anesthesia of lesion(s). Shave biopsy at site(s) performed. Hemostasis was achieved with aluminium chloride. Petrolatum ointment and a sterile dressing were applied. The patient tolerated the procedure and no complications were noted. The patient was provided with verbal and written post care instructions.     Follow-up: 1 year, sooner if concerns.     Staff:     Pauline Freed MD    Department of Dermatology  [facility]: Phone: [phone], Fax:  [phone]  3/25/2021    ____________________________________________    CC: Skin Check (jessica is coming in today for a skin check, states that he has not spots of concern today)    HPI:  Mr. Jessica Saini is a(n) 86 year old male who presents today as a return patient for skin check.  No major concerns today.  No painful, bleeding, non-healing, or otherwise symptomatic lesions.     He is getting an MRI today due to elevated PSA.    Patient is otherwise feeling well, without additional skin concerns.     Labs Reviewed:  N/A    Physical Exam:  Vitals: There were no vitals taken for this visit.  SKIN: Total skin excluding the undergarment areas was performed. The exam included the head/face, neck, both arms, chest, back, abdomen, both legs, digits and/or nails.   - many waxy stuck on papules and plaques.  - right forehead slightly pink papule with ?clods on dermoscopy.   - No other lesions of concern on areas examined.     Medications:  Current Outpatient Medications   Medication     aspirin 81 MG tablet     atorvastatin (LIPITOR) 10 MG tablet     calcium carbonate (OS-DAVIDE) 1500 (600 Ca) MG tablet     Cholecalciferol (VITAMIN D3) 1000 units CAPS     multivitamin (OCUVITE) TABS     Current Facility-Administered Medications   Medication     bevacizumab (AVASTIN) intravitreal inj 1.25 mg      Past Medical History:   Patient Active Problem List   Diagnosis     Eczema     Pure hypercholesterolemia     Male erectile disorder     Dermatofibroma     Seborrheic keratoses, inflamed     Age-related osteoporosis without current pathological fracture     Past Medical History:   Diagnosis Date     Aortic insufficiency      DVT (deep vein thrombosis) in pregnancy 05/2019    distal DVT with extension, completed 6 months of AC (failed eliquis)     Erectile dysfunction      History of pelvic fracture      Hyperlipidemia      Low back pain     disc disease s/p laminectomy in past     Macular degeneration     on Avastin, R eye      Pancreatic cyst 07/2018    next MR due 12/20, then consider 2 year interval imaging     Radius fracture 2018    after fall from wall       CC Pauline Freed MD   DERMATOLOGY  [address] [place], [place] [id number] [phone] on close of this encounter.

## 2021-03-24 NOTE — NURSING NOTE
Lidocaine-epinephrine 1-1:070240 % injection   1mL once for one use, starting 3/24/2021 ending 3/24/2021,  2mL disp, R-0, injection  Injected by Key Bolaños CMA

## 2021-03-24 NOTE — NURSING NOTE
Dermatology Rooming Note    Jessica Saini's goals for this visit include:   Chief Complaint   Patient presents with     Skin Check     jessica is coming in today for a skin check, states that he has not spots of concern today     Key Bolaños CMA on 3/24/2021 at 11:42 AM

## 2021-03-25 ENCOUNTER — MYC MEDICAL ADVICE (OUTPATIENT)
Dept: INTERNAL MEDICINE | Facility: CLINIC | Age: 86
End: 2021-03-25

## 2021-03-26 NOTE — PROGRESS NOTES
[facility] Dermatology Note  Encounter Date: Mar 24, 2021  Office Visit     Dermatology Problem List:  1. SKs.   - Large symptomatic SK, R Mu-ism - cryo 11/07/2019, repeat on 12/03/2019.   2. AKs.   - Cryo  # Neoplasm of uncertain behavior, R forehead, s/p shave bx 3/24/21  ____________________________________________    Assessment & Plan:     # Neoplasm of uncertain behavior, R forehead - ddx BCC v SK.  - See shave bx procedure below    # Seborrheic keratosis. Discussed the natural history and benign nature of this lesion. Reassurance provided that no additional treatment is necessary.     Procedures Performed:   - Shave biopsy procedure note, location(s): see above. After discussion of benefits and risks including but not limited to bleeding, infection, scar, incomplete removal, recurrence, and non-diagnostic biopsy, written consent and photographs were obtained. The area was cleaned with isopropyl alcohol. 0.5mL of 1% lidocaine with epinephrine was injected to obtain adequate anesthesia of lesion(s). Shave biopsy at site(s) performed. Hemostasis was achieved with aluminium chloride. Petrolatum ointment and a sterile dressing were applied. The patient tolerated the procedure and no complications were noted. The patient was provided with verbal and written post care instructions.     Follow-up: 1 year, sooner if concerns.     Staff:     Pauline Freed MD    Department of Dermatology  [facility]: Phone: [phone], Fax: [phone]  3/25/2021    ____________________________________________    CC: Skin Check (jessica is coming in today for a skin check, states that he has not spots of concern today)    HPI:  Mr. Jessica Saini is a(n) 86 year old male who presents today as a return patient for skin check.  No major concerns today.  No painful, bleeding, non-healing, or otherwise symptomatic lesions.      He is getting an MRI today due to elevated PSA.    Patient is otherwise feeling well, without additional skin concerns.     Labs Reviewed:  N/A    Physical Exam:  Vitals: There were no vitals taken for this visit.  SKIN: Total skin excluding the undergarment areas was performed. The exam included the head/face, neck, both arms, chest, back, abdomen, both legs, digits and/or nails.   - many waxy stuck on papules and plaques.  - right forehead slightly pink papule with ?clods on dermoscopy.   - No other lesions of concern on areas examined.     Medications:  Current Outpatient Medications   Medication     aspirin 81 MG tablet     atorvastatin (LIPITOR) 10 MG tablet     calcium carbonate (OS-DAVIDE) 1500 (600 Ca) MG tablet     Cholecalciferol (VITAMIN D3) 1000 units CAPS     multivitamin (OCUVITE) TABS     Current Facility-Administered Medications   Medication     bevacizumab (AVASTIN) intravitreal inj 1.25 mg      Past Medical History:   Patient Active Problem List   Diagnosis     Eczema     Pure hypercholesterolemia     Male erectile disorder     Dermatofibroma     Seborrheic keratoses, inflamed     Age-related osteoporosis without current pathological fracture     Past Medical History:   Diagnosis Date     Aortic insufficiency      DVT (deep vein thrombosis) in pregnancy 05/2019    distal DVT with extension, completed 6 months of AC (failed eliquis)     Erectile dysfunction      History of pelvic fracture      Hyperlipidemia      Low back pain     disc disease s/p laminectomy in past     Macular degeneration     on Avastin, R eye     Pancreatic cyst 07/2018    next MR due 12/20, then consider 2 year interval imaging     Radius fracture 2018    after fall from wall       CC Pauline Freed MD   [facility]MATOLOGY  [address] [facility] [address] on close of this encounter.

## 2021-03-28 LAB — COPATH REPORT: NORMAL

## 2021-05-13 ENCOUNTER — OFFICE VISIT (OUTPATIENT)
Dept: OPHTHALMOLOGY | Facility: CLINIC | Age: 86
End: 2021-05-13
Attending: OPHTHALMOLOGY
Payer: MEDICARE

## 2021-05-13 DIAGNOSIS — H35.3212 EXUDATIVE AGE-RELATED MACULAR DEGENERATION OF RIGHT EYE WITH INACTIVE CHOROIDAL NEOVASCULARIZATION (H): ICD-10-CM

## 2021-05-13 DIAGNOSIS — H35.3221 EXUDATIVE AGE-RELATED MACULAR DEGENERATION OF LEFT EYE WITH ACTIVE CHOROIDAL NEOVASCULARIZATION (H): Primary | ICD-10-CM

## 2021-05-13 DIAGNOSIS — Z96.1 PSEUDOPHAKIA OF BOTH EYES: ICD-10-CM

## 2021-05-13 PROCEDURE — 250N000011 HC RX IP 250 OP 636: Performed by: OPHTHALMOLOGY

## 2021-05-13 PROCEDURE — 67028 INJECTION EYE DRUG: CPT | Mod: LT | Performed by: OPHTHALMOLOGY

## 2021-05-13 PROCEDURE — 999N000103 HC STATISTIC NO CHARGE FACILITY FEE

## 2021-05-13 RX ADMIN — Medication 1.25 MG: at 09:35

## 2021-05-13 ASSESSMENT — VISUAL ACUITY
OD_CC+: -2
CORRECTION_TYPE: GLASSES
METHOD: SNELLEN - LINEAR
OS_CC: 20/40
OD_CC: 20/40

## 2021-05-13 ASSESSMENT — REFRACTION_WEARINGRX
OD_AXIS: 170
OS_SPHERE: -1.50
OS_CYLINDER: +0.50
OD_SPHERE: -2.75
SPECS_TYPE: BIFOCAL
OD_CYLINDER: +1.00
OS_ADD: +2.75
OS_AXIS: 010
OD_ADD: +2.75

## 2021-05-13 ASSESSMENT — TONOMETRY
OD_IOP_MMHG: 16
IOP_METHOD: TONOPEN
OS_IOP_MMHG: 14

## 2021-05-13 ASSESSMENT — CONF VISUAL FIELD: OS_INFERIOR_TEMPORAL_RESTRICTION: 3

## 2021-05-13 NOTE — PROGRESS NOTES
Chief Complaint(s) and History of Present Illness(es)     Follow Up     Laterality: left eye    Course: stable    Associated symptoms: dryness.  Negative for eye pain, redness and tearing      Treatments tried: artificial tears    Pain scale: 0/10    Comments: Exudative age-related macular degeneration of left eye with   active choroidal neovascularization                   Comments     He states that his vision has seemed stable in both eyes, since his last   eye exam.  Patient denies having any eye discomfort.    Melodie Kirk, COT 9:11 AM  May 13, 2021                 Review of systems for the eyes was negative other than the pertinent positives/negatives listed in the HPI.      Assessment & Plan      Tom Saini is a 86 year old male with the following diagnoses:   1. Exudative age-related macular degeneration of left eye with active choroidal neovascularization (H)    2. Exudative age-related macular degeneration of right eye with inactive choroidal neovascularization (H)    3. Pseudophakia of both eyes         Right eye stable   Left eye  Undergoing treat and extend for wet age related macular degeneration     Plan to proceed with Q8 week series left eye   RBA to Avastin intravitreal injection reviewed, consent obtained  #2/3 today  Return precautions reviewed   Amsler to continue         Patient disposition:   Return in about 2 months (around 7/13/2021) for VT only, OCT Macula; Avastin left eye.           Attending Physician Attestation:  Complete documentation of historical and exam elements from today's encounter can be found in the full encounter summary report (not reduplicated in this progress note).  I personally obtained the chief complaint(s) and history of present illness.  I confirmed and edited as necessary the review of systems, past medical/surgical history, family history, social history, and examination findings as documented by others; and I examined the patient myself.  I personally  reviewed the relevant tests, images, and reports as documented above.  I formulated and edited as necessary the assessment and plan and discussed the findings and management plan with the patient and family. . - Brigido Laura MD

## 2021-05-13 NOTE — NURSING NOTE
Chief Complaints and History of Present Illnesses   Patient presents with     Follow Up     Exudative age-related macular degeneration of left eye with active choroidal neovascularization          Chief Complaint(s) and History of Present Illness(es)     Follow Up     Laterality: left eye    Course: stable    Associated symptoms: dryness.  Negative for eye pain, redness and tearing    Treatments tried: artificial tears    Pain scale: 0/10    Comments: Exudative age-related macular degeneration of left eye with active choroidal neovascularization                   Comments     He states that his vision has seemed stable in both eyes, since his last eye exam.  Patient denies having any eye discomfort.    Melodie Kirk, COT 9:11 AM  May 13, 2021

## 2021-05-25 ENCOUNTER — TELEPHONE (OUTPATIENT)
Dept: INTERNAL MEDICINE | Facility: CLINIC | Age: 86
End: 2021-05-25

## 2021-05-25 DIAGNOSIS — E78.5 HYPERLIPIDEMIA LDL GOAL <100: ICD-10-CM

## 2021-05-26 RX ORDER — ATORVASTATIN CALCIUM 10 MG/1
10 TABLET, FILM COATED ORAL DAILY
Qty: 30 TABLET | Refills: 0 | Status: SHIPPED | OUTPATIENT
Start: 2021-05-26 | End: 2021-06-01

## 2021-05-26 NOTE — TELEPHONE ENCOUNTER
atorvastatin (LIPITOR) 10 MG tablet      Last Written Prescription Date:  2/24/21  Last Fill Quantity: 90,   # refills: 0  Last Office Visit : 2/1/21  Future Office visit:   None scheduled    Routing refill request to provider for review/approval because:  Overdue for LDL  Lab Test 09/19/19 2026   LDL 66     Nothing more recent in care everywhere nor media  No future orders in que  this is a second refill request for medication with out or overdue labs. With last request pt was given 90 day garcía and Melissa notified.

## 2021-05-27 ENCOUNTER — MYC MEDICAL ADVICE (OUTPATIENT)
Dept: INTERNAL MEDICINE | Facility: CLINIC | Age: 86
End: 2021-05-27

## 2021-05-27 DIAGNOSIS — E78.5 HYPERLIPIDEMIA LDL GOAL <100: ICD-10-CM

## 2021-05-27 LAB
CHOLEST SERPL-MCNC: 145 MG/DL
HDLC SERPL-MCNC: 78 MG/DL
LDLC SERPL CALC-MCNC: 58 MG/DL
NONHDLC SERPL-MCNC: 67 MG/DL
TRIGL SERPL-MCNC: 41 MG/DL

## 2021-05-27 PROCEDURE — 80061 LIPID PANEL: CPT | Performed by: PATHOLOGY

## 2021-05-27 PROCEDURE — 36415 COLL VENOUS BLD VENIPUNCTURE: CPT | Performed by: PATHOLOGY

## 2021-06-01 ENCOUNTER — MYC MEDICAL ADVICE (OUTPATIENT)
Dept: INTERNAL MEDICINE | Facility: CLINIC | Age: 86
End: 2021-06-01

## 2021-06-01 RX ORDER — ATORVASTATIN CALCIUM 10 MG/1
10 TABLET, FILM COATED ORAL DAILY
Qty: 90 TABLET | Refills: 3 | Status: SHIPPED | OUTPATIENT
Start: 2021-06-01 | End: 2022-07-11

## 2021-07-08 ENCOUNTER — OFFICE VISIT (OUTPATIENT)
Dept: OPHTHALMOLOGY | Facility: CLINIC | Age: 86
End: 2021-07-08
Attending: OPHTHALMOLOGY
Payer: MEDICARE

## 2021-07-08 DIAGNOSIS — H35.3212 EXUDATIVE AGE-RELATED MACULAR DEGENERATION OF RIGHT EYE WITH INACTIVE CHOROIDAL NEOVASCULARIZATION (H): ICD-10-CM

## 2021-07-08 DIAGNOSIS — Z96.1 PSEUDOPHAKIA OF BOTH EYES: ICD-10-CM

## 2021-07-08 DIAGNOSIS — H35.3221 EXUDATIVE AGE-RELATED MACULAR DEGENERATION OF LEFT EYE WITH ACTIVE CHOROIDAL NEOVASCULARIZATION (H): Primary | ICD-10-CM

## 2021-07-08 PROCEDURE — 67028 INJECTION EYE DRUG: CPT | Mod: LT | Performed by: OPHTHALMOLOGY

## 2021-07-08 PROCEDURE — G0463 HOSPITAL OUTPT CLINIC VISIT: HCPCS | Mod: 25

## 2021-07-08 PROCEDURE — 92134 CPTRZ OPH DX IMG PST SGM RTA: CPT | Performed by: OPHTHALMOLOGY

## 2021-07-08 PROCEDURE — 250N000011 HC RX IP 250 OP 636: Performed by: OPHTHALMOLOGY

## 2021-07-08 RX ADMIN — Medication 3 MG: at 10:02

## 2021-07-08 ASSESSMENT — VISUAL ACUITY
OS_PH_CC: 20/30-1
OS_CC+: +2
OD_PH_CC: 20/30
METHOD: SNELLEN - LINEAR
OD_CC: 20/50
OD_PH_CC+: -2
OD_CC+: -1
OS_PH_CC+: +2
OS_CC: 20/40

## 2021-07-08 ASSESSMENT — REFRACTION_WEARINGRX
OS_CYLINDER: +0.50
OS_SPHERE: -1.50
OD_CYLINDER: +1.00
SPECS_TYPE: BIFOCAL
OD_SPHERE: -2.75
OD_AXIS: 170
OS_AXIS: 010
OS_ADD: +2.75
OD_ADD: +2.75

## 2021-07-08 ASSESSMENT — TONOMETRY
IOP_METHOD: TONOPEN
OS_IOP_MMHG: 13
OD_IOP_MMHG: 16

## 2021-07-08 ASSESSMENT — CONF VISUAL FIELD
METHOD: COUNTING FINGERS
OS_NORMAL: 1

## 2021-07-08 NOTE — NURSING NOTE
Chief Complaints and History of Present Illnesses   Patient presents with     Exudative Macular Degeneration Follow Up     Chief Complaint(s) and History of Present Illness(es)     Exudative Macular Degeneration Follow Up     Laterality: left eye    Associated symptoms: floaters.  Negative for eye pain, dryness and flashes    Response to treatment: no improvement    Pain scale: 0/10              Comments     Tom is here to continue care for Exudative age-related macular degeneration of left eye with active choroidal neovascularization. He is having imaging today as well as an Avastin injection LE. He feels overall vision is declining gradually.      Tom Armijo COT 9:05 AM July 8, 2021

## 2021-07-08 NOTE — PROGRESS NOTES
Chief Complaint(s) and History of Present Illness(es)     Exudative Macular Degeneration Follow Up     Laterality: left eye    Associated symptoms: floaters.  Negative for eye pain, dryness and   flashes    Response to treatment: no improvement    Pain scale: 0/10              Comments     Tom is here to continue care for Exudative age-related macular   degeneration of left eye with active choroidal neovascularization. He is   having imaging today as well as an Avastin injection LE. He feels overall   vision is declining gradually.      Tom Armijo COT 9:05 AM July 8, 2021               Review of systems for the eyes was negative other than the pertinent positives/negatives listed in the HPI.      Assessment & Plan      Tom Saini is a 86 year old male with the following diagnoses:   1. Exudative age-related macular degeneration of left eye with active choroidal neovascularization (H)    2. Exudative age-related macular degeneration of right eye with inactive choroidal neovascularization (H)    3. Pseudophakia of both eyes         Right eye stable   Left eye undergoing treat and extend for wet age related macular degeneration   OCT mac today with mild increase in intraretinal fluid left eye     Plan to proceed with Q8 week series left eye   RBA to Avastin intravitreal injection reviewed, consent obtained  #3/3 today  Return precautions reviewed   Amsler to continue         Patient disposition:   Return in about 2 months (around 9/8/2021) for DFE, OCT Macula.           Attending Physician Attestation:  Complete documentation of historical and exam elements from today's encounter can be found in the full encounter summary report (not reduplicated in this progress note).  I personally obtained the chief complaint(s) and history of present illness.  I confirmed and edited as necessary the review of systems, past medical/surgical history, family history, social history, and examination findings as documented by  others; and I examined the patient myself.  I personally reviewed the relevant tests, images, and reports as documented above.  I formulated and edited as necessary the assessment and plan and discussed the findings and management plan with the patient and family. . - Brigido Laura MD

## 2021-07-15 ENCOUNTER — OFFICE VISIT (OUTPATIENT)
Dept: FAMILY MEDICINE | Facility: CLINIC | Age: 86
End: 2021-07-15
Payer: MEDICARE

## 2021-07-15 ENCOUNTER — ANCILLARY PROCEDURE (OUTPATIENT)
Dept: GENERAL RADIOLOGY | Facility: CLINIC | Age: 86
End: 2021-07-15
Attending: NURSE PRACTITIONER
Payer: MEDICARE

## 2021-07-15 ENCOUNTER — ANCILLARY PROCEDURE (OUTPATIENT)
Dept: ULTRASOUND IMAGING | Facility: CLINIC | Age: 86
End: 2021-07-15
Attending: NURSE PRACTITIONER
Payer: MEDICARE

## 2021-07-15 VITALS
HEART RATE: 72 BPM | TEMPERATURE: 97.8 F | BODY MASS INDEX: 23.22 KG/M2 | DIASTOLIC BLOOD PRESSURE: 70 MMHG | WEIGHT: 162.2 LBS | OXYGEN SATURATION: 96 % | RESPIRATION RATE: 16 BRPM | HEIGHT: 70 IN | SYSTOLIC BLOOD PRESSURE: 123 MMHG

## 2021-07-15 DIAGNOSIS — M79.605 PAIN IN LEFT LEG: ICD-10-CM

## 2021-07-15 DIAGNOSIS — M25.662 KNEE STIFFNESS, LEFT: ICD-10-CM

## 2021-07-15 DIAGNOSIS — M25.462 SWELLING OF KNEE JOINT, LEFT: ICD-10-CM

## 2021-07-15 DIAGNOSIS — R29.898 LEFT LEG WEAKNESS: ICD-10-CM

## 2021-07-15 DIAGNOSIS — M25.662 KNEE STIFFNESS, LEFT: Primary | ICD-10-CM

## 2021-07-15 PROCEDURE — 73562 X-RAY EXAM OF KNEE 3: CPT | Mod: LT | Performed by: RADIOLOGY

## 2021-07-15 PROCEDURE — 93971 EXTREMITY STUDY: CPT | Mod: LT | Performed by: RADIOLOGY

## 2021-07-15 PROCEDURE — 99214 OFFICE O/P EST MOD 30 MIN: CPT | Performed by: NURSE PRACTITIONER

## 2021-07-15 ASSESSMENT — MIFFLIN-ST. JEOR: SCORE: 1426.98

## 2021-07-15 ASSESSMENT — ANXIETY QUESTIONNAIRES
7. FEELING AFRAID AS IF SOMETHING AWFUL MIGHT HAPPEN: NOT AT ALL
2. NOT BEING ABLE TO STOP OR CONTROL WORRYING: NOT AT ALL
6. BECOMING EASILY ANNOYED OR IRRITABLE: NOT AT ALL
GAD7 TOTAL SCORE: 0
GAD7 TOTAL SCORE: 0
4. TROUBLE RELAXING: NOT AT ALL
3. WORRYING TOO MUCH ABOUT DIFFERENT THINGS: NOT AT ALL
1. FEELING NERVOUS, ANXIOUS, OR ON EDGE: NOT AT ALL
5. BEING SO RESTLESS THAT IT IS HARD TO SIT STILL: NOT AT ALL
7. FEELING AFRAID AS IF SOMETHING AWFUL MIGHT HAPPEN: NOT AT ALL
GAD7 TOTAL SCORE: 0

## 2021-07-15 ASSESSMENT — PAIN SCALES - GENERAL: PAINLEVEL: NO PAIN (0)

## 2021-07-15 NOTE — NURSING NOTE
Chief Complaint   Patient presents with     Musculoskeletal Problem     Patient complains of left leg numbness when sitting down.          Stewart Viera MA on 7/15/2021 at 12:39 PM

## 2021-07-15 NOTE — PROGRESS NOTES
Assessment & Plan     Knee stiffness, left  - XR Knee Left 3 Views  - US Lower Extremity Venous Duplex Left  - Physical Therapy Referral    Left leg weakness  - US Lower Extremity Venous Duplex Left  - Physical Therapy Referral    Swelling of knee joint, left  - US Lower Extremity Venous Duplex Left  - Physical Therapy Referral    Pain in left leg   - XR Knee Left 3 Views  - US Lower Extremity Venous Duplex Left  - Physical Therapy Referral      30 minutes spent on the date of the encounter doing chart review, history and exam, documentation and further activities per the note  }     See Patient Instructions  Due to left knee and leg weakness along with calf pain with stretching left leg will obtain left leg ultrasound. Due to weakness and stiffness of left knee will obtain xray of knee.  Discussed if both are negative would have patient be evaluated by physical therapy  Return to clinic if no improvement or symptoms worsen.  Patient verbalized understanding & agreed with plan of care.    HEYDI Lyle, CNP  [facility] BULMARO Reza is a 86 year old who presents for the following health issues  accompanied by himself:    HPI   Patient presents today for concerns with leg weakness x 3 to 4 days. He states he notes that with extension while sitting of his left leg that he notes leg pain specifically in his calf. He also notes leg weakness more localized ot the knee when walking.  He declines injury or fall.  Never has had anything like this before.  Had a clot in his right leg x 2 years - Eliquis - didn't work and he was treated three months of Pradaxa - see below. Had foot swelling at that time and states this feels different than when he had a DVT in his right leg.  He states anteriorly his knee feels weakness, below and above the knee.  Declines leg swelling.  He states he feels leg weakness when he stands.  Takes walking a 8 to 10 steps for it to go  "back to normal.  He still feels like it is mildly weak at rest.  Normally does not use a cane but had a cane at home - wife's cane.  Lives at home with wife.  Normally walking without issue.  Has been walking daily but had not been walking for 2.5 weeks due to heat and  issue in his house.  Was walking 25 minutes/many days per week - 6 days weeks.  No redness, swelling or warmth.  Has been wearing the same type of shoe for 15 years and believes his current pair of shoes are 2 to 3 years.  Declines numbness or tingling in his lower extremities.  No history of knee surgeries.   . Has 27 stairs to get to the front door - his left knee feels weak the first 8 to 10 stairs and then improves.  Drove here today.  States his anterior left knee feels weak and weird.  No clicking or grinding.  No back pain.  No ankle pain.     Reviewed bleeding and clotting disorder note -  Left distal lower extremity DVT at the end of May 2019 with extension of left leg DVT despite 3 months of anticoagulation with Eliquis. And then was treated with Pradaxa.  This was determined to be unprovoked left distal lower extremity DVT.  He was treated with 3 months of Pradaxa and the follow-up ultrasound which showed a resolved DVT.  The recommendation was \"I do recommend that this patient should receive aggressive mechanical DVT/PE and/or pharmacological DVT/PE prophylaxis in the future if he should be in situation for increase risk of venous thromboembolism. These situations include but not limited to: 1) Prolong immobility or hospitalization of >24 hours; 2) Surgery, especially orthopedic type surgery; 3) Traumatic injury etc....\" per Ahmet Vazquez PA-C with [facility].       Review of Systems   Constitutional, HEENT, cardiovascular, pulmonary, gi and gu systems are negative, except as otherwise noted.      Objective    /70 (BP Location: Right arm, Patient Position: Sitting, Cuff Size: Adult Regular)   " "Pulse 72   Temp 97.8  F (36.6  C) (Oral)   Resp 16   Ht 1.778 m (5' 10\")   Wt 73.6 kg (162 lb 3.2 oz)   SpO2 96%   BMI 23.27 kg/m    Body mass index is 23.27 kg/m .  Physical Exam   GENERAL: healthy, alert and no distress  MS: normal muscle tone, normal range of motion, no cyanosis, clubbing, or edema, RLE exam shows normal strength and muscle mass, no deformities, no erythema, induration, or nodules, no evidence of joint effusion, ROM of all joints is normal and no evidence of joint instability and LLE exam shows normal strength and muscle mass, no deformities, no erythema, induration, or nodules, no evidence of joint effusion, ROM of all joints is normal and no evidence of joint instability.  Negative Festus, anterior and posterior drawer, lachman, and varus and valgus stress - bilaterally.  Left knee notes minimal swelling when compared to left knee - no redness or warmth.  No open lesions.  Negative patricia's sign  SKIN: no suspicious lesions or rashes  NEURO: Normal strength and tone, mentation intact and speech normal  PSYCH: mentation appears normal, affect normal/bright    Left knee xay and left leg ultrasound - pending         Answers for HPI/ROS submitted by the patient on 7/15/2021  ISAEL 7 TOTAL SCORE: 0      "

## 2021-07-16 ASSESSMENT — ANXIETY QUESTIONNAIRES: GAD7 TOTAL SCORE: 0

## 2021-08-05 ENCOUNTER — THERAPY VISIT (OUTPATIENT)
Dept: PHYSICAL THERAPY | Facility: CLINIC | Age: 86
End: 2021-08-05
Attending: NURSE PRACTITIONER
Payer: MEDICARE

## 2021-08-05 DIAGNOSIS — M25.462 SWELLING OF KNEE JOINT, LEFT: ICD-10-CM

## 2021-08-05 DIAGNOSIS — S89.92XA KNEE INJURY, LEFT, INITIAL ENCOUNTER: ICD-10-CM

## 2021-08-05 DIAGNOSIS — M79.605 PAIN IN LEFT LEG: ICD-10-CM

## 2021-08-05 DIAGNOSIS — R29.898 LEFT LEG WEAKNESS: ICD-10-CM

## 2021-08-05 DIAGNOSIS — M25.662 KNEE STIFFNESS, LEFT: ICD-10-CM

## 2021-08-05 PROCEDURE — 97110 THERAPEUTIC EXERCISES: CPT | Mod: GP | Performed by: PHYSICAL THERAPIST

## 2021-08-05 PROCEDURE — 97161 PT EVAL LOW COMPLEX 20 MIN: CPT | Mod: GP | Performed by: PHYSICAL THERAPIST

## 2021-08-05 NOTE — PROGRESS NOTES
"Physical Therapy Initial Evaluation  Subjective:  HPI                  Physical Therapy Initial Examination/Evaluation  August 5, 2021    Tom Saini is a 86 year old male referred to physical therapy for treatment of left knee pain with Precautions/Restrictions/MD instructions none  Hx of right leg DVT.  Currently had an US with no signs of current left leg DVT.  Hx of laminectomy 10+ years ago  Sxs: pain with arising from sitting and lying-left ant and medial knee \"feels like tendons are being rearranged\", loosens up after 6-8 paces with cane.  No hx of knee, hip and ankle injuries    Subjective:  DOI/onset: 7/15/21   Acute Injury or Gradual Onset?: Gradual injury over time  Mechanism of Injury: unknown  Related PMH: see above for DVT and laminectomy   Imaging:   Chief Complaint/Functional Limitations:   pain with arising from sitting and see below in therapy evaluation codes   Pain: rest 0 /10, activity 5/10 Location: ant knee twinges Frequency: Intermittent Described as: twinges  Progression of Symptoms: Unchanged Time of day when pain is worse: Position related  Sleeping: No issues/uninterrupted   Occupation: retired  Job duties: prolonged sitting  Current HEP/exercise regimen: walking-daily-25 minutes  Patient's goals are see chief complaints     Other pertinent PMH/Red Flags: None   Barriers at home/work: None as reported by patient  Pertinent Surgical History: see EMR  Medications: None as reported by patient  General health as reported by patient: excellent  Return to MD:  Not at this time  Pain/fear of leg giving with walking more than 10 minutes.    Objective:  System                                                Knee Evaluation:  ROM:    AROM      Extension:  Left: 7    Right:  7  Flexion: Left: 124    Right: 124    Pain: pain at knee with endrange knee flex    Strength:     Extension:  Left: 4+/5   Pain:      Right: 4+/5   Pain:  Flexion:  Left: 4/5   Pain:      Right: 4/5   Pain:    Quad Set Left: " Good    Pain:   Quad Set Right: Good    Pain:  Ligament Testing:  Normal                Special Tests:     Left knee negative for the following special tests:  Meninscal    Palpation:  Normal      Edema:  Normal        SLS left 3 seconds, right 3 seconds   2L squat ant progression  Gait: fluid with SPC  Spinal screen negative  LE myotomes 5/5 all levels JAMIE  Negative homans  SLR negative  General     ROS    Assessment/Plan:    Patient is a 86 year old male with left side knee complaints.    Patient has the following significant findings with corresponding treatment plan.                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    Comorbidity factors that impact the plan of care are:      None.     Medications impacting care: None.  2) Examination of Body Systems comprised of:   Body structures and functions that impact the plan of care:      Knee.   Activity limitations that impact the plan of care are:      Stairs and Walking.  3) Clinical presentation characteristics are:   Stable/Uncomplicated.  4) Decision-Making    Low complexity using standardized patient assessment instrument and/or measureable assessment of functional outcome.  Cumulative Therapy Evaluation is: Low complexity.    Previous and current functional limitations:  (See Goal Flow Sheet for this information)    Short term and Long term goals: (See Goal Flow Sheet for this information)     Communication ability:  Patient appears to be able to clearly communicate and understand verbal and written communication and follow directions correctly.  Treatment Explanation - The following has been discussed with the patient:   RX ordered/plan of care  Anticipated outcomes  Possible risks and side effects  This patient would benefit from PT  intervention to resume normal activities.   Rehab potential is good.    Frequency:  1 X week, once daily  Duration:  for 8 weeks  Discharge Plan:  Achieve all LTG.  Independent in home treatment program.  Reach maximal therapeutic benefit.    Please refer to the daily flowsheet for treatment today, total treatment time and time spent performing 1:1 timed codes.

## 2021-08-05 NOTE — LETTER
"DEPARTMENT OF HEALTH AND HUMAN SERVICES  [facility] MEDICA[facility]    PLAN/UPDATED PLAN OF PROGRESS FOR OUTPATIENT REHABILITATION    PATIENTS NAME:  Tmo Saini   : 1935  PROVIDER NUMBER:    [id number]  HICN:  [id number]  PROVIDER NAME: [facility]  MEDICAL RECORD NUMBER: [id number]   START OF CARE DATE:  SOC Date: 21   TYPE:  PT  PRIMARY/TREATMENT DIAGNOSIS: (Pertinent Medical Diagnosis)   Knee stiffness, left  Left leg weakness  Swelling of knee joint, left  Pain in left leg  Knee injury, left, initial encounter    VISITS FROM START OF CARE:  Rxs Used: 1     Physical Therapy Initial Evaluation  Subjective:  HPI                  Physical Therapy Initial Examination/Evaluation  2021    Tom Saini is a 86 year old male referred to physical therapy for treatment of left knee pain with Precautions/Restrictions/MD instructions none  Hx of right leg DVT.  Currently had an US with no signs of current left leg DVT.  Hx of laminectomy 10+ years ago  Sxs: pain with arising from sitting and lying-left ant and medial knee \"feels like tendons are being rearranged\", loosens up after 6-8 paces with cane.  No hx of knee, hip and ankle injuries    Subjective:  DOI/onset: 7/15/21   Acute Injury or Gradual Onset?: Gradual injury over time  Mechanism of Injury: unknown  Related PMH: see above for DVT and laminectomy   Imaging:   Chief Complaint/Functional Limitations:   pain with arising from sitting and see below in therapy evaluation codes   Pain: rest 0 /10, activity 5/10 Location: ant knee twinges Frequency: Intermittent Described as: twinges  Progression of Symptoms: Unchanged Time of day when pain is worse: Position related  Sleeping: No issues/uninterrupted   Occupation: retired  Job duties: prolonged sitting  Current HEP/exercise regimen: walking-daily-25 minutes  Patient's goals are see chief complaints     Other pertinent PMH/Red " Flags: None   Barriers at home/work: None as reported by patient  Pertinent Surgical History: see EMR    PATIENTS NAME:  Tom Saini   : 1935    Medications: None as reported by patient  General health as reported by patient: excellent  Return to MD:  Not at this time  Pain/fear of leg giving with walking more than 10 minutes.    Objective:  System       Knee Evaluation:  ROM:    AROM  Extension:  Left: 7    Right:  7  Flexion: Left: 124    Right: 124  Pain: pain at knee with endrange knee flex    Strength:   Extension:  Left: 4+/5   Pain:      Right: 4+/5   Pain:  Flexion:  Left: 4/5   Pain:      Right: 4/5   Pain:    Quad Set Left: Good    Pain:   Quad Set Right: Good    Pain:  Ligament Testing:  Normal  Special Tests:   Left knee negative for the following special tests:  Meninscal  Palpation:  Normal  Edema:  Normal  SLS left 3 seconds, right 3 seconds   2L squat ant progression  Gait: fluid with SPC  Spinal screen negative  LE myotomes 5/5 all levels JAMIE  Negative homans  SLR negative  General     ROS    Assessment/Plan:    Patient is a 86 year old male with left side knee complaints.    Patient has the following significant findings with corresponding treatment plan.                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    Comorbidity factors that impact the plan of care are:      None.     Medications impacting care: None.  2) Examination of Body Systems comprised of:    PATIENTS NAME:  Tom Saini   : 1935     Body structures and functions that impact the plan of care:      Knee.   Activity limitations that impact the plan of care are:      Stairs and Walking.  3) Clinical presentation characteristics are:   Stable/Uncomplicated.  4) Decision-Making    Low complexity  "using standardized patient assessment instrument and/or measureable assessment of functional outcome.  Cumulative Therapy Evaluation is: Low complexity.    Previous and current functional limitations:  (See Goal Flow Sheet for this information)    Short term and Long term goals: (See Goal Flow Sheet for this information)     Communication ability:  Patient appears to be able to clearly communicate and understand verbal and written communication and follow directions correctly.  Treatment Explanation - The following has been discussed with the patient:   RX ordered/plan of care  Anticipated outcomes  Possible risks and side effects  This patient would benefit from PT intervention to resume normal activities.   Rehab potential is good.    Frequency:  1 X week, once daily  Duration:  for 8 weeks  Discharge Plan:  Achieve all LTG.  Independent in home treatment program.  Reach maximal therapeutic benefit.      Caregiver Signature/Credentials _____________________________ Date ________       Treating Provider: Gunnar Quiñones PT     I have reviewed and certified the need for these services and plan of treatment while under my care.        PHYSICIAN'S SIGNATURE:   _________________________________________  Date___________   Orquidea Melo MD     Certification period:  Beginning of Cert date period: 08/05/21 to  End of Cert period date: 11/02/21     Functional Level Progress Report: Please see attached \"Goal Flow sheet for Functional level.\"    ____X____ Continue Services or       ________ DC Services                Service dates: From  SOC Date: 08/05/21 date to present                         "

## 2021-08-12 ENCOUNTER — THERAPY VISIT (OUTPATIENT)
Dept: PHYSICAL THERAPY | Facility: CLINIC | Age: 86
End: 2021-08-12
Payer: MEDICARE

## 2021-08-12 DIAGNOSIS — S89.92XA KNEE INJURY, LEFT, INITIAL ENCOUNTER: ICD-10-CM

## 2021-08-12 PROCEDURE — 97112 NEUROMUSCULAR REEDUCATION: CPT | Mod: GP | Performed by: PHYSICAL THERAPIST

## 2021-08-12 PROCEDURE — 97110 THERAPEUTIC EXERCISES: CPT | Mod: GP | Performed by: PHYSICAL THERAPIST

## 2021-08-20 ENCOUNTER — THERAPY VISIT (OUTPATIENT)
Dept: PHYSICAL THERAPY | Facility: CLINIC | Age: 86
End: 2021-08-20
Payer: MEDICARE

## 2021-08-20 DIAGNOSIS — S89.92XA KNEE INJURY, LEFT, INITIAL ENCOUNTER: ICD-10-CM

## 2021-08-20 PROCEDURE — 97112 NEUROMUSCULAR REEDUCATION: CPT | Mod: GP | Performed by: PHYSICAL THERAPIST

## 2021-08-20 PROCEDURE — 97110 THERAPEUTIC EXERCISES: CPT | Mod: GP | Performed by: PHYSICAL THERAPIST

## 2021-09-02 ENCOUNTER — OFFICE VISIT (OUTPATIENT)
Dept: OPHTHALMOLOGY | Facility: CLINIC | Age: 86
End: 2021-09-02
Attending: OPHTHALMOLOGY
Payer: MEDICARE

## 2021-09-02 DIAGNOSIS — Z96.1 PSEUDOPHAKIA OF BOTH EYES: ICD-10-CM

## 2021-09-02 DIAGNOSIS — H35.3212 EXUDATIVE AGE-RELATED MACULAR DEGENERATION OF RIGHT EYE WITH INACTIVE CHOROIDAL NEOVASCULARIZATION (H): ICD-10-CM

## 2021-09-02 DIAGNOSIS — H52.7 REFRACTIVE ERROR: ICD-10-CM

## 2021-09-02 DIAGNOSIS — H35.3221 EXUDATIVE AGE-RELATED MACULAR DEGENERATION OF LEFT EYE WITH ACTIVE CHOROIDAL NEOVASCULARIZATION (H): Primary | ICD-10-CM

## 2021-09-02 PROCEDURE — 92134 CPTRZ OPH DX IMG PST SGM RTA: CPT | Performed by: OPHTHALMOLOGY

## 2021-09-02 PROCEDURE — G0463 HOSPITAL OUTPT CLINIC VISIT: HCPCS | Mod: 25

## 2021-09-02 PROCEDURE — 92014 COMPRE OPH EXAM EST PT 1/>: CPT | Mod: GC | Performed by: OPHTHALMOLOGY

## 2021-09-02 ASSESSMENT — VISUAL ACUITY
OD_CC: 20/50
CORRECTION_TYPE: GLASSES
OD_CC+: +1
METHOD: SNELLEN - LINEAR
OS_CC+: +2
OS_CC: 20/40
OD_PH_CC+: -1
OD_PH_CC: 20/30

## 2021-09-02 ASSESSMENT — EXTERNAL EXAM - RIGHT EYE: OD_EXAM: NORMAL

## 2021-09-02 ASSESSMENT — REFRACTION_WEARINGRX
OD_SPHERE: -2.75
OS_ADD: +2.75
OD_CYLINDER: +1.00
OD_ADD: +2.75
OS_SPHERE: -1.50
OS_AXIS: 010
SPECS_TYPE: BIFOCAL
OD_AXIS: 170
OS_CYLINDER: +0.50

## 2021-09-02 ASSESSMENT — TONOMETRY
IOP_METHOD: ICARE
OD_IOP_MMHG: 10
OS_IOP_MMHG: 10

## 2021-09-02 ASSESSMENT — CUP TO DISC RATIO
OD_RATIO: 0.2
OS_RATIO: 0.2

## 2021-09-02 ASSESSMENT — EXTERNAL EXAM - LEFT EYE: OS_EXAM: ET

## 2021-09-02 ASSESSMENT — SLIT LAMP EXAM - LIDS
COMMENTS: PTOSIS OS > OD
COMMENTS: PTOSIS OS > OD

## 2021-09-02 ASSESSMENT — CONF VISUAL FIELD
OS_NORMAL: 1
METHOD: COUNTING FINGERS
OD_NORMAL: 1

## 2021-09-02 NOTE — NURSING NOTE
Chief Complaints and History of Present Illnesses   Patient presents with     Follow Up     Exudative age-related macular degeneration of left eye with active choroidal neovascularization      Chief Complaint(s) and History of Present Illness(es)     Follow Up     Laterality: left eye    Onset: gradual    Onset: years ago    Course: gradually worsening    Associated symptoms: floaters.  Negative for eye pain, dryness, tearing, flashes and photophobia    Pain scale: 0/10    Comments: Exudative age-related macular degeneration of left eye with active choroidal neovascularization               Comments     Pt states vision is getting slightly worse.  Sometimes uses AT's.    BRI Mejia September 2, 2021 9:24 AM

## 2021-09-02 NOTE — PROGRESS NOTES
Chief Complaint(s) and History of Present Illness(es)     Follow Up     Laterality: left eye    Onset: gradual    Onset: years ago    Course: gradually worsening    Associated symptoms: floaters.  Negative for eye pain, dryness, tearing,   flashes and photophobia    Pain scale: 0/10    Comments: Exudative age-related macular degeneration of left eye with   active choroidal neovascularization       Comments    Pt states vision is getting slightly worse.  Sometimes uses AT's.    BRI Mejia September 2, 2021 9:24 AM       Review of systems for the eyes was negative other than the pertinent positives/negatives listed in the HPI.      Assessment & Plan      Tom Saini is a 86 year old male with the following diagnoses:   1. Exudative age-related macular degeneration of left eye with active choroidal neovascularization (H)    2. 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   Last Avastin injection July 8, 2021. Has been getting Q8 week series of Avastin left eye.  Looks good today.  New tubulation, but no Intraretinal fluid/SRF   Dilated fundus exam stable in both eyes     Defer injection today   Plan to extend to 10 week intervals    Patient disposition:   Return in about 2 weeks (around 9/16/2021) for VT only, Refraction, OCT Macula; AVASTIN LEFT EYE.    Laurita Whitney MD  Ophthalmology PGY-4    Attending Physician Attestation:  Complete documentation of historical and exam elements from today's encounter can be found in the full encounter summary report (not reduplicated in this progress note).  I personally obtained the chief complaint(s) and history of present illness.  I confirmed and edited as necessary the review of systems, past medical/surgical history, family history, social history, and examination findings as documented by  others; and I examined the patient myself.  I personally reviewed the relevant tests, images, and reports as documented above.  I formulated and edited as necessary the assessment and plan and discussed the findings and management plan with the patient and family. Attending Physician Image/Tesing Attestation: I personally reviewed the ophthalmic test(s) associated with this encounter, agree with the interpretation(s) as documented by the resident/fellow, and have edited the corresponding report(s) as necessary.  . - Brigido Laura MD

## 2021-09-04 ENCOUNTER — HEALTH MAINTENANCE LETTER (OUTPATIENT)
Age: 86
End: 2021-09-04

## 2021-09-09 ENCOUNTER — THERAPY VISIT (OUTPATIENT)
Dept: PHYSICAL THERAPY | Facility: CLINIC | Age: 86
End: 2021-09-09
Payer: MEDICARE

## 2021-09-09 DIAGNOSIS — S89.92XA KNEE INJURY, LEFT, INITIAL ENCOUNTER: ICD-10-CM

## 2021-09-09 PROCEDURE — 97110 THERAPEUTIC EXERCISES: CPT | Mod: GP | Performed by: PHYSICAL THERAPIST

## 2021-09-09 PROCEDURE — 97112 NEUROMUSCULAR REEDUCATION: CPT | Mod: GP | Performed by: PHYSICAL THERAPIST

## 2021-09-15 ENCOUNTER — OFFICE VISIT (OUTPATIENT)
Dept: OPHTHALMOLOGY | Facility: CLINIC | Age: 86
End: 2021-09-15
Attending: OPHTHALMOLOGY
Payer: MEDICARE

## 2021-09-15 DIAGNOSIS — H35.3212 EXUDATIVE AGE-RELATED MACULAR DEGENERATION OF RIGHT EYE WITH INACTIVE CHOROIDAL NEOVASCULARIZATION (H): ICD-10-CM

## 2021-09-15 DIAGNOSIS — H35.3221 EXUDATIVE AGE-RELATED MACULAR DEGENERATION OF LEFT EYE WITH ACTIVE CHOROIDAL NEOVASCULARIZATION (H): Primary | ICD-10-CM

## 2021-09-15 PROCEDURE — 250N000011 HC RX IP 250 OP 636: Performed by: OPHTHALMOLOGY

## 2021-09-15 PROCEDURE — 92134 CPTRZ OPH DX IMG PST SGM RTA: CPT | Performed by: OPHTHALMOLOGY

## 2021-09-15 PROCEDURE — G0463 HOSPITAL OUTPT CLINIC VISIT: HCPCS | Mod: 25

## 2021-09-15 PROCEDURE — 67028 INJECTION EYE DRUG: CPT | Mod: LT | Performed by: OPHTHALMOLOGY

## 2021-09-15 RX ADMIN — Medication 1.25 MG: at 13:51

## 2021-09-15 ASSESSMENT — VISUAL ACUITY
METHOD_MR_RETINOSCOPY: 1
OS_CC+: -1
OD_PH_CC+: -1
OD_CC: 20/50
OD_PH_CC: 20/30
CORRECTION_TYPE: GLASSES
OS_CC: 20/40
OD_CC+: +2
METHOD: SNELLEN - LINEAR

## 2021-09-15 ASSESSMENT — REFRACTION_WEARINGRX
OD_SPHERE: -2.75
OD_ADD: +2.25
OD_AXIS: 178
OS_SPHERE: -1.00
OD_CYLINDER: +1.75
OS_AXIS: 180
OS_CYLINDER: +2.50
SPECS_TYPE: BIFOCAL
OS_ADD: +2.25

## 2021-09-15 ASSESSMENT — REFRACTION_MANIFEST
OD_CYLINDER: +2.25
OS_AXIS: 180
OD_SPHERE: -3.00
OS_ADD: +2.50
OD_AXIS: 180
OD_AXIS: 180
OS_SPHERE: -1.25
OS_CYLINDER: +2.75
OD_CYLINDER: +2.25
OD_ADD: +2.50
OS_SPHERE: PLANO
OS_AXIS: 180
OD_SPHERE: -1.75
OD_ADD: +1.25
OS_ADD: +1.25
OS_CYLINDER: +2.75

## 2021-09-15 ASSESSMENT — CONF VISUAL FIELD
OD_NORMAL: 1
OS_NORMAL: 1

## 2021-09-15 ASSESSMENT — TONOMETRY
OD_IOP_MMHG: 13
IOP_METHOD: TONOPEN
OS_IOP_MMHG: 14

## 2021-09-15 NOTE — NURSING NOTE
Chief Complaints and History of Present Illnesses   Patient presents with     Follow Up     Exudative age-related macular degeneration of left eye with active choroidal neovascularization      Chief Complaint(s) and History of Present Illness(es)     Follow Up     Laterality: both eyes    Course: stable    Associated symptoms: Negative for floaters, flashes, eye pain and headache    Treatments tried: no treatments    Pain scale: 0/10    Comments: Exudative age-related macular degeneration of left eye with active choroidal neovascularization               Comments     Pt states no change in VA since last visit    Tati Laura COT 1:07 PM September 15, 2021

## 2021-09-15 NOTE — PROGRESS NOTES
Chief Complaint(s) and History of Present Illness(es)     Follow Up     Laterality: both eyes    Course: stable    Associated symptoms: Negative for floaters, flashes, eye pain and   headache    Treatments tried: no treatments    Pain scale: 0/10    Comments: Exudative age-related macular degeneration of left eye with   active choroidal neovascularization               Comments     Pt states no change in VA since last visit    Tati Laura COT 1:07 PM September 15, 2021               Review of systems for the eyes was negative other than the pertinent positives/negatives listed in the HPI.      Assessment & Plan      Tom Saini is a 86 year old male with the following diagnoses:   1. Exudative age-related macular degeneration of left eye with active choroidal neovascularization (H)    2. 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     Refractive options reviewed  Refraction given     Patient disposition:   Return in about 10 weeks (around 11/24/2021) for VT only, OCT Macula, Avastin LEFT eye.           Attending Physician Attestation:  Complete documentation of historical and exam elements from today's encounter can be found in the full encounter summary report (not reduplicated in this progress note).  I personally obtained the chief complaint(s) and history of present illness.  I confirmed and edited as necessary the review of systems, past medical/surgical history, family history, social history, and examination findings as documented by others; and I examined the patient myself.  I personally reviewed the relevant tests, images, and reports as documented above.  I formulated and edited as  necessary the assessment and plan and discussed the findings and management plan with the patient and family. . - Brigido Laura MD

## 2021-10-05 ENCOUNTER — TELEPHONE (OUTPATIENT)
Dept: OPHTHALMOLOGY | Facility: CLINIC | Age: 86
End: 2021-10-05

## 2021-10-05 NOTE — TELEPHONE ENCOUNTER
[facility] Call Center    Phone Message    May a detailed message be left on voicemail: yes     Reason for Call: Other: Pt calling in to inform angle on the cylinder for new refractions is wrong, and he would like to know what he should do, please call back to dfscuss further      Action Taken: Message routed to:  [facility] eye     Travel Screening: Not Applicable

## 2021-10-05 NOTE — TELEPHONE ENCOUNTER
Called/ spoke with Juaquin regarding his eye glass Rx. Juaquin thinks the angle on the cylinder for his new refractions is wrong. I made an appointment for a tech appt for 10.7.21     Thank You,     Karuna

## 2021-10-07 ENCOUNTER — ALLIED HEALTH/NURSE VISIT (OUTPATIENT)
Dept: OPHTHALMOLOGY | Facility: CLINIC | Age: 86
End: 2021-10-07
Attending: OPHTHALMOLOGY
Payer: MEDICARE

## 2021-10-07 DIAGNOSIS — H52.13 MYOPIA OF BOTH EYES: Primary | ICD-10-CM

## 2021-10-07 PROCEDURE — 92015 DETERMINE REFRACTIVE STATE: CPT | Mod: GY

## 2021-10-07 PROCEDURE — 999N000103 HC STATISTIC NO CHARGE FACILITY FEE

## 2021-10-07 ASSESSMENT — REFRACTION_WEARINGRX
OD_AXIS: 177
OD_SPHERE: -3.00
SPECS_TYPE: BIFOCAL
OS_ADD: +2.25
OD_CYLINDER: +2.25
OD_ADD: +2.25
OS_AXIS: 003
OS_CYLINDER: +2.75
OS_SPHERE: -1.00

## 2021-10-07 ASSESSMENT — REFRACTION_MANIFEST
OS_CYLINDER: +2.50
OS_SPHERE: -1.00
OD_ADD: +2.50
OD_CYLINDER: +2.00
OS_AXIS: 167
OD_SPHERE: -3.00
OS_ADD: +2.50
OD_AXIS: 176

## 2021-10-07 NOTE — PROGRESS NOTES
Tech visit for prescription only, not seen by physician.      Brigido Laura MD  , Comprehensive Ophthalmology  Department of Ophthalmology and Visual Neurosciences  [facility]

## 2021-10-19 ENCOUNTER — MYC MEDICAL ADVICE (OUTPATIENT)
Dept: INTERNAL MEDICINE | Facility: CLINIC | Age: 86
End: 2021-10-19

## 2021-11-19 PROBLEM — S89.92XA KNEE INJURY, LEFT, INITIAL ENCOUNTER: Status: RESOLVED | Noted: 2021-08-05 | Resolved: 2021-11-19

## 2021-12-09 ENCOUNTER — OFFICE VISIT (OUTPATIENT)
Dept: OPHTHALMOLOGY | Facility: CLINIC | Age: 86
End: 2021-12-09
Attending: OPHTHALMOLOGY
Payer: MEDICARE

## 2021-12-09 DIAGNOSIS — H35.3221 EXUDATIVE AGE-RELATED MACULAR DEGENERATION OF LEFT EYE WITH ACTIVE CHOROIDAL NEOVASCULARIZATION (H): Primary | ICD-10-CM

## 2021-12-09 PROCEDURE — G0463 HOSPITAL OUTPT CLINIC VISIT: HCPCS

## 2021-12-09 PROCEDURE — 67028 INJECTION EYE DRUG: CPT | Mod: LT | Performed by: OPHTHALMOLOGY

## 2021-12-09 PROCEDURE — 250N000011 HC RX IP 250 OP 636: Performed by: OPHTHALMOLOGY

## 2021-12-09 PROCEDURE — 92134 CPTRZ OPH DX IMG PST SGM RTA: CPT | Performed by: OPHTHALMOLOGY

## 2021-12-09 RX ADMIN — Medication 1.25 MG: at 14:07

## 2021-12-09 ASSESSMENT — VISUAL ACUITY
OD_CC+: +2
OD_CC: 20/50
METHOD: SNELLEN - LINEAR
OS_CC+: -1
OS_PH_CC: 20/30
OD_PH_CC: 20/30
OD_PH_CC+: -1
OS_CC: 20/40
OS_PH_CC+: -1

## 2021-12-09 ASSESSMENT — TONOMETRY
OS_IOP_MMHG: 13
IOP_METHOD: TONOPEN
OD_IOP_MMHG: 10

## 2021-12-09 ASSESSMENT — CONF VISUAL FIELD
OS_NORMAL: 1
METHOD: COUNTING FINGERS
OD_NORMAL: 1

## 2021-12-09 NOTE — PROGRESS NOTES
Chief Complaint(s) and History of Present Illness(es)     Follow Up     Laterality: both eyes    Course: stable    Associated symptoms: headache.  Negative for floaters, flashes and eye   pain    Treatments tried: no treatments    Pain scale: 0/10    Comments: Exudative age-related macular degeneration of left eye with   active choroidal neovascularization               Comments     Pt states no change in [facility] since last visit    Tati Laura COT 1:31 PM December 9, 2021               Review of systems for the eyes was negative other than the pertinent positives/negatives listed in the HPI.      Assessment & Plan      Tom Saini is a 86 year old male with the following diagnoses:   1. Exudative age-related macular degeneration of left eye with active choroidal neovascularization (H)         Here for left eye avastin injection  OCT mac remains feli  Continue 10 week intervals for now  Return precautions reviewed     #2/3 @ 10 week intervals left eye today   RBA to intravitreal avastin discussed, consent obtained, will proceed today.           Patient disposition:   Return in about 10 weeks (around 2/17/2022) for VT only, Avastin left eye.           Attending Physician Attestation:  Complete documentation of historical and exam elements from today's encounter can be found in the full encounter summary report (not reduplicated in this progress note).  I personally obtained the chief complaint(s) and history of present illness.  I confirmed and edited as necessary the review of systems, past medical/surgical history, family history, social history, and examination findings as documented by others; and I examined the patient myself.  I personally reviewed the relevant tests, images, and reports as documented above.  I formulated and edited as necessary the assessment and plan and discussed the findings and management plan with the patient and family. . - Brigido Laura MD

## 2021-12-09 NOTE — NURSING NOTE
Chief Complaints and History of Present Illnesses   Patient presents with     Follow Up     Exudative age-related macular degeneration of left eye with active choroidal neovascularization      Chief Complaint(s) and History of Present Illness(es)     Follow Up     Laterality: both eyes    Course: stable    Associated symptoms: headache.  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

## 2022-01-12 ENCOUNTER — OFFICE VISIT (OUTPATIENT)
Dept: OPHTHALMOLOGY | Facility: CLINIC | Age: 87
End: 2022-01-12
Attending: OPHTHALMOLOGY
Payer: MEDICARE

## 2022-01-12 DIAGNOSIS — H35.3221 EXUDATIVE AGE-RELATED MACULAR DEGENERATION OF LEFT EYE WITH ACTIVE CHOROIDAL NEOVASCULARIZATION (H): Primary | ICD-10-CM

## 2022-01-12 PROCEDURE — 92134 CPTRZ OPH DX IMG PST SGM RTA: CPT | Performed by: STUDENT IN AN ORGANIZED HEALTH CARE EDUCATION/TRAINING PROGRAM

## 2022-01-12 PROCEDURE — 99214 OFFICE O/P EST MOD 30 MIN: CPT | Mod: GC | Performed by: STUDENT IN AN ORGANIZED HEALTH CARE EDUCATION/TRAINING PROGRAM

## 2022-01-12 PROCEDURE — G0463 HOSPITAL OUTPT CLINIC VISIT: HCPCS

## 2022-01-12 PROCEDURE — 92134 CPTRZ OPH DX IMG PST SGM RTA: CPT | Mod: 26 | Performed by: STUDENT IN AN ORGANIZED HEALTH CARE EDUCATION/TRAINING PROGRAM

## 2022-01-12 ASSESSMENT — TONOMETRY
IOP_METHOD: TONOPEN
OS_IOP_MMHG: 10
OD_IOP_MMHG: 10

## 2022-01-12 ASSESSMENT — VISUAL ACUITY
OS_CC: 20/50
METHOD: SNELLEN - LINEAR
OD_PH_CC: 20/40
OS_PH_CC+: -1+1
CORRECTION_TYPE: GLASSES
OD_CC+: -1+1
OD_PH_CC+: -2+2
OD_CC: 20/50
OS_PH_CC: 20/30

## 2022-01-12 ASSESSMENT — CONF VISUAL FIELD
OS_INFERIOR_TEMPORAL_RESTRICTION: 3
METHOD: COUNTING FINGERS
OD_NORMAL: 1

## 2022-01-12 ASSESSMENT — REFRACTION_WEARINGRX
OD_SPHERE: -3.00
OS_CYLINDER: +2.75
OD_AXIS: 177
OS_ADD: +2.50
OD_ADD: +2.50
OS_SPHERE: -1.00
OD_CYLINDER: +2.25
SPECS_TYPE: BIFOCAL
OS_AXIS: 002

## 2022-01-12 ASSESSMENT — REFRACTION_MANIFEST
OS_ADD: +1.00
OD_SPHERE: -3.50
OD_AXIS: 002
OD_CYLINDER: +2.25
OD_ADD: +1.00
OS_SPHERE: -1.50
OS_AXIS: 178
OS_CYLINDER: +2.75
OD_CYLINDER: +2.25
OD_AXIS: 002
OS_SPHERE: PLANO
OS_CYLINDER: +2.75
OD_ADD: +2.50
OS_AXIS: 178
OS_ADD: +2.50
OD_SPHERE: -2.00

## 2022-01-12 ASSESSMENT — EXTERNAL EXAM - LEFT EYE: OS_EXAM: ET

## 2022-01-12 ASSESSMENT — SLIT LAMP EXAM - LIDS
COMMENTS: PTOSIS OS > OD
COMMENTS: PTOSIS OS > OD

## 2022-01-12 ASSESSMENT — EXTERNAL EXAM - RIGHT EYE: OD_EXAM: NORMAL

## 2022-01-12 ASSESSMENT — CUP TO DISC RATIO
OD_RATIO: 0.2
OS_RATIO: 0.2

## 2022-01-12 NOTE — NURSING NOTE
Chief Complaints and History of Present Illnesses   Patient presents with     Tech Visit     Chief Complaint(s) and History of Present Illness(es)     Tech Visit               Comments     Pt here for tech only visit.  Pt complains of computer glasses not working for him.  Notes he has to tilt his glasses in order to see well out of them.    Edna Eric OT 8:51 AM January 12, 2022

## 2022-01-12 NOTE — PROGRESS NOTES
OPHTHALMOLOGY - Acute Ophthalmology Clinic Note    CC:  Presents for new mrx    HPI:  HPI     Decreased Vision Follow-Up     In both eyes.  Onset was gradual.  Charactertized as  blurred vision.  Severity is mild.  Occurring constantly.  Since onset it is stable.  Treatments tried include glasses.  Response to treatment was no improvement.  Pain was noted as 0/10.              Comments     Patient here due to glasses not working.  He also notes new waviness in vision.  Pt will keep his scheduled appointment with OCT for age related macular degeneration/injection with Dr. Laura.  Pt complains of computer glasses not working for him.  Notes he has to tilt his glasses in order to see well out of them.    Edna Eric OT 8:51 AM January 12, 2022   Jenny Torrez MD 1/18/2022           Last edited by Jenny Torrez MD on 1/18/2022  9:21 AM. (History)        Complains his glasses from [employer] which were supposed to be office glasses did not work. States his last visit there he got distance glasses but the one before that visit was for office glasses and it was those that did not work. Would like new refraction today.     POHx  - GTTs: none  - Prior eye surgery/laser: CEIOL each eye; strabismus surgery 1947   - AMD each eye with CNV each eye; active in left eye    PMHx  Past Medical History:   Diagnosis Date     Aortic insufficiency      Erectile dysfunction      History of deep venous thrombosis 05/2019    distal DVT with extension, completed 6 months of AC (failed eliquis)     History of pelvic fracture      Hyperlipidemia      Low back pain     disc disease s/p laminectomy in past     Macular degeneration     on Avastin, R eye     Pancreatic cyst 07/2018    next MR due 12/20, then consider 2 year interval imaging     Radius fracture 2018    after fall from wall       Assessment & Plan  Myopia, astigmatism, presbyopia,OU  - Did not like last prescription  - Per COA discussion with [facility], suspect that lens material incorrect in last prescription. The patient did not order computer glasses.  He ordered distance/flat top bifocal in October.  He ordered sunglasses in December,  - Performed new refraction and dispensed mrx    Exudative AMD left eye with active CNV  Exudative AMD right eye with inactive CNV  - OCT macula today appears improved compared to last visit after second HUGO left eye out of three.   - Follow up with Dr. Laura for injections as scheduled    Disposition:  No follow-ups on file. As scheduled with Dr. Laura for HUGO left eye.     Seen and discussed with Dr. Torrez.     Alejandro Wright MD  Resident Physician - PGY2  Department of Ophthalmology   [facility]

## 2022-01-12 NOTE — NURSING NOTE
Chief Complaints and History of Present Illnesses   Patient presents with     Tech Visit     Chief Complaint(s) and History of Present Illness(es)     Tech Visit               Comments     Pt here for tech only visit for glasses.  He also notes new waviness in vision.  Pt will keep his scheduled appointment with OCT.  Pt complains of computer glasses not working for him.  Notes he has to tilt his glasses in order to see well out of them.    Edna Eric OT 8:51 AM January 12, 2022

## 2022-02-10 ENCOUNTER — OFFICE VISIT (OUTPATIENT)
Dept: OPHTHALMOLOGY | Facility: CLINIC | Age: 87
End: 2022-02-10
Attending: OPHTHALMOLOGY
Payer: MEDICARE

## 2022-02-10 DIAGNOSIS — H35.3221 EXUDATIVE AGE-RELATED MACULAR DEGENERATION OF LEFT EYE WITH ACTIVE CHOROIDAL NEOVASCULARIZATION (H): Primary | ICD-10-CM

## 2022-02-10 PROCEDURE — 250N000011 HC RX IP 250 OP 636: Performed by: OPHTHALMOLOGY

## 2022-02-10 PROCEDURE — 999N000103 HC STATISTIC NO CHARGE FACILITY FEE

## 2022-02-10 PROCEDURE — 67028 INJECTION EYE DRUG: CPT | Mod: LT | Performed by: OPHTHALMOLOGY

## 2022-02-10 RX ADMIN — Medication 1.25 MG: at 14:59

## 2022-02-10 ASSESSMENT — TONOMETRY
OS_IOP_MMHG: 12
OD_IOP_MMHG: 13
IOP_METHOD: ICARE

## 2022-02-10 ASSESSMENT — REFRACTION_WEARINGRX
SPECS_TYPE: BIFOCAL
OS_SPHERE: -1.50
OS_ADD: +2.50
OD_ADD: +2.50
OD_SPHERE: -3.50
OS_AXIS: 178
OD_AXIS: 002
OD_CYLINDER: +2.25
OS_CYLINDER: +2.75

## 2022-02-10 ASSESSMENT — VISUAL ACUITY
OS_CC: 20/30
OD_CC: 20/40
CORRECTION_TYPE: GLASSES
METHOD: SNELLEN - LINEAR
OS_CC+: -2

## 2022-02-10 NOTE — NURSING NOTE
Chief Complaints and History of Present Illnesses   Patient presents with     Macular Degeneration Follow Up     Chief Complaint(s) and History of Present Illness(es)     Macular Degeneration Follow Up     Laterality: both eyes    Onset: 4 weeks ago              Comments     Pt. States that he is doing well. No change in VA BE. No pain BE. No flashes or floaters BE.   Lorna Turner COT 1:38 PM February 10, 2022

## 2022-02-11 NOTE — PROGRESS NOTES
Chief Complaint(s) and History of Present Illness(es)     Macular Degeneration Follow Up     Laterality: both eyes    Onset: 4 weeks ago              Comments     Pt. States that he is doing well. No change in VA BE. No pain BE. No   flashes or floaters BE.   Lorna Turner COT 1:38 PM February 10, 2022               Review of systems for the eyes was negative other than the pertinent positives/negatives listed in the HPI.      Assessment & Plan      Tom Saini is a 87 year old male with the following diagnoses:   1. 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      Patient disposition:   Return in about 3 months (around 5/10/2022) for DFE, OCT Macula.           Attending Physician Attestation:  Complete documentation of historical and exam elements from today's encounter can be found in the full encounter summary report (not reduplicated in this progress note).  I personally obtained the chief complaint(s) and history of present illness.  I confirmed and edited as necessary the review of systems, past medical/surgical history, family history, social history, and examination findings as documented by others; and I examined the patient myself.  I personally reviewed the relevant tests, images, and reports as documented above.  I formulated and edited as necessary the assessment and plan and discussed the findings and management plan with the patient and family. . - Brigido Laura MD

## 2022-02-19 ENCOUNTER — HEALTH MAINTENANCE LETTER (OUTPATIENT)
Age: 87
End: 2022-02-19

## 2022-02-25 ENCOUNTER — TELEPHONE (OUTPATIENT)
Dept: INTERNAL MEDICINE | Facility: CLINIC | Age: 87
End: 2022-02-25
Payer: MEDICARE

## 2022-02-25 DIAGNOSIS — Z00.00 ROUTINE ADULT HEALTH MAINTENANCE: ICD-10-CM

## 2022-02-25 DIAGNOSIS — E78.5 HYPERLIPIDEMIA LDL GOAL <100: Primary | ICD-10-CM

## 2022-02-25 NOTE — TELEPHONE ENCOUNTER
[facility] Call Center    Phone Message    May a detailed message be left on voicemail: yes     Reason for Call: Other: Patient requesting labs for annual physical are placed so he can schedule. Please call back patient to confirm they are placed.     Action Taken: Message routed to:  [facility] ([facility]    Travel Screening: Not Applicable

## 2022-02-28 NOTE — TELEPHONE ENCOUNTER
Lab orders placed: BMP, PSA, and future fasting lipid.    Called patient.  Patient unavailable at time of call.  No consent form to communicate with spouse.  Patient will call back.    Call Center:  Please tell patient annual lab orders are placed.          Raphael Killian CMA ([facility]) at 10:05 AM on 2/28/2022

## 2022-03-10 ENCOUNTER — LAB (OUTPATIENT)
Dept: LAB | Facility: CLINIC | Age: 87
End: 2022-03-10
Attending: INTERNAL MEDICINE
Payer: MEDICARE

## 2022-03-10 DIAGNOSIS — Z00.00 ROUTINE ADULT HEALTH MAINTENANCE: ICD-10-CM

## 2022-03-10 DIAGNOSIS — R97.20 ELEVATED PROSTATE SPECIFIC ANTIGEN (PSA): ICD-10-CM

## 2022-03-10 DIAGNOSIS — E78.5 HYPERLIPIDEMIA LDL GOAL <100: ICD-10-CM

## 2022-03-10 DIAGNOSIS — Z12.5 ENCOUNTER FOR SCREENING FOR MALIGNANT NEOPLASM OF PROSTATE: ICD-10-CM

## 2022-03-10 LAB
ANION GAP SERPL CALCULATED.3IONS-SCNC: 6 MMOL/L (ref 3–14)
BUN SERPL-MCNC: 22 MG/DL (ref 7–30)
CALCIUM SERPL-MCNC: 8.8 MG/DL (ref 8.5–10.1)
CHLORIDE BLD-SCNC: 108 MMOL/L (ref 94–109)
CHOLEST SERPL-MCNC: 148 MG/DL
CO2 SERPL-SCNC: 31 MMOL/L (ref 20–32)
CREAT SERPL-MCNC: 0.92 MG/DL (ref 0.66–1.25)
GFR SERPL CREATININE-BSD FRML MDRD: 81 ML/MIN/1.73M2
GLUCOSE BLD-MCNC: 93 MG/DL (ref 70–99)
HDLC SERPL-MCNC: 70 MG/DL
LDLC SERPL CALC-MCNC: 66 MG/DL
NONHDLC SERPL-MCNC: 78 MG/DL
POTASSIUM BLD-SCNC: 4.1 MMOL/L (ref 3.4–5.3)
PSA SERPL-MCNC: 2.76 UG/L (ref 0–4)
SODIUM SERPL-SCNC: 145 MMOL/L (ref 133–144)
TRIGL SERPL-MCNC: 59 MG/DL

## 2022-03-10 PROCEDURE — 36415 COLL VENOUS BLD VENIPUNCTURE: CPT | Performed by: PATHOLOGY

## 2022-03-10 PROCEDURE — G0103 PSA SCREENING: HCPCS | Performed by: PATHOLOGY

## 2022-03-10 PROCEDURE — 80048 BASIC METABOLIC PNL TOTAL CA: CPT | Performed by: PATHOLOGY

## 2022-03-10 PROCEDURE — 80061 LIPID PANEL: CPT | Performed by: PATHOLOGY

## 2022-04-07 ENCOUNTER — OFFICE VISIT (OUTPATIENT)
Dept: INTERNAL MEDICINE | Facility: CLINIC | Age: 87
End: 2022-04-07
Payer: MEDICARE

## 2022-04-07 VITALS
HEART RATE: 78 BPM | RESPIRATION RATE: 16 BRPM | BODY MASS INDEX: 23.77 KG/M2 | HEIGHT: 70 IN | WEIGHT: 166 LBS | OXYGEN SATURATION: 96 % | DIASTOLIC BLOOD PRESSURE: 75 MMHG | SYSTOLIC BLOOD PRESSURE: 136 MMHG

## 2022-04-07 DIAGNOSIS — M81.0 AGE-RELATED OSTEOPOROSIS WITHOUT CURRENT PATHOLOGICAL FRACTURE: ICD-10-CM

## 2022-04-07 DIAGNOSIS — Z00.00 MEDICARE ANNUAL WELLNESS VISIT, SUBSEQUENT: Primary | ICD-10-CM

## 2022-04-07 DIAGNOSIS — Z23 NEED FOR COVID-19 VACCINE: ICD-10-CM

## 2022-04-07 DIAGNOSIS — Z11.52 ENCOUNTER FOR SCREENING FOR COVID-19: ICD-10-CM

## 2022-04-07 LAB — SARS-COV-2 RNA RESP QL NAA+PROBE: NEGATIVE

## 2022-04-07 PROCEDURE — 91305 COVID-19,PF,PFIZER (12+ YRS): CPT | Performed by: INTERNAL MEDICINE

## 2022-04-07 PROCEDURE — 0054A COVID-19,PF,PFIZER (12+ YRS): CPT | Performed by: INTERNAL MEDICINE

## 2022-04-07 PROCEDURE — G0439 PPPS, SUBSEQ VISIT: HCPCS | Performed by: INTERNAL MEDICINE

## 2022-04-07 PROCEDURE — U0003 INFECTIOUS AGENT DETECTION BY NUCLEIC ACID (DNA OR RNA); SEVERE ACUTE RESPIRATORY SYNDROME CORONAVIRUS 2 (SARS-COV-2) (CORONAVIRUS DISEASE [COVID-19]), AMPLIFIED PROBE TECHNIQUE, MAKING USE OF HIGH THROUGHPUT TECHNOLOGIES AS DESCRIBED BY CMS-2020-01-R: HCPCS | Performed by: INTERNAL MEDICINE

## 2022-04-07 RX ORDER — ALBUTEROL SULFATE 90 UG/1
1-2 AEROSOL, METERED RESPIRATORY (INHALATION)
Status: CANCELLED
Start: 2022-04-08

## 2022-04-07 RX ORDER — ZOLEDRONIC ACID 5 MG/100ML
5 INJECTION, SOLUTION INTRAVENOUS ONCE
Status: CANCELLED
Start: 2022-04-08 | End: 2022-04-08

## 2022-04-07 RX ORDER — ALBUTEROL SULFATE 0.83 MG/ML
2.5 SOLUTION RESPIRATORY (INHALATION)
Status: CANCELLED | OUTPATIENT
Start: 2022-04-08

## 2022-04-07 RX ORDER — HEPARIN SODIUM,PORCINE 10 UNIT/ML
5 VIAL (ML) INTRAVENOUS
Status: CANCELLED | OUTPATIENT
Start: 2022-04-08

## 2022-04-07 RX ORDER — NALOXONE HYDROCHLORIDE 0.4 MG/ML
0.2 INJECTION, SOLUTION INTRAMUSCULAR; INTRAVENOUS; SUBCUTANEOUS
Status: CANCELLED | OUTPATIENT
Start: 2022-04-08

## 2022-04-07 RX ORDER — EPINEPHRINE 1 MG/ML
0.3 INJECTION, SOLUTION, CONCENTRATE INTRAVENOUS EVERY 5 MIN PRN
Status: CANCELLED | OUTPATIENT
Start: 2022-04-08

## 2022-04-07 RX ORDER — MEPERIDINE HYDROCHLORIDE 25 MG/ML
25 INJECTION INTRAMUSCULAR; INTRAVENOUS; SUBCUTANEOUS EVERY 30 MIN PRN
Status: CANCELLED | OUTPATIENT
Start: 2022-04-08

## 2022-04-07 RX ORDER — HEPARIN SODIUM (PORCINE) LOCK FLUSH IV SOLN 100 UNIT/ML 100 UNIT/ML
5 SOLUTION INTRAVENOUS
Status: CANCELLED | OUTPATIENT
Start: 2022-04-08

## 2022-04-07 RX ORDER — DIPHENHYDRAMINE HYDROCHLORIDE 50 MG/ML
50 INJECTION INTRAMUSCULAR; INTRAVENOUS
Status: CANCELLED
Start: 2022-04-08

## 2022-04-07 RX ORDER — METHYLPREDNISOLONE SODIUM SUCCINATE 125 MG/2ML
125 INJECTION, POWDER, LYOPHILIZED, FOR SOLUTION INTRAMUSCULAR; INTRAVENOUS
Status: CANCELLED
Start: 2022-04-08

## 2022-04-07 ASSESSMENT — PAIN SCALES - GENERAL: PAINLEVEL: NO PAIN (0)

## 2022-04-07 NOTE — PATIENT INSTRUCTIONS
Schedule your next Reclast infusion this spring.    For help scheduling with infusion services at the [facility], please call  [phone].

## 2022-04-07 NOTE — NURSING NOTE
Tom Saini is a 87 year old male patient that presents today in clinic for the following:    Chief Complaint   Patient presents with     Physical     The patient's allergies and medications were reviewed as noted. A set of vitals were recorded as noted without incident. The patient does not have any other questions for the provider.    Tom Wells, EMT at 10:03 AM on 4/7/2022

## 2022-04-07 NOTE — PROGRESS NOTES
History of Present Illness:  Mr. 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 The risk of osteoporotic fracture increases approximately 2-fold for each 1.0 SD decrease in T-score.      Review of external notes as documented above                   A detailed Review of Systems was performed, verified and is negative except as documented in the HPI.  All health questionnaires were reviewed, verified and relevant information documented above.          Past Medical History:  Past Medical History:   Diagnosis Date     Aortic insufficiency      Erectile dysfunction      History of deep venous thrombosis 05/2019    distal DVT with extension, completed 6 months of AC (failed eliquis)     History of pelvic fracture      Hyperlipidemia      Low back pain     disc disease s/p laminectomy in past     Macular degeneration     on Avastin, R eye     Pancreatic cyst 07/2018    next MR due 12/20, then consider 2 year interval imaging     Radius fracture 2018    after fall from wall       Past Surgical History:  Past Surgical History:   Procedure Laterality Date     CATARACT IOL, RT/LT  2001     COMBINED REPAIR PTOSIS WITH BLEPHAROPLASTY BILATERAL Bilateral 4/6/2018    Procedure: COMBINED REPAIR PTOSIS WITH BLEPHAROPLASTY BILATERAL;  BILATERAL UPPER EYELIDS  "BLEPHAROPLASTY AND PTOSIS REPAIR ;  Surgeon: Anuj Good MD;  Location: [facility]     EYE SURGERY       LAMINECTOMY LUMBAR TWO LEVELS      L4-L5     RECESSION RESECTION (REPAIR STRABISMUS)         Active Meds:  Current Outpatient Medications   Medication     aspirin 81 MG tablet     atorvastatin (LIPITOR) 10 MG tablet     calcium carbonate (OS-DAVIDE) 1500 (600 Ca) MG tablet     Cholecalciferol (VITAMIN D3) 1000 units CAPS     multivitamin (OCUVITE) TABS     Current Facility-Administered Medications   Medication     bevacizumab (AVASTIN) intravitreal inj 1.25 mg        Allergies:  Codeine sulfate    Family History:  family history includes Cancer in his father and mother; Skin Cancer in his mother.    Social History:  Social History     Tobacco Use     Smoking status: Former Smoker     Packs/day: 2.00     Years: 2.00     Pack years: 4.00     Quit date: 1954     Years since quittin.8     Smokeless tobacco: Never Used   Substance Use Topics     Alcohol use: Yes     Comment: glass of wine with dinner     Drug use: No       Physical Exam:  Vitals: /75 (BP Location: Right arm, Patient Position: Sitting, Cuff Size: Adult Regular)   Pulse 78   Resp 16   Ht 1.778 m (5' 10\")   Wt 75.3 kg (166 lb)   SpO2 96%   BMI 23.82 kg/m    Constitutional: Alert, oriented, pleasant, no acute distress  Head: Normocephalic, atraumatic  Eyes: Extra-ocular movements intact, pupils equally round and reactive bilaterally, no scleral icterus  ENT: Oropharynx clear, moist mucus membranes  Neck: Supple, no lymphadenopathy  Cardiovascular: Regular rate and rhythm, no murmurs, rubs or gallops, peripheral pulses full/symmetric  Respiratory: Good air movement bilaterally, lungs clear, no wheezes/rales/rhonchi  GI: Abdomen soft, bowel sounds present, nondistended, nontender, no organomegaly or masses, no rebound/guarding  Musculoskeletal: No edema, normal muscle tone, normal gait  Neurologic: Alert and oriented, cranial " nerves 2-12 intact, grossly non-focal  Skin: No rashes/lesions  Psychiatric: normal mentation, affect and mood      Diagnostics:  Labs reviewed in Epic          Assessment and Plan:  Tom was seen today for physical.    Diagnoses and all orders for this visit:    Medicare annual wellness visit, subsequent  Doing well overall, no major concerns about health.  Reviewed chronic conditions, screenings, including prostate and pancreas.    Encounter for screening for COVID-19  -     Asymptomatic COVID-19 Virus (Coronavirus) by PCR; Future  -     Asymptomatic COVID-19 Virus (Coronavirus) by PCR Nasopharyngeal    Age-related osteoporosis without current pathological fracture  Will proceed with reclast #2 this year.  Consider repeat dexa next year.  -     Asymptomatic COVID-19 Virus (Coronavirus) by PCR; Future  -     Asymptomatic COVID-19 Virus (Coronavirus) by PCR Nasopharyngeal    Need for COVID-19 vaccine  -     COVID-19,PF,PFIZER (12+ Yrs GRAY LABEL)          Angeli Robertson MD  Internal Medicine      Medicare Wellness Health Risk Assessment Questionnaire  What is your marital status?      Who lives in your household?  Myself and my wife    Does your home have loose rugs in the hallway:     Yes     Does your home have grab bars in the bathroom:    No    Does your home have handrails on the stairs?  Yes     Does your home have poorly lit areas?    No    How many times have you fallen in the last year?  0    How many times have you been to the Emergency Room in the last year?  0    How many times have you been hospitalized in the last year?  0    Do you feel threatened or controlled by a partner, ex-partner or anyone in your life?   No    Has anyone hurt you physically, for example by pushing, hitting, slapping or kicking you   or forcing you to have sex?   No    Are you sexually active?        If yes, with men, women, or both?       If yes, do you more than one current partner?       If yes, are you using  condoms?        Have you had any sexually transmitted infections in the last year?   No    Do you have any sexual concerns?        Do you need help with the phone, transportation, shopping, preparing meals, housework, laundry, medications or managing money?   No    Have you noticed any hearing difficulties?   No    Do you wear hearing aids?   No    Have you seen a hearing professional such as an audiologist in the last 1 year?   No    Do you have vision difficulty?    No    Do you wear glasses or contacts?   Yes     Have you seen an eye doctor in the last 1 year?   Yes       How many servings of fruits and vegetables do you eat a day?  1.5    How often do you exercise in a week?  infrequent in winter    What kind of exercise do you do?  30-40 walking    Do you wear a seatbelt when driving or riding in a vehicle?     Yes     Do you use tobacco?    No    Do you use e-cigarettes?    No    Do you use any other drugs?   No         Do you drink alcohol?   Yes     If you drink alcohol, how many drinks per week?  7    Over the last 2 weeks, how often have you been bothered by feeling down, depressed, or hopeless?     Not at all (0)     Over the last 2 weeks, how often have you had little interest or pleasure in doing things?     Not at all (0)     Have you completed an Advance Directives document?  No    If yes, have you given a copy to the clinic?       Do you need information on Advance Directives?   No

## 2022-04-19 RX ORDER — EPINEPHRINE 1 MG/ML
0.3 INJECTION, SOLUTION INTRAMUSCULAR; SUBCUTANEOUS EVERY 5 MIN PRN
Status: CANCELLED | OUTPATIENT
Start: 2022-04-19

## 2022-04-19 RX ORDER — ALBUTEROL SULFATE 0.83 MG/ML
2.5 SOLUTION RESPIRATORY (INHALATION)
Status: CANCELLED | OUTPATIENT
Start: 2022-04-19

## 2022-04-19 RX ORDER — MEPERIDINE HYDROCHLORIDE 25 MG/ML
25 INJECTION INTRAMUSCULAR; INTRAVENOUS; SUBCUTANEOUS EVERY 30 MIN PRN
Status: CANCELLED | OUTPATIENT
Start: 2022-04-19

## 2022-04-19 RX ORDER — ALBUTEROL SULFATE 90 UG/1
1-2 AEROSOL, METERED RESPIRATORY (INHALATION)
Status: CANCELLED
Start: 2022-04-19

## 2022-04-19 RX ORDER — ZOLEDRONIC ACID 5 MG/100ML
5 INJECTION, SOLUTION INTRAVENOUS ONCE
Status: CANCELLED
Start: 2022-04-19 | End: 2022-04-19

## 2022-04-19 RX ORDER — HEPARIN SODIUM,PORCINE 10 UNIT/ML
5 VIAL (ML) INTRAVENOUS
Status: CANCELLED | OUTPATIENT
Start: 2022-04-19

## 2022-04-19 RX ORDER — DIPHENHYDRAMINE HYDROCHLORIDE 50 MG/ML
50 INJECTION INTRAMUSCULAR; INTRAVENOUS
Status: CANCELLED
Start: 2022-04-19

## 2022-04-19 RX ORDER — HEPARIN SODIUM (PORCINE) LOCK FLUSH IV SOLN 100 UNIT/ML 100 UNIT/ML
5 SOLUTION INTRAVENOUS
Status: CANCELLED | OUTPATIENT
Start: 2022-04-19

## 2022-04-19 RX ORDER — METHYLPREDNISOLONE SODIUM SUCCINATE 125 MG/2ML
125 INJECTION, POWDER, LYOPHILIZED, FOR SOLUTION INTRAMUSCULAR; INTRAVENOUS
Status: CANCELLED
Start: 2022-04-19

## 2022-04-19 RX ORDER — NALOXONE HYDROCHLORIDE 0.4 MG/ML
0.2 INJECTION, SOLUTION INTRAMUSCULAR; INTRAVENOUS; SUBCUTANEOUS
Status: CANCELLED | OUTPATIENT
Start: 2022-04-19

## 2022-04-20 ENCOUNTER — INFUSION THERAPY VISIT (OUTPATIENT)
Dept: INFUSION THERAPY | Facility: CLINIC | Age: 87
End: 2022-04-20
Attending: INTERNAL MEDICINE
Payer: MEDICARE

## 2022-04-20 ENCOUNTER — APPOINTMENT (OUTPATIENT)
Dept: LAB | Facility: CLINIC | Age: 87
End: 2022-04-20
Attending: INTERNAL MEDICINE
Payer: MEDICARE

## 2022-04-20 VITALS
RESPIRATION RATE: 16 BRPM | HEART RATE: 91 BPM | SYSTOLIC BLOOD PRESSURE: 134 MMHG | WEIGHT: 167.3 LBS | BODY MASS INDEX: 24.01 KG/M2 | DIASTOLIC BLOOD PRESSURE: 63 MMHG | OXYGEN SATURATION: 99 % | TEMPERATURE: 97.9 F

## 2022-04-20 DIAGNOSIS — M81.0 AGE-RELATED OSTEOPOROSIS WITHOUT CURRENT PATHOLOGICAL FRACTURE: Primary | ICD-10-CM

## 2022-04-20 LAB
CALCIUM SERPL-MCNC: 8.6 MG/DL (ref 8.5–10.1)
CREAT SERPL-MCNC: 1.04 MG/DL (ref 0.66–1.25)
GFR SERPL CREATININE-BSD FRML MDRD: 70 ML/MIN/1.73M2

## 2022-04-20 PROCEDURE — 82310 ASSAY OF CALCIUM: CPT | Performed by: INTERNAL MEDICINE

## 2022-04-20 PROCEDURE — 96365 THER/PROPH/DIAG IV INF INIT: CPT

## 2022-04-20 PROCEDURE — 82565 ASSAY OF CREATININE: CPT | Performed by: INTERNAL MEDICINE

## 2022-04-20 PROCEDURE — 36415 COLL VENOUS BLD VENIPUNCTURE: CPT | Performed by: INTERNAL MEDICINE

## 2022-04-20 PROCEDURE — 250N000011 HC RX IP 250 OP 636: Performed by: INTERNAL MEDICINE

## 2022-04-20 RX ORDER — ZOLEDRONIC ACID 5 MG/100ML
5 INJECTION, SOLUTION INTRAVENOUS ONCE
Status: COMPLETED | OUTPATIENT
Start: 2022-04-20 | End: 2022-04-20

## 2022-04-20 RX ADMIN — ZOLEDRONIC ACID 5 MG: 0.05 INJECTION, SOLUTION INTRAVENOUS at 13:48

## 2022-04-20 ASSESSMENT — PAIN SCALES - GENERAL: PAINLEVEL: NO PAIN (0)

## 2022-04-20 NOTE — PATIENT INSTRUCTIONS
Dear Tom Saini    Thank you for choosing [facility] ([facility]) for your Reclast infusion.  The following information is a summary of our appointment as well as important reminders.        We look forward in seeing you on your next appointment here at [facility] ([facility]).  Please don t hesitate to call us at [phone] to reschedule any of your appointments or to speak with one of the [facility] registered nurses.  It was a pleasure taking care of you today.    Sincerely,    [facility]  [facility]  [address] [place] [address]  [id number]  Phone:  [phone]

## 2022-04-20 NOTE — PROGRESS NOTES
Nursing Note  Tom Saini presents [facility] for:   Chief Complaint   Patient presents with     Blood Draw     Labs drawn with piv start by rn.  VS taken.     During today's [facility] appointment, orders from Dr. Lackey were completed.  Frequency: once/yearly    Progress note:  Patient identification verified by name and date of birth.  Assessment completed.  Vitals recorded in Doc Flowsheets.  Patient was provided with education regarding medication/procedure and possible side effects.  Patient verbalized understanding.     present during visit today: Not Applicable.    Treatment Conditions: Patient's Creatinine Clearance and calcium level are within paramaters to administer medication per orders.    Premedications: were not ordered.    Drug Waste Record: No    Infusion length and rate:  infusion given over approximately 20 minutes    Labs: drawn today, prior to appointment in second floor lab.    Vascular access: peripheral IV placed today prior to appointment in second floor lab    Is the next appt scheduled? NA  Asymptomatic COVID test completed? no    Post Infusion Assessment:  Patient tolerated infusion without incident.     Discharge Plan:   Follow up plan of care with: ongoing infusions at [facility]., ordering provider as scheduled. and after visit summary given to patient  Discharge instructions were reviewed with patient.  Patient/representative verbalized understanding of discharge instructions and all questions answered.  Patient discharged from [facility] in stable condition.    Ebonie Lau RN       Administrations This Visit     zoledronic Acid (RECLAST) infusion 5 mg     Admin Date  04/20/2022 Action  New Bag Dose  5 mg Rate  400 mL/hr Route  Intravenous Administered By  Ebonie Lau RN                /64 (BP Location: Right arm, Patient  Position: Sitting, Cuff Size: Adult Regular)   Pulse 78   Temp 97.9  F (36.6  C) (Oral)   Resp 16   Wt 75.9 kg (167 lb 4.8 oz)   SpO2 99%   BMI 24.01 kg/m

## 2022-04-20 NOTE — LETTER
4/20/2022         RE: Tom Saini  [address] [facility] [id number]        Dear Colleague,    Thank you for referring your patient, Tom Saini, to the [facility]. Please see a copy of my visit note below.    Nursing Note  Tom Saini [facility] for:   Chief Complaint   Patient presents with     Blood Draw     Labs drawn with piv start by rn.  VS taken.     During [facility]'s [facility] appointment, orders from Dr. Lackey were completed.  Frequency: once/yearly    Progress note:  Patient identification verified by name and date of birth.  Assessment completed.  Vitals recorded in Doc Flowsheets.  Patient was provided with education regarding medication/procedure and possible side effects.  Patient verbalized understanding.     present during visit today: Not Applicable.    Treatment Conditions: Patient's Creatinine Clearance and calcium level are within paramaters to administer medication per orders.    Premedications: were not ordered.    Drug Waste Record: No    Infusion length and rate:  infusion given over approximately 20 minutes    Labs: drawn today, prior to appointment in second floor lab.    Vascular access: peripheral IV placed today prior to appointment in second floor lab    Is the next appt scheduled? NA  Asymptomatic COVID test completed? no    Post Infusion Assessment:  Patient tolerated infusion without incident.     Discharge Plan:   Follow up plan of care with: ongoing infusions at [facility]., ordering provider as scheduled. and after visit summary given to patient  Discharge instructions were reviewed with patient.  Patient/representative verbalized understanding of discharge instructions and all questions answered.  Patient discharged from [facility] in stable condition.    Ebonie Campos,  RN       Administrations This Visit     zoledronic Acid (RECLAST) infusion 5 mg     Admin Date  04/20/2022 Action  New Bag Dose  5 mg Rate  400 mL/hr Route  Intravenous Administered By  Ebonie Lau RN                /64 (BP Location: Right arm, Patient Position: Sitting, Cuff Size: Adult Regular)   Pulse 78   Temp 97.9  F (36.6  C) (Oral)   Resp 16   Wt 75.9 kg (167 lb 4.8 oz)   SpO2 99%   BMI 24.01 kg/m          Again, thank you for allowing me to participate in the care of your patient.        Sincerely,        [facility]

## 2022-05-12 ENCOUNTER — OFFICE VISIT (OUTPATIENT)
Dept: OPHTHALMOLOGY | Facility: CLINIC | Age: 87
End: 2022-05-12
Attending: OPHTHALMOLOGY
Payer: MEDICARE

## 2022-05-12 DIAGNOSIS — Z96.1 PSEUDOPHAKIA OF BOTH EYES: ICD-10-CM

## 2022-05-12 DIAGNOSIS — H52.7 REFRACTIVE ERROR: ICD-10-CM

## 2022-05-12 DIAGNOSIS — H35.3221 EXUDATIVE AGE-RELATED MACULAR DEGENERATION OF LEFT EYE WITH ACTIVE CHOROIDAL NEOVASCULARIZATION (H): Primary | ICD-10-CM

## 2022-05-12 PROCEDURE — 92134 CPTRZ OPH DX IMG PST SGM RTA: CPT | Performed by: OPHTHALMOLOGY

## 2022-05-12 PROCEDURE — 250N000011 HC RX IP 250 OP 636: Performed by: OPHTHALMOLOGY

## 2022-05-12 PROCEDURE — 67028 INJECTION EYE DRUG: CPT | Mod: LT | Performed by: OPHTHALMOLOGY

## 2022-05-12 PROCEDURE — G0463 HOSPITAL OUTPT CLINIC VISIT: HCPCS | Mod: 25

## 2022-05-12 RX ADMIN — Medication 1.25 MG: at 14:21

## 2022-05-12 ASSESSMENT — EXTERNAL EXAM - RIGHT EYE: OD_EXAM: NORMAL

## 2022-05-12 ASSESSMENT — CONF VISUAL FIELD
OD_NORMAL: 1
METHOD: COUNTING FINGERS
OS_SUPERIOR_NASAL_RESTRICTION: 3
OS_INFERIOR_TEMPORAL_RESTRICTION: 3

## 2022-05-12 ASSESSMENT — REFRACTION_WEARINGRX
OS_ADD: +2.50
OD_AXIS: 002
OS_SPHERE: -1.50
OS_AXIS: 178
OD_SPHERE: -3.50
OD_CYLINDER: +2.25
OD_ADD: +2.50
SPECS_TYPE: BIFOCAL
OS_CYLINDER: +2.75

## 2022-05-12 ASSESSMENT — VISUAL ACUITY
OD_CC: 20/40
METHOD: SNELLEN - LINEAR
CORRECTION_TYPE: GLASSES
OD_CC+: -2+2
OS_CC+: +2
OS_PH_CC: 20/40
OS_CC: 20/50

## 2022-05-12 ASSESSMENT — CUP TO DISC RATIO
OS_RATIO: 0.2
OD_RATIO: 0.2

## 2022-05-12 ASSESSMENT — TONOMETRY
OD_IOP_MMHG: 13
OS_IOP_MMHG: 12
IOP_METHOD: ICARE

## 2022-05-12 ASSESSMENT — EXTERNAL EXAM - LEFT EYE: OS_EXAM: ET

## 2022-05-12 ASSESSMENT — SLIT LAMP EXAM - LIDS
COMMENTS: PTOSIS OS > OD
COMMENTS: PTOSIS OS > OD

## 2022-05-12 NOTE — NURSING NOTE
Chief Complaints and History of Present Illnesses   Patient presents with     Follow Up     Exudative age-related macular degeneration of left eye with active choroidal neovascularization      Chief Complaint(s) and History of Present Illness(es)     Follow Up     Laterality: left eye    Onset: gradual    Onset: years ago    Course: stable    Associated symptoms: floaters.  Negative for glare, haloes, dryness, eye pain, tearing, photophobia and flashes    Pain scale: 0/10    Comments: Exudative age-related macular degeneration of left eye with active choroidal neovascularization               Comments     Pt states vision is stable since last visit.  No ocular medications.    BRI Mejia May 12, 2022 1:02 PM

## 2022-05-12 NOTE — PROGRESS NOTES
Chief Complaint(s) and History of Present Illness(es)     Follow Up     Laterality: left eye    Onset: gradual    Onset: years ago    Course: stable    Associated symptoms: floaters.  Negative for glare, haloes, dryness, eye   pain, tearing, photophobia and flashes    Pain scale: 0/10    Comments: Exudative age-related macular degeneration of left eye with   active choroidal neovascularization            Comments     Pt states vision is stable since last visit.  No ocular medications.    BRI Mejia May 12, 2022 1:02 PM     Review of systems for the eyes was negative other than the pertinent positives/negatives listed in the HPI.        Assessment & Plan      Tom Saini is a 87 year old male with the following diagnoses:   1. Exudative age-related macular degeneration of left eye with active choroidal neovascularization (H)    2. Pseudophakia of both eyes    3. Refractive error         Slight decrease in near vision since last exam  Using updated glasses from [employer]  OCT mac remains essentially stable (tr Intraretinal fluid now left eye)  Planning continued Q12 week interval  Return precautions reviewed     12 week intervals left eye today    RBA to intravitreal avastin discussed, consent obtained, will proceed today.      Patient disposition:   Return for Vision, Pressure, Dilation, OCT Macula, probable avastin OS.   Low vision refraction as needed          Edgar Reyes, PGY4  Ophthalmology Resident    Attending Physician Attestation:  Complete documentation of historical and exam elements from today's encounter can be found in the full encounter summary report (not reduplicated in this progress note).  I personally obtained the chief complaint(s) and history of present illness.  I confirmed and edited as necessary the review of systems, past medical/surgical history, family history, social history, and examination findings as documented by others; and I examined the patient myself.  I personally  reviewed the relevant tests, images, and reports as documented above.  I formulated and edited as necessary the assessment and plan and discussed the findings and management plan with the patient and family. Attending Physician Image/Tesing Attestation: I personally reviewed the ophthalmic test(s) associated with this encounter, agree with the interpretation(s) as documented by the resident/fellow, and have edited the corresponding report(s) as necessary.  Attending Physician Procedure Attestation: I was present for the entire procedure. - Brigido Laura MD

## 2022-05-19 NOTE — PROGRESS NOTES
[facility] Dermatology Note  Encounter Date: May 20, 2022  Office Visit    Dermatology Problem List:  # SKs  - Large symptomatic SK, R Scientology - cryo 11/07/2019, repeat on 12/03/2019.   # AKs s/p cryo  ____________________________________________    Assessment & Plan:    # SKs.  - Benign, no treatment required    # Xerosis cutis.  - Advised daily moisturization with bland OTC emollient.     Procedures Performed:   None    Follow-up: 1 year, sooner if concerns.     Staff and Scribe:     Scribe Disclosure:  I, Digna Montenegro, am serving as a scribe to document services personally performed by Pauline Freed MD based on data collection and the provider's statements to me.     Provider Disclosure:   The documentation recorded by the scribe accurately reflects the services I personally performed and the decisions made by me.    Pauline Freed MD    Department of Dermatology  [facility]: Phone: [phone], Fax: [phone]  5/20/2022     ____________________________________________    CC: Skin Check (Juaquin is here for a skin check and has no spots of concern.)    HPI:  Mr. Tom Saini is a(n) 87 year old male who presents today as a return patient for a skin check. The patient was last seen in dermatology by myself on 03/24/2021 at which point patient had a benign skin exam.    Today, the patient is here for skin check, no concerns.  Skin itchy and scaly. Hard to get on moisturizer.  No other painful, bleeding, non-healing, or otherwise symptomatic lesions.     Unfortunately his daughter was killed in a hit and run this March.    Patient is otherwise feeling well, without additional skin concerns.    Labs Reviewed:  N/A    Physical Exam:  Vitals: There were no vitals taken for this visit.  SKIN: Total skin excluding the undergarment areas was performed. The exam included the head/face, neck, both  arms, chest, back, abdomen, both legs, digits and/or nails.   - waxy stuck on papules and plaques on trunk and extremities.  - skin xerotic.  - No other lesions of concern on areas examined.     Medications:  Current Outpatient Medications   Medication     aspirin 81 MG tablet     atorvastatin (LIPITOR) 10 MG tablet     calcium carbonate (OS-DAVIDE) 1500 (600 Ca) MG tablet     Cholecalciferol (VITAMIN D3) 1000 units CAPS     multivitamin (OCUVITE) TABS     Current Facility-Administered Medications   Medication     bevacizumab (AVASTIN) intravitreal inj 1.25 mg      Past Medical History:   Patient Active Problem List   Diagnosis     Eczema     Pure hypercholesterolemia     Male erectile disorder     Dermatofibroma     Seborrheic keratoses, inflamed     Age-related osteoporosis without current pathological fracture     Past Medical History:   Diagnosis Date     Aortic insufficiency      Erectile dysfunction      History of deep venous thrombosis 05/2019    distal DVT with extension, completed 6 months of AC (failed eliquis)     History of pelvic fracture      Hyperlipidemia      Low back pain     disc disease s/p laminectomy in past     Macular degeneration     on Avastin, R eye     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

## 2022-05-20 ENCOUNTER — OFFICE VISIT (OUTPATIENT)
Dept: DERMATOLOGY | Facility: CLINIC | Age: 87
End: 2022-05-20
Payer: MEDICARE

## 2022-05-20 DIAGNOSIS — L82.1 SEBORRHEIC KERATOSIS: Primary | ICD-10-CM

## 2022-05-20 DIAGNOSIS — L85.3 XEROSIS CUTIS: ICD-10-CM

## 2022-05-20 PROCEDURE — 99213 OFFICE O/P EST LOW 20 MIN: CPT | Performed by: DERMATOLOGY

## 2022-05-20 ASSESSMENT — PAIN SCALES - GENERAL: PAINLEVEL: NO PAIN (0)

## 2022-05-20 NOTE — LETTER
5/20/2022       RE: Tom Saini  [address] [facility] [address]     Dear Colleague,    Thank you for referring your patient, Tom Saini, to the [facility] [place] at [facility]. Please see a copy of my visit note below.    [facility] Dermatology Note  Encounter Date: May 20, 2022  Office Visit    Dermatology Problem List:  # SKs  - Large symptomatic SK, R Taoism - cryo 11/07/2019, repeat on 12/03/2019.   # AKs s/p cryo  ____________________________________________    Assessment & Plan:    # SKs.  - Benign, no treatment required    # Xerosis cutis.  - Advised daily moisturization with bland OTC emollient.     Procedures Performed:   None    Follow-up: 1 year, sooner if concerns.     Staff and Scribe:     Scribe Disclosure:  I, Digna Montenegro, am serving as a scribe to document services personally performed by Pauline Freed MD based on data collection and the provider's statements to me.     Provider Disclosure:   The documentation recorded by the scribe accurately reflects the services I personally performed and the decisions made by me.    Pauline Freed MD    Department of Dermatology  [facility]: Phone: [phone], Fax: [phone]  5/20/2022     ____________________________________________    CC: Skin Check (Juaquin is here for a skin check and has no spots of concern.)    HPI:  Mr. Tom Saini is a(n) 87 year old male who presents today as a return patient for a skin check. The patient was last seen in dermatology by myself on 03/24/2021 at which point patient had a benign skin exam.    Today, the patient is here for skin check, no concerns.  Skin itchy and scaly. Hard to get on moisturizer.  No other painful, bleeding, non-healing, or otherwise symptomatic lesions.     Unfortunately his daughter  was killed in a hit and run this March.    Patient is otherwise feeling well, without additional skin concerns.    Labs Reviewed:  N/A    Physical Exam:  Vitals: There were no vitals taken for this visit.  SKIN: Total skin excluding the undergarment areas was performed. The exam included the head/face, neck, both arms, chest, back, abdomen, both legs, digits and/or nails.   - waxy stuck on papules and plaques on trunk and extremities.  - skin xerotic.  - No other lesions of concern on areas examined.     Medications:  Current Outpatient Medications   Medication     aspirin 81 MG tablet     atorvastatin (LIPITOR) 10 MG tablet     calcium carbonate (OS-DAVIDE) 1500 (600 Ca) MG tablet     Cholecalciferol (VITAMIN D3) 1000 units CAPS     multivitamin (OCUVITE) TABS     Current Facility-Administered Medications   Medication     bevacizumab (AVASTIN) intravitreal inj 1.25 mg      Past Medical History:   Patient Active Problem List   Diagnosis     Eczema     Pure hypercholesterolemia     Male erectile disorder     Dermatofibroma     Seborrheic keratoses, inflamed     Age-related osteoporosis without current pathological fracture     Past Medical History:   Diagnosis Date     Aortic insufficiency      Erectile dysfunction      History of deep venous thrombosis 05/2019    distal DVT with extension, completed 6 months of AC (failed eliquis)     History of pelvic fracture      Hyperlipidemia      Low back pain     disc disease s/p laminectomy in past     Macular degeneration     on Avastin, R eye     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

## 2022-05-20 NOTE — PATIENT INSTRUCTIONS
Dry Skin    What is dry skin?  Common skin problem  Can be worse during the winter   Affects all ages  Occurs in people with or without other skin problems    What does it look like?  Fine lines in the skin become more visible   Rough feeling skin   Flaky skin  Most common on the arms and legs  Skin can become cracked, especially on the hands and feet    What are some problems caused by dry skin?   Itching  Rubbing or scratching can cause thickened, rough skin patches  Cracks in skin can be painful  Red, itchy, scaly skin (called eczema) can occur  Yellow crusting or pus could be signs of an infection    What causes dry skin?  A lack of water in the top layer of the skin  Too much soapy water,  hot water, or harsh chemicals  Aging and sun damage    How do I treat dry skin?  Shower or bathe daily for under ten minutes with lukewarm water and mild soap.  Pat yourself dry with a towel gently and leave your skin slightly damp.  Use moisturizing cream or ointment right away.  Avoid lotions.    What kind of mild soap should I be using?  Camay , Dove , Tone , Neutrogena , Purpose , or Oil of Olay   A non-detergent cleanser, like Cetaphil , can be used.    What should I stay away from?  Scented soaps   Bath oils    What moisturizers should I be using?  Cetaphil Cream,CeraVe Cream, Vanicream, Aquaphilic, Eucerin, Aquaphor, or Vaseline   Always apply after showering or bathing.  Reapply throughout the day, if possible.  If dry skin affects your hands, always reapply after handwashing.    What else should I know?  Using a humidifier during winter months may help.  If dry skin gets worse or if eczema develops, a steroid cream may be needed.       Patient Education     Checking for Skin Cancer  You can find cancer early by checking your skin each month. There are 3 kinds of skin cancer. They are melanoma, basal cell carcinoma, and squamous cell carcinoma. Doing monthly skin checks is the best way to find new marks or skin  changes. Follow the instructions below for checking your skin.   The ABCDEs of checking moles for melanoma   Check your moles or growths for signs of melanoma using ABCDE:   Asymmetry: the sides of the mole or growth don t match  Border: the edges are ragged, notched, or blurred  Color: the color within the mole or growth varies  Diameter: the mole or growth is larger than 6 mm (size of a pencil eraser)  Evolving: the size, shape, or color of the mole or growth is changing (evolving is not shown in the images below)    Checking for other types of skin cancer  Basal cell carcinoma or squamous cell carcinoma have symptoms such as:     A spot or mole that looks different from all other marks on your skin  Changes in how an area feels, such as itching, tenderness, or pain  Changes in the skin's surface, such as oozing, bleeding, or scaliness  A sore that does not heal  New swelling or redness beyond the border of a mole    Who s at risk?  Anyone can get skin cancer. But you are at greater risk if you have:   Fair skin, light-colored hair, or light-colored eyes  Many moles or abnormal moles on your skin  A history of sunburns from sunlight or tanning beds  A family history of skin cancer  A history of exposure to radiation or chemicals  A weakened immune system  If you have had skin cancer in the past, you are at risk for recurring skin cancer.   How to check your skin  Do your monthly skin checkups in front of a full-length mirror. Check all parts of your body, including your:   Head (ears, face, neck, and scalp)  Torso (front, back, and sides)  Arms (tops, undersides, upper, and lower armpits)  Hands (palms, backs, and fingers, including under the nails)  Buttocks and genitals  Legs (front, back, and sides)  Feet (tops, soles, toes, including under the nails, and between toes)  If you have a lot of moles, take digital photos of them each month. Make sure to take photos both up close and from a distance. These can help  you see if any moles change over time.   Most skin changes are not cancer. But if you see any changes in your skin, call your doctor right away. Only he or she can diagnose a problem. If you have skin cancer, seeing your doctor can be the first step toward getting the treatment that could save your life.   [employer] last reviewed this educational content on 4/1/2019 2000-2020 [employer]. [address] [place] [address], [place] [address]. All rights reserved. This information is not intended as a substitute for professional medical care. Always follow your healthcare professional's instructions.       When should I call my doctor?  If you are worsening or not improving, please, contact us or seek urgent care as noted below.     Who should I call with questions (adults)?  [facility] (adult and pediatric): [phone]  [facility] (adult): [phone]  For urgent needs outside of business hours call the [facility] at [phone] and ask for the dermatology resident on call to be paged  If this is a medical emergency and you are unable to reach an ER, Call [phone]    Who should I call with questions (pediatric)?  [facility]- Pediatric Dermatology  Dr. Toya Ng, Dr. Reanna Good, Dr. Sandie Draper, PALLAVI Rodrigues, Dr. Nilda Arellano, Dr. Imelda Gonzalez & Dr. Mateo Anna  Non-urgent nurse triage line; [phone]- Annette and Kristie BUNCH Care Coordinators   Karuna (/Complex ) [phone]    If you need a prescription refill, please contact your pharmacy. Refills are approved or denied by our Physicians during normal business hours, Monday through Fridays  Per office policy, refills will not be granted if you have not been seen within the past year (or sooner depending on your child's condition)    Scheduling Information:  Pediatric Appointment Scheduling and Call Center ([phone])  [phone]  Radiology Scheduling- [phone]  Sedation Unit Scheduling- [phone]  [facility] Scheduling- General [phone]; Pediatric Dermatology [phone]  Main  Services: [phone]  Uzbek: [phone]  Bruneian: [phone]  Hmong/Anthony/Benjy: [phone]  Preadmission Nursing Department Fax Number: [phone] (Fax all pre-operative paperwork to this number)    For urgent matters arising during evenings, weekends, or holidays that cannot wait for normal business hours please call [phone] and ask for the dermatology resident on call to be paged.

## 2022-05-20 NOTE — NURSING NOTE
Dermatology Rooming Note    Tom Saini's goals for this visit include:   Chief Complaint   Patient presents with     Skin Check     Juaquin is here for a skin check and has no spots of concern.     Emmanuel Brito, EMT

## 2022-07-08 DIAGNOSIS — E78.5 HYPERLIPIDEMIA LDL GOAL <100: ICD-10-CM

## 2022-07-08 NOTE — TELEPHONE ENCOUNTER
Patient called, following up on status of rx. Per patient, he is completely out. Please reach out to patient for updates.

## 2022-07-09 DIAGNOSIS — E78.5 HYPERLIPIDEMIA LDL GOAL <100: ICD-10-CM

## 2022-07-11 RX ORDER — ATORVASTATIN CALCIUM 10 MG/1
10 TABLET, FILM COATED ORAL DAILY
Qty: 90 TABLET | Refills: 3 | Status: SHIPPED | OUTPATIENT
Start: 2022-07-11 | End: 2022-10-07

## 2022-07-11 RX ORDER — ATORVASTATIN CALCIUM 10 MG/1
TABLET, FILM COATED ORAL
Qty: 90 TABLET | Refills: 3 | OUTPATIENT
Start: 2022-07-11

## 2022-07-11 NOTE — TELEPHONE ENCOUNTER
atorvastatin (LIPITOR) 10 MG tablet  90 Tabs, 3 refills sent to pharm   Last office visit: 4/7/2022    Nabila Khan RN  Central Triage Red Flags/Med Refills

## 2022-08-11 ENCOUNTER — OFFICE VISIT (OUTPATIENT)
Dept: OPHTHALMOLOGY | Facility: CLINIC | Age: 87
End: 2022-08-11
Attending: OPHTHALMOLOGY
Payer: MEDICARE

## 2022-08-11 DIAGNOSIS — H18.513 FUCHS' CORNEAL DYSTROPHY OF BOTH EYES: ICD-10-CM

## 2022-08-11 DIAGNOSIS — H35.3221 EXUDATIVE AGE-RELATED MACULAR DEGENERATION OF LEFT EYE WITH ACTIVE CHOROIDAL NEOVASCULARIZATION (H): Primary | ICD-10-CM

## 2022-08-11 PROCEDURE — 92134 CPTRZ OPH DX IMG PST SGM RTA: CPT | Performed by: OPHTHALMOLOGY

## 2022-08-11 PROCEDURE — 250N000011 HC RX IP 250 OP 636: Performed by: OPHTHALMOLOGY

## 2022-08-11 PROCEDURE — 67028 INJECTION EYE DRUG: CPT | Mod: LT | Performed by: OPHTHALMOLOGY

## 2022-08-11 PROCEDURE — G0463 HOSPITAL OUTPT CLINIC VISIT: HCPCS | Mod: 25

## 2022-08-11 RX ORDER — SILICONE ADHESIVE 1.5" X 3"
1-2 SHEET (EA) TOPICAL AT BEDTIME
Qty: 15 ML | Refills: 11 | Status: ON HOLD | OUTPATIENT
Start: 2022-08-11 | End: 2023-07-27

## 2022-08-11 RX ADMIN — Medication 1.25 MG: at 15:02

## 2022-08-11 ASSESSMENT — CUP TO DISC RATIO
OD_RATIO: 0.2
OS_RATIO: 0.2

## 2022-08-11 ASSESSMENT — REFRACTION_WEARINGRX
OD_AXIS: 002
OS_CYLINDER: +2.75
OD_ADD: +2.50
OD_CYLINDER: +2.25
OS_AXIS: 178
SPECS_TYPE: BIFOCAL
OD_SPHERE: -3.50
OS_SPHERE: -1.50
OS_ADD: +2.50

## 2022-08-11 ASSESSMENT — VISUAL ACUITY
METHOD: SNELLEN - LINEAR
OS_CC: 20/30
OD_CC: 20/40
CORRECTION_TYPE: GLASSES
OD_PH_CC: 20/30

## 2022-08-11 ASSESSMENT — SLIT LAMP EXAM - LIDS
COMMENTS: PTOSIS OS > OD
COMMENTS: PTOSIS OS > OD

## 2022-08-11 ASSESSMENT — CONF VISUAL FIELD
METHOD: COUNTING FINGERS
OS_NORMAL: 1
OD_NORMAL: 1

## 2022-08-11 ASSESSMENT — EXTERNAL EXAM - RIGHT EYE: OD_EXAM: NORMAL

## 2022-08-11 ASSESSMENT — EXTERNAL EXAM - LEFT EYE: OS_EXAM: ET

## 2022-08-11 ASSESSMENT — TONOMETRY
OS_IOP_MMHG: 11
OD_IOP_MMHG: 15
IOP_METHOD: TONOPEN

## 2022-08-11 ASSESSMENT — PACHYMETRY
OD_CT(UM): 610
OS_CT(UM): 600

## 2022-08-11 NOTE — PROGRESS NOTES
Chief Complaint(s) and History of Present Illness(es)     Macular Degeneration Follow Up     Laterality: both eyes    Onset: months ago    Quality: Feels the near has decreased      Associated symptoms: Negative for dryness, redness, tearing, photophobia,   flashes and floaters    Treatments tried: no treatments    Pain scale: 0/10       Comments     Noemi Epps COT 1:32 PM August 11, 2022       Patient reports that he had avastin left eye - now at 13 week after Avastin left eye.   Last right eye injection was 2019.   He reports that his right eye is lazy and has never had fusion in his eyes (the world is 2D), he reports that the left eye is overall declining in nature gradually. He reports that it is harder to read.     OCT Macula:  RE: Drusen present, inactive CNVM, no fluid   LE: Drusen present, CNVM - with trace IRF present     Assessment & Plan      Tom Saini is a 87 year old male with the following diagnoses:   1. Exudative age-related macular degeneration of left eye with active choroidal neovascularization (H)    2. Fuchs' corneal dystrophy of both eyes         - CNVM with ongoing IRF present today left eye at 13 week interval since last injection left eye  - Plan for Avastin #14 left eye 08/11/22 - RBA to intravitreal avastin discussed, consent obtained, will proceed today.    - Planning continued Q12 week interval  - Return precautions reviewed     # Guttae, each eye   - Concern for Fuch's endothelial dystrophy each eye   - Guttae present each eye with d-folds present each eye   - Possible mild blur  - Pachy 610 right eye and 600 left eye   - Start Pantera gtt's at bedtime each eye for now    Low vision refraction as needed        Patient disposition:   Return in about 3 months (around 11/11/2022) for VT only, pachy, OCT mac.         Julio Reddy MD - PGY4  Department of Ophthalmology  Pager: [phone]      Attending Physician Attestation:  Complete documentation of historical and exam elements  from today's encounter can be found in the full encounter summary report (not reduplicated in this progress note).  I personally obtained the chief complaint(s) and history of present illness.  I confirmed and edited as necessary the review of systems, past medical/surgical history, family history, social history, and examination findings as documented by others; and I examined the patient myself.  I personally reviewed the relevant tests, images, and reports as documented above.  I formulated and edited as necessary the assessment and plan and discussed the findings and management plan with the patient and family. Attending Physician Image/Tesing Attestation: I personally reviewed the ophthalmic test(s) associated with this encounter, agree with the interpretation(s) as documented by the resident/fellow, and have edited the corresponding report(s) as necessary.  Attending Physician Procedure Attestation: I was present for the entire procedure. - Brigido Laura MD

## 2022-08-11 NOTE — NURSING NOTE
Chief Complaints and History of Present Illnesses   Patient presents with     Macular Degeneration Follow Up     Chief Complaint(s) and History of Present Illness(es)     Macular Degeneration Follow Up     Laterality: both eyes    Onset: months ago    Quality: Feels the near has decreased      Associated symptoms: Negative for dryness, redness, tearing, photophobia, flashes and floaters    Treatments tried: no treatments    Pain scale: 0/10              Comments     Noemi Epps COT 1:32 PM August 11, 2022

## 2022-10-07 ENCOUNTER — OFFICE VISIT (OUTPATIENT)
Dept: INTERNAL MEDICINE | Facility: CLINIC | Age: 87
End: 2022-10-07
Payer: MEDICARE

## 2022-10-07 VITALS
HEIGHT: 70 IN | BODY MASS INDEX: 24.88 KG/M2 | OXYGEN SATURATION: 97 % | HEART RATE: 82 BPM | DIASTOLIC BLOOD PRESSURE: 67 MMHG | SYSTOLIC BLOOD PRESSURE: 116 MMHG | WEIGHT: 173.8 LBS

## 2022-10-07 DIAGNOSIS — M81.0 AGE-RELATED OSTEOPOROSIS WITHOUT CURRENT PATHOLOGICAL FRACTURE: Primary | ICD-10-CM

## 2022-10-07 PROCEDURE — 99214 OFFICE O/P EST MOD 30 MIN: CPT | Performed by: INTERNAL MEDICINE

## 2022-10-07 NOTE — PROGRESS NOTES
History of Present Illness:  Mr. 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  Went to [place] for [employer]  No major health concerns or changes  Had gained weight back since losses in 2021  He got covid booster  He reports random sensations in face/skin, moves, no other sensations, vision/strength/speech/neuro changes, happens intermittently, lasts seconds to minutes  NO issues with bones/joints    Detailed Medical History:  Macular degeneration  Elevated PSA: up to 8.4 in 2021, th en repeat 2.7 3/22, MRI ws low risk 3/21  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 The risk of osteoporotic fracture increases approximately 2-fold for each 1.0 SD decrease in T-score.       Review of external notes as documented above                   A detailed Review of Systems was performed, verified and is negative except as documented in the HPI.  All health questionnaires were reviewed, verified and relevant information documented above.      Past Medical History:  Past Medical History:   Diagnosis Date     Aortic insufficiency      Erectile dysfunction      History of deep venous thrombosis 05/2019    distal DVT with extension, completed 6 months of AC (failed eliquis)     History of pelvic fracture      Hyperlipidemia      Low back pain     disc disease s/p laminectomy in past     Macular degeneration     on Avastin, R eye     Pancreatic cyst 07/2018    next MR due 12/20,  "then consider 2 year interval imaging     Radius fracture 2018    after fall from wall       Active Meds:  Current Outpatient Medications   Medication     aspirin 81 MG tablet     calcium carbonate (OS-DAVIDE) 1500 (600 Ca) MG tablet     multivitamin (OCUVITE) TABS     sodium chloride (LAXMI 128) 5 % ophthalmic solution     atorvastatin (LIPITOR) 10 MG tablet     Cholecalciferol (VITAMIN D3) 1000 units CAPS     Current Facility-Administered Medications   Medication     bevacizumab (AVASTIN) intravitreal inj 1.25 mg        Allergies:  Reviewed, refer to EMR    Relevant Social History:  Social History     Tobacco Use     Smoking status: Former Smoker     Packs/day: 2.00     Years: 2.00     Pack years: 4.00     Quit date: 1954     Years since quittin.3     Smokeless tobacco: Never Used   Substance Use Topics     Alcohol use: Yes     Comment: glass of wine with dinner     Drug use: No        Physical Exam:  Vitals: /67 (BP Location: Right arm, Patient Position: Sitting, Cuff Size: Adult Regular)   Pulse 82   Ht 1.778 m (5' 10\")   Wt 78.8 kg (173 lb 12.8 oz)   SpO2 97%   BMI 24.94 kg/m    Constitutional: Alert, oriented, pleasant, no acute distress  Head: Normocephalic, atraumatic  Eyes: Extra-ocular movements intact, pupils equally round bilaterally, no scleral icterus  ENT: Masked  Neck: Supple, no lymphadenopathy  Cardiovascular: Regular rate and rhythm, no murmurs, rubs or gallops, peripheral pulses full/symmetric  Respiratory: Good air movement bilaterally, lungs clear, no wheezes/rales/rhonchi  Musculoskeletal: No edema, normal muscle tone, normal gait  Neurologic: Alert and oriented, cranial nerves 2-12 intact, grossly non-focal  Psychiatric: normal mentation, affect and mood      Diagnostics:  Labs reviewed in Epic          Assessment and Plan:  Tom was seen today for follow up.    Diagnoses and all orders for this visit:    Age-related osteoporosis without current pathological " fracture    Advised repeat dexa to assess efficacy of reclast.    -     Dexa hip/pelvis/spine*; Future            Angeli Robertson MD  Internal Medicine    >30 minutes spent today performing chart review, history and exam, counseling, care coordination, documentation and further activities as noted above exclusive of any procedures or EKG interpretation

## 2022-10-07 NOTE — NURSING NOTE
"Tom Saini is a 87 year old male patient that presents today in clinic for the following:    Chief Complaint   Patient presents with     Follow Up     The patient's allergies and medications were reviewed as noted. A set of vitals were recorded as noted without incident: /67 (BP Location: Right arm, Patient Position: Sitting, Cuff Size: Adult Regular)   Pulse 82   Ht 1.778 m (5' 10\")   Wt 78.8 kg (173 lb 12.8 oz)   SpO2 97%   BMI 24.94 kg/m  . The patient does not have any other questions for the provider.    Benson Holloway, Visit Facilitator 8:48 AM on 10/7/2022   "

## 2022-10-16 ENCOUNTER — HEALTH MAINTENANCE LETTER (OUTPATIENT)
Age: 87
End: 2022-10-16

## 2022-11-04 ENCOUNTER — ANCILLARY PROCEDURE (OUTPATIENT)
Dept: BONE DENSITY | Facility: CLINIC | Age: 87
End: 2022-11-04
Attending: INTERNAL MEDICINE
Payer: MEDICARE

## 2022-11-04 DIAGNOSIS — M81.0 AGE-RELATED OSTEOPOROSIS WITHOUT CURRENT PATHOLOGICAL FRACTURE: ICD-10-CM

## 2022-11-04 PROCEDURE — 77080 DXA BONE DENSITY AXIAL: CPT

## 2022-11-17 ENCOUNTER — OFFICE VISIT (OUTPATIENT)
Dept: OPHTHALMOLOGY | Facility: CLINIC | Age: 87
End: 2022-11-17
Attending: OPHTHALMOLOGY
Payer: MEDICARE

## 2022-11-17 DIAGNOSIS — H35.3221 EXUDATIVE AGE-RELATED MACULAR DEGENERATION OF LEFT EYE WITH ACTIVE CHOROIDAL NEOVASCULARIZATION (H): Primary | ICD-10-CM

## 2022-11-17 DIAGNOSIS — H18.513 FUCHS' CORNEAL DYSTROPHY OF BOTH EYES: ICD-10-CM

## 2022-11-17 PROCEDURE — 250N000011 HC RX IP 250 OP 636: Performed by: OPHTHALMOLOGY

## 2022-11-17 PROCEDURE — G0463 HOSPITAL OUTPT CLINIC VISIT: HCPCS | Mod: 25

## 2022-11-17 PROCEDURE — 76514 ECHO EXAM OF EYE THICKNESS: CPT | Performed by: OPHTHALMOLOGY

## 2022-11-17 PROCEDURE — 92134 CPTRZ OPH DX IMG PST SGM RTA: CPT | Performed by: OPHTHALMOLOGY

## 2022-11-17 PROCEDURE — 67028 INJECTION EYE DRUG: CPT | Mod: LT | Performed by: OPHTHALMOLOGY

## 2022-11-17 RX ORDER — ATORVASTATIN CALCIUM 10 MG/1
10 TABLET, FILM COATED ORAL DAILY
COMMUNITY
End: 2023-05-26

## 2022-11-17 RX ADMIN — Medication 1.25 MG: at 13:51

## 2022-11-17 ASSESSMENT — CONF VISUAL FIELD
OS_NORMAL: 1
OS_SUPERIOR_TEMPORAL_RESTRICTION: 0
OD_NORMAL: 1
OS_INFERIOR_NASAL_RESTRICTION: 0
OD_SUPERIOR_TEMPORAL_RESTRICTION: 0
OS_INFERIOR_TEMPORAL_RESTRICTION: 0
OS_SUPERIOR_NASAL_RESTRICTION: 0
OD_INFERIOR_TEMPORAL_RESTRICTION: 0
OD_SUPERIOR_NASAL_RESTRICTION: 0
OD_INFERIOR_NASAL_RESTRICTION: 0
METHOD: COUNTING FINGERS

## 2022-11-17 ASSESSMENT — VISUAL ACUITY
OS_CC+: -2
OS_CC: 20/30
OD_CC: 20/40
CORRECTION_TYPE: GLASSES
METHOD: SNELLEN - LINEAR

## 2022-11-17 ASSESSMENT — REFRACTION_WEARINGRX
SPECS_TYPE: BIFOCAL
OD_SPHERE: -3.50
OS_ADD: +2.50
OS_CYLINDER: +2.75
OS_SPHERE: -1.50
OD_ADD: +2.50
OD_CYLINDER: +2.25
OS_AXIS: 178
OD_AXIS: 002

## 2022-11-17 ASSESSMENT — PACHYMETRY
OS_CT(UM): 603
OD_CT(UM): 599

## 2022-11-17 ASSESSMENT — TONOMETRY
OS_IOP_MMHG: 15
OD_IOP_MMHG: 18
IOP_METHOD: TONOPEN

## 2022-11-17 ASSESSMENT — EXTERNAL EXAM - RIGHT EYE: OD_EXAM: NORMAL

## 2022-11-17 ASSESSMENT — EXTERNAL EXAM - LEFT EYE: OS_EXAM: ET

## 2022-11-17 ASSESSMENT — SLIT LAMP EXAM - LIDS
COMMENTS: PTOSIS OS > OD
COMMENTS: PTOSIS OS > OD

## 2022-11-17 NOTE — PROGRESS NOTES
Chief Complaint(s) and History of Present Illness(es)     Macular Degeneration Follow Up            Comments: 3 month follow up           Comments    Pt states vision has been good since last visit. No eye pain today.  No flashes or floaters. No redness or dryness.    danni JaimeePATO November 17, 2022 1:02 PM                   Review of systems for the eyes was negative other than the pertinent positives/negatives listed in the HPI.      Assessment & Plan      Tom Saini is a 87 year old male with the following diagnoses:   1. Exudative age-related macular degeneration of left eye with active choroidal neovascularization (H)    2. Fuchs' corneal dystrophy of both eyes       Vision seems better since starting Pantera  K clear both eyes today  pachmetry slightly thinner    Tr subretinal fluid still left eye   Continue Avastin Q12 week interval left eye   RBA  discussed, consent obtained, will proceed today.   Return precautions reviewed   Cont Pantera gtt's at bedtime each eye for now      Patient disposition:   Return in about 3 months (around 2/17/2023) for DFE, OCT Macula, pachy.           Attending Physician Attestation:  Complete documentation of historical and exam elements from today's encounter can be found in the full encounter summary report (not reduplicated in this progress note).  I personally obtained the chief complaint(s) and history of present illness.  I confirmed and edited as necessary the review of systems, past medical/surgical history, family history, social history, and examination findings as documented by others; and I examined the patient myself.  I personally reviewed the relevant tests, images, and reports as documented above.  I formulated and edited as necessary the assessment and plan and discussed the findings and management plan with the patient and family. . - Brigido Laura MD

## 2022-11-17 NOTE — NURSING NOTE
Chief Complaints and History of Present Illnesses   Patient presents with     Macular Degeneration Follow Up     3 month follow up      Chief Complaint(s) and History of Present Illness(es)     Macular Degeneration Follow Up            Comments: 3 month follow up           Comments    Pt states vision has been good since last visit. No eye pain today.  No flashes or floaters. No redness or dryness.    PATO Butler November 17, 2022 1:02 PM

## 2022-12-14 ENCOUNTER — OFFICE VISIT (OUTPATIENT)
Dept: OPHTHALMOLOGY | Facility: CLINIC | Age: 87
End: 2022-12-14
Attending: OPHTHALMOLOGY
Payer: MEDICARE

## 2022-12-14 DIAGNOSIS — H18.513 FUCHS' CORNEAL DYSTROPHY OF BOTH EYES: Primary | ICD-10-CM

## 2022-12-14 DIAGNOSIS — H35.3221 EXUDATIVE AGE-RELATED MACULAR DEGENERATION OF LEFT EYE WITH ACTIVE CHOROIDAL NEOVASCULARIZATION (H): ICD-10-CM

## 2022-12-14 PROCEDURE — G0463 HOSPITAL OUTPT CLINIC VISIT: HCPCS

## 2022-12-14 PROCEDURE — 92025 CPTRIZED CORNEAL TOPOGRAPHY: CPT | Performed by: OPHTHALMOLOGY

## 2022-12-14 PROCEDURE — 76514 ECHO EXAM OF EYE THICKNESS: CPT | Performed by: OPHTHALMOLOGY

## 2022-12-14 PROCEDURE — 92134 CPTRZ OPH DX IMG PST SGM RTA: CPT | Performed by: OPHTHALMOLOGY

## 2022-12-14 PROCEDURE — 99214 OFFICE O/P EST MOD 30 MIN: CPT | Performed by: OPHTHALMOLOGY

## 2022-12-14 ASSESSMENT — TONOMETRY
IOP_METHOD: TONOPEN
OD_IOP_MMHG: 17
OS_IOP_MMHG: 19

## 2022-12-14 ASSESSMENT — REFRACTION_WEARINGRX
OS_AXIS: 178
OD_SPHERE: -3.50
SPECS_TYPE: BIFOCAL
OD_CYLINDER: +2.25
OD_AXIS: 002
OD_ADD: +2.50
OS_ADD: +2.50
OS_CYLINDER: +2.75
OS_SPHERE: -1.50

## 2022-12-14 ASSESSMENT — SLIT LAMP EXAM - LIDS
COMMENTS: PTOSIS OS > OD
COMMENTS: PTOSIS OS > OD

## 2022-12-14 ASSESSMENT — CONF VISUAL FIELD
OD_INFERIOR_TEMPORAL_RESTRICTION: 0
OD_NORMAL: 1
OS_SUPERIOR_NASAL_RESTRICTION: 3
OD_SUPERIOR_TEMPORAL_RESTRICTION: 0
OD_INFERIOR_NASAL_RESTRICTION: 0
OD_SUPERIOR_NASAL_RESTRICTION: 0
OS_SUPERIOR_TEMPORAL_RESTRICTION: 3

## 2022-12-14 ASSESSMENT — VISUAL ACUITY
METHOD: SNELLEN - LINEAR
OD_CC: 20/40
OS_CC: 20/30
CORRECTION_TYPE: GLASSES
OS_CC+: -2

## 2022-12-14 ASSESSMENT — PACHYMETRY
OD_CT(UM): 574
OS_CT(UM): 574

## 2022-12-14 ASSESSMENT — EXTERNAL EXAM - RIGHT EYE: OD_EXAM: NORMAL

## 2022-12-14 ASSESSMENT — EXTERNAL EXAM - LEFT EYE: OS_EXAM: ET

## 2022-12-14 NOTE — PROGRESS NOTES
Chief Complaint(s) and History of Present Illness(es)     Follow Up            Laterality: left eye    Onset: days ago    Quality: blurred vision    Context: computer work    Associated symptoms: Negative for eye pain, flashes and floaters    Treatments tried: eye drops and glasses    Pain scale: 0/10    Comments: Patient reports vision with left eye is not as sharp when   looking at computer screen since yesterday. Denies pain.     Hx Exudative AMD left eye and Fuch's Dystrophy each eye. Hx Avastin   injection Q12wks left eye. Last injection was 11/17/22.             Comments    Ocular meds:   - Gabriel gtt at bedtime each eye     BRI Mckinney 12:37 PM 12/14/2022               Review of systems for the eyes was negative other than the pertinent positives/negatives listed in the HPI.      Assessment & Plan      Tom Saini is a 87 year old male with the following diagnoses:   1. Fuchs' corneal dystrophy of both eyes    2. Exudative age-related macular degeneration of left eye with active choroidal neovascularization (H)         New change in central vision left eye.  Denies discomfort  Using Gabriel at bedtime both eyes     OCT mac with improved subretinal fluid since last injection; right eye remains stable  pachmetry is stable, but there are new central D folds in the left eye     Recommend to increase gabriel to 2-3x daily left eye   Continue gabriel at bedtime right eye   Return precautions reviewed       Patient disposition:   Return in about 2 months (around 2/14/2023) for VT only, pachy, OCT mac.           Attending Physician Attestation:  Complete documentation of historical and exam elements from today's encounter can be found in the full encounter summary report (not reduplicated in this progress note).  I personally obtained the chief complaint(s) and history of present illness.  I confirmed and edited as necessary the review of systems, past medical/surgical history, family history, social history, and  examination findings as documented by others; and I examined the patient myself.  I personally reviewed the relevant tests, images, and reports as documented above.  I formulated and edited as necessary the assessment and plan and discussed the findings and management plan with the patient and family. . - Brigido Laura MD

## 2022-12-14 NOTE — NURSING NOTE
Chief Complaints and History of Present Illnesses   Patient presents with     Follow Up     Patient reports vision with left eye is not as sharp when looking at computer screen since yesterday. Denies pain.     Hx Exudative AMD left eye and Fuch's Dystrophy each eye. Hx Avastin injection Q12wks left eye. Last injection was 11/17/22.        Chief Complaint(s) and History of Present Illness(es)     Follow Up            Laterality: left eye    Onset: days ago    Quality: blurred vision    Context: computer work    Associated symptoms: Negative for eye pain, flashes and floaters    Treatments tried: eye drops and glasses    Pain scale: 0/10    Comments: Patient reports vision with left eye is not as sharp when looking at computer screen since yesterday. Denies pain.     Hx Exudative AMD left eye and Fuch's Dystrophy each eye. Hx Avastin injection Q12wks left eye. Last injection was 11/17/22.             Comments    Ocular meds:   - Pantera gtt at bedtime each eye     BRI Mckinney 12:37 PM 12/14/2022

## 2023-02-23 ENCOUNTER — OFFICE VISIT (OUTPATIENT)
Dept: OPHTHALMOLOGY | Facility: CLINIC | Age: 88
End: 2023-02-23
Attending: OPHTHALMOLOGY
Payer: MEDICARE

## 2023-02-23 DIAGNOSIS — Z96.1 PSEUDOPHAKIA OF BOTH EYES: ICD-10-CM

## 2023-02-23 DIAGNOSIS — H35.3221 EXUDATIVE AGE-RELATED MACULAR DEGENERATION OF LEFT EYE WITH ACTIVE CHOROIDAL NEOVASCULARIZATION (H): Primary | ICD-10-CM

## 2023-02-23 DIAGNOSIS — H18.513 FUCHS' CORNEAL DYSTROPHY OF BOTH EYES: ICD-10-CM

## 2023-02-23 PROCEDURE — 250N000011 HC RX IP 250 OP 636: Performed by: OPHTHALMOLOGY

## 2023-02-23 PROCEDURE — G0463 HOSPITAL OUTPT CLINIC VISIT: HCPCS | Mod: 25

## 2023-02-23 PROCEDURE — 92134 CPTRZ OPH DX IMG PST SGM RTA: CPT | Performed by: OPHTHALMOLOGY

## 2023-02-23 PROCEDURE — 67028 INJECTION EYE DRUG: CPT | Mod: LT | Performed by: OPHTHALMOLOGY

## 2023-02-23 RX ORDER — SODIUM CHLORIDE 5 %
OINTMENT (GRAM) OPHTHALMIC (EYE)
Qty: 3.5 G | Refills: 11 | Status: SHIPPED | OUTPATIENT
Start: 2023-02-23

## 2023-02-23 RX ADMIN — Medication 1.25 MG: at 13:20

## 2023-02-23 ASSESSMENT — VISUAL ACUITY
OS_CC+: -2
CORRECTION_TYPE: GLASSES
OS_CC: 20/30
OD_CC: 20/40
OD_PH_CC: 20/30
OD_CC+: -2
OD_PH_CC+: -2
METHOD: SNELLEN - LINEAR

## 2023-02-23 ASSESSMENT — EXTERNAL EXAM - LEFT EYE: OS_EXAM: ET

## 2023-02-23 ASSESSMENT — CUP TO DISC RATIO
OD_RATIO: 0.2
OS_RATIO: 0.2

## 2023-02-23 ASSESSMENT — PACHYMETRY
OS_CT(UM): 633
OD_CT(UM): 621

## 2023-02-23 ASSESSMENT — REFRACTION_WEARINGRX
OD_CYLINDER: +2.25
OS_AXIS: 178
OS_ADD: +2.50
OD_AXIS: 002
OS_SPHERE: -1.50
OS_CYLINDER: +2.75
OD_SPHERE: -3.50
SPECS_TYPE: BIFOCAL
OD_ADD: +2.50

## 2023-02-23 ASSESSMENT — EXTERNAL EXAM - RIGHT EYE: OD_EXAM: NORMAL

## 2023-02-23 ASSESSMENT — TONOMETRY
OS_IOP_MMHG: 12
IOP_METHOD: ICARE
OD_IOP_MMHG: 14

## 2023-02-23 ASSESSMENT — SLIT LAMP EXAM - LIDS
COMMENTS: PTOSIS OS > OD
COMMENTS: PTOSIS OS > OD

## 2023-02-23 NOTE — NURSING NOTE
Chief Complaints and History of Present Illnesses   Patient presents with     Fuch's Dystrophy Follow Up     Chief Complaint(s) and History of Present Illness(es)     Fuch's Dystrophy Follow Up            Laterality: both eyes    Course: stable    Associated symptoms: Negative for eye pain, flashes and floaters    Treatments tried: artificial tears          Comments    Here for Fuch's dystrophy follow up. Vision is about the same. Uses lubricating drops as needed. No flashes or floaters.    Jt GREGORY 12:27 PM February 23, 2023

## 2023-02-23 NOTE — PROGRESS NOTES
Chief Complaint    Fuch's Dystrophy Follow Up          Review of systems for the eyes was negative other than the pertinent positives/negatives listed in the HPI.      Assessment & Plan      Tom Saini is a 87 year old male with the following diagnoses:   1. Exudative age-related macular degeneration of left eye with active choroidal neovascularization (H)    2. Fuchs' corneal dystrophy of both eyes    3. Pseudophakia of both eyes       No change to vision in both eyes. Denies discomfort.  Has been using gabriel at bedtime mostly (occasional twice a day, but has trouble putting drops in by himself)    More K swelling today compared to last visit according to pachymetry and there was central D folds in both eyes L>R.    OCT mac with overall stable in both eyes today. There is tr increased subretinal hyperreflectivity in the left eye compared to last 3 months ago (fibrosis vs very subtle fluid).  3 months since last injection in the left eye.    Recommend to increase gabriel to 2-3x daily in both eyes v gabriel ointment at bedtime   RBA to Avastin left eye discussed, consent obtained, will proceed today.   Will continue injections Q12 weeks in the left eye for now  Return precautions reviewed     Patient disposition:   Return in about 3 months (around 5/23/2023) for Follow Up, OCT MAC, injection OS.           Lillian Smith MD  PGY-4 Resident Physician  Department of Ophthalmology      Attending Physician Attestation:  Complete documentation of historical and exam elements from today's encounter can be found in the full encounter summary report (not reduplicated in this progress note).  I personally obtained the chief complaint(s) and history of present illness.  I confirmed and edited as necessary the review of systems, past medical/surgical history, family history, social history, and examination findings as documented by others; and I examined the patient myself.  I personally reviewed the relevant tests, images, and  reports as documented above.  I formulated and edited as necessary the assessment and plan and discussed the findings and management plan with the patient and family. Attending Physician Image/Tesing Attestation: I personally reviewed the ophthalmic test(s) associated with this encounter, agree with the interpretation(s) as documented by the resident/fellow, and have edited the corresponding report(s) as necessary.  Attending Physician Procedure Attestation: I was present for the entire procedure . - Brigido Laura MD

## 2023-03-17 ENCOUNTER — TELEPHONE (OUTPATIENT)
Dept: OPHTHALMOLOGY | Facility: CLINIC | Age: 88
End: 2023-03-17

## 2023-03-17 ENCOUNTER — NURSE TRIAGE (OUTPATIENT)
Dept: NURSING | Facility: CLINIC | Age: 88
End: 2023-03-17

## 2023-03-17 NOTE — TELEPHONE ENCOUNTER
Nurse Triage SBAR    Is this a 2nd Level Triage?   Yes     Situation: Right eye vision change for a moment while on the computer.      Background/Assessment:     Pt was working on the computer and noticed some changes in vision in his right eye.     Pt mentioned for a few seconds.  It looked like he was looking through a bubble on the right corner of his right eye.     It was only for a few seconds.  Then it went away.     It look like the Pt was looking through water or a bubble on the right corner of the right eye.     No floaters, flashes of light, headache, dizziness or injuries noted.       Pt mentioned it looked like when he gets injections in his left eye where things are distorted for a moment.   But then it clears up.    But he has never had injections in the right eye.      Pt in stable condition and can seen now clearly.     But wondering if he can get in to see a provider next week.    Please call the Pt back @ [phone] for further assistance.    Nabila Khan RN  Central Triage Red Flags/Med Refills      Protocol Recommended Disposition:   See in Office Today      Reason for Disposition    Patient wants to be seen    Additional Information    Negative: Weakness of the face, arm or leg on one side of the body    Negative: Followed getting substance in the eye    Negative: Foreign body or object is or was lodged in the eye    Negative: Followed an eye injury    Negative: Followed sun lamp or sun exposure (UV keratitis)    Negative: Yellow or green discharge (pus) in the eye    Negative: Pregnant    Negative: Complete loss of vision in 1 or both eyes    Negative: SEVERE eye pain    Negative: Severe headache    Negative: Double vision    Negative: Blurred vision or visual changes and present now and sudden onset or new (e.g., minutes, hours, days)  (Exception: Seeing floaters / black specks OR previously diagnosed migraine headaches with same symptoms.)    Negative: Patient sounds very sick or weak to  the triager    Negative: Flashes of light  (Exception: brief from pressing on the eyeball)    Negative: Eye pain and brief (now gone) blurred vision or visual changes    Negative: Taking digoxin (e.g., Lanoxin, Digitek, Cardoxin, Lanoxicaps; Toloxin in [place]) and blurred vision, yellow vision, or yellow-green halos    Negative: Many floaters in the eye    Negative: Jaw pain while eating and age > 50 years    Negative: Headache and age > 50 years    Protocols used: VISION LOSS OR CHANGE-A-OH

## 2023-03-17 NOTE — TELEPHONE ENCOUNTER
Caller reporting the following red-flag symptom(s): Vision is distorted moving around, will not focus.      Per the system red-flag symptom policy, patient was instructed to:  speak with a Registered Nurse    Action:  Patient warm transferred to a Registered NurseJudith

## 2023-03-17 NOTE — TELEPHONE ENCOUNTER
Spoke to pt at 1735    Pt with subjective visual disturbance for about 10 minutes--lights swirling in vision    Vision normalized after 10 minutes and has stayed normal.    No new floaters/flashing  No other vision changes    Reviewed s/s of ocular migrain--benign in nature    Offered appt on Monday and review ok to monitor also for any reoccurences, new floaters/flashing/eye symptoms    Pt comfortable monitoring at this time    Provided [phone] option 4 for information to page on call eye provider over weekend for any reoccurrences/vision changes/concerns    Note to on call for review    Josh Araiza RN 5:44 PM 03/17/23

## 2023-04-04 NOTE — PROGRESS NOTES
[facility] Dermatology Note  Encounter Date: Apr 7, 2023  Office Visit     Dermatology Problem List:  1. SKs  - Large symptomatic SK R nestor - cryo 11/07/2019, repeat on 12/03/2019.   2. AKs, s/p cryo  # Lesion to monitor, left medial shin, s/p photo 4/7/2023  ____________________________________________    Assessment & Plan:    # Lesion to monitor,  left medial shin - favor benign dermatofibroma but not with diagnostic pattern on dermoscopy,- Recheck in 6 months  - Photodocumentation today    # Eczematous dermatitis. Patient deferred a topical treatment. Will reach out if he would like a prescription.  - Continue regular moisturization     # AKs, scalp x 2, right forehead, right cheek - total of x4  - Cryotherapy today, see procedure note below    # Multiple benign nevi  # Solar lentigines   - No concerning lesions today  - Monitor for ABCDEs of melanoma   - Continue sun protection - recommend SPF 30 or higher with frequent application   - Return sooner if noticing changing or symptomatic lesions    # Cherry angiomas  # Seborrheic keratoses  - Reassured patient of benign nature, no treatment necessary    Procedures Performed:   - Cryotherapy procedure note, location(s): see above. After verbal consent and discussion of risks and benefits including, but not limited to, dyspigmentation/scar, blister, and pain, 4 lesion(s) was(were) treated with 1-2 mm freeze border for 1-2 cycles with liquid nitrogen. Post cryotherapy instructions were provided.    Follow-up: 6 month(s) in-person, or earlier for new or changing lesions    Staff and Scribe:     Scribe Disclosure:  I, MARCO BUCK, am serving as a scribe to document services personally performed by Pauline Freed MD based on data collection and the provider's statements to me.     Provider Disclosure:   The documentation recorded by the scribe accurately reflects the services I personally performed and the decisions made by me.    Pauline  MD [place]    Department of Dermatology  [facility]: Phone: [phone], Fax: [phone]  4/8/2023     ____________________________________________    CC: Derm Problem (Juaquin is here today for a skin check. )    HPI:  Mr. Tom Saini is a(n) 88 year old male who presents today as a return patient for FBSE. Last seen by myself on 5/20/22, at which time the patient had no lesions of concern.    The patient has no specific spots of concern on today.     Patient is otherwise feeling well, without additional skin concerns.    Labs Reviewed:  N/A    Physical Exam:  Vitals: There were no vitals taken for this visit.  SKIN: Full skin, excluding the groin, which includes the head/face, both arms, chest, back, abdomen,both legs, buttocks, digits and/or nails, was examined.  - 6 mm slightly firm pink/tan papule on the left medial shin.  - Bilateral superior shins, eczematous pink scaly thin plaques  - There are dome shaped bright red papules on the trunk and extremities.   - Multiple regular brown pigmented macules and papules are identified on the trunk and extremities.   - Scattered brown macules on sun exposed areas.  - There are waxy stuck on tan to brown papules on the trunk and extremities.   - There are erythematous macules with overyling adherent scale on the scalp x 2, right forehead, and right cheek.   - No other lesions of concern on areas examined.     Medications:  Current Outpatient Medications   Medication     aspirin 81 MG tablet     atorvastatin (LIPITOR) 10 MG tablet     calcium carbonate (OS-DAVIDE) 1500 (600 Ca) MG tablet     multivitamin (OCUVITE) TABS     sodium chloride (LAXMI 128) 5 % ophthalmic ointment     sodium chloride (LAXMI 128) 5 % ophthalmic solution     Current Facility-Administered Medications   Medication     bevacizumab (AVASTIN) intravitreal inj 1.25 mg      Past Medical History:   Patient Active  Problem List   Diagnosis     Eczema     Pure hypercholesterolemia     Male erectile disorder     Dermatofibroma     Seborrheic keratoses, inflamed     Age-related osteoporosis without current pathological fracture     Past Medical History:   Diagnosis Date     Aortic insufficiency      Erectile dysfunction      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 on close of this encounter.

## 2023-04-07 ENCOUNTER — MYC MEDICAL ADVICE (OUTPATIENT)
Dept: INTERNAL MEDICINE | Facility: CLINIC | Age: 88
End: 2023-04-07

## 2023-04-07 ENCOUNTER — OFFICE VISIT (OUTPATIENT)
Dept: DERMATOLOGY | Facility: CLINIC | Age: 88
End: 2023-04-07
Payer: MEDICARE

## 2023-04-07 ENCOUNTER — OFFICE VISIT (OUTPATIENT)
Dept: INTERNAL MEDICINE | Facility: CLINIC | Age: 88
End: 2023-04-07
Payer: MEDICARE

## 2023-04-07 VITALS
OXYGEN SATURATION: 97 % | DIASTOLIC BLOOD PRESSURE: 66 MMHG | SYSTOLIC BLOOD PRESSURE: 110 MMHG | BODY MASS INDEX: 25.02 KG/M2 | HEART RATE: 72 BPM | WEIGHT: 174.4 LBS

## 2023-04-07 DIAGNOSIS — L30.9 ECZEMA, UNSPECIFIED TYPE: ICD-10-CM

## 2023-04-07 DIAGNOSIS — L57.0 ACTINIC KERATOSIS: ICD-10-CM

## 2023-04-07 DIAGNOSIS — L82.1 SEBORRHEIC KERATOSIS: ICD-10-CM

## 2023-04-07 DIAGNOSIS — D49.0 IPMN (INTRADUCTAL PAPILLARY MUCINOUS NEOPLASM): Primary | ICD-10-CM

## 2023-04-07 DIAGNOSIS — L81.4 SOLAR LENTIGO: ICD-10-CM

## 2023-04-07 DIAGNOSIS — M81.0 AGE-RELATED OSTEOPOROSIS WITHOUT CURRENT PATHOLOGICAL FRACTURE: ICD-10-CM

## 2023-04-07 DIAGNOSIS — D22.9 MULTIPLE BENIGN NEVI: Primary | ICD-10-CM

## 2023-04-07 DIAGNOSIS — D18.01 CHERRY ANGIOMA: ICD-10-CM

## 2023-04-07 DIAGNOSIS — D49.2 NEOPLASM OF UNSPECIFIED BEHAVIOR OF BONE, SOFT TISSUE, AND SKIN: ICD-10-CM

## 2023-04-07 DIAGNOSIS — M81.0 AGE-RELATED OSTEOPOROSIS WITHOUT CURRENT PATHOLOGICAL FRACTURE: Primary | ICD-10-CM

## 2023-04-07 PROCEDURE — 99213 OFFICE O/P EST LOW 20 MIN: CPT | Mod: 25 | Performed by: DERMATOLOGY

## 2023-04-07 PROCEDURE — 99213 OFFICE O/P EST LOW 20 MIN: CPT | Performed by: INTERNAL MEDICINE

## 2023-04-07 PROCEDURE — 17003 DESTRUCT PREMALG LES 2-14: CPT | Performed by: DERMATOLOGY

## 2023-04-07 PROCEDURE — 17000 DESTRUCT PREMALG LESION: CPT | Performed by: DERMATOLOGY

## 2023-04-07 ASSESSMENT — PAIN SCALES - GENERAL: PAINLEVEL: NO PAIN (0)

## 2023-04-07 NOTE — NURSING NOTE
Dermatology Rooming Note    Tom Saini's goals for this visit include:   Chief Complaint   Patient presents with     Derm Problem     Juaquin is here today for a skin check.      Mari Mancuso RN

## 2023-04-07 NOTE — PATIENT INSTRUCTIONS
Patient Education     Checking for Skin Cancer  You can find cancer early by checking your skin each month. There are 3 kinds of skin cancer. They are melanoma, basal cell carcinoma, and squamous cell carcinoma. Doing monthly skin checks is the best way to find new marks or skin changes. Follow the instructions below for checking your skin.   The ABCDEs of checking moles for melanoma   Check your moles or growths for signs of melanoma using ABCDE:   Asymmetry: the sides of the mole or growth don t match  Border: the edges are ragged, notched, or blurred  Color: the color within the mole or growth varies  Diameter: the mole or growth is larger than 6 mm (size of a pencil eraser)  Evolving: the size, shape, or color of the mole or growth is changing (evolving is not shown in the images below)    Checking for other types of skin cancer  Basal cell carcinoma or squamous cell carcinoma have symptoms such as:     A spot or mole that looks different from all other marks on your skin  Changes in how an area feels, such as itching, tenderness, or pain  Changes in the skin's surface, such as oozing, bleeding, or scaliness  A sore that does not heal  New swelling or redness beyond the border of a mole    Who s at risk?  Anyone can get skin cancer. But you are at greater risk if you have:   Fair skin, light-colored hair, or light-colored eyes  Many moles or abnormal moles on your skin  A history of sunburns from sunlight or tanning beds  A family history of skin cancer  A history of exposure to radiation or chemicals  A weakened immune system  If you have had skin cancer in the past, you are at risk for recurring skin cancer.   How to check your skin  Do your monthly skin checkups in front of a full-length mirror. Check all parts of your body, including your:   Head (ears, face, neck, and scalp)  Torso (front, back, and sides)  Arms (tops, undersides, upper, and lower armpits)  Hands (palms, backs, and fingers, including  under the nails)  Buttocks and genitals  Legs (front, back, and sides)  Feet (tops, soles, toes, including under the nails, and between toes)  If you have a lot of moles, take digital photos of them each month. Make sure to take photos both up close and from a distance. These can help you see if any moles change over time.   Most skin changes are not cancer. But if you see any changes in your skin, call your doctor right away. Only he or she can diagnose a problem. If you have skin cancer, seeing your doctor can be the first step toward getting the treatment that could save your life.   Jarvam last reviewed this educational content on 4/1/2019 2000-2020 [employer]. [address] [place] [address], [place] [address]. All rights reserved. This information is not intended as a substitute for professional medical care. Always follow your healthcare professional's instructions.       When should I call my doctor?  If you are worsening or not improving, please, contact us or seek urgent care as noted below.     Who should I call with questions (adults)?  [facility] (adult and pediatric): [phone]  [facility] (adult): [phone]  For urgent needs outside of business hours call the [facility] at [phone] and ask for the dermatology resident on call to be paged  If this is a medical emergency and you are unable to reach an ER, Call [phone]    Who should I call with questions (pediatric)?  [facility]- Pediatric Dermatology  Dr. Toya Ng, Dr. Reanna Good, Dr. Sandie Draper, PALLAVI Rodrigues, Dr. Nilda Arellano, Dr. Imelda Gonzalez & Dr. Mateo Anna  Non-urgent nurse triage line; [phone]- Annette and Kristie [facility] Beckman (/Complex ) [phone]    If you need a prescription refill, please contact your pharmacy. Refills are approved or denied by our  Physicians during normal business hours, Monday through Fridays  Per office policy, refills will not be granted if you have not been seen within the past year (or sooner depending on your child's condition)    Scheduling Information:  Pediatric Appointment Scheduling and Call Center [phone]  Radiology Scheduling- [phone]  Sedation Unit Scheduling- [phone]  [place] Scheduling- General [phone]; Pediatric Dermatology [phone]  Main  Services: [phone]  Hebrew: [phone]  Mauritian: [phone]  Hmong/Malawian/Nepali: [phone]  Preadmission Nursing Department Fax Number: [phone] (Fax all pre-operative paperwork to this number)    For urgent matters arising during evenings, weekends, or holidays that cannot wait for normal business hours please call [phone] and ask for the dermatology resident on call to be paged. Cryotherapy    What is it?  Use of a very cold liquid, such as liquid nitrogen, to freeze and destroy abnormal skin cells that need to be removed    What should I expect?  Tenderness and redness  A small blister that might grow and fill with dark purple blood. There may be crusting.  More than one treatment may be needed if the lesions do not go away.    How do I care for the treated area?  Gently wash the area with your hands when bathing.  Use a thin layer of Vaseline to help with healing. You may use a Band-Aid.   The area should heal within 7-10 days and may leave behind a pink or lighter color.   Do not use an antibiotic or Neosporin ointment.   You may take acetaminophen (Tylenol) for pain.     Call your doctor if you have:  Severe pain  Signs of infection (warmth, redness, cloudy yellow drainage, and or a bad smell)  Questions or concerns    Who should I call with questions?      [facility]: [phone]      [facility]: [phone]      For urgent needs outside of business hours  call the [facility] at [phone] and ask for the dermatology resident on call

## 2023-04-07 NOTE — LETTER
4/7/2023       RE: Tom Saini  [address] [facility] [id number]     Dear Colleague,    Thank you for referring your patient, Tom Saini, to the [facility]ERMAT[facility] [place] at [facility]. Please see a copy of my visit note below.    [facility] Dermatology Note  Encounter Date: Apr 7, 2023  Office Visit     Dermatology Problem List:  1. SKs  - Large symptomatic SK R Presybeterian - cryo 11/07/2019, repeat on 12/03/2019.   2. AKs, s/p cryo  # Lesion to monitor, left medial shin, s/p photo 4/7/2023  ____________________________________________    Assessment & Plan:    # Lesion to monitor,  left medial shin - favor benign dermatofibroma but not with diagnostic pattern on dermoscopy,- Recheck in 6 months  - Photodocumentation today    # Eczematous dermatitis. Patient deferred a topical treatment. Will reach out if he would like a prescription.  - Continue regular moisturization     # AKs, scalp x 2, right forehead, right cheek - total of x4  - Cryotherapy today, see procedure note below    # Multiple benign nevi  # Solar lentigines   - No concerning lesions today  - Monitor for ABCDEs of melanoma   - Continue sun protection - recommend SPF 30 or higher with frequent application   - Return sooner if noticing changing or symptomatic lesions    # Cherry angiomas  # Seborrheic keratoses  - Reassured patient of benign nature, no treatment necessary    Procedures Performed:   - Cryotherapy procedure note, location(s): see above. After verbal consent and discussion of risks and benefits including, but not limited to, dyspigmentation/scar, blister, and pain, 4 lesion(s) was(were) treated with 1-2 mm freeze border for 1-2 cycles with liquid nitrogen. Post cryotherapy instructions were provided.    Follow-up: 6 month(s) in-person, or earlier for new or changing lesions    Staff and Scribe:     Scribe Disclosure:  MARCO HASTINGS, am  serving as a scribe to document services personally performed by Pauline Freed MD based on data collection and the provider's statements to me.     Provider Disclosure:   The documentation recorded by the scribe accurately reflects the services I personally performed and the decisions made by me.    Pauline Freed MD    Department of Dermatology  [facility]: Phone: [phone], Fax: [phone]  4/8/2023     ____________________________________________    CC: Derm Problem (Juaquin is here today for a skin check. )    HPI:  Mr. Tom Saini is a(n) 88 year old male who presents today as a return patient for FBSE. Last seen by myself on 5/20/22, at which time the patient had no lesions of concern.    The patient has no specific spots of concern on today.     Patient is otherwise feeling well, without additional skin concerns.    Labs Reviewed:  N/A    Physical Exam:  Vitals: There were no vitals taken for this visit.  SKIN: Full skin, excluding the groin, which includes the head/face, both arms, chest, back, abdomen,both legs, buttocks, digits and/or nails, was examined.  - 6 mm slightly firm pink/tan papule on the left medial shin.  - Bilateral superior shins, eczematous pink scaly thin plaques  - There are dome shaped bright red papules on the trunk and extremities.   - Multiple regular brown pigmented macules and papules are identified on the trunk and extremities.   - Scattered brown macules on sun exposed areas.  - There are waxy stuck on tan to brown papules on the trunk and extremities.   - There are erythematous macules with overyling adherent scale on the scalp x 2, right forehead, and right cheek.   - No other lesions of concern on areas examined.     Medications:  Current Outpatient Medications   Medication    aspirin 81 MG tablet    atorvastatin (LIPITOR) 10 MG tablet    calcium carbonate (OS-DAVIDE) 1500 (600  Ca) MG tablet    multivitamin (OCUVITE) TABS    sodium chloride (LAXMI 128) 5 % ophthalmic ointment    sodium chloride (LXAMI 128) 5 % ophthalmic solution     Current Facility-Administered Medications   Medication    bevacizumab (AVASTIN) intravitreal inj 1.25 mg      Past Medical History:   Patient Active Problem List   Diagnosis    Eczema    Pure hypercholesterolemia    Male erectile disorder    Dermatofibroma    Seborrheic keratoses, inflamed    Age-related osteoporosis without current pathological fracture     Past Medical History:   Diagnosis Date    Aortic insufficiency     Erectile dysfunction     History of deep venous thrombosis 05/2019    distal DVT with extension, completed 6 months of AC (failed eliquis)    History of pelvic fracture     Hyperlipidemia     Low back pain     disc disease s/p laminectomy in past    Macular degeneration     on Avastin, R eye    Pancreatic cyst 07/2018    next MR due 12/23    Radius fracture 2018    after fall from wall         No referring provider defined for this encounter. on close of this encounter.

## 2023-04-07 NOTE — PATIENT INSTRUCTIONS
Homework:    Take vitamin D 1000 units daily.    2. End of April or May do Reclast infusion for bones.    3. Schedule a MRI of your pancreas this spring.To schedule your appointment with imaging, please call [phone]     To call [facility] please call [phone].

## 2023-04-07 NOTE — PROGRESS NOTES
History of Present Illness:  Mr. Saini is a 87 year old male who presents for  Chief Complaint   Patient presents with     Follow Up     Pt here for follow up        No acute changes in health or health concerns  He was doing own shoveling this winter, no falls, no CP, no injuries  Last year, Juaquin's Daughter was killed in hit and run   He lives with wife Val  Going to [place] in Sept  He is due for reclast this spring, dose #3, some vertebral improvement on last dexa 2022  No hearing, memory, cardiopulmonary, GI, mood concerns.  Sees ophtho for eye concerns.  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  All health questionnaires were reviewed, verified and relevant information documented above.      Past Medical History:  Past Medical History:   Diagnosis Date     Aortic insufficiency      Erectile dysfunction      History of deep venous thrombosis 05/2019    distal DVT with extension, completed 6 months of AC (failed eliquis)     History of pelvic fracture      Hyperlipidemia      Low back pain     disc disease s/p laminectomy in past     Macular degeneration     on Avastin, R eye     Pancreatic cyst 07/2018    next MR due 12/23     Radius fracture 2018    after fall from wall       Active Meds:  Current Outpatient Medications   Medication     aspirin 81 MG tablet     atorvastatin (LIPITOR) 10 MG tablet     calcium carbonate (OS-DAVIDE) 1500 (600 Ca) MG tablet     multivitamin (OCUVITE) TABS     sodium chloride (LAXMI 128) 5 % ophthalmic ointment     sodium chloride (LAXMI 128) 5 % ophthalmic solution      Current Facility-Administered Medications   Medication     bevacizumab (AVASTIN) intravitreal inj 1.25 mg        Allergies:  Reviewed, refer to EMR    Relevant Social History:  Social History     Tobacco Use     Smoking status: Former     Packs/day: 2.00     Years: 2.00     Pack years: 4.00     Types: Cigarettes     Quit date: 1954     Years since quittin.8     Smokeless tobacco: Never   Substance Use Topics     Alcohol use: Yes     Comment: glass of wine with dinner     Drug use: No        Physical Exam:  Vitals: /66 (BP Location: Right arm, Patient Position: Sitting, Cuff Size: Adult Regular)   Pulse 72   Wt 79.1 kg (174 lb 6.4 oz)   SpO2 97%   BMI 25.02 kg/m    Constitutional: Alert, oriented, pleasant, no acute distress  Head: Normocephalic, atraumatic  Eyes: Extra-ocular movements intact, pupils equally round bilaterally, no scleral icterus  ENT: Masked  Neck: Supple, no lymphadenopathy  Cardiovascular: Regular rate and rhythm, 2/6 YAO, no rubs or gallops, peripheral pulses full/symmetric  Respiratory: Good air movement bilaterally, lungs clear, no wheezes/rales/rhonchi  Musculoskeletal: No edema, normal muscle tone, normal gait  Neurologic: Alert and oriented, cranial nerves 2-12 intact, grossly non-focal  Psychiatric: normal mentation, affect and mood      Diagnostics:  Labs reviewed in Epic          Assessment and Plan:  Tom was seen today for follow up.    Diagnoses and all orders for this visit:    IPMN (intraductal papillary mucinous neoplasm)  Given patient is healthy and active, agrees to additional surveillance  -     MR Pancreas wo & w Contrast; Future    Age-related osteoporosis without current pathological fracture  Advised #3 dose reclast this spring.        Angeli Robertson MD  Internal Medicine    >30 minutes spent today performing chart review, history and exam, counseling, care coordination, documentation and further activities as noted above exclusive of any procedures  or EKG interpretation        In design  blurb

## 2023-04-07 NOTE — NURSING NOTE
Tom Saini is a 88 year old male that presents in clinic today for the following:     Chief Complaint   Patient presents with     Follow Up     Pt here for follow up        The patient's allergies and medications were reviewed. The patient's vitals were obtained without incident. The patient does not have any other questions for the provider.     Gus Quintero, EMT at 9:00 AM on 4/7/2023.  Primary Care Clinic: [phone]

## 2023-04-10 RX ORDER — HEPARIN SODIUM,PORCINE 10 UNIT/ML
5 VIAL (ML) INTRAVENOUS
Status: CANCELLED | OUTPATIENT
Start: 2023-04-10

## 2023-04-10 RX ORDER — ZOLEDRONIC ACID 5 MG/100ML
5 INJECTION, SOLUTION INTRAVENOUS ONCE
Status: CANCELLED
Start: 2023-04-10

## 2023-04-10 RX ORDER — EPINEPHRINE 1 MG/ML
0.3 INJECTION, SOLUTION, CONCENTRATE INTRAVENOUS EVERY 5 MIN PRN
Status: CANCELLED | OUTPATIENT
Start: 2023-04-10

## 2023-04-10 RX ORDER — ACETAMINOPHEN 325 MG/1
650 TABLET ORAL ONCE
Status: CANCELLED | OUTPATIENT
Start: 2023-04-10

## 2023-04-10 RX ORDER — ALBUTEROL SULFATE 0.83 MG/ML
2.5 SOLUTION RESPIRATORY (INHALATION)
Status: CANCELLED | OUTPATIENT
Start: 2023-04-10

## 2023-04-10 RX ORDER — HEPARIN SODIUM (PORCINE) LOCK FLUSH IV SOLN 100 UNIT/ML 100 UNIT/ML
5 SOLUTION INTRAVENOUS
Status: CANCELLED | OUTPATIENT
Start: 2023-04-10

## 2023-04-10 RX ORDER — METHYLPREDNISOLONE SODIUM SUCCINATE 125 MG/2ML
125 INJECTION, POWDER, LYOPHILIZED, FOR SOLUTION INTRAMUSCULAR; INTRAVENOUS
Status: CANCELLED
Start: 2023-04-10

## 2023-04-10 RX ORDER — ALBUTEROL SULFATE 90 UG/1
1-2 AEROSOL, METERED RESPIRATORY (INHALATION)
Status: CANCELLED
Start: 2023-04-10

## 2023-04-10 RX ORDER — DIPHENHYDRAMINE HYDROCHLORIDE 50 MG/ML
50 INJECTION INTRAMUSCULAR; INTRAVENOUS
Status: CANCELLED
Start: 2023-04-10

## 2023-04-10 NOTE — TELEPHONE ENCOUNTER
"Per last visit with Dr. Robertson:   \"Age-related osteoporosis without current pathological fracture  Advised #3 dose reclast this spring.\"    Reclast infusion therapy plan created and routed to Dr. Robertson for approval.     TIFF PARSON RN on 4/10/2023 at 10:01 AM    "

## 2023-04-25 RX ORDER — ACETAMINOPHEN 325 MG/1
650 TABLET ORAL ONCE
Status: CANCELLED | OUTPATIENT
Start: 2023-04-25

## 2023-04-25 RX ORDER — EPINEPHRINE 1 MG/ML
0.3 INJECTION, SOLUTION INTRAMUSCULAR; SUBCUTANEOUS EVERY 5 MIN PRN
Status: CANCELLED | OUTPATIENT
Start: 2023-04-25

## 2023-04-25 RX ORDER — METHYLPREDNISOLONE SODIUM SUCCINATE 125 MG/2ML
125 INJECTION, POWDER, LYOPHILIZED, FOR SOLUTION INTRAMUSCULAR; INTRAVENOUS
Status: CANCELLED
Start: 2023-04-25

## 2023-04-25 RX ORDER — ALBUTEROL SULFATE 90 UG/1
1-2 AEROSOL, METERED RESPIRATORY (INHALATION)
Status: CANCELLED
Start: 2023-04-25

## 2023-04-25 RX ORDER — HEPARIN SODIUM (PORCINE) LOCK FLUSH IV SOLN 100 UNIT/ML 100 UNIT/ML
5 SOLUTION INTRAVENOUS
Status: CANCELLED | OUTPATIENT
Start: 2023-04-25

## 2023-04-25 RX ORDER — DIPHENHYDRAMINE HYDROCHLORIDE 50 MG/ML
50 INJECTION INTRAMUSCULAR; INTRAVENOUS
Status: CANCELLED
Start: 2023-04-25

## 2023-04-25 RX ORDER — ALBUTEROL SULFATE 0.83 MG/ML
2.5 SOLUTION RESPIRATORY (INHALATION)
Status: CANCELLED | OUTPATIENT
Start: 2023-04-25

## 2023-04-25 RX ORDER — ZOLEDRONIC ACID 5 MG/100ML
5 INJECTION, SOLUTION INTRAVENOUS ONCE
Status: CANCELLED
Start: 2023-04-25

## 2023-04-25 RX ORDER — HEPARIN SODIUM,PORCINE 10 UNIT/ML
5 VIAL (ML) INTRAVENOUS
Status: CANCELLED | OUTPATIENT
Start: 2023-04-25

## 2023-04-26 ENCOUNTER — INFUSION THERAPY VISIT (OUTPATIENT)
Dept: INFUSION THERAPY | Facility: CLINIC | Age: 88
End: 2023-04-26
Attending: INTERNAL MEDICINE
Payer: MEDICARE

## 2023-04-26 ENCOUNTER — APPOINTMENT (OUTPATIENT)
Dept: LAB | Facility: CLINIC | Age: 88
End: 2023-04-26
Attending: INTERNAL MEDICINE
Payer: MEDICARE

## 2023-04-26 VITALS
DIASTOLIC BLOOD PRESSURE: 66 MMHG | BODY MASS INDEX: 25.07 KG/M2 | TEMPERATURE: 97.8 F | WEIGHT: 174.7 LBS | OXYGEN SATURATION: 97 % | HEART RATE: 87 BPM | RESPIRATION RATE: 16 BRPM | SYSTOLIC BLOOD PRESSURE: 106 MMHG

## 2023-04-26 DIAGNOSIS — M81.0 AGE-RELATED OSTEOPOROSIS WITHOUT CURRENT PATHOLOGICAL FRACTURE: Primary | ICD-10-CM

## 2023-04-26 LAB
CALCIUM SERPL-MCNC: 9.9 MG/DL (ref 8.8–10.2)
CREAT SERPL-MCNC: 1.09 MG/DL (ref 0.67–1.17)
GFR SERPL CREATININE-BSD FRML MDRD: 66 ML/MIN/1.73M2

## 2023-04-26 PROCEDURE — 36415 COLL VENOUS BLD VENIPUNCTURE: CPT | Performed by: INTERNAL MEDICINE

## 2023-04-26 PROCEDURE — 96365 THER/PROPH/DIAG IV INF INIT: CPT

## 2023-04-26 PROCEDURE — 250N000011 HC RX IP 250 OP 636: Performed by: INTERNAL MEDICINE

## 2023-04-26 PROCEDURE — 82565 ASSAY OF CREATININE: CPT | Performed by: INTERNAL MEDICINE

## 2023-04-26 PROCEDURE — 82310 ASSAY OF CALCIUM: CPT | Performed by: INTERNAL MEDICINE

## 2023-04-26 RX ORDER — ZOLEDRONIC ACID 5 MG/100ML
5 INJECTION, SOLUTION INTRAVENOUS ONCE
Status: COMPLETED | OUTPATIENT
Start: 2023-04-26 | End: 2023-04-26

## 2023-04-26 RX ADMIN — ZOLEDRONIC ACID 5 MG: 0.05 INJECTION, SOLUTION INTRAVENOUS at 16:12

## 2023-04-26 ASSESSMENT — PAIN SCALES - GENERAL: PAINLEVEL: NO PAIN (0)

## 2023-04-26 NOTE — PATIENT INSTRUCTIONS
Dear Tom Saini    Thank you for choosing [facility] ([facility]) for your infusion.  The following information is a summary of our appointment as well as important reminders.          We look forward in seeing you on your next appointment here at [facility] ([facility]).  Please don t hesitate to call us at [phone] to reschedule any of your appointments or to speak with one of the [facility] registered nurses.  It was a pleasure taking care of you today.    Sincerely,    [facility]  [address] [place] [address]  [address]  Phone:  [phone]

## 2023-04-26 NOTE — PROGRESS NOTES
Infusion Nursing Note:  Tom Saini presents today for Reclast.    Patient seen by provider today: No   present during visit today: Not Applicable.    Note: Reclast infused over 30 minutes.  Patient refused to take tylenol.    Intravenous Access:  Peripheral IV placed by lab.    Treatment Conditions:  Met parameters. Creatinine Clearance 53 ml/min, Calcium 9.9 mg/dl.      Post Infusion Assessment:  Patient tolerated infusion without incident.  Blood return noted pre and post infusion.  Site patent and intact, free from redness, edema or discomfort.  No evidence of extravasations.  Access discontinued per protocol.       Discharge Plan:   Discharge instructions reviewed with: Patient.  Patient and/or family verbalized understanding of discharge instructions and all questions answered.  AVS to patient via [employer].  Patient will follow up with his provider.   Patient discharged in stable condition accompanied by: self.  Departure Mode: Ambulatory.    Administrations This Visit     zoledronic Acid (RECLAST) infusion 5 mg     Admin Date  04/26/2023 Action  $New Bag Dose  5 mg Rate  200 mL/hr Route  Intravenous Administered By  Yamilet Suazo RN              /66 (BP Location: Left arm, Patient Position: Sitting, Cuff Size: Adult Regular)   Pulse 87   Temp 97.8  F (36.6  C) (Oral)   Resp 16   Wt 79.2 kg (174 lb 11.2 oz)   SpO2 97%   BMI 25.07 kg/m         Yamilet Suazo RN

## 2023-04-26 NOTE — NURSING NOTE
Chief Complaint   Patient presents with     Blood Draw     Vitals, blood drawn and PIV placed by LPN. Pt checked into appt.      ANURADHA Rojo LPN   wife upset with wait times

## 2023-05-11 ENCOUNTER — TELEPHONE (OUTPATIENT)
Dept: OPHTHALMOLOGY | Facility: CLINIC | Age: 88
End: 2023-05-11

## 2023-05-11 ENCOUNTER — VIRTUAL VISIT (OUTPATIENT)
Dept: INTERNAL MEDICINE | Facility: CLINIC | Age: 88
End: 2023-05-11
Payer: MEDICARE

## 2023-05-11 DIAGNOSIS — U07.1 INFECTION DUE TO 2019 NOVEL CORONAVIRUS: Primary | ICD-10-CM

## 2023-05-11 PROCEDURE — 99213 OFFICE O/P EST LOW 20 MIN: CPT | Mod: VID | Performed by: HOSPITALIST

## 2023-05-11 ASSESSMENT — ENCOUNTER SYMPTOMS
SHORTNESS OF BREATH: 0
FATIGUE: 1
FEVER: 0
MYALGIAS: 0
COUGH: 1
ARTHRALGIAS: 0
ABDOMINAL PAIN: 0

## 2023-05-11 NOTE — PROGRESS NOTES
"Virtual Visit Details    Type of service:  Video Visit   Video Start Time: {video visit start/end time for provider to select:184679}  Video End Time:{video visit start/end time for provider to select:555192}    Originating Location (pt. Location): {video visit patient location:191886::\"Home\"}  {PROVIDER LOCATION On-site should be selected for visits conducted from your clinic location or adjoining [facility] hospital, academic office, or other nearby [facility] building. Off-site should be selected for all other provider locations, including home:068846}  Distant Location (provider location):  {virtual location provider:046977}  Platform used for Video Visit: {Virtual Visit Platforms:815044::\"[employer]\"}  "

## 2023-05-11 NOTE — TELEPHONE ENCOUNTER
[facility] Call Center    Phone Message    May a detailed message be left on voicemail: yes     Reason for Call: Other: pt tested positive for covid and was told to quarantine for 2 weeks. Writer explained to him that his appt with Keya is on the 2 week pinky and he is very sure that the date today is 5/18 and that would be within the 2 weeks. Next available that writer could pull is not until August and pt would like to be seen sooner than that. He would like a confirmation from the clinic that he can still go to his appt on 5/25. Please contact pt to discuss    Thank you     Action Taken: Message routed to:  [facility]    Travel Screening: Not Applicable

## 2023-05-11 NOTE — TELEPHONE ENCOUNTER
Called and spoke to Juaquin Reza started his covid symptoms on 5/9 and tested positive on 5/10    Ok to be seen on 5/25     Advised pt if he is feeling worse by ( 5.25) we can reschedule     Discussed wearing a mask at appt     Karuna Stoner Communication Facilitator on 5/11/2023 at 1:55 PM

## 2023-05-11 NOTE — PATIENT INSTRUCTIONS
- Will send Paxlovid for 5 days.   - You will need to stop use of Atorvastatin while you are on Paxlovid.   - Continue quarantining for at least 14 days, from start of symptoms. If needing to go in public, recommend a N95 mask and keep hands washed.   - If worsening shortness of breath, may need to present to ER.   - If coughing is worse, call clinic and ask for Dr. Jacob to send a cough suppressant.       Follow up as needed.

## 2023-05-11 NOTE — PROGRESS NOTES
Assessment/Plan  Problem List Items Addressed This Visit        Infectious/Inflammatory    Infection due to 2019 novel coronavirus - Primary     Day 2 of symptoms. Patient is over 65 year old.   - Will send Paxlovid for 5 days.   - Patient will need to stop use of Atorvastatin while you are on Paxlovid.   - Continue quarantining for at least 14 days, from start of symptoms. If needing to go in public, recommend a N95 mask and keep hands washed.   - If worsening shortness of breath, may need to present to ER.   - If coughing is worse, call clinic and ask for Dr. Jacob to send a cough suppressant (without codeine).          Relevant Medications    nirmatrelvir and ritonavir (PAXLOVID) 300 mg/100 mg therapy pack       No results found for any visits on 05/11/23.    Health Maintenance Due   Topic Date Due     ADVANCE CARE PLANNING  Never done     COVID-19 Vaccine (6 - Pfizer series) 02/03/2023     MEDICARE ANNUAL WELLNESS VISIT  04/07/2023     FALL RISK ASSESSMENT  05/12/2023     Video visit  Time in: 12:33pm  Time out: 12:46pm  Patient location: Home  Provider location: Off site in [place], [place]  Platform: [employer]    Subjective  Mentions running a test yesterday which showed. Has been having runny nose, lots of sleeping and coughing. Feel symptoms 2 nights ago. Had recent trip to [place] by flight, [employer] Went for a [employer] service. No short of breath. No chills. No fevers. Just fatigue. No chest or abdominal pain. Wife is feeling well for now.       Review of Systems   Constitutional: Positive for fatigue. Negative for fever.   Respiratory: Positive for cough. Negative for shortness of breath.    Cardiovascular: Negative for chest pain.   Gastrointestinal: Negative for abdominal pain.   Musculoskeletal: Negative for arthralgias and myalgias.   Skin: Negative for rash.   Psychiatric/Behavioral: Negative for mood changes.       History  Past Medical History:   Diagnosis Date     Aortic insufficiency       Erectile dysfunction      History of deep venous thrombosis 2019    distal DVT with extension, completed 6 months of AC (failed eliquis)     History of pelvic fracture      Hyperlipidemia      Low back pain     disc disease s/p laminectomy in past     Macular degeneration     on Avastin, R eye     Pancreatic cyst 2018    next MR due      Radius fracture     after fall from wall       Past Surgical History:   Procedure Laterality Date     CATARACT IOL, RT/LT       COMBINED REPAIR PTOSIS WITH BLEPHAROPLASTY BILATERAL Bilateral 2018    Procedure: COMBINED REPAIR PTOSIS WITH BLEPHAROPLASTY BILATERAL;  BILATERAL UPPER EYELIDS BLEPHAROPLASTY AND PTOSIS REPAIR ;  Surgeon: Anuj Good MD;  Location: [facility]     EYE SURGERY       LAMINECTOMY LUMBAR TWO LEVELS      L4-L5     RECESSION RESECTION (REPAIR STRABISMUS)  1949       Family History   Problem Relation Age of Onset     Cancer Father         bladder     Cancer Mother         unknown skin cancer     Skin Cancer Mother      Glaucoma No family hx of      Macular Degeneration No family hx of      Diabetes No family hx of      Hypertension No family hx of        Social History     Tobacco Use     Smoking status: Former     Packs/day: 2.00     Years: 2.00     Pack years: 4.00     Types: Cigarettes     Quit date: 1954     Years since quittin.9     Smokeless tobacco: Never   Vaping Use     Vaping status: Not on file   Substance Use Topics     Alcohol use: Yes     Comment: glass of wine with dinner        Objective  There were no vitals taken for this visit.  Vitals taken by Jaspreet Jacob MD    Physical Exam  Constitutional:       General: He is not in acute distress.     Appearance: He is ill-appearing. He is not toxic-appearing.   Pulmonary:      Effort: Pulmonary effort is normal.   Neurological:      Mental Status: He is alert.   Psychiatric:         Mood and Affect: Mood normal.         Thought Content: Thought content  normal.         13 minutes spent on the date of the encounter doing chart review, history and exam, documentation and further activities per the note.      Return if symptoms worsen or fail to improve.        Jaspreet Jacob MD  [facility] INTERNAL MEDICINE [place]

## 2023-05-11 NOTE — ASSESSMENT & PLAN NOTE
Day 2 of symptoms. Patient is over 65 year old.   - Will send Paxlovid for 5 days.   - Patient will need to stop use of Atorvastatin while you are on Paxlovid.   - Continue quarantining for at least 14 days, from start of symptoms. If needing to go in public, recommend a N95 mask and keep hands washed.   - If worsening shortness of breath, may need to present to ER.   - If coughing is worse, call clinic and ask for Dr. Jacob to send a cough suppressant (without codeine).

## 2023-05-25 ENCOUNTER — OFFICE VISIT (OUTPATIENT)
Dept: OPHTHALMOLOGY | Facility: CLINIC | Age: 88
End: 2023-05-25
Attending: OPHTHALMOLOGY
Payer: MEDICARE

## 2023-05-25 DIAGNOSIS — H35.3221 EXUDATIVE AGE-RELATED MACULAR DEGENERATION OF LEFT EYE WITH ACTIVE CHOROIDAL NEOVASCULARIZATION (H): Primary | ICD-10-CM

## 2023-05-25 DIAGNOSIS — H18.513 FUCHS' CORNEAL DYSTROPHY OF BOTH EYES: ICD-10-CM

## 2023-05-25 DIAGNOSIS — H53.15 VISUAL DISTORTIONS OF SHAPE AND SIZE: ICD-10-CM

## 2023-05-25 DIAGNOSIS — H18.513 FUCHS' CORNEAL DYSTROPHY OF BOTH EYES: Primary | ICD-10-CM

## 2023-05-25 PROCEDURE — 92134 CPTRZ OPH DX IMG PST SGM RTA: CPT | Performed by: OPHTHALMOLOGY

## 2023-05-25 PROCEDURE — G0463 HOSPITAL OUTPT CLINIC VISIT: HCPCS | Mod: 25 | Performed by: OPHTHALMOLOGY

## 2023-05-25 PROCEDURE — 67028 INJECTION EYE DRUG: CPT | Mod: LT | Performed by: OPHTHALMOLOGY

## 2023-05-25 PROCEDURE — 250N000011 HC RX IP 250 OP 636: Performed by: OPHTHALMOLOGY

## 2023-05-25 RX ADMIN — Medication 1.25 MG: at 13:26

## 2023-05-25 ASSESSMENT — REFRACTION_WEARINGRX
OD_SPHERE: -3.50
OS_CYLINDER: +2.75
OS_ADD: +2.50
SPECS_TYPE: BIFOCAL
OS_AXIS: 178
OS_SPHERE: -1.50
OD_ADD: +2.50
OD_CYLINDER: +2.25
OD_AXIS: 002

## 2023-05-25 ASSESSMENT — CONF VISUAL FIELD
OS_INFERIOR_TEMPORAL_RESTRICTION: 0
OD_INFERIOR_NASAL_RESTRICTION: 0
OD_SUPERIOR_TEMPORAL_RESTRICTION: 0
OS_SUPERIOR_NASAL_RESTRICTION: 0
OS_SUPERIOR_TEMPORAL_RESTRICTION: 0
OS_NORMAL: 1
METHOD: COUNTING FINGERS
OD_INFERIOR_TEMPORAL_RESTRICTION: 0
OD_NORMAL: 1
OD_SUPERIOR_NASAL_RESTRICTION: 0
OS_INFERIOR_NASAL_RESTRICTION: 0

## 2023-05-25 ASSESSMENT — VISUAL ACUITY
OD_CC+: -2
OS_CC+: -2
OS_CC: 20/30
OD_CC: 20/30
METHOD: SNELLEN - LINEAR
CORRECTION_TYPE: GLASSES

## 2023-05-25 ASSESSMENT — EXTERNAL EXAM - RIGHT EYE: OD_EXAM: NORMAL

## 2023-05-25 ASSESSMENT — TONOMETRY
OD_IOP_MMHG: 16
OS_IOP_MMHG: 15
IOP_METHOD: TONOPEN

## 2023-05-25 ASSESSMENT — SLIT LAMP EXAM - LIDS
COMMENTS: PTOSIS OS > OD
COMMENTS: PTOSIS OS > OD

## 2023-05-25 ASSESSMENT — EXTERNAL EXAM - LEFT EYE: OS_EXAM: ET

## 2023-05-25 NOTE — NURSING NOTE
Chief Complaints and History of Present Illnesses   Patient presents with     Follow Up     Exudative age-related macular degeneration of left eye with active choroidal neovascularization      Chief Complaint(s) and History of Present Illness(es)     Follow Up            Comments: Exudative age-related macular degeneration of left eye with active choroidal neovascularization           Comments    Pt states no change in VA since last visit  States no flashes, floaters , eye pain or redness    Tati Laura COT 12:26 PM May 25, 2023

## 2023-05-25 NOTE — PROGRESS NOTES
Chief Complaint(s) and History of Present Illness(es)     Follow Up            Comments: Exudative age-related macular degeneration of left eye with   active choroidal neovascularization           Comments    Pt states no change in VA since last visit  States no flashes, floaters , eye pain or redness    Tati Laura COT 12:26 PM May 25, 2023                      Review of systems for the eyes was negative other than the pertinent positives/negatives listed in the HPI.      Assessment & Plan      Tom Saini is a 88 year old male with the following diagnoses:   1. Exudative age-related macular degeneration of left eye with active choroidal neovascularization (H)    2. Fuchs' corneal dystrophy of both eyes    3. Visual distortions of shape and size         S/P Avastin x many   Vision seems stable.  Using night time [employer] [place]   K look more compact both eyes today  Doing well with Q3 mo avastin left eye   OCT mac stable both eyes with minimal subretinal fluid left eye   RBA to continued Q3mo avastin left eye discussed, consent obtained, will proceed today.   Return precautions reviewed     Patient disposition:   Return in about 3 months (around 8/25/2023) for DFE, OCT Macula.           Attending Physician Attestation:  Complete documentation of historical and exam elements from today's encounter can be found in the full encounter summary report (not reduplicated in this progress note).  I personally obtained the chief complaint(s) and history of present illness.  I confirmed and edited as necessary the review of systems, past medical/surgical history, family history, social history, and examination findings as documented by others; and I examined the patient myself.  I personally reviewed the relevant tests, images, and reports as documented above.  I formulated and edited as necessary the assessment and plan and discussed the findings and management plan with the patient and family. . - Brigido Laura MD

## 2023-05-26 ENCOUNTER — TELEPHONE (OUTPATIENT)
Dept: OPHTHALMOLOGY | Facility: CLINIC | Age: 88
End: 2023-05-26
Payer: MEDICARE

## 2023-05-26 ENCOUNTER — OFFICE VISIT (OUTPATIENT)
Dept: OPHTHALMOLOGY | Facility: CLINIC | Age: 88
End: 2023-05-26
Attending: STUDENT IN AN ORGANIZED HEALTH CARE EDUCATION/TRAINING PROGRAM
Payer: MEDICARE

## 2023-05-26 DIAGNOSIS — H04.122 DRY EYE OF LEFT SIDE: Primary | ICD-10-CM

## 2023-05-26 PROCEDURE — G0463 HOSPITAL OUTPT CLINIC VISIT: HCPCS | Performed by: STUDENT IN AN ORGANIZED HEALTH CARE EDUCATION/TRAINING PROGRAM

## 2023-05-26 PROCEDURE — 92014 COMPRE OPH EXAM EST PT 1/>: CPT | Mod: GC | Performed by: STUDENT IN AN ORGANIZED HEALTH CARE EDUCATION/TRAINING PROGRAM

## 2023-05-26 RX ORDER — CHOLECALCIFEROL (VITAMIN D3) 25 MCG
CAPSULE ORAL DAILY
COMMUNITY

## 2023-05-26 ASSESSMENT — TONOMETRY
IOP_METHOD: TONOPEN
OD_IOP_MMHG: 18
OS_IOP_MMHG: 13

## 2023-05-26 ASSESSMENT — CONF VISUAL FIELD
OD_SUPERIOR_TEMPORAL_RESTRICTION: 0
OD_NORMAL: 1
OS_SUPERIOR_NASAL_RESTRICTION: 3
OD_SUPERIOR_NASAL_RESTRICTION: 0
OD_INFERIOR_TEMPORAL_RESTRICTION: 0
OD_INFERIOR_NASAL_RESTRICTION: 0

## 2023-05-26 ASSESSMENT — REFRACTION_WEARINGRX
OD_CYLINDER: +2.25
OD_SPHERE: -3.50
OS_ADD: +2.50
OS_CYLINDER: +2.75
OD_AXIS: 002
SPECS_TYPE: BIFOCAL
OD_ADD: +2.50
OS_AXIS: 178
OS_SPHERE: -1.50

## 2023-05-26 ASSESSMENT — VISUAL ACUITY
CORRECTION_TYPE: GLASSES
OD_CC: 20/40
OD_PH_CC: 20/40
OS_CC: 20/70
OS_PH_CC: 20/60
OD_PH_CC+: +2
METHOD: SNELLEN - LINEAR
OS_PH_CC+: -1
OS_CC+: -2

## 2023-05-26 ASSESSMENT — EXTERNAL EXAM - LEFT EYE: OS_EXAM: ET

## 2023-05-26 ASSESSMENT — EXTERNAL EXAM - RIGHT EYE: OD_EXAM: NORMAL

## 2023-05-26 ASSESSMENT — CUP TO DISC RATIO: OS_RATIO: 0.2

## 2023-05-26 NOTE — LETTER
5/26/2023       RE: Tom Saini  [address] [facility] [address]     Dear Colleague,    Thank you for referring your patient, Tom Saini, to the [facility] [place] at [facility]. Please see a copy of my visit note below.    HPI    Eye pain LE since about 2 hours after injection LE yesterday. LE is sore and red today. FB sensation last night.  No flashes or floaters. No discharge.    PATO Butler May 26, 2023 10:25 AM        Last edited by Alma Hermosillo COMT on 5/26/2023 10:25 AM.          Review of systems for the eyes was negative other than the pertinent positives/negatives listed in the HPI.    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;   - pseudophakia OU;   - history of BUL blepharoplasty/MMCR;  - Strabismic amblyopia, right eye,   - Longstanding (since childhood), s/p strab surgery in 1947;  - Hx of retinal tears, both eyes; Hx of myopia prior to cataract surgery; S/p cryo OD and laser OS    FOHx: no family history of glaucoma or blindness    SocHx: former smoker    PMHx:   Past Medical History:   Diagnosis Date     Aortic insufficiency      Erectile dysfunction      History of deep venous thrombosis 05/2019    distal DVT with extension, completed 6 months of AC (failed eliquis)     History of pelvic fracture      Hyperlipidemia      Low back pain     disc disease s/p laminectomy in past     Macular degeneration     on Avastin, R eye     Pancreatic cyst 07/2018    next MR due 12/23     Radius fracture 2018    after fall from wall       Assessment & Plan     Tom Saini is a 88 year old male with the following diagnoses:    1. Dry eye of left side       Patient with post injection discomfort which resolves with instillation of proparacaine  No new abrasion/erosions/ or ulcers.   No signs of endophthalmitis or infectious process    Advised use of artificial  tear gel or ointment at bedtime and frequent preservative free artificial tears until symptoms improve than can back off frequency.   Counseled return/RD/endophthalmitis precautions    Seen with Dr Alfonso.    Valeri Plummer MD  Ophthalmology Resident PGY-4  [facility]     Patient disposition:   Return for Follow Up as scheduled with Dr Laura , sooner if needed.    Attending Physician Attestation:  Complete documentation of historical and exam elements from today's encounter can be found in the full encounter summary report (not reduplicated in this progress note).  I personally obtained the chief complaint(s) and history of present illness.  I confirmed and edited as necessary the review of systems, past medical/surgical history, family history, social history, and examination findings as documented by others; and I examined the patient myself.  I personally reviewed the relevant tests, images, and reports as documented above.  I formulated and edited as necessary the assessment and plan and discussed the findings and management plan with the patient and family. . - Ibeth Alfonso MD          Again, thank you for allowing me to participate in the care of your patient.      Sincerely,    Valeri Plummer MD

## 2023-05-26 NOTE — TELEPHONE ENCOUNTER
Spoke to pt at 0824    Pt with concerning vision loss following intravitreal injection yesterday (pt states blurry vision typically improves by following day)    Pt reporting redness on white part of eye also (?blood on white part of eye following injection/subconjuctival hemorrhage)    Scheduled in acute clinic this morning with Dr. Plummer    Pt aware of date/time/location at [facility]    Josh Araiza RN 8:26 AM 05/26/23            [facility] Call Center    Phone Message    May a detailed message be left on voicemail: yes     Reason for Call: Other: Pt states his injection from yesterday has not cleared up yet and he has some concerns. Pt says usually within 24 hours his eye clears up but states the eye is still a bit blurry and seems irritated. Please reach out to pt and advise. Thank You!     Action Taken: Message routed to:  [facility] eye    Travel Screening: Not Applicable

## 2023-05-26 NOTE — NURSING NOTE
Chief Complaints and History of Present Illnesses   Patient presents with     Eye Pain Left Eye     Chief Complaint(s) and History of Present Illness(es)     Eye Pain Left Eye           Comments    Eye pain LE since about 2 hours after injection LE yesterday. LE is sore and red today. FB sensation last night.  No flashes or floaters. No discharge.    Alma Hermosillo  [facility] May 26, 2023 10:25 AM

## 2023-06-01 ENCOUNTER — HEALTH MAINTENANCE LETTER (OUTPATIENT)
Age: 88
End: 2023-06-01

## 2023-06-04 ASSESSMENT — SLIT LAMP EXAM - LIDS
COMMENTS: NORMAL
COMMENTS: NORMAL

## 2023-06-15 ENCOUNTER — TELEPHONE (OUTPATIENT)
Dept: INTERNAL MEDICINE | Facility: CLINIC | Age: 88
End: 2023-06-15

## 2023-06-15 ENCOUNTER — VIRTUAL VISIT (OUTPATIENT)
Dept: INTERNAL MEDICINE | Facility: CLINIC | Age: 88
End: 2023-06-15
Payer: MEDICARE

## 2023-06-15 DIAGNOSIS — R19.7 DIARRHEA, UNSPECIFIED TYPE: Primary | ICD-10-CM

## 2023-06-15 PROCEDURE — 99442 PR PHYSICIAN TELEPHONE EVALUATION 11-20 MIN: CPT | Mod: 95 | Performed by: HOSPITALIST

## 2023-06-15 ASSESSMENT — ENCOUNTER SYMPTOMS
HEMATOCHEZIA: 0
DIARRHEA: 1
CHILLS: 0
MYALGIAS: 0
FEVER: 0
ARTHRALGIAS: 0
ABDOMINAL PAIN: 0

## 2023-06-15 NOTE — TELEPHONE ENCOUNTER
RN returned call to patient who states that he is having diarrhea, drinking plenty of fluids, and it has been going on for 6 days. Pt encouraged to speak with a provider, offered phone visit with Dr. Jacob today, pt accepted. RN scheduled patient for phone visit with Dr. Jacob.    TIFF PARSON RN on 6/15/2023 at 1:00 PM

## 2023-06-15 NOTE — PATIENT INSTRUCTIONS
Thank you for visiting the Primary Care Center today at the [facility]! The following is some information about our clinic:     - Would take metamucil 1 teaspoon if diarrhea continues.   - Stay hydrated.   - If you have blood in the stools, fevers, or abdominal pains then I would evaluate further.     Follow up as needed for now.     Primary Care Center Frequently-Asked Questions    (1) How do I schedule appointments at the [facility]?     Primary Care--to schedule or make changes to an existing appointment, please call our primary care line at [phone].    Labs--to schedule a lab appointment at the [facility] you can use [employer] or call [phone]. If you have a [place] location that is closer to home, you can reach out to that location for scheduling options.     Imaging--if you need to schedule a CT, X-ray, MRI, ultrasound, or other imaging study you can call [phone] to schedule at the [facility] or any other [facility] imaging location.     Referrals--if a referral to another specialty was ordered you can expect a phone call from their scheduling team. If you have not heard from them in a week, please call us or send us a [employer] message to check the status or get a scheduling number. Please note that this only applies to internal [facility] referrals. If the referral is external you would need to contact their office for scheduling.     (2) I have a question about my visit, who do I contact?     You can call us at the primary care line at [phone] to ask questions about your visit. You can also send a secure message through [employer], which is reviewed by clinic staff. Please note that [employer] messages have a twenty-four to forty-eight business hour turnaround time and should not be used for urgent concerns.    (3) How will I get the results of my tests?    If you are signed up for [employer] all tests will be released to you  within twenty-four hours of resulting. Please allow three to five days for your doctor to review your results and place a note interpreting the results. If you do not have [email or web address] you will receive your results through mail seven to ten business days following the return of the tests. Please note that if there should be any urgent or concerning results that your doctor or their registered nurse will reach out to you the same day as the tests come back. If you have follow up questions about your results or would like to discuss the results in detail please schedule a follow up with your provider either in person or virtually.     (4) How do I get refills of my prescriptions?     You should always first contact your pharmacy for refills of your medications. If submitting a refill request on [employer], please be sure to submit the request only once--repeat requests can cause delays in refill. If you are requesting a NEW medication or a medication related to new symptoms you will need to schedule an appointment with a provider prior to approval. Please note: Routine medication refills have up to one to three business day turnaround whereas controlled substances refills have up to five to seven business day turnaround.    (5) I have new symptoms, what do I do?     If you are having an immediate medical emergency, you should dial 911 for assistance.   For anything urgent that needs to be seen within a few hours to one day you should visit a local urgent care for assistance.  For non-urgent symptoms that need to be seen within a few days to a week you can schedule with an available provider in primary care by going to [employer] or calling [phone].   If you are not sure how serious your symptoms are or you would like to receive medical advice you can always call [phone] to speak with a triage nurse.

## 2023-06-15 NOTE — ASSESSMENT & PLAN NOTE
Mentions pellets with some watery changes of his stools, going about 8 times a day the past week. No abdominal pains, bodyaches, fevers, blood in his stools. Has not been exposed to outdoor water lately, no pets at home. No other sick contacts.   - Would take metamucil 1 teaspoon a day if diarrhea continues.   - Stay hydrated.   - If you have blood in the stools, fevers, or abdominal pains then I would evaluate further.

## 2023-06-15 NOTE — NURSING NOTE
Is the patient currently in the state of [place]? YES    Visit mode:TELEPHONE    If the visit is dropped, the patient can be reconnected by: TELEPHONE VISIT: Phone number: [phone]    Will anyone else be joining the visit? NO      How would you like to obtain your AVS? MyChart    Are changes needed to the allergy or medication list? NO    Reason for visit: JIM NAVARRO

## 2023-06-15 NOTE — TELEPHONE ENCOUNTER
[facility] Call Center    Phone Message    May a detailed message be left on voicemail: yes     Reason for Call: Symptoms or Concerns     If patient has red-flag symptoms, warm transfer to triage line    Current symptom or concern: diarrhea    Symptoms have been present for:  6 day(s)    Are there any new or worsening symptoms? Yes: Pt requesting call back to discuss. Pt stated he has been taking imodium but it is not helping

## 2023-06-15 NOTE — PROGRESS NOTES
Assessment/Plan  Problem List Items Addressed This Visit        Other    Diarrhea, unspecified type - Primary     Mentions pellets with some watery changes of his stools, going about 8 times a day the past week. No abdominal pains, bodyaches, fevers, blood in his stools. Has not been exposed to outdoor water lately, no pets at home. No other sick contacts.   - Would take metamucil 1 teaspoon a day if diarrhea continues.   - Stay hydrated.   - If you have blood in the stools, fevers, or abdominal pains then I would evaluate further.             No results found for any visits on 06/15/23.    Health Maintenance Due   Topic Date Due     ADVANCE CARE PLANNING  Never done     COVID-19 Vaccine (6 - Pfizer series) 02/03/2023     MEDICARE ANNUAL WELLNESS VISIT  04/07/2023     FALL RISK ASSESSMENT  05/12/2023     Telephone visit  Time in: 4:27pm  Time out: 4:44pm  Patient location: Home  Provider location: Off site in [place], [place]  Platform: Telephone    Subjective  Patient mentions he had to continue go to the restroom for a while, about 1 week. Mentions bowel is more like congealed, small pellets. He was asked if he is dehydrated and he doesn't know. He does not getting thirst. May has slight dry mouth but not lightheaded. Mentions some watery changes. May have . Urinating well. Wife is in [place] right now. Mentions some foods such as squashes that leads to this. Also cinnamon, pumpkin pie, and edinson. However, he is starting to feel better today where he hasn't had to go. No bodyaches, no fevers. Went to his 71th reunion last weekend, but had problem before he went. Mentions he's had some leakage. No pains along rectal. No pets at home. Hasn't been outdoor galindo in the past few weeks. No blood in the stools. No abdominal pains.        Review of Systems   Constitutional: Negative for chills and fever.   Gastrointestinal: Positive for diarrhea. Negative for abdominal pain and hematochezia.   Musculoskeletal:  Negative for arthralgias and myalgias.   Psychiatric/Behavioral: Negative for mood changes.       History  Past Medical History:   Diagnosis Date     Aortic insufficiency      Erectile dysfunction      History of deep venous thrombosis 2019    distal DVT with extension, completed 6 months of AC (failed eliquis)     History of pelvic fracture      Hyperlipidemia      Low back pain     disc disease s/p laminectomy in past     Macular degeneration     on Avastin, R eye     Pancreatic cyst 2018    next MR due      Radius fracture     after fall from wall       Past Surgical History:   Procedure Laterality Date     CATARACT IOL, RT/LT       COMBINED REPAIR PTOSIS WITH BLEPHAROPLASTY BILATERAL Bilateral 2018    Procedure: COMBINED REPAIR PTOSIS WITH BLEPHAROPLASTY BILATERAL;  BILATERAL UPPER EYELIDS BLEPHAROPLASTY AND PTOSIS REPAIR ;  Surgeon: Anuj Good MD;  Location: [facility]     EYE SURGERY       LAMINECTOMY LUMBAR TWO LEVELS      L4-L5     RECESSION RESECTION (REPAIR STRABISMUS)         Family History   Problem Relation Age of Onset     Cancer Father         bladder     Cancer Mother         unknown skin cancer     Skin Cancer Mother      Glaucoma No family hx of      Macular Degeneration No family hx of      Diabetes No family hx of      Hypertension No family hx of        Social History     Tobacco Use     Smoking status: Former     Packs/day: 2.00     Years: 2.00     Pack years: 4.00     Types: Cigarettes     Quit date: 1954     Years since quittin.0     Smokeless tobacco: Never   Vaping Use     Vaping status: Not on file   Substance Use Topics     Alcohol use: Yes     Comment: glass of wine with dinner        Objective  There were no vitals taken for this visit.  Vitals taken by Jaspreet Jacob MD    Physical Exam  Pulmonary:      Effort: Pulmonary effort is normal.   Neurological:      Mental Status: He is alert.   Psychiatric:         Mood and Affect: Mood  normal.         Thought Content: Thought content normal.         17 minutes spent on the date of the encounter doing chart review, history and exam, documentation and further activities per the note.      Return if symptoms worsen or fail to improve.      Jaspreet Jacob MD  [facility] INTERNAL MEDICINE [place]

## 2023-06-18 ENCOUNTER — ANCILLARY PROCEDURE (OUTPATIENT)
Dept: MRI IMAGING | Facility: CLINIC | Age: 88
End: 2023-06-18
Attending: INTERNAL MEDICINE
Payer: MEDICARE

## 2023-06-18 DIAGNOSIS — D49.0 IPMN (INTRADUCTAL PAPILLARY MUCINOUS NEOPLASM): ICD-10-CM

## 2023-06-18 PROCEDURE — G1010 CDSM STANSON: HCPCS | Mod: GC | Performed by: RADIOLOGY

## 2023-06-18 PROCEDURE — 74183 MRI ABD W/O CNTR FLWD CNTR: CPT | Mod: MG | Performed by: RADIOLOGY

## 2023-06-18 PROCEDURE — A9585 GADOBUTROL INJECTION: HCPCS | Performed by: RADIOLOGY

## 2023-06-18 RX ORDER — GADOBUTROL 604.72 MG/ML
7.5 INJECTION INTRAVENOUS ONCE
Status: COMPLETED | OUTPATIENT
Start: 2023-06-18 | End: 2023-06-18

## 2023-06-18 RX ADMIN — GADOBUTROL 7.5 ML: 604.72 INJECTION INTRAVENOUS at 13:40

## 2023-06-20 ENCOUNTER — TELEPHONE (OUTPATIENT)
Dept: INTERNAL MEDICINE | Facility: CLINIC | Age: 88
End: 2023-06-20
Payer: MEDICARE

## 2023-06-20 DIAGNOSIS — R93.5 ABNORMAL MRI OF ABDOMEN: Primary | ICD-10-CM

## 2023-06-20 DIAGNOSIS — R19.7 DIARRHEA, UNSPECIFIED TYPE: Primary | ICD-10-CM

## 2023-06-20 DIAGNOSIS — K86.89 PANCREATIC INSUFFICIENCY: ICD-10-CM

## 2023-06-20 LAB — RADIOLOGIST FLAGS: NORMAL

## 2023-06-20 NOTE — TELEPHONE ENCOUNTER
Discussed CT results with patient.  Plan for possibly colonoscopy to evaluate sigmoid mass.  Pancreatic duct seems to be obstructed by stone rather than malignancy. Patient having some diarrhea x 2 weeks.  Will recommend stool testing for pancreatic insufficiency.  Angeli Robertson MD  Internal Medicine  06/22/23      [facility] Call Center    Phone Message    May a detailed message be left on voicemail: yes     Reason for Call: Other: Patient requesting a call back to discuss test results from appt 6/18/23. Thank you     Action Taken: Message routed to:  [facility] ([facility]): [facility]    Travel Screening: Not Applicable

## 2023-06-20 NOTE — TELEPHONE ENCOUNTER
Discussed results with patient on phone, most concerning on MRI was pancreatic ductal abnormalities and possible sigmoid mass.  Discussed getting CT CAP for further staging in next 2-3 days and will consider flex sig/EUS for tissue diagnosis.  Patient is interested in pursuing work up.   Angeli Robertson MD  Internal Medicine

## 2023-06-21 ENCOUNTER — ANCILLARY PROCEDURE (OUTPATIENT)
Dept: CT IMAGING | Facility: CLINIC | Age: 88
End: 2023-06-21
Attending: INTERNAL MEDICINE
Payer: MEDICARE

## 2023-06-21 DIAGNOSIS — R93.5 ABNORMAL MRI OF ABDOMEN: ICD-10-CM

## 2023-06-21 LAB
CREAT BLD-MCNC: 1.1 MG/DL (ref 0.7–1.3)
GFR SERPL CREATININE-BSD FRML MDRD: >60 ML/MIN/1.73M2

## 2023-06-21 PROCEDURE — 74177 CT ABD & PELVIS W/CONTRAST: CPT | Mod: MG | Performed by: RADIOLOGY

## 2023-06-21 PROCEDURE — 71260 CT THORAX DX C+: CPT | Mod: MG | Performed by: RADIOLOGY

## 2023-06-21 PROCEDURE — 82565 ASSAY OF CREATININE: CPT | Performed by: PATHOLOGY

## 2023-06-21 PROCEDURE — G1010 CDSM STANSON: HCPCS | Performed by: RADIOLOGY

## 2023-06-21 RX ORDER — IOPAMIDOL 755 MG/ML
96 INJECTION, SOLUTION INTRAVASCULAR ONCE
Status: COMPLETED | OUTPATIENT
Start: 2023-06-21 | End: 2023-06-21

## 2023-06-21 RX ADMIN — IOPAMIDOL 96 ML: 755 INJECTION, SOLUTION INTRAVASCULAR at 11:33

## 2023-06-21 NOTE — TELEPHONE ENCOUNTER
Patient calling regarding the results from is CT scan today, he would like to speak to Dr. Robertson about this.

## 2023-06-22 ENCOUNTER — LAB (OUTPATIENT)
Dept: LAB | Facility: CLINIC | Age: 88
End: 2023-06-22
Payer: MEDICARE

## 2023-06-22 DIAGNOSIS — R19.7 DIARRHEA, UNSPECIFIED TYPE: ICD-10-CM

## 2023-06-22 LAB — LACTOFERRIN STL QL IA: NEGATIVE

## 2023-06-22 PROCEDURE — 83630 LACTOFERRIN FECAL (QUAL): CPT | Performed by: INTERNAL MEDICINE

## 2023-06-22 PROCEDURE — 99000 SPECIMEN HANDLING OFFICE-LAB: CPT | Performed by: PATHOLOGY

## 2023-06-22 PROCEDURE — 83993 ASSAY FOR CALPROTECTIN FECAL: CPT | Performed by: INTERNAL MEDICINE

## 2023-06-22 PROCEDURE — 82653 EL-1 FECAL QUANTITATIVE: CPT | Performed by: INTERNAL MEDICINE

## 2023-06-23 ENCOUNTER — TELEPHONE (OUTPATIENT)
Dept: GASTROENTEROLOGY | Facility: CLINIC | Age: 88
End: 2023-06-23
Payer: MEDICARE

## 2023-06-23 ENCOUNTER — TELEPHONE (OUTPATIENT)
Dept: INTERNAL MEDICINE | Facility: CLINIC | Age: 88
End: 2023-06-23

## 2023-06-23 DIAGNOSIS — R93.89 ABNORMAL FINDING ON IMAGING: Primary | ICD-10-CM

## 2023-06-23 DIAGNOSIS — K63.89 COLONIC MASS: ICD-10-CM

## 2023-06-23 LAB
CALPROTECTIN STL-MCNT: 51.7 MG/KG (ref 0–49.9)
ELASTASE PANC STL-MCNT: 135 UG/G

## 2023-06-23 NOTE — TELEPHONE ENCOUNTER
"Endoscopy Scheduling Screen    Have you had a positive Covid test in the last 14 days?  No    Are you active on MyChart?   Yes    What insurance is in the chart?  Other:  MEDICARE    Ordering/Referring Provider: LOGEAIS   (If ordering provider performs procedure, schedule with ordering provider unless otherwise instructed. )    BMI: Estimated body mass index is 25.07 kg/m  as calculated from the following:    Height as of 10/7/22: 1.778 m (5' 10\").    Weight as of 4/26/23: 79.2 kg (174 lb 11.2 oz).     Sedation Ordered  moderate sedation.   If patient BMI > 50 do not schedule in [facility].    Are you taking any prescription medications for pain?   No    Are you taking methadone or Suboxone?  No    Do you have a history of malignant hyperthermia or adverse reaction to anesthesia?  No    (Females) Are you currently pregnant?   No     Have you been diagnosed or told you have pulmonary hypertension?   No    Do you have an LVAD?  No    Have you been told you have moderate to severe sleep apnea?  No    Have you been told you have COPD, asthma, or any other lung disease?  No    Do you have any heart conditions?  No     Have you ever had or are you awaiting a heart or lung transplant?   No    Have you had a stroke or transient ischemic attack (TIA aka \"mini stroke\" in the last 6 months?   No    Have you been diagnosed with or been told you have cirrhosis of the liver?   No    Are you currently on dialysis?   No    Do you need assistance transferring?   No    BMI: Estimated body mass index is 25.07 kg/m  as calculated from the following:    Height as of 10/7/22: 1.778 m (5' 10\").    Weight as of 4/26/23: 79.2 kg (174 lb 11.2 oz).     Is patients BMI > 40 and scheduling location [facility]?  No    Do you take the medication Phentermine, Ozempic or Wegovy?  No    Do you take the medication Naltrexone?  No    Do you take blood thinners?  No      Prep   Are you currently on dialysis or do you have chronic kidney disease?  No    Do you have a " diagnosis of diabetes?  No    Do you have a diagnosis of cystic fibrosis (CF)?  No    On a regular basis do you go 3 -5 days between bowel movements?  No    BMI > 40?  No    Preferred Pharmacy:    [id number] [address] AT [facility]  [address] [place] [address]  Phone: [phone] Fax: [phone]      Final Scheduling Details   Colonoscopy prep sent?  MiraLAX (No Mag Citrate)    Procedure scheduled  Colonoscopy    Surgeon:  MARYANA     Date of procedure:  6/30     Schedule PAC:   No    Location  [facility]    Sedation   Moderate Sedation    Patient Reminders:    You will receive a call from a Nurse to review instructions and health history.  This assessment must be completed prior to your procedure.  Failure to complete the Nurse assessment may result in the procedure being cancelled.       On the day of your procedure, please designate an adult(s) who can drive you home stay with you for the next 24 hours. The medicines used in the exam will make you sleepy. You will not be able to drive.       You cannot take public transportation, ride share services, or non-medical taxi service without a responsible caregiver.  Medical transport services are allowed with the requirement that a responsible caregiver will receive you at your destination.  We require that drivers and caregivers are confirmed prior to your procedure.

## 2023-06-23 NOTE — TELEPHONE ENCOUNTER
"  Pre Assessment Chart Review  Scheduling Recommendations:    Procedure:    Colonoscopy      Sedation:   Moderate Sedation    Location/Preferred location per order:   No facility restrictions     Pre op required?   Determined by hospital requirement    Bowel prep recommendation:   BMI: Estimated body mass index is 25.07 kg/m  as calculated from the following:    Height as of 10/7/22: 1.778 m (5' 10\").    Weight as of 4/26/23: 79.2 kg (174 lb 11.2 oz).     Miralax prep without magnesium citrate     Medication HOLDING Reminders:  N/A    Additional information:  N/A    ----------------------------------------------------------------------------------------------------------    Order Review:    Sedation ordered: Moderate Sedation    Ordering provider: Angeli Robertson MD    Indication/additional information: urgent colonoscopy , Ind - sigmoid mass    ----------------------------------------------------------------------------------------------------------    Cardiac Review:  Cardiac history reviewed. Aortic valve disorder. LVEF within normal limits      Pulmonary Review:  Pulmonary history reviewed.       Sleep apnea/CESAR Review  No sleep apnea documented upon chart review.      Stroke/TIA Review  No documentation of recent TIA/Stroke in the last 6 months.      Hepatology Review  Hepatology history reviewed.       Renal Review:  Renal history reviewed.       Diabetes?  No       Medication Review:  Medications reviewed.   Please note holding recommendations above if applicable.       Louise Kendall RN  Endoscopy Procedure Pre Assessment RN       "

## 2023-06-23 NOTE — TELEPHONE ENCOUNTER
Per Judith and Dr. Stovall, offer 6/28 opening to get patient in sooner.     Nabila Mota, Damien Ray Dr said she wanted us to try our best to give him an earlier time if possible.      ----- Message -----   From: Damien Macias   Sent: 6/23/2023   2:38 PM CDT   To: Nabila Mota RN   Subject: RE: Urgent colonoscopy with Gen GI needed         We may be able to do 6/28 at the [facility], but 6/30 would be the best to give him time to follow the dietary restrictions and make sure he is cleared out all the way. I'd hate for him to rush it and have to repeat the process.     Ramana TAYLOR   ----- Message -----   From: Nabila Mota RN   Sent: 6/23/2023   2:24 PM CDT   To: Damien Macias   Subject: RE: Urgent colonoscopy with Gen GI needed           So, this patient just called in asking for an earlier procedure date. Dr Robertson forwarded to me, not that I have any say in it, but anything you can do for him?     Judith     ----- Message -----   From: Damien Macias   Sent: 6/23/2023   2:23 PM CDT   To: Nabila Mota RN   Subject: RE: Urgent colonoscopy with Gen GI needed         Scheduled wit Dr. Buitrago on 6/30!     Ramana TAYLOR   ----- Message -----   From: Nabila Mota RN   Sent: 6/23/2023  10:36 AM CDT   To: Endoscopy Scheduling Pool   Subject: Urgent colonoscopy with Gen GI needed             Hi team     I got a message passed down to me from Dr Angeli Robertson, she wants this patient to have a colonoscopy ASAP for a recent imaging finding in his sigmoid colon. This can be done by any General GI provider. Pt is aware of the procedure being recommended. Order has been placed.     Thank you   Judith      Final Scheduling Details   Colonoscopy prep sent?  MiraLAX (No Mag Citrate)    Procedure scheduled  Colonoscopy    Surgeon:  Supa     Date of procedure:  6/28/2023     Schedule PAC:   No    Location  [facility]    Sedation   Moderate Sedation    Patient Reminders:    You will receive a call from a Nurse  to review instructions and health history.  This assessment must be completed prior to your procedure.  Failure to complete the Nurse assessment may result in the procedure being cancelled.       On the day of your procedure, please designate an adult(s) who can drive you home stay with you for the next 24 hours. The medicines used in the exam will make you sleepy. You will not be able to drive.       You cannot take public transportation, ride share services, or non-medical taxi service without a responsible caregiver.  Medical transport services are allowed with the requirement that a responsible caregiver will receive you at your destination.  We require that drivers and caregivers are confirmed prior to your procedure.

## 2023-06-23 NOTE — TELEPHONE ENCOUNTER
[facility] Call Center    Phone Message    May a detailed message be left on voicemail: yes     Reason for Call: Other: They were unable to get patient in for  Colonoscopy until 6/30/23. He would like to know if Dr. Robertson could help get him sooner given is possible Cancer Diagnosis?    Action Taken: Other: PCC    Travel Screening: Not Applicable

## 2023-06-23 NOTE — TELEPHONE ENCOUNTER
Pre assessment completed for upcoming procedure.    Procedure details:    Patient scheduled for Colonoscopy  on 6/28/23.    Arrival time: 0915. Procedure time 1015    Pre op exam needed? N/A    [facility] [address] [place] [address], 5[address] Floor, [place] [address]    Sedation type: Conscious sedation     Indication for procedure:   Abnormal finding on imaging   Colonic mass         COVID policy reviewed.    Designated  policy reviewed. Instructed to have someone stay 6 hours post procedure.       Chart review:     Electronic implanted devices? No    Diabetic? No          Medication review:    Anticoagulants? No    NSAIDS? No    Other medication HOLDING recommendations:  N/A      Prep for procedure:     Bowel prep recommendation: Miralax prep without magnesium citrate    Prep instructions sent via [employer] [facility] Bowel prep script sent to [facility] #[id number] [place], [place] - [address] AT [facility]    Reviewed procedure prep instructions.     Patient verbalized understanding and had no questions or concerns at this time.        Louise Kendall RN  Endoscopy Procedure Pre Assessment RN  [phone] option 4

## 2023-06-27 ENCOUNTER — NURSE TRIAGE (OUTPATIENT)
Dept: NURSING | Facility: CLINIC | Age: 88
End: 2023-06-27
Payer: MEDICARE

## 2023-06-27 ENCOUNTER — HOSPITAL ENCOUNTER (EMERGENCY)
Facility: CLINIC | Age: 88
Discharge: HOME | End: 2023-06-28
Attending: STUDENT IN AN ORGANIZED HEALTH CARE EDUCATION/TRAINING PROGRAM | Admitting: STUDENT IN AN ORGANIZED HEALTH CARE EDUCATION/TRAINING PROGRAM
Payer: MEDICARE

## 2023-06-27 DIAGNOSIS — R19.7 VOMITING AND DIARRHEA: ICD-10-CM

## 2023-06-27 DIAGNOSIS — R42 DIZZINESS: ICD-10-CM

## 2023-06-27 DIAGNOSIS — R11.10 VOMITING AND DIARRHEA: ICD-10-CM

## 2023-06-27 LAB
ALBUMIN SERPL BCG-MCNC: 4 G/DL (ref 3.5–5.2)
ALP SERPL-CCNC: 98 U/L (ref 40–129)
ALT SERPL W P-5'-P-CCNC: 12 U/L (ref 0–70)
ANION GAP SERPL CALCULATED.3IONS-SCNC: 11 MMOL/L (ref 7–15)
AST SERPL W P-5'-P-CCNC: 18 U/L (ref 0–45)
BILIRUB SERPL-MCNC: 0.4 MG/DL
BUN SERPL-MCNC: 16.9 MG/DL (ref 8–23)
CALCIUM SERPL-MCNC: 9 MG/DL (ref 8.8–10.2)
CHLORIDE SERPL-SCNC: 105 MMOL/L (ref 98–107)
CREAT SERPL-MCNC: 1.02 MG/DL (ref 0.67–1.17)
DEPRECATED HCO3 PLAS-SCNC: 25 MMOL/L (ref 22–29)
GFR SERPL CREATININE-BSD FRML MDRD: 71 ML/MIN/1.73M2
GLUCOSE SERPL-MCNC: 113 MG/DL (ref 70–99)
POTASSIUM SERPL-SCNC: 4.2 MMOL/L (ref 3.4–5.3)
PROT SERPL-MCNC: 7.2 G/DL (ref 6.4–8.3)
SODIUM SERPL-SCNC: 141 MMOL/L (ref 136–145)

## 2023-06-27 PROCEDURE — 250N000013 HC RX MED GY IP 250 OP 250 PS 637: Performed by: STUDENT IN AN ORGANIZED HEALTH CARE EDUCATION/TRAINING PROGRAM

## 2023-06-27 PROCEDURE — 82310 ASSAY OF CALCIUM: CPT | Performed by: STUDENT IN AN ORGANIZED HEALTH CARE EDUCATION/TRAINING PROGRAM

## 2023-06-27 PROCEDURE — 84484 ASSAY OF TROPONIN QUANT: CPT | Performed by: EMERGENCY MEDICINE

## 2023-06-27 PROCEDURE — 96374 THER/PROPH/DIAG INJ IV PUSH: CPT | Performed by: STUDENT IN AN ORGANIZED HEALTH CARE EDUCATION/TRAINING PROGRAM

## 2023-06-27 PROCEDURE — 96361 HYDRATE IV INFUSION ADD-ON: CPT | Performed by: STUDENT IN AN ORGANIZED HEALTH CARE EDUCATION/TRAINING PROGRAM

## 2023-06-27 PROCEDURE — 85004 AUTOMATED DIFF WBC COUNT: CPT | Performed by: STUDENT IN AN ORGANIZED HEALTH CARE EDUCATION/TRAINING PROGRAM

## 2023-06-27 PROCEDURE — 99284 EMERGENCY DEPT VISIT MOD MDM: CPT | Mod: 25 | Performed by: STUDENT IN AN ORGANIZED HEALTH CARE EDUCATION/TRAINING PROGRAM

## 2023-06-27 PROCEDURE — 99284 EMERGENCY DEPT VISIT MOD MDM: CPT | Performed by: STUDENT IN AN ORGANIZED HEALTH CARE EDUCATION/TRAINING PROGRAM

## 2023-06-27 PROCEDURE — 84450 TRANSFERASE (AST) (SGOT): CPT | Performed by: STUDENT IN AN ORGANIZED HEALTH CARE EDUCATION/TRAINING PROGRAM

## 2023-06-27 PROCEDURE — 250N000011 HC RX IP 250 OP 636: Mod: JZ | Performed by: STUDENT IN AN ORGANIZED HEALTH CARE EDUCATION/TRAINING PROGRAM

## 2023-06-27 PROCEDURE — 258N000003 HC RX IP 258 OP 636: Performed by: STUDENT IN AN ORGANIZED HEALTH CARE EDUCATION/TRAINING PROGRAM

## 2023-06-27 PROCEDURE — 93005 ELECTROCARDIOGRAM TRACING: CPT | Performed by: STUDENT IN AN ORGANIZED HEALTH CARE EDUCATION/TRAINING PROGRAM

## 2023-06-27 PROCEDURE — 36415 COLL VENOUS BLD VENIPUNCTURE: CPT | Performed by: STUDENT IN AN ORGANIZED HEALTH CARE EDUCATION/TRAINING PROGRAM

## 2023-06-27 RX ORDER — ONDANSETRON 2 MG/ML
4 INJECTION INTRAMUSCULAR; INTRAVENOUS EVERY 30 MIN PRN
Status: DISCONTINUED | OUTPATIENT
Start: 2023-06-27 | End: 2023-06-28 | Stop reason: HOSPADM

## 2023-06-27 RX ORDER — POLYETHYLENE GLYCOL 3350 17 G/17G
119 POWDER, FOR SOLUTION ORAL ONCE
Status: COMPLETED | OUTPATIENT
Start: 2023-06-27 | End: 2023-06-27

## 2023-06-27 RX ADMIN — ONDANSETRON 4 MG: 2 INJECTION INTRAMUSCULAR; INTRAVENOUS at 22:57

## 2023-06-27 RX ADMIN — POLYETHYLENE GLYCOL 3350 119 G: 17 POWDER, FOR SOLUTION ORAL at 23:20

## 2023-06-27 RX ADMIN — SODIUM CHLORIDE, POTASSIUM CHLORIDE, SODIUM LACTATE AND CALCIUM CHLORIDE 1000 ML: 600; 310; 30; 20 INJECTION, SOLUTION INTRAVENOUS at 22:52

## 2023-06-27 ASSESSMENT — ACTIVITIES OF DAILY LIVING (ADL): ADLS_ACUITY_SCORE: 35

## 2023-06-28 ENCOUNTER — HOSPITAL ENCOUNTER (OUTPATIENT)
Facility: AMBULATORY SURGERY CENTER | Age: 88
Discharge: HOME | End: 2023-06-28
Attending: SURGERY | Admitting: SURGERY
Payer: MEDICARE

## 2023-06-28 ENCOUNTER — PATIENT OUTREACH (OUTPATIENT)
Dept: CARE COORDINATION | Facility: CLINIC | Age: 88
End: 2023-06-28
Payer: MEDICARE

## 2023-06-28 VITALS
RESPIRATION RATE: 14 BRPM | BODY MASS INDEX: 23.62 KG/M2 | OXYGEN SATURATION: 99 % | SYSTOLIC BLOOD PRESSURE: 122 MMHG | TEMPERATURE: 97 F | DIASTOLIC BLOOD PRESSURE: 65 MMHG | HEIGHT: 70 IN | WEIGHT: 165 LBS | HEART RATE: 66 BPM

## 2023-06-28 VITALS
SYSTOLIC BLOOD PRESSURE: 121 MMHG | TEMPERATURE: 97.6 F | DIASTOLIC BLOOD PRESSURE: 70 MMHG | OXYGEN SATURATION: 97 % | HEART RATE: 62 BPM | RESPIRATION RATE: 16 BRPM

## 2023-06-28 DIAGNOSIS — Z12.11 COLON CANCER SCREENING: Primary | ICD-10-CM

## 2023-06-28 LAB
ATRIAL RATE - MUSE: 62 BPM
BASOPHILS # BLD AUTO: 0.1 10E3/UL (ref 0–0.2)
BASOPHILS NFR BLD AUTO: 1 %
COLONOSCOPY: NORMAL
DIASTOLIC BLOOD PRESSURE - MUSE: NORMAL MMHG
EOSINOPHIL # BLD AUTO: 0.1 10E3/UL (ref 0–0.7)
EOSINOPHIL NFR BLD AUTO: 1 %
ERYTHROCYTE [DISTWIDTH] IN BLOOD BY AUTOMATED COUNT: 13.7 % (ref 10–15)
HCT VFR BLD AUTO: 40.4 % (ref 40–53)
HGB BLD-MCNC: 12.9 G/DL (ref 13.3–17.7)
IMM GRANULOCYTES # BLD: 0 10E3/UL
IMM GRANULOCYTES NFR BLD: 0 %
INTERPRETATION ECG - MUSE: NORMAL
LYMPHOCYTES # BLD AUTO: 1 10E3/UL (ref 0.8–5.3)
LYMPHOCYTES NFR BLD AUTO: 10 %
MCH RBC QN AUTO: 30.5 PG (ref 26.5–33)
MCHC RBC AUTO-ENTMCNC: 31.9 G/DL (ref 31.5–36.5)
MCV RBC AUTO: 96 FL (ref 78–100)
MONOCYTES # BLD AUTO: 0.7 10E3/UL (ref 0–1.3)
MONOCYTES NFR BLD AUTO: 7 %
NEUTROPHILS # BLD AUTO: 8.9 10E3/UL (ref 1.6–8.3)
NEUTROPHILS NFR BLD AUTO: 81 %
NRBC # BLD AUTO: 0 10E3/UL
NRBC BLD AUTO-RTO: 0 /100
P AXIS - MUSE: 57 DEGREES
PLATELET # BLD AUTO: 288 10E3/UL (ref 150–450)
PR INTERVAL - MUSE: 176 MS
QRS DURATION - MUSE: 96 MS
QT - MUSE: 438 MS
QTC - MUSE: 444 MS
R AXIS - MUSE: -50 DEGREES
RBC # BLD AUTO: 4.23 10E6/UL (ref 4.4–5.9)
SYSTOLIC BLOOD PRESSURE - MUSE: NORMAL MMHG
T AXIS - MUSE: 13 DEGREES
TROPONIN T SERPL HS-MCNC: 14 NG/L
VENTRICULAR RATE- MUSE: 62 BPM
WBC # BLD AUTO: 10.9 10E3/UL (ref 4–11)

## 2023-06-28 PROCEDURE — 88305 TISSUE EXAM BY PATHOLOGIST: CPT | Mod: TC | Performed by: SURGERY

## 2023-06-28 PROCEDURE — 88305 TISSUE EXAM BY PATHOLOGIST: CPT | Mod: 26 | Performed by: PATHOLOGY

## 2023-06-28 PROCEDURE — 45331 SIGMOIDOSCOPY AND BIOPSY: CPT

## 2023-06-28 RX ORDER — SODIUM CHLORIDE, SODIUM LACTATE, POTASSIUM CHLORIDE, CALCIUM CHLORIDE 600; 310; 30; 20 MG/100ML; MG/100ML; MG/100ML; MG/100ML
INJECTION, SOLUTION INTRAVENOUS CONTINUOUS
Status: DISCONTINUED | OUTPATIENT
Start: 2023-06-28 | End: 2023-06-29 | Stop reason: HOSPADM

## 2023-06-28 RX ORDER — LIDOCAINE 40 MG/G
CREAM TOPICAL
Status: DISCONTINUED | OUTPATIENT
Start: 2023-06-28 | End: 2023-06-29 | Stop reason: HOSPADM

## 2023-06-28 RX ORDER — FENTANYL CITRATE 50 UG/ML
INJECTION, SOLUTION INTRAMUSCULAR; INTRAVENOUS DAILY PRN
Status: DISCONTINUED | OUTPATIENT
Start: 2023-06-28 | End: 2023-06-28 | Stop reason: HOSPADM

## 2023-06-28 RX ADMIN — SODIUM CHLORIDE, SODIUM LACTATE, POTASSIUM CHLORIDE, CALCIUM CHLORIDE: 600; 310; 30; 20 INJECTION, SOLUTION INTRAVENOUS at 09:50

## 2023-06-28 ASSESSMENT — ACTIVITIES OF DAILY LIVING (ADL)
ADLS_ACUITY_SCORE: 35

## 2023-06-28 NOTE — DISCHARGE INSTRUCTIONS
You were seen in the emergency department due to nausea, vomiting and dizziness in the setting of colonoscopy prep.      You had lab work & an EKG here that are normal, and were given some nausea medicine as well as some fluids.  We did give you your second dose of your colonoscopy prep here.    We recommend that you continue to take your prep as prescribed prior to your colonoscopy.  You can drink this more slowly over the course of an hour and a half instead of over 45 minutes to help keep some of the fluid down so that you are not more likely to vomit this again.    Turn to the emergency department with any worsening severe pain, chest pain, feeling as though you are going to pass out again, or any other concerning symptoms

## 2023-06-28 NOTE — TELEPHONE ENCOUNTER
Nurse Triage SBAR    Is this a 2nd Level Triage? YES, LICENSED PRACTITIONER REVIEW IS REQUIRED    Situation:   Nausea and vomiting after 4 cups of colon prep. States too weak to get off toilet.    Background:   Colonoscopy scheduled tomorrow    Assessment:   Fall risk d/t weakness    Protocol Recommended Disposition:   Go to ED Now (Or PCP Triage)    Recommendation:   Needs to go to the ED for IV hydration and nausea medication. Pt wants MD recommendation.    Paged to provider   Paged Dr Meeks and he advised to go to call 911.    Does the patient meet one of the following criteria for ADS visit consideration? 16+ years old, with an [facility] PCP     TIP  Providers, please consider if this condition is appropriate for management at one of our Acute and Diagnostic Services sites.     If patient is a good candidate, please use dotphrase <dot>triageresponse and select Refer to ADS to document.  Reason for Disposition    SEVERE vomiting (e.g., 6 or more times/day)    Additional Information    Negative: Shock suspected (e.g., cold/pale/clammy skin, too weak to stand, low BP, rapid pulse)    Negative: Difficult to awaken or acting confused (e.g., disoriented, slurred speech)    Negative: Passed out (i.e., lost consciousness, collapsed and was not responding)    Negative: Sounds like a life-threatening emergency to the triager    Negative: SEVERE abdomen pain (e.g., excruciating)    Negative: SEVERE rectal bleeding (large blood clots; on and off, or constant bleeding)    Negative: SEVERE dizziness (e.g., unable to stand, requires support to walk, feels like passing out now)    Protocols used: COLONOSCOPY SYMPTOMS AND [id number]

## 2023-06-28 NOTE — ED TRIAGE NOTES
Pt BIBA from home w/ c/o nausea, 1 episode of vomiting. Pt is prepping for a scheduled colonscopy tomorrow r/t recent finding of abdominal mass.      Triage Assessment     Row Name 06/27/23 2320       Triage Assessment (Adult)    Airway WDL WDL       Respiratory WDL    Respiratory WDL WDL       Skin Circulation/Temperature WDL    Skin Circulation/Temperature WDL WDL       Cardiac WDL    Cardiac WDL WDL       Peripheral/Neurovascular WDL    Peripheral Neurovascular WDL WDL       Cognitive/Neuro/Behavioral WDL    Cognitive/Neuro/Behavioral WDL WDL

## 2023-06-28 NOTE — ED PROVIDER NOTES
"SIGN-OUT:  - Assumed care of this patient from Dr. Guido  - Pending at shift change: Clinical observation  - Tentative plan from original EM Physician:   --- They think the patient's lightheadedness was related directly to the prep, no other primary cardiac, neurologic or other more ominous cause of symptoms. Other than the CBC they do no think the patient needs any other medical workup at this time.   --- Follow-up pending CBC  --- Reassess after patient's p.o. challenge and MiraLAX/colonoscopy prep here in ED (they think around 1 AM would be a good time to reassess)  --- If patient able to tolerate p.o., does well with \"road test\" they think the patient can be discharged with plan to follow-up with colonoscopy tomorrow to see 6/28/2023) as planned.  --- Not feeling better, or any ongoing issues they would recommend ED ops admission for further symptom management and plan for potentially still getting colonoscopy but under a safe for observed preparation.    UPDATES / REASSESSMENT:  Reassessment:   - PO Challenge pending  --- No acute issues or interventions necessary while still in the emergency department.    Patient signed out to overnight ED physician pending clinical improvement/reassessment.            Bri Isabel MD  06/29/23 4263       Bri Isabel MD  10/05/23 9295    "

## 2023-06-28 NOTE — ED PROVIDER NOTES
ED Provider Note  [facility]      History     Chief Complaint   Patient presents with     Vomiting     Dizziness     COLONSCOPY PREP      HPI  Tom Saini is a 88 year old male with past medical history of colon mass currently doing colonoscopy prep tonight for prep for colonoscopy in the morning noted to have nausea, vomiting and dizziness today.    Patient endorses he took his laxative like he was supposed to at noon today.  At 5:00, he was supposed to drink a total of 32 ounces of fluid mixed with half a container of MiraLAX.  He drank the entire thing within 45 minutes, and shortly thereafter developed significant nausea and vomited 10-12 times.  Notes that he was sitting on the toilet and feeling very dizzy and sweaty after this happened.  He notes that he is feeling much better at this point and denies any abdominal pain, any dizziness, chest pain or shortness of breath.    Does endorse has been having significant multiple loose stools    Past Medical History  Past Medical History:   Diagnosis Date     Aortic insufficiency      Erectile dysfunction      History of deep venous thrombosis 05/2019    distal DVT with extension, completed 6 months of AC (failed eliquis)     History of pelvic fracture      Hyperlipidemia      Low back pain     disc disease s/p laminectomy in past     Macular degeneration     on Avastin, R eye     Pancreatic cyst 07/2018    next MR due 12/23     Radius fracture 2018    after fall from wall     Past Surgical History:   Procedure Laterality Date     CATARACT IOL, RT/LT  2001     COMBINED REPAIR PTOSIS WITH BLEPHAROPLASTY BILATERAL Bilateral 4/6/2018    Procedure: COMBINED REPAIR PTOSIS WITH BLEPHAROPLASTY BILATERAL;  BILATERAL UPPER EYELIDS BLEPHAROPLASTY AND PTOSIS REPAIR ;  Surgeon: Anuj Good MD;  Location: [facility]     EYE SURGERY       LAMINECTOMY LUMBAR TWO LEVELS  1993    L4-L5     RECESSION RESECTION (REPAIR STRABISMUS)  1949     aspirin 81  MG tablet  calcium carbonate (OS-DAVIDE) 1500 (600 Ca) MG tablet  Cholecalciferol (VITAMIN D-3) 25 MCG (1000 UT) CAPS  multivitamin (OCUVITE) TABS  sodium chloride (LAXMI 128) 5 % ophthalmic ointment  sodium chloride (LAXMI 128) 5 % ophthalmic solution      Allergies   Allergen Reactions     Codeine Sulfate Nausea and Vomiting     Family History  Family History   Problem Relation Age of Onset     Cancer Father         bladder     Cancer Mother         unknown skin cancer     Skin Cancer Mother      Glaucoma No family hx of      Macular Degeneration No family hx of      Diabetes No family hx of      Hypertension No family hx of      Social History   Social History     Tobacco Use     Smoking status: Former     Packs/day: 2.00     Years: 2.00     Pack years: 4.00     Types: Cigarettes     Quit date: 1954     Years since quittin.0     Smokeless tobacco: Never   Substance Use Topics     Alcohol use: Yes     Comment: glass of wine with dinner     Drug use: No         A medically appropriate review of systems was performed with pertinent positives and negatives noted in the HPI, and all other systems negative.    Physical Exam   BP: 124/64  Pulse: 62  Temp: 97.6  F (36.4  C)  Resp: 16  SpO2: 98 %  Physical Exam  GEN: Well appearing, non toxic, cooperative  HEENT: normocephalic and atraumatic, PERRLA, EOMI, mildly dry tacky mucous membranes  CV: well-perfused, normal skin color for ethnicity, regular rate and rhythm  PULM: breathing comfortably, in no respiratory distress, clear to auscultation upper and lower lung fields  ABD: nondistended, soft, nontender  EXT: Full range of motion.  No edema.  NEURO: awake, conversant, grossly normal bilateral upper and lower extremity strength & ROM   SKIN: No rashes, ecchymosis, or lacerations  PSYCH: Calm and cooperative, interactive      ED Course, Procedures, & Data      Procedures       ED Course Selections:        EKG Interpretation:      Interpreted by Melodie Guido MD  Time  reviewed: 2248  Symptoms at time of EKG: none   Rhythm: normal sinus   Rate: normal  Axis: normal  Ectopy: none  Conduction: left AFB  ST Segments/ T Waves: No ST-T wave changes  Q Waves: none  Comparison to prior: Unchanged    Clinical Impression: left anterior fasicular block       Results for orders placed or performed during the hospital encounter of 06/27/23   EKG 12-lead, tracing only     Status: None (Preliminary result)   Result Value Ref Range    Systolic Blood Pressure  mmHg    Diastolic Blood Pressure  mmHg    Ventricular Rate 62 BPM    Atrial Rate 62 BPM    TN Interval 176 ms    QRS Duration 96 ms     ms    QTc 444 ms    P Axis 57 degrees    R AXIS -50 degrees    T Axis 13 degrees    Interpretation ECG       Sinus rhythm  Left anterior fascicular block  Abnormal ECG     CBC with platelets differential     Status: None ()    Narrative    The following orders were created for panel order CBC with platelets differential.  Procedure                               Abnormality         Status                     ---------                               -----------         ------                     CBC with platelets and d...[[id number]]                                                   Please view results for these tests on the individual orders.     Medications   lactated ringers BOLUS 1,000 mL (1,000 mLs Intravenous $New Bag 6/27/23 2252)   ondansetron (ZOFRAN) injection 4 mg (4 mg Intravenous $Given 6/27/23 2257)   polyethylene glycol (MIRALAX) powder 119 g (has no administration in time range)     Labs Ordered and Resulted from Time of ED Arrival to Time of ED Departure - No data to display  No orders to display          Critical care was not performed.     Medical Decision Making  The patient's presentation was of moderate complexity (an acute illness with systemic symptoms).    The patient's evaluation involved:  review of external note(s) from 3+ sources (see separate area of note for  details)  ordering and/or review of 3+ test(s) in this encounter (see separate area of note for details)  review of 3+ test result(s) ordered prior to this encounter (see separate area of note for details)    The patient's management necessitated moderate risk (prescription drug management including medications given in the ED).      Assessment & Plan    88-year-old male presents emergency department due to multiple episodes of nausea and vomiting in the setting of taking colonoscopy prep with multiple episodes of diarrhea as well noted to have some dizziness just prior to arrival which has not since resolved.  Noted to be hemodynamically stable in no significant distress with no ongoing abdominal pain or nausea at this point.    Patient is interested in pursuing further colonoscopy prep in order to have his colonoscopy tomorrow.  We will plan for IV fluids, antiemetics, and basic lab work.  After IV fluids and antiemetics, patient would like to attempt to take the remainder of his prep which was due at 10 PM.  Notes he is supposed to take another 32 ounces of fluids mixed with another half container of MiraLAX.  He also reports he supposed to do the same thing tomorrow morning at 5 AM.    At this point abdomen is soft and benign with low suspicion of an acute intra-abdominal perforation, and he is stooling and passing gas with low suspicion of bowel obstruction.  Will obtain EKG to ensure no evidence of any significant dysrhythmias in the setting of his dizziness    11:00 PM EKG reassuring. HR on the lower side (sinus 50-60). Possible that his dizziness was related to bearing down in the setting of vomiting and that he became more bradycardic in that setting.    I have reviewed the nursing notes. I have reviewed the findings, diagnosis, plan and need for follow up with the patient.    New Prescriptions    No medications on file       Final diagnoses:   Vomiting and diarrhea   Dizziness       MD JESSICA Pompa  [facility] EMERGENCY DEPARTMENT  6/27/2023     Melodie Guido MD  06/27/23 4568

## 2023-06-28 NOTE — ED PROVIDER NOTES
Sign Out Provider: Dr. Isabel    Sign Out Plan: 88-year-old here with nausea, vomiting, lightheadedness after doing colonoscopy prep.  Patient with improvement of his symptoms, continuing his prep in the emergency department and likely plan for discharge in the morning for his colonoscopy.    Reassessment: Patient doing well throughout the night, no further episodes of vomiting, lightheadedness.  Patient completed his prep.  Patient feels comfortable with discharge to his colonoscopy in the morning.  Wife will be here at 9 AM.  Patient signed out to morning provider pending discharge         Berenice Hunt MD  06/28/23 0709

## 2023-06-29 ENCOUNTER — ANCILLARY PROCEDURE (OUTPATIENT)
Dept: CT IMAGING | Facility: CLINIC | Age: 88
End: 2023-06-29
Attending: SURGERY
Payer: MEDICARE

## 2023-06-29 DIAGNOSIS — Z12.11 COLON CANCER SCREENING: ICD-10-CM

## 2023-06-29 LAB
PATH REPORT.COMMENTS IMP SPEC: NORMAL
PATH REPORT.COMMENTS IMP SPEC: NORMAL
PATH REPORT.FINAL DX SPEC: NORMAL
PATH REPORT.GROSS SPEC: NORMAL
PATH REPORT.MICROSCOPIC SPEC OTHER STN: NORMAL
PATH REPORT.RELEVANT HX SPEC: NORMAL
PHOTO IMAGE: NORMAL

## 2023-06-29 PROCEDURE — 74263 CT COLONOGRAPHY SCREENING: CPT | Mod: GY | Performed by: RADIOLOGY

## 2023-07-07 ENCOUNTER — TELEPHONE (OUTPATIENT)
Dept: INTERNAL MEDICINE | Facility: CLINIC | Age: 88
End: 2023-07-07
Payer: MEDICARE

## 2023-07-07 DIAGNOSIS — K63.89 COLONIC MASS: Primary | ICD-10-CM

## 2023-07-07 DIAGNOSIS — E44.1 MILD PROTEIN-CALORIE MALNUTRITION (H): ICD-10-CM

## 2023-07-07 NOTE — TELEPHONE ENCOUNTER
RN left voice message, asking patient to call clinic back to discuss symptoms, and PCC number was given in message.  RN also let patient know that an opening with Dr. Gonzalez is available for next wed 7/12/23 at 5:30 PM, and he can call back to schedule if he is having severe pain due to colon blockage issues.  RN also relayed that after having a colonoscopy, it can take more than 3-4 days to resume having normal BMs, but depending on his symptoms he could see a provider for further advice.    Glenna Garcia RN on 7/7/2023 at 11:11 AM

## 2023-07-07 NOTE — TELEPHONE ENCOUNTER
[facility] Call Center    Phone Message    May a detailed message be left on voicemail: yes     Reason for Call: Symptoms or Concerns     If patient has red-flag symptoms, warm transfer to triage line    Current symptom or concern: Blockage in the colon. Patient has had testing done.     Symptoms have been present for:  2-3 weeks(s)    Has patient previously been seen for this? Yes    By Dr Ruiz      Date: in the last few weeks    Are there any new or worsening symptoms? No      Action Taken: Message routed to:  [facility]    Travel Screening: Not Applicable

## 2023-07-07 NOTE — TELEPHONE ENCOUNTER
Thanks for the message.  I have been in touch with Dr. Cowart and he advised reach out to our advanced endoscopy team. I will reach out and try to get back to him shortly.  In the interim, if he's experiencing new symptoms, he should let us know.  I scheduled him with me on Monday.   Thanks,  Angeli Robertson MD  Internal Medicine

## 2023-07-10 ENCOUNTER — LAB (OUTPATIENT)
Dept: LAB | Facility: CLINIC | Age: 88
End: 2023-07-10
Payer: MEDICARE

## 2023-07-10 ENCOUNTER — OFFICE VISIT (OUTPATIENT)
Dept: INTERNAL MEDICINE | Facility: CLINIC | Age: 88
End: 2023-07-10
Payer: MEDICARE

## 2023-07-10 VITALS
OXYGEN SATURATION: 97 % | BODY MASS INDEX: 24.59 KG/M2 | SYSTOLIC BLOOD PRESSURE: 115 MMHG | HEART RATE: 79 BPM | DIASTOLIC BLOOD PRESSURE: 74 MMHG | WEIGHT: 171.4 LBS

## 2023-07-10 DIAGNOSIS — E44.1 MILD PROTEIN-CALORIE MALNUTRITION (H): ICD-10-CM

## 2023-07-10 DIAGNOSIS — E44.1 MILD PROTEIN-CALORIE MALNUTRITION (H): Primary | ICD-10-CM

## 2023-07-10 DIAGNOSIS — K63.89 COLONIC MASS: ICD-10-CM

## 2023-07-10 DIAGNOSIS — R79.89 OTHER SPECIFIED ABNORMAL FINDINGS OF BLOOD CHEMISTRY: ICD-10-CM

## 2023-07-10 LAB
ALBUMIN SERPL BCG-MCNC: 3.9 G/DL (ref 3.5–5.2)
ALP SERPL-CCNC: 107 U/L (ref 40–129)
ALT SERPL W P-5'-P-CCNC: 14 U/L (ref 0–70)
ANION GAP SERPL CALCULATED.3IONS-SCNC: 9 MMOL/L (ref 7–15)
AST SERPL W P-5'-P-CCNC: 17 U/L (ref 0–45)
BILIRUB SERPL-MCNC: 0.3 MG/DL
BUN SERPL-MCNC: 21.8 MG/DL (ref 8–23)
CALCIUM SERPL-MCNC: 9.7 MG/DL (ref 8.8–10.2)
CEA SERPL-MCNC: 1.5 NG/ML
CHLORIDE SERPL-SCNC: 103 MMOL/L (ref 98–107)
CREAT SERPL-MCNC: 1.03 MG/DL (ref 0.67–1.17)
DEPRECATED HCO3 PLAS-SCNC: 28 MMOL/L (ref 22–29)
ERYTHROCYTE [DISTWIDTH] IN BLOOD BY AUTOMATED COUNT: 13.8 % (ref 10–15)
GFR SERPL CREATININE-BSD FRML MDRD: 70 ML/MIN/1.73M2
GLUCOSE SERPL-MCNC: 102 MG/DL (ref 70–99)
HCT VFR BLD AUTO: 40.7 % (ref 40–53)
HGB BLD-MCNC: 12.9 G/DL (ref 13.3–17.7)
INR PPP: 1.01 (ref 0.85–1.15)
MCH RBC QN AUTO: 30.4 PG (ref 26.5–33)
MCHC RBC AUTO-ENTMCNC: 31.7 G/DL (ref 31.5–36.5)
MCV RBC AUTO: 96 FL (ref 78–100)
PLATELET # BLD AUTO: 305 10E3/UL (ref 150–450)
POTASSIUM SERPL-SCNC: 5 MMOL/L (ref 3.4–5.3)
PROT SERPL-MCNC: 7.2 G/DL (ref 6.4–8.3)
RBC # BLD AUTO: 4.25 10E6/UL (ref 4.4–5.9)
SODIUM SERPL-SCNC: 140 MMOL/L (ref 136–145)
WBC # BLD AUTO: 7.6 10E3/UL (ref 4–11)

## 2023-07-10 PROCEDURE — 80053 COMPREHEN METABOLIC PANEL: CPT | Performed by: PATHOLOGY

## 2023-07-10 PROCEDURE — 84134 ASSAY OF PREALBUMIN: CPT | Performed by: INTERNAL MEDICINE

## 2023-07-10 PROCEDURE — 99000 SPECIMEN HANDLING OFFICE-LAB: CPT | Performed by: PATHOLOGY

## 2023-07-10 PROCEDURE — 99417 PROLNG OP E/M EACH 15 MIN: CPT | Performed by: INTERNAL MEDICINE

## 2023-07-10 PROCEDURE — 99215 OFFICE O/P EST HI 40 MIN: CPT | Performed by: INTERNAL MEDICINE

## 2023-07-10 PROCEDURE — 36415 COLL VENOUS BLD VENIPUNCTURE: CPT | Performed by: PATHOLOGY

## 2023-07-10 PROCEDURE — 85027 COMPLETE CBC AUTOMATED: CPT | Performed by: PATHOLOGY

## 2023-07-10 PROCEDURE — 85610 PROTHROMBIN TIME: CPT | Performed by: PATHOLOGY

## 2023-07-10 PROCEDURE — 82378 CARCINOEMBRYONIC ANTIGEN: CPT | Performed by: INTERNAL MEDICINE

## 2023-07-10 NOTE — PROGRESS NOTES
History of Present Illness:  Mr. Saini is a 88 year old male who presents for  Chief Complaint   Patient presents with     Follow Up     Pt here for follow up     Recently found to have advanced colon mass on imaging, unfortunately colonoscopy was not successful given advanced stricturing and pathology was nondiagnostic  CT colonography shows almost complete obliteration of lumen with extramural extension and mesenteric and RP lympadenopathy    Juaquin is not experiencing any severe pain, bleeding, nausea or vomiting  Has switched to a low residue/liquid diet, is passing some fecal matter but also liquid  He would be open to surgery potentially  He is more tired, taking naps, but otherwise no major concerns    Cardiac: He is able to walk up >20 steps without cardiopulmonary symptoms, has good baseline functional status of >4 mets  He has mild AI on echo, he had stress testing in 2019 which was normal    Pulm: no history of tobacco use and no known lung symptoms or diagnoses    Bleeding/clotting: Unprovoked distal DVT 5/19 approx one year after major pelvic trauma, completed 6 months of pradaxa after he seemingly failed eliquis therapy, no further clots      Review of external notes as documented above                   A detailed Review of Systems was performed, verified and is negative except as documented in the HPI.  All health questionnaires were reviewed, verified and relevant information documented above.    Past Medical History:  Past Medical History:   Diagnosis Date     Aortic insufficiency      Erectile dysfunction      History of deep venous thrombosis 05/2019    distal DVT with extension, completed 6 months of AC (failed eliquis)     History of pelvic fracture      Hyperlipidemia      Low back pain     disc disease s/p laminectomy in past     Macular degeneration     on Avastin, R eye     Pancreatic cyst 07/2018    next MR due 12/23     Radius fracture 2018    after fall from wall       Past Surgical  History:  Past Surgical History:   Procedure Laterality Date     CATARACT IOL, RT/LT       COLONOSCOPY N/A 2023    Procedure: COLONOSCOPY, WITH BIOPSY;  Surgeon: Jhonatan Cowart MD;  Location: [facility] OR     COMBINED REPAIR PTOSIS WITH BLEPHAROPLASTY BILATERAL Bilateral 2018    Procedure: COMBINED REPAIR PTOSIS WITH BLEPHAROPLASTY BILATERAL;  BILATERAL UPPER EYELIDS BLEPHAROPLASTY AND PTOSIS REPAIR ;  Surgeon: Anuj Good MD;  Location: [facility]     EYE SURGERY       LAMINECTOMY LUMBAR TWO LEVELS      L4-L5     RECESSION RESECTION (REPAIR STRABISMUS)         Active Meds:  Current Outpatient Medications   Medication     aspirin 81 MG tablet     calcium carbonate (OS-DAVIDE) 1500 (600 Ca) MG tablet     Cholecalciferol (VITAMIN D-3) 25 MCG (1000 UT) CAPS     lipase-protease-amylase (CREON) 36467-47739-861168 units CPEP per EC capsule     multivitamin (OCUVITE) TABS     sodium chloride (LAXMI 128) 5 % ophthalmic ointment     sodium chloride (LAXMI 128) 5 % ophthalmic solution     Current Facility-Administered Medications   Medication     bevacizumab (AVASTIN) intravitreal inj 1.25 mg        Allergies:  Codeine sulfate    Family History:  family history includes Cancer in his father and mother; Skin Cancer in his mother.    Social History:  Social History     Tobacco Use     Smoking status: Former     Packs/day: 2.00     Years: 2.00     Pack years: 4.00     Types: Cigarettes     Quit date: 1954     Years since quittin.0     Smokeless tobacco: Never   Substance Use Topics     Alcohol use: Yes     Comment: glass of wine with dinner     Drug use: No       Physical Exam:  Vitals: /74 (BP Location: Right arm, Patient Position: Sitting, Cuff Size: Adult Regular)   Pulse 79   Wt 77.7 kg (171 lb 6.4 oz)   SpO2 97%   BMI 24.59 kg/m    Constitutional: Alert, oriented, pleasant, no acute distress  Head: Normocephalic, atraumatic  Eyes: Extra-ocular movements intact, pupils equally  round and reactive bilaterally, no scleral icterus  ENT: Oropharynx clear, moist mucus membranes  Neck: Supple  Cardiovascular: Regular rate and rhythm, 2/6 YAO, no rubs or gallops, peripheral pulses full/symmetric  Respiratory: Good air movement bilaterally, lungs clear, no wheezes/rales/rhonchi  GI: Abdomen soft, bowel sounds present, minimally distended, nontender, firmness noted in LLQ, no organomegaly or masses, no rebound/guarding  Musculoskeletal: No edema, normal muscle tone, normal gait  Neurologic: Alert and oriented, cranial nerves 2-12 intact, grossly non-focal  Psychiatric: normal mentation, affect and mood      Diagnostics:  Labs reviewed in Epic          Assessment and Plan:  Tom was seen today for follow up.    Diagnoses and all orders for this visit:    Mild protein-calorie malnutrition (H)  Colonic mass  Other specified abnormal findings of blood chemistry  Most likely primary colon cancer, though no tissue diagnosis, no obvious distant or rp mets on imaging though regional lymph nodes noted. May benefit from surgery for palliation even if not curative. Will c/w colorectal and oncology re: next steps as it may pertain to surgery or neoadjuvant or adjuvant chemo. Discussed low residue high protein diet and hydration. His RCRI is 0 and he is low risk for surgery from a cardiopulmonary standpoint. I discussed his care with medical oncology.  -     CEA; Future        Angeli Robertson MD  Internal Medicine    >85 minutes spent today performing chart review, history and exam, counseling, care coordination, documentation and further activities as noted above exclusive of any procedures or EKG interpretation

## 2023-07-10 NOTE — PATIENT INSTRUCTIONS
Eating a Low-fiber Diet  What is fiber?  Fiber is the part of food that the body cannot digest. It helps form stools (bowel movements).   If you eat less fiber, you may:  Reduce belly pain, diarrhea (loose, watery stools) and other digestive problems  Have fewer and smaller stools  Decrease inflammation (pain, redness and swelling) in the GI (gastro-intestinal) tract  Promote healing in the GI tract.  For a list of foods allowed in a low-fiber diet, see the back of this page.  Why might I need a low-fiber diet?  You may need a low-fiber diet if you have:   Inflamed bowels  Crohn's disease  Diverticular disease  Ulcerative colitis  Radiation therapy to the belly area  Chemotherapy  An upcoming colonoscopy  Surgery on your intestines or in the belly area.    Sample Menu    Eggs  Soup  Greek yogurt  A few crackers  Tuna   Fish  Mashed potatoes  Jello, pudding  Cottage cheese  Tofu  Rice, rice pudding  Cream of wheat  Well cooked ground meat  Well cooked vegetables or fruits included some canned   Applesauce  Protein shakes--aim for 60-80 grams of protein daily     Avoid raw veggies, meat, whole grains

## 2023-07-10 NOTE — NURSING NOTE
Tom Saini is a 88 year old male that presents in clinic today for the following:     Chief Complaint   Patient presents with     Follow Up     Pt here for follow up       The patient's allergies and medications were reviewed. The patient's vitals were obtained without incident. The patient does not have any other questions for the provider.     Gus Quintero, EMT at 3:24 PM on 7/10/2023.  Primary Care Clinic: [phone]

## 2023-07-11 ENCOUNTER — TELEPHONE (OUTPATIENT)
Dept: INTERNAL MEDICINE | Facility: CLINIC | Age: 88
End: 2023-07-11

## 2023-07-11 LAB — PREALB SERPL IA-MCNC: 20 MG/DL (ref 15–45)

## 2023-07-11 NOTE — PROGRESS NOTES
"Colon and Rectal Surgery Clinic Note    RE: Tom Saini.  : 1935.  KEN: 2023.    Reason for visit: colon mass     HPI: Tom \"Juaquin\" Parveen is a 88 year old male who presents today for a colon mass. He has a past medical history of aortic insufficiency, DVT, HLD, macular degeneration, and pancreatic cyst. Juaquin had a CT Chest/Abdomen/Pelvis on 2023 that showed a sigmoid mass with large extramural component consistent with adenocarcinoma. There were a few small LNs in the KE/super rectal territory suspicious for regional lymph node involvement. There were also mildly enlarged bilateral LNs. I preformed a colonoscopy on 2023 however this was aborted and the patient proceeded with a CT Colonography. This again demonstrated the above findings. CEA 1.5.     He is on aspirin 81mg.     Today Juaquin feels well although he admits that he is having mostly liquid stool or small caliber stools.  He does not have any abdominal pain.  He is enjoying a low-fiber diet.  He has no nausea/vomiting.      CT Chest/Abdomen/Pelvis (2023):                                     IMPRESSION:  1.  Sigmoid colon mass with large extramural component most consistent with adenocarcinoma. No evidence of large bowel obstruction.  2.  A few small lymph nodes in the KE/superior rectal territory are suspicious for regional lymph node involvement. Mildly enlarged bilateral inguinal lymph nodes are not a typical location for a sigmoid mass, therefore indeterminate. No enlarged   retroperitoneal lymph nodes.  3.  No distant metastatic disease.  4.  Diffuse pancreatic duct dilation with a large intraductal calculus in the head at the site of probable stricture. Refer to MRI from 2023 for better characterization of the cystic lesions.      Colonoscopy (2023):  Findings:        Many small and large-mouthed diverticula were found in the sigmoid        colon. There was no evidence of diverticular bleeding. Biopsies were "        taken with a cold forceps for histology.        The exam was otherwise without abnormality.                                                                                     Impression:            - The procedure was aborted due to the extreme                          difficulty of the procedure.                          - Severe diverticulosis in the sigmoid colon. There                          was no evidence of diverticular bleeding. Biopsied.                          - The examination was otherwise normal.         Surgical Pathology (6/28/2023):  A(1). COLON, SIGMOID STRICTURE BIOPSY:  - Nonspecific mild acute inflammation with focal cryptitis  - Negative for dysplasia or malignancy      CT Colonography (6/29/2023):  Impression:  1.  Ct Colonography demonstrates large sigmoid colon mass with  near-complete obliteration of the lumen, infiltrative nodular  extension towards the distal descending colon, and extensive  extramural extension and mesenteric and retroperitoneal metastatic  lymphadenopathy. Borderline enlarged inguinal adenopathy as well.  There are no polyps or masses in the remaining colon, however  evaluation is suboptimal due to poor air distention and large volume  of contrast which did not pass beyond the large mass.  2.  Similar appearance of diffuse pancreatic duct dilatation distal to  a pancreatic head intraductal calculus and scattered side branch  intraductal papillary pieces neoplasms.  3.  Moderate left hydronephrosis, similar.  4.  Stable benign large renal cyst as well as a right hepatic lobe  presumed hemangioma.  5.  Additional chronic incidental findings as detailed in report.      CEA (7/10/2023):  Component Ref Range & Units 1 d ago     CEA ng/mL 1.5          Medical history:  1. Aortic insufficiency   2. ED   3. DVT  4. HLD  5. Macular degeneration   6. Pancreatic cyst     Surgical history:  1. Bilateral blepharoplasty   2. Laminectomy L4-L5    Family history:  Family  History   Problem Relation Age of Onset     Cancer Father         bladder     Cancer Mother         unknown skin cancer     Skin Cancer Mother      Glaucoma No family hx of      Macular Degeneration No family hx of      Diabetes No family hx of      Hypertension No family hx of        Medications:  Current Outpatient Medications   Medication Sig Dispense Refill     aspirin 81 MG tablet Take 1 tablet by mouth daily.  3     calcium carbonate (OS-DAVIDE) 1500 (600 Ca) MG tablet Take 600 mg by mouth 2 times daily (with meals)       Cholecalciferol (VITAMIN D-3) 25 MCG (1000 UT) CAPS Take by mouth daily       lipase-protease-amylase (CREON) 02125-71293-184668 units CPEP per EC capsule Take 2 capsules by mouth 3 times daily (with meals) And 1 with snacks. 200 capsule 1     multivitamin (OCUVITE) TABS Take 1 tablet by mouth 2 times daily        sodium chloride (LAXMI 128) 5 % ophthalmic ointment Apply a small amount in both eyes at least once a day (bedtime). 3.5 g 11     sodium chloride (LAXMI 128) 5 % ophthalmic solution Place 1-2 drops into both eyes At Bedtime 15 mL 11       Allergies:  Allergies   Allergen Reactions     Codeine Sulfate Nausea and Vomiting       Social history:   Social History     Tobacco Use     Smoking status: Former     Packs/day: 2.00     Years: 2.00     Pack years: 4.00     Types: Cigarettes     Quit date: 1954     Years since quittin.0     Smokeless tobacco: Never   Substance Use Topics     Alcohol use: Yes     Comment: glass of wine with dinner     Marital status: .    ROS:  A complete review of systems was performed with the patient and all systems negative except as per HPI.    Physical Examination:  There were no vitals taken for this visit.  General: Well hydrated. No acute distress.  Abdomen: Soft, Not tender    ASSESSMENT  89 y/o gentleman with sigmoid stricture precluding colonoscopy and corroborated on CT colonography - highly suspicious for colon cancer.     Risks,  benefits, and alternatives of operative treatment were thoroughly discussed with the patient, he/she understands these well and agrees to proceed.    PLAN  1. OR for laparoscopic, possible open, sigmoid colectomy  2. Risks/benefits discussed with  Juaquin Saini and his wife; these include risks of anesthesia, cardiopulmonary risks, risk of anastomotic leak (3-5%), need for possible ostomy, possible conversion to open surgery, possible need for TCU prior to discharge home; also discussed possibility that this may not be a cancer and a benign stricture; all questions were answered  3. PAC visit    60 minutes spent on the date of the encounter doing chart review, history and exam, imaging review, documentation and further activities as noted above.      Jhonatan Cowart MD, PhD    Division of Colon and Rectal Surgery  [facility]    Referring Provider:  No referring provider defined for this encounter.     Primary Care Provider:  Angeli Robertson

## 2023-07-11 NOTE — TELEPHONE ENCOUNTER
Routed to colon rectal scheduling team to see if pt is able to be seen sooner and if patient is able to receive a return call from the team.     TIFF PARSON RN on 7/11/2023 at 12:45 PM

## 2023-07-11 NOTE — TELEPHONE ENCOUNTER
[facility] Call Center    Phone Message    May a detailed message be left on voicemail: yes     Reason for Call: Other: Patient calling with questions regarding his care within the colon & rectal department with his possible cancer diagnosis. He states that the doctor he was referred to cannot see him via virtual for 8 days. He is wondering if there is any way Dr. Robertson can get him in sooner.     Action Taken: Message routed to:  [facility] pcc    Travel Screening: Not Applicable

## 2023-07-11 NOTE — TELEPHONE ENCOUNTER
Diagnosis, Referred by & from: Presumed Colon Cancer   Appt date: 7/19/2023   NOTES STATUS DETAILS   OFFICE NOTE from referring provider [facility]:  7/10/23 - PCC OV with Dr. Robertson   OFFICE NOTE from other specialist [facility]:  6/15/23 - PCC OV with Dr. Jacob   DISCHARGE SUMMARY from hospital N/A    DISCHARGE REPORT from the ER [facility]:  6/27/23 - ED OV with Dr. Hunt   OPERATIVE REPORT N/A    MEDICATION LIST Internal    LABS     ANAL PAP/CEA [facility]:  7/10/23 - CEA   BIOPSIES/PATHOLOGY RELATED TO DIAGNOSIS [facility];  6/28/23 - Sigmoid Biopsy (Case: [id number])   DIAGNOSTIC PROCEDURES     COLONOSCOPY [facility]:  6/28/23 - Colonoscopy   IMAGING (DISC & REPORT)      CT [facility]:  6/29/23 - CT Colonography  6/21/23 - CT Chest/Abd/Pelvis   MRI [facility]:  6/18/23 - MRI Pancreas  3/24/21 - MRI Prostate  2/27/21 - MRI Abdomen  12/19/19 - MRI Pancreas  1/3/19 - MRI Pancreas  7/7/18 - MRI Abdomen

## 2023-07-13 ENCOUNTER — OFFICE VISIT (OUTPATIENT)
Dept: SURGERY | Facility: CLINIC | Age: 88
End: 2023-07-13
Payer: MEDICARE

## 2023-07-13 VITALS — HEART RATE: 100 BPM | DIASTOLIC BLOOD PRESSURE: 74 MMHG | SYSTOLIC BLOOD PRESSURE: 127 MMHG | OXYGEN SATURATION: 95 %

## 2023-07-13 DIAGNOSIS — R97.8 OTHER ABNORMAL TUMOR MARKERS: ICD-10-CM

## 2023-07-13 DIAGNOSIS — K63.89 COLONIC MASS: ICD-10-CM

## 2023-07-13 PROCEDURE — 99215 OFFICE O/P EST HI 40 MIN: CPT | Performed by: SURGERY

## 2023-07-13 RX ORDER — METRONIDAZOLE 500 MG/1
500 TABLET ORAL EVERY 6 HOURS
Qty: 3 TABLET | Refills: 0 | Status: ON HOLD | OUTPATIENT
Start: 2023-07-13 | End: 2023-08-02

## 2023-07-13 RX ORDER — ONDANSETRON 4 MG/1
4 TABLET, FILM COATED ORAL EVERY 6 HOURS
Qty: 3 TABLET | Refills: 0 | Status: ON HOLD | OUTPATIENT
Start: 2023-07-13 | End: 2023-08-02

## 2023-07-13 RX ORDER — POLYETHYLENE GLYCOL 3350 17 G/17G
238 POWDER, FOR SOLUTION ORAL SEE ADMIN INSTRUCTIONS
Qty: 14 PACKET | Refills: 0 | Status: ON HOLD | OUTPATIENT
Start: 2023-07-13 | End: 2023-08-02

## 2023-07-13 RX ORDER — NEOMYCIN SULFATE 500 MG/1
1000 TABLET ORAL EVERY 6 HOURS
Qty: 6 TABLET | Refills: 0 | Status: ON HOLD | OUTPATIENT
Start: 2023-07-13 | End: 2023-07-27

## 2023-07-13 ASSESSMENT — PAIN SCALES - GENERAL: PAINLEVEL: NO PAIN (0)

## 2023-07-13 NOTE — LETTER
"2023       RE: Tom Saini  [address]  [facility] [address]       Dear Colleague,    Thank you for referring your patient, Tom Saini, to the [facility] [place] [facility]. Please see a copy of my visit note below.    Colon and Rectal Surgery Clinic Note    RE: Tom Saini.  : 1935.  KEN: 2023.    Reason for visit: colon mass     HPI: Tom \"Juaquin\"Parveen is a 88 year old male who presents today for a colon mass. He has a past medical history of aortic insufficiency, DVT, HLD, macular degeneration, and pancreatic cyst. Juaquin had a CT Chest/Abdomen/Pelvis on 2023 that showed a sigmoid mass with large extramural component consistent with adenocarcinoma. There were a few small LNs in the KE/super rectal territory suspicious for regional lymph node involvement. There were also mildly enlarged bilateral LNs. I preformed a colonoscopy on 2023 however this was aborted and the patient proceeded with a CT Colonography. This again demonstrated the above findings. CEA 1.5.     He is on aspirin 81mg.     Today Juaquin feels well although he admits that he is having mostly liquid stool or small caliber stools.  He does not have any abdominal pain.  He is enjoying a low-fiber diet.  He has no nausea/vomiting.    CT Chest/Abdomen/Pelvis (2023):                                     IMPRESSION:  1.  Sigmoid colon mass with large extramural component most consistent with adenocarcinoma. No evidence of large bowel obstruction.  2.  A few small lymph nodes in the KE/superior rectal territory are suspicious for regional lymph node involvement. Mildly enlarged bilateral inguinal lymph nodes are not a typical location for a sigmoid mass, therefore indeterminate. No enlarged   retroperitoneal lymph nodes.  3.  No distant metastatic disease.  4.  Diffuse pancreatic duct dilation with a large " intraductal calculus in the head at the site of probable stricture. Refer to MRI from 06/18/2023 for better characterization of the cystic lesions.    Colonoscopy (6/28/2023):  Findings:        Many small and large-mouthed diverticula were found in the sigmoid        colon. There was no evidence of diverticular bleeding. Biopsies were        taken with a cold forceps for histology.        The exam was otherwise without abnormality.                                                                                     Impression:            - The procedure was aborted due to the extreme                          difficulty of the procedure.                          - Severe diverticulosis in the sigmoid colon. There                          was no evidence of diverticular bleeding. Biopsied.                          - The examination was otherwise normal.       Surgical Pathology (6/28/2023):  A(1). COLON, SIGMOID STRICTURE BIOPSY:  - Nonspecific mild acute inflammation with focal cryptitis  - Negative for dysplasia or malignancy    CT Colonography (6/29/2023):  Impression:  1.  Ct Colonography demonstrates large sigmoid colon mass with  near-complete obliteration of the lumen, infiltrative nodular  extension towards the distal descending colon, and extensive  extramural extension and mesenteric and retroperitoneal metastatic  lymphadenopathy. Borderline enlarged inguinal adenopathy as well.  There are no polyps or masses in the remaining colon, however  evaluation is suboptimal due to poor air distention and large volume  of contrast which did not pass beyond the large mass.  2.  Similar appearance of diffuse pancreatic duct dilatation distal to  a pancreatic head intraductal calculus and scattered side branch  intraductal papillary pieces neoplasms.  3.  Moderate left hydronephrosis, similar.  4.  Stable benign large renal cyst as well as a right hepatic lobe  presumed hemangioma.  5.  Additional chronic incidental  findings as detailed in report.    CEA (7/10/2023):  Component Ref Range & Units 1 d ago     CEA ng/mL 1.5          Medical history:  1. Aortic insufficiency   2. ED   3. DVT  4. HLD  5. Macular degeneration   6. Pancreatic cyst     Surgical history:  1. Bilateral blepharoplasty   2. Laminectomy L4-L5    Family history:  Family History   Problem Relation Age of Onset    Cancer Father         bladder    Cancer Mother         unknown skin cancer    Skin Cancer Mother     Glaucoma No family hx of     Macular Degeneration No family hx of     Diabetes No family hx of     Hypertension No family hx of        Medications:  Current Outpatient Medications   Medication Sig Dispense Refill    aspirin 81 MG tablet Take 1 tablet by mouth daily.  3    calcium carbonate (OS-DAVIDE) 1500 (600 Ca) MG tablet Take 600 mg by mouth 2 times daily (with meals)      Cholecalciferol (VITAMIN D-3) 25 MCG (1000 UT) CAPS Take by mouth daily      lipase-protease-amylase (CREON) [id number] units CPEP per EC capsule Take 2 capsules by mouth 3 times daily (with meals) And 1 with snacks. 200 capsule 1    multivitamin (OCUVITE) TABS Take 1 tablet by mouth 2 times daily       sodium chloride (LAXMI 128) 5 % ophthalmic ointment Apply a small amount in both eyes at least once a day (bedtime). 3.5 g 11    sodium chloride (LAXMI 128) 5 % ophthalmic solution Place 1-2 drops into both eyes At Bedtime 15 mL 11       Allergies:  Allergies   Allergen Reactions    Codeine Sulfate Nausea and Vomiting       Social history:   Social History     Tobacco Use    Smoking status: Former     Packs/day: 2.00     Years: 2.00     Pack years: 4.00     Types: Cigarettes     Quit date: 1954     Years since quittin.0    Smokeless tobacco: Never   Substance Use Topics    Alcohol use: Yes     Comment: glass of wine with dinner     Marital status: .    ROS:  A complete review of systems was performed with the patient and all systems negative except as per  HPI.    Physical Examination:  There were no vitals taken for this visit.  General: Well hydrated. No acute distress.  Abdomen: Soft, Not tender    ASSESSMENT  87 y/o gentleman with sigmoid stricture precluding colonoscopy and corroborated on CT colonography - highly suspicious for colon cancer.     Risks, benefits, and alternatives of operative treatment were thoroughly discussed with the patient, he/she understands these well and agrees to proceed.    PLAN  1. OR for laparoscopic, possible open, sigmoid colectomy  2. Risks/benefits discussed with Prof Juaquin Saini and his wife; these include risks of anesthesia, cardiopulmonary risks, risk of anastomotic leak (3-5%), need for possible ostomy, possible conversion to open surgery, possible need for TCU prior to discharge home; also discussed possibility that this may not be a cancer and a benign stricture; all questions were answered  3. PAC visit    60 minutes spent on the date of the encounter doing chart review, history and exam, imaging review, documentation and further activities as noted above.        Referring Provider:  No referring provider defined for this encounter.     Primary Care Provider:  Angeli Robertson        Again, thank you for allowing me to participate in the care of your patient.      Sincerely,    Jhonatan Cowart MD

## 2023-07-13 NOTE — NURSING NOTE
Chief Complaint   Patient presents with     Cancer       Vitals:    07/13/23 1212   BP: 127/74   BP Location: Left arm   Patient Position: Sitting   Cuff Size: Adult Regular   Pulse: 100   SpO2: 95%       There is no height or weight on file to calculate BMI.    Tim Davis EMT-P

## 2023-07-13 NOTE — PATIENT INSTRUCTIONS
Follow up:  Please call Silvia to schedule surgery at [phone]  Full bowel prep with antibiotics  Pre op physical   Labs      Please call with any questions or concerns regarding your clinic visit today.    It is a pleasure being involved in your health care.    Contacts post-consultation depending on your need:    Radiology Appointments [phone]    Schedule Clinic Appointments [phone] # 1   M-F 7:30 - 5 pm    ALIVIA Jackson [phone]    Clinic Fax Number [phone]    Surgery Scheduling [phone]    My Chart is available 24 hours a day and is a secure way to access your records and communicate with your care team.  I strongly recommend signing up if you haven't already done so, if you are comfortable with computers.  If you would like to inquire about this or are having problems with My Chart access, you may call [phone] or go online at [email or web address].  Please allow at least 24 hours for a response and extra time on weekends and Holidays.

## 2023-07-14 ENCOUNTER — TELEPHONE (OUTPATIENT)
Dept: SURGERY | Facility: CLINIC | Age: 88
End: 2023-07-14
Payer: MEDICARE

## 2023-07-14 NOTE — TELEPHONE ENCOUNTER
On 7/14/2023 at 3:53 PM:  Patient called to schedule his Laparoscopic Sigmoid Colectomy with Dr. Cowart. This is a priority case due to cancer diagnosis.    I told patient that I was still working with Dr. Cowart to find a date, and that I'd call him back on Monday to discuss further.    Messaged with Dr. Jhonatan Cowart, he would like to wait list to do this case on Tuesday 8/1/2023. Need to finish plans for that date for the surgeon with the Colon & Rectal block - must be completed before 4:30 PM on Monday 7/16/2023.    Awaiting plans on Monday before scheduling this case vs putting case on the wait list for 8/1    Silvia Briceño  Kaylene-op Coordinator  [facility]  Direct Phone: [phone]

## 2023-07-17 ENCOUNTER — TELEPHONE (OUTPATIENT)
Dept: SURGERY | Facility: CLINIC | Age: 88
End: 2023-07-17
Payer: MEDICARE

## 2023-07-18 NOTE — TELEPHONE ENCOUNTER
FUTURE VISIT INFORMATION      SURGERY INFORMATION:    Date: 23    Location: [facility] or    Surgeon:  Jhonatan Cowart MD    Anesthesia Type:  general    Procedure: COLECTOMY, SIGMOID, LAPAROSCOPIC  RECORDS REQUESTED FROM:       Primary Care Provider: Angeli Robertson MD- [facility]    Pertinent Medical History:    Most recent EKG+ Tracin23    Most recent ECHO: 10/22/19    Most recent Cardiac Stress Test: 3/19/19

## 2023-07-19 ENCOUNTER — PRE VISIT (OUTPATIENT)
Dept: SURGERY | Facility: CLINIC | Age: 88
End: 2023-07-19

## 2023-07-19 ENCOUNTER — MYC MEDICAL ADVICE (OUTPATIENT)
Dept: INTERNAL MEDICINE | Facility: CLINIC | Age: 88
End: 2023-07-19

## 2023-07-19 LAB
ABO/RH(D): NORMAL
ANTIBODY SCREEN: NEGATIVE
SPECIMEN EXPIRATION DATE: NORMAL

## 2023-07-20 ENCOUNTER — LAB (OUTPATIENT)
Dept: LAB | Facility: CLINIC | Age: 88
End: 2023-07-20
Attending: SURGERY
Payer: MEDICARE

## 2023-07-20 ENCOUNTER — OFFICE VISIT (OUTPATIENT)
Dept: SURGERY | Facility: CLINIC | Age: 88
End: 2023-07-20
Payer: MEDICARE

## 2023-07-20 ENCOUNTER — ANESTHESIA EVENT (OUTPATIENT)
Dept: SURGERY | Facility: CLINIC | Age: 88
DRG: 821 | End: 2023-07-20
Payer: MEDICARE

## 2023-07-20 ENCOUNTER — PRE VISIT (OUTPATIENT)
Dept: SURGERY | Facility: CLINIC | Age: 88
End: 2023-07-20

## 2023-07-20 VITALS
SYSTOLIC BLOOD PRESSURE: 120 MMHG | WEIGHT: 165 LBS | TEMPERATURE: 98.4 F | OXYGEN SATURATION: 97 % | DIASTOLIC BLOOD PRESSURE: 71 MMHG | RESPIRATION RATE: 16 BRPM | HEART RATE: 80 BPM | BODY MASS INDEX: 23.1 KG/M2 | HEIGHT: 71 IN

## 2023-07-20 DIAGNOSIS — R97.8 OTHER ABNORMAL TUMOR MARKERS: ICD-10-CM

## 2023-07-20 DIAGNOSIS — K63.89 COLONIC MASS: ICD-10-CM

## 2023-07-20 DIAGNOSIS — Z01.818 PREOP EXAMINATION: Primary | ICD-10-CM

## 2023-07-20 DIAGNOSIS — Z01.818 PREOP EXAMINATION: ICD-10-CM

## 2023-07-20 LAB
ALBUMIN SERPL BCG-MCNC: 3.9 G/DL (ref 3.5–5.2)
ALP SERPL-CCNC: 112 U/L (ref 40–129)
ALT SERPL W P-5'-P-CCNC: 7 U/L (ref 0–70)
ANION GAP SERPL CALCULATED.3IONS-SCNC: 9 MMOL/L (ref 7–15)
AST SERPL W P-5'-P-CCNC: 19 U/L (ref 0–45)
BASOPHILS # BLD AUTO: 0.1 10E3/UL (ref 0–0.2)
BASOPHILS NFR BLD AUTO: 1 %
BILIRUB SERPL-MCNC: 0.4 MG/DL
BUN SERPL-MCNC: 29.8 MG/DL (ref 8–23)
CALCIUM SERPL-MCNC: 9.6 MG/DL (ref 8.8–10.2)
CEA SERPL-MCNC: 1.3 NG/ML
CHLORIDE SERPL-SCNC: 104 MMOL/L (ref 98–107)
CREAT SERPL-MCNC: 1.06 MG/DL (ref 0.67–1.17)
DEPRECATED HCO3 PLAS-SCNC: 27 MMOL/L (ref 22–29)
EOSINOPHIL # BLD AUTO: 0.2 10E3/UL (ref 0–0.7)
EOSINOPHIL NFR BLD AUTO: 3 %
ERYTHROCYTE [DISTWIDTH] IN BLOOD BY AUTOMATED COUNT: 13.5 % (ref 10–15)
GFR SERPL CREATININE-BSD FRML MDRD: 68 ML/MIN/1.73M2
GLUCOSE SERPL-MCNC: 98 MG/DL (ref 70–99)
HCT VFR BLD AUTO: 39.3 % (ref 40–53)
HGB BLD-MCNC: 12.6 G/DL (ref 13.3–17.7)
IMM GRANULOCYTES # BLD: 0 10E3/UL
IMM GRANULOCYTES NFR BLD: 0 %
LYMPHOCYTES # BLD AUTO: 1.1 10E3/UL (ref 0.8–5.3)
LYMPHOCYTES NFR BLD AUTO: 14 %
MCH RBC QN AUTO: 30.2 PG (ref 26.5–33)
MCHC RBC AUTO-ENTMCNC: 32.1 G/DL (ref 31.5–36.5)
MCV RBC AUTO: 94 FL (ref 78–100)
MONOCYTES # BLD AUTO: 0.8 10E3/UL (ref 0–1.3)
MONOCYTES NFR BLD AUTO: 11 %
NEUTROPHILS # BLD AUTO: 5.3 10E3/UL (ref 1.6–8.3)
NEUTROPHILS NFR BLD AUTO: 71 %
NRBC # BLD AUTO: 0 10E3/UL
NRBC BLD AUTO-RTO: 0 /100
PLATELET # BLD AUTO: 251 10E3/UL (ref 150–450)
POTASSIUM SERPL-SCNC: 4.4 MMOL/L (ref 3.4–5.3)
PROT SERPL-MCNC: 7.4 G/DL (ref 6.4–8.3)
RBC # BLD AUTO: 4.17 10E6/UL (ref 4.4–5.9)
SODIUM SERPL-SCNC: 140 MMOL/L (ref 136–145)
WBC # BLD AUTO: 7.5 10E3/UL (ref 4–11)

## 2023-07-20 PROCEDURE — 85025 COMPLETE CBC W/AUTO DIFF WBC: CPT | Performed by: PATHOLOGY

## 2023-07-20 PROCEDURE — 99213 OFFICE O/P EST LOW 20 MIN: CPT | Performed by: NURSE PRACTITIONER

## 2023-07-20 PROCEDURE — 36415 COLL VENOUS BLD VENIPUNCTURE: CPT | Performed by: PATHOLOGY

## 2023-07-20 PROCEDURE — 99000 SPECIMEN HANDLING OFFICE-LAB: CPT | Performed by: PATHOLOGY

## 2023-07-20 PROCEDURE — 86901 BLOOD TYPING SEROLOGIC RH(D): CPT | Performed by: NURSE PRACTITIONER

## 2023-07-20 PROCEDURE — 80053 COMPREHEN METABOLIC PANEL: CPT | Performed by: PATHOLOGY

## 2023-07-20 PROCEDURE — 84134 ASSAY OF PREALBUMIN: CPT | Performed by: SURGERY

## 2023-07-20 PROCEDURE — 82378 CARCINOEMBRYONIC ANTIGEN: CPT | Performed by: SURGERY

## 2023-07-20 PROCEDURE — 86850 RBC ANTIBODY SCREEN: CPT | Performed by: NURSE PRACTITIONER

## 2023-07-20 ASSESSMENT — ENCOUNTER SYMPTOMS: ORTHOPNEA: 0

## 2023-07-20 ASSESSMENT — PAIN SCALES - GENERAL: PAINLEVEL: NO PAIN (0)

## 2023-07-20 NOTE — H&P
Pre-Operative H & P     CC:  Preoperative exam to assess for increased cardiopulmonary risk while undergoing surgery and anesthesia.    Date of Encounter: 7/20/2023  Primary Care Physician:  Angeli Robertson     Reason for visit:   Encounter Diagnoses   Name Primary?     Preop examination Yes     Colonic mass        HPI  Tom Saini is a 88 year old male who presents for pre-operative H & P in preparation for  Procedure Information     Case: [id number] Date/Time: 08/01/23 1230    Procedures:       COLECTOMY, SIGMOID, LAPAROSCOPIC (Abdomen)      possible open (Left: Abdomen)    Anesthesia type: General    Diagnosis:       Colonic mass [K63.89]      Other abnormal tumor markers [R97.8]    Pre-op diagnosis:       Colonic mass [K63.89]      Other abnormal tumor markers [R97.8]    [facility]  [facility]    Providers: Jhonatan Cowart MD          Tom Saini is a 88 year old male with thoracic aorta dilation, anemia, history of DVT, pancreatic cyst, pancreatic insufficiency and macular degeneration that has a colonic mass concerning for malignancy.  This mass was incidentally noted on imaging done for follow up of his pancreatic cyst.  A colonoscopy was later attempted, but had to be aborted.  The above listed procedure has now been recommended for further evaluation and initial treatment.     History is obtained from the patient, his wief and chart review    Hx of abnormal bleeding or anti-platelet use: aspirin      Past Medical History  Past Medical History:   Diagnosis Date     Aortic insufficiency      Erectile dysfunction      History of deep venous thrombosis 05/2019    distal DVT with extension, completed 6 months of AC (failed eliquis)     History of pelvic fracture      Hyperlipidemia      Low back pain     disc disease s/p laminectomy in past     Macular degeneration     on Avastin, R eye     Pancreatic cyst 07/2018    next MR due 12/23     Radius fracture 2018    after fall from wall       Past  Surgical History  Past Surgical History:   Procedure Laterality Date     CATARACT IOL, RT/LT  01/01/2001     COLONOSCOPY N/A 06/28/2023    Procedure: COLONOSCOPY, WITH BIOPSY;  Surgeon: Jhonatan Cowart MD;  Location: [facility] OR     COMBINED REPAIR PTOSIS WITH BLEPHAROPLASTY BILATERAL Bilateral 04/06/2018    Procedure: COMBINED REPAIR PTOSIS WITH BLEPHAROPLASTY BILATERAL;  BILATERAL UPPER EYELIDS BLEPHAROPLASTY AND PTOSIS REPAIR ;  Surgeon: Anuj Good MD;  Location: [facility]     EYE SURGERY       LAMINECTOMY LUMBAR TWO LEVELS  01/01/1993    L4-L5     ORIF RADIAL SHAFT FRACTURE Right 2018     RECESSION RESECTION (REPAIR STRABISMUS)  01/01/1949       Prior to Admission Medications  Current Outpatient Medications   Medication Sig Dispense Refill     aspirin 81 MG tablet Take 1 tablet by mouth daily.  3     sodium chloride (LAXMI 128) 5 % ophthalmic ointment Apply a small amount in both eyes at least once a day (bedtime). 3.5 g 11     calcium carbonate (OS-DAVIDE) 1500 (600 Ca) MG tablet Take 600 mg by mouth 2 times daily (with meals)       Cholecalciferol (VITAMIN D-3) 25 MCG (1000 UT) CAPS Take by mouth daily       lipase-protease-amylase (CREON) 58749-53410-981013 units CPEP per EC capsule Take 2 capsules by mouth 3 times daily (with meals) And 1 with snacks. (Patient not taking: Reported on 7/20/2023) 200 capsule 1     metroNIDAZOLE (FLAGYL) 500 MG tablet Take 1 tablet (500 mg) by mouth every 6 hours At 8:00 am, 2:00 pm, 8:00 pm the day prior to your surgery with neomycin and zofran. (Patient not taking: Reported on 7/20/2023) 3 tablet 0     multivitamin (OCUVITE) TABS Take 1 tablet by mouth 2 times daily        neomycin (MYCIFRADIN) 500 MG tablet Take 2 tablets (1,000 mg) by mouth every 6 hours At 8:00 am, 2:00 pm, 8:00 pm the day prior to your surgery with flagyl and zofran. (Patient not taking: Reported on 7/20/2023) 6 tablet 0     ondansetron (ZOFRAN) 4 MG tablet Take 1 tablet (4 mg) by mouth every 6  "hours At 8:00 am, 2:00 pm, 8:00 pm the day prior to your surgery with neomycin and flagyl. (Patient not taking: Reported on 2023) 3 tablet 0     polyethylene glycol (MIRALAX) 17 g packet Take 238 g by mouth See Admin Instructions Starting at 4 pm night prior to surgery. Refer to \"Getting Ready for Surgery\" instructions. (Patient not taking: Reported on 2023) 14 packet 0     sodium chloride (LAXMI 128) 5 % ophthalmic solution Place 1-2 drops into both eyes At Bedtime (Patient not taking: Reported on 2023) 15 mL 11       Allergies  Allergies   Allergen Reactions     Codeine Sulfate Nausea and Vomiting       Social History  Social History     Socioeconomic History     Marital status:      Spouse name: Not on file     Number of children: 4     Years of education: Not on file     Highest education level: Not on file   Occupational History     Occupation: retired professor [employer]   Tobacco Use     Smoking status: Former     Packs/day: 2.00     Years: 2.00     Pack years: 4.00     Types: Cigarettes     Quit date: 1954     Years since quittin.1     Smokeless tobacco: Never   Substance and Sexual Activity     Alcohol use: Yes     Alcohol/week: 3.0 standard drinks of alcohol     Types: 3 Glasses of wine per week     Comment: glass of wine with dinner     Drug use: Never     Sexual activity: Not on file   Other Topics Concern     Parent/sibling w/ CABG, MI or angioplasty before 65F 55M? Not Asked   Social History Narrative    Retired     [place]  and traveler    3 daughters    Virgie and Mike Montero daughter in [place]--moved to [place]--killed  in hit and run    Melodie in  in [facility]--Step Son and Daughter    7 grandchildren    2 great grandchildren     Social Determinants of Health     Financial Resource Strain: Not on file   Food Insecurity: Not on file   Transportation Needs: Not on file   Physical Activity: Not on " file   Stress: Not on file   Social Connections: Not on file   Intimate Partner Violence: Not on file   Housing Stability: Not on file       Family History  Family History   Problem Relation Age of Onset     Cancer Mother         unknown skin cancer     Skin Cancer Mother      Cancer Father         bladder     Glaucoma No family hx of      Macular Degeneration No family hx of      Diabetes No family hx of      Hypertension No family hx of      Anesthesia Reaction No family hx of      Thrombosis No family hx of        Review of Systems  The complete review of systems is negative other than noted in the HPI or here.   Anesthesia Evaluation   Pt has had prior anesthetic.     No history of anesthetic complications       ROS/MED HX  ENT/Pulmonary:  - neg pulmonary ROS  (-) CESAR risk factors   Neurologic:  - neg neurologic ROS     Cardiovascular: Comment: Mildly dilated thoracic aorta 4.0cm    (+) -----Previous cardiac testing   Echo: Date: 2019 Results:  Interpretation Summary  Global and regional left ventricular function is normal with an EF of 60-65%.  The right ventricle is normal in function and size.  The atrial septum is intact as assessed by color Doppler and agitated dextrose  bubble study .  Mild aortic insufficiency is present.  No pericardial effusion is present.  Proximal ascending aorta is dilated measuring 4cm (indexed value of 2cm/m2).  Sinuses of valsalva measure 3.7cm.     This study was compared with the study from 5/23/14 the aorta is larger in  size. .     Stress Test: Date: 2019 Results:                                                          IMPRESSION:  1.  Normal  myocardial SPECT study with a summed stress score of   1 .  No ischemia or infarct identified. Normal LV function.     ECG Reviewed:  Date: 6/2023 Results:  Sinus rhythm   Left anterior fascicular block   Abnormal ECG   Cath:  Date: Results:   (-) SANTOS and orthopnea/PND   METS/Exercise Tolerance: 4 - Raking leaves, gardening Comment: Up  "until recently was walking 0.75 miles at the [employer] [place] 3-4x per week for exercise.  Slow pace   Hematologic:     (+) History of blood clots (hx DVT in 2019), pt is not anticoagulated, anemia,     Musculoskeletal: Comment: S/p lumbar laminectomy several years ago - still has intermittent low back pain.      GI/Hepatic: Comment: Colonic mass, other abnormal tumor markers    Chronic constipation      Renal/Genitourinary:  - neg Renal ROS     Endo: Comment: Pancreatic cyst, pancreatic insufficiency      Psychiatric/Substance Use:  - neg psychiatric ROS     Infectious Disease:  - neg infectious disease ROS     Malignancy:  - neg malignancy ROS     Other: Comment: Macular degeneration - neg other ROS          /71 (BP Location: Right arm, Patient Position: Sitting, Cuff Size: Adult Regular)   Pulse 80   Temp 98.4  F (36.9  C) (Oral)   Resp 16   Ht 1.791 m (5' 10.5\")   Wt 74.8 kg (165 lb)   SpO2 97%   BMI 23.34 kg/m      Physical Exam   Constitutional: Awake, alert, cooperative, no apparent distress, and appears stated age.  Eyes: Pupils equal, round and reactive to light, extra ocular muscles intact, sclera clear, conjunctiva normal.  HENT: Normocephalic, oral pharynx with moist mucus membranes, good dentition. No goiter appreciated.   Respiratory: Clear to auscultation bilaterally, no crackles or wheezing.  Cardiovascular: Regular rate and rhythm, normal S1 and S2, and no murmur noted.  Carotids +2, no bruits. No edema. Palpable pulses to radial  DP and PT arteries.   GI: Normal bowel sounds, soft, non-distended, non-tender, no masses palpated, no hepatosplenomegaly.    Lymph/Hematologic: No cervical lymphadenopathy and no supraclavicular lymphadenopathy.  Genitourinary:  deferred  Skin: Warm and dry.  No rashes at anticipated surgical site.   Musculoskeletal: Full ROM of neck. There is no redness, warmth, or swelling of the joints. Gross motor strength is normal.    Neurologic: Awake, alert, oriented to " name, place and time. Cranial nerves II-XII are grossly intact. Gait is normal.   Neuropsychiatric: Calm, cooperative. Normal affect.     Prior Labs/Diagnostic Studies   All labs and imaging personally reviewed     EKG/ stress test - if available please see in ROS above           The patient's records and results personally reviewed by this provider.     Outside records reviewed from: Care Everywhere    LAB/DIAGNOSTIC STUDIES TODAY:    Component      Latest Ref Rng 7/10/2023  3:15 PM 7/20/2023  10:41 AM   WBC      4.0 - 11.0 10e3/uL  7.5    RBC Count      4.40 - 5.90 10e6/uL  4.17 (L)    Hemoglobin      13.3 - 17.7 g/dL  12.6 (L)    Hematocrit      40.0 - 53.0 %  39.3 (L)    MCV      78 - 100 fL  94    MCH      26.5 - 33.0 pg  30.2    MCHC      31.5 - 36.5 g/dL  32.1    RDW      10.0 - 15.0 %  13.5    Platelet Count      150 - 450 10e3/uL  251    % Neutrophils      %  71    % Lymphocytes      %  14    % Monocytes      %  11    % Eosinophils      %  3    % Basophils      %  1    % Immature Granulocytes      %  0    NRBCs per 100 WBC      <1 /100  0    Absolute Neutrophils      1.6 - 8.3 10e3/uL  5.3    Absolute Lymphocytes      0.8 - 5.3 10e3/uL  1.1    Absolute Monocytes      0.0 - 1.3 10e3/uL  0.8    Absolute Eosinophils      0.0 - 0.7 10e3/uL  0.2    Absolute Basophils      0.0 - 0.2 10e3/uL  0.1    Absolute Immature Granulocytes      <=0.4 10e3/uL  0.0    Absolute NRBCs      10e3/uL  0.0    Sodium      136 - 145 mmol/L  140    Potassium      3.4 - 5.3 mmol/L  4.4    Chloride      98 - 107 mmol/L  104    Carbon Dioxide (CO2)      22 - 29 mmol/L  27    Anion Gap      7 - 15 mmol/L  9    Urea Nitrogen      8.0 - 23.0 mg/dL  29.8 (H)    Creatinine      0.67 - 1.17 mg/dL  1.06    Calcium      8.8 - 10.2 mg/dL  9.6    Glucose      70 - 99 mg/dL  98    Alkaline Phosphatase      40 - 129 U/L  112    AST      0 - 45 U/L  19    ALT      0 - 70 U/L  7    Protein Total      6.4 - 8.3 g/dL  7.4    Albumin      3.5 - 5.2 g/dL   "3.9    Bilirubin Total      <=1.2 mg/dL  0.4    GFR Estimate      >60 mL/min/1.73m2  68    INR      0.85 - 1.15  1.01        Legend:  (L) Low  (H) High      Assessment      Tom Saini is a 88 year old male seen as a PAC referral for risk assessment and optimization for anesthesia.    Plan/Recommendations  Pt will be optimized for the proposed procedure.  See below for details on the assessment, risk, and preoperative recommendations    NEUROLOGY  - No history of TIA, CVA or seizure    -Post Op delirium risk factors:  Age    ENT  - No current airway concerns.  Will need to be reassessed day of surgery.  Mallampati: II  TM: > 3    CARDIAC  - No history of CAD, Hypertension and Afib  - METS (Metabolic Equivalents)  Patient performs 4 or more METS exercise without symptoms            Total Score: 0      RCRI-Low risk: Class 2 0.9% complication rate            Total Score: 1    RCRI: High Risk Surgery        PULMONARY    CESAR Low Risk            Total Score: 2    CESAR: Over 50 ys old    CESAR: Male      - Denies asthma or inhaler use  - Tobacco History      History   Smoking Status     Former     Packs/day: 2.00     Years: 2.00     Types: Cigarettes     Quit date: 6/25/1954   Smokeless Tobacco     Never       GI  - - denies GERD   - colonic mass - procedure planned as above.   PONV Medium Risk  Total Score: 2           1 AN PONV: Patient is not a current smoker    1 AN PONV: Intended Post Op Opioids            ENDOCRINE    - BMI: Estimated body mass index is 23.34 kg/m  as calculated from the following:    Height as of this encounter: 1.791 m (5' 10.5\").    Weight as of this encounter: 74.8 kg (165 lb).  Healthy Weight (BMI 18.5-24.9)  - No history of Diabetes Mellitus     - creon was ordered recently for pancreatic cyst/insufficiency, but he notes that he hasn't started taking it due to the noted possible side effect of bowel obstruction.  He plans to discuss with his surgeon and primary care provider after " surgery.    HEME  VTE High Risk 3%            Total Score: 11    VTE: Greater than 59 yrs old    VTE: Male    VTE: Pt history of VTE    VTE: Current cancer      - already put his aspirin on hold in preparation for surgery.   - Chronic anemia  Recommend perioperative use of blood conservation techniques intraoperatively and close monitoring for postoperative bleeding.  A type and screen has been ordered for this patient    MSK  - intermittent low back pain.  Consider cautious positioning.         Different anesthesia methods/types have been discussed with the patient, but they are aware that the final plan will be decided by the assigned anesthesia provider on the date of service.      The patient is optimized for their procedure. AVS with information on surgery time/arrival time, meds and NPO status given by nursing staff. No further diagnostic testing indicated.      On the day of service:     Prep time: 5 minutes  Visit time: 18 minutes  Documentation time: 13 minutes  ------------------------------------------  Total time: 36 minutes      HEYDI Munroe CNP  Preoperative Assessment Center  [facility]  Clinic and Surgery Center  Phone: [phone]  Fax: [phone]

## 2023-07-20 NOTE — PATIENT INSTRUCTIONS
Preparing for Your Surgery      Name:  Tom Saini   MRN:  [id number]   :  1935   Today's Date:  2023       Arriving for surgery:  Surgery date: 23  Arrival time:  10.30AM    Please come to:     Please come to:      [facility] [address]  [address] [place]  [address]      The [facility] Patient /Visitor Ramp is located at [address]. Patients and visitors who self-park will receive the reduced hospital parking rate. If the Patient /Visitor Ramp is full, please follow the signs to the [employer] parking located at the main hospital entrance.     parking is available ( 24 hours/ 7 days a week)    Discounted parking pass options are available for patients and visitors. They can be purchased at the sones desk at the main hospital entrance.    -    Stop at the security desk and they will direct surgery patients to the 3rd floor Surgery Waiting Room. [phone]     -  If you are in need of directions, wheelchair or escort please stop at the Information/security desk in the lobby.       What can I eat or drink?  Please follow the bowel prep by Dr. Cowart provided to you and you may have clear liquids until 2 hours prior to arrival time. (Until 8.30AM)    Examples of clear liquids:  Water  Clear broth  Juices (apple, white grape, white cranberry  and cider) without pulp  Noncarbonated, powder based beverages  (lemonade and Josh-Aid)  Sodas (Sprite, 7-Up, ginger ale and seltzer)  Coffee or tea (without milk or cream)  Gatorade    -  No Alcohol or cannabis products for at least 24 hours before surgery.     Which medicines can I take?    Hold Aspirin for 7 days before surgery.   Hold Multivitamins for 7 days before surgery. (Occuvite)  Hold Supplements for 7 days before surgery. (Os-Cricket)  Hold Ibuprofen (Advil, Motrin) for 1 day(s) before surgery--unless otherwise directed by surgeon.  Hold Naproxen  (Aleve) for 4 days before surgery.    -  DO NOT take these medications the day of surgery:  None.    -  PLEASE TAKE these medications the day of surgery:  None.    How do I prepare myself?  - Please take 2 showers (one the night prior to surgery and one the morning of surgery) using Scrubcare or Hibiclens soap.    Use this soap only from the neck to your toes.     Leave the soap on your skin for one minute--then rinse thoroughly.      You may use your own shampoo and conditioner. No other hair products.   - Please remove all jewelry and body piercings.  - No lotions, deodorants or fragrance.  - No makeup or fingernail polish.   - Bring your ID and insurance card.    -If you have a Deep Brain Stimulator, Spinal Cord Stimulator, or any Neuro Stimulator device---you must bring the remote control to the hospital.      ALL PATIENTS GOING HOME THE SAME DAY OF SURGERY ARE REQUIRED TO HAVE A RESPONSIBLE ADULT TO DRIVE AND BE IN ATTENDANCE WITH THEM FOR 24 HOURS FOLLOWING SURGERY.    Covid testing policy as of 12/06/2022  Your surgeon will notify and schedule you for a COVID test if one is needed before surgery--please direct any questions or COVID symptoms to your surgeon      Questions or Concerns:    - For any questions regarding the day of surgery or your hospital stay, please contact the Pre Admission Nursing Office at [phone].       - If you have health changes between today and your surgery, please call your surgeon.       - For questions after surgery, please call your surgeons office.           Current Visitor Guidelines    You may have 2 visitors in the pre op area.    Visiting hours: 8 a.m. to 8:30 p.m.    You may have four visitors during your inpatient hospital stay.    Patients confirmed or suspected to have symptoms of COVID 19 or flu:     No visitors allowed for adult patients.   Children (under age 18) can have 1 named visitor.     People who are sick or showing symptoms of COVID 19 or flu:    Are not  allowed to visit patients--we can only make exceptions in special situations.       Please follow these guidelines for your visit:          Please maintain social distance          Masking is optional--however at times you may be asked to wear a mask for the safety of yourself and others     Clean your hands with alcohol hand . Do this when you arrive at and leave the building and patient room,    And again after you touch your mask or anything in the room.     Go directly to and from the room you are visiting.     Stay in the patient s room during your visit. Limit going to other places in the hospital as much as possible     Leave bags and jackets at home or in the car.     For everyone s health, please don t come and go during your visit. That includes for smoking   during your visit.

## 2023-07-21 ENCOUNTER — TEAM CONFERENCE (OUTPATIENT)
Dept: SURGERY | Facility: CLINIC | Age: 88
End: 2023-07-21
Payer: MEDICARE

## 2023-07-21 LAB — PREALB SERPL IA-MCNC: 18 MG/DL (ref 15–45)

## 2023-07-21 NOTE — TELEPHONE ENCOUNTER
On 7/21/2023 at 9:08 AM:  Per Dr. Cowart, move patient's surgery up from 8/1/2023 to 7/27/2023. Dr. Cowart is no longer available [facility] Spoke with patient, he confirmed new surgery date. I confirmed that times are tentative, but it is likely that his surgery will start at 7:30 AM, with arrival time 5:30 AM. Patient expressed understanding.    On 7/21/2023 at 10:52 AM:  Spoke with patient via phone again, explained that I'd sent his updated Surgery Packet via [facility] and Mail, including related scheduling information and prep instructions. Patient had a printed copy of his Surgery Packet for the previous date. I explained that all bowel prep instructions and appointments are the same except for the surgery date. Patient also reviewed the Updated Surgery Packet on [employer]. He wanted me to ask ALIVIA Jackson, about his Bowel Prep since last time he ended up in the ED via ambulance after only attempting part of his Bowel Prep. I sent a message to ALIVIA Jackson, to follow up with patient directly regarding his question/ concerns about completing the Bowel Prep.    Your surgery is scheduled:    Date: Thursday July 27, 2023  ________________________________    Time: 7:30 AM*  ________________________________    Please arrive at:  5:30 AM*  ______________________    Surgeon's Name:   - Dr. Jhonatan Cowart  ______________________      Pre-Op Physical Fax Numbers:         [facility] Fax:  [phone] / Phone:  [phone]        Your surgery is located at:      [facility]      [facility] [address]C)      [place] [address]      [phone]      [email or web address]      Required: Yes, you will need a  18 years or older to drive you home from your procedure as well as stay with you for 24 hours after your procedure, if you are discharged the same day as your  "procedure.    Surgery: Laparoscopic, possible open Sigmoid Colectomy    [facility] [place] - Other Important Phone Numbers:      [phone]   Nurse Line (Renetta RN): [phone]      Services: [phone]   Scheduling (Silvia): [phone]     Upcoming / Related Appointments:     - Pre-op Physical with PAC + Labs completed on 2023    Aug 18, 2023  9:30 AM  (Arrive by 9:15 AM)  Post Op with Jenny Poe PA-C  [facility] [place] [phone]    [address] [place] [id number]     Sep 12, 2023 10:30 AM  (Arrive by 10:15 AM)  Post Op with Jhonatan Cowart MD  [facility] [place] ([facility] [place] ) [phone]     [address] [address]     Pre-surgical Preparation:    MiraLAX/Gatorade, Antibiotics, Zofran  - see \"Day Before Surgery\"   - obtain medications and ingredients in advance    Please go to your local pharmacy or store and purchase:   - TWO 8.3oz (238g) bottles of Miralax (Powder)   - 96 oz of Gatorade (no red or purple)     Silvia Briceño  Kaylene-op Coordinator  [place]  Direct Phone: [phone]  "

## 2023-07-21 NOTE — PROGRESS NOTES
COLON AND RECTAL SURGERY HUDDLE:    Patient was reviewed in preparation for their surgery the following was reviewed and has been completed:    Surgeon: Dr. Jhonatan Cowart     Surgery & Date: 7/27/2023    COLECTOMY, SIGMOID, LAPAROSCOPIC N/A General   possible open Left General     Last MD Note: reviewed    Anesthesia Type: General    Other Providers: No    PAC: Yes    WOC: N/A    Labs: Yes    Bowel Prep: Yes Antibiotic- Juaquin ended up at the ER with his last prep. He couldn't tolerate it (probably due to stricture). Discussed with Dr. Cowart. Ok for just antibiotics.     Packet: Yes    Imaging: N/A    Post-Op Appointments: Yes    Pre op soap: Yes Questions about shower: no     Is patient on TPN?: N/A   If yes, I contacted the TPN pharmacist by paging the  pharmacy at [phone] or calling [phone]. I also contacted Iveth OLIVO with inpatient colon and rectal team.     Pre op call complete: Yes

## 2023-07-27 ENCOUNTER — HOSPITAL ENCOUNTER (INPATIENT)
Facility: CLINIC | Age: 88
LOS: 7 days | Discharge: NURSING, REHAB OR LONG-TERM CARE | DRG: 821 | End: 2023-08-03
Attending: SURGERY | Admitting: SURGERY
Payer: MEDICARE

## 2023-07-27 DIAGNOSIS — K63.89 COLONIC MASS: Primary | ICD-10-CM

## 2023-07-27 LAB — GLUCOSE BLDC GLUCOMTR-MCNC: 107 MG/DL (ref 70–99)

## 2023-07-27 PROCEDURE — 258N000003 HC RX IP 258 OP 636: Performed by: STUDENT IN AN ORGANIZED HEALTH CARE EDUCATION/TRAINING PROGRAM

## 2023-07-27 PROCEDURE — 88368 INSITU HYBRIDIZATION MANUAL: CPT | Mod: 26 | Performed by: MEDICAL GENETICS

## 2023-07-27 PROCEDURE — 88271 CYTOGENETICS DNA PROBE: CPT | Performed by: SURGERY

## 2023-07-27 PROCEDURE — 258N000003 HC RX IP 258 OP 636: Performed by: ANESTHESIOLOGY

## 2023-07-27 PROCEDURE — 120N000002 HC R&B MED SURG/OB UMMC

## 2023-07-27 PROCEDURE — 272N000001 HC OR GENERAL SUPPLY STERILE: Performed by: SURGERY

## 2023-07-27 PROCEDURE — 710N000010 HC RECOVERY PHASE 1, LEVEL 2, PER MIN: Performed by: SURGERY

## 2023-07-27 PROCEDURE — 88309 TISSUE EXAM BY PATHOLOGIST: CPT | Mod: 26 | Performed by: PATHOLOGY

## 2023-07-27 PROCEDURE — 250N000013 HC RX MED GY IP 250 OP 250 PS 637

## 2023-07-27 PROCEDURE — 88184 FLOWCYTOMETRY/ TC 1 MARKER: CPT | Performed by: SURGERY

## 2023-07-27 PROCEDURE — 250N000011 HC RX IP 250 OP 636: Mod: JZ | Performed by: ANESTHESIOLOGY

## 2023-07-27 PROCEDURE — 88305 TISSUE EXAM BY PATHOLOGIST: CPT | Mod: TC | Performed by: SURGERY

## 2023-07-27 PROCEDURE — 250N000013 HC RX MED GY IP 250 OP 250 PS 637: Performed by: STUDENT IN AN ORGANIZED HEALTH CARE EDUCATION/TRAINING PROGRAM

## 2023-07-27 PROCEDURE — 250N000011 HC RX IP 250 OP 636: Mod: JZ | Performed by: SURGERY

## 2023-07-27 PROCEDURE — 0DJD8ZZ INSPECTION OF LOWER INTESTINAL TRACT, VIA NATURAL OR ARTIFICIAL OPENING ENDOSCOPIC: ICD-10-PCS | Performed by: SURGERY

## 2023-07-27 PROCEDURE — 250N000011 HC RX IP 250 OP 636: Mod: JZ | Performed by: STUDENT IN AN ORGANIZED HEALTH CARE EDUCATION/TRAINING PROGRAM

## 2023-07-27 PROCEDURE — 360N000077 HC SURGERY LEVEL 4, PER MIN: Performed by: SURGERY

## 2023-07-27 PROCEDURE — 250N000011 HC RX IP 250 OP 636: Performed by: ANESTHESIOLOGY

## 2023-07-27 PROCEDURE — 88342 IMHCHEM/IMCYTCHM 1ST ANTB: CPT | Mod: 26 | Performed by: PATHOLOGY

## 2023-07-27 PROCEDURE — 88360 TUMOR IMMUNOHISTOCHEM/MANUAL: CPT | Mod: 26 | Performed by: PATHOLOGY

## 2023-07-27 PROCEDURE — 999N000141 HC STATISTIC PRE-PROCEDURE NURSING ASSESSMENT: Performed by: SURGERY

## 2023-07-27 PROCEDURE — P9045 ALBUMIN (HUMAN), 5%, 250 ML: HCPCS | Mod: JZ | Performed by: ANESTHESIOLOGY

## 2023-07-27 PROCEDURE — C9290 INJ, BUPIVACAINE LIPOSOME: HCPCS | Performed by: STUDENT IN AN ORGANIZED HEALTH CARE EDUCATION/TRAINING PROGRAM

## 2023-07-27 PROCEDURE — 88341 IMHCHEM/IMCYTCHM EA ADD ANTB: CPT | Mod: 26 | Performed by: PATHOLOGY

## 2023-07-27 PROCEDURE — 0DBN0ZZ EXCISION OF SIGMOID COLON, OPEN APPROACH: ICD-10-PCS | Performed by: SURGERY

## 2023-07-27 PROCEDURE — 370N000017 HC ANESTHESIA TECHNICAL FEE, PER MIN: Performed by: SURGERY

## 2023-07-27 PROCEDURE — 88305 TISSUE EXAM BY PATHOLOGIST: CPT | Mod: 26 | Performed by: PATHOLOGY

## 2023-07-27 PROCEDURE — 250N000011 HC RX IP 250 OP 636: Performed by: STUDENT IN AN ORGANIZED HEALTH CARE EDUCATION/TRAINING PROGRAM

## 2023-07-27 PROCEDURE — 44139 MOBILIZATION OF COLON: CPT | Mod: GC | Performed by: SURGERY

## 2023-07-27 PROCEDURE — 88369 M/PHMTRC ALYSISHQUANT/SEMIQ: CPT | Mod: 26 | Performed by: MEDICAL GENETICS

## 2023-07-27 PROCEDURE — 88189 FLOWCYTOMETRY/READ 16 & >: CPT | Mod: GC | Performed by: STUDENT IN AN ORGANIZED HEALTH CARE EDUCATION/TRAINING PROGRAM

## 2023-07-27 PROCEDURE — 88331 PATH CONSLTJ SURG 1 BLK 1SPC: CPT | Mod: 26 | Performed by: PATHOLOGY

## 2023-07-27 PROCEDURE — 250N000009 HC RX 250: Performed by: ANESTHESIOLOGY

## 2023-07-27 PROCEDURE — 88304 TISSUE EXAM BY PATHOLOGIST: CPT | Mod: 26 | Performed by: PATHOLOGY

## 2023-07-27 PROCEDURE — 88185 FLOWCYTOMETRY/TC ADD-ON: CPT | Performed by: SURGERY

## 2023-07-27 PROCEDURE — 88331 PATH CONSLTJ SURG 1 BLK 1SPC: CPT | Mod: TC | Performed by: SURGERY

## 2023-07-27 PROCEDURE — 44147 PARTIAL REMOVAL OF COLON: CPT | Mod: GC | Performed by: SURGERY

## 2023-07-27 PROCEDURE — 250N000025 HC SEVOFLURANE, PER MIN: Performed by: SURGERY

## 2023-07-27 PROCEDURE — 88280 CHROMOSOME KARYOTYPE STUDY: CPT | Performed by: SURGERY

## 2023-07-27 PROCEDURE — 250N000011 HC RX IP 250 OP 636: Performed by: SURGERY

## 2023-07-27 RX ORDER — ACETAMINOPHEN 325 MG/1
975 TABLET ORAL ONCE
Status: COMPLETED | OUTPATIENT
Start: 2023-07-27 | End: 2023-07-27

## 2023-07-27 RX ORDER — OXYCODONE HYDROCHLORIDE 10 MG/1
10 TABLET ORAL EVERY 4 HOURS PRN
Status: DISCONTINUED | OUTPATIENT
Start: 2023-07-27 | End: 2023-07-28

## 2023-07-27 RX ORDER — BUPIVACAINE HYDROCHLORIDE 2.5 MG/ML
INJECTION, SOLUTION EPIDURAL; INFILTRATION; INTRACAUDAL
Status: COMPLETED | OUTPATIENT
Start: 2023-07-27 | End: 2023-07-27

## 2023-07-27 RX ORDER — FENTANYL CITRATE 50 UG/ML
INJECTION, SOLUTION INTRAMUSCULAR; INTRAVENOUS PRN
Status: DISCONTINUED | OUTPATIENT
Start: 2023-07-27 | End: 2023-07-27

## 2023-07-27 RX ORDER — CEFAZOLIN SODIUM/WATER 2 G/20 ML
2 SYRINGE (ML) INTRAVENOUS SEE ADMIN INSTRUCTIONS
Status: DISCONTINUED | OUTPATIENT
Start: 2023-07-27 | End: 2023-07-27 | Stop reason: HOSPADM

## 2023-07-27 RX ORDER — METHOCARBAMOL 500 MG/1
500 TABLET, FILM COATED ORAL 4 TIMES DAILY
Status: DISCONTINUED | OUTPATIENT
Start: 2023-07-27 | End: 2023-07-27

## 2023-07-27 RX ORDER — FLUMAZENIL 0.1 MG/ML
0.2 INJECTION, SOLUTION INTRAVENOUS
Status: DISCONTINUED | OUTPATIENT
Start: 2023-07-27 | End: 2023-07-27 | Stop reason: HOSPADM

## 2023-07-27 RX ORDER — CEFAZOLIN SODIUM/WATER 2 G/20 ML
2 SYRINGE (ML) INTRAVENOUS
Status: COMPLETED | OUTPATIENT
Start: 2023-07-27 | End: 2023-07-27

## 2023-07-27 RX ORDER — ONDANSETRON 2 MG/ML
4 INJECTION INTRAMUSCULAR; INTRAVENOUS EVERY 30 MIN PRN
Status: DISCONTINUED | OUTPATIENT
Start: 2023-07-27 | End: 2023-07-28 | Stop reason: HOSPADM

## 2023-07-27 RX ORDER — METRONIDAZOLE 500 MG/100ML
500 INJECTION, SOLUTION INTRAVENOUS
Status: COMPLETED | OUTPATIENT
Start: 2023-07-27 | End: 2023-07-27

## 2023-07-27 RX ORDER — HYDROMORPHONE HCL IN WATER/PF 6 MG/30 ML
0.2 PATIENT CONTROLLED ANALGESIA SYRINGE INTRAVENOUS
Status: DISCONTINUED | OUTPATIENT
Start: 2023-07-27 | End: 2023-07-28

## 2023-07-27 RX ORDER — HYDROMORPHONE HCL IN WATER/PF 6 MG/30 ML
0.4 PATIENT CONTROLLED ANALGESIA SYRINGE INTRAVENOUS EVERY 5 MIN PRN
Status: DISCONTINUED | OUTPATIENT
Start: 2023-07-27 | End: 2023-07-28 | Stop reason: HOSPADM

## 2023-07-27 RX ORDER — GLYCOPYRROLATE 0.2 MG/ML
INJECTION, SOLUTION INTRAMUSCULAR; INTRAVENOUS PRN
Status: DISCONTINUED | OUTPATIENT
Start: 2023-07-27 | End: 2023-07-27

## 2023-07-27 RX ORDER — SODIUM CHLORIDE, SODIUM GLUCONATE, SODIUM ACETATE, POTASSIUM CHLORIDE AND MAGNESIUM CHLORIDE 526; 502; 368; 37; 30 MG/100ML; MG/100ML; MG/100ML; MG/100ML; MG/100ML
INJECTION, SOLUTION INTRAVENOUS CONTINUOUS PRN
Status: DISCONTINUED | OUTPATIENT
Start: 2023-07-27 | End: 2023-07-27

## 2023-07-27 RX ORDER — ONDANSETRON 2 MG/ML
INJECTION INTRAMUSCULAR; INTRAVENOUS PRN
Status: DISCONTINUED | OUTPATIENT
Start: 2023-07-27 | End: 2023-07-27

## 2023-07-27 RX ORDER — SODIUM CHLORIDE 5 %
OINTMENT (GRAM) OPHTHALMIC (EYE)
Status: DISCONTINUED | OUTPATIENT
Start: 2023-07-27 | End: 2023-08-03 | Stop reason: HOSPADM

## 2023-07-27 RX ORDER — FENTANYL CITRATE 50 UG/ML
25-50 INJECTION, SOLUTION INTRAMUSCULAR; INTRAVENOUS
Status: DISCONTINUED | OUTPATIENT
Start: 2023-07-27 | End: 2023-07-27 | Stop reason: HOSPADM

## 2023-07-27 RX ORDER — OXYCODONE HYDROCHLORIDE 5 MG/1
5 TABLET ORAL EVERY 4 HOURS PRN
Status: DISCONTINUED | OUTPATIENT
Start: 2023-07-27 | End: 2023-07-28

## 2023-07-27 RX ORDER — ENOXAPARIN SODIUM 100 MG/ML
40 INJECTION SUBCUTANEOUS
Status: COMPLETED | OUTPATIENT
Start: 2023-07-27 | End: 2023-07-27

## 2023-07-27 RX ORDER — SODIUM CHLORIDE, SODIUM LACTATE, POTASSIUM CHLORIDE, CALCIUM CHLORIDE 600; 310; 30; 20 MG/100ML; MG/100ML; MG/100ML; MG/100ML
INJECTION, SOLUTION INTRAVENOUS CONTINUOUS PRN
Status: DISCONTINUED | OUTPATIENT
Start: 2023-07-27 | End: 2023-07-27

## 2023-07-27 RX ORDER — HYDROMORPHONE HCL IN WATER/PF 6 MG/30 ML
0.4 PATIENT CONTROLLED ANALGESIA SYRINGE INTRAVENOUS
Status: DISCONTINUED | OUTPATIENT
Start: 2023-07-27 | End: 2023-07-28

## 2023-07-27 RX ORDER — FENTANYL CITRATE 50 UG/ML
25 INJECTION, SOLUTION INTRAMUSCULAR; INTRAVENOUS EVERY 5 MIN PRN
Status: DISCONTINUED | OUTPATIENT
Start: 2023-07-27 | End: 2023-07-28

## 2023-07-27 RX ORDER — LIDOCAINE 40 MG/G
CREAM TOPICAL
Status: DISCONTINUED | OUTPATIENT
Start: 2023-07-27 | End: 2023-08-03 | Stop reason: HOSPADM

## 2023-07-27 RX ORDER — PROPOFOL 10 MG/ML
INJECTION, EMULSION INTRAVENOUS PRN
Status: DISCONTINUED | OUTPATIENT
Start: 2023-07-27 | End: 2023-07-27

## 2023-07-27 RX ORDER — SODIUM CHLORIDE, SODIUM LACTATE, POTASSIUM CHLORIDE, CALCIUM CHLORIDE 600; 310; 30; 20 MG/100ML; MG/100ML; MG/100ML; MG/100ML
INJECTION, SOLUTION INTRAVENOUS CONTINUOUS
Status: DISCONTINUED | OUTPATIENT
Start: 2023-07-27 | End: 2023-07-27 | Stop reason: HOSPADM

## 2023-07-27 RX ORDER — LIDOCAINE 40 MG/G
CREAM TOPICAL
Status: DISCONTINUED | OUTPATIENT
Start: 2023-07-27 | End: 2023-07-27 | Stop reason: HOSPADM

## 2023-07-27 RX ORDER — ENOXAPARIN SODIUM 100 MG/ML
40 INJECTION SUBCUTANEOUS EVERY 24 HOURS
Status: DISCONTINUED | OUTPATIENT
Start: 2023-07-28 | End: 2023-08-03 | Stop reason: HOSPADM

## 2023-07-27 RX ORDER — SODIUM CHLORIDE, SODIUM LACTATE, POTASSIUM CHLORIDE, CALCIUM CHLORIDE 600; 310; 30; 20 MG/100ML; MG/100ML; MG/100ML; MG/100ML
INJECTION, SOLUTION INTRAVENOUS CONTINUOUS
Status: DISCONTINUED | OUTPATIENT
Start: 2023-07-27 | End: 2023-07-28

## 2023-07-27 RX ORDER — ONDANSETRON 2 MG/ML
4 INJECTION INTRAMUSCULAR; INTRAVENOUS EVERY 6 HOURS PRN
Status: DISCONTINUED | OUTPATIENT
Start: 2023-07-27 | End: 2023-08-03 | Stop reason: HOSPADM

## 2023-07-27 RX ORDER — NALOXONE HYDROCHLORIDE 0.4 MG/ML
0.2 INJECTION, SOLUTION INTRAMUSCULAR; INTRAVENOUS; SUBCUTANEOUS
Status: DISCONTINUED | OUTPATIENT
Start: 2023-07-27 | End: 2023-07-27 | Stop reason: HOSPADM

## 2023-07-27 RX ORDER — EPHEDRINE SULFATE 50 MG/ML
INJECTION, SOLUTION INTRAMUSCULAR; INTRAVENOUS; SUBCUTANEOUS PRN
Status: DISCONTINUED | OUTPATIENT
Start: 2023-07-27 | End: 2023-07-27

## 2023-07-27 RX ORDER — METHOCARBAMOL 500 MG/1
500 TABLET, FILM COATED ORAL 4 TIMES DAILY PRN
Status: DISCONTINUED | OUTPATIENT
Start: 2023-07-27 | End: 2023-07-29

## 2023-07-27 RX ORDER — ACETAMINOPHEN 325 MG/1
975 TABLET ORAL EVERY 8 HOURS
Status: DISCONTINUED | OUTPATIENT
Start: 2023-07-27 | End: 2023-07-28

## 2023-07-27 RX ORDER — LIDOCAINE HYDROCHLORIDE 20 MG/ML
INJECTION, SOLUTION INFILTRATION; PERINEURAL PRN
Status: DISCONTINUED | OUTPATIENT
Start: 2023-07-27 | End: 2023-07-27

## 2023-07-27 RX ORDER — ONDANSETRON 4 MG/1
4 TABLET, ORALLY DISINTEGRATING ORAL EVERY 30 MIN PRN
Status: DISCONTINUED | OUTPATIENT
Start: 2023-07-27 | End: 2023-07-28 | Stop reason: HOSPADM

## 2023-07-27 RX ORDER — ACETAMINOPHEN 325 MG/1
650 TABLET ORAL EVERY 4 HOURS PRN
Status: DISCONTINUED | OUTPATIENT
Start: 2023-07-30 | End: 2023-07-28

## 2023-07-27 RX ORDER — NALOXONE HYDROCHLORIDE 0.4 MG/ML
0.4 INJECTION, SOLUTION INTRAMUSCULAR; INTRAVENOUS; SUBCUTANEOUS
Status: DISCONTINUED | OUTPATIENT
Start: 2023-07-27 | End: 2023-07-27 | Stop reason: HOSPADM

## 2023-07-27 RX ORDER — HYDROMORPHONE HCL IN WATER/PF 6 MG/30 ML
0.2 PATIENT CONTROLLED ANALGESIA SYRINGE INTRAVENOUS EVERY 5 MIN PRN
Status: DISCONTINUED | OUTPATIENT
Start: 2023-07-27 | End: 2023-07-28 | Stop reason: HOSPADM

## 2023-07-27 RX ORDER — SODIUM CHLORIDE, SODIUM LACTATE, POTASSIUM CHLORIDE, CALCIUM CHLORIDE 600; 310; 30; 20 MG/100ML; MG/100ML; MG/100ML; MG/100ML
INJECTION, SOLUTION INTRAVENOUS CONTINUOUS
Status: DISCONTINUED | OUTPATIENT
Start: 2023-07-27 | End: 2023-07-28 | Stop reason: HOSPADM

## 2023-07-27 RX ORDER — ONDANSETRON 4 MG/1
4 TABLET, ORALLY DISINTEGRATING ORAL EVERY 6 HOURS PRN
Status: DISCONTINUED | OUTPATIENT
Start: 2023-07-27 | End: 2023-08-03 | Stop reason: HOSPADM

## 2023-07-27 RX ORDER — ONDANSETRON 2 MG/ML
4 INJECTION INTRAMUSCULAR; INTRAVENOUS ONCE
Status: DISCONTINUED | OUTPATIENT
Start: 2023-07-27 | End: 2023-07-27 | Stop reason: HOSPADM

## 2023-07-27 RX ORDER — FENTANYL CITRATE 50 UG/ML
50 INJECTION, SOLUTION INTRAMUSCULAR; INTRAVENOUS EVERY 5 MIN PRN
Status: DISCONTINUED | OUTPATIENT
Start: 2023-07-27 | End: 2023-07-28

## 2023-07-27 RX ADMIN — GLYCOPYRROLATE 0.4 MG: 0.2 INJECTION, SOLUTION INTRAMUSCULAR; INTRAVENOUS at 08:24

## 2023-07-27 RX ADMIN — Medication 15 MG: at 10:00

## 2023-07-27 RX ADMIN — BUPIVACAINE HYDROCHLORIDE 20 ML: 2.5 INJECTION, SOLUTION EPIDURAL; INFILTRATION; INTRACAUDAL; PERINEURAL at 06:30

## 2023-07-27 RX ADMIN — Medication 15 MG: at 11:09

## 2023-07-27 RX ADMIN — Medication 2 G: at 12:15

## 2023-07-27 RX ADMIN — FENTANYL CITRATE 50 MCG: 50 INJECTION, SOLUTION INTRAMUSCULAR; INTRAVENOUS at 07:47

## 2023-07-27 RX ADMIN — SODIUM CHLORIDE, POTASSIUM CHLORIDE, SODIUM LACTATE AND CALCIUM CHLORIDE: 600; 310; 30; 20 INJECTION, SOLUTION INTRAVENOUS at 13:58

## 2023-07-27 RX ADMIN — FENTANYL CITRATE 25 MCG: 50 INJECTION, SOLUTION INTRAMUSCULAR; INTRAVENOUS at 13:30

## 2023-07-27 RX ADMIN — NOREPINEPHRINE BITARTRATE 12.8 MCG: 1 INJECTION, SOLUTION, CONCENTRATE INTRAVENOUS at 08:18

## 2023-07-27 RX ADMIN — ALBUMIN (HUMAN): 12.5 SOLUTION INTRAVENOUS at 12:08

## 2023-07-27 RX ADMIN — FENTANYL CITRATE 25 MCG: 50 INJECTION, SOLUTION INTRAMUSCULAR; INTRAVENOUS at 13:35

## 2023-07-27 RX ADMIN — SODIUM CHLORIDE, SODIUM GLUCONATE, SODIUM ACETATE, POTASSIUM CHLORIDE AND MAGNESIUM CHLORIDE: 526; 502; 368; 37; 30 INJECTION, SOLUTION INTRAVENOUS at 12:03

## 2023-07-27 RX ADMIN — FENTANYL CITRATE 50 MCG: 50 INJECTION, SOLUTION INTRAMUSCULAR; INTRAVENOUS at 12:41

## 2023-07-27 RX ADMIN — HYDROMORPHONE HYDROCHLORIDE 0.2 MG: 0.2 INJECTION, SOLUTION INTRAMUSCULAR; INTRAVENOUS; SUBCUTANEOUS at 13:57

## 2023-07-27 RX ADMIN — ALBUMIN (HUMAN): 12.5 SOLUTION INTRAVENOUS at 11:48

## 2023-07-27 RX ADMIN — HYDROMORPHONE HYDROCHLORIDE 0.2 MG: 0.2 INJECTION, SOLUTION INTRAMUSCULAR; INTRAVENOUS; SUBCUTANEOUS at 14:46

## 2023-07-27 RX ADMIN — SODIUM CHLORIDE, POTASSIUM CHLORIDE, SODIUM LACTATE AND CALCIUM CHLORIDE: 600; 310; 30; 20 INJECTION, SOLUTION INTRAVENOUS at 07:40

## 2023-07-27 RX ADMIN — ENOXAPARIN SODIUM 40 MG: 40 INJECTION SUBCUTANEOUS at 07:14

## 2023-07-27 RX ADMIN — FENTANYL CITRATE 25 MCG: 50 INJECTION, SOLUTION INTRAMUSCULAR; INTRAVENOUS at 13:15

## 2023-07-27 RX ADMIN — NOREPINEPHRINE BITARTRATE 6.4 MCG: 1 INJECTION, SOLUTION, CONCENTRATE INTRAVENOUS at 09:06

## 2023-07-27 RX ADMIN — EPINEPHRINE 10 MCG: 1 INJECTION INTRAMUSCULAR; INTRAVENOUS; SUBCUTANEOUS at 08:44

## 2023-07-27 RX ADMIN — NOREPINEPHRINE BITARTRATE 6.4 MCG: 1 INJECTION, SOLUTION, CONCENTRATE INTRAVENOUS at 08:05

## 2023-07-27 RX ADMIN — SUGAMMADEX 200 MG: 100 INJECTION, SOLUTION INTRAVENOUS at 12:59

## 2023-07-27 RX ADMIN — NOREPINEPHRINE BITARTRATE 6.4 MCG: 1 INJECTION, SOLUTION, CONCENTRATE INTRAVENOUS at 09:20

## 2023-07-27 RX ADMIN — FENTANYL CITRATE 25 MCG: 50 INJECTION, SOLUTION INTRAMUSCULAR; INTRAVENOUS at 07:09

## 2023-07-27 RX ADMIN — SODIUM CHLORIDE, POTASSIUM CHLORIDE, SODIUM LACTATE AND CALCIUM CHLORIDE: 600; 310; 30; 20 INJECTION, SOLUTION INTRAVENOUS at 08:25

## 2023-07-27 RX ADMIN — HYDROMORPHONE HYDROCHLORIDE 0.2 MG: 0.2 INJECTION, SOLUTION INTRAMUSCULAR; INTRAVENOUS; SUBCUTANEOUS at 14:03

## 2023-07-27 RX ADMIN — EPINEPHRINE 20 MCG: 1 INJECTION INTRAMUSCULAR; INTRAVENOUS; SUBCUTANEOUS at 11:00

## 2023-07-27 RX ADMIN — EPINEPHRINE 10 MCG: 1 INJECTION INTRAMUSCULAR; INTRAVENOUS; SUBCUTANEOUS at 11:28

## 2023-07-27 RX ADMIN — FENTANYL CITRATE 25 MCG: 50 INJECTION, SOLUTION INTRAMUSCULAR; INTRAVENOUS at 13:47

## 2023-07-27 RX ADMIN — PROPOFOL 100 MG: 10 INJECTION, EMULSION INTRAVENOUS at 07:47

## 2023-07-27 RX ADMIN — HYDROMORPHONE HYDROCHLORIDE 0.2 MG: 0.2 INJECTION, SOLUTION INTRAMUSCULAR; INTRAVENOUS; SUBCUTANEOUS at 14:40

## 2023-07-27 RX ADMIN — NOREPINEPHRINE BITARTRATE 6.4 MCG: 1 INJECTION, SOLUTION, CONCENTRATE INTRAVENOUS at 08:42

## 2023-07-27 RX ADMIN — EPHEDRINE SULFATE 10 MG: 5 INJECTION INTRAVENOUS at 12:32

## 2023-07-27 RX ADMIN — LIDOCAINE HYDROCHLORIDE 100 MG: 20 INJECTION, SOLUTION INFILTRATION; PERINEURAL at 07:47

## 2023-07-27 RX ADMIN — ACETAMINOPHEN 975 MG: 325 TABLET, FILM COATED ORAL at 14:19

## 2023-07-27 RX ADMIN — Medication 2 G: at 08:15

## 2023-07-27 RX ADMIN — HYDROMORPHONE HYDROCHLORIDE 0.2 MG: 0.2 INJECTION, SOLUTION INTRAMUSCULAR; INTRAVENOUS; SUBCUTANEOUS at 15:31

## 2023-07-27 RX ADMIN — ACETAMINOPHEN 975 MG: 325 TABLET, FILM COATED ORAL at 21:11

## 2023-07-27 RX ADMIN — ONDANSETRON 4 MG: 2 INJECTION INTRAMUSCULAR; INTRAVENOUS at 12:41

## 2023-07-27 RX ADMIN — FENTANYL CITRATE 25 MCG: 50 INJECTION, SOLUTION INTRAMUSCULAR; INTRAVENOUS at 07:04

## 2023-07-27 RX ADMIN — METHOCARBAMOL 500 MG: 500 TABLET ORAL at 15:31

## 2023-07-27 RX ADMIN — METHOCARBAMOL 500 MG: 500 TABLET ORAL at 21:11

## 2023-07-27 RX ADMIN — FENTANYL CITRATE 50 MCG: 50 INJECTION, SOLUTION INTRAMUSCULAR; INTRAVENOUS at 08:59

## 2023-07-27 RX ADMIN — Medication 20 MG: at 08:59

## 2023-07-27 RX ADMIN — Medication 50 MG: at 07:48

## 2023-07-27 RX ADMIN — METRONIDAZOLE 500 MG: 500 INJECTION, SOLUTION INTRAVENOUS at 07:23

## 2023-07-27 RX ADMIN — SODIUM CHLORIDE, SODIUM GLUCONATE, SODIUM ACETATE, POTASSIUM CHLORIDE AND MAGNESIUM CHLORIDE: 526; 502; 368; 37; 30 INJECTION, SOLUTION INTRAVENOUS at 07:51

## 2023-07-27 RX ADMIN — EPINEPHRINE 10 MCG: 1 INJECTION INTRAMUSCULAR; INTRAVENOUS; SUBCUTANEOUS at 10:00

## 2023-07-27 RX ADMIN — BUPIVACAINE 20 ML: 13.3 INJECTION, SUSPENSION, LIPOSOMAL INFILTRATION at 06:30

## 2023-07-27 ASSESSMENT — ACTIVITIES OF DAILY LIVING (ADL)
ADLS_ACUITY_SCORE: 24
ADLS_ACUITY_SCORE: 24
ADLS_ACUITY_SCORE: 22
ADLS_ACUITY_SCORE: 24

## 2023-07-27 NOTE — ANESTHESIA POSTPROCEDURE EVALUATION
Patient: Tom Saini    Procedure: Procedure(s):  COLECTOMY, SIGMOID, LAPAROSCOPIC assisted  Sigmoidoscopy flexible       Anesthesia Type:  General    Note:  Disposition: Inpatient   Postop Pain Control: Uneventful            Sign Out: Well controlled pain   PONV: No   Neuro/Psych: Uneventful            Sign Out: Acceptable/Baseline neuro status   Airway/Respiratory: Uneventful            Sign Out: Acceptable/Baseline resp. status   CV/Hemodynamics: Uneventful            Sign Out: Acceptable CV status; No obvious hypovolemia; No obvious fluid overload   Other NRE:    DID A NON-ROUTINE EVENT OCCUR? No           Last vitals:  Vitals Value Taken Time   /70 07/27/23 1330   Temp     Pulse 80 07/27/23 1336   Resp 18 07/27/23 1336   SpO2 100 % 07/27/23 1336   Vitals shown include unvalidated device data.    Electronically Signed By: Mateo Christianson MD  July 27, 2023  1:37 PM

## 2023-07-27 NOTE — BRIEF OP NOTE
[facility]    Brief Operative Note    Pre-operative diagnosis: Colonic mass [K63.89]  Other abnormal tumor markers [R97.8]  Post-operative diagnosis Same as pre-operative diagnosis    Procedure: Procedure(s):  COLECTOMY, SIGMOID, LAPAROSCOPIC  possible open  Sigmoidoscopy flexible  Surgeon: Surgeon(s) and Role:     * Jhonatan Cowart MD - Primary     * Heaven Santos MD  Anesthesia: General with Block   Estimated Blood Loss: 50cc    Drains: None  Specimens:   ID Type Source Tests Collected by Time Destination   1 : SIGMOID COLON Tissue Abdomen SURGICAL PATHOLOGY EXAM Jhonatan Cowart MD 7/27/2023 11:05 AM    2 : Lateral pelvic sidewall Tissue Abdomen SURGICAL PATHOLOGY EXAM Jhonatan Cowart MD 7/27/2023 11:17 AM    3 : distal anastomatic ring Tissue Other SURGICAL PATHOLOGY EXAM Jhonatan Cowart MD 7/27/2023 12:24 PM    4 : proximal anastomatic ring Tissue Other SURGICAL PATHOLOGY EXAM Jhonatan Cowart MD 7/27/2023 12:28 PM      Findings:   Large sigmoid colon mass adherent to left pelvic sidewall, mass and additional margin frozen positive for lymphoma; left ureter dilated crossing over the pelvic brim .  Complications: None.  Implants: * No implants in log *

## 2023-07-27 NOTE — OR NURSING
Sppoke with Dr Chavez about inability to get pain controlled with meds ice and repositioning pt describes as a pressure but screams out intermittently like it is a spasm.

## 2023-07-27 NOTE — ANESTHESIA CARE TRANSFER NOTE
Patient: Tom Saini    Procedure: Procedure(s):  COLECTOMY, SIGMOID, LAPAROSCOPIC assisted  Sigmoidoscopy flexible       Diagnosis: Colonic mass [K63.89]  Other abnormal tumor markers [R97.8]  Diagnosis Additional Information: No value filed.    Anesthesia Type:   General     Note:    Oropharynx: oropharynx clear of all foreign objects  Level of Consciousness: awake      Independent Airway: airway patency satisfactory and stable  Dentition: dentition unchanged  Vital Signs Stable: post-procedure vital signs reviewed and stable  Report to RN Given: handoff report given  Patient transferred to: PACU    Handoff Report: Identifed the Patient, Identified the Reponsible Provider, Reviewed the pertinent medical history, Discussed the surgical course, Reviewed Intra-OP anesthesia mangement and issues during anesthesia, Set expectations for post-procedure period and Allowed opportunity for questions and acknowledgement of understanding      Vitals:  Vitals Value Taken Time   /64 07/27/23 1319   Temp     Pulse 80 07/27/23 1324   Resp 18 07/27/23 1324   SpO2 99 % 07/27/23 1324   Vitals shown include unvalidated device data.    Electronically Signed By: HEYDI Crowley CRNA  July 27, 2023  1:25 PM

## 2023-07-27 NOTE — ANESTHESIA PROCEDURE NOTES
TAP (Midaxillary) Procedure Note    Pre-Procedure   Staff -        Anesthesiologist:  Jose Angel Dalal MD       Resident/Fellow: Hussein Luis MD       Performed By: resident and anesthesiologist       Location: pre-op       Procedure Start/Stop Times: 7/27/2023 6:30 AM and 7/27/2023 6:45 AM       Pre-Anesthestic Checklist: patient identified, IV checked, site marked, risks and benefits discussed, informed consent, monitors and equipment checked, pre-op evaluation, at physician/surgeon's request and post-op pain management  Timeout:       Correct Patient: Yes        Correct Procedure: Yes        Correct Site: Yes        Correct Position: Yes        Correct Laterality: Yes        Site Marked: Yes  Procedure Documentation  Procedure: TAP (Midaxillary)       Diagnosis: ANALGESIA       Laterality: bilateral       Patient Position: supine       Patient Prep/Sterile Barriers: sterile gloves, mask       Skin prep: Chloraprep       Needle Type: short bevel       Needle Gauge: 21.        Needle Length (millimeters): 110        Ultrasound guided       1. Ultrasound was used to identify targeted nerve, plexus, vascular marker, or fascial plane and place a needle adjacent to it in real-time.       2. Ultrasound was used to visualize the spread of anesthetic in close proximity to the above referenced structure.       3. A permanent image is entered into the patient's record.       4. The visualized anatomic structures appeared normal.       5. There were no apparent abnormal pathologic findings.    Assessment/Narrative         The placement was negative for: blood aspirated, painful injection and site bleeding       Paresthesias: No.       Bolus given via needle..        Secured via.        Insertion/Infusion Method: Single Shot       Complications: none       Injection made incrementally with aspirations every 5 mL.    Medication(s) Administered   Bupivacaine 0.25% PF (Infiltration) - Infiltration   20 mL - 7/27/2023 6:30:00  "AM  Bupivacaine liposome (Exparel) 1.3% LA inj susp (Infiltration) - Infiltration   20 mL - 7/27/2023 6:30:00 AM  Medication Administration Time: 7/27/2023 6:30 AM      FOR [facility] ONLY:   Pain Team Contact information: please page the Pain Team Via [employer]. Search \"Pain\". During daytime hours, please page the attending first. At night please page the resident first.      "

## 2023-07-27 NOTE — OP NOTE
[facility] Colorectal Surgery Operative Report    PREOPERATIVE DIAGNOSIS:  1. Large bowel obstruction due to malignant neoplasm of uncertain etiology  2. DVT history  3. Aortic insufficiency  4. HLD    POSTOPERATIVE DIAGNOSIS:   1. 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 DVT history  3. Aortic insufficiency  4. HLD  5. Colon lymphoma    PROCEDURE:  1. Open sigmoid colectomy.  2. Laparoscopic mobilization of splenic flexure.  3. Intraoperative flexible sigmoidoscopy.    ANESTHESIA: General endotracheal anesthesia plus bilateral TAP blocks.    SURGEON:  Jhonatan Cowart MD, PhD     ASSISTANT(S): Briseida Moss MD - Colorectal Surgery Fellow    INDICATIONS FOR PROCEDURE  Tom Saini is a 88 year old gentleman who presented to colonoscopy clinic with imaging (performed for different reasons) that was concerning for a mass in his sigmoid colon.  Unfortunately I was unable to reach the mass with colonoscopy.  CT colonography confirmed the presence of a mass.  This was causing an obstruction and so it was deemed appropriate to perform sigmoid colectomy, both for symptoms and for (likely) oncologic purposes.  There was no evidence of metastatic disease on imaging. I thoroughly discussed the risks, benefits, and alternatives of operative treatment with the patient and he and his wife agreed to proceed.    General risks related to abdominal surgery were reviewed with the patient. These include, but are not limited to, death, myocardial infarction, pneumonia, urinary tract infection, deep venous thrombosis with or without pulmonary embolus, abdominal infection from bowel injury or abscess, fistula, anastomotic leak that may require reoperation and a stoma, ureteral injury, urinary dysfunction, sexual dysfunction, bowel obstruction, wound infection, and bleeding.    OPERATIVE PROCEDURE: After obtaining informed consent, the patient was brought to the operating room and placed in the modified lithotomy position  "with right arm tucked, left arm out. The patient underwent preoperative bilateral TAP blocks. Appropriate preoperative mechanical and chemical deep venous thrombosis prophylaxis, as well as preoperative prophylactic parenteral antibiotics were given. General endotracheal anesthesia was gently induced. Bilateral lower extremity pneumatic compression devices were applied and all pressure points were cushioned. A Syed catheter was inserted without difficulty.     A time-out was performed and we performed a flexible sigmoidoscopy.  We could not pass a point at about 40 cm from anal verge, similar to my prior experience with him in the endoscopy suite.  We could not see the mass that was causing this problem.    The abdomen was then preped and draped in the standard sterile fashion.     After a \"time-out\" was performed, a 12mm infra-umbilical incision was made and a 12mm trocar was inserted in an open fashion. Pneumoperitoneum was established with CO2 without difficulty up to 15mmHg. A 10mm 30 degree angle laparoscope was then used to explore the peritoneal cavity. No evidence of bleeding, bowel injury or metastatic disease was visualized. Additional trocars were placed at the suprapubic area (5 mm) and right lower quadrant (5mm), and right upper quadrant under direct vision.     We then performed a laparoscopic takedown of the splenic flexure, starting distally along the line of Toldt and moving proximally until we had rounded the splenic flexure and entered the lesser sac.  After this we turned our attention to the sigmoid colon.  We continued our initial dissection in the Line of Toldt in the distal direction until reaching the mid-sigmoid, which was plastered to the lateral pelvic sidewall.  We could not make any progress laparoscopically and decided to convert to an open incision.    We made a lower midline incision from the infra-umbilical port down to the pubis.  We entered the abdominal cavity and placed a large " "Parth wound protector and the Bookwalter retractor device.  The small bowel and right colon were moved into the upper quadrants and packed away.      There was clearly a large mass in the sigmoid colon.  A loop of sigmoid colon then descended into the pelvis where it was adherent to the presacral plane prior to returning up to the abdominal cavity and then down again into the pelvis at the rectosigmoid junction.  The rectosigmoid junction was soft and the tissue quality was very good.  We performed our distal transection at this site with the Green Contour stapler.      After this, we turned our attention to the Inferior Mesenteric Artery.  We performed a high ligation with a 3-0 silk stick tie and a free 2-0 silk tie.  This was then ligated.  I could feel enlarged lymph nodes along the course of the KE, and these were included with our surgical specimen.  I did not appreciate any enlarged nodes left on the \"stay\" side of the KE stump.    We then took the mesentery with the ligasure device.  We reached an area that looked to be far from the proximal extent of the tumor and took this as our proximal transection site with another firing of the Contour Stapler.      Finally, we turned our attention to the remaining sigmoid that was still adherent to the pelvic side wall and presacral fascia.  We took this with a combination of electrocautery, finger fracturing, and blunt dissection.  It actually peeled away rather easily.  The sigmoid colon was then sent to pathology.    There were several areas along the left pelvic sidewall that felt hard.  I could not tell if this was desmoplastic reaction or residual tumor.  I was able to peel off a disc of this tissue as a representative sample and then sent it to pathology for frozen sections.    In the meantime, we noted that the left ureter was somewhat dilated.  My colleague, Dr Fantasma Chandler of Urology, came in to have a look and he thought it only mildly dilated and with " no evidence of injury.  We then proceeded with the case.    At this time the attending pathologist called into the room to tell us that the mass and associated disc of left pelvic sidewall tissue were both consistent with lymphoma.  This made a lot more sense to me than an invasive adenocarcinoma.  It also was a bit of a relief as I knew I would not be achieving an R0 (or even R1) resection if all of that hardened tissue of the left pelvic sidewall proved to be adenocarcinoma (it would have involved taking the ureter and much more of a morbid operation).    We decided to proceed forward with an anastomosis after mobilizing some more of the rectum from posterior attachments in the TME plane.    A 2-0 Prolene pursestring suture was placed at the proximal colon. Hemostasis was corroborated, the anvil of a 29 mm EEA surgical stapler was placed inside the colonic lumen and the Prolene suture was tied. Care was taken not to incorporate mesentery, adjacent tissue, or a diverticular into the pursestring suture.     The 29 mm EEA stapler was then gently passed through the anus up the rectum, and positioned in the middle of the rectal staple line. The stapler post was then brought through the middle of the staple line of the rectum with the carefull assistance of the abdominal surgeon. The anvil was then connected to the post of the surgical stapler and both colon ends were brought together. The colon ends appeared to be well vascularized, and with no evidence of tension or torsion. After a short pause, the stapler was fired creating an end-to-end colorectal anastomosis. The stapler was then carefully removed without difficulty. On the back-table, I assured adequate integrity of the stapler anastomtoic rings. A flexible sigmoidoscopy was performed with no evidence of ongoing bleeding or air leak.     The peritoneal cavity was then irrigated and suctioned. There was no evidence of bleeding or bowel injury. The lower midline  incision was closed with 0-looped PDS suture in running fashion.  The skin was closed with 4-0 monocryl suture.    Instrument, sponge, and needle counts were all correct, as reported to me at this time. Wounds were irrigated and dried, and hemostasis was corroborated. Skin incisions were re-approximated with running subcuticular 4-0 Monocryl sutures and Exofilm. The patient tolerated the procedure well.    COMPLICATIONS: none.    ESTIMATED BLOOD LOSS: 100 mL.    SPECIMEN(S): sigmoid colon with mesocolon, left pelvic sidewall margin, proximal anastomotic ring, distal anastomotic ring    OPERATIVE COUNT: Complete.    OPERATIVE FINDINGS: stapled end-to-end colorectal anastomosis. Anastomotic pneumatic test: negative. Colon lymphoma - main mass causing large bowel obstruction; enlarged lymph nodes along the KE; hard/woody tissue along left pelvic sidewall consistent on frozen sections with lymphoma      Jhonatan Cowart MD, PhD   Division of Colon and Rectal Surgery  [facility]      CC:  [facility] Surgery billing.

## 2023-07-27 NOTE — OR NURSING
Dr cordova by earlier ordered Robaxin for patient. Mr Saini is sleeping more between  cares, less spasms. Complains of chills but afebrile and VSS. Will continue to monitor

## 2023-07-27 NOTE — ANESTHESIA PROCEDURE NOTES
Airway       Patient location during procedure: OR       Procedure Start/Stop Times: 7/27/2023 7:51 AM  Staff -        CRNA: Wilver Pabon APRN CRNA       Performed By: CRNA  Consent for Airway        Urgency: elective  Indications and Patient Condition       Indications for airway management: jey-procedural       Induction type:intravenous       Mask difficulty assessment: 1 - vent by mask    Final Airway Details       Final airway type: endotracheal airway       Successful airway: ETT - single  Endotracheal Airway Details        ETT size (mm): 7.5       Cuffed: yes       Successful intubation technique: direct laryngoscopy       DL Blade Type: MAC 4       Grade View of Cords: 2       Adjucts: stylet       Position: Right       Measured from: gums/teeth       Secured at (cm): 23       Bite block used: None    Post intubation assessment        Placement verified by: capnometry, equal breath sounds and chest rise        Number of attempts at approach: 1       Number of other approaches attempted: 0       Secured with: pink tape       Ease of procedure: easy       Dentition: Intact and Unchanged    Medication(s) Administered   Medication Administration Time: 7/27/2023 7:51 AM

## 2023-07-27 NOTE — ANESTHESIA PREPROCEDURE EVALUATION
Anesthesia Pre-Procedure Evaluation    Patient: Tom Saini   MRN: [id number] : 1935        Procedure : Procedure(s):  COLECTOMY, SIGMOID, LAPAROSCOPIC  possible open          Past Medical History:   Diagnosis Date    Aortic insufficiency     Erectile dysfunction     History of deep venous thrombosis 2019    distal DVT with extension, completed 6 months of AC (failed eliquis)    History of pelvic fracture     Hyperlipidemia     Low back pain     disc disease s/p laminectomy in past    Macular degeneration     on Avastin, R eye    Pancreatic cyst 2018    next MR due     Radius fracture     after fall from wall      Past Surgical History:   Procedure Laterality Date    CATARACT IOL, RT/LT  2001    COLONOSCOPY N/A 2023    Procedure: COLONOSCOPY, WITH BIOPSY;  Surgeon: Jhonatan Cowart MD;  [place] [facility] [place]    COMBINED REPAIR PTOSIS WITH BLEPHAROPLASTY BILATERAL Bilateral 2018    Procedure: COMBINED REPAIR PTOSIS WITH BLEPHAROPLASTY BILATERAL;  BILATERAL UPPER EYELIDS BLEPHAROPLASTY AND PTOSIS REPAIR ;  Surgeon: Anuj Good MD;  Location: [facility]    EYE SURGERY      LAMINECTOMY LUMBAR TWO LEVELS  1993    L4-L5    ORIF RADIAL SHAFT FRACTURE Right 2018    RECESSION RESECTION (REPAIR STRABISMUS)  1949      Allergies   Allergen Reactions    Codeine Sulfate Nausea and Vomiting      Social History     Tobacco Use    Smoking status: Former     Packs/day: 2.00     Years: 2.00     Pack years: 4.00     Types: Cigarettes     Quit date: 1954     Years since quittin.1    Smokeless tobacco: Never   Substance Use Topics    Alcohol use: Yes     Alcohol/week: 3.0 standard drinks of alcohol     Types: 3 Glasses of wine per week     Comment: glass of wine with dinner      Wt Readings from Last 1 Encounters:   23 73.5 kg (162 lb 0.6 oz)        Anesthesia Evaluation   Pt has had prior anesthetic. Type: General.        ROS/MED HX  ENT/Pulmonary:        Neurologic:       Cardiovascular:       METS/Exercise Tolerance:     Hematologic:       Musculoskeletal:       GI/Hepatic:       Renal/Genitourinary:       Endo:       Psychiatric/Substance Use:       Infectious Disease:       Malignancy:       Other:            Physical Exam    Airway        Mallampati: II    Neck ROM: limited     Respiratory Devices and Support         Dental       (+) Modest Abnormalities - crowns, retainers, 1 or 2 missing teeth      Cardiovascular   cardiovascular exam normal          Pulmonary   pulmonary exam normal                OUTSIDE LABS:  CBC:   Lab Results   Component Value Date    WBC 7.5 07/20/2023    WBC 7.6 07/10/2023    HGB 12.6 (L) 07/20/2023    HGB 12.9 (L) 07/10/2023    HCT 39.3 (L) 07/20/2023    HCT 40.7 07/10/2023     07/20/2023     07/10/2023     BMP:   Lab Results   Component Value Date     07/20/2023     07/10/2023    POTASSIUM 4.4 07/20/2023    POTASSIUM 5.0 07/10/2023    CHLORIDE 104 07/20/2023    CHLORIDE 103 07/10/2023    CO2 27 07/20/2023    CO2 28 07/10/2023    BUN 29.8 (H) 07/20/2023    BUN 21.8 07/10/2023    CR 1.06 07/20/2023    CR 1.03 07/10/2023     (H) 07/27/2023    GLC 98 07/20/2023     COAGS:   Lab Results   Component Value Date    PTT 52 (H) 09/19/2019    INR 1.01 07/10/2023     POC:   Lab Results   Component Value Date    BGM 71 03/20/2019     HEPATIC:   Lab Results   Component Value Date    ALBUMIN 3.9 07/20/2023    PROTTOTAL 7.4 07/20/2023    ALT 7 07/20/2023    AST 19 07/20/2023    ALKPHOS 112 07/20/2023    BILITOTAL 0.4 07/20/2023     OTHER:   Lab Results   Component Value Date    A1C 5.7 (H) 09/19/2019    DAVIDE 9.6 07/20/2023    TSH 0.90 02/27/2021    SED 33 (H) 02/27/2021       Anesthesia Plan    ASA Status:  3    NPO Status:  NPO Appropriate    Anesthesia Type: General.     - Airway: ETT   Induction: Intravenous.   Maintenance: Balanced.   Techniques and Equipment:     - Airway: Video-Laryngoscope     -  Lines/Monitors: 2nd IV, Arterial Line     Consents    Anesthesia Plan(s) and associated risks, benefits, and realistic alternatives discussed. Questions answered and patient/representative(s) expressed understanding.     - Discussed: Risks, Benefits and Alternatives for BOTH SEDATION and the PROCEDURE were discussed     - Discussed with:  Patient       Use of blood products discussed: Yes.     - Discussed with: Patient.     Postoperative Care       PONV prophylaxis: Ondansetron (or other 5HT-3)     Comments:                Mateo Christianson MD

## 2023-07-28 ENCOUNTER — APPOINTMENT (OUTPATIENT)
Dept: PHYSICAL THERAPY | Facility: CLINIC | Age: 88
DRG: 821 | End: 2023-07-28
Attending: SURGERY
Payer: MEDICARE

## 2023-07-28 DIAGNOSIS — C85.93 LYMPHOMA OF INTESTINE (H): Primary | ICD-10-CM

## 2023-07-28 LAB
ANION GAP SERPL CALCULATED.3IONS-SCNC: 8 MMOL/L (ref 7–15)
BUN SERPL-MCNC: 13 MG/DL (ref 8–23)
CALCIUM SERPL-MCNC: 8 MG/DL (ref 8.8–10.2)
CHLORIDE SERPL-SCNC: 106 MMOL/L (ref 98–107)
CREAT SERPL-MCNC: 0.95 MG/DL (ref 0.67–1.17)
DEPRECATED HCO3 PLAS-SCNC: 26 MMOL/L (ref 22–29)
ERYTHROCYTE [DISTWIDTH] IN BLOOD BY AUTOMATED COUNT: 13.5 % (ref 10–15)
GFR SERPL CREATININE-BSD FRML MDRD: 77 ML/MIN/1.73M2
GLUCOSE SERPL-MCNC: 120 MG/DL (ref 70–99)
HCT VFR BLD AUTO: 34.8 % (ref 40–53)
HGB BLD-MCNC: 10.9 G/DL (ref 13.3–17.7)
MAGNESIUM SERPL-MCNC: 1.8 MG/DL (ref 1.7–2.3)
MCH RBC QN AUTO: 29.8 PG (ref 26.5–33)
MCHC RBC AUTO-ENTMCNC: 31.3 G/DL (ref 31.5–36.5)
MCV RBC AUTO: 95 FL (ref 78–100)
PATH REPORT.COMMENTS IMP SPEC: ABNORMAL
PATH REPORT.COMMENTS IMP SPEC: YES
PATH REPORT.FINAL DX SPEC: ABNORMAL
PATH REPORT.MICROSCOPIC SPEC OTHER STN: ABNORMAL
PATH REPORT.RELEVANT HX SPEC: ABNORMAL
PHOSPHATE SERPL-MCNC: 2.6 MG/DL (ref 2.5–4.5)
PLATELET # BLD AUTO: 283 10E3/UL (ref 150–450)
POTASSIUM SERPL-SCNC: 4.2 MMOL/L (ref 3.4–5.3)
RBC # BLD AUTO: 3.66 10E6/UL (ref 4.4–5.9)
SODIUM SERPL-SCNC: 140 MMOL/L (ref 136–145)
WBC # BLD AUTO: 12.4 10E3/UL (ref 4–11)

## 2023-07-28 PROCEDURE — 82310 ASSAY OF CALCIUM: CPT | Performed by: STUDENT IN AN ORGANIZED HEALTH CARE EDUCATION/TRAINING PROGRAM

## 2023-07-28 PROCEDURE — 258N000003 HC RX IP 258 OP 636: Performed by: ANESTHESIOLOGY

## 2023-07-28 PROCEDURE — 250N000013 HC RX MED GY IP 250 OP 250 PS 637: Performed by: STUDENT IN AN ORGANIZED HEALTH CARE EDUCATION/TRAINING PROGRAM

## 2023-07-28 PROCEDURE — 85027 COMPLETE CBC AUTOMATED: CPT | Performed by: STUDENT IN AN ORGANIZED HEALTH CARE EDUCATION/TRAINING PROGRAM

## 2023-07-28 PROCEDURE — 83735 ASSAY OF MAGNESIUM: CPT | Performed by: STUDENT IN AN ORGANIZED HEALTH CARE EDUCATION/TRAINING PROGRAM

## 2023-07-28 PROCEDURE — 84100 ASSAY OF PHOSPHORUS: CPT | Performed by: STUDENT IN AN ORGANIZED HEALTH CARE EDUCATION/TRAINING PROGRAM

## 2023-07-28 PROCEDURE — 120N000002 HC R&B MED SURG/OB UMMC

## 2023-07-28 PROCEDURE — 250N000013 HC RX MED GY IP 250 OP 250 PS 637: Performed by: PHYSICIAN ASSISTANT

## 2023-07-28 PROCEDURE — 250N000011 HC RX IP 250 OP 636: Mod: JZ | Performed by: STUDENT IN AN ORGANIZED HEALTH CARE EDUCATION/TRAINING PROGRAM

## 2023-07-28 PROCEDURE — 258N000003 HC RX IP 258 OP 636: Performed by: STUDENT IN AN ORGANIZED HEALTH CARE EDUCATION/TRAINING PROGRAM

## 2023-07-28 PROCEDURE — 36415 COLL VENOUS BLD VENIPUNCTURE: CPT | Performed by: STUDENT IN AN ORGANIZED HEALTH CARE EDUCATION/TRAINING PROGRAM

## 2023-07-28 PROCEDURE — 97161 PT EVAL LOW COMPLEX 20 MIN: CPT | Mod: GP

## 2023-07-28 PROCEDURE — 97530 THERAPEUTIC ACTIVITIES: CPT | Mod: GP

## 2023-07-28 RX ORDER — OXYCODONE HYDROCHLORIDE 5 MG/1
5 TABLET ORAL EVERY 4 HOURS PRN
Status: DISCONTINUED | OUTPATIENT
Start: 2023-07-28 | End: 2023-08-03 | Stop reason: HOSPADM

## 2023-07-28 RX ORDER — HYDROMORPHONE HCL IN WATER/PF 6 MG/30 ML
0.1 PATIENT CONTROLLED ANALGESIA SYRINGE INTRAVENOUS
Status: DISCONTINUED | OUTPATIENT
Start: 2023-07-28 | End: 2023-08-03 | Stop reason: HOSPADM

## 2023-07-28 RX ORDER — ACETAMINOPHEN 325 MG/1
975 TABLET ORAL EVERY 8 HOURS
Status: DISCONTINUED | OUTPATIENT
Start: 2023-07-28 | End: 2023-07-30

## 2023-07-28 RX ORDER — HYDROMORPHONE HCL IN WATER/PF 6 MG/30 ML
0.2 PATIENT CONTROLLED ANALGESIA SYRINGE INTRAVENOUS
Status: DISCONTINUED | OUTPATIENT
Start: 2023-07-28 | End: 2023-08-03 | Stop reason: HOSPADM

## 2023-07-28 RX ADMIN — ONDANSETRON 4 MG: 2 INJECTION INTRAMUSCULAR; INTRAVENOUS at 16:14

## 2023-07-28 RX ADMIN — SODIUM CHLORIDE, POTASSIUM CHLORIDE, SODIUM LACTATE AND CALCIUM CHLORIDE: 600; 310; 30; 20 INJECTION, SOLUTION INTRAVENOUS at 10:04

## 2023-07-28 RX ADMIN — ACETAMINOPHEN 975 MG: 325 TABLET, FILM COATED ORAL at 14:52

## 2023-07-28 RX ADMIN — ENOXAPARIN SODIUM 40 MG: 40 INJECTION SUBCUTANEOUS at 09:58

## 2023-07-28 RX ADMIN — OXYCODONE HYDROCHLORIDE 5 MG: 5 TABLET ORAL at 01:15

## 2023-07-28 RX ADMIN — SODIUM CHLORIDE, POTASSIUM CHLORIDE, SODIUM LACTATE AND CALCIUM CHLORIDE: 600; 310; 30; 20 INJECTION, SOLUTION INTRAVENOUS at 00:10

## 2023-07-28 ASSESSMENT — ACTIVITIES OF DAILY LIVING (ADL)
ADLS_ACUITY_SCORE: 31
ADLS_ACUITY_SCORE: 24
ADLS_ACUITY_SCORE: 30
ADLS_ACUITY_SCORE: 24

## 2023-07-28 NOTE — PROGRESS NOTES
"PACU PT Eval     07/28/23 1200   Appointment Info   Signing Clinician's Name / Credentials (PT) Jose Wilkes, PT, DPT   Rehab Comments (PT) abdominal prec   Living Environment   Current Living Arrangements house   Home Accessibility stairs to enter home;stairs within home   Number of Stairs, Main Entrance other (see comments)  (26)   Stair Railings, Main Entrance railing on left side (ascending)   Number of Stairs, Within Home, Primary other (see comments)  (3 story home)   Stair Railings, Within Home, Primary railings safe and in good condition   Living Environment Comments Patient lives in 3 story home with 26 stairs to enter.   Self-Care   Usual Activity Tolerance good   Current Activity Tolerance poor   Equipment Currently Used at Home cane, straight   Activity/Exercise/Self-Care Comment Patient endorses IND at baseline and states that he gets up and down stairs at house on a daily basis.   General Information   Onset of Illness/Injury or Date of Surgery 07/27/23   Referring Physician Angeli Robertson MD   Patient/Family Therapy Goals Statement (PT) To return home   Pertinent History of Current Problem (include personal factors and/or comorbidities that impact the POC) Per EMR \"Tom Saini is a 88 year old gentleman who presented to colonoscopy clinic with imaging (performed for different reasons) that was concerning for a mass in his sigmoid colon\". Now post-op sigmoid colectomy\"   Existing Precautions/Restrictions fall   General Observations activity: ambulate with assist   Cognition   Affect/Mental Status (Cognition) WFL   Range of Motion (ROM)   Range of Motion ROM is WFL   Strength (Manual Muscle Testing)   Strength (Manual Muscle Testing) strength is WFL   Strength Comments limited by pain after surgery   Bed Mobility   Bed Mobility supine-sit;sit-supine   Supine-Sit Franklin (Bed Mobility) modified independence   Sit-Supine [place] (Bed Mobility) modified independence   Balance   Balance Comments " "initially IND in sitting balance but regressed to min A with report of \"dizziness\"   Clinical Impression   Criteria for Skilled Therapeutic Intervention Yes, treatment indicated   PT Diagnosis (PT) impaired functional mobility   Influenced by the following impairments decreased activity tolerance   Functional limitations due to impairments bed mobility, transfers, gait, stairs   Clinical Presentation (PT Evaluation Complexity) Stable/Uncomplicated   Clinical Presentation Rationale clinical judgement   Clinical Decision Making (Complexity) low complexity   Planned Therapy Interventions (PT) balance training;bed mobility training;gait training;ROM (range of motion);stair training;strengthening;stretching;transfer training   Risk & Benefits of therapy have been explained evaluation/treatment results reviewed;care plan/treatment goals reviewed;risks/benefits reviewed;current/potential barriers reviewed;participants voiced agreement with care plan;participants included;patient   PT Total Evaluation Time   PT Eval, Low Complexity Minutes (63156) 8   Physical Therapy Goals   PT Frequency 5x/week   PT Predicted Duration/Target Date for Goal Attainment 08/11/23   PT Goals Transfers;Bed Mobility;Gait;Stairs   PT: Bed Mobility Modified independent;Supine to/from sit   PT: Transfers Modified independent;Sit to/from stand;Assistive device   PT: Gait Modified independent;Assistive device;Greater than 200 feet   PT: Stairs Modified independent;3 stairs   PT Discharge Planning   PT Plan bed mob, sit<>stand, gait as able, watch for OH   PT Discharge Recommendation (DC Rec) Transitional Care Facility;home with assist   PT Rationale for DC Rec At this time patient presents with imparied mobility and would require TCU if to d/c from hospital. That being said, suspect that limitation were due to pain medication and anticiapte patient will progress quickly and become appropriate for d/c home with a few PT sessions. Will continue to " follow and update as appropriate.   PT Brief overview of current status Ax1 for bed mobility, dizzy from EOB   Total Session Time   Total Session Time (sum of timed and untimed services) 8         Jose Wilkes, PT, DPT  Pager #[phone]

## 2023-07-28 NOTE — PROGRESS NOTES
[facility]    Progress Note - Colorectal Surgery Service       Date of Admission:  7/27/2023    Assessment & Plan: Surgery   Tom Saini is a 88 year old male admitted on 7/27/2023. He has a PMH of DVT, HLD, and aortic insufficiency who was found to have a mass in the sigmoid colon on CT colonography, which was concerning for malignancy and caused large bowel obstruction. He is now POD 1 after an open sigmoid colectomy, laparoscopic mobilization of the splenic flexure, and intraoperative flexible sigmoidoscopy.    Plan:  -Remove galo catheter  -Remain on clear liquid diet  -Discontinue cardiac monitor  -Pain control with scheduled Tylenol and PRN Robaxin, oxycodone, IV dilaudid.  -Awaiting return of bowel function     Diet: Clear Liquid Diet    DVT Prophylaxis: Enoxaparin (Lovenox) SQ  Galo Catheter: Not present  Lines: None     Drains: None     Cardiac Monitoring: None  Code Status: Full Code      Clinically Significant Risk Factors                                  Disposition Plan     Expected Discharge Date: 07/29/2023                 The patient's care was discussed with the Chief Resident/Fellow.    Mor Chavez MD  [facility]  Non-urgent messages: Securely message with [employer] (more info)  Text page via [employer] Paging/Directory     ______________________________________________________________________    Interval History   Reported having a bad night from his monitor beeping and keeping him awake and his bed getting stuck when moving units. Has been on CLD. Reported having some burping and nausea last night. Had good urine output.    Physical Exam   Vital Signs: Temp: 98.1  F (36.7  C) Temp src: Oral BP: 133/74 Pulse: 98   Resp: 22 SpO2: 96 % O2 Device: None (Room air) Oxygen Delivery: 2 LPM  Weight: 162 lbs .61 oz  Intake/Output Summary (Last 24 hours) at 7/28/2023 1545  Last data filed at 7/28/2023 1500  Gross  per 24 hour   Intake 503.33 ml   Output 3025 ml   Net -2521.67 ml     General Appearance: Resting in bed. No acute distress. Alert.  Respiratory: Non-labored breathing on room air. Non-cyanotic appearing  Cardiovascular: Extremities well perfused.  GI: Soft, appropriately tender, incisions clean dry and intact  Extremities: No gross abnormality          Data     I have personally reviewed the following data over the past 24 hrs:    12.4 (H)  \   10.9 (L)   / 283     140 106 13.0 /  120 (H)   4.2 26 0.95 \

## 2023-07-28 NOTE — PROGRESS NOTES
"/70 (BP Location: Left arm)   Pulse 75   Temp 98.6  F (37  C) (Oral)   Resp 18   Ht 1.778 m (5' 10\")   Wt 73.5 kg (162 lb 0.6 oz)   SpO2 95%   BMI 23.25 kg/m        Pt has been alert and oriented x4. Avss, sat 95% room air. Syed was removed at 1530 and no void yet. Pt has poor oral intake due to n/v, Zofran given. At 1730 writer gave report to 5A RN and at 1740 pt left 3C for 5A with the transport staff with all of his belongings. The wife was at the bedside, when he was informed of the transfer.  "

## 2023-07-28 NOTE — PROGRESS NOTES
Aox4. Patient is on room air with capno. Patient has been voiding well with galo cath. Patient has SHERYL abdominal liquid bandage. Patient denied nausea and given oxycodne x 1 to help with occasional abdominal,pain. Patient later denied pain but has carlos did not ambulate and was told to push call light. Patient VSS. Pnuemo boots on. Meplix on sacrum.

## 2023-07-28 NOTE — PROGRESS NOTES
"  Colorectal Surgery Progress Note  Surgery Cross-Cover  Post Op Check    07/27/2023    Tom Saini is a 88 year old male POD#0 s/p Procedure(s):  COLECTOMY, SIGMOID, LAPAROSCOPIC assisted  Sigmoidoscopy flexible for Pre-Op Diagnosis Codes:     * Colonic mass [K63.89]     * Other abnormal tumor markers [R97.8]    Pt reports their pain is controlled with current regimen. No longer having hiccups.Denies nausea, SOB, and chest pain. Patient is resting comfortably    /61   Pulse 74   Temp 97.8  F (36.6  C) (Oral)   Resp (!) 4   Ht 1.778 m (5' 10\")   Wt 73.5 kg (162 lb 0.6 oz)   SpO2 99%   BMI 23.25 kg/m      Gen: A&O x4, NAD   Chest: breathing non-labored on nasal cannula at 2 liters per minute   Abdomen: soft, non-tender to light palpation, non-distended  Incision: clean, dry, intact closed with dermabond  Extremities: warm and well perfused  Devices: Nasal cannula and Syed catheter    A/P: No acute post-op issues. Please call with any questions.    Mor Chavez MD, PGY1    "

## 2023-07-29 ENCOUNTER — APPOINTMENT (OUTPATIENT)
Dept: PHYSICAL THERAPY | Facility: CLINIC | Age: 88
DRG: 821 | End: 2023-07-29
Attending: SURGERY
Payer: MEDICARE

## 2023-07-29 ENCOUNTER — APPOINTMENT (OUTPATIENT)
Dept: OCCUPATIONAL THERAPY | Facility: CLINIC | Age: 88
DRG: 821 | End: 2023-07-29
Attending: SURGERY
Payer: MEDICARE

## 2023-07-29 ENCOUNTER — APPOINTMENT (OUTPATIENT)
Dept: GENERAL RADIOLOGY | Facility: CLINIC | Age: 88
DRG: 821 | End: 2023-07-29
Attending: SURGERY
Payer: MEDICARE

## 2023-07-29 LAB
ANION GAP SERPL CALCULATED.3IONS-SCNC: 11 MMOL/L (ref 7–15)
ANION GAP SERPL CALCULATED.3IONS-SCNC: 12 MMOL/L (ref 7–15)
BASOPHILS # BLD AUTO: 0 10E3/UL (ref 0–0.2)
BASOPHILS NFR BLD AUTO: 0 %
BUN SERPL-MCNC: 20.6 MG/DL (ref 8–23)
BUN SERPL-MCNC: 24.1 MG/DL (ref 8–23)
CALCIUM SERPL-MCNC: 8.2 MG/DL (ref 8.8–10.2)
CALCIUM SERPL-MCNC: 8.4 MG/DL (ref 8.8–10.2)
CHLORIDE SERPL-SCNC: 103 MMOL/L (ref 98–107)
CHLORIDE SERPL-SCNC: 104 MMOL/L (ref 98–107)
CREAT SERPL-MCNC: 0.86 MG/DL (ref 0.67–1.17)
CREAT SERPL-MCNC: 0.88 MG/DL (ref 0.67–1.17)
DEPRECATED HCO3 PLAS-SCNC: 22 MMOL/L (ref 22–29)
DEPRECATED HCO3 PLAS-SCNC: 24 MMOL/L (ref 22–29)
EOSINOPHIL # BLD AUTO: 0 10E3/UL (ref 0–0.7)
EOSINOPHIL NFR BLD AUTO: 0 %
ERYTHROCYTE [DISTWIDTH] IN BLOOD BY AUTOMATED COUNT: 13.4 % (ref 10–15)
ERYTHROCYTE [DISTWIDTH] IN BLOOD BY AUTOMATED COUNT: 13.4 % (ref 10–15)
GFR SERPL CREATININE-BSD FRML MDRD: 83 ML/MIN/1.73M2
GFR SERPL CREATININE-BSD FRML MDRD: 84 ML/MIN/1.73M2
GLUCOSE SERPL-MCNC: 153 MG/DL (ref 70–99)
GLUCOSE SERPL-MCNC: 166 MG/DL (ref 70–99)
HCT VFR BLD AUTO: 37.8 % (ref 40–53)
HCT VFR BLD AUTO: 39.6 % (ref 40–53)
HGB BLD-MCNC: 12.2 G/DL (ref 13.3–17.7)
HGB BLD-MCNC: 12.7 G/DL (ref 13.3–17.7)
IMM GRANULOCYTES # BLD: 0.1 10E3/UL
IMM GRANULOCYTES NFR BLD: 0 %
LYMPHOCYTES # BLD AUTO: 1 10E3/UL (ref 0.8–5.3)
LYMPHOCYTES NFR BLD AUTO: 5 %
MCH RBC QN AUTO: 30.4 PG (ref 26.5–33)
MCH RBC QN AUTO: 30.5 PG (ref 26.5–33)
MCHC RBC AUTO-ENTMCNC: 32.1 G/DL (ref 31.5–36.5)
MCHC RBC AUTO-ENTMCNC: 32.3 G/DL (ref 31.5–36.5)
MCV RBC AUTO: 95 FL (ref 78–100)
MCV RBC AUTO: 95 FL (ref 78–100)
MONOCYTES # BLD AUTO: 0.9 10E3/UL (ref 0–1.3)
MONOCYTES NFR BLD AUTO: 5 %
NEUTROPHILS # BLD AUTO: 16.5 10E3/UL (ref 1.6–8.3)
NEUTROPHILS NFR BLD AUTO: 90 %
NRBC # BLD AUTO: 0 10E3/UL
NRBC BLD AUTO-RTO: 0 /100
PLATELET # BLD AUTO: 312 10E3/UL (ref 150–450)
PLATELET # BLD AUTO: 340 10E3/UL (ref 150–450)
POTASSIUM SERPL-SCNC: 3.9 MMOL/L (ref 3.4–5.3)
POTASSIUM SERPL-SCNC: 4.2 MMOL/L (ref 3.4–5.3)
RBC # BLD AUTO: 4 10E6/UL (ref 4.4–5.9)
RBC # BLD AUTO: 4.18 10E6/UL (ref 4.4–5.9)
SODIUM SERPL-SCNC: 138 MMOL/L (ref 136–145)
SODIUM SERPL-SCNC: 138 MMOL/L (ref 136–145)
WBC # BLD AUTO: 18.5 10E3/UL (ref 4–11)
WBC # BLD AUTO: 18.8 10E3/UL (ref 4–11)

## 2023-07-29 PROCEDURE — 85027 COMPLETE CBC AUTOMATED: CPT | Performed by: STUDENT IN AN ORGANIZED HEALTH CARE EDUCATION/TRAINING PROGRAM

## 2023-07-29 PROCEDURE — 97535 SELF CARE MNGMENT TRAINING: CPT | Mod: GO

## 2023-07-29 PROCEDURE — 250N000011 HC RX IP 250 OP 636: Mod: JZ | Performed by: PHYSICIAN ASSISTANT

## 2023-07-29 PROCEDURE — 97110 THERAPEUTIC EXERCISES: CPT | Mod: GP

## 2023-07-29 PROCEDURE — 250N000013 HC RX MED GY IP 250 OP 250 PS 637

## 2023-07-29 PROCEDURE — 74018 RADEX ABDOMEN 1 VIEW: CPT | Mod: 26 | Performed by: RADIOLOGY

## 2023-07-29 PROCEDURE — 120N000002 HC R&B MED SURG/OB UMMC

## 2023-07-29 PROCEDURE — 85025 COMPLETE CBC W/AUTO DIFF WBC: CPT | Performed by: STUDENT IN AN ORGANIZED HEALTH CARE EDUCATION/TRAINING PROGRAM

## 2023-07-29 PROCEDURE — 258N000003 HC RX IP 258 OP 636: Performed by: STUDENT IN AN ORGANIZED HEALTH CARE EDUCATION/TRAINING PROGRAM

## 2023-07-29 PROCEDURE — 97165 OT EVAL LOW COMPLEX 30 MIN: CPT | Mod: GO

## 2023-07-29 PROCEDURE — 999N000065 XR ABDOMEN PORT 1 VIEW

## 2023-07-29 PROCEDURE — 80048 BASIC METABOLIC PNL TOTAL CA: CPT | Performed by: STUDENT IN AN ORGANIZED HEALTH CARE EDUCATION/TRAINING PROGRAM

## 2023-07-29 PROCEDURE — 97530 THERAPEUTIC ACTIVITIES: CPT | Mod: GP

## 2023-07-29 PROCEDURE — 250N000011 HC RX IP 250 OP 636: Mod: JZ | Performed by: STUDENT IN AN ORGANIZED HEALTH CARE EDUCATION/TRAINING PROGRAM

## 2023-07-29 PROCEDURE — 36415 COLL VENOUS BLD VENIPUNCTURE: CPT | Performed by: STUDENT IN AN ORGANIZED HEALTH CARE EDUCATION/TRAINING PROGRAM

## 2023-07-29 PROCEDURE — 99222 1ST HOSP IP/OBS MODERATE 55: CPT

## 2023-07-29 RX ORDER — SODIUM CHLORIDE, SODIUM LACTATE, POTASSIUM CHLORIDE, CALCIUM CHLORIDE 600; 310; 30; 20 MG/100ML; MG/100ML; MG/100ML; MG/100ML
INJECTION, SOLUTION INTRAVENOUS CONTINUOUS
Status: DISCONTINUED | OUTPATIENT
Start: 2023-07-29 | End: 2023-08-02

## 2023-07-29 RX ORDER — BACLOFEN 5 MG/1
5 TABLET ORAL 3 TIMES DAILY
Status: DISCONTINUED | OUTPATIENT
Start: 2023-07-29 | End: 2023-07-30

## 2023-07-29 RX ADMIN — SODIUM CHLORIDE, POTASSIUM CHLORIDE, SODIUM LACTATE AND CALCIUM CHLORIDE: 600; 310; 30; 20 INJECTION, SOLUTION INTRAVENOUS at 13:14

## 2023-07-29 RX ADMIN — ONDANSETRON 4 MG: 2 INJECTION INTRAMUSCULAR; INTRAVENOUS at 02:47

## 2023-07-29 RX ADMIN — ONDANSETRON 4 MG: 2 INJECTION INTRAMUSCULAR; INTRAVENOUS at 10:13

## 2023-07-29 RX ADMIN — HYDROMORPHONE HYDROCHLORIDE 0.2 MG: 0.2 INJECTION, SOLUTION INTRAMUSCULAR; INTRAVENOUS; SUBCUTANEOUS at 12:08

## 2023-07-29 RX ADMIN — BACLOFEN 5 MG: 5 TABLET ORAL at 21:18

## 2023-07-29 RX ADMIN — ENOXAPARIN SODIUM 40 MG: 40 INJECTION SUBCUTANEOUS at 13:14

## 2023-07-29 ASSESSMENT — ACTIVITIES OF DAILY LIVING (ADL)
ADLS_ACUITY_SCORE: 31
ADLS_ACUITY_SCORE: 31
PREVIOUS_RESPONSIBILITIES: MEAL PREP;HOUSEKEEPING;SHOPPING;YARDWORK;MEDICATION MANAGEMENT;DRIVING
ADLS_ACUITY_SCORE: 31
ADLS_ACUITY_SCORE: 30
ADLS_ACUITY_SCORE: 30
ADLS_ACUITY_SCORE: 31
ADLS_ACUITY_SCORE: 30
ADLS_ACUITY_SCORE: 30
ADLS_ACUITY_SCORE: 31

## 2023-07-29 NOTE — PLAN OF CARE
"BP (!) 148/76 (BP Location: Right arm)   Pulse 79   Temp 98.1  F (36.7  C) (Temporal)   Resp 16   Ht 1.778 m (5' 10\")   Wt 73.5 kg (162 lb 0.6 oz)   SpO2 96%   BMI 23.25 kg/m      Pt a&ox4 with intermittent confusion. Started hallucinating and MD notified and ordered 1:1 close supervision. He voiced lack of sleep. Rated pain 0/10 with no s/sx of pain/discomfort noted. VSS on RA. Lungs clear with no s/sx of SOB/chest pain noted. Bowel sounds present x4 but no BM this shift. Voided 50cc @0430 and PVR @0630 was 125cc. On clears and not tolerating water d/t nausea and voting afterwards. Zofran given x1. Up with Ax1. Has RPIV SL and LPIV infusing TKO. Not OOB yet d/t complain of dizziness. Plan it continue to monitor.   "

## 2023-07-29 NOTE — PROGRESS NOTES
Paged team  Pt clover was taken out yesterday @1533. He has not voided. Bladder scan @0130 had 176cc. He drinking water but emits its out immediately. Gave him Zofran. Could you recommend a bolus?

## 2023-07-29 NOTE — PLAN OF CARE
Goal Outcome Evaluation:    1:1 sitter at bedside this morning for close monitoring of emotional state. Alert and oriented x3, calm and cooperative but expresses frustration with current situation. Patient's wife came to bedside and confirmed that patient does not seem confused, but is much more hopeless than usual and seems sleep deprived. Had psych consult - recommendations in note.    Patient had small coffee ground emesis with distended abdomen and ongoing nausea. MD aware. Now NPO status, NG placed per order. Denies passing gas. Declined pain medications other than prior to NG placement.    Allowed patient to rest between neccessary cares. Plan is for PT to see patient this afternoon to attempt out of bed activity.     Continue with POC.

## 2023-07-29 NOTE — PROVIDER NOTIFICATION
Addendum: Bladder scan in 4 hours if patient has not voided.    Paged colorectal resident Diana Collins re: [phone] [id number] Syed out at 1530; has not yet voided; bladder scan 110 mL. Bel CONTEH RN [id number]

## 2023-07-29 NOTE — CONSULTS
"      Initial Psychiatric Consult   Consult date: July 29, 2023         Reason for Consult, requesting source:    SI, depression in the 80s  Requesting source: Jhonatan Cowart    Labs and imaging reviewed. Patient seen and evaluated by HEYDI Shaikh CNP        HPI:   Tom Saini is a 88 year old male admitted on 7/27/2023. He has a PMH of DVT, HLD, and aortic insufficiency who was found to have a mass in the sigmoid colon on CT colonography, which was concerning for malignancy and caused large bowel obstruction. He is now POD 1 after an open sigmoid colectomy, laparoscopic mobilization of the splenic flexure, and intraoperative flexible sigmoidoscopy.     Overnight, the on-call provider was called by nursing regarding patient stating he is feeling suicidal. Per patient he is feeling hopeless due to his current medical situation. Denies feeling suicidal currently. He is frustrated because he has had persistent hiccups and difficulty with drinking water. Feels that he is making backward progress.     Nursing note from 7/29 at 6:59am stated \"Pt a&ox4 with intermittent confusion. Started hallucinating and MD notified and ordered 1:1 close supervision. He voiced lack of sleep.\"    Upon psychiatric evaluation, patient reported that last night was hard for him due to medical sxs and being overwhelmed. He adamantly denies suicidality. He was been  for 42 years and have 5 kids together. In the 80s, he struggled with some depression due to increased stress at home with teenagers. He thinks he was on medications but is unsure which ones. He states that he has not been depressed since, states this is all \"situational.\" He denies SI, HI, AVH.         Past Psychiatric History:   Pt has a history of MDD in the 80s and was treated with an unknown psychotropic. He denies history of psychosis, amira, or anxiety. No history of suicide attempts or psychiatric hospitalizations.         Substance Use and " History:   Denies. No history of CD treatment or overdoses         Past Medical History:   PAST MEDICAL HISTORY:   Past Medical History:   Diagnosis Date    Aortic insufficiency     Erectile dysfunction     History of deep venous thrombosis 2019    distal DVT with extension, completed 6 months of AC (failed eliquis)    History of pelvic fracture     Hyperlipidemia     Low back pain     disc disease s/p laminectomy in past    Macular degeneration     on Avastin, R eye    Pancreatic cyst 2018    next MR due     Radius fracture     after fall from wall       PAST SURGICAL HISTORY:   Past Surgical History:   Procedure Laterality Date    CATARACT IOL, RT/LT  2001    COLONOSCOPY N/A 2023    Procedure: COLONOSCOPY, WITH BIOPSY;  Surgeon: Jhonatan Cowart MD;  [place] [facility] [place]    COMBINED REPAIR PTOSIS WITH BLEPHAROPLASTY BILATERAL Bilateral 2018    Procedure: COMBINED REPAIR PTOSIS WITH BLEPHAROPLASTY BILATERAL;  BILATERAL UPPER EYELIDS BLEPHAROPLASTY AND PTOSIS REPAIR ;  Surgeon: Anuj Good MD;  Location: [facility]    EYE SURGERY      LAMINECTOMY LUMBAR TWO LEVELS  1993    L4-L5    ORIF RADIAL SHAFT FRACTURE Right 2018    RECESSION RESECTION (REPAIR STRABISMUS)  1949             Family History:   FAMILY HISTORY:   Family History   Problem Relation Age of Onset    Cancer Mother         unknown skin cancer    Skin Cancer Mother     Cancer Father         bladder    Glaucoma No family hx of     Macular Degeneration No family hx of     Diabetes No family hx of     Hypertension No family hx of     Anesthesia Reaction No family hx of     Thrombosis No family hx of        Family Psychiatric History: denies        Social History:   SOCIAL HISTORY:   Social History     Tobacco Use    Smoking status: Former     Packs/day: 2.00     Years: 2.00     Pack years: 4.00     Types: Cigarettes     Quit date: 1954     Years since quittin.1    Smokeless tobacco: Never    Substance Use Topics    Alcohol use: Yes     Alcohol/week: 3.0 standard drinks of alcohol     Types: 3 Glasses of wine per week     Comment: glass of wine with dinner     Very supportive wife          Physical ROS:   The 10 point Review of Systems is negative other than noted in the HPI or here.           Medications:      acetaminophen  975 mg Oral Q8H    enoxaparin ANTICOAGULANT  40 mg Subcutaneous Q24H    sodium chloride (PF)  3 mL Intracatheter Q8H              Allergies:     Allergies   Allergen Reactions    Codeine Sulfate Nausea and Vomiting          Labs:     Recent Results (from the past 48 hour(s))   Basic metabolic panel    Collection Time: 07/28/23  5:36 AM   Result Value Ref Range    Sodium 140 136 - 145 mmol/L    Potassium 4.2 3.4 - 5.3 mmol/L    Chloride 106 98 - 107 mmol/L    Carbon Dioxide (CO2) 26 22 - 29 mmol/L    Anion Gap 8 7 - 15 mmol/L    Urea Nitrogen 13.0 8.0 - 23.0 mg/dL    Creatinine 0.95 0.67 - 1.17 mg/dL    Calcium 8.0 (L) 8.8 - 10.2 mg/dL    Glucose 120 (H) 70 - 99 mg/dL    GFR Estimate 77 >60 mL/min/1.73m2   CBC with platelets    Collection Time: 07/28/23  5:36 AM   Result Value Ref Range    WBC Count 12.4 (H) 4.0 - 11.0 10e3/uL    RBC Count 3.66 (L) 4.40 - 5.90 10e6/uL    Hemoglobin 10.9 (L) 13.3 - 17.7 g/dL    Hematocrit 34.8 (L) 40.0 - 53.0 %    MCV 95 78 - 100 fL    MCH 29.8 26.5 - 33.0 pg    MCHC 31.3 (L) 31.5 - 36.5 g/dL    RDW 13.5 10.0 - 15.0 %    Platelet Count 283 150 - 450 10e3/uL   Magnesium    Collection Time: 07/28/23  5:36 AM   Result Value Ref Range    Magnesium 1.8 1.7 - 2.3 mg/dL   Phosphorus    Collection Time: 07/28/23  5:36 AM   Result Value Ref Range    Phosphorus 2.6 2.5 - 4.5 mg/dL   Basic metabolic panel    Collection Time: 07/29/23  8:16 AM   Result Value Ref Range    Sodium 138 136 - 145 mmol/L    Potassium 4.2 3.4 - 5.3 mmol/L    Chloride 103 98 - 107 mmol/L    Carbon Dioxide (CO2) 24 22 - 29 mmol/L    Anion Gap 11 7 - 15 mmol/L    Urea Nitrogen 20.6 8.0 -  23.0 mg/dL    Creatinine 0.88 0.67 - 1.17 mg/dL    Calcium 8.4 (L) 8.8 - 10.2 mg/dL    Glucose 153 (H) 70 - 99 mg/dL    GFR Estimate 83 >60 mL/min/1.73m2   CBC with platelets and differential    Collection Time: 07/29/23  8:16 AM   Result Value Ref Range    WBC Count 18.5 (H) 4.0 - 11.0 10e3/uL    RBC Count 4.18 (L) 4.40 - 5.90 10e6/uL    Hemoglobin 12.7 (L) 13.3 - 17.7 g/dL    Hematocrit 39.6 (L) 40.0 - 53.0 %    MCV 95 78 - 100 fL    MCH 30.4 26.5 - 33.0 pg    MCHC 32.1 31.5 - 36.5 g/dL    RDW 13.4 10.0 - 15.0 %    Platelet Count 340 150 - 450 10e3/uL    % Neutrophils 90 %    % Lymphocytes 5 %    % Monocytes 5 %    % Eosinophils 0 %    % Basophils 0 %    % Immature Granulocytes 0 %    NRBCs per 100 WBC 0 <1 /100    Absolute Neutrophils 16.5 (H) 1.6 - 8.3 10e3/uL    Absolute Lymphocytes 1.0 0.8 - 5.3 10e3/uL    Absolute Monocytes 0.9 0.0 - 1.3 10e3/uL    Absolute Eosinophils 0.0 0.0 - 0.7 10e3/uL    Absolute Basophils 0.0 0.0 - 0.2 10e3/uL    Absolute Immature Granulocytes 0.1 <=0.4 10e3/uL    Absolute NRBCs 0.0 10e3/uL   CBC with platelets    Collection Time: 07/29/23 11:20 AM   Result Value Ref Range    WBC Count 18.8 (H) 4.0 - 11.0 10e3/uL    RBC Count 4.00 (L) 4.40 - 5.90 10e6/uL    Hemoglobin 12.2 (L) 13.3 - 17.7 g/dL    Hematocrit 37.8 (L) 40.0 - 53.0 %    MCV 95 78 - 100 fL    MCH 30.5 26.5 - 33.0 pg    MCHC 32.3 31.5 - 36.5 g/dL    RDW 13.4 10.0 - 15.0 %    Platelet Count 312 150 - 450 10e3/uL   Basic metabolic panel    Collection Time: 07/29/23 11:20 AM   Result Value Ref Range    Sodium 138 136 - 145 mmol/L    Potassium 3.9 3.4 - 5.3 mmol/L    Chloride 104 98 - 107 mmol/L    Carbon Dioxide (CO2) 22 22 - 29 mmol/L    Anion Gap 12 7 - 15 mmol/L    Urea Nitrogen 24.1 (H) 8.0 - 23.0 mg/dL    Creatinine 0.86 0.67 - 1.17 mg/dL    Calcium 8.2 (L) 8.8 - 10.2 mg/dL    Glucose 166 (H) 70 - 99 mg/dL    GFR Estimate 84 >60 mL/min/1.73m2          Physical and Psychiatric Examination:     /79 (BP Location: Left  "arm)   Pulse 86   Temp 98.1  F (36.7  C) (Temporal)   Resp 18   Ht 1.778 m (5' 10\")   Wt 73.5 kg (162 lb 0.6 oz)   SpO2 94%   BMI 23.25 kg/m    Weight is 162 lbs .61 oz  Body mass index is 23.25 kg/m .    Physical Exam:  I have reviewed the physical exam as documented by by the medical team and agree with findings and assessment and have no additional findings to add at this time.    Mental Status Exam:    Appearance: awake, alert, adequately groomed, and dressed in hospital scrubs  Attitude:  cooperative  Eye Contact:  good  Mood:  better  Affect:  appropriate and in normal range and mood congruent  Speech:  clear, coherent  Language: Fluent in english   Psychomotor Behavior:  no evidence of tardive dyskinesia, dystonia, or tics  Thought Process:  logical, linear, and goal oriented  Associations:  no loose associations  Thought Content:  no evidence of suicidal ideation or homicidal ideation and no evidence of psychotic thought  Insight:  fair  Judgement:  fair  Oriented to:  time, person, and place  Attention Span and Concentration:  intact  Recent and Remote Memory:  intact  Fund of Knowledge: Appropriate   Gait and Station: baseline                DSM-5 Diagnosis:   Remote history of MDD  Delirium           Assessment/Plan:   Juaquin is an 88 year old male with a remote history of depression who made a suicidal statement last night. He is adamantly denying suicidality and depression and states it is all \"situational\" as it started when he got to the hospital. He attributes his mood to his physical complaints/sxs. I discussed with him if his depression persists, we could try Lexapro. For now, I would consider switching his muscle relaxer to Baclofen for the hiccups and there have been some positive psychiatric effects in anxiety/depression/PTSD with baclofen. The hallucinations reported by RN are likely delirium related to hospital environment, medical conditions, and lack of sleep. He states that at home he " has no problem sleeping, and declines and pharmacologic interventions at this time.     Can discontinue 1:1 bedside sitter  Consider switching Robaxin to Baclofen   If depression persists, recommend Lexparo 10mg   If delirium/sleep continue to be an issue, can start with Melatonin scheduled no later than 8pm and if that doesn't work can try trazodone.   Delirium precautions:  Up during the day with lights on  Lights off at night, avoid interruptions during the night as much as possible  Family visits  Encourage wearing glasses  Reorientation  Avoid opioids, benzodiazepines, anticholinergics as much as possible.   Continue to ensure proper nutrition, fluid and electrolyte balance. Monitor for infections, hypoxia, metabolic derangements, or other causes of delirium.           Yamilet Dickens, PMMIMAP-BC  Consult/Liaison Psychiatry   [facility]

## 2023-07-29 NOTE — PLAN OF CARE
Patient admitted from PACU ~1745. Assumed nursing care from [id number]. Patient alert, oriented, and not out of bed. Dangled bedside but unable to stand due to dizziness and weakness. Vital signs stable and within defined limits. Denies pain. Clear liquid diet; drinking only water. Intermittent nausea with small emesis x 1. Syed catheter removed on 3C at 1530; has not yet voided; bladder scan at 2130 = 110 mL. +bowel sounds in all quadrants; intermittent hiccups and belching. L PIV infusing TKO; R PIV saline locked.

## 2023-07-29 NOTE — PROGRESS NOTES
[facility]    Progress Note - Colorectal Surgery Service       Date of Admission:  7/27/2023    Assessment & Plan: Surgery   Tom Saini is a 88 year old male admitted on 7/27/2023. He has a PMH of DVT, HLD, and aortic insufficiency who was found to have a mass in the sigmoid colon on CT colonography, which was concerning for malignancy and caused large bowel obstruction. He is now POD 2 after an open sigmoid colectomy, laparoscopic mobilization of the splenic flexure, and intraoperative flexible sigmoidoscopy.    Plan:  -Psych consult and 1:1 sitter given suicidal ideation  -NPO, mIVF  -NG to LIS given ongoing emesis  -Pain control with scheduled Tylenol and PRN Robaxin, oxycodone, IV dilaudid.  -Awaiting return of bowel function  -Recheck labs  -OOB/IS  -Lovenox for DVT ppx     Diet: NPO for Medical/Clinical Reasons Except for: Meds    DVT Prophylaxis: Enoxaparin (Lovenox) SQ  Syed Catheter: Not present  Lines: None     Drains: None     Cardiac Monitoring: None  Code Status: Full Code      Clinically Significant Risk Factors                                  Disposition Plan     Expected Discharge Date: 07/29/2023                 The patient's care was discussed with the attending Dr. Elvira Moss MD  [facility]  Non-urgent messages: Securely message with [employer] (more info)  Text page via [employer] Paging/Directory     ______________________________________________________________________    Interval History   Suicidal ideation last night so 1:1 sitter ordered. Ongoing emesis last night and this morning, discussed NG placement with patient. Unsure if bowel function, sounds like not yet. Some light headedness, per wife partially baseline.    Physical Exam   Vital Signs: Temp: 98.1  F (36.7  C) Temp src: Temporal BP: (!) 148/76 Pulse: 79   Resp: 16 SpO2: 96 % O2 Device: None (Room air)    Weight: 162 lbs .61  oz  Intake/Output Summary (Last 24 hours) at 7/28/2023 1545  Last data filed at 7/28/2023 1500  Gross per 24 hour   Intake 503.33 ml   Output 3025 ml   Net -2521.67 ml     General Appearance: Resting in bed. No acute distress. Alert.  Respiratory: Non-labored breathing on room air. Non-cyanotic appearing  Cardiovascular: Extremities well perfused.  GI: Soft but distended, appropriately tender, incision clean dry and intact  Extremities: No gross abnormality          Data     I have personally reviewed the following data over the past 24 hrs:    18.8 (H)  \   12.2 (L)   / 312     138 104 24.1 (H) /  166 (H)   3.9 22 0.86 \

## 2023-07-29 NOTE — PROGRESS NOTES
Cross-cover note: 5:08 AM 7/29/2023 07/29/2023    General Surgery Cross Cover Note    Called by nursing regarding patient stating he is feeling suicidal.     Per patient he is feeling hopeless due to his current medical situation. Denies feeling suicidal currently. He is frustrated because he has had persistent hiccups and difficulty with drinking water. Feels that he is making backward progress. Would like to talk with his wife on the phone but feels she is probably asleep.     B/P: 133/70, T: 98.1, P: 79, R: 16    PE:  A&O x3, flat affect  Breathing non-labored  Extr. Well perfused    Plan:  1:1 sitter  Encourage patient to call his wife in the AM    Diana Collins DO  Surgery Resident PGY-1  Please page primary team with questions

## 2023-07-29 NOTE — PROVIDER NOTIFICATION
Time: 1020  Provider notified:JOAO HUANG     Pt had coffee ground emesis, IV Zofran given. Please advise with any new orders/interventions.     Response: NG ordered, pt put back to NPO.

## 2023-07-29 NOTE — PROGRESS NOTES
07/29/23 0901   Appointment Info   Signing Clinician's Name / Credentials (OT) Kary Nelson, OTR/L   Rehab Comments (OT) abd prec   Living Environment   People in Home spouse   Current Living Arrangements house   Home Accessibility stairs to enter home;stairs within home   Number of Stairs, Main Entrance greater than 10 stairs  (26)   Stair Railings, Main Entrance railing on left side (ascending)   Number of Stairs, Within Home, Primary greater than 10 stairs;other (see comments)  (3 stories)   Stair Railings, Within Home, Primary railings safe and in good condition   Transportation Anticipated family or friend will provide   Living Environment Comments Pt and wife report living in 3 story home with may stairs to enter and within home. Has both tub and walk in shower, shower chair, no grab bars.   Self-Care   Usual Activity Tolerance good   Current Activity Tolerance poor   Regular Exercise Yes   Activity/Exercise Type walking   Exercise Amount/Frequency 3-5 times/wk   Equipment Currently Used at Home shower chair   Fall history within last six months no   Activity/Exercise/Self-Care Comment Pt reports being very IND at baseline, does not use AD at [facility]. Walks at [facility] with wife.   Instrumental Activities of Daily Living (IADL)   Previous Responsibilities meal prep;housekeeping;shopping;yardwork;medication management;driving   IADL Comments Pt reports very IND at baseline  for IADLs, does majority of cooking and grocery shopping. Manages meds with pillbox.   General Information   Onset of Illness/Injury or Date of Surgery 07/27/23   Referring Physician Briseida Moss MD   Patient/Family Therapy Goal Statement (OT) to get stronger   Additional Occupational Profile Info/Pertinent History of Current Problem Per chart:  88 year old male admitted on 7/27/2023. He has a PMH of DVT, HLD, and aortic insufficiency who was found to have a mass in the sigmoid colon on CT colonography, which was concerning for malignancy  "and caused large bowel obstruction. He is now POD 2 after an open sigmoid colectomy, laparoscopic mobilization of the splenic flexure, and intraoperative flexible sigmoidoscopy.   Existing Precautions/Restrictions fall;NPO;abdominal   Left Upper Extremity (Weight-bearing Status) partial weight-bearing (PWB)  (<10#s)   Right Upper Extremity (Weight-bearing Status) partial weight-bearing (PWB)  (<10#s)   Left Lower Extremity (Weight-bearing Status) full weight-bearing (FWB)   Right Lower Extremity (Weight-bearing Status) full weight-bearing (FWB)   General Observations and Info Activity Ambulate with assist   Cognitive Status Examination   Orientation Status orientation to person, place and time   Behavioral Issues overwhelmed easily   Affect/Mental Status (Cognitive) anxious   Follows Commands WFL   Cognitive Status Comments Pt appears grossly cognitively intact throughout observation/interview. Will continue to monitor.   Visual Perception   Visual Impairment/Limitations corrective lenses full-time   Impact of Vision Impairment on Function (Vision) Pt reports macular degeneration and \"lazy eye\" and wife reports pt \"only sees in 2D\" but has learned to compensate for lack of depth.   Sensory   Sensory Quick Adds sensation intact   Pain Assessment   Patient Currently in Pain Yes, see Vital Sign flowsheet  (2/10 in surgical area)   Posture   Posture forward head position;protracted shoulders   Range of Motion Comprehensive   General Range of Motion bilateral upper extremity ROM WFL   Strength Comprehensive (MMT)   Comment, General Manual Muscle Testing (MMT) Assessment Not formally assessed 2/2 precautions, demosntrates generalized weakness.   Muscle Tone Assessment   Muscle Tone Quick Adds No deficits were identified   Coordination   Upper Extremity Coordination No deficits were identified   Bed Mobility   Comment (Bed Mobility) Min A supine>sit EOB   Transfers   Transfer Comments Unable to assess 2/2 pt declining "   Activities of Daily Living   BADL Assessment/Intervention bathing;lower body dressing;upper body dressing;grooming;toileting   Bathing Assessment/Intervention   [place] Level (Bathing) maximum assist (25% patient effort)   Comment, (Bathing) Per clinical reasoning   Upper Body Dressing Assessment/Training   Comment, (Upper Body Dressing) Per clinical reasoning   [place] Level (Upper Body Dressing) minimum assist (75% patient effort)   Lower Body Dressing Assessment/Training   Comment, (Lower Body Dressing) Per clinical reasoning   [place] Level (Lower Body Dressing) moderate assist (50% patient effort)   Grooming Assessment/Training   [place] Level (Grooming) set up   Comment, (Grooming) Per clinical reasoning   Toileting   Comment, (Toileting) Per clinical reasoning   [place] Level (Toileting) dependent (less than 25% patient effort)   Clinical Impression   Criteria for Skilled Therapeutic Interventions Met (OT) Yes, treatment indicated   OT Diagnosis decreased engagement/safety with ADLs/IADLs now with post surgical prec   OT Problem List-Impairments impacting ADL problems related to;activity tolerance impaired;strength;pain;post-surgical precautions;mobility;vision   Assessment of Occupational Performance 3-5 Performance Deficits   Identified Performance Deficits dressing, bathing, home mgmt, functional mobility   Planned Therapy Interventions (OT) ADL retraining;IADL retraining;strengthening;transfer training;home program guidelines;progressive activity/exercise;risk factor education;ROM   Clinical Decision Making Complexity (OT) low complexity   Anticipated Equipment Needs Upon Discharge (OT)   (TBD)   Risk & Benefits of therapy have been explained evaluation/treatment results reviewed;care plan/treatment goals reviewed;risks/benefits reviewed;current/potential barriers reviewed;participants voiced agreement with care plan;participants included;patient;spouse/significant other    Clinical Impression Comments Pt will benefit from continued skilled OT services to progress IND and safety with ADLs/IADLs within prec   OT Total Evaluation Time   OT Eval, Low Complexity Minutes (89519) 10   OT Goals   Therapy Frequency (OT) 5 times/wk   OT Predicted Duration/Target Date for Goal Attainment 08/12/23   OT Goals Hygiene/Grooming;Upper Body Dressing;Lower Body Dressing;Upper Body Bathing;Lower Body Bathing;Toilet Transfer/Toileting   OT: Hygiene/Grooming modified independent   OT: Upper Body Dressing Modified independent   OT: Lower Body Dressing Modified independent   OT: Upper Body Bathing Modified independent   OT: Lower Body Bathing Modified independent   OT: Toilet Transfer/Toileting Modified independent   OT Discharge Planning   OT Plan progress OOB activity tolerance, SPT to commode or recliner when tolerated, review prec, ADLs seated EOB   OT Discharge Recommendation (DC Rec) Transitional Care Facility   OT Rationale for DC Rec Pt significantly below baseline for ADLs/IADLs, limited tolerance for OOB activity with goal to return to home IND. Recommend d/c to TCU to progress activity tolerance, strength, and IND.   OT Brief overview of current status Pura supine<>sit EOB, unable to tolerate STS   Total Session Time   Total Session Time (sum of timed and untimed services) 10

## 2023-07-30 ENCOUNTER — APPOINTMENT (OUTPATIENT)
Dept: OCCUPATIONAL THERAPY | Facility: CLINIC | Age: 88
DRG: 821 | End: 2023-07-30
Attending: SURGERY
Payer: MEDICARE

## 2023-07-30 LAB
ANION GAP SERPL CALCULATED.3IONS-SCNC: 12 MMOL/L (ref 7–15)
BUN SERPL-MCNC: 22.2 MG/DL (ref 8–23)
CALCIUM SERPL-MCNC: 8 MG/DL (ref 8.8–10.2)
CHLORIDE SERPL-SCNC: 105 MMOL/L (ref 98–107)
CREAT SERPL-MCNC: 0.71 MG/DL (ref 0.67–1.17)
DEPRECATED HCO3 PLAS-SCNC: 23 MMOL/L (ref 22–29)
ERYTHROCYTE [DISTWIDTH] IN BLOOD BY AUTOMATED COUNT: 13.6 % (ref 10–15)
GFR SERPL CREATININE-BSD FRML MDRD: 89 ML/MIN/1.73M2
GLUCOSE SERPL-MCNC: 107 MG/DL (ref 70–99)
HCT VFR BLD AUTO: 33.6 % (ref 40–53)
HGB BLD-MCNC: 10.7 G/DL (ref 13.3–17.7)
HOLD SPECIMEN: NORMAL
MAGNESIUM SERPL-MCNC: 1.8 MG/DL (ref 1.7–2.3)
MCH RBC QN AUTO: 30.2 PG (ref 26.5–33)
MCHC RBC AUTO-ENTMCNC: 31.8 G/DL (ref 31.5–36.5)
MCV RBC AUTO: 95 FL (ref 78–100)
PLATELET # BLD AUTO: 300 10E3/UL (ref 150–450)
PLATELET # BLD AUTO: 300 10E3/UL (ref 150–450)
POTASSIUM SERPL-SCNC: 4.2 MMOL/L (ref 3.4–5.3)
RBC # BLD AUTO: 3.54 10E6/UL (ref 4.4–5.9)
SODIUM SERPL-SCNC: 140 MMOL/L (ref 136–145)
TROPONIN T SERPL HS-MCNC: 16 NG/L
WBC # BLD AUTO: 13.5 10E3/UL (ref 4–11)

## 2023-07-30 PROCEDURE — 250N000011 HC RX IP 250 OP 636: Mod: JZ | Performed by: STUDENT IN AN ORGANIZED HEALTH CARE EDUCATION/TRAINING PROGRAM

## 2023-07-30 PROCEDURE — 82310 ASSAY OF CALCIUM: CPT | Performed by: SURGERY

## 2023-07-30 PROCEDURE — 84484 ASSAY OF TROPONIN QUANT: CPT

## 2023-07-30 PROCEDURE — 258N000003 HC RX IP 258 OP 636: Performed by: SURGERY

## 2023-07-30 PROCEDURE — 85027 COMPLETE CBC AUTOMATED: CPT | Performed by: STUDENT IN AN ORGANIZED HEALTH CARE EDUCATION/TRAINING PROGRAM

## 2023-07-30 PROCEDURE — 85027 COMPLETE CBC AUTOMATED: CPT | Performed by: SURGERY

## 2023-07-30 PROCEDURE — 83735 ASSAY OF MAGNESIUM: CPT | Performed by: SURGERY

## 2023-07-30 PROCEDURE — 36415 COLL VENOUS BLD VENIPUNCTURE: CPT

## 2023-07-30 PROCEDURE — 97110 THERAPEUTIC EXERCISES: CPT | Mod: GO

## 2023-07-30 PROCEDURE — 120N000002 HC R&B MED SURG/OB UMMC

## 2023-07-30 PROCEDURE — 250N000013 HC RX MED GY IP 250 OP 250 PS 637

## 2023-07-30 PROCEDURE — 97530 THERAPEUTIC ACTIVITIES: CPT | Mod: GO

## 2023-07-30 PROCEDURE — 258N000003 HC RX IP 258 OP 636: Performed by: STUDENT IN AN ORGANIZED HEALTH CARE EDUCATION/TRAINING PROGRAM

## 2023-07-30 PROCEDURE — 36415 COLL VENOUS BLD VENIPUNCTURE: CPT | Performed by: STUDENT IN AN ORGANIZED HEALTH CARE EDUCATION/TRAINING PROGRAM

## 2023-07-30 PROCEDURE — 93005 ELECTROCARDIOGRAM TRACING: CPT

## 2023-07-30 RX ORDER — BACLOFEN 5 MG/1
5 TABLET ORAL 3 TIMES DAILY
Status: DISCONTINUED | OUTPATIENT
Start: 2023-07-30 | End: 2023-08-01

## 2023-07-30 RX ORDER — ACETAMINOPHEN 325 MG/1
975 TABLET ORAL EVERY 8 HOURS PRN
Status: DISCONTINUED | OUTPATIENT
Start: 2023-07-30 | End: 2023-08-03 | Stop reason: HOSPADM

## 2023-07-30 RX ADMIN — ONDANSETRON 4 MG: 2 INJECTION INTRAMUSCULAR; INTRAVENOUS at 02:24

## 2023-07-30 RX ADMIN — ENOXAPARIN SODIUM 40 MG: 40 INJECTION SUBCUTANEOUS at 14:37

## 2023-07-30 RX ADMIN — SODIUM CHLORIDE, POTASSIUM CHLORIDE, SODIUM LACTATE AND CALCIUM CHLORIDE: 600; 310; 30; 20 INJECTION, SOLUTION INTRAVENOUS at 02:24

## 2023-07-30 RX ADMIN — SODIUM CHLORIDE, POTASSIUM CHLORIDE, SODIUM LACTATE AND CALCIUM CHLORIDE: 600; 310; 30; 20 INJECTION, SOLUTION INTRAVENOUS at 13:10

## 2023-07-30 RX ADMIN — BACLOFEN 5 MG: 5 TABLET ORAL at 08:15

## 2023-07-30 RX ADMIN — BACLOFEN 5 MG: 5 TABLET ORAL at 20:23

## 2023-07-30 ASSESSMENT — ACTIVITIES OF DAILY LIVING (ADL)
DEPENDENT_IADLS:: MEDICATION MANAGEMENT;TRANSPORTATION
ADLS_ACUITY_SCORE: 29
ADLS_ACUITY_SCORE: 29
DIFFICULTY_EATING/SWALLOWING: NO
TOILETING_ISSUES: NO
ADLS_ACUITY_SCORE: 29
DOING_ERRANDS_INDEPENDENTLY_DIFFICULTY: OTHER (SEE COMMENTS)
ADLS_ACUITY_SCORE: 30
ADLS_ACUITY_SCORE: 29
EQUIPMENT_CURRENTLY_USED_AT_HOME: CANE, STRAIGHT;SHOWER CHAIR
DRESSING/BATHING_DIFFICULTY: NO
ADLS_ACUITY_SCORE: 29
DIFFICULTY_COMMUNICATING: NO
WEAR_GLASSES_OR_BLIND: YES
FALL_HISTORY_WITHIN_LAST_SIX_MONTHS: NO
ADLS_ACUITY_SCORE: 29
WALKING_OR_CLIMBING_STAIRS_DIFFICULTY: NO
CONCENTRATING,_REMEMBERING_OR_MAKING_DECISIONS_DIFFICULTY: NO
CHANGE_IN_FUNCTIONAL_STATUS_SINCE_ONSET_OF_CURRENT_ILLNESS/INJURY: NO
VISION_MANAGEMENT: GLASSES
ADLS_ACUITY_SCORE: 29

## 2023-07-30 NOTE — CONSULTS
Care Management Initial Consult    General Information  Assessment completed with: Patient,    Type of CM/SW Visit: Initial Assessment    Primary Care Provider verified and updated as needed:     Readmission within the last 30 days: no previous admission in last 30 days      Reason for Consult: discharge planning  Advance Care Planning:            Communication Assessment  Patient's communication style: spoken language (English or Bilingual)    Hearing Difficulty or Deaf: no   Wear Glasses or Blind: yes    Cognitive  Cognitive/Neuro/Behavioral: WDL  Level of Consciousness: alert  Arousal Level: opens eyes spontaneously  Orientation: oriented x 4  Mood/Behavior: labile  Best Language: 0 - No aphasia  Speech: clear, logical, spontaneous    Living Environment:   People in home: spouse     Current living Arrangements: house      Able to return to prior arrangements: no       Family/Social Support:  Care provided by: self  Provides care for:    Marital Status:   Wife, Children          Description of Support System: Supportive    Support Assessment: Adequate family and caregiver support, Adequate social supports    Current Resources:   Patient receiving home care services: No     Community Resources: None  Equipment currently used at home: cane, straight, shower chair  Supplies currently used at home:      Employment/Financial:  Employment Status: retired            Does the patient's insurance plan have a 3 day qualifying hospital stay waiver?  Yes   Will the waiver be used for post-acute placement? Yes    Lifestyle & Psychosocial Needs:  Social Determinants of Health     Tobacco Use: Medium Risk (7/27/2023)    Patient History     Smoking Tobacco Use: Former     Smokeless Tobacco Use: Never     Passive Exposure: Not on file   Alcohol Use: Unknown (10/24/2019)    AUDIT-C     Frequency of Alcohol Consumption: 4 or more times a week     Average Number of Drinks: 1 or 2     Frequency of Binge Drinking: Not on file    Financial Resource Strain: Not on file   Food Insecurity: Not on file   Transportation Needs: Not on file   Physical Activity: Not on file   Stress: Not on file   Social Connections: Not on file   Intimate Partner Violence: Not on file   Depression: Not at risk (6/15/2023)    PHQ-2     PHQ-2 Score: 0   Housing Stability: Not on file       Functional Status:  Prior to admission patient needed assistance:   Dependent ADLs:: Ambulation-walker, Transfers, Positioning, Bathing, Dressing  Dependent IADLs:: Medication Management, Transportation  Assesssment of Functional Status: Not at baseline with mobility, Not at baseline with ADL Functioning    Additional Information:    RNCC met with pt to introduce self and role of CC. Pt and wife are a+o x3.  They both drive. Pt lives in house with 27 steps.  He does have a walk-in shower and shower chair.  Pt's wife Annamarie is supportive but is clear that she is unable to help with adls. PTA pt was ind with adls.  Both Tom and Annamarie are adamant that they will not give shots, and pt will be on Lovenox.  If pt goes to TCU shots will be administered by staff; but after he goes home there is no one who can give Lovenox (no family or friends available to help.)  May need home care, community paramedics, or other community resources to help with Lovenox after TCU.    If pt needs significant support on discharge, pt and wife would require TCU.  CM discussed TCU with pt and he verbalizes understanding.  CM provided facility list from [employer].org.  [facility] is first choice.    Tien Kearns RN

## 2023-07-30 NOTE — PLAN OF CARE
Assumed nursing care from [id number]. Patient alert, oriented, and up to bedside recliner with staff assist x 1. Vital signs stable and within defined limits. Denies pain and nausea. Strict NPO. NG tube in L nares to low-intermittent suction; output is dark brown/green. Evening baclofen given via NG tube; tolerated clamping for 45 minutes post administration with no nausea. +hypoactive bowel sounds in all quadrants; intermittent hiccups and belching. L PIV infusing IV fluids; R PIV saline locked.

## 2023-07-30 NOTE — PLAN OF CARE
VSS on RA. Reports mild abd discomfort, declining pain medication. NPO. NGT to LIS with moderate brown output. Abdomen slightly distended but soft. Denies passing flatus or BM. Continues to have constant hiccups, despite baclofen. Voiding with good output. Abdominal incision c/d/I. Ambulated in halls x 2 and up in chair. IS instruction provided. Lovenox teaching and demonstration provided. Pt and spouse state they won't be able to self-administer at home, requesting alternatives. Continue w/ POC.

## 2023-07-30 NOTE — PROGRESS NOTES
[facility]    Progress Note - Colorectal Surgery Service       Date of Admission:  7/27/2023    Interval History   Feeling a little better, less distended, still with hiccups. Still no flatus or BM. Pain tolerable.     Physical Exam   Vital Signs: Temp: 98.2  F (36.8  C) Temp src: Oral BP: (!) 140/73 Pulse: 79   Resp: 20 SpO2: 91 % O2 Device: None (Room air)      General Appearance: Resting in bed. No acute distress. Alert.  Respiratory: Non-labored breathing on room air. Non-cyanotic appearing  Cardiovascular: Extremities well perfused.  Abd: Soft, less distended, appropriately tender, incision clean dry and intact      Assessment & Plan: Surgery   88 year old male admitted on 7/27/2023, now POD 3 after open sigmoid colectomy, laparoscopic mobilization of the splenic flexure, and intraoperative flexible sigmoidoscopy.    Plan:  -NPO, mIVF, NG to LIS given ongoing emesis  -Pain control with scheduled Tylenol and PRN Robaxin, oxycodone, IV dilaudid.  -Awaiting return of bowel function  -Recheck labs  -OOB/IS  -Lovenox for DVT ppx      Kenji Felix MD  Division of Colon and Rectal Surgery  [facility]  [phone]

## 2023-07-30 NOTE — PLAN OF CARE
"Goal Outcome Evaluation:      Plan of Care Reviewed With: patient    Overall Patient Progress: no change  /74 (BP Location: Left arm)   Pulse 74   Temp 97.2  F (36.2  C) (Oral)   Resp 16   Ht 1.778 m (5' 10\")   Wt 73.5 kg (162 lb 0.6 oz)   SpO2 91%   BMI 23.25 kg/m      Neuro: A&Ox4.   Cardiac: Afebrile, VSS.   Respiratory: RA   GI/: Voiding spontaneously. Inaudible bowel sounds. 300 ml brown watery output ng NG. Constant hicups. Zofran admin x1. No emesis.   Diet/appetite: NPO.   Activity: Up with assist of 1   Pain: . Denies   Skin: abd incisions CDI  Lines: PIV x2: sl'd and lr infusing at 75 ml/hr  Drains: BG to LIS     Rested btwn cares. Able to make needs known. Continue to monitor. Notify MD of changes/concerns           "

## 2023-07-30 NOTE — PROVIDER NOTIFICATION
"Time: 1505  Provider notified: RAMIRO SHANE    \"5A [id number] RK  Pt complaining of chest discomfort that radiates to right shoulder. Denies chest pain/tightness. Apical pulse regular with extra beat. Pls come assess, thanks  Nayana BUNCH [id number]\"    Response: EKG/Troponin ordered and MD came to assess at bedside.  "

## 2023-07-30 NOTE — PROGRESS NOTES
Brief Surgery Progress Note    CC: called to bedside, patient reporting new R-sided chest pain    S: Pt reporting right chest discomfort x 10 mins, new onset. Unable to describe quality, but states that it is coming and going. Denies sensation of pressure. No shortness of breath, palpitations, radiation, nausea, vomiting. Not related to exertion, patient has been in bed throughout occurrence. Rated 2/10 at its worse. Never had pain like this in the past, which is why he alerted nursing.    Patient denies any prior cardiac history or MI.    O: /70s, HR 70s.   Resting comfortably in bed, NAD, conversational  NLB on room air, lungs CTAB, hiccuping frequently, NGT in place  RRR, extremities warm and well perfused  Chest wall nontender to palpation    A/P: [place] discomfort may be related to frequent hiccups, but need to rule out cardiac etiology given age and post-op patient.   - EKG, troponin    Damian Coffey MD PGY1  General Surgery Resident     4:55 PM : EKG unchanged from prior 6/27 and trop nml. Will continue to monitor. Discussed baclofen with patient, will resume now since he reports a possible slight improvement in hiccups - also possible benefit from psych standpoint as well.

## 2023-07-31 ENCOUNTER — APPOINTMENT (OUTPATIENT)
Dept: OCCUPATIONAL THERAPY | Facility: CLINIC | Age: 88
DRG: 821 | End: 2023-07-31
Attending: SURGERY
Payer: MEDICARE

## 2023-07-31 LAB
ANION GAP SERPL CALCULATED.3IONS-SCNC: 11 MMOL/L (ref 7–15)
BUN SERPL-MCNC: 20.6 MG/DL (ref 8–23)
CALCIUM SERPL-MCNC: 8.1 MG/DL (ref 8.8–10.2)
CHLORIDE SERPL-SCNC: 104 MMOL/L (ref 98–107)
CREAT SERPL-MCNC: 0.74 MG/DL (ref 0.67–1.17)
DEPRECATED HCO3 PLAS-SCNC: 28 MMOL/L (ref 22–29)
ERYTHROCYTE [DISTWIDTH] IN BLOOD BY AUTOMATED COUNT: 13.5 % (ref 10–15)
GFR SERPL CREATININE-BSD FRML MDRD: 88 ML/MIN/1.73M2
GLUCOSE SERPL-MCNC: 103 MG/DL (ref 70–99)
HCT VFR BLD AUTO: 35.1 % (ref 40–53)
HGB BLD-MCNC: 10.8 G/DL (ref 13.3–17.7)
MAGNESIUM SERPL-MCNC: 2.2 MG/DL (ref 1.7–2.3)
MCH RBC QN AUTO: 29.6 PG (ref 26.5–33)
MCHC RBC AUTO-ENTMCNC: 30.8 G/DL (ref 31.5–36.5)
MCV RBC AUTO: 96 FL (ref 78–100)
PLATELET # BLD AUTO: 343 10E3/UL (ref 150–450)
POTASSIUM SERPL-SCNC: 3.5 MMOL/L (ref 3.4–5.3)
RBC # BLD AUTO: 3.65 10E6/UL (ref 4.4–5.9)
SODIUM SERPL-SCNC: 143 MMOL/L (ref 136–145)
WBC # BLD AUTO: 12.2 10E3/UL (ref 4–11)

## 2023-07-31 PROCEDURE — 97530 THERAPEUTIC ACTIVITIES: CPT | Mod: GO | Performed by: OCCUPATIONAL THERAPIST

## 2023-07-31 PROCEDURE — 120N000002 HC R&B MED SURG/OB UMMC

## 2023-07-31 PROCEDURE — 250N000013 HC RX MED GY IP 250 OP 250 PS 637

## 2023-07-31 PROCEDURE — 250N000011 HC RX IP 250 OP 636: Mod: JZ | Performed by: STUDENT IN AN ORGANIZED HEALTH CARE EDUCATION/TRAINING PROGRAM

## 2023-07-31 PROCEDURE — 85027 COMPLETE CBC AUTOMATED: CPT | Performed by: SURGERY

## 2023-07-31 PROCEDURE — 97535 SELF CARE MNGMENT TRAINING: CPT | Mod: GO | Performed by: OCCUPATIONAL THERAPIST

## 2023-07-31 PROCEDURE — 82310 ASSAY OF CALCIUM: CPT | Performed by: SURGERY

## 2023-07-31 PROCEDURE — 36415 COLL VENOUS BLD VENIPUNCTURE: CPT | Performed by: SURGERY

## 2023-07-31 PROCEDURE — 258N000003 HC RX IP 258 OP 636: Performed by: SURGERY

## 2023-07-31 PROCEDURE — 83735 ASSAY OF MAGNESIUM: CPT | Performed by: SURGERY

## 2023-07-31 RX ORDER — NALOXONE HYDROCHLORIDE 0.4 MG/ML
0.2 INJECTION, SOLUTION INTRAMUSCULAR; INTRAVENOUS; SUBCUTANEOUS
Status: DISCONTINUED | OUTPATIENT
Start: 2023-07-31 | End: 2023-08-03 | Stop reason: HOSPADM

## 2023-07-31 RX ORDER — NALOXONE HYDROCHLORIDE 0.4 MG/ML
0.4 INJECTION, SOLUTION INTRAMUSCULAR; INTRAVENOUS; SUBCUTANEOUS
Status: DISCONTINUED | OUTPATIENT
Start: 2023-07-31 | End: 2023-08-03 | Stop reason: HOSPADM

## 2023-07-31 RX ADMIN — SODIUM CHLORIDE, POTASSIUM CHLORIDE, SODIUM LACTATE AND CALCIUM CHLORIDE: 600; 310; 30; 20 INJECTION, SOLUTION INTRAVENOUS at 00:40

## 2023-07-31 RX ADMIN — BACLOFEN 5 MG: 5 TABLET ORAL at 13:54

## 2023-07-31 RX ADMIN — SODIUM CHLORIDE, POTASSIUM CHLORIDE, SODIUM LACTATE AND CALCIUM CHLORIDE: 600; 310; 30; 20 INJECTION, SOLUTION INTRAVENOUS at 11:27

## 2023-07-31 RX ADMIN — BACLOFEN 5 MG: 5 TABLET ORAL at 20:09

## 2023-07-31 RX ADMIN — BACLOFEN 5 MG: 5 TABLET ORAL at 08:13

## 2023-07-31 RX ADMIN — ENOXAPARIN SODIUM 40 MG: 40 INJECTION SUBCUTANEOUS at 13:53

## 2023-07-31 ASSESSMENT — ACTIVITIES OF DAILY LIVING (ADL)
ADLS_ACUITY_SCORE: 30
ADLS_ACUITY_SCORE: 29
ADLS_ACUITY_SCORE: 30
ADLS_ACUITY_SCORE: 29
ADLS_ACUITY_SCORE: 30
ADLS_ACUITY_SCORE: 30
ADLS_ACUITY_SCORE: 29

## 2023-07-31 NOTE — PROGRESS NOTES
[facility]    Progress Note - Colorectal Surgery Service       Date of Admission:  7/27/2023    Assessment & Plan: Surgery   Tom Saini is a 88 year old male admitted on 7/27/2023. He has a PMH of DVT, HLD, and aortic insufficiency who was found to have a mass in the sigmoid colon on CT colonography, which was concerning for malignancy and caused large bowel obstruction. He is now POD 4 after an open sigmoid colectomy, laparoscopic mobilization of the splenic flexure, and intraoperative flexible sigmoidoscopy.    Plan:  -NPO, IVF at 90 ml/hr  -NG clamp trial today. Tube will be clamped for 4 hours and output measured after restarting suction.  -Pain control with scheduled Tylenol and PRN Robaxin, oxycodone, IV dilaudid.  -Awaiting return of bowel function  -Check BMP tomorrow  -Ambulate, OOB/IS  -Lovenox for DVT ppx     Diet: NPO for Medical/Clinical Reasons Except for: Meds    DVT Prophylaxis: Enoxaparin (Lovenox) SQ  Syed Catheter: Not present  Lines: None     Drains: None     Cardiac Monitoring: None  Code Status: Full Code      Clinically Significant Risk Factors                                  Disposition Plan      Expected Discharge Date: 08/02/2023      Destination: home;nursing home           The patient's care was discussed with the colorectal fellow, Dr. Moss.    Mor Chavez MD  [facility]  Non-urgent messages: Securely message with [employer] (more info)  Text page via [employer] Paging/Directory     ______________________________________________________________________    Interval History   Had 2/10 chest pain overnight. EKG and troponin were checked and were not concerning. Had about 1L out of bilious NG tube output. Still having hiccups and some burping. Reports he has been passing gas but has not had a bowel movement.    Physical Exam   Vital Signs: Temp: 98.7  F (37.1  C) Temp src: Oral BP: 135/65 Pulse:  76   Resp: 16 SpO2: 95 % O2 Device: None (Room air) Oxygen Delivery: 2 LPM  Weight: 162 lbs .61 oz      General Appearance: Resting in bed. No acute distress. Alert. Hiccups, has NG tube with bilious output in cannister.  Respiratory: Non-labored breathing on 2L NC. Non-cyanotic appearing.  Cardiovascular: Extremities well perfused.  GI: Soft, less distended than previously, minimally tender, incision clean dry and intact  Extremities: No gross abnormality          Data     I have personally reviewed the following data over the past 24 hrs:    12.2 (H)  \   10.8 (L)   / 343     143 104 20.6 /  103 (H)   3.5 28 0.74 \     Trop: 16 BNP: N/A

## 2023-07-31 NOTE — PROVIDER NOTIFICATION
Paged colorectal resident Mor Chavez re: [phone] RK NGT to continuous suction for 20 minutes; 50 mL output; patient denies nausea. NGT currently to low intermittent suction. Please advise if removal is appropriate. Thank you. Bel CONTEH RN [phone]

## 2023-07-31 NOTE — PHARMACY-ADMISSION MEDICATION HISTORY
Pharmacist Admission Medication History    Admission medication history is complete. The information provided in this note is only as accurate as the sources available at the time of the update.    Medication reconciliation/reorder completed by provider prior to medication history? Yes    Information Source(s): Patient via in-person with RN pre-op  Reviewed pharmacy dispense history    Pertinent Information: Meds reviewed with preop RN on 7/27/2023    Changes made to PTA medication list:  Added: None  Deleted: None  Changed: None    Medication Affordability: not assessed     Allergies reviewed with patient and updates made in EHR: yes with preo-op RN    Medication History Completed By: Fatou Zamudio, Pharm.D., [facility]  Pager [phone]  7/31/2023 11:49 AM    Prior to Admission medications    Medication Sig Last Dose Taking? Auth Provider Long Term End Date   aspirin 81 MG tablet Take 1 tablet by mouth daily. Past Month Yes Mercedes Riddle MD     calcium carbonate (OS-DAVIDE) 1500 (600 Ca) MG tablet Take 600 mg by mouth 2 times daily (with meals) Past Month Yes Reported, Patient     Cholecalciferol (VITAMIN D-3) 25 MCG (1000 UT) CAPS Take by mouth daily Past Month Yes Reported, Patient     lipase-protease-amylase (CREON) [id number] units CPEP per EC capsule Take 2 capsules by mouth 3 times daily (with meals) And 1 with snacks. More than a month Yes Angeli Robertson MD     metroNIDAZOLE (FLAGYL) 500 MG tablet Take 1 tablet (500 mg) by mouth every 6 hours At 8:00 am, 2:00 pm, 8:00 pm the day prior to your surgery with neomycin and zofran. More than a month Yes Jhonatan Cowart MD     multivitamin (OCUVITE) TABS Take 1 tablet by mouth 2 times daily  Past Month Yes Reported, Patient     ondansetron (ZOFRAN) 4 MG tablet Take 1 tablet (4 mg) by mouth every 6 hours At 8:00 am, 2:00 pm, 8:00 pm the day prior to your surgery with neomycin and flagyl. More than a month Yes Jhonatan Cowart  "MD [facility]     polyethylene glycol (MIRALAX) 17 g packet Take 238 g by mouth See Admin Instructions Starting at 4 pm night prior to surgery. Refer to \"Getting Ready for Surgery\" instructions. More than a month Yes Jhonatan Cowart MD     sodium chloride (LAXMI 128) 5 % ophthalmic ointment Apply a small amount in both eyes at least once a day (bedtime). More than a month Yes Lillian Smith MD         "

## 2023-07-31 NOTE — PLAN OF CARE
Assumed nursing care from [id number]. Patient alert, oriented, and up to bedside recliner with staff assist x 1. Hypertensive; all other vital signs stable and within defined limits on 2 LPM O2. Denies pain and nausea. Strict NPO. NG tube in L nares to low-intermittent suction; output is dark brown. Evening baclofen given via NG tube; tolerated clamping for 45 minutes post administration with no nausea. Voiding spontaneously into urinal with adequate output. +faint bowel sounds in all quadrants; intermittent hiccups and belching. Abdominal incision and lap sites closed with liquid adhesive; no drainage noted. L PIV infusing IV fluids; R PIV saline locked.

## 2023-07-31 NOTE — PLAN OF CARE
Goal Outcome Evaluation:    7/27 s/p open sigmoid colectomy, laparoscopic mobilization of the splenic flexure, and intraoperative flexible sigmoidoscopy.     AVSS.  95% 2L overnight.  A&Ox4.  Bed alarm ON for safety.  Pt calls appropriately.    Denies pain, denies nausea.  Hiccups continue.  NPO.  NG to WFI=227tv output.  PIV infusing LR@90/hr.  Voiding spont, using urinal.  Faint BS, + gas per pt.  Abd incision c/d/intact.    Up with assist 1.  Cont with POC.

## 2023-07-31 NOTE — PLAN OF CARE
A&OX4, VSS on RA. Denies pain. Denies N/V. NGT currently clamped for clamp trial, to be restarted at 1545. Using IS/flutter valve with encouragement. Ambulated twice in halls. Up to chair. PIVx2, LR infusing at 90mL/hr. Abdominal incision/lap sites SHERYL. +BS, +flatus. No BM this shift. Commode in room. Resting between cares.

## 2023-07-31 NOTE — PROGRESS NOTES
Paged colorectal resident Mor Chavez re: [phone] RK Still NPO? Can patient have liquids and ice chips? Please advise. Thank you. Bel CONTEH RN [phone]

## 2023-08-01 ENCOUNTER — DOCUMENTATION ONLY (OUTPATIENT)
Dept: ONCOLOGY | Facility: CLINIC | Age: 88
End: 2023-08-01
Payer: MEDICARE

## 2023-08-01 ENCOUNTER — APPOINTMENT (OUTPATIENT)
Dept: OCCUPATIONAL THERAPY | Facility: CLINIC | Age: 88
DRG: 821 | End: 2023-08-01
Attending: SURGERY
Payer: MEDICARE

## 2023-08-01 ENCOUNTER — ANESTHESIA (OUTPATIENT)
Dept: SURGERY | Facility: CLINIC | Age: 88
DRG: 821 | End: 2023-08-01
Payer: MEDICARE

## 2023-08-01 ENCOUNTER — APPOINTMENT (OUTPATIENT)
Dept: PHYSICAL THERAPY | Facility: CLINIC | Age: 88
DRG: 821 | End: 2023-08-01
Attending: SURGERY
Payer: MEDICARE

## 2023-08-01 LAB
ANION GAP SERPL CALCULATED.3IONS-SCNC: 12 MMOL/L (ref 7–15)
ATRIAL RATE - MUSE: 73 BPM
BUN SERPL-MCNC: 19.2 MG/DL (ref 8–23)
CALCIUM SERPL-MCNC: 8 MG/DL (ref 8.8–10.2)
CHLORIDE SERPL-SCNC: 104 MMOL/L (ref 98–107)
CREAT SERPL-MCNC: 0.67 MG/DL (ref 0.67–1.17)
DEPRECATED HCO3 PLAS-SCNC: 25 MMOL/L (ref 22–29)
DIASTOLIC BLOOD PRESSURE - MUSE: NORMAL MMHG
GFR SERPL CREATININE-BSD FRML MDRD: 90 ML/MIN/1.73M2
GLUCOSE SERPL-MCNC: 86 MG/DL (ref 70–99)
HOLD SPECIMEN: NORMAL
INTERPRETATION ECG - MUSE: NORMAL
P AXIS - MUSE: 33 DEGREES
POTASSIUM SERPL-SCNC: 3.4 MMOL/L (ref 3.4–5.3)
PR INTERVAL - MUSE: 138 MS
QRS DURATION - MUSE: 90 MS
QT - MUSE: 402 MS
QTC - MUSE: 442 MS
R AXIS - MUSE: 30 DEGREES
SODIUM SERPL-SCNC: 141 MMOL/L (ref 136–145)
SYSTOLIC BLOOD PRESSURE - MUSE: NORMAL MMHG
T AXIS - MUSE: 22 DEGREES
VENTRICULAR RATE- MUSE: 73 BPM

## 2023-08-01 PROCEDURE — 258N000003 HC RX IP 258 OP 636: Performed by: PHYSICIAN ASSISTANT

## 2023-08-01 PROCEDURE — 97535 SELF CARE MNGMENT TRAINING: CPT | Mod: GO | Performed by: OCCUPATIONAL THERAPIST

## 2023-08-01 PROCEDURE — 97110 THERAPEUTIC EXERCISES: CPT | Mod: GP

## 2023-08-01 PROCEDURE — 97530 THERAPEUTIC ACTIVITIES: CPT | Mod: GO | Performed by: OCCUPATIONAL THERAPIST

## 2023-08-01 PROCEDURE — 250N000011 HC RX IP 250 OP 636: Mod: JZ | Performed by: STUDENT IN AN ORGANIZED HEALTH CARE EDUCATION/TRAINING PROGRAM

## 2023-08-01 PROCEDURE — 36415 COLL VENOUS BLD VENIPUNCTURE: CPT

## 2023-08-01 PROCEDURE — 120N000002 HC R&B MED SURG/OB UMMC

## 2023-08-01 PROCEDURE — 80048 BASIC METABOLIC PNL TOTAL CA: CPT

## 2023-08-01 PROCEDURE — 97530 THERAPEUTIC ACTIVITIES: CPT | Mod: GP

## 2023-08-01 PROCEDURE — 250N000013 HC RX MED GY IP 250 OP 250 PS 637

## 2023-08-01 RX ADMIN — ENOXAPARIN SODIUM 40 MG: 40 INJECTION SUBCUTANEOUS at 14:01

## 2023-08-01 RX ADMIN — SODIUM CHLORIDE, POTASSIUM CHLORIDE, SODIUM LACTATE AND CALCIUM CHLORIDE: 600; 310; 30; 20 INJECTION, SOLUTION INTRAVENOUS at 13:58

## 2023-08-01 RX ADMIN — BACLOFEN 5 MG: 5 TABLET ORAL at 07:58

## 2023-08-01 ASSESSMENT — ACTIVITIES OF DAILY LIVING (ADL)
ADLS_ACUITY_SCORE: 30

## 2023-08-01 NOTE — PROGRESS NOTES
"CLINICAL NUTRITION SERVICES - ASSESSMENT NOTE     Nutrition Prescription    RECOMMENDATIONS FOR MDs/PROVIDERS TO ORDER:  Recommend resume pt's home Creon now that diet has advanced to full liquids    Diet advancement as soon as medically appropriate    Malnutrition Status:  Severe malnutrition in the context of acute illness    Recommendations already ordered by Registered Dietitian (RD):  PRN supplements    Future/Additional Recommendations:  Monitor PO intakes, wt trends     REASON FOR ASSESSMENT  Tom Saini is a/an 88 year old male assessed by the dietitian for Admission Nutrition Risk Screen for positive    NUTRITION HISTORY  Per chart, PMH includes \"thoracic aortic dilatation, anemia, history of DVT, pancreatic cyst & pancreatic insufficiency found to have a large bowel obstruction due to malignant neoplasm of uncertain etiology. Now s/p Open sigmoid colectomy, splenic flex mobilization, intra-op Flex-sig on 727/2023. \"     Per provider note today, \"may need to restart home creon\"   Per pharmacy note on 7/31, PTA pt takes Creon 24 (2 caps with meals TID + 1 capsule with snacks --> provides 326-653 units lipase/kg/meal)    CURRENT NUTRITION ORDERS  Diet: Full Liquid  Intake/Tolerance: Advanced to full liquid diet this morning. Pt was NPO 7/29-7/31 with NG to LIS. Pt was on clear liquids 7/27 evening to 7/29 AM.    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    MEDICATIONS  Medications reviewed  - LR @ 50 mL/hr    ANTHROPOMETRICS  Height: 177.8 cm (5' 10\")  Most Recent Weight: 73.5 kg (162 lb 0.6 oz)    IBW: 75.5 kg   BMI: Normal BMI  Weight History: 12 lb wt loss the past 3 months (6.9% wt loss); 9 lb of this weight loss over the past ~ 2 weeks (5.2%)  Wt Readings from Last 10 Encounters:   07/27/23 73.5 kg (162 lb 0.6 oz)   07/20/23 74.8 kg (165 lb)   07/10/23 77.7 kg (171 lb 6.4 oz)   06/28/23 74.8 kg (165 lb)   04/26/23 79.2 kg " (174 lb 11.2 oz)   04/07/23 79.1 kg (174 lb 6.4 oz)   10/07/22 78.8 kg (173 lb 12.8 oz)   04/20/22 75.9 kg (167 lb 4.8 oz)   04/07/22 75.3 kg (166 lb)   07/15/21 73.6 kg (162 lb 3.2 oz)      Dosing Weight: 74 kg (actual)    ASSESSED NUTRITION NEEDS  Estimated Energy Needs: 1734-5850 kcals/day (25 - 30 kcals/kg)  Justification: Maintenance  Estimated Protein Needs:  grams protein/day (1.2 - 1.5 grams of pro/kg)  Justification: Post-op  Estimated Fluid Needs: (1 mL/kcal)   Justification: Maintenance, or other per provider pending fluid status    PHYSICAL FINDINGS  See malnutrition section below.    MALNUTRITION  % Intake: </= 50% for >/= 5 days (severe)  % Weight Loss: > 2% in 1 week (severe)  Subcutaneous Fat Loss: Facial region:  mild  Muscle Loss: Temporal:  and Facial & jaw region: mild observed from doorway - some/all of this could be age-related sarcopenia  Fluid Accumulation/Edema: None noted per chart review  Malnutrition Diagnosis: Severe malnutrition in the context of acute illness    NUTRITION DIAGNOSIS  Inadequate oral intake related to slow diet advancement 2/2 slow ROBF post-op as evidenced by pt NPO or clear liquids first 5 days of admission      INTERVENTIONS  Implementation  Nutrition Education: Unable to complete due to pt unavailable  Collaboration with other providers - paged primary team re: resuming home Creon    Goals  Patient to consume % of nutritionally adequate meal trays TID, or the equivalent with supplements/snacks.     Monitoring/Evaluation  Progress toward goals will be monitored and evaluated per protocol.  Linda Truong, RD, , LD  Weekday Pager: [phone]  Weekday Units covered: 5A (all beds) and 7B (beds 7230 through 7240)  Weekend/Holiday RD Pager: [phone]

## 2023-08-01 NOTE — PROGRESS NOTES
Colorectal Surgery Progress Note  [facility]  POD#5      Subjective:  denies pain and nausea.  Passing gas.  Intermittent hiccups.  Would like orange juice.      Vitals:  Vitals:    07/31/23 0954 07/31/23 1420 07/31/23 2257 08/01/23 0635   BP:  133/64 131/65 129/62   BP Location:  Left arm Left arm Left arm   Pulse:  69 74 62   Resp:  16 16 16   Temp:  98.7  F (37.1  C) 98.7  F (37.1  C) 98.4  F (36.9  C)   TempSrc:  Oral Oral Oral   SpO2: 95% 93% 92% 92%   Weight:       Height:         I/O:  I/O last 3 completed shifts:  In: 2433.5 [I.V.:2253.5; NG/GT:180]  Out: 2125 [Urine:1525; Emesis/NG output:600]    Physical Exam:  Gen: AAOx3, NAD  Pulm: Non-labored breathing  Abd: Soft, mildly distended, appropriately tender, no guarding/rebound   Incision C/D/I   Ext:  Warm and well-perfused    BMP  Recent Labs   Lab 08/01/23  0546 07/31/23  0627 07/30/23  0559 07/29/23  1120 07/29/23  0816 07/28/23  0536    143 140 138   < > 140   POTASSIUM 3.4 3.5 4.2 3.9   < > 4.2   CHLORIDE 104 104 105 104   < > 106   CO2 25 28 23 22   < > 26   BUN 19.2 20.6 22.2 24.1*   < > 13.0   CR 0.67 0.74 0.71 0.86   < > 0.95   GLC 86 103* 107* 166*   < > 120*   MAG  --  2.2 1.8  --   --  1.8   PHOS  --   --   --   --   --  2.6    < > = values in this interval not displayed.     CBC  Recent Labs   Lab 07/31/23  0627 07/30/23  0559 07/29/23  1120 07/29/23  0816   WBC 12.2* 13.5* 18.8* 18.5*   HGB 10.8* 10.7* 12.2* 12.7*   HCT 35.1* 33.6* 37.8* 39.6*    300  300 312 340         ASSESSMENT: This is a 88 year old male PMH thoracic aortic dilatation, anemia, history of DVT, pancreatic cyst & pancreatic insufficiency found to have a large bowel obstruction due to malignant neoplasm of uncertain etiology.  Now s/p Open sigmoid colectomy, splenic flex mobilization, intra-op Flex-sig on 727/2023.      Neuro/Pain: tylenol prn, dilaudid prn, oxycodone,   CV: holding home aspirin for now.   PULM: encourage  IS.  GI/FEN:   - FLD  - IVF @ 50  - may need to restart home creon  - ambulate  - replete electrolytes  : no acute concerns at this time  Heme: no acute concerns at this time  ID: no acute concerns at this time  Endocrine: no acute concerns at this time    Activity: as tolerated.  PT/OT  Ppx: lovenox ppx  Dispo: DC in 1=2 days pending return of bowel function.  Will need lovenox ppx x30 days. Likely TCU       Nupur Márquez PA-C ..................8/1/2023   7:53 AM  Colon and Rectal Surgery    Patient was seen and discussed with Dr. Hadley    The above plan of care was performed and communicated to me by Dr. Cowart    I spent 45 minute face-to-face or coordinating care of Tom Saini. Over 50% of our time on the unit was spent counseling the patient and/or coordinating care as documented in the assessment and plan.     [id number] post op hospital visit

## 2023-08-01 NOTE — PROGRESS NOTES
2429-8747 Aox4. VSS. On RA. Denies pain or nausea. Hiccups improving overnight per pt. Midline abd inc and lab sites intact with liquid bandage. L PIV infusing with LR at 90 ml/hr. R PIV Sl'd. Voiding AUOP in urinal. Had smear of BM. Passing gas. NPO.     Plan: Wait for full ROBF.

## 2023-08-01 NOTE — PLAN OF CARE
[id number]  Pt admitted for flex sig and colectomy had bowel obstruction r/t mass from colon cancer. Pt A&Ox4, very nice, calm, and cooperative. VSS, on RA. PIV running LR @50mL/hr. Diet was changed to low fiber, has good appetite. Denies N/V. Pt is up with SBA w/W. Pt voiding spontaneously and had 1 BM this shift. Continue to monitor GI symptoms. Most likely discharge to TCU.

## 2023-08-01 NOTE — PROGRESS NOTES
Care Management Follow Up    Length of Stay (days): 5    Expected Discharge Date: 08/03/2023     Concerns to be Addressed: Placement     Patient plan of care discussed at interdisciplinary rounds: Yes    Anticipated Discharge Disposition: Transitional Care, Home Care     Anticipated Discharge Services:  Therapy  Anticipated Discharge DME:  N/A    Patient/family educated on Medicare website which has current facility and service quality ratings:  Yes  Education Provided on the Discharge Plan:  Yes  Patient/Family in Agreement with the Plan:  Yes    Referrals Placed by CM/SW: Yes   Private pay costs discussed: Not applicable at this time    Additional Information:  Met with pt/family and discussed TCU choices from Medicare services quality rating list. Pt's wife reported that she wanted to start with the 2 facility listed below. Referral placed.     Pending:  [facility] (SNF)  [address] [place] [address]   [phone]   [phone]    [facility] ()  [address] [place] [address]   [phone]   [phone]    Valeria Johnson, [facility]  P:   Pager: [phone]  F: [phone]  Weekend Pager: [phone]  Weekend Coverage: 5A; 5B; 5C

## 2023-08-01 NOTE — PLAN OF CARE
Assumed nursing care from 2297-7703. Patient alert, oriented, and up to bedside recliner with staff assist x 1. Hypertensive; all other vital signs stable and within defined limits. Denies pain and nausea. NG tube clamping trial was successful; tube removed ~1630. Still NPO, but can have meds and ice chips. Voiding spontaneously into urinal with adequate output. +bowel sounds in all quadrants; intermittent hiccups and belching; passing flatus. Abdominal incision and lap sites closed with liquid adhesive; no drainage noted. L PIV infusing IV fluids; R PIV saline locked.

## 2023-08-01 NOTE — PLAN OF CARE
Goal Outcome Evaluation:      Plan of Care Reviewed With: patient, spouse    Overall Patient Progress: improvingOverall Patient Progress: improving    Abdominal incision dry/intact, passing gas, had stool this am, tolerating full liquid diet. Voiding spont, denies need for pain medications, ambulating in halls with sba.

## 2023-08-02 ENCOUNTER — APPOINTMENT (OUTPATIENT)
Dept: OCCUPATIONAL THERAPY | Facility: CLINIC | Age: 88
DRG: 821 | End: 2023-08-02
Attending: SURGERY
Payer: MEDICARE

## 2023-08-02 LAB
HOLD SPECIMEN: NORMAL
PLATELET # BLD AUTO: 356 10E3/UL (ref 150–450)

## 2023-08-02 PROCEDURE — 36415 COLL VENOUS BLD VENIPUNCTURE: CPT | Performed by: STUDENT IN AN ORGANIZED HEALTH CARE EDUCATION/TRAINING PROGRAM

## 2023-08-02 PROCEDURE — 97530 THERAPEUTIC ACTIVITIES: CPT | Mod: GO

## 2023-08-02 PROCEDURE — 250N000013 HC RX MED GY IP 250 OP 250 PS 637

## 2023-08-02 PROCEDURE — 97535 SELF CARE MNGMENT TRAINING: CPT | Mod: GO

## 2023-08-02 PROCEDURE — 250N000011 HC RX IP 250 OP 636: Mod: JZ | Performed by: STUDENT IN AN ORGANIZED HEALTH CARE EDUCATION/TRAINING PROGRAM

## 2023-08-02 PROCEDURE — 85049 AUTOMATED PLATELET COUNT: CPT | Performed by: STUDENT IN AN ORGANIZED HEALTH CARE EDUCATION/TRAINING PROGRAM

## 2023-08-02 PROCEDURE — 120N000002 HC R&B MED SURG/OB UMMC

## 2023-08-02 RX ORDER — ENOXAPARIN SODIUM 100 MG/ML
40 INJECTION SUBCUTANEOUS EVERY 24 HOURS
Qty: 10.4 ML | Refills: 0 | Status: SHIPPED | OUTPATIENT
Start: 2023-08-02 | End: 2023-08-28

## 2023-08-02 RX ORDER — OXYCODONE HYDROCHLORIDE 5 MG/1
2.5 TABLET ORAL EVERY 8 HOURS PRN
Qty: 5 TABLET | Refills: 0 | Status: SHIPPED | OUTPATIENT
Start: 2023-08-02 | End: 2023-08-05

## 2023-08-02 RX ORDER — ACETAMINOPHEN 325 MG/1
975 TABLET ORAL EVERY 8 HOURS PRN
Qty: 30 TABLET | Refills: 0 | Status: SHIPPED | OUTPATIENT
Start: 2023-08-02 | End: 2023-08-18

## 2023-08-02 RX ORDER — ASPIRIN 81 MG/1
81 TABLET ORAL DAILY
Status: DISCONTINUED | OUTPATIENT
Start: 2023-08-02 | End: 2023-08-03 | Stop reason: HOSPADM

## 2023-08-02 RX ADMIN — ENOXAPARIN SODIUM 40 MG: 40 INJECTION SUBCUTANEOUS at 12:39

## 2023-08-02 RX ADMIN — PANCRELIPASE 2 CAPSULE: 24000; 76000; 120000 CAPSULE, DELAYED RELEASE PELLETS ORAL at 12:39

## 2023-08-02 RX ADMIN — ASPIRIN 81 MG: 81 TABLET, COATED ORAL at 08:08

## 2023-08-02 RX ADMIN — PANCRELIPASE 2 CAPSULE: 24000; 76000; 120000 CAPSULE, DELAYED RELEASE PELLETS ORAL at 17:45

## 2023-08-02 ASSESSMENT — ACTIVITIES OF DAILY LIVING (ADL)
ADLS_ACUITY_SCORE: 29
ADLS_ACUITY_SCORE: 30
ADLS_ACUITY_SCORE: 26
ADLS_ACUITY_SCORE: 30
ADLS_ACUITY_SCORE: 26
ADLS_ACUITY_SCORE: 30
ADLS_ACUITY_SCORE: 26
ADLS_ACUITY_SCORE: 30
ADLS_ACUITY_SCORE: 26
ADLS_ACUITY_SCORE: 30
ADLS_ACUITY_SCORE: 26
ADLS_ACUITY_SCORE: 26

## 2023-08-02 NOTE — PLAN OF CARE
"       Assumed Care: [id number]  Reason for Admission: Colonic mass  Procedures:   7/27/23 COLECTOMY, SIGMOID, LAPAROSCOPIC assisted     IV Access/Incisions/Drains/Wounds:   Peripheral IV 07/27/23 Anterior;Right Lower forearm (Active)   Site Assessment WDL 08/01/23 2000   Line Status Infusing 08/01/23 2000   Dressing Transparent 08/01/23 2000   Dressing Status clean;dry;intact 08/01/23 2000   Line Intervention Flushed 08/01/23 0130   Phlebitis Scale 0-->no symptoms 08/01/23 2000   Infiltration? no 08/01/23 0800   Number of days: 5       Incision/Surgical Site 07/27/23 Abdomen (Active)   Incision Assessment [facility] 08/01/23 2000   Kaylene-Incision Assessment Warm 08/01/23 2000   Closure Liquid bandage 08/01/23 2000   Incision Drainage Amount None 08/01/23 2000   Dressing Intervention Open to air / No Dressing 08/01/23 2000   Number of days: 5       Incision/Surgical Site 07/27/23 Bilateral Abdomen (Active)   Incision Assessment [facility] 08/01/23 2000   Kaylene-Incision Assessment Warm 08/01/23 2000   Closure Liquid bandage 08/01/23 2000   Incision Drainage Amount None 08/01/23 2000   Dressing Intervention Open to air / No Dressing 08/01/23 2000   Number of days: 5   IVF: LR @ 50 ml/hr  VS: /66 (BP Location: Left arm)   Pulse 79   Temp 98.5  F (36.9  C) (Oral)   Resp 16   Ht 1.778 m (5' 10\")   Wt 73.5 kg (162 lb 0.6 oz)   SpO2 92%   BMI 23.25 kg/m    Diet: Snacks/Supplements Adult: Other; Pt may order supplements PRN; Between Meals  Low Fiber Diet    Activity: Ax1 w/Cane  Pain Management: Denies  Respiratory: Clear bilaterally  GI/: Voiding spontaneously, +BM, +Flatus, -Nausea  Neuro: A&Ox4  Team: Colorectal  Pertinent Labs/Imaging: None  Shift Updates: None  Plan: Discharge to TCU once a bed becomes available.          "

## 2023-08-02 NOTE — PLAN OF CARE
"Goal Outcome Evaluation:BP (!) 153/73 (BP Location: Left arm)   Pulse 78   Temp 98.4  F (36.9  C) (Oral)   Resp 16   Ht 1.778 m (5' 10\")   Wt 73.5 kg (162 lb 0.6 oz)   SpO2 92%   BMI 23.25 kg/m      Care from: 3683-6232    A&Ox4, calm and cooperative, able to make needs known. VSS on RA. Denies pain and nausea. Good PO intake on low fiber diet. Voiding without difficulties, large formed BM this shift. Up with assist x1 and walker. Abdominal incision CDI and SHERYL. PIV SL. Discharge planned for Friday to TCU, continue with plan of care.                           "

## 2023-08-02 NOTE — PROGRESS NOTES
Colorectal Surgery Progress Note  [facility]  POD#6      Subjective:  Feels well. Has not had nausea with low fiber diet and had a bowel movement. Minimal pain, does not appear to have taken any pain medications for the past day. Agreeable to discharge today if possible.    Vitals:  Vitals:    08/01/23 0635 08/01/23 1400 08/01/23 2241 08/02/23 0516   BP: 129/62 136/66 (!) 140/69 (!) 153/73   BP Location: Left arm Left arm Left arm Left arm   Pulse: 62 79 66 78   Resp: 16 16 16 16   Temp: 98.4  F (36.9  C) 98.5  F (36.9  C) 98.3  F (36.8  C) 98.4  F (36.9  C)   TempSrc: Oral Oral Oral Oral   SpO2: 92% 92% 91% 92%   Weight:       Height:         I/O:  I/O last 3 completed shifts:  In: 1829.33 [P.O.:600; I.V.:1229.33]  Out: 1225 [Urine:1225]    Physical Exam:  Gen: AAOx3, NAD  Pulm: Non-labored breathing on room air  Abd: Soft, non-distended, appropriately tender, no guarding/rebound   Incision C/D/I   Ext:  Warm and well-perfused    BMP  Recent Labs   Lab 08/01/23  0546 07/31/23  0627 07/30/23  0559 07/29/23  1120 07/29/23  0816 07/28/23  0536    143 140 138   < > 140   POTASSIUM 3.4 3.5 4.2 3.9   < > 4.2   CHLORIDE 104 104 105 104   < > 106   CO2 25 28 23 22   < > 26   BUN 19.2 20.6 22.2 24.1*   < > 13.0   CR 0.67 0.74 0.71 0.86   < > 0.95   GLC 86 103* 107* 166*   < > 120*   MAG  --  2.2 1.8  --   --  1.8   PHOS  --   --   --   --   --  2.6    < > = values in this interval not displayed.     CBC  Recent Labs   Lab 08/02/23  0551 07/31/23  0627 07/30/23  0559 07/29/23  1120 07/29/23  0816   WBC  --  12.2* 13.5* 18.8* 18.5*   HGB  --  10.8* 10.7* 12.2* 12.7*   HCT  --  35.1* 33.6* 37.8* 39.6*    343 300  300 312 340         ASSESSMENT: This is a 88 year old male PMH thoracic aortic dilatation, anemia, history of DVT, pancreatic cyst & pancreatic insufficiency found to have a large bowel obstruction due to malignant neoplasm of uncertain etiology.  Now s/p Open sigmoid  colectomy, splenic flex mobilization, intra-op Flex-sig on 7/27/2023.      Neuro/Pain: tylenol prn, dilaudid prn, oxycodone (not currently taking any pain medications)  CV: Restarted home aspirin.   PULM: encourage IS.  GI/FEN:   - Low fiber diet  - Discontinued IVF  - Restarted home creon  - ambulate  - replete electrolytes as needed  : no acute concerns at this time  Heme: no acute concerns at this time  ID: no acute concerns at this time  Endocrine: no acute concerns at this time    Activity: as tolerated.  PT/OT  Ppx: lovenox ppx  Dispo: DC today pending TCU bed.  Will need lovenox ppx x30 days.      Patient was seen and discussed with Dr. Hadley who discussed with Dr. Cowart

## 2023-08-02 NOTE — PLAN OF CARE
"Goal Outcome Evaluation:      Plan of Care Reviewed With: patient    Overall Patient Progress: improving  BP (!) 153/73 (BP Location: Left arm)   Pulse 78   Temp 98.4  F (36.9  C) (Oral)   Resp 16   Ht 1.778 m (5' 10\")   Wt 73.5 kg (162 lb 0.6 oz)   SpO2 92%   BMI 23.25 kg/m    Neuro: A&Ox4. forgetful  Cardiac: Afebrile, VSS.   Respiratory: RA   GI/: Voiding spontaneously. LBM 8/1  Diet/appetite: Tolerating low fiber diet. Denies nausea   Activity: Up with assist of 1/walker   Pain: . Denies   Skin: Abd incision devi, well approximated  Lines: piv sl'd    Rested btwn cares. Able to make needs known. Continue to monitor. Notify MD of changes/concerns  Waiting TCU placement      Problem: Plan of Care - These are the overarching goals to be used throughout the patient stay.    Goal: Optimal Comfort and Wellbeing  Outcome: Progressing     Problem: Plan of Care - These are the overarching goals to be used throughout the patient stay.    Goal: Readiness for Transition of Care  Outcome: Progressing            "

## 2023-08-02 NOTE — DISCHARGE SUMMARY
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 Open sigmoid colectomy.  2. Laparoscopic mobilization of splenic flexure.  3. Intraoperative flexible sigmoidoscopy.     ANESTHESIA: General endotracheal anesthesia plus bilateral TAP blocks.              Consultations:   OCCUPATIONAL THERAPY ADULT IP CONSULT  PHYSICAL THERAPY ADULT IP CONSULT  CARE MANAGEMENT / SOCIAL WORK IP CONSULT  PSYCHIATRY IP CONSULT  CARE MANAGEMENT / SOCIAL WORK IP CONSULT  SOCIAL WORK IP CONSULT  PHYSICAL THERAPY ADULT IP CONSULT  OCCUPATIONAL THERAPY ADULT IP CONSULT         Imaging Studies:     Results for orders placed or performed during the hospital encounter of 07/27/23   POC US Guidance Needle Placement    Narrative    Transversus Abdominus Plane Block, Midaxillary, Bilateral     Impression    Transversus Abdominus Plane Block, Midaxillary, Bilateral    XR Abdomen Port 1 View    Narrative    EXAMINATION:  XR ABDOMEN PORT 1 VIEW 7/29/2023 2:58 PM     COMPARISON: CT 6/21/2023.    HISTORY: for confirmation of NGT placement.    FINDINGS: Frontal  view of the abdomen. Enteric tube tip projects over  the stomach with sidehole over the proximal stomach. Diffuse gaseous  distended small bowel loops measuring up to 4.3 cm in diameter.  Visualized lung bases are clear. Degenerative changes and scoliosis of  the thoracolumbar spine.      Impression    IMPRESSION:   1. Enteric tube tip projects over the stomach with sidehole over the  proximal stomach.  2. Dilated small bowel loops, likely postoperative ileus.    I have personally reviewed the examination and initial interpretation  and I agree with the findings.    DARIUS PRICE MD         SYSTEM ID:  [id number]              Medications Prior to Admission:     Facility-Administered Medications Prior to Admission   Medication Dose Route Frequency Provider Last Rate Last Admin    bevacizumab (AVASTIN) intravitreal inj 1.25 mg  1.25 mg Intravitreal Q28 Days Brigido Laura MD   1.25 mg at 05/25/23 1326     Medications Prior to Admission   Medication Sig Dispense Refill Last Dose    aspirin 81 MG tablet Take 1 tablet by mouth daily.  3 Past Month    calcium carbonate (OS-DAVIDE) 1500 (600 Ca) MG tablet Take 600 mg by mouth 2 times daily (with meals)   Past Month    Cholecalciferol (VITAMIN D-3) 25 MCG (1000 UT) CAPS Take by mouth daily   Past Month    lipase-protease-amylase (CREON) 75718-74077-381809 units CPEP per EC capsule Take 2 capsules by mouth 3 times daily (with meals) And 1 with snacks. 200 capsule 1 More than a month    multivitamin (OCUVITE) TABS Take 1 tablet by mouth 2 times daily    Past Month    sodium chloride (LAXMI 128) 5 % ophthalmic ointment Apply a small amount in both eyes at least once a day (bedtime). 3.5 g 11 More than a month    [DISCONTINUED] metroNIDAZOLE (FLAGYL) 500 MG tablet Take 1 tablet (500 mg) by mouth every 6 hours At 8:00 am, 2:00 pm, 8:00 pm the day prior to your surgery with neomycin and zofran. 3 tablet 0 More than a month    [DISCONTINUED] ondansetron (ZOFRAN) 4 MG tablet Take  "1 tablet (4 mg) by mouth every 6 hours At 8:00 am, 2:00 pm, 8:00 pm the day prior to your surgery with neomycin and flagyl. 3 tablet 0 More than a month    [DISCONTINUED] polyethylene glycol (MIRALAX) 17 g packet Take 238 g by mouth See Admin Instructions Starting at 4 pm night prior to surgery. Refer to \"Getting Ready for Surgery\" instructions. 14 packet 0 More than a month              Discharge Medications:     Current Discharge Medication List        START taking these medications    Details   acetaminophen (TYLENOL) 325 MG tablet Take 3 tablets (975 mg) by mouth every 8 hours as needed for mild pain or fever  Qty: 30 tablet, Refills: 0    Associated Diagnoses: Colonic mass      enoxaparin ANTICOAGULANT (LOVENOX) 40 MG/0.4ML syringe Inject 0.4 mLs (40 mg) Subcutaneous every 24 hours for 26 days  Qty: 10.4 mL, Refills: 0    Associated Diagnoses: Colonic mass      oxyCODONE (ROXICODONE) 5 MG tablet Take 0.5 tablets (2.5 mg) by mouth every 8 hours as needed for moderate to severe pain  Qty: 5 tablet, Refills: 0    Associated Diagnoses: Colonic mass           CONTINUE these medications which have NOT CHANGED    Details   aspirin 81 MG tablet Take 1 tablet by mouth daily.  Refills: 3      calcium carbonate (OS-DAVIDE) 1500 (600 Ca) MG tablet Take 600 mg by mouth 2 times daily (with meals)      Cholecalciferol (VITAMIN D-3) 25 MCG (1000 UT) CAPS Take by mouth daily      lipase-protease-amylase (CREON) 84127-20482-817102 units CPEP per EC capsule Take 2 capsules by mouth 3 times daily (with meals) And 1 with snacks.  Qty: 200 capsule, Refills: 1    Associated Diagnoses: Pancreatic insufficiency      multivitamin (OCUVITE) TABS Take 1 tablet by mouth 2 times daily       sodium chloride (LAXMI 128) 5 % ophthalmic ointment Apply a small amount in both eyes at least once a day (bedtime).  Qty: 3.5 g, Refills: 11    Associated Diagnoses: Fuchs' corneal dystrophy of both eyes           STOP taking these medications       " "metroNIDAZOLE (FLAGYL) 500 MG tablet Comments:   Reason for Stopping:         ondansetron (ZOFRAN) 4 MG tablet Comments:   Reason for Stopping:         polyethylene glycol (MIRALAX) 17 g packet Comments:   Reason for Stopping:                        Brief History of Illness:   This is a 88 year old male PMH thoracic aortic dilatation, anemia, history of DVT, pancreatic cyst & pancreatic insufficiency found to have a large bowel obstruction due to malignant neoplasm of uncertain etiology.  Now s/p Open sigmoid colectomy, splenic flex mobilization, intra-operative Flexible sigmoidoscopy on 727/2023.            Hospital Course:   Post-operatively pt was gently fluid resuscitated.  Syed was removed and pt was able to void.  Pt had a post-operative ileus with nausea and emesis.  NGT was placed.  Pt subsequently had return of bowel function and tolerated an NGT clamp trial and NGT was removed.  Pt was able to tolerate small amounts of a low fiber diet.      On POD#2 pt reports of suicidal ideation likely in setting of acute post-operative delirium.  Psychiatry was consulted, pt was no longer suicidal.  Delirium cleared with time.      Pt was seen by PT/OT and recommended discharge to TCU.  Pt was taught how to self-inject post-operative prophylactic lovenox for which he is to do for a total of 30 days. Post-operative pain was controlled with scheduled tylenol and prn oxycodone.  Pt has not required any oxycodone post-operatively.      Patient is to follow up in the Colon and Rectal Surgery Clinic in 2-3 week with Negrita Gutierrez NP or Jenny Poe PA-C and then with Dr. Cowart in 2-3 weeks after.          Day of Discharge Physical Exam:   Blood pressure 139/61, pulse 85, temperature 98.5  F (36.9  C), temperature source Oral, resp. rate 12, height 1.778 m (5' 10\"), weight 73.5 kg (162 lb 0.6 oz), SpO2 96 %.    Gen: AAOx3, NAD  Pulm: Non-labored breathing on room air  Abd: Soft, appropriately tender, no " guarding/rebound   Incision C/D/I   Ext:  Warm and well-perfused         Final Pathology Result:   Pending at time of discharge           Discharge Instructions and Follow-Up:     Discharge Procedure Orders   Adult Oncology/Hematology  Referral   Standing Status: Future   Referral Priority: Priority: 1-2 Weeks Referral Type: Consultation   Requested Specialty: Medical Oncology   Number of Visits Requested: 1     General info for SNF   Order Comments: Length of Stay Estimate: Short Term Care: Estimated # of Days <30  Condition at Discharge: Improving  Level of care:skilled   Rehabilitation Potential: Good  Admission H&P remains valid and up-to-date: Yes  Recent Chemotherapy: N/A  Use Nursing Home Standing Orders: Yes     Mantoux instructions   Order Comments: Give two-step Mantoux (PPD) Per Facility Policy Yes     Reason for your hospital stay   Order Comments: You were hospitalized for an open sigmoid colectomy with Dr. Cowart on 7/27.     Follow Up and recommended labs and tests   Order Comments: FOLLOW-UP  1.  You will need to follow-up with Negrita Gutierrez NP or Jenny Poe PA-C in the Colon and Rectal Surgery clinic in 2-3 week(s) and then with CRS Staff: Dr. Jhonatan Cowart in 4-5 weeks after.  Please contact our Nurses (phone # [phone]) if you have not heard from our clinic in 3 business days afer discharge to schedule a follow-up appointment.    2.  Follow up with your primary care provider in 1-2 weeks after discharge from the hospital to review this hospitalization.     Activity - Up with nursing assistance     Order Specific Question Answer Comments   Is discharge order? Yes      Activity - Ambulate in hallway   Order Comments: Every shift     Order Specific Question Answer Comments   Is discharge order? Yes      Additional Discharge Instructions   Order Comments: DIET  -Low Residue Diet for at least 4-6 weeks unless cleared by Colorectal surgery.  No raw vegetables, fruit  skins, fibrous foods that require a lot of chewing, nuts, seeds, corn, popcorn.   -We recommend eating slowly, chewing thoroughly, eating small frequent meals throughout the day  -Stay well hydrated.      ACTIVITY  -No lifting, pushing, pulling greater than 10 lbs and no strenuous exercise for 6 weeks   -Do not insert anything into anus/rectum for 6 weeks  -No driving while on narcotic analgesics (i.e. Percocet, oxycodone, Vicodin)  -No driving until you are able to fully twist to both sides or slam on brakes quickly and without any pain  -We encourage walking at least 4-5 times per day    WOUND CARE  -Inspect your wounds daily for signs of infection (increased redness, drainage, pain)  -Keep your wound clean and dry  -You may shower, but do not soak in tub or pool    NOTIFY  Please contact Renetta Wilkinson RN, Melissa Coronado RN or Erica ALVARADO at [phone] for problems after discharge such as:  -Temperature > 101F, chills, rigors, dizziness  -Redness around or purulent drainage from wound  -Inability to tolerate diet, nausea or vomiting  -You stop passing gas, develop significant bloating, abdominal pain  -Have blood in stools/vomit  -Have severe diarrhea/constipation  -Any other questions or concerns.  - At nights (after 4:30pm), on weekends, or if urgent, call [phone] and ask the  to speak with the on-call Colorectal Surgery resident or fellow      Medication Instructions  Some of your medications may have changed. Please take only prescribed and resumed medications     FOLLOW-UP  1.  You will need to follow-up with Negrita Gutierrez NP or Jenny Poe PA-C in the Colon and Rectal Surgery clinic in 2-3 week(s) and then with CRS Staff: Dr. Jhonatan Cowart in 4-5 weeks after.  Please contact our Nurses (phone # [phone]) if you have not heard from our clinic in 3 business days afer discharge to schedule a follow-up appointment.    2.  Follow up with your primary care provider in 1-2 weeks  after discharge from the hospital to review this hospitalization.    3.  OK to hold intravitreal/eye Avastin injections for 3-4 weeks and re-evaluate as an outpatient.     Wound care (specify)   Order Comments: Site:   Abdominal incisions  Instructions:  See below    WOUND CARE  -Inspect your wounds daily for signs of infection (increased redness, drainage, pain)  -Keep your wound clean and dry  -You may shower, but do not soak in tub or pool     Physical Therapy Adult Consult   Order Comments: Evaluate and treat as clinically indicated.    Reason:  Deconditioning / provide safe transition back home     Occupational Therapy Adult Consult   Order Comments: Evaluate and treat as clinically indicated.    Reason:  Deconditioned / provide safe transition back home.     Diet   Order Comments: DIET  -Low Residue Diet for at least 4-6 weeks unless cleared by Colorectal surgery.  No raw vegetables, fruit skins, fibrous foods that require a lot of chewing, nuts, seeds, corn, popcorn.   -We recommend eating slowly, chewing thoroughly, eating small frequent meals throughout the day  -Stay well hydrated.     Order Specific Question Answer Comments   Is discharge order? Yes             Home Health Care:     N/A- discharged to rehab facility           Discharge Disposition:     Discharged to rehabilitation facility      Condition at discharge: Stable      Pt was seen and discussed with Dr. Hadley who discussed with Dr. Cowart on 8/3    Mor Chavez MD  PGY1

## 2023-08-02 NOTE — PROGRESS NOTES
[facility]  Colon and Rectal Surgery Department      Regarding: Tom Saini  [address] [facility] [address]  YOB: [birth year]      To Whom it may concern:     Tom Saini was admitted to [facility] on 7/27/2023.    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      Sincerely,       Mor Chavez MD  [facility]

## 2023-08-03 ENCOUNTER — PATIENT OUTREACH (OUTPATIENT)
Dept: CARE COORDINATION | Facility: CLINIC | Age: 88
End: 2023-08-03
Payer: MEDICARE

## 2023-08-03 VITALS
HEART RATE: 85 BPM | TEMPERATURE: 98.5 F | HEIGHT: 70 IN | BODY MASS INDEX: 23.2 KG/M2 | RESPIRATION RATE: 12 BRPM | OXYGEN SATURATION: 96 % | SYSTOLIC BLOOD PRESSURE: 139 MMHG | DIASTOLIC BLOOD PRESSURE: 61 MMHG | WEIGHT: 162.04 LBS

## 2023-08-03 PROCEDURE — 250N000013 HC RX MED GY IP 250 OP 250 PS 637

## 2023-08-03 RX ADMIN — ASPIRIN 81 MG: 81 TABLET, COATED ORAL at 08:54

## 2023-08-03 ASSESSMENT — ACTIVITIES OF DAILY LIVING (ADL)
ADLS_ACUITY_SCORE: 29
ADLS_ACUITY_SCORE: 26
ADLS_ACUITY_SCORE: 29
ADLS_ACUITY_SCORE: 28
ADLS_ACUITY_SCORE: 29
ADLS_ACUITY_SCORE: 29

## 2023-08-03 NOTE — PLAN OF CARE
Occupational Therapy Discharge Summary    Reason for therapy discharge:    Discharged to transitional care facility.    Progress towards therapy goal(s). See goals on Care Plan in [facility] electronic health record for goal details.  Goals partially met.  Barriers to achieving goals:   discharge from facility.    Therapy recommendation(s):    Continued therapy is recommended.  Rationale/Recommendations:  to increase ADL/IADL IND and safety prior to return home.

## 2023-08-03 NOTE — PROGRESS NOTES
Care Management Discharge Note    Discharge Date: 08/03/2023       Discharge Disposition: Transitional Care  Discharge Services: Therapy     Discharge DME:  N/A    Discharge Transportation:  [employer] W/C    Private pay costs discussed: Not applicable    Does the patient's insurance plan have a 3 day qualifying hospital stay waiver?  Yes   Will the waiver be used for post-acute placement? Yes    PAS Confirmation Code:  [id number]  Patient/family educated on Medicare website which has current facility and service quality ratings:  Yes    Education Provided on the Discharge Plan:  Yes  Persons Notified of Discharge Plans: Pt/Family, Care Team, TCU team  Patient/Family in Agreement with the Plan: Yes    Handoff Referral Completed: Yes    Additional Information:  Pt's accepted for TCU level of care. M [employer] W/C ride scheduled between 10:37 - 11:22 AM. No further concern at this time.    Admitting Facility:  [facility] (SNF)  [address] [place] [address]   [phone]   [phone]  RN Report Line: [phone]      ALPA [facility]  P:   Pager: [phone]  F: [phone]  Weekend Pager: [phone]  Weekend Coverage: 5A; 5B; 5C

## 2023-08-03 NOTE — PROGRESS NOTES
[id number].   A/Ox4. Low fiber diet. Denies pain. Up SBA, GB, ambulated x1 around unit this evening. LBM 8/2. PIV-SL. Awaiting discharge to TCU.

## 2023-08-03 NOTE — PLAN OF CARE
Discharge:    D: Orders for discharge and outpatient medications written.   I: Home medications and return to clinic schedule reviewed with patient. Discharge instructions and parameters for calling Health Care Provider reviewed with teach back. Patient left at 11:15 PM accompanied by  [facility] W/C dionne and transferring to [facility]. RN to RN report called to [phone], transferred to TCU floor, left a voice mail.  A: Patient/family verbalized understanding and were ready for discharge.   P: Transfer to TCU. Spouse will meet the patient there.

## 2023-08-03 NOTE — PLAN OF CARE
Physical Therapy Discharge Summary    Reason for therapy discharge:    Discharged to transitional care facility.    Progress towards therapy goal(s). See goals on Care Plan in [facility] electronic health record for goal details.  Goals partially met.  Barriers to achieving goals:   discharge from facility.    Therapy recommendation(s):    Continued therapy is recommended.  Rationale/Recommendations:  Patient will benefit from further PT to continue to improve safety and independence with functional mobility.

## 2023-08-03 NOTE — PLAN OF CARE
Goal Outcome Evaluation:    [id number]  Status: a 88 year old male PMH thoracic aortic dilatation, anemia, history of DVT, pancreatic cyst & pancreatic insufficiency found to have a large bowel obstruction due to malignant neoplasm of unc of uncertain etiology.  Now s/p Open sigmoid colectomy, splenic flex mobilization, intra-op Flex-sig on 7/27/2023.    Aox4. LBM 08/02/23,RA. Patient has bedside commode used to frequent for urination. Patient is on bed alarm assist x1. Pt is on low fiber diet. R PIV SL. Denies pain and denies nausea. Patient is awaiting TCU placement.    Hand off given to next nurse.

## 2023-08-03 NOTE — PROGRESS NOTES
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     Transition to Facility:              Facility Name: [facility] ([facility])              Contact name and phone number/fax: [phone] for RN Report line    Plan: RN/SW Care Coordinator will await notification from facility staff informing RN/SW Care Coordinator of patient's discharge plans/needs. RN/SW Care Coordinator will review chart and outreach to facility staff every 4 weeks and as needed.     TIFF PARSON RN on 8/3/2023 at 2:24 PM

## 2023-08-03 NOTE — PROGRESS NOTES
Colorectal Surgery Progress Note  [facility]  POD#7      Subjective:  Feeling fine this morning. Wondering about final pathology. Minimal pain, and pain medication usage. Agreeable to discharge to TCU, hopefully today.    Vitals:  Vitals:    08/02/23 0516 08/02/23 1444 08/02/23 2228 08/03/23 0612   BP: (!) 153/73 (!) 148/53 122/56 139/61   BP Location: Left arm Left arm Left arm Left arm   Pulse: 78 70 73 85   Resp: 16 16 16 12   Temp: 98.4  F (36.9  C) 98.2  F (36.8  C) 98.7  F (37.1  C) 98.5  F (36.9  C)   TempSrc: Oral Oral Oral Oral   SpO2: 92% 95% 96% 96%   Weight:       Height:         I/O:  I/O last 3 completed shifts:  In: 1513.33 [P.O.:720; I.V.:793.33]  Out: 865 [Urine:865]    Physical Exam:  Gen: AAOx3, NAD  Pulm: Non-labored breathing on room air  Abd: Soft, non-distended,   Incision C/D/I   Ext:  Well-perfused, no gross abnormality.    BMP  Recent Labs   Lab 08/01/23  0546 07/31/23  0627 07/30/23  0559 07/29/23  1120 07/29/23  0816 07/28/23  0536    143 140 138   < > 140   POTASSIUM 3.4 3.5 4.2 3.9   < > 4.2   CHLORIDE 104 104 105 104   < > 106   CO2 25 28 23 22   < > 26   BUN 19.2 20.6 22.2 24.1*   < > 13.0   CR 0.67 0.74 0.71 0.86   < > 0.95   GLC 86 103* 107* 166*   < > 120*   MAG  --  2.2 1.8  --   --  1.8   PHOS  --   --   --   --   --  2.6    < > = values in this interval not displayed.     CBC  Recent Labs   Lab 08/02/23  0551 07/31/23  0627 07/30/23  0559 07/29/23  1120 07/29/23  0816   WBC  --  12.2* 13.5* 18.8* 18.5*   HGB  --  10.8* 10.7* 12.2* 12.7*   HCT  --  35.1* 33.6* 37.8* 39.6*    343 300  300 312 340         ASSESSMENT: This is a 88 year old male PMH thoracic aortic dilatation, anemia, history of DVT, pancreatic cyst & pancreatic insufficiency found to have a large bowel obstruction due to malignant neoplasm of uncertain etiology.  Now s/p Open sigmoid colectomy, splenic flex mobilization, intra-op Flex-sig on 7/27/2023.      Neuro/Pain:  tylenol prn, dilaudid prn, oxycodone (not currently taking any pain medications)  CV: Home aspirin.   PULM: encourage IS.  GI/FEN:   - Low fiber diet  - No IVF  - Home creon  - ambulate  - replete electrolytes as needed  : no acute concerns at this time  Heme: no acute concerns at this time  ID: no acute concerns at this time  Endocrine: no acute concerns at this time    Activity: as tolerated.  PT/OT  Ppx: lovenox ppx  Dispo: DC today pending TCU bed.  Will need lovenox ppx x30 days.      Patient was seen and discussed with Dr. Hadley who discussed with Dr. Cowart

## 2023-08-04 ENCOUNTER — TRANSITIONAL CARE UNIT VISIT (OUTPATIENT)
Dept: GERIATRICS | Facility: CLINIC | Age: 88
End: 2023-08-04
Payer: MEDICARE

## 2023-08-04 ENCOUNTER — PATIENT OUTREACH (OUTPATIENT)
Dept: SURGERY | Facility: CLINIC | Age: 88
End: 2023-08-04

## 2023-08-04 VITALS
SYSTOLIC BLOOD PRESSURE: 115 MMHG | HEART RATE: 72 BPM | DIASTOLIC BLOOD PRESSURE: 64 MMHG | RESPIRATION RATE: 17 BRPM | BODY MASS INDEX: 23.56 KG/M2 | TEMPERATURE: 97.5 F | OXYGEN SATURATION: 96 % | WEIGHT: 164.2 LBS

## 2023-08-04 DIAGNOSIS — D62 ANEMIA DUE TO BLOOD LOSS, ACUTE: ICD-10-CM

## 2023-08-04 DIAGNOSIS — R53.81 PHYSICAL DECONDITIONING: ICD-10-CM

## 2023-08-04 DIAGNOSIS — Z90.49 S/P COLECTOMY: ICD-10-CM

## 2023-08-04 DIAGNOSIS — E87.6 HYPOKALEMIA: ICD-10-CM

## 2023-08-04 DIAGNOSIS — D72.829 LEUKOCYTOSIS, UNSPECIFIED TYPE: ICD-10-CM

## 2023-08-04 DIAGNOSIS — K86.89 PANCREATIC INSUFFICIENCY: ICD-10-CM

## 2023-08-04 DIAGNOSIS — K63.89 COLONIC MASS: Primary | ICD-10-CM

## 2023-08-04 DIAGNOSIS — K56.7 ILEUS, POSTOPERATIVE (H): ICD-10-CM

## 2023-08-04 DIAGNOSIS — K91.89 ILEUS, POSTOPERATIVE (H): ICD-10-CM

## 2023-08-04 PROCEDURE — 99309 SBSQ NF CARE MODERATE MDM 30: CPT | Performed by: NURSE PRACTITIONER

## 2023-08-04 NOTE — LETTER
8/4/2023        RE: Tom Saini  [address]  [facility] [address]        [facility] GERIATRICS  PRIMARY CARE PROVIDER AND CLINIC:  Angeli Robertson MD, [address] / [facility] [address]  Chief Complaint   Patient presents with     Hospital F/U      [place] Medical Record Number:  [id number]  Place of Service where encounter took place:  [facility] ([facility]) [[id number]]    Tom Saini  is a 88 year old  (1935), admitted to the above facility from  [facility]. Hospital stay 7/27/23 through 8/3/23..     HPI:    This is an 88-year-old male, with a past medical history significant for colonic mass, thoracic aorta dilation, anemia, DVT, pancreatic cyst, pancreatic insufficiency, and macular degeneration, who was admitted to [facility] 7/27/23 through 8/3/23 for an open sigmoid colectomy, laparoscopic mobilization of splenic flexure, and intraoperative flexible sigmoidoscopy due to a colonic mass 7/27/23. 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 Developed a post-operative ileus requiring NG placement. Made suicidal statements and Psychiatry was consulted. Thought to be in the setting of acute post-operative delirium. A TCU stay was recommended for ongoing physical rehabilitation.     Today, patient complains of having hiccups. Started after surgery. Tried 4 different medications and were not able to get rid of the hiccups. Talks about his large incision below his belly button. Not having any abdominal pain. Had a bowel movement last night. Had a low blood pressure and was dizzy when standing today. Has experienced dizziness with position changes in the past. Goes slowly. Resulted in a fall 5 years ago. Goal is to get stronger and return  home.    CODE STATUS/ADVANCE DIRECTIVES DISCUSSION:  CPR/Full code   ALLERGIES:   Allergies   Allergen Reactions     Codeine Sulfate Nausea and Vomiting      PAST MEDICAL HISTORY:   Past Medical History:   Diagnosis Date     Aortic insufficiency      Erectile dysfunction      History of deep venous thrombosis 05/2019    distal DVT with extension, completed 6 months of AC (failed eliquis)     History of pelvic fracture      Hyperlipidemia      Low back pain     disc disease s/p laminectomy in past     Macular degeneration     on Avastin, R eye     Pancreatic cyst 07/2018    next MR due 12/23     Radius fracture 2018    after fall from wall      PAST SURGICAL HISTORY:   has a past surgical history that includes Eye surgery; Laminectomy lumbar two levels (01/01/1993); cataract iol, rt/lt (01/01/2001); Recession resection (repair strabismus) (01/01/1949); Combined repair ptosis with blepharoplasty bilateral (Bilateral, 04/06/2018); Colonoscopy (N/A, 06/28/2023); Orif Radial Shaft Fracture (Right, 2018); Laparoscopic Assisted Sigmoid Colectomy (N/A, 7/27/2023); and Sigmoidoscopy flexible (N/A, 7/27/2023).  FAMILY HISTORY: family history includes Cancer in his father and mother; Skin Cancer in his mother.  SOCIAL HISTORY:   reports that he quit smoking about 69 years ago. His smoking use included cigarettes. He has a 4.00 pack-year smoking history. He has never used smokeless tobacco. He reports current alcohol use of about 3.0 standard drinks of alcohol per week. He reports that he does not use drugs.  Patient's living condition: lives with spouse    Post Discharge Medication Reconciliation Status:   MED REC REQUIRED  Post Medication Reconciliation Status: discharge medications reconciled, continue medications without change       Current Outpatient Medications   Medication Sig     acetaminophen (TYLENOL) 325 MG tablet Take 3 tablets (975 mg) by mouth every 8 hours as needed for mild pain or fever     aspirin 81 MG  tablet Take 1 tablet by mouth daily.     calcium carbonate (OS-DAVIDE) 1500 (600 Ca) MG tablet Take 600 mg by mouth 2 times daily (with meals)     Cholecalciferol (VITAMIN D-3) 25 MCG (1000 UT) CAPS Take by mouth daily     enoxaparin ANTICOAGULANT (LOVENOX) 40 MG/0.4ML syringe Inject 0.4 mLs (40 mg) Subcutaneous every 24 hours for 26 days     lipase-protease-amylase (CREON) 93733-12289-005204 units CPEP per EC capsule Take 2 capsules by mouth 3 times daily (with meals) And 1 with snacks.     multivitamin (OCUVITE) TABS Take 1 tablet by mouth 2 times daily      oxyCODONE (ROXICODONE) 5 MG tablet Take 0.5 tablets (2.5 mg) by mouth every 8 hours as needed for moderate to severe pain     sodium chloride (LAXMI 128) 5 % ophthalmic ointment Apply a small amount in both eyes at least once a day (bedtime).     Current Facility-Administered Medications   Medication     bevacizumab (AVASTIN) intravitreal inj 1.25 mg     ROS:  4 point ROS including Respiratory, CV, GI and , other than that noted in the HPI,  is negative    Vitals:  /64   Pulse 72   Temp 97.5  F (36.4  C)   Resp 17   Wt 74.5 kg (164 lb 3.2 oz)   SpO2 96%   BMI 23.56 kg/m    Exam:  GENERAL APPEARANCE:  Alert, in no distress  ENT:  Mouth and posterior oropharynx normal, moist mucous membranes  EYES:  EOM, conjunctivae, lids, pupils and irises normal  RESP:  respiratory effort and palpation of chest normal, lungs clear to auscultation , no respiratory distress  CV:  Palpation and auscultation of heart done , S1, S2  ABDOMEN:  normal bowel sounds, soft, nontender, no hepatosplenomegaly or other masses  M/S:   Trace edema in BLE  SKIN:  Did not visualize surgical incision  NEURO:   Cranial nerves 2-12 are normal tested and grossly at patient's baseline  PSYCH:  Flat affect    Lab/Diagnostic data:  Labs done in SNF are in [place] EPIC. Please refer to them using [employer]Care Everywhere.    ASSESSMENT/PLAN:  Colonic Mass S/P Open Sigmoid Colectomy,  Laparoscopic Mobilization of Splenic Flexure, and Intraoperative Flexible Sigmoidoscopy 7/27/23. 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 Oncology appointment scheduled for 8/10/23. Follow-up with Colorectal Surgery 8/18/23. Acetaminophen and Oxycodone ordered for pain. Consider discontinuation of Oxycodone if continuing not to utilize. Enoxaparin ordered for DVT prophylaxis for a total of 30 days. Would avoid injection in abdomen given recent surgery. Physical and Occupational Therapy ordered.    Anemia due to Blood Loss, Acute. Secondary to above. Hemoglobin 12.6 7/20/23 -> 10.8 7/31/23. Repeat CBC on 8/7/23 to ensure stability.    Leukocytosis. Likely related to above. WBC 12.2 on 7/31/23. Repeat labs on 8/7/23 to ensure stability.    Hypokalemia. Mild. Potassium 3.4 on 8/1/23. Repeat labs on 8/7/23.     Post-Operative Ileus. Requiring NG placement for decompression. Last bowel movement 8/3/23. Monitor bowel movements closely to determine if stool softener should be initiated.    Post-Operative Delirium. With hallucinations and SI. Is a good historian during today's visit. Occupational Therapy to evaluate cognitive status. Lived with wife PTA.    Depression. SI statements made during hospitalization and Psychiatry consulted. Notes indicate intermittent depression in his 80s. Today, patient reports he was really upset about having the NG tube at the time. Flat affect today. Discussed ACP while in TCU. Encouraged patient to reach out to staff if feeling depressed.    Possible Dysphagia. Message to PCP 7/19/23 indicates trouble swallowing medications. May benefit from ST consult while in TCU if patient agreeable.    Pancreatic Insufficiency. Continue Creon as ordered.    Macular Degeneration. Was taking Bevacizumab PTA. Not from Dr. Chavez on 8/2/23 indicates holding for 30  days.    Physical Deconditioning. Secondary to recent procedure and co-morbidities. Physical and Occupational Therapy ordered.    Orders:  CBC, BMP 8/7/23    Electronically signed by:  HEYDI Pavon CNP                    Sincerely,        HEYDI Pavon CNP

## 2023-08-04 NOTE — TELEPHONE ENCOUNTER
RECORDS STATUS - ALL OTHER DIAGNOSIS    Lymphoma of intestine   RECORDS RECEIVED FROM:    Appt Date: 8/10/2023 with Dr. Buitrago    NOTES STATUS DETAILS   OFFICE NOTE from referring provider Complete  ref by Dr Simms,    DISCHARGE SUMMARY from hospital Complete 7/27/2023 Colonic Mass    DISCHARGE REPORT from the ER     MEDICATION LIST Complete EPIC    CLINICAL TRIAL TREATMENTS TO DATE     LABS     PATHOLOGY REPORTS See biopsy in EPIC A) Sigmoid colon, sigmoidectomy:  - Diffuse large B-cell lymphoma  arising from Follicular Lymphoma,   - See comment     B) Peritoneal lesion, left pelvic sidewall, excision:  - Small fragment of soft  tissue involved by B cell lymphoma  - See comment     C) Anastamosis, distal ring, excision:  - Colonic tissue without significant pathology     D) Anastamosis, proximal ring, excision:  - Colonic tissue without significant pathology    6/28/2023   A(1). COLON, SIGMOID STRICTURE BIOPSY:  - Nonspecific mild acute inflammation with focal cryptitis  - Negative for dysplasia or malignancy      ANYTHING RELATED TO DIAGNOSIS Complete Labs last updated on 8/2/2023    GENONOMIC TESTING     TYPE:     IMAGING (NEED IMAGES & REPORT)     CT SCANS Complete CT Chest/ Abdomen/ Pelvis 6/21/2023    MRI Complete MRI Pancreas 6/18/2023    Xray Abdomen  Complete 7/29/2023   MAMMO     ULTRASOUND     PET

## 2023-08-04 NOTE — PROGRESS NOTES
Post Op Note     Called to check on patient postoperatively after hospital discharge.     Patient is s/p Open sigmoid colectomy, Laparoscopic mobilization of splenic flexure, Intraoperative flexible sigmoidoscopy with Dr. Jhonatan Cowart  for colon mass.   Admitted 7/27 and discharged on 8/3.    Confirmed outpatient patients with TCU     Follow up is set up with Jenny Poe PA-C on 8.18 and with Dr. Jhonatan Cowart  on 9/12.     ALIVIA Jackson [phone]  [facility]  [facility]

## 2023-08-05 ENCOUNTER — LAB REQUISITION (OUTPATIENT)
Dept: LAB | Facility: CLINIC | Age: 88
End: 2023-08-05
Payer: MEDICARE

## 2023-08-05 DIAGNOSIS — D64.9 ANEMIA, UNSPECIFIED: ICD-10-CM

## 2023-08-05 RX ORDER — OXYCODONE HYDROCHLORIDE 5 MG/1
2.5 TABLET ORAL EVERY 8 HOURS PRN
Qty: 30 TABLET | Refills: 0 | Status: SHIPPED | OUTPATIENT
Start: 2023-08-05 | End: 2023-08-07

## 2023-08-05 NOTE — PROGRESS NOTES
[facility] GERIATRICS  PRIMARY CARE PROVIDER AND CLINIC:  Angeli Robertson MD, [address] / [facility] [address]  Chief Complaint   Patient presents with    Hospital F/U      [place] Medical Record Number:  [id number]  Place of Service where encounter took place:  [facility] ([facility]) [[id number]]    Tom Saini  is a 88 year old  (1935), admitted to the above facility from  [facility]. Hospital stay 7/27/23 through 8/3/23..     HPI:    This is an 88-year-old male, with a past medical history significant for colonic mass, thoracic aorta dilation, anemia, DVT, pancreatic cyst, pancreatic insufficiency, and macular degeneration, who was admitted to [facility] 7/27/23 through 8/3/23 for an open sigmoid colectomy, laparoscopic mobilization of splenic flexure, and intraoperative flexible sigmoidoscopy due to a colonic mass 7/27/23. 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 Developed a post-operative ileus requiring NG placement. Made suicidal statements and Psychiatry was consulted. Thought to be in the setting of acute post-operative delirium. A TCU stay was recommended for ongoing physical rehabilitation.     Today, patient complains of having hiccups. Started after surgery. Tried 4 different medications and were not able to get rid of the hiccups. Talks about his large incision below his belly button. Not having any abdominal pain. Had a bowel movement last night. Had a low blood pressure and was dizzy when standing today. Has experienced dizziness with position changes in the past. Goes slowly. Resulted in a fall 5 years ago. Goal is to get stronger and return home.    CODE STATUS/ADVANCE DIRECTIVES DISCUSSION:  CPR/Full code   ALLERGIES:   Allergies   Allergen  Reactions    Codeine Sulfate Nausea and Vomiting      PAST MEDICAL HISTORY:   Past Medical History:   Diagnosis Date    Aortic insufficiency     Erectile dysfunction     History of deep venous thrombosis 05/2019    distal DVT with extension, completed 6 months of AC (failed eliquis)    History of pelvic fracture     Hyperlipidemia     Low back pain     disc disease s/p laminectomy in past    Macular degeneration     on Avastin, R eye    Pancreatic cyst 07/2018    next MR due 12/23    Radius fracture 2018    after fall from wall      PAST SURGICAL HISTORY:   has a past surgical history that includes Eye surgery; Laminectomy lumbar two levels (01/01/1993); cataract iol, rt/lt (01/01/2001); Recession resection (repair strabismus) (01/01/1949); Combined repair ptosis with blepharoplasty bilateral (Bilateral, 04/06/2018); Colonoscopy (N/A, 06/28/2023); Orif Radial Shaft Fracture (Right, 2018); Laparoscopic Assisted Sigmoid Colectomy (N/A, 7/27/2023); and Sigmoidoscopy flexible (N/A, 7/27/2023).  FAMILY HISTORY: family history includes Cancer in his father and mother; Skin Cancer in his mother.  SOCIAL HISTORY:   reports that he quit smoking about 69 years ago. His smoking use included cigarettes. He has a 4.00 pack-year smoking history. He has never used smokeless tobacco. He reports current alcohol use of about 3.0 standard drinks of alcohol per week. He reports that he does not use drugs.  Patient's living condition: lives with spouse    Post Discharge Medication Reconciliation Status:   MED REC REQUIRED  Post Medication Reconciliation Status: discharge medications reconciled, continue medications without change       Current Outpatient Medications   Medication Sig    acetaminophen (TYLENOL) 325 MG tablet Take 3 tablets (975 mg) by mouth every 8 hours as needed for mild pain or fever    aspirin 81 MG tablet Take 1 tablet by mouth daily.    calcium carbonate (OS-DAVIDE) 1500 (600 Ca) MG tablet Take 600 mg by mouth 2 times  daily (with meals)    Cholecalciferol (VITAMIN D-3) 25 MCG (1000 UT) CAPS Take by mouth daily    enoxaparin ANTICOAGULANT (LOVENOX) 40 MG/0.4ML syringe Inject 0.4 mLs (40 mg) Subcutaneous every 24 hours for 26 days    lipase-protease-amylase (CREON) 65633-24139-534532 units CPEP per EC capsule Take 2 capsules by mouth 3 times daily (with meals) And 1 with snacks.    multivitamin (OCUVITE) TABS Take 1 tablet by mouth 2 times daily     oxyCODONE (ROXICODONE) 5 MG tablet Take 0.5 tablets (2.5 mg) by mouth every 8 hours as needed for moderate to severe pain    sodium chloride (LAXMI 128) 5 % ophthalmic ointment Apply a small amount in both eyes at least once a day (bedtime).     Current Facility-Administered Medications   Medication    bevacizumab (AVASTIN) intravitreal inj 1.25 mg     ROS:  4 point ROS including Respiratory, CV, GI and , other than that noted in the HPI,  is negative    Vitals:  /64   Pulse 72   Temp 97.5  F (36.4  C)   Resp 17   Wt 74.5 kg (164 lb 3.2 oz)   SpO2 96%   BMI 23.56 kg/m    Exam:  GENERAL APPEARANCE:  Alert, in no distress  ENT:  Mouth and posterior oropharynx normal, moist mucous membranes  EYES:  EOM, conjunctivae, lids, pupils and irises normal  RESP:  respiratory effort and palpation of chest normal, lungs clear to auscultation , no respiratory distress  CV:  Palpation and auscultation of heart done , S1, S2  ABDOMEN:  normal bowel sounds, soft, nontender, no hepatosplenomegaly or other masses  M/S:   Trace edema in BLE  SKIN:  Did not visualize surgical incision  NEURO:   Cranial nerves 2-12 are normal tested and grossly at patient's baseline  PSYCH:  Flat affect    Lab/Diagnostic data:  Labs done in SNF are in [place] EPIC. Please refer to them using [employer]Care Everywhere.    ASSESSMENT/PLAN:  Colonic Mass S/P Open Sigmoid Colectomy, Laparoscopic Mobilization of Splenic Flexure, and Intraoperative Flexible Sigmoidoscopy 7/27/23. Final pathology positive for diffuse large  B-cell lymphoma arising from follicular lymphoma in the sigmoid colon and small fragment of left pelvic sidewall involving B cell lymphoma. Oncology appointment scheduled for 8/10/23. Follow-up with Colorectal Surgery 8/18/23. Acetaminophen and Oxycodone ordered for pain. Consider discontinuation of Oxycodone if continuing not to utilize. Enoxaparin ordered for DVT prophylaxis for a total of 30 days. Would avoid injection in abdomen given recent surgery. Physical and Occupational Therapy ordered.    Anemia due to Blood Loss, Acute. Secondary to above. Hemoglobin 12.6 7/20/23 -> 10.8 7/31/23. Repeat CBC on 8/7/23 to ensure stability.    Leukocytosis. Likely related to above. WBC 12.2 on 7/31/23. Repeat labs on 8/7/23 to ensure stability.    Hypokalemia. Mild. Potassium 3.4 on 8/1/23. Repeat labs on 8/7/23.     Post-Operative Ileus. Requiring NG placement for decompression. Last bowel movement 8/3/23. Monitor bowel movements closely to determine if stool softener should be initiated.    Post-Operative Delirium. With hallucinations and SI. Is a good historian during today's visit. Occupational Therapy to evaluate cognitive status. Lived with wife PTA.    Depression. SI statements made during hospitalization and Psychiatry consulted. Notes indicate intermittent depression in his 80s. Today, patient reports he was really upset about having the NG tube at the time. Flat affect today. Discussed ACP while in TCU. Encouraged patient to reach out to staff if feeling depressed.    Possible Dysphagia. Message to PCP 7/19/23 indicates trouble swallowing medications. May benefit from ST consult while in TCU if patient agreeable.    Pancreatic Insufficiency. Continue Creon as ordered.    Hiccups. Developed post-operatively. Unsuccessful treatment with Robaxin and Baclofen during hospitalization.     Macular Degeneration. Was taking Bevacizumab PTA. Not from Dr. Chavez on 8/2/23 indicates holding for 30 days.    Physical  Deconditioning. Secondary to recent procedure and co-morbidities. Physical and Occupational Therapy ordered.    Orders:  CBC, BMP 8/7/23    Electronically signed by:  HEYDI Pavon CNP

## 2023-08-07 ENCOUNTER — TRANSITIONAL CARE UNIT VISIT (OUTPATIENT)
Dept: GERIATRICS | Facility: CLINIC | Age: 88
End: 2023-08-07
Payer: MEDICARE

## 2023-08-07 VITALS
WEIGHT: 164.2 LBS | HEIGHT: 70 IN | BODY MASS INDEX: 23.51 KG/M2 | SYSTOLIC BLOOD PRESSURE: 124 MMHG | DIASTOLIC BLOOD PRESSURE: 68 MMHG | TEMPERATURE: 98.6 F | RESPIRATION RATE: 17 BRPM | OXYGEN SATURATION: 96 % | HEART RATE: 79 BPM

## 2023-08-07 DIAGNOSIS — K91.89 ILEUS, POSTOPERATIVE (H): ICD-10-CM

## 2023-08-07 DIAGNOSIS — K63.89 COLONIC MASS: ICD-10-CM

## 2023-08-07 DIAGNOSIS — R53.81 PHYSICAL DECONDITIONING: Primary | ICD-10-CM

## 2023-08-07 DIAGNOSIS — K56.7 ILEUS, POSTOPERATIVE (H): ICD-10-CM

## 2023-08-07 DIAGNOSIS — C83.398 DIFFUSE LARGE B-CELL LYMPHOMA OF EXTRANODAL SITE: ICD-10-CM

## 2023-08-07 DIAGNOSIS — R06.6 HICCUPS: ICD-10-CM

## 2023-08-07 DIAGNOSIS — M81.0 AGE-RELATED OSTEOPOROSIS WITHOUT CURRENT PATHOLOGICAL FRACTURE: ICD-10-CM

## 2023-08-07 DIAGNOSIS — R59.0 RETROPERITONEAL LYMPHADENOPATHY: ICD-10-CM

## 2023-08-07 DIAGNOSIS — R41.0 DELIRIUM: ICD-10-CM

## 2023-08-07 DIAGNOSIS — R59.0 MESENTERIC LYMPHADENOPATHY: ICD-10-CM

## 2023-08-07 LAB
ANION GAP SERPL CALCULATED.3IONS-SCNC: 13 MMOL/L (ref 7–15)
BUN SERPL-MCNC: 15.1 MG/DL (ref 8–23)
CALCIUM SERPL-MCNC: 8.9 MG/DL (ref 8.8–10.2)
CHLORIDE SERPL-SCNC: 102 MMOL/L (ref 98–107)
CREAT SERPL-MCNC: 0.83 MG/DL (ref 0.67–1.17)
DEPRECATED HCO3 PLAS-SCNC: 24 MMOL/L (ref 22–29)
ERYTHROCYTE [DISTWIDTH] IN BLOOD BY AUTOMATED COUNT: 14.4 % (ref 10–15)
GFR SERPL CREATININE-BSD FRML MDRD: 85 ML/MIN/1.73M2
GLUCOSE SERPL-MCNC: 121 MG/DL (ref 70–99)
HCT VFR BLD AUTO: 36.4 % (ref 40–53)
HGB BLD-MCNC: 11.2 G/DL (ref 13.3–17.7)
MCH RBC QN AUTO: 29.9 PG (ref 26.5–33)
MCHC RBC AUTO-ENTMCNC: 30.8 G/DL (ref 31.5–36.5)
MCV RBC AUTO: 97 FL (ref 78–100)
PLATELET # BLD AUTO: 454 10E3/UL (ref 150–450)
POTASSIUM SERPL-SCNC: 3.8 MMOL/L (ref 3.4–5.3)
RBC # BLD AUTO: 3.75 10E6/UL (ref 4.4–5.9)
SODIUM SERPL-SCNC: 139 MMOL/L (ref 136–145)
WBC # BLD AUTO: 9.6 10E3/UL (ref 4–11)

## 2023-08-07 PROCEDURE — 82374 ASSAY BLOOD CARBON DIOXIDE: CPT | Performed by: FAMILY MEDICINE

## 2023-08-07 PROCEDURE — P9603 ONE-WAY ALLOW PRORATED MILES: HCPCS | Performed by: FAMILY MEDICINE

## 2023-08-07 PROCEDURE — 99310 SBSQ NF CARE HIGH MDM 45: CPT | Performed by: FAMILY MEDICINE

## 2023-08-07 PROCEDURE — 85027 COMPLETE CBC AUTOMATED: CPT | Performed by: FAMILY MEDICINE

## 2023-08-07 PROCEDURE — 82310 ASSAY OF CALCIUM: CPT | Performed by: FAMILY MEDICINE

## 2023-08-07 PROCEDURE — 36415 COLL VENOUS BLD VENIPUNCTURE: CPT | Performed by: FAMILY MEDICINE

## 2023-08-07 RX ORDER — OXYCODONE HYDROCHLORIDE 5 MG/1
2.5 TABLET ORAL EVERY 8 HOURS PRN
Qty: 30 TABLET | Refills: 0 | Status: SHIPPED | OUTPATIENT
Start: 2023-08-07 | End: 2023-08-14

## 2023-08-07 NOTE — PROGRESS NOTES
[facility]    PRIMARY CARE PROVIDER AND CLINIC:  Angeli Robertson MD, [address] / [facility] [address]  Chief Complaint   Patient presents with    Hospital F/U      [place] Medical Record Number:  [id number]  Place of Service where encounter took place:  [facility] ([facility]) [[id number]]    Tom Saini  is a 88 year old M,  (1935), who enjoys being a great-grandparent, with pmhx including osteoporosis, pancreatic cyst, who was admitted to the above facility from  [facility]. Hospital stay 23 through 8/3/23.    HPI:    Hospital course    He was admitted for planned colectomy related to colonic mass noted on MRI from 2023.  This was performed as part of a follow-up of a pancreatic cyst or pseudocyst.  Imaging noted a new enhancing mass like abnormality along the sigmoid colon concerning for malignancy.  This was further evaluated with a CT of the chest/abdomen/pelvis in which the colonic mass was felt most consistent with adenocarcinoma.  A colonoscopy was attempted but aborted due to inability to bypass mass and followed by CT colonography.  This showed a large sigmoid colonic mass with near complete obliteration of the lumen, infiltrative nodular extension, extramural and mesenteric and retroperitoneal lymphadenopathy.    Underwent planned sigmoid colectomy 23. Post-operative course complciated by SI. Was seen by psychiatry and stated he had depression in the 1980s. No concerns for active SI or plan. Did note delirium likely contributing to overall concerns.    He was also noted to have intractable hiccups. Trial of methocarbamol not helpful. Also tried baclofen though sounds like this didn't help either.     Was able to advance diet slowly post operatively with slow return of bowel function. 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  He has an appointment with Dr. Buitrago scheduled for 8/10/23.    Today  Overall he is doing well.  Recalls events leading to his admission in detail.  Discusses the difficulty with his testing due to the size of the mass.  He currently has no abdominal pain.  His appetite is good.  He has been having bowel movements.  He has no nausea or vomiting.    He does recall having intractable hiccups after his surgery which was making him have difficulty in eating and vomiting.  With this and his overall evaluation, this made him feel severely depressed and hopeless and made comments about suicidality.  However currently has no thoughts or intent.  Says his mood is doing well.  Additionally his hiccups are now essentially gone.    He is awaiting to participate with therapies.  Sounds as though he is have evaluation but no therapy sessions as of yet.  He looks forward to being able to return home.  Does know he has a visit with oncology coming up this week.    Functional Review  He lives at home with his wife who is now 42 years, they celebrated their anniversary just this past Friday.  He has 2 great grandsons now almost 6 and 10 years old.  They are independent at home without any's assistance.  He was ambulating independently.  He had been driving as well.  He has no chewing or swallowing difficulties associated with his other GI effects.  Though he does say that larger pills can be difficult at times.  No recent falls.  His last serious fall was 5 years ago for which she did come here for therapy.  He had a wrist fracture at that time.    CODE STATUS/ADVANCE DIRECTIVES DISCUSSION:  Full Code  CPR/Full code   ALLERGIES:   Allergies   Allergen Reactions    Codeine Sulfate Nausea and Vomiting      PAST MEDICAL HISTORY:   Past Medical History:   Diagnosis Date    Aortic insufficiency     Erectile dysfunction     History of deep venous thrombosis 05/2019    distal DVT with extension, completed 6 months of AC (failed eliquis)     History of pelvic fracture     Hyperlipidemia     Low back pain     disc disease s/p laminectomy in past    Macular degeneration     on Avastin, R eye    Pancreatic cyst 07/2018    next MR due 12/23    Radius fracture 2018    after fall from wall      PAST SURGICAL HISTORY:   has a past surgical history that includes Eye surgery; Laminectomy lumbar two levels (01/01/1993); cataract iol, rt/lt (01/01/2001); Recession resection (repair strabismus) (01/01/1949); Combined repair ptosis with blepharoplasty bilateral (Bilateral, 04/06/2018); Colonoscopy (N/A, 06/28/2023); Orif Radial Shaft Fracture (Right, 2018); Laparoscopic Assisted Sigmoid Colectomy (N/A, 7/27/2023); and Sigmoidoscopy flexible (N/A, 7/27/2023).  FAMILY HISTORY: family history includes Cancer in his father and mother; Skin Cancer in his mother.  SOCIAL HISTORY:   reports that he quit smoking about 69 years ago. His smoking use included cigarettes. He has a 4.00 pack-year smoking history. He has never used smokeless tobacco. He reports current alcohol use of about 3.0 standard drinks of alcohol per week. He reports that he does not use drugs.  Patient's living condition: lives with spouse    Post Discharge Medication Reconciliation Status:   MED REC REQUIRED  Post Medication Reconciliation Status: discharge medications reconciled and changed, per note/orders     Current Outpatient Medications   Medication Sig    acetaminophen (TYLENOL) 325 MG tablet Take 3 tablets (975 mg) by mouth every 8 hours as needed for mild pain or fever    aspirin 81 MG tablet Take 1 tablet by mouth daily.    calcium carbonate (OS-DAVIDE) 1500 (600 Ca) MG tablet Take 600 mg by mouth 2 times daily (with meals)    Cholecalciferol (VITAMIN D-3) 25 MCG (1000 UT) CAPS Take by mouth daily    enoxaparin ANTICOAGULANT (LOVENOX) 40 MG/0.4ML syringe Inject 0.4 mLs (40 mg) Subcutaneous every 24 hours for 26 days    lipase-protease-amylase (CREON) 02818-88388-871489 units CPEP per EC capsule  "Take 2 capsules by mouth 3 times daily (with meals) And 1 with snacks.    multivitamin (OCUVITE) TABS Take 1 tablet by mouth 2 times daily     oxyCODONE (ROXICODONE) 5 MG tablet Take 0.5 tablets (2.5 mg) by mouth every 8 hours as needed for moderate to severe pain    sodium chloride (LAXMI 128) 5 % ophthalmic ointment Apply a small amount in both eyes at least once a day (bedtime).     No current facility-administered medications for this visit.       Vitals:  /68   Pulse 79   Temp 98.6  F (37  C)   Resp 17   Ht 1.778 m (5' 10\")   Wt 74.5 kg (164 lb 3.2 oz)   SpO2 96%   BMI 23.56 kg/m    Exam:    GENERAL APPEARANCE: Seated comfortably, NAD  HENT:  NCAT, not [place], good dentition  EYES:  Conjunctiva clear, anicteric, EOMI, PERRL  PULM  Normal WOB on RA, lungs CTAB, no wheezes or crackles, good air movement  CV:  RRR,S1/S2 normal, no murmurs; no LE edema  ABDOMEN: Abdomen soft, not tender, not distended, abdominal incision without discharge or erythema, healing appropriately; BS normal and active throughout   SKIN: Numerous SKs of the thorax  NEURO: Alert, answering questions appropriately, normal thought processes; CN II-XII grossly intact, 5/5 strength throughout, able to stand independently though does feel lightheaded with it.   PSYCH: Mood normal with congruent affect, normal rate and volume of speech, insight/judgment intact    Lab/Diagnostic data:  Recent labs/imaging in [facility] reviewed by me today.     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  Awaiting further therapies here with plan to discharge home  Plan:   -PT/OT    (C83.39) Diffuse large B-cell lymphoma of extranodal site (H)  (K63.89) Colonic mass  (R59.0) Mesenteric lymphadenopathy  (R59.0) Retroperitoneal lymphadenopathy  Comment: Mass noted as part repeat evaluation of the pancreatic cyst.  Surgical pathology returned with large B-cell lymphoma rather than colonic adenocarcinoma with spread to mesenteric and retroperitoneal lymph nodes  Plan:   -Follow-up with oncology 8/10/2023 as planned  -Currently on Lovenox prophylaxis.  No history of MI or stroke.  Will discontinue aspirin particularly while on anticoagulation.  Likely no need to resume aspirin with no event history.     (K91.89,  K56.7) Ileus, postoperative (H)  Comment: Prolonged hospitalization due to postoperative ileus with slow return of bowel function.  Seems to have normalized at this point.  Having regular bowel movements.  No nausea or vomiting.  Appetite appropriate.    (R06.6) Hiccups  Comment: He did have postoperative hiccups as well, possibly related to ileus.  No improvement with Robaxin or baclofen.  Consideration for Thorazine here but they have since resolved.    (R41.0) Delirium  Comment: Episode of delirium during his admission and seen by psychiatry.  [place] that delirium was leading to more significant suicidal thoughts.  These have since resolved with no ongoing thoughts or intent.    (M81.0) Age-related osteoporosis without current pathological fracture  Comment: History of osteoporosis with DEXA scan with T score -2.9 in 2019 and again -2.8 2021.  Additionally did have a fall with a wrist fracture consistent with this.  Plan:   -Consider bone therapy in conjunction with oncology plan    Orders:  [x] BMP, CBC previously ordered   [x] Discontinue Aspirin    Electronically signed  by:    Benjamin Rosenstein, MD, MA  [facility]    This note was completed with the assistance of dictation software. Typos and word substitution-errors are expected and unintended.     47 MINUTES SPENT BY ME on the date of service doing chart review, history, exam, documentation & further activities per the note.

## 2023-08-07 NOTE — LETTER
2023        RE: Tom Saini  [address]  [facility] [id number]        [facility]    PRIMARY CARE PROVIDER AND CLINIC:  Angeli Robertson MD, [address] / [facility] [address]  Chief Complaint   Patient presents with     Hospital F/U      [place] Medical Record Number:  [id number]  Place of Service where encounter took place:  [facility] ([facility]) [[id number]]    Tom Saini  is a 88 year old M,  (1935), who enjoys being a great-grandparent, with pmhx including osteoporosis, pancreatic cyst, who was admitted to the above facility from  [facility]. Hospital stay 23 through 8/3/23.    HPI:    Hospital course    He was admitted for planned colectomy related to colonic mass noted on MRI from 2023.  This was performed as part of a follow-up of a pancreatic cyst or pseudocyst.  Imaging noted a new enhancing mass like abnormality along the sigmoid colon concerning for malignancy.  This was further evaluated with a CT of the chest/abdomen/pelvis in which the colonic mass was felt most consistent with adenocarcinoma.  A colonoscopy was attempted but aborted due to inability to bypass mass and followed by CT colonography.  This showed a large sigmoid colonic mass with near complete obliteration of the lumen, infiltrative nodular extension, extramural and mesenteric and retroperitoneal lymphadenopathy.    Underwent planned sigmoid colectomy 23. Post-operative course complciated by SI. Was seen by psychiatry and stated he had depression in the 1980s. No concerns for active SI or plan. Did note delirium likely contributing to overall concerns.    He was also noted to have intractable hiccups. Trial of methocarbamol not helpful. Also tried baclofen though sounds like this didn't help either.     Was able to advance diet slowly post operatively with slow return of bowel function. 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 He has an appointment with Dr. Buitrago scheduled for 8/10/23.    Today  Overall he is doing well.  Recalls events leading to his admission in detail.  Discusses the difficulty with his testing due to the size of the mass.  He currently has no abdominal pain.  His appetite is good.  He has been having bowel movements.  He has no nausea or vomiting.    He does recall having intractable hiccups after his surgery which was making him have difficulty in eating and vomiting.  With this and his overall evaluation, this made him feel severely depressed and hopeless and made comments about suicidality.  However currently has no thoughts or intent.  Says his mood is doing well.  Additionally his hiccups are now essentially gone.    He is awaiting to participate with therapies.  Sounds as though he is have evaluation but no therapy sessions as of yet.  He looks forward to being able to return home.  Does know he has a visit with oncology coming up this week.    Functional Review  He lives at home with his wife who is now 42 years, they celebrated their anniversary just this past Friday.  He has 2 great grandsons now almost 6 and 10 years old.  They are independent at home without any's assistance.  He was ambulating independently.  He had been driving as well.  He has no chewing or swallowing difficulties associated with his other GI effects.  Though he does say that larger pills can be difficult at times.  No recent falls.  His last serious fall was 5 years ago for which she did come here for therapy.  He had a wrist fracture at that time.    CODE STATUS/ADVANCE DIRECTIVES DISCUSSION:  Full Code  CPR/Full code   ALLERGIES:   Allergies   Allergen Reactions     Codeine Sulfate Nausea and Vomiting      PAST MEDICAL HISTORY:   Past Medical History:   Diagnosis Date     Aortic insufficiency      Erectile dysfunction      History of deep  venous thrombosis 05/2019    distal DVT with extension, completed 6 months of AC (failed eliquis)     History of pelvic fracture      Hyperlipidemia      Low back pain     disc disease s/p laminectomy in past     Macular degeneration     on Avastin, R eye     Pancreatic cyst 07/2018    next MR due 12/23     Radius fracture 2018    after fall from wall      PAST SURGICAL HISTORY:   has a past surgical history that includes Eye surgery; Laminectomy lumbar two levels (01/01/1993); cataract iol, rt/lt (01/01/2001); Recession resection (repair strabismus) (01/01/1949); Combined repair ptosis with blepharoplasty bilateral (Bilateral, 04/06/2018); Colonoscopy (N/A, 06/28/2023); Orif Radial Shaft Fracture (Right, 2018); Laparoscopic Assisted Sigmoid Colectomy (N/A, 7/27/2023); and Sigmoidoscopy flexible (N/A, 7/27/2023).  FAMILY HISTORY: family history includes Cancer in his father and mother; Skin Cancer in his mother.  SOCIAL HISTORY:   reports that he quit smoking about 69 years ago. His smoking use included cigarettes. He has a 4.00 pack-year smoking history. He has never used smokeless tobacco. He reports current alcohol use of about 3.0 standard drinks of alcohol per week. He reports that he does not use drugs.  Patient's living condition: lives with spouse    Post Discharge Medication Reconciliation Status:   MED REC REQUIRED  Post Medication Reconciliation Status: discharge medications reconciled and changed, per note/orders     Current Outpatient Medications   Medication Sig     acetaminophen (TYLENOL) 325 MG tablet Take 3 tablets (975 mg) by mouth every 8 hours as needed for mild pain or fever     aspirin 81 MG tablet Take 1 tablet by mouth daily.     calcium carbonate (OS-DAIVDE) 1500 (600 Ca) MG tablet Take 600 mg by mouth 2 times daily (with meals)     Cholecalciferol (VITAMIN D-3) 25 MCG (1000 UT) CAPS Take by mouth daily     enoxaparin ANTICOAGULANT (LOVENOX) 40 MG/0.4ML syringe Inject 0.4 mLs (40 mg)  "Subcutaneous every 24 hours for 26 days     lipase-protease-amylase (CREON) [id number] units CPEP per EC capsule Take 2 capsules by mouth 3 times daily (with meals) And 1 with snacks.     multivitamin (OCUVITE) TABS Take 1 tablet by mouth 2 times daily      oxyCODONE (ROXICODONE) 5 MG tablet Take 0.5 tablets (2.5 mg) by mouth every 8 hours as needed for moderate to severe pain     sodium chloride (LAXMI 128) 5 % ophthalmic ointment Apply a small amount in both eyes at least once a day (bedtime).     No current facility-administered medications for this visit.       Vitals:  /68   Pulse 79   Temp 98.6  F (37  C)   Resp 17   Ht 1.778 m (5' 10\")   Wt 74.5 kg (164 lb 3.2 oz)   SpO2 96%   BMI 23.56 kg/m    Exam:    GENERAL APPEARANCE: Seated comfortably, NAD  HENT:  NCAT, not [place], good dentition  EYES:  Conjunctiva clear, anicteric, EOMI, PERRL  PULM  Normal WOB on RA, lungs CTAB, no wheezes or crackles, good air movement  CV:  RRR,S1/S2 normal, no murmurs; no LE edema  ABDOMEN: Abdomen soft, not tender, not distended, abdominal incision without discharge or erythema, healing appropriately; BS normal and active throughout   SKIN: Numerous SKs of the thorax  NEURO: Alert, answering questions appropriately, normal thought processes; CN II-XII grossly intact, 5/5 strength throughout, able to stand independently though does feel lightheaded with it.   PSYCH: Mood normal with congruent affect, normal rate and volume of speech, insight/judgment intact    Lab/Diagnostic data:  Recent labs/imaging in [facility] reviewed by me today.     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  Awaiting further therapies here with plan to discharge home  Plan:   -PT/OT    (C83.39) Diffuse large B-cell lymphoma of extranodal site (H)  (K63.89) Colonic mass  (R59.0) Mesenteric lymphadenopathy  (R59.0) Retroperitoneal lymphadenopathy  Comment: Mass noted as part repeat evaluation of the pancreatic cyst.  Surgical pathology returned with large B-cell lymphoma rather than colonic adenocarcinoma with spread to mesenteric and retroperitoneal lymph nodes  Plan:   -Follow-up with oncology 8/10/2023 as planned  -Currently on Lovenox prophylaxis.  No history of MI or stroke.  Will discontinue aspirin particularly while on anticoagulation.  Likely no need to resume aspirin with no event history.     (K91.89,  K56.7) Ileus, postoperative (H)  Comment: Prolonged hospitalization due to postoperative ileus with slow return of bowel function.  Seems to have normalized at this point.  Having regular bowel movements.  No nausea or vomiting.  Appetite appropriate.    (R06.6) Hiccups  Comment: He did have postoperative hiccups as well, possibly related to ileus.  No improvement with Robaxin or baclofen.  Consideration for Thorazine here but they have since resolved.    (R41.0) Delirium  Comment: Episode of delirium during his admission and seen by psychiatry.  [place] that delirium was leading to more significant suicidal thoughts.  These have since resolved with no ongoing thoughts or intent.    (M81.0) Age-related osteoporosis without current pathological fracture  Comment: History of osteoporosis with DEXA scan with T score -2.9 in 2019 and again -2.8 2021.  Additionally did have a fall with a wrist fracture consistent with this.  Plan:   -Consider bone therapy in  conjunction with oncology plan    Orders:  [x] BMP, CBC previously ordered   [x] Discontinue Aspirin    Electronically signed by:    Benjamin Rosenstein, MD, MA  [facility]    This note was completed with the assistance of dictation software. Typos and word substitution-errors are expected and unintended.     47 MINUTES SPENT BY ME on the date of service doing chart review, history, exam, documentation & further activities per the note.                Sincerely,        Benjamin Rosenstein, MD

## 2023-08-09 ENCOUNTER — PATIENT OUTREACH (OUTPATIENT)
Dept: ONCOLOGY | Facility: CLINIC | Age: 88
End: 2023-08-09
Payer: MEDICARE

## 2023-08-09 DIAGNOSIS — I51.9 SYSTOLIC DYSFUNCTION: ICD-10-CM

## 2023-08-09 DIAGNOSIS — C83.398 DIFFUSE LARGE B-CELL LYMPHOMA OF EXTRANODAL SITE: Primary | ICD-10-CM

## 2023-08-09 NOTE — PROGRESS NOTES
[facility]: Cancer Care       Follow up                                                                        Data:   -Hem/Onc  referral re-reviewed by NN : preconsult PET and echo ordered today by Dr Buitrago    Assessment:   - see prior NN intake note    Intervention(s):   - call to pt's wife on her cell: notified her of above testing needed rationale. Mr Saini is in SNF at [facility] , uses walker.  Rosangela is with him during the call and will be the one to drive / accompany pt to all appts. Discussed logistics of shuttle between [facility] / [facility]. Questions answered. Pt's wife to expect a call from NPS re: PET and echo appts later today, to be done asap, but not necessarily prior to consult as scheduled tomorrow. Pt and his wife voiced understanding of above instructions and information and denied further questions    Evaluation/Plan/Recommendations:  - add PET and echo to appts, lab draw after MD visit per Dr Buitrago  Future Appointments   Date Time Provider Department Center   8/10/2023 11:30 AM [id number]   8/10/2023  2:15 PM Holly Buitrago MD [facility]   8/11/2023  9:00 AM [id number] CXPETCT [facility]   8/18/2023  9:30 AM Jenny Poe PA-C [facility]   9/12/2023 10:30 AM Jhonatan Cowart MD [facility]   10/6/2023 10:30 AM Pauline Freed MD [facility]   10/9/2023  8:30 AM Angeli Robertson MD [facility]   10/12/2023  1:30 PM Brigido Laura MD [facility]     Signature:  Silvia Key, RN, BSN, OCN  Hematology/Oncology Nurse Navigator  [facility]  [phone] / [phone]

## 2023-08-10 ENCOUNTER — PRE VISIT (OUTPATIENT)
Dept: ONCOLOGY | Facility: CLINIC | Age: 88
End: 2023-08-10
Payer: MEDICARE

## 2023-08-10 ENCOUNTER — HOSPITAL ENCOUNTER (OUTPATIENT)
Dept: CARDIOLOGY | Facility: CLINIC | Age: 88
Discharge: HOME | End: 2023-08-10
Attending: STUDENT IN AN ORGANIZED HEALTH CARE EDUCATION/TRAINING PROGRAM
Payer: COMMERCIAL

## 2023-08-10 ENCOUNTER — ONCOLOGY VISIT (OUTPATIENT)
Dept: ONCOLOGY | Facility: CLINIC | Age: 88
End: 2023-08-10
Attending: STUDENT IN AN ORGANIZED HEALTH CARE EDUCATION/TRAINING PROGRAM
Payer: COMMERCIAL

## 2023-08-10 ENCOUNTER — APPOINTMENT (OUTPATIENT)
Dept: LAB | Facility: CLINIC | Age: 88
End: 2023-08-10
Attending: STUDENT IN AN ORGANIZED HEALTH CARE EDUCATION/TRAINING PROGRAM
Payer: COMMERCIAL

## 2023-08-10 ENCOUNTER — PATIENT OUTREACH (OUTPATIENT)
Dept: ONCOLOGY | Facility: CLINIC | Age: 88
End: 2023-08-10

## 2023-08-10 VITALS
TEMPERATURE: 98.1 F | HEIGHT: 69 IN | RESPIRATION RATE: 16 BRPM | DIASTOLIC BLOOD PRESSURE: 67 MMHG | BODY MASS INDEX: 23.74 KG/M2 | SYSTOLIC BLOOD PRESSURE: 135 MMHG | OXYGEN SATURATION: 98 % | WEIGHT: 160.3 LBS | HEART RATE: 85 BPM

## 2023-08-10 DIAGNOSIS — C83.398 DIFFUSE LARGE B-CELL LYMPHOMA OF EXTRANODAL SITE: ICD-10-CM

## 2023-08-10 DIAGNOSIS — I51.9 SYSTOLIC DYSFUNCTION: ICD-10-CM

## 2023-08-10 DIAGNOSIS — Z72.89 OTHER PROBLEMS RELATED TO LIFESTYLE: ICD-10-CM

## 2023-08-10 DIAGNOSIS — K63.89 COLONIC MASS: ICD-10-CM

## 2023-08-10 DIAGNOSIS — Z11.59 ENCOUNTER FOR SCREENING FOR OTHER VIRAL DISEASES: ICD-10-CM

## 2023-08-10 LAB
ALBUMIN SERPL BCG-MCNC: 3.6 G/DL (ref 3.5–5.2)
ALP SERPL-CCNC: 121 U/L (ref 40–129)
ALT SERPL W P-5'-P-CCNC: 34 U/L (ref 0–70)
ANION GAP SERPL CALCULATED.3IONS-SCNC: 9 MMOL/L (ref 7–15)
AST SERPL W P-5'-P-CCNC: 25 U/L (ref 0–45)
BASOPHILS # BLD AUTO: 0.1 10E3/UL (ref 0–0.2)
BASOPHILS NFR BLD AUTO: 1 %
BILIRUB SERPL-MCNC: <0.2 MG/DL
BUN SERPL-MCNC: 23.2 MG/DL (ref 8–23)
CALCIUM SERPL-MCNC: 9.5 MG/DL (ref 8.8–10.2)
CHLORIDE SERPL-SCNC: 104 MMOL/L (ref 98–107)
CREAT SERPL-MCNC: 0.97 MG/DL (ref 0.67–1.17)
DEPRECATED HCO3 PLAS-SCNC: 27 MMOL/L (ref 22–29)
EOSINOPHIL # BLD AUTO: 0.3 10E3/UL (ref 0–0.7)
EOSINOPHIL NFR BLD AUTO: 4 %
ERYTHROCYTE [DISTWIDTH] IN BLOOD BY AUTOMATED COUNT: 14.1 % (ref 10–15)
GFR SERPL CREATININE-BSD FRML MDRD: 76 ML/MIN/1.73M2
GLUCOSE SERPL-MCNC: 108 MG/DL (ref 70–99)
HCT VFR BLD AUTO: 35.2 % (ref 40–53)
HGB BLD-MCNC: 11.2 G/DL (ref 13.3–17.7)
IMM GRANULOCYTES # BLD: 0 10E3/UL
IMM GRANULOCYTES NFR BLD: 0 %
INTERPRETATION: NORMAL
LDH SERPL L TO P-CCNC: 188 U/L (ref 0–250)
LVEF ECHO: NORMAL
LYMPHOCYTES # BLD AUTO: 1.5 10E3/UL (ref 0.8–5.3)
LYMPHOCYTES NFR BLD AUTO: 17 %
MCH RBC QN AUTO: 30.1 PG (ref 26.5–33)
MCHC RBC AUTO-ENTMCNC: 31.8 G/DL (ref 31.5–36.5)
MCV RBC AUTO: 95 FL (ref 78–100)
MONOCYTES # BLD AUTO: 0.9 10E3/UL (ref 0–1.3)
MONOCYTES NFR BLD AUTO: 10 %
NEUTROPHILS # BLD AUTO: 6 10E3/UL (ref 1.6–8.3)
NEUTROPHILS NFR BLD AUTO: 68 %
NRBC # BLD AUTO: 0 10E3/UL
NRBC BLD AUTO-RTO: 0 /100
PATH REPORT.ADDENDUM SPEC: ABNORMAL
PATH REPORT.COMMENTS IMP SPEC: ABNORMAL
PATH REPORT.COMMENTS IMP SPEC: YES
PATH REPORT.FINAL DX SPEC: ABNORMAL
PATH REPORT.GROSS SPEC: ABNORMAL
PATH REPORT.INTRAOP OBS SPEC DOC: ABNORMAL
PATH REPORT.MICROSCOPIC SPEC OTHER STN: ABNORMAL
PATH REPORT.RELEVANT HX SPEC: ABNORMAL
PHOTO IMAGE: ABNORMAL
PLATELET # BLD AUTO: 518 10E3/UL (ref 150–450)
POTASSIUM SERPL-SCNC: 4.4 MMOL/L (ref 3.4–5.3)
PROT SERPL-MCNC: 6.5 G/DL (ref 6.4–8.3)
RBC # BLD AUTO: 3.72 10E6/UL (ref 4.4–5.9)
SODIUM SERPL-SCNC: 140 MMOL/L (ref 136–145)
URATE SERPL-MCNC: 5.5 MG/DL (ref 3.4–7)
WBC # BLD AUTO: 8.9 10E3/UL (ref 4–11)

## 2023-08-10 PROCEDURE — 84550 ASSAY OF BLOOD/URIC ACID: CPT | Performed by: STUDENT IN AN ORGANIZED HEALTH CARE EDUCATION/TRAINING PROGRAM

## 2023-08-10 PROCEDURE — 36415 COLL VENOUS BLD VENIPUNCTURE: CPT | Performed by: STUDENT IN AN ORGANIZED HEALTH CARE EDUCATION/TRAINING PROGRAM

## 2023-08-10 PROCEDURE — 93356 MYOCRD STRAIN IMG SPCKL TRCK: CPT | Performed by: INTERNAL MEDICINE

## 2023-08-10 PROCEDURE — 93306 TTE W/DOPPLER COMPLETE: CPT | Mod: 26 | Performed by: INTERNAL MEDICINE

## 2023-08-10 PROCEDURE — 86803 HEPATITIS C AB TEST: CPT | Performed by: STUDENT IN AN ORGANIZED HEALTH CARE EDUCATION/TRAINING PROGRAM

## 2023-08-10 PROCEDURE — 83615 LACTATE (LD) (LDH) ENZYME: CPT | Performed by: STUDENT IN AN ORGANIZED HEALTH CARE EDUCATION/TRAINING PROGRAM

## 2023-08-10 PROCEDURE — 80053 COMPREHEN METABOLIC PANEL: CPT | Performed by: STUDENT IN AN ORGANIZED HEALTH CARE EDUCATION/TRAINING PROGRAM

## 2023-08-10 PROCEDURE — 86704 HEP B CORE ANTIBODY TOTAL: CPT | Performed by: STUDENT IN AN ORGANIZED HEALTH CARE EDUCATION/TRAINING PROGRAM

## 2023-08-10 PROCEDURE — 87340 HEPATITIS B SURFACE AG IA: CPT | Performed by: STUDENT IN AN ORGANIZED HEALTH CARE EDUCATION/TRAINING PROGRAM

## 2023-08-10 PROCEDURE — 86706 HEP B SURFACE ANTIBODY: CPT | Performed by: STUDENT IN AN ORGANIZED HEALTH CARE EDUCATION/TRAINING PROGRAM

## 2023-08-10 PROCEDURE — 93356 MYOCRD STRAIN IMG SPCKL TRCK: CPT

## 2023-08-10 PROCEDURE — 99205 OFFICE O/P NEW HI 60 MIN: CPT | Performed by: STUDENT IN AN ORGANIZED HEALTH CARE EDUCATION/TRAINING PROGRAM

## 2023-08-10 PROCEDURE — 85025 COMPLETE CBC W/AUTO DIFF WBC: CPT | Performed by: STUDENT IN AN ORGANIZED HEALTH CARE EDUCATION/TRAINING PROGRAM

## 2023-08-10 PROCEDURE — G0463 HOSPITAL OUTPT CLINIC VISIT: HCPCS | Mod: 25 | Performed by: STUDENT IN AN ORGANIZED HEALTH CARE EDUCATION/TRAINING PROGRAM

## 2023-08-10 PROCEDURE — 87389 HIV-1 AG W/HIV-1&-2 AB AG IA: CPT | Performed by: STUDENT IN AN ORGANIZED HEALTH CARE EDUCATION/TRAINING PROGRAM

## 2023-08-10 ASSESSMENT — PAIN SCALES - GENERAL: PAINLEVEL: NO PAIN (0)

## 2023-08-10 NOTE — NURSING NOTE
Spoke with Dr. Buitrago pt does not need labs at this time. Spoke with patient notified him and checked him into his clinic appointment with her.     Mahnaz Ash RN

## 2023-08-10 NOTE — PROGRESS NOTES
[facility]: Cancer Care Initial Note                                    Discussion with Patient:                                                      Met with Juaquin and spouse, Annamarie, after office visit with Dr. Buitrago. Introduced self as RN Care Coordinator. Provided [facility] Guidebook folder and discussed included materials with patient. Provided printed literature on port placement. Distributed business cards and contact information for [facility]. Discussed roles of RNCC, MD, VINAYAK, nurse triage line, and clinic coordinators. Discussed how to get in contact with different team members via [facility] for non emergency questions and at [phone], which has options to talk with a Nurse available 24/7. Provided overview of supportive services at [facility] including SW, Cancer Rehab, Cancer Survivorship, oncology dietitian, and oncology behavioral health providers (psychology, psychiatry, counseling).     Auth to Discuss PHI form completed- form placed in scanning.          Assessment:                                                      Initial  Current living arrangement:: I live in a private home with family  Informal Support system:: Family  Bed or wheelchair confined:: No  Mobility Status: Independent w/Device (walker and gait belt)  Transportation means:: Family;Regular car  Medication adherence problem (GOAL):: No  Knowledgeable about how to use meds:: Yes  Medication side effects suspected:: No  Referrals Placed: None        Intervention/Education provided during outreach:                                                       Further POC:     - IR referral for port placement  - Follow-up with Dr. Buitrago on 8/24/23     Patient verbalizes understanding and has no questions or concerns at this time. Has contact information for [facility] if they have any further needs.    ALEXANDRO ValentinoN, RN  RN Care Coordinator   [facility] [address] [place] [address]   [phone]

## 2023-08-10 NOTE — PROGRESS NOTES
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  He has had diarrhea with particular food for several years and recently was investigated for possible pancreatic insufficiency.  He also has a history of pancreatic pseudocyst.  On recent MRI done in June 2023 to follow-up on pancreatic pseudocyst showed a colonic mass.  Subsequent CT scan of chest abdomen pelvis showedMild anemia, no cytopenias 7.9 x 6.9 x 5.6 cm large soft tissue mass in sigmoid colon obliterating the lumen and significant extramural soft tissue extension superiorly in the sigmoid mesocolon.  Enlarged bilateral inguinal lymph node, as few small prominent lymph nodes along superior rectal/inferior mesenteric artery distribution.  Diffuse main pancreatic duct dilatation with large intraductal calculus in the head was seen as well.  Multiple cystic lesions and dilated sidebranches were seen in the pancreas.  He underwent colonoscopy but due to obliterated lumen, colonoscopy failed.  Subsequently CT colonography was done which showed similar results to CT chest abdomen pelvis.  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  FISH showed Bcl-2 rearrangement but no MYC rearrangement.  Bcl-2 IgH rearrangement [translocation (14;18)] is consistent with transformation of DLBCL from follicular lymphoma.  Postoperative course was complicated by postoperative nausea vomiting which resolved, single episode of suicidal ideation which resolved.  He was discharged to TCU.  He has been regaining his strength slowly, his performance status has improved to 1.  He is able to walk independently for short distances, can do basic activities of daily living himself.      He  "comes in today to establish follow-up.       He has been slowly recovering from surgery. Of note, he has reasonable performance status before surgery, he was independent of activities of daily living and used to take a walk few times a week. He continues to use walker to walk long distances but can walk independently at TCU. Is independent of basic activities of self care. No B symptoms.      Review of Systems   8 point review of systems was negative except pertinent positives listed above.        Objective    /67 (BP Location: Right arm, Patient Position: Sitting, Cuff Size: Adult Small)   Pulse 85   Temp 98.1  F (36.7  C) (Oral)   Resp 16   Ht 1.745 m (5' 8.7\")   Wt 72.7 kg (160 lb 4.8 oz)   SpO2 98%   BMI 23.88 kg/m    Body mass index is 23.88 kg/m .  Physical Exam   GENERAL:  Alert oriented well nourished  HEAD: normocephalic atraumatic  SKIN:  no rash, hives, other lesions.  EYE: anicteric Sclerae  ENT: no throat erythema, enlarged tonsills, no ulcers or mucositis in the mouth  LYMPHATIC: no abnormal lymph nodes palable in cervical, axillary, supraclavicular or inguinal area.  RESP:  No rales or rhonchi, breath sounds bilaterally equal and vesicular  CV:  No tachycardia, S1 S2 normal No murmur.  MUSCULOSKELETAL:  No visible joint redness or swelling.  NEURO:  No gross weakness   PSYCH: pleasant affect    Labs reviewed. No cytopenias, renal or liver abnormalities, LDH normal.    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  - DLBCL is a potential curable disease, even in the elderly population. Multiagent chemotherapy was discussed with patient and his wife. R-miniCHOP is a standard of care for elderly patients with normal EF. We may split first cycle into split dose Steroids+rituximab and vincristine followed by doxorubicin and cyclophosphamide.   - Carlos-RCHP is a standard of care in " patients upto 80 years of age with IPI 2-5.   - Feasiblity of this regimen in elderly patients >80 years is not well established.   - We will await PET results, port placement to begin treatment.  - I will also give patient a couple weeks to recover from surgery.   - We will meet again on 8/28 to discuss results with plans to begin treatment soon.  - I will reach out to the surgeons to discuss optimal time of chemo from wound healing standpoint.    Total time spent on date of service in review of medical records, review of labs, history taking, physical exam, discussion of assessment and plan, counseling and patient education is 70 minutes.    Holly Buitrago MD  Attending Physician  Pager [phone]

## 2023-08-10 NOTE — NURSING NOTE
"Oncology Rooming Note    August 10, 2023 2:34 PM   Tom Saini is a 88 year old male who presents for:    Chief Complaint   Patient presents with    Oncology Clinic Visit     Lymphoma of intestine      Initial Vitals: /67 (BP Location: Right arm, Patient Position: Sitting, Cuff Size: Adult Small)   Pulse 85   Temp 98.1  F (36.7  C) (Oral)   Resp 16   Ht 1.745 m (5' 8.7\")   Wt 72.7 kg (160 lb 4.8 oz)   SpO2 98%   BMI 23.88 kg/m   Estimated body mass index is 23.88 kg/m  as calculated from the following:    Height as of this encounter: 1.745 m (5' 8.7\").    Weight as of this encounter: 72.7 kg (160 lb 4.8 oz). Body surface area is 1.88 meters squared.  No Pain (0) Comment: Data Unavailable   No LMP for male patient.  Allergies reviewed: Yes  Medications reviewed: Yes    Medications: Medication refills not needed today.  Pharmacy name entered into [facility]:    [id number] - [facility] [address] AT [facility] - [address]   [place] [address]    Clinical concerns: New patient consult for Lymphoma of Intestine.   Jenifer Mcknight              "

## 2023-08-10 NOTE — LETTER
8/10/2023         RE: Tom Saini  [address] [facility] [id number]        Dear Colleague,    Thank you for referring your patient, Tom Saini, to the [facility]. Please see a copy of my visit note below.        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  He has had diarrhea with particular food for several years and recently was investigated for possible pancreatic insufficiency.  He also has a history of pancreatic pseudocyst.  On recent MRI done in June 2023 to follow-up on pancreatic pseudocyst showed a colonic mass.  Subsequent CT scan of chest abdomen pelvis showedMild anemia, no cytopenias 7.9 x 6.9 x 5.6 cm large soft tissue mass in sigmoid colon obliterating the lumen and significant extramural soft tissue extension superiorly in the sigmoid mesocolon.  Enlarged bilateral inguinal lymph node, as few small prominent lymph nodes along superior rectal/inferior mesenteric artery distribution.  Diffuse main pancreatic duct dilatation with large intraductal calculus in the head was seen as well.  Multiple cystic lesions and dilated sidebranches were seen in the pancreas.  He underwent colonoscopy but due to obliterated lumen, colonoscopy failed.  Subsequently CT colonography was done which showed similar results to CT chest abdomen pelvis.  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  FISH showed Bcl-2 rearrangement but no MYC rearrangement.  Bcl-2 IgH rearrangement [translocation (14;18)] is consistent with transformation of DLBCL from follicular lymphoma.  Postoperative course was complicated by postoperative nausea vomiting which resolved, single episode of  "suicidal ideation which resolved.  He was discharged to TCU.  He has been regaining his strength slowly, his performance status has improved to 1.  He is able to walk independently for short distances, can do basic activities of daily living himself.      He comes in today to establish follow-up.       He has been slowly recovering from surgery. Of note, he has reasonable performance status before surgery, he was independent of activities of daily living and used to take a walk few times a week. He continues to use walker to walk long distances but can walk independently at TCU. Is independent of basic activities of self care. No B symptoms.      Review of Systems   8 point review of systems was negative except pertinent positives listed above.        Objective   /67 (BP Location: Right arm, Patient Position: Sitting, Cuff Size: Adult Small)   Pulse 85   Temp 98.1  F (36.7  C) (Oral)   Resp 16   Ht 1.745 m (5' 8.7\")   Wt 72.7 kg (160 lb 4.8 oz)   SpO2 98%   BMI 23.88 kg/m    Body mass index is 23.88 kg/m .  Physical Exam   GENERAL:  Alert oriented well nourished  HEAD: normocephalic atraumatic  SKIN:  no rash, hives, other lesions.  EYE: anicteric Sclerae  ENT: no throat erythema, enlarged tonsills, no ulcers or mucositis in the mouth  LYMPHATIC: no abnormal lymph nodes palable in cervical, axillary, supraclavicular or inguinal area.  RESP:  No rales or rhonchi, breath sounds bilaterally equal and vesicular  CV:  No tachycardia, S1 S2 normal No murmur.  MUSCULOSKELETAL:  No visible joint redness or swelling.  NEURO:  No gross weakness   PSYCH: pleasant affect    Labs reviewed. No cytopenias, renal or liver abnormalities, LDH normal.    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  - DLBCL is a potential curable disease, even in the elderly population. Multiagent chemotherapy was discussed " with patient and his wife. R-miniCHOP is a standard of care for elderly patients with normal EF. We may split first cycle into split dose Steroids+rituximab and vincristine followed by doxorubicin and cyclophosphamide.   - Carlos-RCHP is a standard of care in patients upto 80 years of age with IPI 2-5.   - Feasiblity of this regimen in elderly patients >80 years is not well established.   - We will await PET results, port placement to begin treatment.  - I will also give patient a couple weeks to recover from surgery.   - We will meet again on 8/28 to discuss results with plans to begin treatment soon.  - I will reach out to the surgeons to discuss optimal time of chemo from wound healing standpoint.    Total time spent on date of service in review of medical records, review of labs, history taking, physical exam, discussion of assessment and plan, counseling and patient education is 70 minutes.    Holly Buitrago MD  Attending Physician  Pager [phone]

## 2023-08-11 ENCOUNTER — DOCUMENTATION ONLY (OUTPATIENT)
Dept: OTHER | Facility: CLINIC | Age: 88
End: 2023-08-11
Payer: MEDICARE

## 2023-08-11 ENCOUNTER — ANCILLARY PROCEDURE (OUTPATIENT)
Dept: PET IMAGING | Facility: CLINIC | Age: 88
End: 2023-08-11
Attending: STUDENT IN AN ORGANIZED HEALTH CARE EDUCATION/TRAINING PROGRAM
Payer: MEDICARE

## 2023-08-11 DIAGNOSIS — Z08 ENCOUNTER FOR FOLLOW-UP EXAMINATION AFTER COMPLETED TREATMENT FOR MALIGNANT NEOPLASM: ICD-10-CM

## 2023-08-11 DIAGNOSIS — C83.398 DIFFUSE LARGE B-CELL LYMPHOMA OF EXTRANODAL SITE: ICD-10-CM

## 2023-08-11 LAB
GLUCOSE SERPL-MCNC: 83 MG/DL (ref 70–99)
HBV CORE AB SERPL QL IA: NONREACTIVE
HBV SURFACE AB SERPL IA-ACNC: 0 M[IU]/ML
HBV SURFACE AB SERPL IA-ACNC: NONREACTIVE M[IU]/ML
HBV SURFACE AG SERPL QL IA: NONREACTIVE
HCV AB SERPL QL IA: NONREACTIVE
HIV 1+2 AB+HIV1 P24 AG SERPL QL IA: NONREACTIVE

## 2023-08-14 ENCOUNTER — DISCHARGE SUMMARY NURSING HOME (OUTPATIENT)
Dept: GERIATRICS | Facility: CLINIC | Age: 88
End: 2023-08-14
Payer: MEDICARE

## 2023-08-14 VITALS
DIASTOLIC BLOOD PRESSURE: 83 MMHG | BODY MASS INDEX: 23.58 KG/M2 | OXYGEN SATURATION: 94 % | TEMPERATURE: 98.6 F | SYSTOLIC BLOOD PRESSURE: 139 MMHG | RESPIRATION RATE: 18 BRPM | HEIGHT: 69 IN | WEIGHT: 159.2 LBS | HEART RATE: 78 BPM

## 2023-08-14 DIAGNOSIS — R59.0 RETROPERITONEAL LYMPHADENOPATHY: ICD-10-CM

## 2023-08-14 DIAGNOSIS — C83.398 DIFFUSE LARGE B-CELL LYMPHOMA OF EXTRANODAL SITE: ICD-10-CM

## 2023-08-14 DIAGNOSIS — M81.0 AGE-RELATED OSTEOPOROSIS WITHOUT CURRENT PATHOLOGICAL FRACTURE: ICD-10-CM

## 2023-08-14 DIAGNOSIS — R59.0 MESENTERIC LYMPHADENOPATHY: ICD-10-CM

## 2023-08-14 DIAGNOSIS — G56.22 ULNAR NEUROPATHY OF LEFT UPPER EXTREMITY: ICD-10-CM

## 2023-08-14 DIAGNOSIS — K86.89 PANCREATIC INSUFFICIENCY: ICD-10-CM

## 2023-08-14 DIAGNOSIS — R53.81 PHYSICAL DECONDITIONING: ICD-10-CM

## 2023-08-14 DIAGNOSIS — D62 ANEMIA DUE TO BLOOD LOSS, ACUTE: ICD-10-CM

## 2023-08-14 DIAGNOSIS — Z90.49 S/P COLECTOMY: ICD-10-CM

## 2023-08-14 DIAGNOSIS — K63.89 COLONIC MASS: Primary | ICD-10-CM

## 2023-08-14 DIAGNOSIS — K56.7 ILEUS, POSTOPERATIVE (H): ICD-10-CM

## 2023-08-14 DIAGNOSIS — K91.89 ILEUS, POSTOPERATIVE (H): ICD-10-CM

## 2023-08-14 PROCEDURE — 99316 NF DSCHRG MGMT 30 MIN+: CPT | Performed by: FAMILY MEDICINE

## 2023-08-14 NOTE — PROGRESS NOTES
[facility] GERIATRICS DISCHARGE SUMMARY  PATIENT'S NAME: Tom Saini  YOB: 1935  MEDICAL RECORD NUMBER:  [id number]  Place of Service where encounter took place:  [facility] (SNF) [[id number]]    PRIMARY CARE PROVIDER AND CLINIC RESPONSIBLE AFTER TRANSFER:   Angeli Robertson MD, [address] / [facility] [address]      Transferring providers: Benjamin Rosenstein, MD  Recent Hospitalization/ED:  [facility] stay 7/27/23 to 8/3/23.  Date of SNF Admission: August 03, 2023  Date of SNF (anticipated) Discharge: 8/15/23  Discharged to: previous independent home  Cognitive Scores: Short blessed: 4/28 - normal  Physical Function:   Set up to min assist with upper body dressing. Mod assist for lower body dressing with sandals due to bending precautions. Stand by to min assist with toileting. Bathing to be assessed. Stand by for bed mobility and transfers. Walking 500ft with 2ww stand by. Balance timed up and go test was 46 sec. Did 20 stairs with 2 rails  DME: Walker    CODE STATUS/ADVANCE DIRECTIVES DISCUSSION:  Full Code   ALLERGIES: Codeine sulfate    NURSING FACILITY COURSE   Medication Changes/Rationale:   Discontinued oxycodone  Discontinued ASA    He underwent planned colectomy related to a colonic mass as noted on MRI 6/18/2023.  Notably  MRI was performed as part of follow-up of a pancreatic cyst.  Imaging showed a new enhancing masslike abnormality along the sigmoid colon concerning for malignancy.  Further evaluation of this was performed with a CT of the chest/abdomen/pelvis in which the colonic mass was felt most consistent with adenocarcinoma.  Further evaluation was attempted with colonoscopy but aborted due to inability to bypass the mass.  A CT colonography was performed and also was incomplete but did show a large sigmoid colonic mass with near complete obliteration of the lumen, infiltrative nodular extension,  extramural and mesenteric and retroperitoneal lymphadenopathy.    Colectomy performed 7/27/2023.  Postoperative  course complicated by SI.  He was seen by psychiatry and stated he had depression in the 1980s.  No concerns for active SI or plan.  Psychiatry noted delirium likely contributing to overall concerns but no reason to hold.  Additionally he had intractable hiccups however these did resolve by time of evaluation in TCU.    His hospital stay was extended due to slow return of bowel function.  He was able to advance diet slowly and discharged to TCU for therapies.  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     He is relatively independent at baseline and did well in TCU.  He did have follow-up with oncology on 8/10/2023.  See their note for further details but will likely start chemotherapy.  Did undergo echocardiogram prior to initiating chemotherapy.  Completed 8/10/2023 as well with overall normal findings.  He will have follow-up with colorectal surgery 8/18/2023 following discharge from TCU.     On day of discharge he is doing well.  He is planning to follow-up with his primary Dr. Lackey in a couple weeks if able.  They did have questions about his recent PET CT scan which I reviewed and noted that oncology will have further information for them as well.  He did note numbness of his left fifth and fourth digits consistent with ulnar neuropathy.  He noted this occurred after his surgery and we reviewed that possibly also was related  to positioning during the procedure.    Summary of nursing facility stay by problem:   (K63.89) Colonic mass  (primary encounter diagnosis)  (Z90.49) S/P colectomy  Comment: As above.  Doing well, has been able to advance diet.  Will be following up with colorectal surgery 8/18/2023.  Plan:  -Continue lovenox ppx     (C83.39) Diffuse large B-cell lymphoma of extranodal  site (H)  (R59.0) Mesenteric lymphadenopathy  (R59.0) Retroperitoneal lymphadenopathy  Comment: As noted on pathology,  has had a initial visit with oncology.  PET/CT with metastatic jaja disease.  Plan:   -Port placement planned  -Follow-up with oncology as planned    (K91.89,  K56.7) Ileus, postoperative (H)  Comment: Contribute to prolonged hospital stay and physical deconditioning.  Has since resolved with improvement in diet.    (R53.81) Physical deconditioning  Comment: Due to acute illness and prolonged hospitalization.  Did well with therapies and able to return home with limited assistance.    (M81.0) Age-related osteoporosis without current pathological fracture  Comment: History of osteoporosis with DEXA scan T score -2.9 in 2019 and -2.8 in 2021.  Additionally had a fall with a wrist fracture that would be consistent with osteoporosis.  Plan:   -Consider bone therapy in conjunction with oncology plans    (D62) Anemia due to blood loss, acute  Comment: Postoperative blood loss as expected.  Repeat CBC in TCU with improvement to 11.2.  Plan:   -Consider repeat CBC with primary     (K86.89) Pancreatic insufficiency  Comment: Sounds as a longstanding history of this.  Does well with Creon.  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  Suspect related to positioning during surgery and discussed may have slow resolution.  Recommend to continue to monitor with follow-up.    Discharge Medications:  MED REC REQUIRED  Post Medication Reconciliation Status: medication reconcilation previously completed during another office visit     Current Outpatient Medications   Medication Sig Dispense Refill    acetaminophen (TYLENOL) 325 MG tablet Take 3 tablets (975 mg) by mouth every 8 hours as needed for mild pain or fever (Patient not taking: Reported on 8/10/2023) 30 tablet 0    calcium carbonate  "(OS-DAVIDE) 1500 (600 Ca) MG tablet Take 600 mg by mouth 2 times daily (with meals)      Cholecalciferol (VITAMIN D-3) 25 MCG (1000 UT) CAPS Take by mouth daily      enoxaparin ANTICOAGULANT (LOVENOX) 40 MG/0.4ML syringe Inject 0.4 mLs (40 mg) Subcutaneous every 24 hours for 26 days 10.4 mL 0    lipase-protease-amylase (CREON) 90411-74[address]-479071 units CPEP per EC capsule Take 2 capsules by mouth 3 times daily (with meals) And 1 with snacks. 200 capsule 1    multivitamin (OCUVITE) TABS Take 1 tablet by mouth 2 times daily       sodium chloride (LAXMI 128) 5 % ophthalmic ointment Apply a small amount in both eyes at least once a day (bedtime). 3.5 g 11        Controlled medications:   not applicable/none     Past Medical History:   Past Medical History:   Diagnosis Date    Aortic insufficiency     Erectile dysfunction     History of deep venous thrombosis 05/2019    distal DVT with extension, completed 6 months of AC (failed eliquis)    History of pelvic fracture     Hyperlipidemia     Low back pain     disc disease s/p laminectomy in past    Macular degeneration     on Avastin, R eye    Pancreatic cyst 07/2018    next MR due 12/23    Radius fracture 2018    after fall from wall     Physical Exam:   Vitals: /83   Pulse 78   Temp 98.6  F (37  C)   Resp 18   Ht 1.745 m (5' 8.7\")   Wt 72.2 kg (159 lb 3.2 oz)   SpO2 94%   BMI 23.72 kg/m    BMI: Body mass index is 23.72 kg/m .    GENERAL APPEARANCE: Seated comfortably, NAD  HENT:  NCAT, not [place], good dentition  PULM  Normal WOB on RA, lungs CTAB, no wheezes or crackles, good air movement  CV:  RRR, S1/S2 normal, no murmurs; no LE edema  ABDOMEN: Abdomen soft, not tender, not distended, BS normal and active throughout   SKIN:  Abdominal incision healing appropriately, no erythema, discharge, or dehiscence   NEURO: Alert, answering questions appropriately, normal thought processes; cognition intact; CN II-XII grossly intact, 5/5 strength  PSYCH:  Mood normal with " congruent affect. Insight/judgment intact    SNF labs: Recent labs in [facility] reviewed by me today.     Final Diagnosis   A) Sigmoid colon, sigmoidectomy:  - Diffuse large B-cell lymphoma  arising from Follicular Lymphoma,   - See comment     B) Peritoneal lesion, left pelvic sidewall, excision:  - Small fragment of soft  tissue involved by B cell lymphoma  - See comment     C) Anastamosis, distal ring, excision:  - Colonic tissue without significant pathology     D) Anastamosis, proximal ring, excision:  - Colonic tissue without significant pathology     Addendum   The diagnosis is unchanged. Cytogenetic studies by FISH (see abbreviated results below and the complete cytogenetic report for details) revealed BCL2::IGH fusion , in addition a subset of the cells showed gain of MYC  , but no MYC translocation was detected. These results support the diagnosis of Follicular lymphoma with progression to Diffuse large B cell lymphoma, which may be correlating with the MYC gain. This result does not support high grade B cell lymphoma diagnosis.   ABNORMAL:  - Gain of MYC (16.5-22%) (sub-population)  - BCL2 rearrangement (80.0%)  - IGH rearrangement (62.5%)     DISCHARGE PLAN:  Follow up labs: Consider CBC  Medical Follow Up:      Follow up with primary care provider in 1-2 weeks  [facility] scheduled appointments:    Next 5 appointments (look out 90 days)      Oct 06, 2023 10:30 AM  (Arrive by 10:15 AM)  Return Visit with Pauline Freed MD  [facility] [place] ([facility] ) [address] [place] [address]  3rd [facility] [address] [phone]          Discharge Services: Home Care:  Occupational Therapy, Physical Therapy, and Registered Nurse  Discharge Instructions Verbalized to Patient at Discharge:   None    TOTAL DISCHARGE TIME:   Greater than 30 minutes in review of discharge plan, follow-up needs, and review of recent studies.    Electronically  signed by:    Benjamin Rosenstein, MD, MA  [facility] Faculty    Documentation of Face to Face and Certification for Home Health Services    I certify that patient: Tom Saini is under my care and that I, or a nurse practitioner or physician's assistant working with me, had a face-to-face encounter that meets the physician face-to-face encounter requirements with this patient on: 8/14/2023.    This encounter with the patient was in whole, or in part, for the following medical condition, which is the primary reason for home health care: Physical Deconditioning.    I certify that, based on my findings, the following services are medically necessary home health services: Nursing, Occupational Therapy, and Physical Therapy.    My clinical findings support the need for the above services because: Nurse is needed: post-operative wound monitoring and cares., Occupational Therapy Services are needed to assess and treat cognitive ability and address ADL safety due to impairment in dressing., and Physical Therapy Services are needed to assess and treat the following functional impairments: lower extremity weakness and ambulation.    Further, I certify that my clinical findings support that this patient is homebound (i.e. absences from home require considerable and taxing effort and are for medical reasons or Sabian[facility] services or infrequently or of short duration when for other reasons) because: Requires assistance of another person or specialized equipment to access medical services because patient: Requires supervision of another for safe transfer. and  increased fall risk..    Based on the above findings. I certify that this patient is confined to the home and needs intermittent skilled nursing care, physical therapy and/or speech therapy.  The patient is under my care, and I have initiated the establishment of the plan of care.  This patient will be followed by a physician who will periodically review  the plan of care.  Physician/Provider to provide follow up care: Angeli Robertson    Attending hospital physician (the Medicare certified PECOS provider): Benjamin Rosenstein, MD, MA  Physician Signature: See electronic signature associated with these discharge orders.  Date: 8/17/2023

## 2023-08-14 NOTE — LETTER
8/14/2023        RE: Tom Saini  [address]  [facility] [address]        [facility] GERIATRICS DISCHARGE SUMMARY  PATIENT'S NAME: Tom Saini  YOB: 1935  MEDICAL RECORD NUMBER:  [id number]  Place of Service where encounter took place:  [facility] ([facility]) [[id number]]    PRIMARY CARE PROVIDER AND CLINIC RESPONSIBLE AFTER TRANSFER:   Angeli Robertson MD, [address] / [facility] [address]      Transferring providers: Benjamin Rosenstein, MD  Recent Hospitalization/ED:  [facility] stay 7/27/23 to 8/3/23.  Date of SNF Admission: August 03, 2023  Date of SNF (anticipated) Discharge: 8/15/23  Discharged to: previous independent home  Cognitive Scores: Short blessed: 4/28 - normal  Physical Function:   Set up to min assist with upper body dressing. Mod assist for lower body dressing with sandals due to bending precautions. Stand by to min assist with toileting. Bathing to be assessed. Stand by for bed mobility and transfers. Walking 500ft with 2ww stand by. Balance timed up and go test was 46 sec. Did 20 stairs with 2 rails  DME: Walker    CODE STATUS/ADVANCE DIRECTIVES DISCUSSION:  Full Code   ALLERGIES: Codeine sulfate    NURSING FACILITY COURSE   Medication Changes/Rationale:   Discontinued oxycodone  Discontinued ASA    He underwent planned colectomy related to a colonic mass as noted on MRI 6/18/2023.  Notably  MRI was performed as part of follow-up of a pancreatic cyst.  Imaging showed a new enhancing masslike abnormality along the sigmoid colon concerning for malignancy.  Further evaluation of this was performed with a CT of the chest/abdomen/pelvis in which the colonic mass was felt most consistent with adenocarcinoma.  Further evaluation was attempted with colonoscopy but aborted due to inability to bypass the mass.  A CT colonography was performed and also was incomplete but did show a large sigmoid  colonic mass with near complete obliteration of the lumen, infiltrative nodular extension, extramural and mesenteric and retroperitoneal lymphadenopathy.    Colectomy performed 7/27/2023.  Postoperative  course complicated by SI.  He was seen by psychiatry and stated he had depression in the 1980s.  No concerns for active SI or plan.  Psychiatry noted delirium likely contributing to overall concerns but no reason to hold.  Additionally he had intractable hiccups however these did resolve by time of evaluation in TCU.    His hospital stay was extended due to slow return of bowel function.  He was able to advance diet slowly and discharged to TCU for therapies.  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     He is relatively independent at baseline and did well in TCU.  He did have follow-up with oncology on 8/10/2023.  See their note for further details but will likely start chemotherapy.  Did undergo echocardiogram prior to initiating chemotherapy.  Completed 8/10/2023 as well with overall normal findings.  He will have follow-up with colorectal surgery 8/18/2023 following discharge from TCU.     On day of discharge he is doing well.  He is planning to follow-up with his primary Dr. Lackey in a couple weeks if able.  They did have questions about his recent PET CT scan which I reviewed and noted that oncology will have further information for them as well.  He did note numbness of his left fifth and fourth digits consistent with ulnar neuropathy.  He noted this occurred after his surgery and we reviewed that possibly also was related  to positioning during the procedure.    Summary of nursing facility stay by problem:   (K63.89) Colonic mass  (primary encounter diagnosis)  (Z90.49) S/P colectomy  Comment: As above.  Doing well, has been able to advance diet.  Will be following up with colorectal surgery  8/18/2023.  Plan:  -Continue lovenox ppx     (C83.39) Diffuse large B-cell lymphoma of extranodal site (H)  (R59.0) Mesenteric lymphadenopathy  (R59.0) Retroperitoneal lymphadenopathy  Comment: As noted on pathology,  has had a initial visit with oncology.  PET/CT with metastatic jaja disease.  Plan:   -Port placement planned  -Follow-up with oncology as planned    (K91.89,  K56.7) Ileus, postoperative (H)  Comment: Contribute to prolonged hospital stay and physical deconditioning.  Has since resolved with improvement in diet.    (R53.81) Physical deconditioning  Comment: Due to acute illness and prolonged hospitalization.  Did well with therapies and able to return home with limited assistance.    (M81.0) Age-related osteoporosis without current pathological fracture  Comment: History of osteoporosis with DEXA scan T score -2.9 in 2019 and -2.8 in 2021.  Additionally had a fall with a wrist fracture that would be consistent with osteoporosis.  Plan:   -Consider bone therapy in conjunction with oncology plans    (D62) Anemia due to blood loss, acute  Comment: Postoperative blood loss as expected.  Repeat CBC in TCU with improvement to 11.2.  Plan:   -Consider repeat CBC with primary     (K86.89) Pancreatic insufficiency  Comment: Sounds as a longstanding history of this.  Does well with Creon.  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  Suspect related to positioning during surgery and discussed may have slow resolution.  Recommend to continue to monitor with follow-up.    Discharge Medications:  MED REC REQUIRED  Post Medication Reconciliation Status: medication reconcilation previously completed during another office visit     Current Outpatient Medications   Medication Sig Dispense Refill     acetaminophen (TYLENOL) 325 MG tablet Take 3 tablets (975 mg) by mouth every 8 hours as needed  "for mild pain or fever (Patient not taking: Reported on 8/10/2023) 30 tablet 0     calcium carbonate (OS-DAVIDE) 1500 (600 Ca) MG tablet Take 600 mg by mouth 2 times daily (with meals)       Cholecalciferol (VITAMIN D-3) 25 MCG (1000 UT) CAPS Take by mouth daily       enoxaparin ANTICOAGULANT (LOVENOX) 40 MG/0.4ML syringe Inject 0.4 mLs (40 mg) Subcutaneous every 24 hours for 26 days 10.4 mL 0     lipase-protease-amylase (CREON) 10764-67702-804321 units CPEP per EC capsule Take 2 capsules by mouth 3 times daily (with meals) And 1 with snacks. 200 capsule 1     multivitamin (OCUVITE) TABS Take 1 tablet by mouth 2 times daily        sodium chloride (LAXMI 128) 5 % ophthalmic ointment Apply a small amount in both eyes at least once a day (bedtime). 3.5 g 11        Controlled medications:   not applicable/none     Past Medical History:   Past Medical History:   Diagnosis Date     Aortic insufficiency      Erectile dysfunction      History of deep venous thrombosis 05/2019    distal DVT with extension, completed 6 months of AC (failed eliquis)     History of pelvic fracture      Hyperlipidemia      Low back pain     disc disease s/p laminectomy in past     Macular degeneration     on Avastin, R eye     Pancreatic cyst 07/2018    next MR due 12/23     Radius fracture 2018    after fall from wall     Physical Exam:   Vitals: /83   Pulse 78   Temp 98.6  F (37  C)   Resp 18   Ht 1.745 m (5' 8.7\")   Wt 72.2 kg (159 lb 3.2 oz)   SpO2 94%   BMI 23.72 kg/m    BMI: Body mass index is 23.72 kg/m .    GENERAL APPEARANCE: Seated comfortably, NAD  HENT:  NCAT, not [place], good dentition  PULM  Normal WOB on RA, lungs CTAB, no wheezes or crackles, good air movement  CV:  RRR, S1/S2 normal, no murmurs; no LE edema  ABDOMEN: Abdomen soft, not tender, not distended, BS normal and active throughout   SKIN:  Abdominal incision healing appropriately, no erythema, discharge, or dehiscence   NEURO: Alert, answering questions " appropriately, normal thought processes; cognition intact; CN II-XII grossly intact, 5/5 strength  PSYCH:  Mood normal with congruent affect. Insight/judgment intact    SNF labs: Recent labs in [facility] reviewed by me today.     Final Diagnosis   A) Sigmoid colon, sigmoidectomy:  - Diffuse large B-cell lymphoma  arising from Follicular Lymphoma,   - See comment     B) Peritoneal lesion, left pelvic sidewall, excision:  - Small fragment of soft  tissue involved by B cell lymphoma  - See comment     C) Anastamosis, distal ring, excision:  - Colonic tissue without significant pathology     D) Anastamosis, proximal ring, excision:  - Colonic tissue without significant pathology     Addendum   The diagnosis is unchanged. Cytogenetic studies by FISH (see abbreviated results below and the complete cytogenetic report for details) revealed BCL2::IGH fusion , in addition a subset of the cells showed gain of MYC  , but no MYC translocation was detected. These results support the diagnosis of Follicular lymphoma with progression to Diffuse large B cell lymphoma, which may be correlating with the MYC gain. This result does not support high grade B cell lymphoma diagnosis.   ABNORMAL:  - Gain of MYC (16.5-22%) (sub-population)  - BCL2 rearrangement (80.0%)  - IGH rearrangement (62.5%)     DISCHARGE PLAN:  Follow up labs: Consider CBC  Medical Follow Up:      Follow up with primary care provider in 1-2 weeks  [facility] scheduled appointments:    Next 5 appointments (look out 90 days)      Oct 06, 2023 10:30 AM  (Arrive by 10:15 AM)  Return Visit with Pauline Freed MD  [facility] ([facility] ) [address] [place] [address]  3rd Floor  [facility] [address] [phone]          Discharge Services: Home Care:  Occupational Therapy, Physical Therapy, and Registered Nurse  Discharge Instructions Verbalized to Patient at Discharge:   None    TOTAL DISCHARGE  TIME:   Greater than 30 minutes in review of discharge plan, follow-up needs, and review of recent studies.    Electronically signed by:    Benjamin Rosenstein, MD, MA  [facility]    Documentation of Face to Face and Certification for Home Health Services    I certify that patient: Tom Saini is under my care and that I, or a nurse practitioner or physician's assistant working with me, had a face-to-face encounter that meets the physician face-to-face encounter requirements with this patient on: 8/14/2023.    This encounter with the patient was in whole, or in part, for the following medical condition, which is the primary reason for home health care: Physical Deconditioning.    I certify that, based on my findings, the following services are medically necessary home health services: Nursing, Occupational Therapy, and Physical Therapy.    My clinical findings support the need for the above services because: Nurse is needed: post-operative wound monitoring and cares., Occupational Therapy Services are needed to assess and treat cognitive ability and address ADL safety due to impairment in dressing., and Physical Therapy Services are needed to assess and treat the following functional impairments: lower extremity weakness and ambulation.    Further, I certify that my clinical findings support that this patient is homebound (i.e. absences from home require considerable and taxing effort and are for medical reasons or Restoration[facility] services or infrequently or of short duration when for other reasons) because: Requires assistance of another person or specialized equipment to access medical services because patient: Requires supervision of another for safe transfer. and  increased fall risk..    Based on the above findings. I certify that this patient is confined to the home and needs intermittent skilled nursing care, physical therapy and/or speech therapy.  The patient is under my care, and I  have initiated the establishment of the plan of care.  This patient will be followed by a physician who will periodically review the plan of care.  Physician/Provider to provide follow up care: Angeli Robertson    Attending [facility] physician (the Medicare certified P[facility] provider): Benjamin Rosenstein, MD, MA  Physician Signature: See electronic signature associated with these discharge orders.  Date: 8/17/2023                Sincerely,        Benjamin Rosenstein, MD

## 2023-08-16 ENCOUNTER — TELEPHONE (OUTPATIENT)
Dept: INTERNAL MEDICINE | Facility: CLINIC | Age: 88
End: 2023-08-16

## 2023-08-16 NOTE — TELEPHONE ENCOUNTER
[facility] Call Center    Phone Message    May a detailed message be left on voicemail: yes     Reason for Call: Order(s): Home Care Orders: Skilled Nursing:  Patient declining home care services. Nurse states that he is doing well and doesn't need their services.    Action Taken: Message routed to:  [facility]    Travel Screening: Not Applicable

## 2023-08-17 NOTE — H&P (VIEW-ONLY)
Colon and Rectal Surgery Postoperative Clinic Note    RE: Tom Saini  : 1935  KEN: 2023    Tom Saini is a very pleasant 88 year old male with a history of DVT and aortic insufficiency who presented with an obstructing sigmoid colon mass now status post open sigmoid colectomy with Dr. Cowart on 23.    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 Very little pain. Tired of the low fiber diet. He established with Dr. Buitrago for oncology and will had a port placed  for chemotherapy. His main concern is left hand weakness that has been worsening. It affects his ring and pinky finger and he is struggling to fully flex these fingers. He is left hand dominant. He notes that he is having difficulty with writing because of the weakness.    Physical Examination:   /69 (BP Location: Left arm, Patient Position: Sitting, Cuff Size: Adult Regular)   Pulse 96   SpO2 98%   General: alert, oriented, in no acute distress, sitting comfortably  HEENT: moist mucous membranes  Abdomen: Midline incision well approximated with glue in place. No erythema or drainage.    Assessment/Plan:  88 year old male with a history of DVT and aortic insufficiency who presented with an obstructing sigmoid colon mass now status post open sigmoid colectomy with Dr. Cowart on 23. Pathology returned diffuse large B cell lymphoma GCB subtype. He has already established with oncology and plans to start chemotherapy soon. He is doing well and discharged from TCU earlier this  week. May slowly transition to a regular diet. Avoid lifting >10 lb for another month. Scheduled to see Dr. Cowart on 9/12/23. He does have worsening ulnar neuropathy since surgery. Will get EMG and refer to Dr. Alfred for further evaluation and management. We did discuss that typically this improves after time, but he has been worsening and has functional issues as this affects his dominant hand. Contact us in the meantime with questions or concerns. Patient's questions were answered to his stated satisfaction and he is in agreement with this plan.     Medical history:  Past Medical History:   Diagnosis Date    Aortic insufficiency     Erectile dysfunction     History of deep venous thrombosis 05/2019    distal DVT with extension, completed 6 months of AC (failed eliquis)    History of pelvic fracture     Hyperlipidemia     Low back pain     disc disease s/p laminectomy in past    Macular degeneration     on Avastin, R eye    Pancreatic cyst 07/2018    next MR due 12/23    Radius fracture 2018    after fall from wall       Surgical history:  Past Surgical History:   Procedure Laterality Date    CATARACT IOL, RT/LT  01/01/2001    COLONOSCOPY N/A 06/28/2023    Procedure: COLONOSCOPY, WITH BIOPSY;  Surgeon: Jhonatan Cowart MD;  Location: [facility] OR    COMBINED REPAIR PTOSIS WITH BLEPHAROPLASTY BILATERAL Bilateral 04/06/2018    Procedure: COMBINED REPAIR PTOSIS WITH BLEPHAROPLASTY BILATERAL;  BILATERAL UPPER EYELIDS BLEPHAROPLASTY AND PTOSIS REPAIR ;  Surgeon: Anuj Good MD;  Location: [facility]    EYE SURGERY      LAMINECTOMY LUMBAR TWO LEVELS  01/01/1993    L4-L5    LAPAROSCOPIC ASSISTED SIGMOID COLECTOMY N/A 7/27/2023    Procedure: COLECTOMY, SIGMOID, LAPAROSCOPIC assisted;  Surgeon: Jhonatan Cowart MD;  Location: [facility] OR    ORIF RADIAL SHAFT FRACTURE Right 2018    RECESSION RESECTION (REPAIR STRABISMUS)  01/01/1949    SIGMOIDOSCOPY FLEXIBLE N/A 7/27/2023    Procedure: Sigmoidoscopy  flexible;  Surgeon: Jhonatan Cowart MD;  Location:  OR       Problem list:    Patient Active Problem List    Diagnosis Date Noted    Colonic mass 07/27/2023     Priority: Medium    Diarrhea, unspecified type 06/15/2023     Priority: Medium    Infection due to 2019 novel coronavirus 05/11/2023     Priority: Medium    Age-related osteoporosis without current pathological fracture 02/01/2021     Priority: Medium    Dermatofibroma 10/15/2017     Priority: Medium    Seborrheic keratoses, inflamed 10/15/2017     Priority: Medium    Eczema 08/01/2011     Priority: Medium    Pure hypercholesterolemia 08/01/2011     Priority: Medium    Male erectile disorder 08/01/2011     Priority: Medium       Medications:  Current Outpatient Medications   Medication Sig Dispense Refill    calcium carbonate (OS-DAVIDE) 1500 (600 Ca) MG tablet Take 600 mg by mouth 2 times daily (with meals)      Cholecalciferol (VITAMIN D-3) 25 MCG (1000 UT) CAPS Take by mouth daily      enoxaparin ANTICOAGULANT (LOVENOX) 40 MG/0.4ML syringe Inject 0.4 mLs (40 mg) Subcutaneous every 24 hours for 26 days 10.4 mL 0    lipase-protease-amylase (CREON) 91754-49449-293364 units CPEP per EC capsule Take 2 capsules by mouth 3 times daily (with meals) And 1 with snacks. 200 capsule 1    multivitamin (OCUVITE) TABS Take 1 tablet by mouth 2 times daily       sodium chloride (LAXMI 128) 5 % ophthalmic ointment Apply a small amount in both eyes at least once a day (bedtime). 3.5 g 11       Allergies:  Allergies   Allergen Reactions    Codeine Sulfate Nausea and Vomiting       Family history:  Family History   Problem Relation Age of Onset    Cancer Mother         unknown skin cancer    Skin Cancer Mother     Cancer Father         bladder    Glaucoma No family hx of     Macular Degeneration No family hx of     Diabetes No family hx of     Hypertension No family hx of     Anesthesia Reaction No family hx of     Thrombosis No family hx of        Social  history:  Social History     Tobacco Use    Smoking status: Former     Packs/day: 2.00     Years: 2.00     Pack years: 4.00     Types: Cigarettes     Quit date: 1954     Years since quittin.1     Passive exposure: Past    Smokeless tobacco: Never   Substance Use Topics    Alcohol use: Yes     Alcohol/week: 3.0 standard drinks of alcohol     Types: 3 Glasses of wine per week     Comment: glass of wine with dinner     Marital status: .    Nursing Notes:   Tim Davis, EMT  2023  9:29 AM  Signed  Chief Complaint   Patient presents with    Post-op Visit       Vitals:    23 0927   BP: 119/69   BP Location: Left arm   Patient Position: Sitting   Cuff Size: Adult Regular   Pulse: 96   SpO2: 98%       There is no height or weight on file to calculate BMI.    Tim Davis EMT-P       20 minutes spent on the date of the encounter doing chart review, history and exam, documentation and further activities as noted above.   This is a postop visit.      Jenny Poe PA-C  Colon and Rectal Surgery  [facility]

## 2023-08-17 NOTE — PROGRESS NOTES
Colon and Rectal Surgery Postoperative Clinic Note    RE: Tom Saini  : 1935  KEN: 2023    Tom Saini is a very pleasant 88 year old male with a history of DVT and aortic insufficiency who presented with an obstructing sigmoid colon mass now status post open sigmoid colectomy with Dr. Cowart on 23.    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 Very little pain. Tired of the low fiber diet. He established with Dr. Buitrago for oncology and will had a port placed  for chemotherapy. His main concern is left hand weakness that has been worsening. It affects his ring and pinky finger and he is struggling to fully flex these fingers. He is left hand dominant. He notes that he is having difficulty with writing because of the weakness.    Physical Examination:   /69 (BP Location: Left arm, Patient Position: Sitting, Cuff Size: Adult Regular)   Pulse 96   SpO2 98%   General: alert, oriented, in no acute distress, sitting comfortably  HEENT: moist mucous membranes  Abdomen: Midline incision well approximated with glue in place. No erythema or drainage.    Assessment/Plan:  88 year old male with a history of DVT and aortic insufficiency who presented with an obstructing sigmoid colon mass now status post open sigmoid colectomy with Dr. Cowart on 23. Pathology returned diffuse large B cell lymphoma GCB subtype. He has already established with oncology and plans to start chemotherapy soon. He is doing well and discharged from TCU earlier this  week. May slowly transition to a regular diet. Avoid lifting >10 lb for another month. Scheduled to see Dr. Cowart on 9/12/23. He does have worsening ulnar neuropathy since surgery. Will get EMG and refer to Dr. Alfred for further evaluation and management. We did discuss that typically this improves after time, but he has been worsening and has functional issues as this affects his dominant hand. Contact us in the meantime with questions or concerns. Patient's questions were answered to his stated satisfaction and he is in agreement with this plan.     Medical history:  Past Medical History:   Diagnosis Date    Aortic insufficiency     Erectile dysfunction     History of deep venous thrombosis 05/2019    distal DVT with extension, completed 6 months of AC (failed eliquis)    History of pelvic fracture     Hyperlipidemia     Low back pain     disc disease s/p laminectomy in past    Macular degeneration     on Avastin, R eye    Pancreatic cyst 07/2018    next MR due 12/23    Radius fracture 2018    after fall from wall       Surgical history:  Past Surgical History:   Procedure Laterality Date    CATARACT IOL, RT/LT  01/01/2001    COLONOSCOPY N/A 06/28/2023    Procedure: COLONOSCOPY, WITH BIOPSY;  Surgeon: Jhonatan Cowart MD;  Location: [facility] OR    COMBINED REPAIR PTOSIS WITH BLEPHAROPLASTY BILATERAL Bilateral 04/06/2018    Procedure: COMBINED REPAIR PTOSIS WITH BLEPHAROPLASTY BILATERAL;  BILATERAL UPPER EYELIDS BLEPHAROPLASTY AND PTOSIS REPAIR ;  Surgeon: Anuj Good MD;  Location: [facility]    EYE SURGERY      LAMINECTOMY LUMBAR TWO LEVELS  01/01/1993    L4-L5    LAPAROSCOPIC ASSISTED SIGMOID COLECTOMY N/A 7/27/2023    Procedure: COLECTOMY, SIGMOID, LAPAROSCOPIC assisted;  Surgeon: Jhonatan Cowart MD;  Location: [facility] OR    ORIF RADIAL SHAFT FRACTURE Right 2018    RECESSION RESECTION (REPAIR STRABISMUS)  01/01/1949    SIGMOIDOSCOPY FLEXIBLE N/A 7/27/2023    Procedure: Sigmoidoscopy  flexible;  Surgeon: Jhonatan Cowart MD;  Location:  OR       Problem list:    Patient Active Problem List    Diagnosis Date Noted    Colonic mass 07/27/2023     Priority: Medium    Diarrhea, unspecified type 06/15/2023     Priority: Medium    Infection due to 2019 novel coronavirus 05/11/2023     Priority: Medium    Age-related osteoporosis without current pathological fracture 02/01/2021     Priority: Medium    Dermatofibroma 10/15/2017     Priority: Medium    Seborrheic keratoses, inflamed 10/15/2017     Priority: Medium    Eczema 08/01/2011     Priority: Medium    Pure hypercholesterolemia 08/01/2011     Priority: Medium    Male erectile disorder 08/01/2011     Priority: Medium       Medications:  Current Outpatient Medications   Medication Sig Dispense Refill    calcium carbonate (OS-DAVIDE) 1500 (600 Ca) MG tablet Take 600 mg by mouth 2 times daily (with meals)      Cholecalciferol (VITAMIN D-3) 25 MCG (1000 UT) CAPS Take by mouth daily      enoxaparin ANTICOAGULANT (LOVENOX) 40 MG/0.4ML syringe Inject 0.4 mLs (40 mg) Subcutaneous every 24 hours for 26 days 10.4 mL 0    lipase-protease-amylase (CREON) 29407-05815-909215 units CPEP per EC capsule Take 2 capsules by mouth 3 times daily (with meals) And 1 with snacks. 200 capsule 1    multivitamin (OCUVITE) TABS Take 1 tablet by mouth 2 times daily       sodium chloride (LAXMI 128) 5 % ophthalmic ointment Apply a small amount in both eyes at least once a day (bedtime). 3.5 g 11       Allergies:  Allergies   Allergen Reactions    Codeine Sulfate Nausea and Vomiting       Family history:  Family History   Problem Relation Age of Onset    Cancer Mother         unknown skin cancer    Skin Cancer Mother     Cancer Father         bladder    Glaucoma No family hx of     Macular Degeneration No family hx of     Diabetes No family hx of     Hypertension No family hx of     Anesthesia Reaction No family hx of     Thrombosis No family hx of        Social  history:  Social History     Tobacco Use    Smoking status: Former     Packs/day: 2.00     Years: 2.00     Pack years: 4.00     Types: Cigarettes     Quit date: 1954     Years since quittin.1     Passive exposure: Past    Smokeless tobacco: Never   Substance Use Topics    Alcohol use: Yes     Alcohol/week: 3.0 standard drinks of alcohol     Types: 3 Glasses of wine per week     Comment: glass of wine with dinner     Marital status: .    Nursing Notes:   Tim Davis, EMT  2023  9:29 AM  Signed  Chief Complaint   Patient presents with    Post-op Visit       Vitals:    23 0927   BP: 119/69   BP Location: Left arm   Patient Position: Sitting   Cuff Size: Adult Regular   Pulse: 96   SpO2: 98%       There is no height or weight on file to calculate BMI.    Tim Davis EMT-P       20 minutes spent on the date of the encounter doing chart review, history and exam, documentation and further activities as noted above.   This is a postop visit.      Jenny Poe PA-C  Colon and Rectal Surgery  [facility]

## 2023-08-18 ENCOUNTER — TELEPHONE (OUTPATIENT)
Dept: ORTHOPEDICS | Facility: CLINIC | Age: 88
End: 2023-08-18

## 2023-08-18 ENCOUNTER — TELEPHONE (OUTPATIENT)
Dept: NEUROLOGY | Facility: CLINIC | Age: 88
End: 2023-08-18

## 2023-08-18 ENCOUNTER — OFFICE VISIT (OUTPATIENT)
Dept: SURGERY | Facility: CLINIC | Age: 88
End: 2023-08-18
Payer: MEDICARE

## 2023-08-18 ENCOUNTER — PATIENT OUTREACH (OUTPATIENT)
Dept: CARE COORDINATION | Facility: CLINIC | Age: 88
End: 2023-08-18

## 2023-08-18 VITALS — OXYGEN SATURATION: 98 % | HEART RATE: 96 BPM | DIASTOLIC BLOOD PRESSURE: 69 MMHG | SYSTOLIC BLOOD PRESSURE: 119 MMHG

## 2023-08-18 DIAGNOSIS — G56.22 ULNAR NEUROPATHY OF LEFT UPPER EXTREMITY: ICD-10-CM

## 2023-08-18 DIAGNOSIS — Z09 FOLLOW-UP EXAMINATION AFTER COLORECTAL SURGERY: Primary | ICD-10-CM

## 2023-08-18 DIAGNOSIS — Z90.49 S/P PARTIAL COLECTOMY: ICD-10-CM

## 2023-08-18 PROCEDURE — 99024 POSTOP FOLLOW-UP VISIT: CPT

## 2023-08-18 ASSESSMENT — PAIN SCALES - GENERAL: PAINLEVEL: MILD PAIN (3)

## 2023-08-18 NOTE — PROGRESS NOTES
Clinic Care Coordination Contact    Situation: Patient chart reviewed by care coordinator.    Background: Patient was recently hospitalized from 7/27-8/3 and then went to TCU. Patient was recently discharged from TCU.     Assessment: RN calling patient to offer CC services- patient declines and states that he is now moving independently, eating appropriately, can drive again, and is following up in our clinic on 8/22. Patient reports that he has no issues with anything and denies resources. Has some numbness to L fingers, told colorectal surgery today, they ordered an EMG for him. He does not feel he needs further support.     Plan/Recommendations: No further contact.     TIFF PARSON RN on 8/18/2023 at 11:27 AM

## 2023-08-18 NOTE — PATIENT INSTRUCTIONS
You will get a call to schedule an EMG for your left hand.    You will get a separate call to schedule with Dr. Alfred.

## 2023-08-18 NOTE — NURSING NOTE
Chief Complaint   Patient presents with    Post-op Visit       Vitals:    08/18/23 0927   BP: 119/69   BP Location: Left arm   Patient Position: Sitting   Cuff Size: Adult Regular   Pulse: 96   SpO2: 98%       There is no height or weight on file to calculate BMI.    Tim Davis EMT-P

## 2023-08-18 NOTE — TELEPHONE ENCOUNTER
[facility] Call Center    Phone Message    May a detailed message be left on voicemail: yes     Reason for Call: Appointment Intake    Referring Provider Name: Jenny Poe PA-C  Diagnosis and/or Symptoms: EMG left fore arm     Action Taken: Message routed to:  [facility] ([facility] Neurology    Travel Screening: Not Applicable

## 2023-08-18 NOTE — LETTER
2023       RE: Tom Saini  [address]  [facility] [id number]       Dear Colleague,    Thank you for referring your patient, Tom Saini, to the [facility] COLON AND RECTAL SURGERY CLINIC [place] [facility]. Please see a copy of my visit note below.    Colon and Rectal Surgery Postoperative Clinic Note    RE: Tom Saini  : 1935  KEN: 2023    Tom Saini is a very pleasant 88 year old male with a history of DVT and aortic insufficiency who presented with an obstructing sigmoid colon mass now status post open sigmoid colectomy with Dr. Cowart on 23.    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 Very little pain. Tired of the low fiber diet. He established with Dr. Buitrago for oncology and will had a port placed  for chemotherapy. His main concern is left hand weakness that has been worsening. It affects his ring and pinky finger and he is struggling to fully flex these fingers. He is left hand dominant. He notes that he is having difficulty with writing because of the weakness.    Physical Examination:   /69 (BP Location: Left arm, Patient Position: Sitting, Cuff Size: Adult Regular)   Pulse 96   SpO2 98%   General: alert, oriented, in no acute distress, sitting comfortably  HEENT: moist mucous membranes  Abdomen: Midline incision well approximated with glue in place. No erythema or drainage.    Assessment/Plan:  88 year old male with a history of DVT and aortic  insufficiency who presented with an obstructing sigmoid colon mass now status post open sigmoid colectomy with Dr. Cowart on 7/27/23. Pathology returned diffuse large B cell lymphoma GCB subtype. He has already established with oncology and plans to start chemotherapy soon. He is doing well and discharged from TCU earlier this week. May slowly transition to a regular diet. Avoid lifting >10 lb for another month. Scheduled to see Dr. Cowart on 9/12/23. He does have worsening ulnar neuropathy since surgery. Will get EMG and refer to Dr. Alfred for further evaluation and management. We did discuss that typically this improves after time, but he has been worsening and has functional issues as this affects his dominant hand. Contact us in the meantime with questions or concerns. Patient's questions were answered to his stated satisfaction and he is in agreement with this plan.     Medical history:  Past Medical History:   Diagnosis Date    Aortic insufficiency     Erectile dysfunction     History of deep venous thrombosis 05/2019    distal DVT with extension, completed 6 months of AC (failed eliquis)    History of pelvic fracture     Hyperlipidemia     Low back pain     disc disease s/p laminectomy in past    Macular degeneration     on Avastin, R eye    Pancreatic cyst 07/2018    next MR due 12/23    Radius fracture 2018    after fall from wall       Surgical history:  Past Surgical History:   Procedure Laterality Date    CATARACT IOL, RT/LT  01/01/2001    COLONOSCOPY N/A 06/28/2023    Procedure: COLONOSCOPY, WITH BIOPSY;  Surgeon: Jhonatan Cowart MD;  Location: [facility]    COMBINED REPAIR PTOSIS WITH BLEPHAROPLASTY BILATERAL Bilateral 04/06/2018    Procedure: COMBINED REPAIR PTOSIS WITH BLEPHAROPLASTY BILATERAL;  BILATERAL UPPER EYELIDS BLEPHAROPLASTY AND PTOSIS REPAIR ;  Surgeon: Anuj Good MD;  Location: [facility]    EYE SURGERY      LAMINECTOMY LUMBAR TWO LEVELS  01/01/1993    L4-L5     LAPAROSCOPIC ASSISTED SIGMOID COLECTOMY N/A 7/27/2023    Procedure: COLECTOMY, SIGMOID, LAPAROSCOPIC assisted;  Surgeon: Jhonatan Cowart MD;  Location: [facility] OR    ORIF RADIAL SHAFT FRACTURE Right 2018    RECESSION RESECTION (REPAIR STRABISMUS)  01/01/1949    SIGMOIDOSCOPY FLEXIBLE N/A 7/27/2023    Procedure: Sigmoidoscopy flexible;  Surgeon: Jhonatan Cowart MD;  Location: [facility] OR       Problem list:    Patient Active Problem List    Diagnosis Date Noted    Colonic mass 07/27/2023     Priority: Medium    Diarrhea, unspecified type 06/15/2023     Priority: Medium    Infection due to 2019 novel coronavirus 05/11/2023     Priority: Medium    Age-related osteoporosis without current pathological fracture 02/01/2021     Priority: Medium    Dermatofibroma 10/15/2017     Priority: Medium    Seborrheic keratoses, inflamed 10/15/2017     Priority: Medium    Eczema 08/01/2011     Priority: Medium    Pure hypercholesterolemia 08/01/2011     Priority: Medium    Male erectile disorder 08/01/2011     Priority: Medium       Medications:  Current Outpatient Medications   Medication Sig Dispense Refill    calcium carbonate (OS-DAVIDE) 1500 (600 Ca) MG tablet Take 600 mg by mouth 2 times daily (with meals)      Cholecalciferol (VITAMIN D-3) 25 MCG (1000 UT) CAPS Take by mouth daily      enoxaparin ANTICOAGULANT (LOVENOX) 40 MG/0.4ML syringe Inject 0.4 mLs (40 mg) Subcutaneous every 24 hours for 26 days 10.4 mL 0    lipase-protease-amylase (CREON) 44277-55640-869046 units CPEP per EC capsule Take 2 capsules by mouth 3 times daily (with meals) And 1 with snacks. 200 capsule 1    multivitamin (OCUVITE) TABS Take 1 tablet by mouth 2 times daily       sodium chloride (LAXMI 128) 5 % ophthalmic ointment Apply a small amount in both eyes at least once a day (bedtime). 3.5 g 11       Allergies:  Allergies   Allergen Reactions    Codeine Sulfate Nausea and Vomiting       Family history:  Family History   Problem Relation Age of  Onset    Cancer Mother         unknown skin cancer    Skin Cancer Mother     Cancer Father         bladder    Glaucoma No family hx of     Macular Degeneration No family hx of     Diabetes No family hx of     Hypertension No family hx of     Anesthesia Reaction No family hx of     Thrombosis No family hx of        Social history:  Social History     Tobacco Use    Smoking status: Former     Packs/day: 2.00     Years: 2.00     Pack years: 4.00     Types: Cigarettes     Quit date: 1954     Years since quittin.1     Passive exposure: Past    Smokeless tobacco: Never   Substance Use Topics    Alcohol use: Yes     Alcohol/week: 3.0 standard drinks of alcohol     Types: 3 Glasses of wine per week     Comment: glass of wine with dinner     Marital status: .    Nursing Notes:   Tim Davis EMT  2023  9:29 AM  Signed  Chief Complaint   Patient presents with    Post-op Visit       Vitals:    23 0927   BP: 119/69   BP Location: Left arm   Patient Position: Sitting   Cuff Size: Adult Regular   Pulse: 96   SpO2: 98%       There is no height or weight on file to calculate BMI.    Tim Davis EMT-P       20 minutes spent on the date of the encounter doing chart review, history and exam, documentation and further activities as noted above.   This is a postop visit.          Again, thank you for allowing me to participate in the care of your patient.      Sincerely,    Jenny Poe PA-C

## 2023-08-21 DIAGNOSIS — C83.398 DIFFUSE LARGE B-CELL LYMPHOMA OF EXTRANODAL SITE: Primary | ICD-10-CM

## 2023-08-21 DIAGNOSIS — Z76.89 PREVENTION OF CHEMOTHERAPY-INDUCED NEUTROPENIA: Primary | ICD-10-CM

## 2023-08-22 ENCOUNTER — OFFICE VISIT (OUTPATIENT)
Dept: INTERNAL MEDICINE | Facility: CLINIC | Age: 88
End: 2023-08-22
Payer: MEDICARE

## 2023-08-22 VITALS
BODY MASS INDEX: 23.39 KG/M2 | OXYGEN SATURATION: 96 % | DIASTOLIC BLOOD PRESSURE: 71 MMHG | HEIGHT: 69 IN | WEIGHT: 157.9 LBS | HEART RATE: 85 BPM | SYSTOLIC BLOOD PRESSURE: 112 MMHG

## 2023-08-22 DIAGNOSIS — E44.1 MILD PROTEIN-CALORIE MALNUTRITION (H): ICD-10-CM

## 2023-08-22 DIAGNOSIS — C85.93 LYMPHOMA OF INTESTINE (H): Primary | ICD-10-CM

## 2023-08-22 DIAGNOSIS — K86.89 PANCREATIC INSUFFICIENCY: ICD-10-CM

## 2023-08-22 DIAGNOSIS — Z51.11 ENCOUNTER FOR ANTINEOPLASTIC CHEMOTHERAPY: Primary | ICD-10-CM

## 2023-08-22 PROCEDURE — 99213 OFFICE O/P EST LOW 20 MIN: CPT | Mod: GC

## 2023-08-22 NOTE — NURSING NOTE
Tom Saini is a 88 year old male patient that presents today in clinic for the following:    Chief Complaint   Patient presents with    Hospital F/U     Weight loss from surgery      The patient's allergies and medications were reviewed as noted. A set of vitals were recorded as noted without incident. The patient does not have any other questions for the provider.    Celeste Curry, EMT at 2:19 PM on 8/22/2023

## 2023-08-23 DIAGNOSIS — Z51.11 ENCOUNTER FOR ANTINEOPLASTIC CHEMOTHERAPY: ICD-10-CM

## 2023-08-23 DIAGNOSIS — Z76.89 PREVENTION OF CHEMOTHERAPY-INDUCED NEUTROPENIA: ICD-10-CM

## 2023-08-23 DIAGNOSIS — C83.398 DIFFUSE LARGE B-CELL LYMPHOMA OF EXTRANODAL SITE: Primary | ICD-10-CM

## 2023-08-24 ENCOUNTER — PATIENT OUTREACH (OUTPATIENT)
Dept: ONCOLOGY | Facility: CLINIC | Age: 88
End: 2023-08-24

## 2023-08-24 ENCOUNTER — APPOINTMENT (OUTPATIENT)
Dept: LAB | Facility: CLINIC | Age: 88
End: 2023-08-24
Attending: STUDENT IN AN ORGANIZED HEALTH CARE EDUCATION/TRAINING PROGRAM
Payer: MEDICARE

## 2023-08-24 ENCOUNTER — ANESTHESIA (OUTPATIENT)
Dept: SURGERY | Facility: AMBULATORY SURGERY CENTER | Age: 88
End: 2023-08-24
Payer: MEDICARE

## 2023-08-24 ENCOUNTER — HOSPITAL ENCOUNTER (OUTPATIENT)
Facility: AMBULATORY SURGERY CENTER | Age: 88
Discharge: HOME | End: 2023-08-24
Attending: RADIOLOGY
Payer: MEDICARE

## 2023-08-24 ENCOUNTER — ANCILLARY PROCEDURE (OUTPATIENT)
Dept: RADIOLOGY | Facility: AMBULATORY SURGERY CENTER | Age: 88
End: 2023-08-24
Attending: STUDENT IN AN ORGANIZED HEALTH CARE EDUCATION/TRAINING PROGRAM
Payer: MEDICARE

## 2023-08-24 ENCOUNTER — ONCOLOGY VISIT (OUTPATIENT)
Dept: ONCOLOGY | Facility: CLINIC | Age: 88
End: 2023-08-24
Attending: STUDENT IN AN ORGANIZED HEALTH CARE EDUCATION/TRAINING PROGRAM
Payer: MEDICARE

## 2023-08-24 ENCOUNTER — ANESTHESIA EVENT (OUTPATIENT)
Dept: SURGERY | Facility: AMBULATORY SURGERY CENTER | Age: 88
End: 2023-08-24
Payer: MEDICARE

## 2023-08-24 VITALS
OXYGEN SATURATION: 98 % | RESPIRATION RATE: 20 BRPM | DIASTOLIC BLOOD PRESSURE: 62 MMHG | BODY MASS INDEX: 22.96 KG/M2 | WEIGHT: 154.98 LBS | HEIGHT: 69 IN | TEMPERATURE: 98 F | SYSTOLIC BLOOD PRESSURE: 118 MMHG | HEART RATE: 99 BPM

## 2023-08-24 VITALS
WEIGHT: 155 LBS | HEART RATE: 99 BPM | TEMPERATURE: 98 F | SYSTOLIC BLOOD PRESSURE: 118 MMHG | HEIGHT: 69 IN | DIASTOLIC BLOOD PRESSURE: 63 MMHG | OXYGEN SATURATION: 98 % | RESPIRATION RATE: 20 BRPM | BODY MASS INDEX: 22.96 KG/M2

## 2023-08-24 DIAGNOSIS — C83.398 DIFFUSE LARGE B-CELL LYMPHOMA OF EXTRANODAL SITE: Primary | ICD-10-CM

## 2023-08-24 DIAGNOSIS — Z76.89 PREVENTION OF CHEMOTHERAPY-INDUCED NEUTROPENIA: ICD-10-CM

## 2023-08-24 DIAGNOSIS — Z51.11 ENCOUNTER FOR ANTINEOPLASTIC CHEMOTHERAPY: ICD-10-CM

## 2023-08-24 DIAGNOSIS — K63.89 COLONIC MASS: ICD-10-CM

## 2023-08-24 PROCEDURE — G0463 HOSPITAL OUTPT CLINIC VISIT: HCPCS | Performed by: STUDENT IN AN ORGANIZED HEALTH CARE EDUCATION/TRAINING PROGRAM

## 2023-08-24 PROCEDURE — 99215 OFFICE O/P EST HI 40 MIN: CPT | Performed by: STUDENT IN AN ORGANIZED HEALTH CARE EDUCATION/TRAINING PROGRAM

## 2023-08-24 PROCEDURE — 76937 US GUIDE VASCULAR ACCESS: CPT | Mod: 26 | Performed by: RADIOLOGY

## 2023-08-24 PROCEDURE — 36561 INSERT TUNNELED CV CATH: CPT | Performed by: RADIOLOGY

## 2023-08-24 PROCEDURE — 77001 FLUOROGUIDE FOR VEIN DEVICE: CPT | Mod: 26 | Performed by: RADIOLOGY

## 2023-08-24 PROCEDURE — 36561 INSERT TUNNELED CV CATH: CPT

## 2023-08-24 RX ORDER — HEPARIN SODIUM,PORCINE 10 UNIT/ML
5-10 VIAL (ML) INTRAVENOUS
Status: DISCONTINUED | OUTPATIENT
Start: 2023-08-24 | End: 2023-08-25 | Stop reason: HOSPADM

## 2023-08-24 RX ORDER — ACYCLOVIR 400 MG/1
400 TABLET ORAL EVERY 12 HOURS
Qty: 60 TABLET | Refills: 11 | Status: SHIPPED | OUTPATIENT
Start: 2023-08-24 | End: 2024-02-01

## 2023-08-24 RX ORDER — HEPARIN SODIUM (PORCINE) LOCK FLUSH IV SOLN 100 UNIT/ML 100 UNIT/ML
SOLUTION INTRAVENOUS DAILY PRN
Status: DISCONTINUED | OUTPATIENT
Start: 2023-08-24 | End: 2023-08-24 | Stop reason: HOSPADM

## 2023-08-24 RX ORDER — SODIUM CHLORIDE, SODIUM LACTATE, POTASSIUM CHLORIDE, CALCIUM CHLORIDE 600; 310; 30; 20 MG/100ML; MG/100ML; MG/100ML; MG/100ML
INJECTION, SOLUTION INTRAVENOUS CONTINUOUS PRN
Status: DISCONTINUED | OUTPATIENT
Start: 2023-08-24 | End: 2023-08-24

## 2023-08-24 RX ORDER — LIDOCAINE HYDROCHLORIDE 10 MG/ML
INJECTION, SOLUTION EPIDURAL; INFILTRATION; INTRACAUDAL; PERINEURAL DAILY PRN
Status: DISCONTINUED | OUTPATIENT
Start: 2023-08-24 | End: 2023-08-24 | Stop reason: HOSPADM

## 2023-08-24 RX ORDER — HEPARIN SODIUM,PORCINE 10 UNIT/ML
5-10 VIAL (ML) INTRAVENOUS EVERY 24 HOURS
Status: DISCONTINUED | OUTPATIENT
Start: 2023-08-24 | End: 2023-08-25 | Stop reason: HOSPADM

## 2023-08-24 RX ORDER — LIDOCAINE HYDROCHLORIDE 20 MG/ML
INJECTION, SOLUTION INFILTRATION; PERINEURAL PRN
Status: DISCONTINUED | OUTPATIENT
Start: 2023-08-24 | End: 2023-08-24

## 2023-08-24 RX ORDER — PROPOFOL 10 MG/ML
INJECTION, EMULSION INTRAVENOUS CONTINUOUS PRN
Status: DISCONTINUED | OUTPATIENT
Start: 2023-08-24 | End: 2023-08-24

## 2023-08-24 RX ORDER — LIDOCAINE 40 MG/G
CREAM TOPICAL
Status: DISCONTINUED | OUTPATIENT
Start: 2023-08-24 | End: 2023-08-25 | Stop reason: HOSPADM

## 2023-08-24 RX ORDER — PROPOFOL 10 MG/ML
INJECTION, EMULSION INTRAVENOUS PRN
Status: DISCONTINUED | OUTPATIENT
Start: 2023-08-24 | End: 2023-08-24

## 2023-08-24 RX ORDER — CEFAZOLIN SODIUM 2 G/50ML
2 SOLUTION INTRAVENOUS
Status: COMPLETED | OUTPATIENT
Start: 2023-08-24 | End: 2023-08-24

## 2023-08-24 RX ORDER — HEPARIN SODIUM (PORCINE) LOCK FLUSH IV SOLN 100 UNIT/ML 100 UNIT/ML
5-10 SOLUTION INTRAVENOUS
Status: DISCONTINUED | OUTPATIENT
Start: 2023-08-24 | End: 2023-08-25 | Stop reason: HOSPADM

## 2023-08-24 RX ADMIN — SODIUM CHLORIDE, SODIUM LACTATE, POTASSIUM CHLORIDE, CALCIUM CHLORIDE: 600; 310; 30; 20 INJECTION, SOLUTION INTRAVENOUS at 08:39

## 2023-08-24 RX ADMIN — PROPOFOL 30 MG: 10 INJECTION, EMULSION INTRAVENOUS at 09:21

## 2023-08-24 RX ADMIN — LIDOCAINE HYDROCHLORIDE 70 MG: 20 INJECTION, SOLUTION INFILTRATION; PERINEURAL at 09:16

## 2023-08-24 RX ADMIN — PROPOFOL 150 MCG/KG/MIN: 10 INJECTION, EMULSION INTRAVENOUS at 09:16

## 2023-08-24 RX ADMIN — CEFAZOLIN SODIUM 2 G: 2 SOLUTION INTRAVENOUS at 09:08

## 2023-08-24 NOTE — ANESTHESIA CARE TRANSFER NOTE
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    Anesthesia Type:   MAC     Note:    Oropharynx: oropharynx clear of all foreign objects  Level of Consciousness: awake  Oxygen Supplementation: room air    Independent Airway: airway patency satisfactory and stable  Dentition: dentition unchanged    Report to RN Given: handoff report given  Patient transferred to: Phase II    Handoff Report: Identifed the Patient, Identified the Reponsible Provider, Reviewed the pertinent medical history, Discussed the surgical course, Reviewed Intra-OP anesthesia mangement and issues during anesthesia, Set expectations for post-procedure period and Allowed opportunity for questions and acknowledgement of understanding      Vitals:  Vitals Value Taken Time   /62 08/24/23 1506   Temp 36.7  C (98  F) 08/24/23 1506   Pulse 99 08/24/23 1506   Resp 20 08/24/23 1506   SpO2 98 % 08/24/23 1506       Electronically Signed By: Cesar Fang MD  August 24, 2023  4:15 PM

## 2023-08-24 NOTE — ANESTHESIA PREPROCEDURE EVALUATION
Anesthesia Pre-Procedure Evaluation    Patient: Tom Saini   MRN: [id number] : 1935        Procedure : Procedure(s):  Insert port vascular access          Past Medical History:   Diagnosis Date    Aortic insufficiency     Erectile dysfunction     History of deep venous thrombosis 2019    distal DVT with extension, completed 6 months of AC (failed eliquis)    History of pelvic fracture     Hyperlipidemia     Low back pain     disc disease s/p laminectomy in past    Macular degeneration     on Avastin, R eye    Pancreatic cyst 2018    next MR due     Radius fracture     after fall from wall      Past Surgical History:   Procedure Laterality Date    CATARACT IOL, RT/LT  2001    COLONOSCOPY N/A 2023    Procedure: COLONOSCOPY, WITH BIOPSY;  Surgeon: Jhonatan Cowart MD;  Location: [facility] OR    COMBINED REPAIR PTOSIS WITH BLEPHAROPLASTY BILATERAL Bilateral 2018    Procedure: COMBINED REPAIR PTOSIS WITH BLEPHAROPLASTY BILATERAL;  BILATERAL UPPER EYELIDS BLEPHAROPLASTY AND PTOSIS REPAIR ;  Surgeon: Anuj Good MD;  Location: [facility]    EYE SURGERY      LAMINECTOMY LUMBAR TWO LEVELS  1993    L4-L5    LAPAROSCOPIC ASSISTED SIGMOID COLECTOMY N/A 2023    Procedure: COLECTOMY, SIGMOID, LAPAROSCOPIC assisted;  Surgeon: Jhonatan Cowart MD;  Location: [facility] OR    ORIF RADIAL SHAFT FRACTURE Right 2018    RECESSION RESECTION (REPAIR STRABISMUS)  1949    SIGMOIDOSCOPY FLEXIBLE N/A 2023    Procedure: Sigmoidoscopy flexible;  Surgeon: Jhonatan Cowart MD;  Location: [facility] OR      Allergies   Allergen Reactions    Codeine Sulfate Nausea and Vomiting      Social History     Tobacco Use    Smoking status: Former     Packs/day: 2.00     Years: 2.00     Pack years: 4.00     Types: Cigarettes     Quit date: 1954     Years since quittin.2     Passive exposure: Past    Smokeless tobacco: Never   Substance Use Topics    Alcohol use: Yes      Alcohol/week: 3.0 standard drinks of alcohol     Types: 3 Glasses of wine per week     Comment: glass of wine with dinner      Wt Readings from Last 1 Encounters:   08/24/23 70.3 kg (155 lb)        Anesthesia Evaluation            ROS/MED HX  ENT/Pulmonary:       Neurologic:       Cardiovascular: Comment: Aortic insufficiency    (+) Dyslipidemia - -   -  - -                                      METS/Exercise Tolerance:     Hematologic:       Musculoskeletal:   (+)  arthritis,             GI/Hepatic:       Renal/Genitourinary:       Endo:       Psychiatric/Substance Use:       Infectious Disease:       Malignancy:   (+) Malignancy, History of Lymphoma/Leukemia.    Other:            Physical Exam    Airway  airway exam normal      Mallampati: II   TM distance: > 3 FB   Neck ROM: full   Mouth opening: > 3 cm    Respiratory Devices and Support         Dental       (+) Completely normal teeth      Cardiovascular          Rhythm and rate: regular and normal     Pulmonary   pulmonary exam normal        breath sounds clear to auscultation           OUTSIDE LABS:  CBC:   Lab Results   Component Value Date    WBC 8.9 08/10/2023    WBC 9.6 08/07/2023    HGB 11.2 (L) 08/10/2023    HGB 11.2 (L) 08/07/2023    HCT 35.2 (L) 08/10/2023    HCT 36.4 (L) 08/07/2023     (H) 08/10/2023     (H) 08/07/2023     BMP:   Lab Results   Component Value Date     08/10/2023     08/07/2023    POTASSIUM 4.4 08/10/2023    POTASSIUM 3.8 08/07/2023    CHLORIDE 104 08/10/2023    CHLORIDE 102 08/07/2023    CO2 27 08/10/2023    CO2 24 08/07/2023    BUN 23.2 (H) 08/10/2023    BUN 15.1 08/07/2023    CR 0.97 08/10/2023    CR 0.83 08/07/2023    GLC 83 08/11/2023     (H) 08/10/2023     COAGS:   Lab Results   Component Value Date    PTT 52 (H) 09/19/2019    INR 1.01 07/10/2023     POC:   Lab Results   Component Value Date    BGM 71 03/20/2019     HEPATIC:   Lab Results   Component Value Date    ALBUMIN 3.6 08/10/2023     PROTTOTAL 6.5 08/10/2023    ALT 34 08/10/2023    AST 25 08/10/2023    ALKPHOS 121 08/10/2023    BILITOTAL <0.2 08/10/2023     OTHER:   Lab Results   Component Value Date    A1C 5.7 (H) 09/19/2019    DAVIDE 9.5 08/10/2023    PHOS 2.6 07/28/2023    MAG 2.2 07/31/2023    TSH 0.90 02/27/2021    SED 33 (H) 02/27/2021       Anesthesia Plan    ASA Status:  3    NPO Status:  NPO Appropriate    Anesthesia Type: MAC.     - Reason for MAC: immobility needed, straight local not clinically adequate   Induction: Intravenous.   Maintenance: TIVA.        Consents    Anesthesia Plan(s) and associated risks, benefits, and realistic alternatives discussed. Questions answered and patient/representative(s) expressed understanding.     - Discussed:     - Discussed with:  Patient      - Extended Intubation/Ventilatory Support Discussed: No.      - Patient is DNR/DNI Status: No     Use of blood products discussed: No .     Postoperative Care    Pain management: IV analgesics, Oral pain medications, Multi-modal analgesia.   PONV prophylaxis: Ondansetron (or other 5HT-3), Background Propofol Infusion     Comments:                Cesar Fang MD

## 2023-08-24 NOTE — PROGRESS NOTES
[facility]: Cancer Care Plan of Care Education Note                                    Discussion with Patient:                                                      Met with patient after office visit with Dr. Ramos. Discussed chemo education and what to expect at infusion appointments. Provided printed literature on R-CHOP. Reviewed possible side effects, when to contact your doctor or health care provider, and self care tips sections. Reviewed treatment schedule including cycle length, lab monitoring, and prn medications.    Provided RNCC contact information, [facility] phone number (which has options to talk with a Nurse available 24/7 - triage and RNCC via this option during business hours). Reviewed appropriate use of [employer], not to be used for symptom reporting.        Goals          General     Other (pt-stated)      Notes - Note created  8/24/2023  4:51 PM by Dee Philip RN     Goal Statement: I will use my clinic and care team resources as directed.  Date Goal set: 8/24/2023  Barriers: disease burden  Strengths: support, coping, motivation, health awareness, and involvement with care team  Date to Achieve By: ongoing  Patient expressed understanding of goal: Yes  Action steps to achieve this goal:  I will contact triage with new, worsening or uncontrolled symptoms.   I will contact triage with temperature over 100.4  I will call with difficulties of scheduling and/or transportation.   I will request needed refills when there are 7 days of medication remaining.   I will not send urgent or symptomatic messages through [facility].   I will contact scheduling to arrange or make changes in my appointments.                Assessment:                                                      Assessment completed with:: Patient;Spouse or significant other    Current living arrangement  I live in a private home with family    Plan of Care Education   Yearly learning assessment completed?: Yes (see  Education tab)  Diagnosis:: DLBCL  Does patient understand diagnosis?: Yes  Tx plan/regimen:: Mini R-CHOP  Does patient understand treatment plan/regimen?: Yes  Preparing for treatment:: Reviewed treatment preparation information with patient (vascular access, day of chemo, visitor policy, what to bring, etc.)  Vascular access:: Port  Side effect education:: Diarrhea/Constipation;Endocrine therapy (myalgias, arthralgias, hot flashes, vaginal dryness, mood disorder, and thinning of the bones);Fatigue;Hair loss;Infection;Lab value monitoring (anemia, neutropenia, thrombocytopenia);Mouth sores;Mylosuppression;Nausea/Vomiting;Neuropathy;Skin changes;Urinary  Transportation means:: Family;Regular car  Safety/self care at home reviewed with patient:: Yes  Informal Support system:: Family  Coping - concerns/fears reviewed with patient:: Yes  Plan of Care:: VINAYAK follow-up appointment;Lab appointment;Treatment schedule  When to call provider:: Bleeding;Increased shortness of breath;New/worsening pain;Shaking chills;Temperature >100.4F;Uncontrolled diarrhea/constipation;Uncontrolled nausea/vomiting    Evaluation of Learning  Patient Education Provided: Yes  Readiness:: Eager;Acceptance  Method:: Booklet/Handout;Literature;Explanation  Response:: Verbalizes understanding        Intervention/Education provided during outreach:                                                       Patient voiced understanding and appreciation of above information and denies any further questions.    ALEXANDRO ValentinoN, RN  RN Care Coordinator   [phone]

## 2023-08-24 NOTE — DISCHARGE INSTRUCTIONS
A collaboration between [facility] and [facility]  Experts in minimally invasive, targeted treatments performed using imaging guidance    Venous Access Device,  Port Catheter or Tunneled or Non-Tunneled Central Line Placement    Today you had a procedure today to install a venous access device; either a tunneled central vein catheter or a subcutaneous port catheter.    After you go home:  Drink plenty of fluids.  Generally 6-8 (8 ounce) glasses a day is recommended.  Resume your regular diet unless otherwise ordered by a medical provider.  Keep any applied tape/gauze dressings clean and dry.  Change tape/gauze dressings if they get wet or soiled.  You may shower the following day after procedure, however cover and protect from moisture any tape/gauze dressings.  You may let water hit and run over dried skin glue, but do not scrub.  Pat the area dry after showering.  Port placement incisions are closed with absorbable suture, meaning they do not need to be removed at a later date, and a topical skin adhesive (skin glue).  This glue will wear off in 7-14 days.  Do not remove before this time.  If 14 days have passed and residual glue is present, you may gently remove it.  Do not apply gels, lotions, or ointments to the glue site for the first 10 days as this may cause the glue to prematurely soften and fail.  Do not perform strenuous activities or lift greater than 10 pounds for the next three days.  If there is bleeding or oozing from the procedure site, apply firm pressure to the area for 5-10 minutes.  If the bleeding continues seek medical advice at the numbers below.  Mild procedure site discomfort can be treated with an ice pack and over-the-counter pain relievers.        For 24 hours after any sedation used:  Relax and take it easy.  No strenuous activities.  Do not drive or operate machines at home or at work.  No alcohol consumption.  Do not make any  important or legal decisions.    Call our Interventional Radiology (IR) service if:  If you start bleeding from the procedure site.  If you do start to bleed from the site, lie down and hold some pressure on the site.  Our radiology provider can help you decide if you need to return to the hospital.  If you have new or worsening pain related to the procedure.  If you have concerning swelling at the procedure site.  If you develop persistent nausea or vomiting.  If you develop hives or a rash or any unexplained itching.  If you have a fever of greater than 100.5  F and chills in the first 5 days after procedure.  Any other concerns related to your procedure.      [facility]  Interventional Radiology (IR)  [address]  [address] [facility] [place] [address]    Contact Number:  [phone]  (IR control desk)  Monday - Friday 8:00 am - 4:30 pm    After hours for urgent concerns:  [phone]  After 4:30 pm Monday - Friday, Weekends and Holidays.   Ask for Interventional Radiology on-call.  Someone is available 24 hours a day.  [facility] toll free number:  [phone]

## 2023-08-24 NOTE — NURSING NOTE
No labs needed per Dr Buitrago and care coordinator Dee Philip. Pt to check in for merlyn appt closer to appt time.     Jolly Fernandez RN

## 2023-08-24 NOTE — ANESTHESIA POSTPROCEDURE EVALUATION
Patient: Tom Saini    Procedure: Procedure(s):  Insert port vascular access       Anesthesia Type:  MAC    Note:  Disposition: Outpatient   Postop Pain Control: Uneventful            Sign Out: Well controlled pain   PONV: No   Neuro/Psych: Uneventful            Sign Out: Acceptable/Baseline neuro status   Airway/Respiratory: Uneventful            Sign Out: Acceptable/Baseline resp. status   CV/Hemodynamics: Uneventful            Sign Out: Acceptable CV status   Other NRE: NONE   DID A NON-ROUTINE EVENT OCCUR? No           Last vitals:  Vitals Value Taken Time   /62 08/24/23 1506   Temp 36.7  C (98  F) 08/24/23 1506   Pulse 99 08/24/23 1506   Resp 20 08/24/23 1506   SpO2 98 % 08/24/23 1506       Electronically Signed By: Cesar Fang MD  August 24, 2023  4:14 PM

## 2023-08-24 NOTE — NURSING NOTE
"Oncology Rooming Note    August 24, 2023 3:11 PM   Tom Saini is a 88 year old male who presents for:    Chief Complaint   Patient presents with    Oncology Clinic Visit     Lymphoma of intestine     Initial Vitals: /62   Pulse 99   Temp 98  F (36.7  C) (Temporal)   Resp 20   Ht 1.753 m (5' 9.02\")   Wt 70.3 kg (154 lb 15.7 oz)   SpO2 98%   BMI 22.88 kg/m   Estimated body mass index is 22.88 kg/m  as calculated from the following:    Height as of this encounter: 1.753 m (5' 9.02\").    Weight as of this encounter: 70.3 kg (154 lb 15.7 oz). Body surface area is 1.85 meters squared.  Data Unavailable Comment: Data Unavailable   No LMP for male patient.  Allergies reviewed: Yes  Medications reviewed: Yes    Medications: Medication refills not needed today.  Pharmacy name entered into ECS Tuning:    [id number] [address] AT [facility] - [address]   [place] [address]    Clinical concerns: Pt has concern for numbness in left hand.       Martín Kirkland              "

## 2023-08-24 NOTE — BRIEF OP NOTE
[facility]    Brief Operative Note    Pre-operative diagnosis: Cecum mass [K63.89]  Post-operative diagnosis Same as pre-operative diagnosis    Procedure: Procedure(s):  Insert port vascular access  Surgeon: Surgeon(s) and Role:     * Cesar Negro MD - Primary  Anesthesia: MAC   Estimated Blood Loss: Minimal    Drains: None  Specimens: * No specimens in log *  Findings:   None.  Complications: None.  Implants:   Implant Name Type Inv. Item Serial No.  Lot No. LRB No. Used Action   6F SLIM POWER PORT     [id number] Right 1 Implanted         24 cm 6 [place] of [place] single lumen BD PowerPort ClearVUE Slim placed via right internal jugular vein. Catheter tip in the high right atrium. Heparin locked and ready for use.

## 2023-08-24 NOTE — LETTER
8/24/2023         RE: Tom Saini  [address] [facility] [address]        Dear Colleague,    Thank you for referring your patient, Tom Saini, to the [facility]. Please see a copy of my visit note below.      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  He has had diarrhea with particular food for several years and recently was investigated for possible pancreatic insufficiency.  He also has a history of pancreatic pseudocyst.  On recent MRI done in June 2023 to follow-up on pancreatic pseudocyst showed a colonic mass.  Subsequent CT scan of chest abdomen pelvis showedMild anemia, no cytopenias 7.9 x 6.9 x 5.6 cm large soft tissue mass in sigmoid colon obliterating the lumen and significant extramural soft tissue extension superiorly in the sigmoid mesocolon.  Enlarged bilateral inguinal lymph node, as few small prominent lymph nodes along superior rectal/inferior mesenteric artery distribution.  Diffuse main pancreatic duct dilatation with large intraductal calculus in the head was seen as well.  Multiple cystic lesions and dilated sidebranches were seen in the pancreas.  He underwent colonoscopy but due to obliterated lumen, colonoscopy failed.  Subsequently CT colonography was done which showed similar results to CT chest abdomen pelvis.  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  FISH showed Bcl-2 rearrangement but no MYC rearrangement.  Bcl-2 IgH rearrangement [translocation (14;18)] is consistent with transformation of DLBCL from follicular lymphoma.  Postoperative course was complicated by postoperative nausea vomiting which resolved, single episode of suicidal  "ideation which resolved.  He was discharged to TCU.  He has been regaining his strength slowly, his performance status has improved to 1.  He is able to walk independently for short distances, can do basic activities of daily living himself.    PET scan revealed persistent hypermetabolic retroperitoneal lymph nodes, possibly reactive but lymphoma cannot be ruled out. ECHOcardiogram reveals EF >50%. LDH within normal limits. Overall IPI 2. Low-intermediate risk GCB DLBCL.     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 ECOG is 1-2.      Review of Systems   8 point review of systems was negative except pertinent positives listed above.        Objective   /62   Pulse 99   Temp 98  F (36.7  C) (Temporal)   Resp 20   Ht 1.753 m (5' 9.02\")   Wt 70.3 kg (154 lb 15.7 oz)   SpO2 98%   BMI 22.88 kg/m    Body mass index is 22.88 kg/m .  Physical Exam   GENERAL:  Alert oriented well nourished  HEAD: normocephalic atraumatic  SKIN:  no rash, hives, other lesions.  EYE: anicteric Sclerae  ENT: no throat erythema, enlarged tonsills, no ulcers or mucositis in the mouth  LYMPHATIC: no abnormal lymph nodes palable in cervical, axillary, supraclavicular or inguinal area.  RESP:  No rales or rhonchi, breath sounds bilaterally equal and vesicular  CV:  No tachycardia, S1 S2 normal No murmur.  GI:  Abdomen soft nontender no hepato or splenomegaly, scar is well healing   MUSCULOSKELETAL:  No visible joint redness or swelling.  NEURO:  No gross weakness gait normal  PSYCH: pleasant affect      Labs: Labs reviewed. Mid anemia, thrombocytosis, no liver and renal " abnormalities.    Imaging: PETCT scan shows hypermetabolic retroperitoneal lymph nodes. Reviewed with the patient.    Assessment and Plan:    1) Diffuse large B cell lymphoma:  - Stage III IPI 2.  - We will begin RminiCHOP, I discussed toxicities, schedule of RminiCHOP. At 50% of full dose of RCHOP.  - First dose will be divided into two doses a week apart: Rituximab plus steroids followed by CHO.   - PET scan will be after 2 cycles and 6 cycles.  - Return in 1 week for infusion.  - Continue acyclovir, allopurinol and antiemetics as directed.     2) Pancreatic insufficiency:  - Continue Creon.   - We will monitor for diarrhea.    Total time spent on date of service in review of medical records, review of labs, history taking, physical exam, discussion of assessment and plan, counseling and patient education is 60 minutes.    Holly Buitrago MD  Attending Physician  Pager [phone]

## 2023-08-24 NOTE — INTERVAL H&P NOTE
I have reviewed the surgical (or preoperative) H&P that is linked to this encounter, and examined the patient. There are no significant changes    Clinical Conditions Present on Arrival:  Clinically Significant Risk Factors Present on Admission           # Hypocalcemia: Lowest Ca = 8 mg/dL in last 30 days, will monitor and replace as appropriate      # Drug Induced Coagulation Defect: home medication list includes an anticoagulant medication

## 2023-08-25 NOTE — PROGRESS NOTES
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  He has had diarrhea with particular food for several years and recently was investigated for possible pancreatic insufficiency.  He also has a history of pancreatic pseudocyst.  On recent MRI done in June 2023 to follow-up on pancreatic pseudocyst showed a colonic mass.  Subsequent CT scan of chest abdomen pelvis showedMild anemia, no cytopenias 7.9 x 6.9 x 5.6 cm large soft tissue mass in sigmoid colon obliterating the lumen and significant extramural soft tissue extension superiorly in the sigmoid mesocolon.  Enlarged bilateral inguinal lymph node, as few small prominent lymph nodes along superior rectal/inferior mesenteric artery distribution.  Diffuse main pancreatic duct dilatation with large intraductal calculus in the head was seen as well.  Multiple cystic lesions and dilated sidebranches were seen in the pancreas.  He underwent colonoscopy but due to obliterated lumen, colonoscopy failed.  Subsequently CT colonography was done which showed similar results to CT chest abdomen pelvis.  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  FISH showed Bcl-2 rearrangement but no MYC rearrangement.  Bcl-2 IgH rearrangement [translocation (14;18)] is consistent with transformation of DLBCL from follicular lymphoma.  Postoperative course was complicated by postoperative nausea vomiting which resolved, single episode of suicidal ideation which resolved.  He was discharged to TCU.  He has been regaining his strength slowly, his performance status has improved to 1.  He is able to walk independently for short distances, can do basic activities of daily living himself.    PET scan  "revealed persistent hypermetabolic retroperitoneal lymph nodes, possibly reactive but lymphoma cannot be ruled out. ECHOcardiogram reveals EF >50%. LDH within normal limits. Overall IPI 2. Low-intermediate risk GCB DLBCL.     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 ECOG is 1-2.      Review of Systems   8 point review of systems was negative except pertinent positives listed above.        Objective    /62   Pulse 99   Temp 98  F (36.7  C) (Temporal)   Resp 20   Ht 1.753 m (5' 9.02\")   Wt 70.3 kg (154 lb 15.7 oz)   SpO2 98%   BMI 22.88 kg/m    Body mass index is 22.88 kg/m .  Physical Exam   GENERAL:  Alert oriented well nourished  HEAD: normocephalic atraumatic  SKIN:  no rash, hives, other lesions.  EYE: anicteric Sclerae  ENT: no throat erythema, enlarged tonsills, no ulcers or mucositis in the mouth  LYMPHATIC: no abnormal lymph nodes palable in cervical, axillary, supraclavicular or inguinal area.  RESP:  No rales or rhonchi, breath sounds bilaterally equal and vesicular  CV:  No tachycardia, S1 S2 normal No murmur.  GI:  Abdomen soft nontender no hepato or splenomegaly, scar is well healing   MUSCULOSKELETAL:  No visible joint redness or swelling.  NEURO:  No gross weakness gait normal  PSYCH: pleasant affect      Labs: Labs reviewed. Mid anemia, thrombocytosis, no liver and renal abnormalities.    Imaging: PETCT scan shows hypermetabolic retroperitoneal lymph nodes. Reviewed with the patient.    Assessment and Plan:    1) Diffuse large B cell lymphoma:  - Stage III IPI 2.  - We will begin RminiCHOP, I discussed toxicities, schedule of RminiCHOP. " At 50% of full dose of RCHOP.  - First dose will be divided into two doses a week apart: Rituximab plus steroids followed by CHO.   - PET scan will be after 2 cycles and 6 cycles.  - Return in 1 week for infusion.  - Continue acyclovir, allopurinol and antiemetics as directed.     2) Pancreatic insufficiency:  - Continue Creon.   - We will monitor for diarrhea.    Total time spent on date of service in review of medical records, review of labs, history taking, physical exam, discussion of assessment and plan, counseling and patient education is 60 minutes.    Holly Buitrago MD  Attending Physician  Pager [phone]

## 2023-08-27 NOTE — PROGRESS NOTES
Internal Medicine Primary Care   SUBJECTIVE  CC: Post hospitalization follow up     HPI: This is a 88 year old male PMH thoracic aortic dilatation, anemia, history of DVT, pancreatic cyst & pancreatic insufficiency who was admitted to [facility] from 7/27/23 - 8/3/23 for abdominal pain, found to have a large bowel obstruction due to malignant neoplasm.  Now s/p Open sigmoid colectomy, splenic flex mobilization, intra-operative Flexible sigmoidoscopy on 727/2023. 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 He has already established with Oncology and is planning to have a port placed in the next 2 days with plans to begin multi agent chemotherapy.     He presents today for post hospitalization visit. He denies any new abdominal pain, his bowel movements have returned to their baseline. No new infectious s/s. Only concern he expresses is that he lost about 15 pounds since surgery. 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 200 capsule 1    acyclovir (ZOVIRAX) 400 MG tablet Take 1 tablet (400 mg) by mouth every 12 hours for 30 days 60 tablet 11    calcium carbonate (OS-DAVIDE) 1500 (600 Ca) MG tablet Take 600 mg by mouth 2  times daily (with meals)      Cholecalciferol (VITAMIN D-3) 25 MCG (1000 UT) CAPS Take by mouth daily      enoxaparin ANTICOAGULANT (LOVENOX) 40 MG/0.4ML syringe Inject 0.4 mLs (40 mg) Subcutaneous every 24 hours for 26 days (Patient not taking: Reported on 8/22/2023) 10.4 mL 0    multivitamin (OCUVITE) TABS Take 1 tablet by mouth 2 times daily      sodium chloride (LAXMI 128) 5 % ophthalmic ointment Apply a small amount in both eyes at least once a day (bedtime). (Patient not taking: Reported on 8/22/2023) 3.5 g 11         PAST SURGICAL HISTORY  Past Surgical History:   Procedure Laterality Date    CATARACT IOL, RT/LT  01/01/2001    COLONOSCOPY N/A 06/28/2023    Procedure: COLONOSCOPY, WITH BIOPSY;  Surgeon: Jhonatan Cowart MD;  Location: [facility] OR    COMBINED REPAIR PTOSIS WITH BLEPHAROPLASTY BILATERAL Bilateral 04/06/2018    Procedure: COMBINED REPAIR PTOSIS WITH BLEPHAROPLASTY BILATERAL;  BILATERAL UPPER EYELIDS BLEPHAROPLASTY AND PTOSIS REPAIR ;  Surgeon: Anuj Good MD;  Location: [facility]    EYE SURGERY      INSERT PORT VASCULAR ACCESS N/A 8/24/2023    Procedure: Insert port vascular access;  Surgeon: Cesar Negro MD;  Location: [facility] OR    IR CHEST PORT PLACEMENT > 5 YRS OF AGE  8/24/2023    LAMINECTOMY LUMBAR TWO LEVELS  01/01/1993    L4-L5    LAPAROSCOPIC ASSISTED SIGMOID COLECTOMY N/A 7/27/2023    Procedure: COLECTOMY, SIGMOID, LAPAROSCOPIC assisted;  Surgeon: Jhonatan Cowart MD;  Location: [facility] OR    ORIF RADIAL SHAFT FRACTURE Right 2018    RECESSION RESECTION (REPAIR STRABISMUS)  01/01/1949    SIGMOIDOSCOPY FLEXIBLE N/A 7/27/2023    Procedure: Sigmoidoscopy flexible;  Surgeon: Jhonatan Cowart MD;  Location: [facility] OR         FAMILY HISTORY  Family History   Problem Relation Age of Onset    Cancer Mother         unknown skin cancer    Skin Cancer Mother     Cancer Father         bladder    Breast Cancer Sister     Glaucoma No family hx of     Macular Degeneration No  "family hx of     Diabetes No family hx of     Hypertension No family hx of     Anesthesia Reaction No family hx of     Thrombosis No family hx of          ALLERGIES     Allergies   Allergen Reactions    Codeine Sulfate Nausea and Vomiting         OBJECTIVE    Vitals  /71 (BP Location: Left arm, Patient Position: Sitting, Cuff Size: Adult Regular)   Pulse 85   Ht 1.745 m (5' 8.7\")   Wt 71.6 kg (157 lb 14.4 oz)   SpO2 96%   BMI 23.52 kg/m      Last 6 BPs  BP Readings from Last 6 Encounters:   08/24/23 118/63   08/24/23 118/62   08/22/23 112/71   08/18/23 119/69   08/14/23 139/83   08/10/23 135/67       Physical Exam  Constitutional:       Appearance: Normal appearance.   HENT:      Head: Normocephalic and atraumatic.   Eyes:      Extraocular Movements: Extraocular movements intact.   Cardiovascular:      Rate and Rhythm: Regular rhythm.      Pulses: Normal pulses.      Heart sounds: Normal heart sounds.   Pulmonary:      Effort: Pulmonary effort is normal.      Breath sounds: Normal breath sounds.   Abdominal:      General: Abdomen is flat.      Palpations: Abdomen is soft.   Skin:     General: Skin is warm and dry.   Neurological:      General: No focal deficit present.      Mental Status: He is alert and oriented to person, place, and time.         ASSESSMENT AND PLAN    DLBCL of the colon s/p colectomy  Stage III, follows with Dr. Buitrago from oncology  - Plans for R miniCHOP  - Acylocir, allopurinol, antiemetics as directed by oncology    Weight loss in setting of malignancy and pancreatic insufficiency  Reports losing up to 15 pounds since before hospitalization. Likely 2/2 to increased metabolic demand, malignancy and colectomy.  - Advised supplementing with Ensure with snacks  - Increase to 2 packets of creon with ensure and snacks  - Offered nutrition consult, patient deferred, wants to speak with oncology    Patient understands and agrees with the above assessment and plan. Patient was staffed and " seen with Dr. Donnelly    Follow up  RTC as needed      Erwin Allen, DO  Internal Medicine PGY-3

## 2023-08-28 ENCOUNTER — PATIENT OUTREACH (OUTPATIENT)
Dept: ONCOLOGY | Facility: CLINIC | Age: 88
End: 2023-08-28
Payer: MEDICARE

## 2023-08-28 DIAGNOSIS — C83.398 DIFFUSE LARGE B-CELL LYMPHOMA OF EXTRANODAL SITE: Primary | ICD-10-CM

## 2023-08-28 RX ORDER — PENTAMIDINE ISETHIONATE 300 MG/300MG
300 INHALANT RESPIRATORY (INHALATION)
Status: CANCELLED
Start: 2023-09-07 | End: 2023-09-07

## 2023-08-28 RX ORDER — EPINEPHRINE 1 MG/ML
0.3 INJECTION, SOLUTION INTRAMUSCULAR; SUBCUTANEOUS EVERY 5 MIN PRN
Status: CANCELLED | OUTPATIENT
Start: 2023-09-07

## 2023-08-28 RX ORDER — DIPHENHYDRAMINE HYDROCHLORIDE 50 MG/ML
50 INJECTION INTRAMUSCULAR; INTRAVENOUS
Status: CANCELLED
Start: 2023-09-07

## 2023-08-28 RX ORDER — METHYLPREDNISOLONE SODIUM SUCCINATE 125 MG/2ML
125 INJECTION, POWDER, LYOPHILIZED, FOR SOLUTION INTRAMUSCULAR; INTRAVENOUS
Status: CANCELLED
Start: 2023-09-07

## 2023-08-28 RX ORDER — MEPERIDINE HYDROCHLORIDE 25 MG/ML
25 INJECTION INTRAMUSCULAR; INTRAVENOUS; SUBCUTANEOUS EVERY 30 MIN PRN
Status: CANCELLED | OUTPATIENT
Start: 2023-09-07

## 2023-08-28 RX ORDER — ALBUTEROL SULFATE 90 UG/1
1-2 AEROSOL, METERED RESPIRATORY (INHALATION)
Status: CANCELLED
Start: 2023-09-07

## 2023-08-28 RX ORDER — ACYCLOVIR 400 MG/1
400 TABLET ORAL EVERY 12 HOURS
Qty: 60 TABLET | Refills: 11 | Status: SHIPPED | OUTPATIENT
Start: 2023-08-28 | End: 2023-12-15

## 2023-08-28 RX ORDER — ALBUTEROL SULFATE 0.83 MG/ML
2.5 SOLUTION RESPIRATORY (INHALATION)
Status: CANCELLED | OUTPATIENT
Start: 2023-09-07

## 2023-08-28 RX ORDER — ALBUTEROL SULFATE 0.83 MG/ML
2.5 SOLUTION RESPIRATORY (INHALATION) ONCE
Status: CANCELLED
Start: 2023-09-07 | End: 2023-09-07

## 2023-08-28 NOTE — PROGRESS NOTES
[facility]: Cancer Care Short Note                                                                                          RNCC received VM from patient stating that they only received one prescription (acyclovir) from their pharmacy after their visit with Dr. Buitrago and were told there would be more.     RNCC called patient back and LVM. Confirmed that there will be more, however, they have not been released yet. Informed patient that Dr. Buitrago will release these on the first day of treatment and [facility] he is done with infusion.     Dee Philip, BSN, RN  RN Care Coordinator   [phone]

## 2023-08-29 ENCOUNTER — TELEPHONE (OUTPATIENT)
Dept: INTERNAL MEDICINE | Facility: CLINIC | Age: 88
End: 2023-08-29
Payer: MEDICARE

## 2023-08-29 DIAGNOSIS — Z76.89 PREVENTION OF CHEMOTHERAPY-INDUCED NEUTROPENIA: ICD-10-CM

## 2023-08-29 DIAGNOSIS — Z51.11 ENCOUNTER FOR ANTINEOPLASTIC CHEMOTHERAPY: ICD-10-CM

## 2023-08-29 DIAGNOSIS — C83.398 DIFFUSE LARGE B-CELL LYMPHOMA OF EXTRANODAL SITE: Primary | ICD-10-CM

## 2023-08-29 RX ORDER — MEPERIDINE HYDROCHLORIDE 25 MG/ML
25 INJECTION INTRAMUSCULAR; INTRAVENOUS; SUBCUTANEOUS
Status: CANCELLED
Start: 2023-09-08

## 2023-08-29 RX ORDER — DIPHENHYDRAMINE HYDROCHLORIDE 50 MG/ML
50 INJECTION INTRAMUSCULAR; INTRAVENOUS
Status: CANCELLED
Start: 2023-09-08

## 2023-08-29 RX ORDER — ACETAMINOPHEN 325 MG/1
650 TABLET ORAL ONCE
Status: CANCELLED
Start: 2023-08-31

## 2023-08-29 RX ORDER — EPINEPHRINE 1 MG/ML
0.3 INJECTION, SOLUTION INTRAMUSCULAR; SUBCUTANEOUS EVERY 5 MIN PRN
Status: CANCELLED | OUTPATIENT
Start: 2023-08-31

## 2023-08-29 RX ORDER — MEPERIDINE HYDROCHLORIDE 25 MG/ML
25 INJECTION INTRAMUSCULAR; INTRAVENOUS; SUBCUTANEOUS
Status: CANCELLED
Start: 2023-08-31

## 2023-08-29 RX ORDER — DIPHENHYDRAMINE HCL 25 MG
50 CAPSULE ORAL ONCE
Status: CANCELLED
Start: 2023-08-31

## 2023-08-29 RX ORDER — METHYLPREDNISOLONE SODIUM SUCCINATE 125 MG/2ML
125 INJECTION, POWDER, LYOPHILIZED, FOR SOLUTION INTRAMUSCULAR; INTRAVENOUS
Status: CANCELLED
Start: 2023-08-31

## 2023-08-29 RX ORDER — ALBUTEROL SULFATE 90 UG/1
1-2 AEROSOL, METERED RESPIRATORY (INHALATION)
Status: CANCELLED
Start: 2023-08-31

## 2023-08-29 RX ORDER — HEPARIN SODIUM,PORCINE 10 UNIT/ML
5-20 VIAL (ML) INTRAVENOUS DAILY PRN
Status: CANCELLED | OUTPATIENT
Start: 2023-09-08

## 2023-08-29 RX ORDER — PREDNISONE 20 MG/1
30 TABLET ORAL 2 TIMES DAILY
Qty: 15 TABLET | Refills: 7 | Status: SHIPPED | OUTPATIENT
Start: 2023-08-30 | End: 2024-01-29

## 2023-08-29 RX ORDER — PALONOSETRON 0.05 MG/ML
0.25 INJECTION, SOLUTION INTRAVENOUS ONCE
Status: CANCELLED
Start: 2023-09-08

## 2023-08-29 RX ORDER — ALBUTEROL SULFATE 0.83 MG/ML
2.5 SOLUTION RESPIRATORY (INHALATION)
Status: CANCELLED | OUTPATIENT
Start: 2023-09-08

## 2023-08-29 RX ORDER — DIPHENHYDRAMINE HYDROCHLORIDE 50 MG/ML
50 INJECTION INTRAMUSCULAR; INTRAVENOUS
Status: CANCELLED
Start: 2023-08-31

## 2023-08-29 RX ORDER — LORAZEPAM 2 MG/ML
0.5 INJECTION INTRAMUSCULAR EVERY 4 HOURS PRN
Status: CANCELLED | OUTPATIENT
Start: 2023-09-08

## 2023-08-29 RX ORDER — ONDANSETRON 8 MG/1
8 TABLET, FILM COATED ORAL EVERY 8 HOURS PRN
Qty: 30 TABLET | Refills: 2 | Status: SHIPPED | OUTPATIENT
Start: 2023-08-30 | End: 2023-12-15

## 2023-08-29 RX ORDER — ALLOPURINOL 300 MG/1
300 TABLET ORAL DAILY
Qty: 14 TABLET | Refills: 0 | Status: SHIPPED | OUTPATIENT
Start: 2023-08-30 | End: 2023-09-13

## 2023-08-29 RX ORDER — METHYLPREDNISOLONE SODIUM SUCCINATE 125 MG/2ML
125 INJECTION, POWDER, LYOPHILIZED, FOR SOLUTION INTRAMUSCULAR; INTRAVENOUS
Status: CANCELLED
Start: 2023-09-08

## 2023-08-29 RX ORDER — LORAZEPAM 2 MG/ML
0.5 INJECTION INTRAMUSCULAR EVERY 4 HOURS PRN
Status: CANCELLED | OUTPATIENT
Start: 2023-08-31

## 2023-08-29 RX ORDER — EPINEPHRINE 1 MG/ML
0.3 INJECTION, SOLUTION INTRAMUSCULAR; SUBCUTANEOUS EVERY 5 MIN PRN
Status: CANCELLED | OUTPATIENT
Start: 2023-09-08

## 2023-08-29 RX ORDER — ALBUTEROL SULFATE 90 UG/1
1-2 AEROSOL, METERED RESPIRATORY (INHALATION)
Status: CANCELLED
Start: 2023-09-08

## 2023-08-29 RX ORDER — MEPERIDINE HYDROCHLORIDE 25 MG/ML
25 INJECTION INTRAMUSCULAR; INTRAVENOUS; SUBCUTANEOUS EVERY 30 MIN PRN
Status: CANCELLED | OUTPATIENT
Start: 2023-08-31

## 2023-08-29 RX ORDER — DOXORUBICIN HYDROCHLORIDE 2 MG/ML
25 INJECTION, SOLUTION INTRAVENOUS ONCE
Status: CANCELLED | OUTPATIENT
Start: 2023-09-08

## 2023-08-29 RX ORDER — HEPARIN SODIUM (PORCINE) LOCK FLUSH IV SOLN 100 UNIT/ML 100 UNIT/ML
5 SOLUTION INTRAVENOUS
Status: CANCELLED | OUTPATIENT
Start: 2023-08-31

## 2023-08-29 RX ORDER — ALBUTEROL SULFATE 0.83 MG/ML
2.5 SOLUTION RESPIRATORY (INHALATION)
Status: CANCELLED | OUTPATIENT
Start: 2023-08-31

## 2023-08-29 RX ORDER — HEPARIN SODIUM,PORCINE 10 UNIT/ML
5-20 VIAL (ML) INTRAVENOUS DAILY PRN
Status: CANCELLED | OUTPATIENT
Start: 2023-08-31

## 2023-08-29 RX ORDER — MEPERIDINE HYDROCHLORIDE 25 MG/ML
25 INJECTION INTRAMUSCULAR; INTRAVENOUS; SUBCUTANEOUS EVERY 30 MIN PRN
Status: CANCELLED | OUTPATIENT
Start: 2023-09-08

## 2023-08-29 RX ORDER — PROCHLORPERAZINE MALEATE 10 MG
10 TABLET ORAL EVERY 6 HOURS PRN
Qty: 30 TABLET | Refills: 2 | Status: SHIPPED | OUTPATIENT
Start: 2023-08-30 | End: 2024-01-29

## 2023-08-29 RX ORDER — DEXAMETHASONE SODIUM PHOSPHATE 10 MG/ML
8 INJECTION, SOLUTION INTRAMUSCULAR; INTRAVENOUS ONCE
Status: CANCELLED
Start: 2023-09-08 | End: 2023-09-07

## 2023-08-29 RX ORDER — HEPARIN SODIUM (PORCINE) LOCK FLUSH IV SOLN 100 UNIT/ML 100 UNIT/ML
5 SOLUTION INTRAVENOUS
Status: CANCELLED | OUTPATIENT
Start: 2023-09-08

## 2023-08-29 NOTE — TELEPHONE ENCOUNTER
[facility] Call Center    Phone Message    May a detailed message be left on voicemail: yes     Reason for Call: Other: Patient is calling requesting  referral to be sent over to neurology so they can discuss cooncerns with him prior to his upcoming procedure.      Action Taken: Message routed to:  [facility] ([facility]    Travel Screening: Not Applicable

## 2023-08-31 ENCOUNTER — INFUSION THERAPY VISIT (OUTPATIENT)
Dept: ONCOLOGY | Facility: CLINIC | Age: 88
End: 2023-08-31
Attending: STUDENT IN AN ORGANIZED HEALTH CARE EDUCATION/TRAINING PROGRAM
Payer: MEDICARE

## 2023-08-31 ENCOUNTER — APPOINTMENT (OUTPATIENT)
Dept: LAB | Facility: CLINIC | Age: 88
End: 2023-08-31
Attending: STUDENT IN AN ORGANIZED HEALTH CARE EDUCATION/TRAINING PROGRAM
Payer: MEDICARE

## 2023-08-31 VITALS
OXYGEN SATURATION: 95 % | HEART RATE: 81 BPM | TEMPERATURE: 98.1 F | SYSTOLIC BLOOD PRESSURE: 120 MMHG | RESPIRATION RATE: 16 BRPM | WEIGHT: 160.4 LBS | DIASTOLIC BLOOD PRESSURE: 64 MMHG | BODY MASS INDEX: 23.68 KG/M2

## 2023-08-31 DIAGNOSIS — Z76.89 PREVENTION OF CHEMOTHERAPY-INDUCED NEUTROPENIA: ICD-10-CM

## 2023-08-31 DIAGNOSIS — C83.398 DIFFUSE LARGE B-CELL LYMPHOMA OF EXTRANODAL SITE: Primary | ICD-10-CM

## 2023-08-31 DIAGNOSIS — Z51.11 ENCOUNTER FOR ANTINEOPLASTIC CHEMOTHERAPY: ICD-10-CM

## 2023-08-31 LAB
ALBUMIN SERPL BCG-MCNC: 3.9 G/DL (ref 3.5–5.2)
ALP SERPL-CCNC: 92 U/L (ref 40–129)
ALT SERPL W P-5'-P-CCNC: 14 U/L (ref 0–70)
ANION GAP SERPL CALCULATED.3IONS-SCNC: 11 MMOL/L (ref 7–15)
AST SERPL W P-5'-P-CCNC: 17 U/L (ref 0–45)
BASOPHILS # BLD AUTO: 0.1 10E3/UL (ref 0–0.2)
BASOPHILS NFR BLD AUTO: 2 %
BILIRUB SERPL-MCNC: 0.2 MG/DL
BUN SERPL-MCNC: 24 MG/DL (ref 8–23)
CALCIUM SERPL-MCNC: 9.7 MG/DL (ref 8.8–10.2)
CHLORIDE SERPL-SCNC: 103 MMOL/L (ref 98–107)
CREAT SERPL-MCNC: 0.94 MG/DL (ref 0.67–1.17)
DEPRECATED HCO3 PLAS-SCNC: 25 MMOL/L (ref 22–29)
EOSINOPHIL # BLD AUTO: 0.3 10E3/UL (ref 0–0.7)
EOSINOPHIL NFR BLD AUTO: 5 %
ERYTHROCYTE [DISTWIDTH] IN BLOOD BY AUTOMATED COUNT: 13.5 % (ref 10–15)
GFR SERPL CREATININE-BSD FRML MDRD: 78 ML/MIN/1.73M2
GLUCOSE SERPL-MCNC: 197 MG/DL (ref 70–99)
HBV CORE AB SERPL QL IA: NONREACTIVE
HBV SURFACE AB SERPL IA-ACNC: 0.17 M[IU]/ML
HBV SURFACE AB SERPL IA-ACNC: NONREACTIVE M[IU]/ML
HBV SURFACE AG SERPL QL IA: NONREACTIVE
HCT VFR BLD AUTO: 36.5 % (ref 40–53)
HGB BLD-MCNC: 11.7 G/DL (ref 13.3–17.7)
IMM GRANULOCYTES # BLD: 0 10E3/UL
IMM GRANULOCYTES NFR BLD: 0 %
LYMPHOCYTES # BLD AUTO: 1.3 10E3/UL (ref 0.8–5.3)
LYMPHOCYTES NFR BLD AUTO: 19 %
MCH RBC QN AUTO: 29.8 PG (ref 26.5–33)
MCHC RBC AUTO-ENTMCNC: 32.1 G/DL (ref 31.5–36.5)
MCV RBC AUTO: 93 FL (ref 78–100)
MONOCYTES # BLD AUTO: 0.7 10E3/UL (ref 0–1.3)
MONOCYTES NFR BLD AUTO: 10 %
NEUTROPHILS # BLD AUTO: 4.4 10E3/UL (ref 1.6–8.3)
NEUTROPHILS NFR BLD AUTO: 64 %
NRBC # BLD AUTO: 0 10E3/UL
NRBC BLD AUTO-RTO: 0 /100
PLATELET # BLD AUTO: 313 10E3/UL (ref 150–450)
POTASSIUM SERPL-SCNC: 4.2 MMOL/L (ref 3.4–5.3)
PROT SERPL-MCNC: 6.9 G/DL (ref 6.4–8.3)
RBC # BLD AUTO: 3.92 10E6/UL (ref 4.4–5.9)
SODIUM SERPL-SCNC: 139 MMOL/L (ref 136–145)
WBC # BLD AUTO: 6.8 10E3/UL (ref 4–11)

## 2023-08-31 PROCEDURE — 80053 COMPREHEN METABOLIC PANEL: CPT | Performed by: STUDENT IN AN ORGANIZED HEALTH CARE EDUCATION/TRAINING PROGRAM

## 2023-08-31 PROCEDURE — 96415 CHEMO IV INFUSION ADDL HR: CPT

## 2023-08-31 PROCEDURE — 86704 HEP B CORE ANTIBODY TOTAL: CPT | Performed by: STUDENT IN AN ORGANIZED HEALTH CARE EDUCATION/TRAINING PROGRAM

## 2023-08-31 PROCEDURE — 85025 COMPLETE CBC W/AUTO DIFF WBC: CPT | Performed by: STUDENT IN AN ORGANIZED HEALTH CARE EDUCATION/TRAINING PROGRAM

## 2023-08-31 PROCEDURE — 86706 HEP B SURFACE ANTIBODY: CPT | Performed by: STUDENT IN AN ORGANIZED HEALTH CARE EDUCATION/TRAINING PROGRAM

## 2023-08-31 PROCEDURE — 87340 HEPATITIS B SURFACE AG IA: CPT | Performed by: STUDENT IN AN ORGANIZED HEALTH CARE EDUCATION/TRAINING PROGRAM

## 2023-08-31 PROCEDURE — 250N000013 HC RX MED GY IP 250 OP 250 PS 637: Performed by: STUDENT IN AN ORGANIZED HEALTH CARE EDUCATION/TRAINING PROGRAM

## 2023-08-31 PROCEDURE — 96376 TX/PRO/DX INJ SAME DRUG ADON: CPT

## 2023-08-31 PROCEDURE — G0463 HOSPITAL OUTPT CLINIC VISIT: HCPCS | Mod: 25

## 2023-08-31 PROCEDURE — 96375 TX/PRO/DX INJ NEW DRUG ADDON: CPT

## 2023-08-31 PROCEDURE — 258N000003 HC RX IP 258 OP 636: Performed by: STUDENT IN AN ORGANIZED HEALTH CARE EDUCATION/TRAINING PROGRAM

## 2023-08-31 PROCEDURE — 36591 DRAW BLOOD OFF VENOUS DEVICE: CPT | Performed by: STUDENT IN AN ORGANIZED HEALTH CARE EDUCATION/TRAINING PROGRAM

## 2023-08-31 PROCEDURE — 96413 CHEMO IV INFUSION 1 HR: CPT

## 2023-08-31 PROCEDURE — 250N000011 HC RX IP 250 OP 636: Performed by: STUDENT IN AN ORGANIZED HEALTH CARE EDUCATION/TRAINING PROGRAM

## 2023-08-31 RX ORDER — ALBUTEROL SULFATE 90 UG/1
1-2 AEROSOL, METERED RESPIRATORY (INHALATION)
Status: DISCONTINUED | OUTPATIENT
Start: 2023-08-31 | End: 2023-08-31 | Stop reason: HOSPADM

## 2023-08-31 RX ORDER — EPINEPHRINE 1 MG/ML
0.3 INJECTION, SOLUTION INTRAMUSCULAR; SUBCUTANEOUS EVERY 5 MIN PRN
Status: DISCONTINUED | OUTPATIENT
Start: 2023-08-31 | End: 2023-08-31 | Stop reason: HOSPADM

## 2023-08-31 RX ORDER — HEPARIN SODIUM (PORCINE) LOCK FLUSH IV SOLN 100 UNIT/ML 100 UNIT/ML
5 SOLUTION INTRAVENOUS
Status: DISCONTINUED | OUTPATIENT
Start: 2023-08-31 | End: 2023-08-31 | Stop reason: HOSPADM

## 2023-08-31 RX ORDER — HEPARIN SODIUM (PORCINE) LOCK FLUSH IV SOLN 100 UNIT/ML 100 UNIT/ML
5 SOLUTION INTRAVENOUS DAILY PRN
Status: DISCONTINUED | OUTPATIENT
Start: 2023-08-31 | End: 2023-08-31 | Stop reason: HOSPADM

## 2023-08-31 RX ORDER — DIPHENHYDRAMINE HCL 25 MG
50 CAPSULE ORAL ONCE
Status: COMPLETED | OUTPATIENT
Start: 2023-08-31 | End: 2023-08-31

## 2023-08-31 RX ORDER — ACETAMINOPHEN 325 MG/1
650 TABLET ORAL ONCE
Status: COMPLETED | OUTPATIENT
Start: 2023-08-31 | End: 2023-08-31

## 2023-08-31 RX ORDER — ALBUTEROL SULFATE 0.83 MG/ML
2.5 SOLUTION RESPIRATORY (INHALATION)
Status: DISCONTINUED | OUTPATIENT
Start: 2023-08-31 | End: 2023-08-31 | Stop reason: HOSPADM

## 2023-08-31 RX ORDER — METHYLPREDNISOLONE SODIUM SUCCINATE 125 MG/2ML
125 INJECTION, POWDER, LYOPHILIZED, FOR SOLUTION INTRAMUSCULAR; INTRAVENOUS
Status: DISCONTINUED | OUTPATIENT
Start: 2023-08-31 | End: 2023-08-31 | Stop reason: HOSPADM

## 2023-08-31 RX ORDER — MEPERIDINE HYDROCHLORIDE 25 MG/ML
25 INJECTION INTRAMUSCULAR; INTRAVENOUS; SUBCUTANEOUS EVERY 30 MIN PRN
Status: DISCONTINUED | OUTPATIENT
Start: 2023-08-31 | End: 2023-08-31 | Stop reason: HOSPADM

## 2023-08-31 RX ORDER — DIPHENHYDRAMINE HYDROCHLORIDE 50 MG/ML
50 INJECTION INTRAMUSCULAR; INTRAVENOUS
Status: COMPLETED | OUTPATIENT
Start: 2023-08-31 | End: 2023-08-31

## 2023-08-31 RX ADMIN — DIPHENHYDRAMINE HYDROCHLORIDE 50 MG: 25 CAPSULE ORAL at 09:09

## 2023-08-31 RX ADMIN — Medication 5 ML: at 08:16

## 2023-08-31 RX ADMIN — FAMOTIDINE 20 MG: 10 INJECTION, SOLUTION INTRAVENOUS at 11:03

## 2023-08-31 RX ADMIN — FAMOTIDINE 20 MG: 10 INJECTION, SOLUTION INTRAVENOUS at 11:38

## 2023-08-31 RX ADMIN — SODIUM CHLORIDE 500 ML: 9 INJECTION, SOLUTION INTRAVENOUS at 10:58

## 2023-08-31 RX ADMIN — DIPHENHYDRAMINE HYDROCHLORIDE 50 MG: 50 INJECTION INTRAMUSCULAR; INTRAVENOUS at 10:59

## 2023-08-31 RX ADMIN — Medication 5 ML: at 15:22

## 2023-08-31 RX ADMIN — RITUXIMAB-ABBS 700 MG: 10 INJECTION, SOLUTION INTRAVENOUS at 09:59

## 2023-08-31 RX ADMIN — METHYLPREDNISOLONE SODIUM SUCCINATE 125 MG: 125 INJECTION, POWDER, FOR SOLUTION INTRAMUSCULAR; INTRAVENOUS at 11:02

## 2023-08-31 RX ADMIN — MEPERIDINE HYDROCHLORIDE 12.5 MG: 25 INJECTION INTRAMUSCULAR; INTRAVENOUS; SUBCUTANEOUS at 11:34

## 2023-08-31 RX ADMIN — SODIUM CHLORIDE 250 ML: 9 INJECTION, SOLUTION INTRAVENOUS at 09:59

## 2023-08-31 RX ADMIN — ACETAMINOPHEN 650 MG: 325 TABLET ORAL at 09:09

## 2023-08-31 ASSESSMENT — PAIN SCALES - GENERAL: PAINLEVEL: NO PAIN (0)

## 2023-08-31 NOTE — PROGRESS NOTES
"Colon and Rectal Surgery Clinic Note    RE: Tom Saini.  : 1935.  KEN: 2023.    Reason for visit: post op.    HPI: Tom \"Juaquin\"Parveen is a 88 year old male who presents today for a post op. He has a past medical history of aortic insufficiency, DVT, HLD, macular degeneration, and pancreatic cyst. Juaquin had a CT Chest/Abdomen/Pelvis on 2023 that showed a sigmoid mass with large extramural component consistent with adenocarcinoma. There were a few small LNs in the KE/super rectal territory suspicious for regional lymph node involvement. There were also mildly enlarged bilateral LNs. I preformed a colonoscopy on 2023 however this was aborted and the patient proceeded with a CT Colonography. This again demonstrated the above findings. CEA 1.5.     He is now s/p open sigmoid colectomy on 23. 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 He follows with Dr. Buitrago and started chemotherapy. PET scan on 2023 demonstrated multiple hypermetabolic intra-abdominal and pelvic lymph nodes and residual tract postoperative pneumoperitoneum.    He is doing great.  He is eating well, having BM's, and has no pain.  He continues to have numbness and tingling in the left ulnar distribution and recent EMG confirms the lack of function in this nerve.     Surgical Pathology (2023):  A) Sigmoid colon, sigmoidectomy:  - Diffuse large B-cell lymphoma  arising from Follicular Lymphoma,   - See comment     B) Peritoneal lesion, left pelvic sidewall, excision:  - Small fragment of soft  tissue involved by B cell lymphoma  - See comment     C) Anastamosis, distal ring, excision:  - Colonic tissue without significant pathology     D) Anastamosis, proximal ring, excision:  - Colonic tissue without significant pathology   The diagnosis is unchanged. Cytogenetic studies by FISH (see abbreviated results below and the complete cytogenetic report for details) revealed " BCL2::IGH fusion , in addition a subset of the cells showed gain of MYC  , but no MYC translocation was detected. These results support the diagnosis of Follicular lymphoma with progression to Diffuse large B cell lymphoma, which may be correlating with the MYC gain. This result does not support high grade B cell lymphoma diagnosis.   ABNORMAL:  - Gain of MYC (16.5-22%) (sub-population)  - BCL2 rearrangement (80.0%)  - IGH rearrangement (62.5%)    PET (8/11/2023):  IMPRESSION: In this patient with large B-cell lymphoma arising from  follicular lymphoma in the sigmoid colon status post colectomy  7/27/2023:  1. Multiple hypermetabolic intra-abdominal and pelvic lymph nodes,  suggestive of metastatic jaja involvement.  2. Mildly hypermetabolic bilateral inguinal lymph nodes, favored to be  inflammatory. Recommend attention on follow-up.  3. Residual trace postoperative pneumoperitoneum.  4. Incidental findings include 1.8 cm bilateral common iliac  aneurysms.    Medications:  Current Outpatient Medications   Medication Sig Dispense Refill    acyclovir (ZOVIRAX) 400 MG tablet Take 1 tablet (400 mg) by mouth every 12 hours 60 tablet 11    acyclovir (ZOVIRAX) 400 MG tablet Take 1 tablet (400 mg) by mouth every 12 hours for 30 days 60 tablet 11    allopurinol (ZYLOPRIM) 300 MG tablet Take 1 tablet (300 mg) by mouth daily for 14 days 14 tablet 0    calcium carbonate (OS-DAVIDE) 1500 (600 Ca) MG tablet Take 600 mg by mouth 2 times daily (with meals)      Cholecalciferol (VITAMIN D-3) 25 MCG (1000 UT) CAPS Take by mouth daily      lipase-protease-amylase (CREON) 12037-24708-654831 units CPEP per EC capsule Take 2 capsules by mouth 3 times daily (with meals) And 1 with snacks. 200 capsule 1    multivitamin (OCUVITE) TABS Take 1 tablet by mouth 2 times daily      ondansetron (ZOFRAN) 8 MG tablet Take 1 tablet (8 mg) by mouth every 8 hours as needed for nausea (vomiting) 30 tablet 2    predniSONE (DELTASONE) 20 MG tablet Take 1.5  tablets (30 mg) by mouth 2 times daily Take on Days 1 through 5. Take first dose in AM prior to chemotherapy. 15 tablet 7    prochlorperazine (COMPAZINE) 10 MG tablet Take 1 tablet (10 mg) by mouth every 6 hours as needed for nausea or vomiting 30 tablet 2    sodium chloride (LAXMI 128) 5 % ophthalmic ointment Apply a small amount in both eyes at least once a day (bedtime). 3.5 g 11       ROS:  A complete review of systems was performed with the patient and all systems negative except as per HPI.    Physical Examination:  -abdomen soft, not tender, midline incision is completely healed  -still notes numbness in the ulnar distribution of the left upper extremity      ASSESSMENT  89 y/o gentleman with colon lymphoma, s/p sigmoid colectomy - complicated by ulnar neuropathy.    PLAN  1. PT referral for ulnar neuropathy  2. Follow up with me PRN; continue lymphoma treatment per medical oncology    15 minutes spent on the date of the encounter doing chart review, history and exam, imaging review, documentation and further activities as noted above.    Jhonatan Cowart MD, PhD    Division of Colon and Rectal Surgery  [facility]    Referring Provider:  Jhonatan Cowart MD  [address] [place] [id number]     Primary Care Provider:  Angeli Robertson

## 2023-08-31 NOTE — PROGRESS NOTES
Infusion Nursing Note:  Tom Saini presents today for cycle 1, day 1 rituxan.    Patient seen by provider today: No   present during visit today: Not Applicable.    Note: Patient new to infusion, oriented to infusion suite and call light.  Patient confirms that he has received written chemotherapy teaching materials. Basic side effects of medications reviewed today.  Patient confirms that he has a thermometer at home. Instructed to call with any fevers >100.4, shaking chills, other infectious symptoms, uncontrolled nausea, vomiting, diarrhea, constipation or any other new or concerning symptoms.  Provided with the phone number for triage.      Intravenous Access:  Implanted Port.    Treatment Conditions:  Lab Results   Component Value Date    HGB 11.7 (L) 08/31/2023    WBC 6.8 08/31/2023    ANEU 5.1 02/27/2021    ANEUTAUTO 4.4 08/31/2023     08/31/2023        Lab Results   Component Value Date     08/31/2023    POTASSIUM 4.2 08/31/2023    MAG 2.2 07/31/2023    CR 0.94 08/31/2023    DAVIDE 9.7 08/31/2023    BILITOTAL 0.2 08/31/2023    ALBUMIN 3.9 08/31/2023    ALT 14 08/31/2023    AST 17 08/31/2023       Results reviewed, labs MET treatment parameters, ok to proceed with treatment.      Post Infusion Assessment:  Patient tolerated infusion poorly due to : Hypersensitivity: Did patient have a hypersensitivity reaction? : Yes  Drug or Product name: rituxan  Were pre-meds administered?: Yes  What pre-meds were administered?: Diphenhydramine (Benadryl);Acetaminophen (Tylenol)  First or Subsequent treatment: First time receiving  Rate of infusion when patient had hypersensitivity reaction: 100ml/hour  Time the hypersensitivity reaction was first recognized: 1057  Symptoms observed or reported (select all that apply): Rigors (leg numbness)  Interventions/treatment following reaction: Infusion stopped;Hypersensitivity medications administered;Reduced rate of medication administration  What  hypersensitivity medications were administered?: DiphendydrAMINE (benadryl);Meperidine (Demerol);Methylprednisolone;Famotidine(Pepcid)  Name of provider notified: Dr Buitrago  Time provider notified: 1110  Type of notification (select all that apply): Paged/Phone;Provider at bedside  Was the patient re-challenged today?: Yes - tolerated well      With 3 minutes left at a rate of 100ml/hour patient put on his call light feeling chilled.  Chills developed into rigors. Rituxan stopped. Warm blankets applied. Benadryl, solu-medrol, and pepcid given. VS monitored and stable. Patient also report some numbness bilaterally in legs from the knees down. Symptoms improved with intervention but still had not resolved after 30 minutes. Demerol 12.5mg given IV and patient monitored. Patient sleepy following benadryl and demerol.      8/31/2023 1137 KAYKAY Buitrago MD/Cecy Alvarez RN   -once symptoms resolve, ok to restart rituxan at 50ml/hour and titrate per protocol      At 1200, patient's chills had completely resolved and his legs were feeling better. VS stable. Patient tired and sleeping. Rituxan restarted at 50ml/hour and titrated as ordered. Tolerated the remainder of infusions without incident.       Blood return noted pre and post infusion.  Site patent and intact, free from redness, edema or discomfort.  No evidence of extravasations.  Access discontinued per protocol.       Discharge Plan:   Prescription refills given for allopurinol, prednisone, zofran, and compazine (already started taking acyclovir). Pharmacy staff did teaching with patient and spouse regarding the new medications.   Discharge instructions reviewed with: Patient and spouse.  Patient and/or family verbalized understanding of discharge instructions and all questions answered.  AVS to patient via [employer].  Patient will return 9/8/23 for next appointment.   Patient discharged in stable condition accompanied by: self and wife.  Departure Mode:  Ambulatory.      Cecy Alvarez RN

## 2023-08-31 NOTE — PATIENT INSTRUCTIONS
[facility] Triage and after hours / weekends / holidays:  [phone]    Please call the triage or after hours line if you experience a temperature greater than or equal to 100.4, shaking chills, have uncontrolled nausea, vomiting and/or diarrhea, dizziness, shortness of breath, chest pain, bleeding, unexplained bruising, or if you have any other new/concerning symptoms, questions or concerns.      If you are having any concerning symptoms or wish to speak to a provider before your next infusion visit, please call triage to notify them so we can adequately serve you.     If you need a refill on a narcotic prescription or other medication, please call before your infusion appointment.           August 2023 Sunday Monday Tuesday Wednesday Thursday Friday Saturday             1    IP PT TREATMENT   1:00 PM   (30 min.)   Debora Koroma PTA   [facility]    IP OT TREATMENT   2:30 PM   (30 min.)   Rosa Elena Maria, OT   [facility] 2    IP OT TREATMENT   9:00 AM   (30 min.)   Lauren Feliz OT   [facility] 3     4    TRANSITIONAL CARE UNIT VISIT   1:00 PM   (30 min.)   Radha Rousseau, HEYDI CNP   [facility] 5       6     7    TRANSITIONAL CARE UNIT VISIT  12:30 PM   (30 min.)   Rosenstein, Benjamin, MD   [facility] 8     9     10    ECHO COMPLETE  11:15 AM   (60 min.)   [email or web address]   [facility]    LAB PERIPHERAL   1:15 PM   (15 min.)    [facility] LAB DRAW   [facility]    NEW ONCOLOGY   2:00 PM   (60 min.)   Holly Buitrago MD   [facility] 11    PET ONCOLOGY WHOLE BODY   8:30 AM   (60 min.)   [email or web address]   [facility] 12       13     14    DISCHARGE SUMMARY   2:30 PM   (30 min.)   Rosenstein, Benjamin, MD   [facility] 15      16     17     18    POST-OP   9:15 AM   (30 min.)   Jenny Poe PA-C   [facility] [place] 19       20     21     22    ED/HOSP FOLLOW UP   1:35 PM   (30 min.)   Erwin Allen MD   [facility] Internal Medicine [place] 23     24    Outpatient Visit   7:28 AM   [facility] OR [place]    IR CHEST PORT PLACEMENT >5 YRS   7:30 AM   (60 min.)   [email or web address]   [facility] ASC Imaging [place]    INSERTION, VASCULAR ACCESS PORT   9:00 AM   Cesar Negro MD   [facility] OR    LAB PERIPHERAL   2:45 PM   (15 min.)   [facility] DRAW   [facility]    RETURN CCSL   3:00 PM   (30 min.)   Holly Buitrago MD   [facility] 25     26       27     28     29     30    EMG   9:15 AM   (30 min.)   Mateo Laughlin MD   [facility] EMG [facility] 31    LAB PERIPHERAL   7:45 AM   (15 min.)   [facility] DRAW   [facility]    ONC INFUSION 4 HR (240 MIN)   8:30 AM   (240 min.)   UC ONC INFUSION NURSE   [facility]                       September 2023 Sunday Monday Tuesday Wednesday Thursday Friday Saturday                            1     2       3     4     5     6     7    PENTAMADINE NEB   2:15 PM   (60 min.)   UC PFL PENT   [facility] Pulmonary Function Testing [place] 8    LAB PERIPHERAL  11:00 AM   (15 min.)   [facility] DRAW   [facility]    ONC INFUSION 3.5 HR (210 MIN)  11:30 AM   (210 min.)   UC ONC INFUSION NURSE   [facility] 9       10    ONC INFUSION 0.5 HR (30 MIN)  12:00 PM   (30 min.)   UC ONC INFUSION NURSE   [facility] 11     12    LAB CENTRAL   9:00 AM   (15 min.)   [facility] DRAW   [facility]    RETURN CCSL   9:15 AM   (45 min.)   Miriam Rouse APRN CNP   M  [facility]    POST-OP  10:15 AM   (15 min.)   Jhonatan Cowart MD   [facility] [place] 13     14    PET ONCOLOGY WHOLE BODY   8:30 AM   (30 min.)   [email or web address]   [facility] 15     16       17     18     19     20     21     22     23       24     25     26     27     28    LAB CENTRAL   8:45 AM   (15 min.)   [facility] LAB DRAW   [facility]    RETURN CCSL   9:15 AM   (45 min.)   Miriam Rouse APRN CNP   [facility]    ONC INFUSION 5 HR (300 MIN)  10:30 AM   (300 min.)    ONC INFUSION NURSE   [facility] 29     30                    Recent Results (from the past 24 hour(s))   Comprehensive metabolic panel    Collection Time: 08/31/23  8:21 AM   Result Value Ref Range    Sodium 139 136 - 145 mmol/L    Potassium 4.2 3.4 - 5.3 mmol/L    Chloride 103 98 - 107 mmol/L    Carbon Dioxide (CO2) 25 22 - 29 mmol/L    Anion Gap 11 7 - 15 mmol/L    Urea Nitrogen 24.0 (H) 8.0 - 23.0 mg/dL    Creatinine 0.94 0.67 - 1.17 mg/dL    Calcium 9.7 8.8 - 10.2 mg/dL    Glucose 197 (H) 70 - 99 mg/dL    Alkaline Phosphatase 92 40 - 129 U/L    AST 17 0 - 45 U/L    ALT 14 0 - 70 U/L    Protein Total 6.9 6.4 - 8.3 g/dL    Albumin 3.9 3.5 - 5.2 g/dL    Bilirubin Total 0.2 <=1.2 mg/dL    GFR Estimate 78 >60 mL/min/1.73m2   Hepatitis B core antibody    Collection Time: 08/31/23  8:21 AM   Result Value Ref Range    Hepatitis B Core Antibody Total Nonreactive Nonreactive   Hepatitis B surface antigen    Collection Time: 08/31/23  8:21 AM   Result Value Ref Range    Hepatitis B Surface Antigen Nonreactive Nonreactive   Hepatitis B Surface Antibody    Collection Time: 08/31/23  8:21 AM   Result Value Ref Range    Hepatitis B Surface Antibody Instrument Value 0.17 <8.00 m[IU]/mL    Hepatitis B Surface Antibody Nonreactive    CBC with platelets and differential    Collection  Time: 08/31/23  8:21 AM   Result Value Ref Range    WBC Count 6.8 4.0 - 11.0 10e3/uL    RBC Count 3.92 (L) 4.40 - 5.90 10e6/uL    Hemoglobin 11.7 (L) 13.3 - 17.7 g/dL    Hematocrit 36.5 (L) 40.0 - 53.0 %    MCV 93 78 - 100 fL    MCH 29.8 26.5 - 33.0 pg    MCHC 32.1 31.5 - 36.5 g/dL    RDW 13.5 10.0 - 15.0 %    Platelet Count 313 150 - 450 10e3/uL    % Neutrophils 64 %    % Lymphocytes 19 %    % Monocytes 10 %    % Eosinophils 5 %    % Basophils 2 %    % Immature Granulocytes 0 %    NRBCs per 100 WBC 0 <1 /100    Absolute Neutrophils 4.4 1.6 - 8.3 10e3/uL    Absolute Lymphocytes 1.3 0.8 - 5.3 10e3/uL    Absolute Monocytes 0.7 0.0 - 1.3 10e3/uL    Absolute Eosinophils 0.3 0.0 - 0.7 10e3/uL    Absolute Basophils 0.1 0.0 - 0.2 10e3/uL    Absolute Immature Granulocytes 0.0 <=0.4 10e3/uL    Absolute NRBCs 0.0 10e3/uL

## 2023-08-31 NOTE — NURSING NOTE
Chief Complaint   Patient presents with    Port Draw     Labs drawn via port by RN in lab. VS taken.      Labs drawn via Port accessed using 20g flat needle. Line flushed and Heparin locked. Vital signs taken. Checked into next appointment.     Nadine Uriostegui RN

## 2023-09-05 NOTE — TELEPHONE ENCOUNTER
The patient was calling about getting a referral for [facility], he tried to go through [facility] but we are booking too far out, please review and follow up with the patient on more details thank you.

## 2023-09-06 ENCOUNTER — TELEPHONE (OUTPATIENT)
Dept: SURGERY | Facility: CLINIC | Age: 88
End: 2023-09-06
Payer: MEDICARE

## 2023-09-06 NOTE — TELEPHONE ENCOUNTER
Health Call Center    Phone Message    May a detailed message be left on voicemail: yes     Reason for Call: Maame from [facility] stated melanie the order they had receives for the pt's EMG was incomplete. Caller is requesting the clinic resend the order with the missing information:    Pt demographics  Diagnosis   Which extremities and instructions on what needs to be performed  Clinic fax and phone number     [facility] fax: :[phone]   Phone: [phone] op 2    Action Taken: Message routed to:  [facility] YIN PARADA     Travel Screening: Not Applicable

## 2023-09-07 ENCOUNTER — TRANSFERRED RECORDS (OUTPATIENT)
Dept: HEALTH INFORMATION MANAGEMENT | Facility: CLINIC | Age: 88
End: 2023-09-07

## 2023-09-07 DIAGNOSIS — C83.398 DIFFUSE LARGE B-CELL LYMPHOMA OF EXTRANODAL SITE: Primary | ICD-10-CM

## 2023-09-07 PROCEDURE — 94642 AEROSOL INHALATION TREATMENT: CPT | Performed by: INTERNAL MEDICINE

## 2023-09-07 PROCEDURE — 94640 AIRWAY INHALATION TREATMENT: CPT | Performed by: INTERNAL MEDICINE

## 2023-09-07 RX ORDER — EPINEPHRINE 1 MG/ML
0.3 INJECTION, SOLUTION, CONCENTRATE INTRAVENOUS EVERY 5 MIN PRN
Status: CANCELLED | OUTPATIENT
Start: 2023-10-05

## 2023-09-07 RX ORDER — DIPHENHYDRAMINE HYDROCHLORIDE 50 MG/ML
50 INJECTION INTRAMUSCULAR; INTRAVENOUS
Status: CANCELLED
Start: 2023-10-05

## 2023-09-07 RX ORDER — ALBUTEROL SULFATE 0.83 MG/ML
2.5 SOLUTION RESPIRATORY (INHALATION) ONCE
Status: CANCELLED
Start: 2023-10-05 | End: 2023-10-05

## 2023-09-07 RX ORDER — METHYLPREDNISOLONE SODIUM SUCCINATE 125 MG/2ML
125 INJECTION, POWDER, LYOPHILIZED, FOR SOLUTION INTRAMUSCULAR; INTRAVENOUS
Status: CANCELLED
Start: 2023-10-05

## 2023-09-07 RX ORDER — ALBUTEROL SULFATE 90 UG/1
1-2 AEROSOL, METERED RESPIRATORY (INHALATION)
Status: CANCELLED
Start: 2023-10-05

## 2023-09-07 RX ORDER — ALBUTEROL SULFATE 0.83 MG/ML
2.5 SOLUTION RESPIRATORY (INHALATION)
Status: CANCELLED | OUTPATIENT
Start: 2023-10-05

## 2023-09-07 RX ORDER — MEPERIDINE HYDROCHLORIDE 25 MG/ML
25 INJECTION INTRAMUSCULAR; INTRAVENOUS; SUBCUTANEOUS EVERY 30 MIN PRN
Status: CANCELLED | OUTPATIENT
Start: 2023-10-05

## 2023-09-07 RX ORDER — PENTAMIDINE ISETHIONATE 300 MG/300MG
300 INHALANT RESPIRATORY (INHALATION)
Status: COMPLETED | OUTPATIENT
Start: 2023-09-07 | End: 2023-09-07

## 2023-09-07 RX ORDER — PENTAMIDINE ISETHIONATE 300 MG/300MG
300 INHALANT RESPIRATORY (INHALATION)
Status: CANCELLED
Start: 2023-10-05 | End: 2023-10-05

## 2023-09-07 RX ADMIN — PENTAMIDINE ISETHIONATE 300 MG: 300 INHALANT RESPIRATORY (INHALATION) at 15:04

## 2023-09-07 NOTE — PROGRESS NOTES
Tom Saini was seen today for a Pentamidine nebulizer tx ordered by Dr. Holly Buitrago.    Patient was first given 4 puffs of albuterol MDI with spacer (due to severe albuterol nebulizer shortage), after which Pentamidine 300 mg (Lot # [id number], NDC# [id number], EXP 05/2025) mixed with 6cc Sterile H20 was administered through a filtered nebulizer.    Pre-treatment: SpO2 = 96% HR = 86 bpm     BBS = clear   Post-treatment: SpO2 = 95%  HR = 85 bpm   BBS = clear    Patient noted feeling slightly dizzy, but that can be common for patient. Tom statyed seated until he felt the dizziness dissipated.    This service today was provided under the supervision of Dr. Denise Mayer, who was available if needed.     Procedure was completed by Alex Davidson RRT.

## 2023-09-08 ENCOUNTER — APPOINTMENT (OUTPATIENT)
Dept: LAB | Facility: CLINIC | Age: 88
End: 2023-09-08
Attending: STUDENT IN AN ORGANIZED HEALTH CARE EDUCATION/TRAINING PROGRAM
Payer: MEDICARE

## 2023-09-08 ENCOUNTER — INFUSION THERAPY VISIT (OUTPATIENT)
Dept: ONCOLOGY | Facility: CLINIC | Age: 88
End: 2023-09-08
Attending: STUDENT IN AN ORGANIZED HEALTH CARE EDUCATION/TRAINING PROGRAM
Payer: MEDICARE

## 2023-09-08 VITALS
TEMPERATURE: 97.7 F | BODY MASS INDEX: 24.1 KG/M2 | RESPIRATION RATE: 16 BRPM | HEART RATE: 99 BPM | WEIGHT: 163.3 LBS | OXYGEN SATURATION: 99 % | SYSTOLIC BLOOD PRESSURE: 126 MMHG | DIASTOLIC BLOOD PRESSURE: 69 MMHG

## 2023-09-08 DIAGNOSIS — C83.398 DIFFUSE LARGE B-CELL LYMPHOMA OF EXTRANODAL SITE: Primary | ICD-10-CM

## 2023-09-08 DIAGNOSIS — Z51.11 ENCOUNTER FOR ANTINEOPLASTIC CHEMOTHERAPY: ICD-10-CM

## 2023-09-08 DIAGNOSIS — Z76.89 PREVENTION OF CHEMOTHERAPY-INDUCED NEUTROPENIA: ICD-10-CM

## 2023-09-08 LAB
ALBUMIN SERPL BCG-MCNC: 3.8 G/DL (ref 3.5–5.2)
ALP SERPL-CCNC: 80 U/L (ref 40–129)
ALT SERPL W P-5'-P-CCNC: 10 U/L (ref 0–70)
ANION GAP SERPL CALCULATED.3IONS-SCNC: 9 MMOL/L (ref 7–15)
AST SERPL W P-5'-P-CCNC: 14 U/L (ref 0–45)
BASOPHILS # BLD AUTO: 0.1 10E3/UL (ref 0–0.2)
BASOPHILS NFR BLD AUTO: 1 %
BILIRUB SERPL-MCNC: 0.3 MG/DL
BUN SERPL-MCNC: 19.6 MG/DL (ref 8–23)
CALCIUM SERPL-MCNC: 9.6 MG/DL (ref 8.8–10.2)
CHLORIDE SERPL-SCNC: 102 MMOL/L (ref 98–107)
CREAT SERPL-MCNC: 0.92 MG/DL (ref 0.67–1.17)
DEPRECATED HCO3 PLAS-SCNC: 28 MMOL/L (ref 22–29)
EGFRCR SERPLBLD CKD-EPI 2021: 81 ML/MIN/1.73M2
EOSINOPHIL # BLD AUTO: 0.3 10E3/UL (ref 0–0.7)
EOSINOPHIL NFR BLD AUTO: 3 %
ERYTHROCYTE [DISTWIDTH] IN BLOOD BY AUTOMATED COUNT: 14.3 % (ref 10–15)
GLUCOSE SERPL-MCNC: 99 MG/DL (ref 70–99)
HCT VFR BLD AUTO: 36.4 % (ref 40–53)
HGB BLD-MCNC: 11.8 G/DL (ref 13.3–17.7)
IMM GRANULOCYTES # BLD: 0.1 10E3/UL
IMM GRANULOCYTES NFR BLD: 1 %
LYMPHOCYTES # BLD AUTO: 0.9 10E3/UL (ref 0.8–5.3)
LYMPHOCYTES NFR BLD AUTO: 10 %
MCH RBC QN AUTO: 30.2 PG (ref 26.5–33)
MCHC RBC AUTO-ENTMCNC: 32.4 G/DL (ref 31.5–36.5)
MCV RBC AUTO: 93 FL (ref 78–100)
MONOCYTES # BLD AUTO: 1 10E3/UL (ref 0–1.3)
MONOCYTES NFR BLD AUTO: 11 %
NEUTROPHILS # BLD AUTO: 6.7 10E3/UL (ref 1.6–8.3)
NEUTROPHILS NFR BLD AUTO: 74 %
NRBC # BLD AUTO: 0 10E3/UL
NRBC BLD AUTO-RTO: 0 /100
PLATELET # BLD AUTO: 281 10E3/UL (ref 150–450)
POTASSIUM SERPL-SCNC: 4.7 MMOL/L (ref 3.4–5.3)
PROT SERPL-MCNC: 6.6 G/DL (ref 6.4–8.3)
RBC # BLD AUTO: 3.91 10E6/UL (ref 4.4–5.9)
SODIUM SERPL-SCNC: 139 MMOL/L (ref 136–145)
WBC # BLD AUTO: 9 10E3/UL (ref 4–11)

## 2023-09-08 PROCEDURE — 96367 TX/PROPH/DG ADDL SEQ IV INF: CPT

## 2023-09-08 PROCEDURE — 96413 CHEMO IV INFUSION 1 HR: CPT

## 2023-09-08 PROCEDURE — 96375 TX/PRO/DX INJ NEW DRUG ADDON: CPT

## 2023-09-08 PROCEDURE — 80053 COMPREHEN METABOLIC PANEL: CPT | Performed by: STUDENT IN AN ORGANIZED HEALTH CARE EDUCATION/TRAINING PROGRAM

## 2023-09-08 PROCEDURE — 250N000011 HC RX IP 250 OP 636: Mod: JZ | Performed by: STUDENT IN AN ORGANIZED HEALTH CARE EDUCATION/TRAINING PROGRAM

## 2023-09-08 PROCEDURE — 258N000003 HC RX IP 258 OP 636: Performed by: STUDENT IN AN ORGANIZED HEALTH CARE EDUCATION/TRAINING PROGRAM

## 2023-09-08 PROCEDURE — 96377 APPLICATON ON-BODY INJECTOR: CPT

## 2023-09-08 PROCEDURE — 96411 CHEMO IV PUSH ADDL DRUG: CPT

## 2023-09-08 PROCEDURE — 96372 THER/PROPH/DIAG INJ SC/IM: CPT | Performed by: STUDENT IN AN ORGANIZED HEALTH CARE EDUCATION/TRAINING PROGRAM

## 2023-09-08 PROCEDURE — 36591 DRAW BLOOD OFF VENOUS DEVICE: CPT | Performed by: STUDENT IN AN ORGANIZED HEALTH CARE EDUCATION/TRAINING PROGRAM

## 2023-09-08 PROCEDURE — 85025 COMPLETE CBC W/AUTO DIFF WBC: CPT | Performed by: STUDENT IN AN ORGANIZED HEALTH CARE EDUCATION/TRAINING PROGRAM

## 2023-09-08 RX ORDER — HEPARIN SODIUM (PORCINE) LOCK FLUSH IV SOLN 100 UNIT/ML 100 UNIT/ML
5 SOLUTION INTRAVENOUS ONCE
Status: COMPLETED | OUTPATIENT
Start: 2023-09-08 | End: 2023-09-08

## 2023-09-08 RX ORDER — PALONOSETRON 0.05 MG/ML
0.25 INJECTION, SOLUTION INTRAVENOUS ONCE
Status: COMPLETED | OUTPATIENT
Start: 2023-09-08 | End: 2023-09-08

## 2023-09-08 RX ORDER — DOXORUBICIN HYDROCHLORIDE 2 MG/ML
25 INJECTION, SOLUTION INTRAVENOUS ONCE
Status: COMPLETED | OUTPATIENT
Start: 2023-09-08 | End: 2023-09-08

## 2023-09-08 RX ORDER — DEXAMETHASONE SODIUM PHOSPHATE 10 MG/ML
8 INJECTION, SOLUTION INTRAMUSCULAR; INTRAVENOUS ONCE
Status: DISCONTINUED | OUTPATIENT
Start: 2023-09-08 | End: 2023-09-08

## 2023-09-08 RX ORDER — HEPARIN SODIUM (PORCINE) LOCK FLUSH IV SOLN 100 UNIT/ML 100 UNIT/ML
5 SOLUTION INTRAVENOUS
Status: DISCONTINUED | OUTPATIENT
Start: 2023-09-08 | End: 2023-09-08 | Stop reason: HOSPADM

## 2023-09-08 RX ADMIN — DOXORUBICIN HYDROCHLORIDE 45 MG: 2 INJECTION, SOLUTION INTRAVENOUS at 13:11

## 2023-09-08 RX ADMIN — SODIUM CHLORIDE 250 ML: 9 INJECTION, SOLUTION INTRAVENOUS at 12:14

## 2023-09-08 RX ADMIN — Medication 5 ML: at 11:40

## 2023-09-08 RX ADMIN — PEGFILGRASTIM 6 MG: KIT SUBCUTANEOUS at 13:34

## 2023-09-08 RX ADMIN — DEXAMETHASONE SODIUM PHOSPHATE: 10 INJECTION, SOLUTION INTRAMUSCULAR; INTRAVENOUS at 12:27

## 2023-09-08 RX ADMIN — Medication 5 ML: at 14:04

## 2023-09-08 RX ADMIN — CYCLOPHOSPHAMIDE 750 MG: 1 INJECTION, POWDER, FOR SOLUTION INTRAVENOUS; ORAL at 13:31

## 2023-09-08 RX ADMIN — VINCRISTINE SULFATE 1 MG: 1 INJECTION, SOLUTION INTRAVENOUS at 13:24

## 2023-09-08 RX ADMIN — PALONOSETRON HYDROCHLORIDE 0.25 MG: 0.25 INJECTION INTRAVENOUS at 12:14

## 2023-09-08 ASSESSMENT — PAIN SCALES - GENERAL: PAINLEVEL: NO PAIN (0)

## 2023-09-08 NOTE — NURSING NOTE
"Chief Complaint   Patient presents with    Port Draw     Labs drawn via port by RN. VS taken.     Port accessed with 20 gauge, 3/4\" power needle by RN, labs collected, line flushed with saline and heparin.  Vitals taken. Pt checked in for appointment(s).     Hafsa Landry RN    "

## 2023-09-08 NOTE — PROGRESS NOTES
Infusion Nursing Note:  Tom Saini presents today for C1D8 Adriamycin, Vincritine, and Cytoxan.    Patient seen by provider today: No   present during visit today: Not Applicable.    Note: Juaquin presents to infusion today feeling well. He denies any pain, infectious symptoms, or other concerns. He is receiving everything for the first time today. Information provided by RNCC prior and reinforced by writer in infusion. Patient and wife state understanding.    Intravenous Access:  Implanted Port.    Treatment Conditions:  Lab Results   Component Value Date    HGB 11.8 (L) 09/08/2023    WBC 9.0 09/08/2023    ANEU 5.1 02/27/2021    ANEUTAUTO 6.7 09/08/2023     09/08/2023     Lab Results   Component Value Date     09/08/2023    POTASSIUM 4.7 09/08/2023    MAG 2.2 07/31/2023    CR 0.92 09/08/2023    DAVIDE 9.6 09/08/2023    BILITOTAL 0.3 09/08/2023    ALBUMIN 3.8 09/08/2023    ALT 10 09/08/2023    AST 14 09/08/2023     Results reviewed, labs MET treatment parameters, ok to proceed with treatment.  ECHO/MUGA completed 8/10/23  EF 55-60%.    Post Infusion Assessment:  Patient tolerated infusion without incident.  Blood return noted pre and post infusion.  Site patent and intact, free from redness, edema or discomfort.  No evidence of extravasations.  Access discontinued per protocol.     Neulasta Onpro On-Body injector applied to R arm at 1340.  Writer discussed Neulasta injection would start tomorrow 9/9 at 1640, approximately 27 hours after application applied today.    Written and Verbal instruction reviewed with patient.  Pt instructed when the dose delivery starts, it will take about 45 minutes to complete.  Pt aware Neulasta Onpro On-Body should have green flashing light and to call triage or on-call MD if injector flashes red or appears to be leaking.   Pt aware to keep Onpro On-Body Neulasta 4 inches away from electrical equipment and to avoid showering 4 hours prior to injection.      Discharge Plan:   Patient declined prescription refills.  Discharge instructions reviewed with: Patient and Family.  Patient and/or family verbalized understanding of discharge instructions and all questions answered.  Copy of AVS reviewed with patient and/or family.  Patient will return 9/28 for next appointment.  Patient discharged in stable condition accompanied by: wife.  Departure Mode: Ambulatory.      Maria M Ashton RN

## 2023-09-08 NOTE — PATIENT INSTRUCTIONS
Start taking Claritin daily for bone pain from Neulasta Onpro if needed.   Neulasta injection will start tomorrow at 4:40, approximately 27 hours after application today.  When the dose delivery starts, it will take about 45 minutes to complete. Neulasta Onpro On-Body should have green flashing light.  Call triage or on-call MD if injector flashes red or appears to be leaking.  Keep Onpro On-Body Neulasta 4 inches away from electrical equipment and avoid showering 4 hours prior to injection.       Contact Numbers  [facility]: [phone] (for symptom and scheduling needs)    Please call the [facility] Triage line if you experience a temperature greater than or equal to 100.4, shaking chills, have uncontrolled nausea, vomiting and/or diarrhea, dizziness, shortness of breath, chest pain, bleeding, unexplained bruising, or if you have any other new/concerning symptoms, questions or concerns.     If you are having any concerning symptoms or wish to speak to a provider before your next infusion visit, please call your care coordinator or triage to notify them so we can adequately serve you.     If you need a refill on a narcotic prescription or other medication, please call triage before your infusion appointment.          Lab Results:  Recent Results (from the past 12 hour(s))   Comprehensive metabolic panel    Collection Time: 09/08/23 11:33 AM   Result Value Ref Range    Sodium 139 136 - 145 mmol/L    Potassium 4.7 3.4 - 5.3 mmol/L    Chloride 102 98 - 107 mmol/L    Carbon Dioxide (CO2) 28 22 - 29 mmol/L    Anion Gap 9 7 - 15 mmol/L    Urea Nitrogen 19.6 8.0 - 23.0 mg/dL    Creatinine 0.92 0.67 - 1.17 mg/dL    Calcium 9.6 8.8 - 10.2 mg/dL    Glucose 99 70 - 99 mg/dL    Alkaline Phosphatase 80 40 - 129 U/L    AST 14 0 - 45 U/L    ALT 10 0 - 70 U/L    Protein Total 6.6 6.4 - 8.3 g/dL    Albumin 3.8 3.5 - 5.2 g/dL    Bilirubin Total 0.3 <=1.2 mg/dL    GFR Estimate 81 >60 mL/min/1.73m2   CBC with platelets and  differential    Collection Time: 09/08/23 11:33 AM   Result Value Ref Range    WBC Count 9.0 4.0 - 11.0 10e3/uL    RBC Count 3.91 (L) 4.40 - 5.90 10e6/uL    Hemoglobin 11.8 (L) 13.3 - 17.7 g/dL    Hematocrit 36.4 (L) 40.0 - 53.0 %    MCV 93 78 - 100 fL    MCH 30.2 26.5 - 33.0 pg    MCHC 32.4 31.5 - 36.5 g/dL    RDW 14.3 10.0 - 15.0 %    Platelet Count 281 150 - 450 10e3/uL    % Neutrophils 74 %    % Lymphocytes 10 %    % Monocytes 11 %    % Eosinophils 3 %    % Basophils 1 %    % Immature Granulocytes 1 %    NRBCs per 100 WBC 0 <1 /100    Absolute Neutrophils 6.7 1.6 - 8.3 10e3/uL    Absolute Lymphocytes 0.9 0.8 - 5.3 10e3/uL    Absolute Monocytes 1.0 0.0 - 1.3 10e3/uL    Absolute Eosinophils 0.3 0.0 - 0.7 10e3/uL    Absolute Basophils 0.1 0.0 - 0.2 10e3/uL    Absolute Immature Granulocytes 0.1 <=0.4 10e3/uL    Absolute NRBCs 0.0 10e3/uL

## 2023-09-11 LAB — CULTURE HARVEST COMPLETE DATE: NORMAL

## 2023-09-11 NOTE — PROGRESS NOTES
Julee Reza is a 88 year old, presenting for the following health issues:  Port Draw (Port accessed and labs drawn by rn in lab. Vital signs taken.) and Oncology Clinic Visit (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  He has had diarrhea with particular food for several years and recently was investigated for possible pancreatic insufficiency.  He also has a history of pancreatic pseudocyst.  On recent MRI done in June 2023 to follow-up on pancreatic pseudocyst showed a colonic mass.  Subsequent CT scan of chest abdomen pelvis showed Mild anemia, no cytopenias 7.9 x 6.9 x 5.6 cm large soft tissue mass in sigmoid colon obliterating the lumen and significant extramural soft tissue extension superiorly in the sigmoid mesocolon.  Enlarged bilateral inguinal lymph node, as few small prominent lymph nodes along superior rectal/inferior mesenteric artery distribution.  Diffuse main pancreatic duct dilatation with large intraductal calculus in the head was seen as well.  Multiple cystic lesions and dilated sidebranches were seen in the pancreas.  He underwent colonoscopy but due to obliterated lumen, colonoscopy failed.  Subsequently CT colonography was done which showed similar results to CT chest abdomen pelvis.  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  FISH showed Bcl-2 rearrangement but no MYC rearrangement.  Bcl-2 IgH rearrangement [translocation (14;18)] is consistent with transformation of DLBCL from follicular lymphoma.  Postoperative course was complicated by postoperative nausea vomiting which resolved, single episode of suicidal ideation which resolved.  He was discharged to TCU.  He has been regaining his strength slowly, his performance status has improved to 1.  He is able to walk independently for short  "distances, can do basic activities of daily living himself.    PET scan revealed persistent hypermetabolic retroperitoneal lymph nodes, possibly reactive but lymphoma cannot be ruled out. ECHOcardiogram reveals EF >50%. LDH within normal limits. Overall IPI 2. Low-intermediate risk GCB DLBCL.     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  He is overall doing well.  His energy levels have been good.  He is eating and drinking well and his appetite is good.      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  Reports this has not improved or worsened.  He has been referred to Hand/wrist provider.  He has severe numbness of 4th and 5th fingers in the R hand.      Denies fevers/chills, night sweats, N/V/D/C, recurring infections, bleeding issues, rashes/sores.       Objective    /61 (BP Location: Right arm, Patient Position: Sitting, Cuff Size: Adult Regular)   Pulse 101   Temp 98.1  F (36.7  C) (Oral)   Resp 16   Ht 1.753 m (5' 9.02\")   Wt 74 kg (163 lb 1.6 oz)   SpO2 96%   BMI 24.07 kg/m    Body mass index is 24.07 kg/m .  Physical Exam   General: No acute distress  HEENT: Sclera anicteric. Oral exam deferred  Lymph: No lymphadenopathy in neck, supraclavicular, and axillary areas   Heart: Regular, rate, and rhythm  Lungs: Clear to ascultation bilaterally  Abdomen: Positive bowel sounds.   Extremities: no lower extremity edema  Neuro: Cranial nerves grossly intact  Rash: none on exposed skin    Labs:  Most Recent 3 CBC's:  Recent Labs   Lab Test 09/12/23  0926 09/08/23  1133 08/31/23  0821 08/10/23  1636   WBC 54.2* 9.0 6.8 8.9   HGB 11.2* 11.8* 11.7* 11.2*   MCV 95 93 93 95    281 313 " 518*   ANEUTAUTO  --  6.7 4.4 6.0     Most Recent 3 BMP's:  Recent Labs   Lab Test 09/08/23  1133 08/31/23  0821 08/11/23  0845 08/10/23  1636    139  --  140   POTASSIUM 4.7 4.2  --  4.4   CHLORIDE 102 103  --  104   CO2 28 25  --  27   BUN 19.6 24.0*  --  23.2*   CR 0.92 0.94  --  0.97   ANIONGAP 9 11  --  9   DAVIDE 9.6 9.7  --  9.5   GLC 99 197* 83 108*   PROTTOTAL 6.6 6.9  --  6.5   ALBUMIN 3.8 3.9  --  3.6    Most Recent 3 LFT's:  Recent Labs   Lab Test 09/08/23  1133 08/31/23  0821 08/10/23  1636   AST 14 17 25   ALT 10 14 34   ALKPHOS 80 92 121   BILITOTAL 0.3 0.2 <0.2    Most Recent 2 TSH and T4:  Recent Labs   Lab Test 02/27/21  1149   TSH 0.90     I reviewed the above labs today.    Assessment and Plan:    1) Diffuse large B cell lymphoma:  - First dose will be divided into two doses a week apart: Rituximab plus steroids followed by CHO.   - PET scan will be after 2 cycles and 6 cycles.  - PET scan 10/12  - Mini-R-CHOP C1D1 8/31 (divided into 2 doses 8/31 and 9/8)  - Follow-up with VINAYAK prior to each cycle  - S/p C1 and tolerating treatment well without significant side effects.  His biggest complaint was his baseline neuropathy in his right hand.     Ppx:  - Acyclovir 400 mg BID- patient unable to determine if he is taking or not  - Allopurinol 300 mg daily     2) Pancreatic insufficiency:  - Continue Creon.   - We will monitor for diarrhea.    3) R hand neuropathy:  Patient vocalized frustrations today of his baseline neuropathy in his R hand 4th and 5th fingers.  He was seen by his PCP for the issue and referred for a EMG.  The EMG was performed on 9/7 by [facility].  Patient wanted to review the results of his EMG and discuss the need for a brace.  - Informed patient he should follow-up with Jenny Poe PA-C who ordered the EMG and to discuss the brace, pt verbalized understanding.    25 minutes spent on the date of the encounter doing chart review, review of test results,  interpretation of tests, patient visit, and documentation     Miriam Rouse, HEYDI CNP

## 2023-09-12 ENCOUNTER — APPOINTMENT (OUTPATIENT)
Dept: LAB | Facility: CLINIC | Age: 88
End: 2023-09-12
Attending: STUDENT IN AN ORGANIZED HEALTH CARE EDUCATION/TRAINING PROGRAM
Payer: MEDICARE

## 2023-09-12 ENCOUNTER — OFFICE VISIT (OUTPATIENT)
Dept: SURGERY | Facility: CLINIC | Age: 88
End: 2023-09-12
Payer: MEDICARE

## 2023-09-12 ENCOUNTER — TELEPHONE (OUTPATIENT)
Dept: OPHTHALMOLOGY | Facility: CLINIC | Age: 88
End: 2023-09-12

## 2023-09-12 ENCOUNTER — ONCOLOGY VISIT (OUTPATIENT)
Dept: ONCOLOGY | Facility: CLINIC | Age: 88
End: 2023-09-12
Attending: STUDENT IN AN ORGANIZED HEALTH CARE EDUCATION/TRAINING PROGRAM
Payer: MEDICARE

## 2023-09-12 VITALS
RESPIRATION RATE: 16 BRPM | BODY MASS INDEX: 24.16 KG/M2 | HEIGHT: 69 IN | HEART RATE: 101 BPM | OXYGEN SATURATION: 96 % | SYSTOLIC BLOOD PRESSURE: 132 MMHG | TEMPERATURE: 98.1 F | DIASTOLIC BLOOD PRESSURE: 61 MMHG | WEIGHT: 163.1 LBS

## 2023-09-12 VITALS
SYSTOLIC BLOOD PRESSURE: 132 MMHG | DIASTOLIC BLOOD PRESSURE: 61 MMHG | WEIGHT: 163 LBS | TEMPERATURE: 98.1 F | BODY MASS INDEX: 24.14 KG/M2 | HEIGHT: 69 IN | HEART RATE: 101 BPM | OXYGEN SATURATION: 96 %

## 2023-09-12 DIAGNOSIS — G62.9 NEUROPATHY: ICD-10-CM

## 2023-09-12 DIAGNOSIS — R20.2 NUMBNESS AND TINGLING IN LEFT ARM: Primary | ICD-10-CM

## 2023-09-12 DIAGNOSIS — C83.398 DIFFUSE LARGE B-CELL LYMPHOMA OF EXTRANODAL SITE: Primary | ICD-10-CM

## 2023-09-12 DIAGNOSIS — R20.0 NUMBNESS AND TINGLING IN LEFT ARM: Primary | ICD-10-CM

## 2023-09-12 LAB
ALBUMIN SERPL BCG-MCNC: 3.6 G/DL (ref 3.5–5.2)
ALP SERPL-CCNC: 168 U/L (ref 40–129)
ALT SERPL W P-5'-P-CCNC: 8 U/L (ref 0–70)
ANION GAP SERPL CALCULATED.3IONS-SCNC: 10 MMOL/L (ref 7–15)
AST SERPL W P-5'-P-CCNC: 13 U/L (ref 0–45)
BASOPHILS # BLD MANUAL: 0.5 10E3/UL (ref 0–0.2)
BASOPHILS NFR BLD MANUAL: 1 %
BILIRUB SERPL-MCNC: 0.6 MG/DL
BUN SERPL-MCNC: 20.4 MG/DL (ref 8–23)
CALCIUM SERPL-MCNC: 9.3 MG/DL (ref 8.8–10.2)
CHLORIDE SERPL-SCNC: 103 MMOL/L (ref 98–107)
CREAT SERPL-MCNC: 0.88 MG/DL (ref 0.67–1.17)
DEPRECATED HCO3 PLAS-SCNC: 26 MMOL/L (ref 22–29)
EGFRCR SERPLBLD CKD-EPI 2021: 83 ML/MIN/1.73M2
EOSINOPHIL # BLD MANUAL: 1.1 10E3/UL (ref 0–0.7)
EOSINOPHIL NFR BLD MANUAL: 2 %
ERYTHROCYTE [DISTWIDTH] IN BLOOD BY AUTOMATED COUNT: 14.5 % (ref 10–15)
GLUCOSE SERPL-MCNC: 171 MG/DL (ref 70–99)
HCT VFR BLD AUTO: 34.7 % (ref 40–53)
HGB BLD-MCNC: 11.2 G/DL (ref 13.3–17.7)
LDH SERPL L TO P-CCNC: 232 U/L (ref 0–250)
LYMPHOCYTES # BLD MANUAL: 0.5 10E3/UL (ref 0.8–5.3)
LYMPHOCYTES NFR BLD MANUAL: 1 %
MCH RBC QN AUTO: 30.5 PG (ref 26.5–33)
MCHC RBC AUTO-ENTMCNC: 32.3 G/DL (ref 31.5–36.5)
MCV RBC AUTO: 95 FL (ref 78–100)
MONOCYTES # BLD MANUAL: 0 10E3/UL (ref 0–1.3)
MONOCYTES NFR BLD MANUAL: 0 %
NEUTROPHILS # BLD MANUAL: 52 10E3/UL (ref 1.6–8.3)
NEUTROPHILS NFR BLD MANUAL: 96 %
PLAT MORPH BLD: ABNORMAL
PLATELET # BLD AUTO: 194 10E3/UL (ref 150–450)
POTASSIUM SERPL-SCNC: 4.3 MMOL/L (ref 3.4–5.3)
PROT SERPL-MCNC: 6.2 G/DL (ref 6.4–8.3)
RBC # BLD AUTO: 3.67 10E6/UL (ref 4.4–5.9)
RBC MORPH BLD: ABNORMAL
SODIUM SERPL-SCNC: 139 MMOL/L (ref 136–145)
URATE SERPL-MCNC: 4.9 MG/DL (ref 3.4–7)
WBC # BLD AUTO: 54.2 10E3/UL (ref 4–11)

## 2023-09-12 PROCEDURE — 36591 DRAW BLOOD OFF VENOUS DEVICE: CPT

## 2023-09-12 PROCEDURE — 80053 COMPREHEN METABOLIC PANEL: CPT

## 2023-09-12 PROCEDURE — 250N000011 HC RX IP 250 OP 636: Mod: JZ

## 2023-09-12 PROCEDURE — 85007 BL SMEAR W/DIFF WBC COUNT: CPT

## 2023-09-12 PROCEDURE — 84550 ASSAY OF BLOOD/URIC ACID: CPT

## 2023-09-12 PROCEDURE — G0463 HOSPITAL OUTPT CLINIC VISIT: HCPCS

## 2023-09-12 PROCEDURE — 85027 COMPLETE CBC AUTOMATED: CPT

## 2023-09-12 PROCEDURE — 99024 POSTOP FOLLOW-UP VISIT: CPT | Performed by: SURGERY

## 2023-09-12 PROCEDURE — 83615 LACTATE (LD) (LDH) ENZYME: CPT

## 2023-09-12 PROCEDURE — 99213 OFFICE O/P EST LOW 20 MIN: CPT

## 2023-09-12 RX ORDER — HEPARIN SODIUM (PORCINE) LOCK FLUSH IV SOLN 100 UNIT/ML 100 UNIT/ML
5 SOLUTION INTRAVENOUS ONCE
Status: COMPLETED | OUTPATIENT
Start: 2023-09-12 | End: 2023-09-12

## 2023-09-12 RX ADMIN — Medication 5 ML: at 09:20

## 2023-09-12 ASSESSMENT — PAIN SCALES - GENERAL
PAINLEVEL: NO PAIN (0)
PAINLEVEL: NO PAIN (0)

## 2023-09-12 NOTE — LETTER
9/12/2023         RE: Tom Saini  [address] [facility] [address]        Dear Colleague,    Thank you for referring your patient, Tom Saini, to the [facility]. Please see a copy of my visit note below.      Julee Reza is a 88 year old, presenting for the following health issues:  Port Draw (Port accessed and labs drawn by rn in lab. 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  He has had diarrhea with particular food for several years and recently was investigated for possible pancreatic insufficiency.  He also has a history of pancreatic pseudocyst.  On recent MRI done in June 2023 to follow-up on pancreatic pseudocyst showed a colonic mass.  Subsequent CT scan of chest abdomen pelvis showed Mild anemia, no cytopenias 7.9 x 6.9 x 5.6 cm large soft tissue mass in sigmoid colon obliterating the lumen and significant extramural soft tissue extension superiorly in the sigmoid mesocolon.  Enlarged bilateral inguinal lymph node, as few small prominent lymph nodes along superior rectal/inferior mesenteric artery distribution.  Diffuse main pancreatic duct dilatation with large intraductal calculus in the head was seen as well.  Multiple cystic lesions and dilated sidebranches were seen in the pancreas.  He underwent colonoscopy but due to obliterated lumen, colonoscopy failed.  Subsequently CT colonography was done which showed similar results to CT chest abdomen pelvis.  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  FISH showed Bcl-2 rearrangement but no MYC rearrangement.  Bcl-2 IgH rearrangement [translocation (14;18)] is consistent with transformation of DLBCL from follicular lymphoma.  Postoperative course was complicated  "by postoperative nausea vomiting which resolved, single episode of suicidal ideation which resolved.  He was discharged to TCU.  He has been regaining his strength slowly, his performance status has improved to 1.  He is able to walk independently for short distances, can do basic activities of daily living himself.    PET scan revealed persistent hypermetabolic retroperitoneal lymph nodes, possibly reactive but lymphoma cannot be ruled out. ECHOcardiogram reveals EF >50%. LDH within normal limits. Overall IPI 2. Low-intermediate risk GCB DLBCL.     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  He is overall doing well.  His energy levels have been good.  He is eating and drinking well and his appetite is good.      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  Reports this has not improved or worsened.  He has been referred to Hand/wrist provider.  He has severe numbness of 4th and 5th fingers in the R hand.      Denies fevers/chills, night sweats, N/V/D/C, recurring infections, bleeding issues, rashes/sores.       Objective   /61 (BP Location: Right arm, Patient Position: Sitting, Cuff Size: Adult Regular)   Pulse 101   Temp 98.1  F (36.7  C) (Oral)   Resp 16   Ht 1.753 m (5' 9.02\")   Wt 74 kg (163 lb 1.6 oz)   SpO2 96%   BMI 24.07 kg/m    Body mass index is 24.07 kg/m .  Physical Exam   General: No acute distress  HEENT: Sclera anicteric. Oral exam deferred  Lymph: No lymphadenopathy in neck, supraclavicular, and axillary areas   Heart: Regular, rate, and rhythm  Lungs: Clear to ascultation bilaterally  Abdomen: Positive bowel sounds.   Extremities: no lower extremity edema  Neuro: " Cranial nerves grossly intact  Rash: none on exposed skin    Labs:  Most Recent 3 CBC's:  Recent Labs   Lab Test 09/12/23  0926 09/08/23  1133 08/31/23  0821 08/10/23  1636   WBC 54.2* 9.0 6.8 8.9   HGB 11.2* 11.8* 11.7* 11.2*   MCV 95 93 93 95    281 313 518*   ANEUTAUTO  --  6.7 4.4 6.0     Most Recent 3 BMP's:  Recent Labs   Lab Test 09/08/23  1133 08/31/23  0821 08/11/23  0845 08/10/23  1636    139  --  140   POTASSIUM 4.7 4.2  --  4.4   CHLORIDE 102 103  --  104   CO2 28 25  --  27   BUN 19.6 24.0*  --  23.2*   CR 0.92 0.94  --  0.97   ANIONGAP 9 11  --  9   DAVIDE 9.6 9.7  --  9.5   GLC 99 197* 83 108*   PROTTOTAL 6.6 6.9  --  6.5   ALBUMIN 3.8 3.9  --  3.6    Most Recent 3 LFT's:  Recent Labs   Lab Test 09/08/23  1133 08/31/23  0821 08/10/23  1636   AST 14 17 25   ALT 10 14 34   ALKPHOS 80 92 121   BILITOTAL 0.3 0.2 <0.2    Most Recent 2 TSH and T4:  Recent Labs   Lab Test 02/27/21  1149   TSH 0.90     I reviewed the above labs today.    Assessment and Plan:    1) Diffuse large B cell lymphoma:  - First dose will be divided into two doses a week apart: Rituximab plus steroids followed by CHO.   - PET scan will be after 2 cycles and 6 cycles.  - PET scan 10/12  - Mini-R-CHOP C1D1 8/31 (divided into 2 doses 8/31 and 9/8)  - Follow-up with VINAYAK prior to each cycle  - S/p C1 and tolerating treatment well without significant side effects.  His biggest complaint was his baseline neuropathy in his right hand.     Ppx:  - Acyclovir 400 mg BID- patient unable to determine if he is taking or not  - Allopurinol 300 mg daily     2) Pancreatic insufficiency:  - Continue Creon.   - We will monitor for diarrhea.    3) R hand neuropathy:  Patient vocalized frustrations today of his baseline neuropathy in his R hand 4th and 5th fingers.  He was seen by his PCP for the issue and referred for a EMG.  The EMG was performed on 9/7 by [facility].  Patient wanted to review the results of his EMG and  discuss the need for a brace.  - Informed patient he should follow-up with Jenny Poe PA-C who ordered the EMG and to discuss the brace, pt verbalized understanding.    25 minutes spent on the date of the encounter doing chart review, review of test results, interpretation of tests, patient visit, and documentation     HEYDI Perkins CNP

## 2023-09-12 NOTE — NURSING NOTE
"Chief Complaint   Patient presents with    Post-op Visit       Vitals:    09/12/23 1047   BP: 132/61   BP Location: Left arm   Patient Position: Sitting   Cuff Size: Adult Regular   Pulse: 101   Temp: 98.1  F (36.7  C)   TempSrc: Oral   SpO2: 96%   Weight: 163 lb   Height: 5' 9\"       Body mass index is 24.07 kg/m .    Kathryn Quan CMA    "

## 2023-09-12 NOTE — NURSING NOTE
"Chief Complaint   Patient presents with    Port Draw     Port accessed and labs drawn by rn in lab. Vital signs taken.     Port accessed by RN in lab with 20g 3/4\" gripper needle, labs drawn, port flushed with saline and heparin, port de-accessed.  vitals checked, pt checked in for next appointment.    Leyla Longoria RN    "

## 2023-09-12 NOTE — NURSING NOTE
"Oncology Rooming Note    September 12, 2023 9:38 AM   Tom Saini is a 88 year old male who presents for:    Chief Complaint   Patient presents with    Port Draw     Port accessed and labs drawn by rn in lab. Vital signs taken.    Oncology Clinic Visit     [facility] RETURN - DLBCL      Initial Vitals: /61 (BP Location: Right arm, Patient Position: Sitting, Cuff Size: Adult Regular)   Pulse 101   Temp 98.1  F (36.7  C) (Oral)   Resp 16   Ht 1.753 m (5' 9.02\")   Wt 74 kg (163 lb 1.6 oz)   SpO2 96%   BMI 24.07 kg/m   Estimated body mass index is 24.07 kg/m  as calculated from the following:    Height as of this encounter: 1.753 m (5' 9.02\").    Weight as of this encounter: 74 kg (163 lb 1.6 oz). Body surface area is 1.9 meters squared.  No Pain (0) Comment: Data Unavailable   No LMP for male patient.  Allergies reviewed: Yes  Medications reviewed: Yes    Medications: Medication refills not needed today.  Pharmacy name entered into [facility]:    [id number] [place], [place] - [address] AT [facility] - [address] [place] [address]    Rom Davis LPN              "

## 2023-09-12 NOTE — TELEPHONE ENCOUNTER
[facility] Call Center    Phone Message    May a detailed message be left on voicemail: yes     Reason for Call: Other: Patient has an apt with Dr Laura for 10/12 and wants provider to know that in the AM patient is scheduled for a PET scan and will be slightly radioactive. Patient would like to know if he should still come in or reschedule. Please call patient back at [phone]. Thank you.     Action Taken: Message routed to:  [facility] ([facility] Eye    Travel Screening: Not Applicable

## 2023-09-12 NOTE — LETTER
"2023       RE: Tom Saini  [address]  [facility] [address]     Dear Colleague,    Thank you for referring your patient, Tom Saini, to the [facility] COLON AND RECTAL SURGERY CLINIC [place] [facility]. Please see a copy of my visit note below.    Colon and Rectal Surgery Clinic Note    RE: Tom Saini.  : 1935.  KEN: 2023.    Reason for visit: post op.    HPI: Tom \"Juaquin\"Parveen is a 88 year old male who presents today for a post op. He has a past medical history of aortic insufficiency, DVT, HLD, macular degeneration, and pancreatic cyst. Juaquin had a CT Chest/Abdomen/Pelvis on 2023 that showed a sigmoid mass with large extramural component consistent with adenocarcinoma. There were a few small LNs in the KE/super rectal territory suspicious for regional lymph node involvement. There were also mildly enlarged bilateral LNs. I preformed a colonoscopy on 2023 however this was aborted and the patient proceeded with a CT Colonography. This again demonstrated the above findings. CEA 1.5.     He is now s/p open sigmoid colectomy on 23. 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 He follows with Dr. Buitrago and started chemotherapy. PET scan on 2023 demonstrated multiple hypermetabolic intra-abdominal and pelvic lymph nodes and residual tract postoperative pneumoperitoneum.    He is doing great.  He is eating well, having BM's, and has no pain.  He continues to have numbness and tingling in the left ulnar distribution and recent EMG confirms the lack of function in this nerve.     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 Cytogenetic studies by FISH (see abbreviated results below and the complete cytogenetic report for details) revealed BCL2::IGH fusion , in addition a subset of the cells showed gain of MYC  , but no MYC translocation was detected. These results support the diagnosis of Follicular lymphoma with progression to Diffuse large B cell lymphoma, which may be correlating with the MYC gain. This result does not support high grade B cell lymphoma diagnosis.   ABNORMAL:  - Gain of MYC (16.5-22%) (sub-population)  - BCL2 rearrangement (80.0%)  - IGH rearrangement (62.5%)    PET (8/11/2023):  IMPRESSION: In this patient with large B-cell lymphoma arising from  follicular lymphoma in the sigmoid colon status post colectomy  7/27/2023:  1. Multiple hypermetabolic intra-abdominal and pelvic lymph nodes,  suggestive of metastatic jaja involvement.  2. Mildly hypermetabolic bilateral inguinal lymph nodes, favored to be  inflammatory. Recommend attention on follow-up.  3. Residual trace postoperative pneumoperitoneum.  4. Incidental findings include 1.8 cm bilateral common iliac  aneurysms.    Medications:  Current Outpatient Medications   Medication Sig Dispense Refill    acyclovir (ZOVIRAX) 400 MG tablet Take 1 tablet (400 mg) by mouth every 12 hours 60 tablet 11    acyclovir (ZOVIRAX) 400 MG tablet Take 1 tablet (400 mg) by mouth every 12 hours for 30 days 60 tablet 11    allopurinol (ZYLOPRIM) 300 MG tablet Take 1 tablet (300 mg) by mouth daily for 14 days 14 tablet 0    calcium carbonate (OS-DAVIDE) 1500 (600 Ca) MG tablet Take 600 mg by mouth 2 times daily (with meals)      Cholecalciferol (VITAMIN D-3) 25 MCG (1000 UT) CAPS Take by mouth daily      lipase-protease-amylase (CREON) 90016-16924-676234 units CPEP per EC  capsule Take 2 capsules by mouth 3 times daily (with meals) And 1 with snacks. 200 capsule 1    multivitamin (OCUVITE) TABS Take 1 tablet by mouth 2 times daily      ondansetron (ZOFRAN) 8 MG tablet Take 1 tablet (8 mg) by mouth every 8 hours as needed for nausea (vomiting) 30 tablet 2    predniSONE (DELTASONE) 20 MG tablet Take 1.5 tablets (30 mg) by mouth 2 times daily Take on Days 1 through 5. Take first dose in AM prior to chemotherapy. 15 tablet 7    prochlorperazine (COMPAZINE) 10 MG tablet Take 1 tablet (10 mg) by mouth every 6 hours as needed for nausea or vomiting 30 tablet 2    sodium chloride (LAXMI 128) 5 % ophthalmic ointment Apply a small amount in both eyes at least once a day (bedtime). 3.5 g 11       ROS:  A complete review of systems was performed with the patient and all systems negative except as per HPI.    Physical Examination:  -abdomen soft, not tender, midline incision is completely healed  -still notes numbness in the ulnar distribution of the left upper extremity      ASSESSMENT  89 y/o gentleman with colon lymphoma, s/p sigmoid colectomy - complicated by ulnar neuropathy.    PLAN  1. PT referral for ulnar neuropathy  2. Follow up with me PRN; continue lymphoma treatment per medical oncology    15 minutes spent on the date of the encounter doing chart review, history and exam, imaging review, documentation and further activities as noted above.    Referring Provider:  Jhonatan Cowart MD  [address] [place] [id number]     Primary Care Provider:  Angeli Robertson      Again, thank you for allowing me to participate in the care of your patient.      Sincerely,    Jhonatan Cowart MD

## 2023-09-13 NOTE — TELEPHONE ENCOUNTER
Called and spoke to wife     I made an appointment for Juaquin for 10/17 @ 2 pm - wife will let her  know     Karuna Stoner Communication Facilitator on 9/13/2023 at 1:27 PM

## 2023-09-19 LAB
ADDITIONAL COMMENTS: NORMAL
INTERPRETATION: NORMAL
ISCN: NORMAL
METHODS: NORMAL

## 2023-09-26 NOTE — PROGRESS NOTES
Julee Reza is a 88 year old, presenting for the following health issues:  Port Draw (Vitals taken port accessed labs drawn heparin locked, checked into next appt) and Oncology Clinic Visit (Diffuse large B-cell lymphoma )    HPI     Oncology History Overview Note   This is an 88-year-old gentleman coming in with newly diagnosed DLBCL composite with follicular lymphoma found in colon mass.  He has had diarrhea with particular food for several years and recently was investigated for possible pancreatic insufficiency.  He also has a history of pancreatic pseudocyst.  On recent MRI done in June 2023 to follow-up on pancreatic pseudocyst showed a colonic mass.  Subsequent CT scan of chest abdomen pelvis showed Mild anemia, no cytopenias 7.9 x 6.9 x 5.6 cm large soft tissue mass in sigmoid colon obliterating the lumen and significant extramural soft tissue extension superiorly in the sigmoid mesocolon.  Enlarged bilateral inguinal lymph node, as few small prominent lymph nodes along superior rectal/inferior mesenteric artery distribution.  Diffuse main pancreatic duct dilatation with large intraductal calculus in the head was seen as well.  Multiple cystic lesions and dilated sidebranches were seen in the pancreas.  He underwent colonoscopy but due to obliterated lumen, colonoscopy failed.  Subsequently CT colonography was done which showed similar results to CT chest abdomen pelvis.  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  FISH showed Bcl-2 rearrangement but no MYC rearrangement.  Bcl-2 IgH rearrangement [translocation (14;18)] is consistent with transformation of DLBCL from follicular lymphoma.  Postoperative course was complicated by postoperative nausea vomiting which resolved, single episode of suicidal ideation which resolved.  He was discharged to TCU.  He has been regaining his strength slowly, his performance status has improved to 1.  He is able to  walk independently for short distances, can do basic activities of daily living himself.    PET scan revealed persistent hypermetabolic retroperitoneal lymph nodes, possibly reactive but lymphoma cannot be ruled out. ECHOcardiogram reveals EF >50%. LDH within normal limits. Overall IPI 2. Low-intermediate risk GCB DLBCL.     Diffuse large B-cell lymphoma of extranodal site (H)   8/21/2023 Initial Diagnosis    Diffuse large B-cell lymphoma of extranodal site (H)     8/31/2023 -  Chemotherapy    OP ONC Non-Hodgkin's Lymphoma - mini-R-CHOP  Plan Provider: Holly Buitrago MD  Treatment goal: Curative  Line of treatment: First Line     Juaquin presents to clinic for follow-up prior to C2D1. His energy levels are a little lower then they were in May but he remains active.  He is eating and drinking well with a good appetite.    He has some baseline neuropathy in L hands.  Reports this has not improved or worsened.  He has severe numbness of 4th and 5th fingers in the R hand.  He has an appointment with PT.    Denies fevers/chills, night sweats, N/V/D/C, recurring infections, bleeding issues, rashes/sores.       Objective    /65 (BP Location: Left arm, Patient Position: Sitting, Cuff Size: Adult Regular)   Pulse 99   Temp 98.2  F (36.8  C) (Oral)   Resp 16   Wt 73.3 kg (161 lb 8 oz)   SpO2 97%   BMI 23.85 kg/m    Body mass index is 23.85 kg/m .  Physical Exam   General: No acute distress  HEENT: Sclera anicteric. Oral exam deferred  Lymph: No lymphadenopathy in neck and supraclavicular areas   Heart: Regular, rate, and rhythm  Lungs: Clear to ascultation bilaterally  Abdomen: Positive bowel sounds.   Extremities: no lower extremity edema  Neuro: Cranial nerves grossly intact  Rash: none on exposed skin    Labs:  Most Recent 3 CBC's:  Recent Labs   Lab Test 09/28/23  0908 09/12/23  0926 09/08/23  1133 08/31/23  0821   WBC 10.1 54.2* 9.0 6.8   HGB 11.9* 11.2* 11.8* 11.7*   MCV 95 95 93 93    194 281 313    ANEUTAUTO 7.8  --  6.7 4.4     Most Recent 3 BMP's:  Recent Labs   Lab Test 09/12/23  0926 09/08/23  1133 08/31/23  0821    139 139   POTASSIUM 4.3 4.7 4.2   CHLORIDE 103 102 103   CO2 26 28 25   BUN 20.4 19.6 24.0*   CR 0.88 0.92 0.94   ANIONGAP 10 9 11   DAVIDE 9.3 9.6 9.7   * 99 197*   PROTTOTAL 6.2* 6.6 6.9   ALBUMIN 3.6 3.8 3.9    Most Recent 3 LFT's:  Recent Labs   Lab Test 09/12/23  0926 09/08/23  1133 08/31/23  0821   AST 13 14 17   ALT 8 10 14   ALKPHOS 168* 80 92   BILITOTAL 0.6 0.3 0.2    Most Recent 2 TSH and T4:  Recent Labs   Lab Test 02/27/21  1149   TSH 0.90     I reviewed the above labs today.    Assessment and Plan:    1) Diffuse large B cell lymphoma:  - First dose will be divided into two doses a week apart: Rituximab plus steroids followed by CHO.   - PET scan will be after 2 cycles and 6 cycles.  - PET scan 10/12  - Received C1D1 in 2 divided doses on 8/31 and 9/8  - Follow-up with VINAYAK prior to each cycle  - S/p C1 and tolerating treatment well, his only complaint is his baseline neuropathy which has not worsened with treatment.  - Proceed with Mini-R-CHOP C2D1 9/28     Ppx:  - Acyclovir 400 mg BID- patient unable to determine if he is taking or not  - Allopurinol 300 mg daily     2) Pancreatic insufficiency:  - Continue Creon.   - We will monitor for diarrhea.    3) R hand neuropathy:  Patient vocalized frustrations today of his baseline neuropathy in his R hand 4th and 5th fingers.  He was seen by his PCP for the issue and referred for a EMG.  The EMG was performed on 9/7 by [facility].  Patient wanted to review the results of his EMG and discuss the need for a brace.  - Scheduled with Hand/Wrist Specialist 10/31  - Is going to start working with PT    20 minutes spent on the date of the encounter doing chart review, review of test results, interpretation of tests, patient visit, and documentation     HEYDI Perkins CNP

## 2023-09-28 ENCOUNTER — INFUSION THERAPY VISIT (OUTPATIENT)
Dept: ONCOLOGY | Facility: CLINIC | Age: 88
End: 2023-09-28
Attending: STUDENT IN AN ORGANIZED HEALTH CARE EDUCATION/TRAINING PROGRAM
Payer: MEDICARE

## 2023-09-28 ENCOUNTER — APPOINTMENT (OUTPATIENT)
Dept: LAB | Facility: CLINIC | Age: 88
End: 2023-09-28
Attending: STUDENT IN AN ORGANIZED HEALTH CARE EDUCATION/TRAINING PROGRAM
Payer: MEDICARE

## 2023-09-28 VITALS
OXYGEN SATURATION: 97 % | WEIGHT: 161.5 LBS | RESPIRATION RATE: 16 BRPM | DIASTOLIC BLOOD PRESSURE: 65 MMHG | HEART RATE: 99 BPM | SYSTOLIC BLOOD PRESSURE: 117 MMHG | TEMPERATURE: 98.2 F | BODY MASS INDEX: 23.85 KG/M2

## 2023-09-28 DIAGNOSIS — C83.398 DIFFUSE LARGE B-CELL LYMPHOMA OF EXTRANODAL SITE: Primary | ICD-10-CM

## 2023-09-28 DIAGNOSIS — Z76.89 PREVENTION OF CHEMOTHERAPY-INDUCED NEUTROPENIA: ICD-10-CM

## 2023-09-28 DIAGNOSIS — Z51.11 ENCOUNTER FOR ANTINEOPLASTIC CHEMOTHERAPY: ICD-10-CM

## 2023-09-28 DIAGNOSIS — G62.9 NEUROPATHY: ICD-10-CM

## 2023-09-28 LAB
ALBUMIN SERPL BCG-MCNC: 3.8 G/DL (ref 3.5–5.2)
ALP SERPL-CCNC: 117 U/L (ref 40–129)
ALT SERPL W P-5'-P-CCNC: 8 U/L (ref 0–70)
ANION GAP SERPL CALCULATED.3IONS-SCNC: 10 MMOL/L (ref 7–15)
AST SERPL W P-5'-P-CCNC: 12 U/L (ref 0–45)
BASOPHILS # BLD AUTO: 0.2 10E3/UL (ref 0–0.2)
BASOPHILS NFR BLD AUTO: 2 %
BILIRUB SERPL-MCNC: 0.2 MG/DL
BUN SERPL-MCNC: 21.2 MG/DL (ref 8–23)
CALCIUM SERPL-MCNC: 9.3 MG/DL (ref 8.8–10.2)
CHLORIDE SERPL-SCNC: 105 MMOL/L (ref 98–107)
CREAT SERPL-MCNC: 0.88 MG/DL (ref 0.67–1.17)
EGFRCR SERPLBLD CKD-EPI 2021: 83 ML/MIN/1.73M2
EOSINOPHIL # BLD AUTO: 0.1 10E3/UL (ref 0–0.7)
EOSINOPHIL NFR BLD AUTO: 1 %
ERYTHROCYTE [DISTWIDTH] IN BLOOD BY AUTOMATED COUNT: 15.3 % (ref 10–15)
GLUCOSE SERPL-MCNC: 186 MG/DL (ref 70–99)
HCO3 SERPL-SCNC: 26 MMOL/L (ref 22–29)
HCT VFR BLD AUTO: 37 % (ref 40–53)
HGB BLD-MCNC: 11.9 G/DL (ref 13.3–17.7)
IMM GRANULOCYTES # BLD: 0.2 10E3/UL
IMM GRANULOCYTES NFR BLD: 2 %
LYMPHOCYTES # BLD AUTO: 1.1 10E3/UL (ref 0.8–5.3)
LYMPHOCYTES NFR BLD AUTO: 11 %
MCH RBC QN AUTO: 30.4 PG (ref 26.5–33)
MCHC RBC AUTO-ENTMCNC: 32.2 G/DL (ref 31.5–36.5)
MCV RBC AUTO: 95 FL (ref 78–100)
MONOCYTES # BLD AUTO: 0.7 10E3/UL (ref 0–1.3)
MONOCYTES NFR BLD AUTO: 7 %
NEUTROPHILS # BLD AUTO: 7.8 10E3/UL (ref 1.6–8.3)
NEUTROPHILS NFR BLD AUTO: 77 %
NRBC # BLD AUTO: 0 10E3/UL
NRBC BLD AUTO-RTO: 0 /100
PLATELET # BLD AUTO: 329 10E3/UL (ref 150–450)
POTASSIUM SERPL-SCNC: 4.1 MMOL/L (ref 3.4–5.3)
PROT SERPL-MCNC: 6.7 G/DL (ref 6.4–8.3)
RBC # BLD AUTO: 3.91 10E6/UL (ref 4.4–5.9)
SODIUM SERPL-SCNC: 141 MMOL/L (ref 135–145)
WBC # BLD AUTO: 10.1 10E3/UL (ref 4–11)

## 2023-09-28 PROCEDURE — 96413 CHEMO IV INFUSION 1 HR: CPT

## 2023-09-28 PROCEDURE — 96415 CHEMO IV INFUSION ADDL HR: CPT

## 2023-09-28 PROCEDURE — 96377 APPLICATON ON-BODY INJECTOR: CPT | Mod: 59 | Performed by: NURSE PRACTITIONER

## 2023-09-28 PROCEDURE — 36415 COLL VENOUS BLD VENIPUNCTURE: CPT | Performed by: STUDENT IN AN ORGANIZED HEALTH CARE EDUCATION/TRAINING PROGRAM

## 2023-09-28 PROCEDURE — 99213 OFFICE O/P EST LOW 20 MIN: CPT

## 2023-09-28 PROCEDURE — 85025 COMPLETE CBC W/AUTO DIFF WBC: CPT | Performed by: STUDENT IN AN ORGANIZED HEALTH CARE EDUCATION/TRAINING PROGRAM

## 2023-09-28 PROCEDURE — 96375 TX/PRO/DX INJ NEW DRUG ADDON: CPT

## 2023-09-28 PROCEDURE — 250N000011 HC RX IP 250 OP 636: Mod: JZ

## 2023-09-28 PROCEDURE — G0463 HOSPITAL OUTPT CLINIC VISIT: HCPCS | Mod: 25

## 2023-09-28 PROCEDURE — 250N000013 HC RX MED GY IP 250 OP 250 PS 637: Performed by: NURSE PRACTITIONER

## 2023-09-28 PROCEDURE — 96367 TX/PROPH/DG ADDL SEQ IV INF: CPT

## 2023-09-28 PROCEDURE — 250N000011 HC RX IP 250 OP 636: Performed by: NURSE PRACTITIONER

## 2023-09-28 PROCEDURE — 258N000003 HC RX IP 258 OP 636: Performed by: NURSE PRACTITIONER

## 2023-09-28 PROCEDURE — 96417 CHEMO IV INFUS EACH ADDL SEQ: CPT

## 2023-09-28 PROCEDURE — 80053 COMPREHEN METABOLIC PANEL: CPT | Performed by: STUDENT IN AN ORGANIZED HEALTH CARE EDUCATION/TRAINING PROGRAM

## 2023-09-28 PROCEDURE — 96372 THER/PROPH/DIAG INJ SC/IM: CPT | Performed by: NURSE PRACTITIONER

## 2023-09-28 PROCEDURE — 96411 CHEMO IV PUSH ADDL DRUG: CPT

## 2023-09-28 RX ORDER — DIPHENHYDRAMINE HYDROCHLORIDE 50 MG/ML
50 INJECTION INTRAMUSCULAR; INTRAVENOUS
Status: CANCELLED
Start: 2023-09-28

## 2023-09-28 RX ORDER — ALBUTEROL SULFATE 90 UG/1
1-2 AEROSOL, METERED RESPIRATORY (INHALATION)
Status: CANCELLED
Start: 2023-09-28

## 2023-09-28 RX ORDER — METHYLPREDNISOLONE SODIUM SUCCINATE 125 MG/2ML
125 INJECTION, POWDER, LYOPHILIZED, FOR SOLUTION INTRAMUSCULAR; INTRAVENOUS
Status: CANCELLED
Start: 2023-09-28

## 2023-09-28 RX ORDER — DIPHENHYDRAMINE HCL 25 MG
50 CAPSULE ORAL ONCE
Status: CANCELLED
Start: 2023-09-28

## 2023-09-28 RX ORDER — HEPARIN SODIUM (PORCINE) LOCK FLUSH IV SOLN 100 UNIT/ML 100 UNIT/ML
5 SOLUTION INTRAVENOUS
Status: DISCONTINUED | OUTPATIENT
Start: 2023-09-28 | End: 2023-09-28 | Stop reason: HOSPADM

## 2023-09-28 RX ORDER — LORAZEPAM 2 MG/ML
0.5 INJECTION INTRAMUSCULAR EVERY 4 HOURS PRN
Status: CANCELLED | OUTPATIENT
Start: 2023-09-28

## 2023-09-28 RX ORDER — MEPERIDINE HYDROCHLORIDE 25 MG/ML
25 INJECTION INTRAMUSCULAR; INTRAVENOUS; SUBCUTANEOUS
Status: CANCELLED
Start: 2023-09-28

## 2023-09-28 RX ORDER — ACETAMINOPHEN 325 MG/1
650 TABLET ORAL ONCE
Status: CANCELLED
Start: 2023-09-28

## 2023-09-28 RX ORDER — DIPHENHYDRAMINE HCL 25 MG
50 CAPSULE ORAL ONCE
Status: COMPLETED | OUTPATIENT
Start: 2023-09-28 | End: 2023-09-28

## 2023-09-28 RX ORDER — HEPARIN SODIUM (PORCINE) LOCK FLUSH IV SOLN 100 UNIT/ML 100 UNIT/ML
5 SOLUTION INTRAVENOUS
Status: CANCELLED | OUTPATIENT
Start: 2023-09-28

## 2023-09-28 RX ORDER — DOXORUBICIN HYDROCHLORIDE 2 MG/ML
25 INJECTION, SOLUTION INTRAVENOUS ONCE
Status: COMPLETED | OUTPATIENT
Start: 2023-09-28 | End: 2023-09-28

## 2023-09-28 RX ORDER — PALONOSETRON 0.05 MG/ML
0.25 INJECTION, SOLUTION INTRAVENOUS ONCE
Status: COMPLETED | OUTPATIENT
Start: 2023-09-28 | End: 2023-09-28

## 2023-09-28 RX ORDER — ACETAMINOPHEN 325 MG/1
650 TABLET ORAL ONCE
Status: COMPLETED | OUTPATIENT
Start: 2023-09-28 | End: 2023-09-28

## 2023-09-28 RX ORDER — PALONOSETRON 0.05 MG/ML
0.25 INJECTION, SOLUTION INTRAVENOUS ONCE
Status: CANCELLED
Start: 2023-09-28

## 2023-09-28 RX ORDER — DOXORUBICIN HYDROCHLORIDE 2 MG/ML
25 INJECTION, SOLUTION INTRAVENOUS ONCE
Status: CANCELLED | OUTPATIENT
Start: 2023-09-28

## 2023-09-28 RX ORDER — ALBUTEROL SULFATE 0.83 MG/ML
2.5 SOLUTION RESPIRATORY (INHALATION)
Status: CANCELLED | OUTPATIENT
Start: 2023-09-28

## 2023-09-28 RX ORDER — MEPERIDINE HYDROCHLORIDE 25 MG/ML
25 INJECTION INTRAMUSCULAR; INTRAVENOUS; SUBCUTANEOUS EVERY 30 MIN PRN
Status: CANCELLED | OUTPATIENT
Start: 2023-09-28

## 2023-09-28 RX ORDER — HEPARIN SODIUM,PORCINE 10 UNIT/ML
5-20 VIAL (ML) INTRAVENOUS DAILY PRN
Status: CANCELLED | OUTPATIENT
Start: 2023-09-28

## 2023-09-28 RX ORDER — EPINEPHRINE 1 MG/ML
0.3 INJECTION, SOLUTION INTRAMUSCULAR; SUBCUTANEOUS EVERY 5 MIN PRN
Status: CANCELLED | OUTPATIENT
Start: 2023-09-28

## 2023-09-28 RX ADMIN — CYCLOPHOSPHAMIDE 750 MG: 1 INJECTION, POWDER, FOR SOLUTION INTRAVENOUS; ORAL at 11:04

## 2023-09-28 RX ADMIN — DIPHENHYDRAMINE HYDROCHLORIDE 50 MG: 25 CAPSULE ORAL at 11:09

## 2023-09-28 RX ADMIN — PEGFILGRASTIM 6 MG: KIT SUBCUTANEOUS at 15:06

## 2023-09-28 RX ADMIN — VINCRISTINE SULFATE 1 MG: 1 INJECTION, SOLUTION INTRAVENOUS at 10:58

## 2023-09-28 RX ADMIN — DOXORUBICIN HYDROCHLORIDE 45 MG: 2 INJECTION, SOLUTION INTRAVENOUS at 10:50

## 2023-09-28 RX ADMIN — RITUXIMAB-ABBS 700 MG: 10 INJECTION, SOLUTION INTRAVENOUS at 11:46

## 2023-09-28 RX ADMIN — SODIUM CHLORIDE 250 ML: 9 INJECTION, SOLUTION INTRAVENOUS at 10:02

## 2023-09-28 RX ADMIN — Medication 5 ML: at 09:10

## 2023-09-28 RX ADMIN — FOSAPREPITANT DIMEGLUMINE 150 MG: 150 INJECTION, POWDER, LYOPHILIZED, FOR SOLUTION INTRAVENOUS at 10:12

## 2023-09-28 RX ADMIN — ACETAMINOPHEN 650 MG: 325 TABLET ORAL at 11:08

## 2023-09-28 RX ADMIN — Medication 5 ML: at 15:22

## 2023-09-28 RX ADMIN — PALONOSETRON HYDROCHLORIDE 0.25 MG: 0.25 INJECTION INTRAVENOUS at 10:06

## 2023-09-28 ASSESSMENT — PAIN SCALES - GENERAL: PAINLEVEL: NO PAIN (0)

## 2023-09-28 NOTE — NURSING NOTE
"Oncology Rooming Note    September 28, 2023 9:19 AM   Tom Saini is a 88 year old male who presents for:    Chief Complaint   Patient presents with    Port Draw     Vitals taken port accessed labs drawn heparin locked, checked into next appt    Oncology Clinic Visit     Diffuse large B-cell lymphoma      Initial Vitals: /65 (BP Location: Left arm, Patient Position: Sitting, Cuff Size: Adult Regular)   Pulse 99   Temp 98.2  F (36.8  C) (Oral)   Resp 16   Wt 73.3 kg (161 lb 8 oz)   SpO2 97%   BMI 23.85 kg/m   Estimated body mass index is 23.85 kg/m  as calculated from the following:    Height as of 9/12/23: 1.753 m (5' 9\").    Weight as of this encounter: 73.3 kg (161 lb 8 oz). Body surface area is 1.89 meters squared.  No Pain (0) Comment: Data Unavailable   No LMP for male patient.  Allergies reviewed: Yes  Medications reviewed: Yes    Medications: Medication refills not needed today.  Pharmacy name entered into [facility]:    [id number] - [place], [place] - [address] AT [facility] - [address]   [facility] - [address]    Clinical concerns:        Maame Bell              "

## 2023-09-28 NOTE — LETTER
9/28/2023         RE: oTm Saini  [address] [facility] [address]        Dear Colleague,    Thank you for referring your patient, Tom Saini, to the [facility]CER [facility]. Please see a copy of my visit note below.      Julee Reza is a 88 year old, presenting for the following health issues:  Port Draw (Vitals taken port accessed labs drawn heparin locked, checked into next appt) and Oncology Clinic Visit (Diffuse large B-cell lymphoma )    HPI     Oncology History Overview Note   This is an 88-year-old gentleman coming in with newly diagnosed DLBCL composite with follicular lymphoma found in colon mass.  He has had diarrhea with particular food for several years and recently was investigated for possible pancreatic insufficiency.  He also has a history of pancreatic pseudocyst.  On recent MRI done in June 2023 to follow-up on pancreatic pseudocyst showed a colonic mass.  Subsequent CT scan of chest abdomen pelvis showed Mild anemia, no cytopenias 7.9 x 6.9 x 5.6 cm large soft tissue mass in sigmoid colon obliterating the lumen and significant extramural soft tissue extension superiorly in the sigmoid mesocolon.  Enlarged bilateral inguinal lymph node, as few small prominent lymph nodes along superior rectal/inferior mesenteric artery distribution.  Diffuse main pancreatic duct dilatation with large intraductal calculus in the head was seen as well.  Multiple cystic lesions and dilated sidebranches were seen in the pancreas.  He underwent colonoscopy but due to obliterated lumen, colonoscopy failed.  Subsequently CT colonography was done which showed similar results to CT chest abdomen pelvis.  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  FISH showed Bcl-2 rearrangement but no MYC rearrangement.  Bcl-2 IgH rearrangement [translocation (14;18)] is consistent with transformation of DLBCL from follicular lymphoma.   Postoperative course was complicated by postoperative nausea vomiting which resolved, single episode of suicidal ideation which resolved.  He was discharged to TCU.  He has been regaining his strength slowly, his performance status has improved to 1.  He is able to walk independently for short distances, can do basic activities of daily living himself.    PET scan revealed persistent hypermetabolic retroperitoneal lymph nodes, possibly reactive but lymphoma cannot be ruled out. ECHOcardiogram reveals EF >50%. LDH within normal limits. Overall IPI 2. Low-intermediate risk GCB DLBCL.     Diffuse large B-cell lymphoma of extranodal site (H)   8/21/2023 Initial Diagnosis    Diffuse large B-cell lymphoma of extranodal site (H)     8/31/2023 -  Chemotherapy    OP ONC Non-Hodgkin's Lymphoma - mini-R-CHOP  Plan Provider: Holly Buitrago MD  Treatment goal: Curative  Line of treatment: First Line     Juaquin presents to clinic for follow-up prior to C2D1. His energy levels are a little lower then they were in May but he remains active.  He is eating and drinking well with a good appetite.    He has some baseline neuropathy in L hands.  Reports this has not improved or worsened.  He has severe numbness of 4th and 5th fingers in the R hand.  He has an appointment with PT.    Denies fevers/chills, night sweats, N/V/D/C, recurring infections, bleeding issues, rashes/sores.       Objective   /65 (BP Location: Left arm, Patient Position: Sitting, Cuff Size: Adult Regular)   Pulse 99   Temp 98.2  F (36.8  C) (Oral)   Resp 16   Wt 73.3 kg (161 lb 8 oz)   SpO2 97%   BMI 23.85 kg/m    Body mass index is 23.85 kg/m .  Physical Exam   General: No acute distress  HEENT: Sclera anicteric. Oral exam deferred  Lymph: No lymphadenopathy in neck and supraclavicular areas   Heart: Regular, rate, and rhythm  Lungs: Clear to ascultation bilaterally  Abdomen: Positive bowel sounds.   Extremities: no lower extremity edema  Neuro: Cranial  nerves grossly intact  Rash: none on exposed skin    Labs:  Most Recent 3 CBC's:  Recent Labs   Lab Test 09/28/23  0908 09/12/23  0926 09/08/23  1133 08/31/23  0821   WBC 10.1 54.2* 9.0 6.8   HGB 11.9* 11.2* 11.8* 11.7*   MCV 95 95 93 93    194 281 313   ANEUTAUTO 7.8  --  6.7 4.4     Most Recent 3 BMP's:  Recent Labs   Lab Test 09/12/23  0926 09/08/23  1133 08/31/23  0821    139 139   POTASSIUM 4.3 4.7 4.2   CHLORIDE 103 102 103   CO2 26 28 25   BUN 20.4 19.6 24.0*   CR 0.88 0.92 0.94   ANIONGAP 10 9 11   DAVIDE 9.3 9.6 9.7   * 99 197*   PROTTOTAL 6.2* 6.6 6.9   ALBUMIN 3.6 3.8 3.9    Most Recent 3 LFT's:  Recent Labs   Lab Test 09/12/23  0926 09/08/23  1133 08/31/23  0821   AST 13 14 17   ALT 8 10 14   ALKPHOS 168* 80 92   BILITOTAL 0.6 0.3 0.2    Most Recent 2 TSH and T4:  Recent Labs   Lab Test 02/27/21  1149   TSH 0.90     I reviewed the above labs today.    Assessment and Plan:    1) Diffuse large B cell lymphoma:  - First dose will be divided into two doses a week apart: Rituximab plus steroids followed by CHO.   - PET scan will be after 2 cycles and 6 cycles.  - PET scan 10/12  - Received C1D1 in 2 divided doses on 8/31 and 9/8  - Follow-up with VINAYAK prior to each cycle  - S/p C1 and tolerating treatment well, his only complaint is his baseline neuropathy which has not worsened with treatment.  - Proceed with Mini-R-CHOP C2D1 9/28     Ppx:  - Acyclovir 400 mg BID- patient unable to determine if he is taking or not  - Allopurinol 300 mg daily     2) Pancreatic insufficiency:  - Continue Creon.   - We will monitor for diarrhea.    3) R hand neuropathy:  Patient vocalized frustrations today of his baseline neuropathy in his R hand 4th and 5th fingers.  He was seen by his PCP for the issue and referred for a EMG.  The EMG was performed on 9/7 by [facility].  Patient wanted to review the results of his EMG and discuss the need for a brace.  - Scheduled with Hand/Wrist Specialist  10/31  - Is going to start working with PT    20 minutes spent on the date of the encounter doing chart review, review of test results, interpretation of tests, patient visit, and documentation     HEYDI Perkins CNP

## 2023-09-28 NOTE — PROGRESS NOTES
Infusion Nursing Note:  Tom Saini presents today for Cycle 2 Day 1 Rituxan, Doxorubicin, Vincristine, and Cytoxan.    Patient seen by provider today: Yes: Miriam Rouse NP saw patient prior to infusion   present during visit today: Not Applicable.    Note:  Patient took Prednisone while in infusion.    Rituxan initiated at 50 mL/hr and increased every 30 minutes by 50 mL/hr to a maximum rate of 400 mL/hr.    Neulasta On Pro- On Body injector applied to patient on back on right arm. Discussed with patient when to call, and that injection would start 27 hours after application. Patient's injection scheduled to start at about 1805 on 9/29/23. Patient aware that Neulasta On-pro on body should have green flashing light throughout, and if it flashes red to call the on call number. Patient aware to keep electronics 4 inches away from on pro injector and to avoid showering 4 hours prior to injection. Patient given written and verbal instruction on the above and verbalized understanding.     Intravenous Access:  Implanted Port.    Treatment Conditions:   Latest Reference Range & Units 09/28/23 09:08   Sodium 135 - 145 mmol/L 141   Potassium 3.4 - 5.3 mmol/L 4.1   Chloride 98 - 107 mmol/L 105   Carbon Dioxide (CO2) 22 - 29 mmol/L 26   Urea Nitrogen 8.0 - 23.0 mg/dL 21.2   Creatinine 0.67 - 1.17 mg/dL 0.88   GFR Estimate >60 mL/min/1.73m2 83   Calcium 8.8 - 10.2 mg/dL 9.3   Anion Gap 7 - 15 mmol/L 10   Albumin 3.5 - 5.2 g/dL 3.8   Protein Total 6.4 - 8.3 g/dL 6.7   Alkaline Phosphatase 40 - 129 U/L 117   ALT 0 - 70 U/L 8   AST 0 - 45 U/L 12   Bilirubin Total <=1.2 mg/dL 0.2   Glucose 70 - 99 mg/dL 186 (H)   WBC 4.0 - 11.0 10e3/uL 10.1   Hemoglobin 13.3 - 17.7 g/dL 11.9 (L)   Hematocrit 40.0 - 53.0 % 37.0 (L)   Platelet Count 150 - 450 10e3/uL 329   RBC Count 4.40 - 5.90 10e6/uL 3.91 (L)   MCV 78 - 100 fL 95   MCH 26.5 - 33.0 pg 30.4   MCHC 31.5 - 36.5 g/dL 32.2   RDW 10.0 - 15.0 % 15.3 (H)   % Neutrophils % 77    % Lymphocytes % 11   % Monocytes % 7   % Eosinophils % 1   % Basophils % 2   Absolute Basophils 0.0 - 0.2 10e3/uL 0.2   Absolute Eosinophils 0.0 - 0.7 10e3/uL 0.1   Absolute Immature Granulocytes <=0.4 10e3/uL 0.2   Absolute Lymphocytes 0.8 - 5.3 10e3/uL 1.1   Absolute Monocytes 0.0 - 1.3 10e3/uL 0.7   % Immature Granulocytes % 2   Absolute Neutrophils 1.6 - 8.3 10e3/uL 7.8   Absolute NRBCs 10e3/uL 0.0   NRBCs per 100 WBC <1 /100 0     Results reviewed, labs MET treatment parameters, ok to proceed with treatment.  ECHO/MUGA completed 8/10/23  EF 55-60%.      Post Infusion Assessment:  Patient tolerated infusion without incident.  Blood return noted pre and post infusion.  Blood return noted during administration every 2-3 ml during Doxorubicin and throughout Vincristine infusion.  Site patent and intact, free from redness, edema or discomfort.  No evidence of extravasations.  Access discontinued per protocol.       Discharge Plan:   Prescription refills given for Prednisone.  Discharge instructions reviewed with: Patient.  Patient and/or family verbalized understanding of discharge instructions and all questions answered.  Copy of AVS reviewed with patient and/or family.  Patient will return 10/20/23 for next appointment.  Patient discharged in stable condition accompanied by: wife.  Departure Mode: Ambulatory.      Odalys Salmon RN

## 2023-09-28 NOTE — NURSING NOTE
Chief Complaint   Patient presents with    Port Draw     Vitals taken port accessed labs drawn heparin locked, checked into next appt     /65 (BP Location: Left arm, Patient Position: Sitting, Cuff Size: Adult Regular)   Pulse 99   Temp 98.2  F (36.8  C) (Oral)   Resp 16   Wt 73.3 kg (161 lb 8 oz)   SpO2 97%   BMI 23.85 kg/m    Alexsander Solis RN on 9/28/2023 at 9:15 AM

## 2023-09-28 NOTE — PATIENT INSTRUCTIONS
Contact Numbers  [facility]: [phone] (for symptom and scheduling needs)    Please call the [facility] Triage line if you experience a temperature greater than or equal to 100.4, shaking chills, have uncontrolled nausea, vomiting and/or diarrhea, dizziness, shortness of breath, chest pain, bleeding, unexplained bruising, or if you have any other new/concerning symptoms, questions or concerns.     If you are having any concerning symptoms or wish to speak to a provider before your next infusion visit, please call your care coordinator or triage to notify them so we can adequately serve you.     If you need a refill on a narcotic prescription or other medication, please call triage before your infusion appointment.

## 2023-09-29 ENCOUNTER — MYC MEDICAL ADVICE (OUTPATIENT)
Dept: INTERNAL MEDICINE | Facility: CLINIC | Age: 88
End: 2023-09-29
Payer: MEDICARE

## 2023-10-06 ENCOUNTER — OFFICE VISIT (OUTPATIENT)
Dept: DERMATOLOGY | Facility: CLINIC | Age: 88
End: 2023-10-06
Payer: MEDICARE

## 2023-10-06 ENCOUNTER — THERAPY VISIT (OUTPATIENT)
Dept: PHYSICAL THERAPY | Facility: CLINIC | Age: 88
End: 2023-10-06
Attending: SURGERY
Payer: MEDICARE

## 2023-10-06 DIAGNOSIS — D18.01 CHERRY ANGIOMA: ICD-10-CM

## 2023-10-06 DIAGNOSIS — L82.1 SEBORRHEIC KERATOSIS: ICD-10-CM

## 2023-10-06 DIAGNOSIS — D22.9 MULTIPLE BENIGN NEVI: ICD-10-CM

## 2023-10-06 DIAGNOSIS — R20.0 NUMBNESS AND TINGLING IN LEFT ARM: Primary | ICD-10-CM

## 2023-10-06 DIAGNOSIS — R20.2 NUMBNESS AND TINGLING IN LEFT ARM: Primary | ICD-10-CM

## 2023-10-06 DIAGNOSIS — C83.398 DIFFUSE LARGE B-CELL LYMPHOMA OF EXTRANODAL SITE: Primary | ICD-10-CM

## 2023-10-06 DIAGNOSIS — L81.4 LENTIGINES: ICD-10-CM

## 2023-10-06 DIAGNOSIS — L57.0 ACTINIC KERATOSIS: Primary | ICD-10-CM

## 2023-10-06 PROCEDURE — 94642 AEROSOL INHALATION TREATMENT: CPT | Performed by: INTERNAL MEDICINE

## 2023-10-06 PROCEDURE — 17000 DESTRUCT PREMALG LESION: CPT | Mod: 79 | Performed by: DERMATOLOGY

## 2023-10-06 PROCEDURE — 94640 AIRWAY INHALATION TREATMENT: CPT | Performed by: INTERNAL MEDICINE

## 2023-10-06 PROCEDURE — 97112 NEUROMUSCULAR REEDUCATION: CPT | Mod: GP

## 2023-10-06 PROCEDURE — 99213 OFFICE O/P EST LOW 20 MIN: CPT | Mod: 24 | Performed by: DERMATOLOGY

## 2023-10-06 PROCEDURE — 97161 PT EVAL LOW COMPLEX 20 MIN: CPT | Mod: GP

## 2023-10-06 PROCEDURE — 17003 DESTRUCT PREMALG LES 2-14: CPT | Mod: 79 | Performed by: DERMATOLOGY

## 2023-10-06 RX ORDER — ALBUTEROL SULFATE 0.83 MG/ML
2.5 SOLUTION RESPIRATORY (INHALATION) ONCE
Start: 2023-11-02 | End: 2023-11-02

## 2023-10-06 RX ORDER — ALBUTEROL SULFATE 90 UG/1
1-2 AEROSOL, METERED RESPIRATORY (INHALATION)
Start: 2023-11-02

## 2023-10-06 RX ORDER — DIPHENHYDRAMINE HYDROCHLORIDE 50 MG/ML
50 INJECTION INTRAMUSCULAR; INTRAVENOUS
Start: 2023-11-02

## 2023-10-06 RX ORDER — PENTAMIDINE ISETHIONATE 300 MG/300MG
300 INHALANT RESPIRATORY (INHALATION)
Status: COMPLETED | OUTPATIENT
Start: 2023-10-06 | End: 2023-10-06

## 2023-10-06 RX ORDER — METHYLPREDNISOLONE SODIUM SUCCINATE 125 MG/2ML
125 INJECTION, POWDER, LYOPHILIZED, FOR SOLUTION INTRAMUSCULAR; INTRAVENOUS
Start: 2023-11-02

## 2023-10-06 RX ORDER — ALBUTEROL SULFATE 0.83 MG/ML
2.5 SOLUTION RESPIRATORY (INHALATION)
OUTPATIENT
Start: 2023-11-02

## 2023-10-06 RX ORDER — MEPERIDINE HYDROCHLORIDE 25 MG/ML
25 INJECTION INTRAMUSCULAR; INTRAVENOUS; SUBCUTANEOUS EVERY 30 MIN PRN
OUTPATIENT
Start: 2023-11-02

## 2023-10-06 RX ORDER — EPINEPHRINE 1 MG/ML
0.3 INJECTION, SOLUTION, CONCENTRATE INTRAVENOUS EVERY 5 MIN PRN
OUTPATIENT
Start: 2023-11-02

## 2023-10-06 RX ORDER — PENTAMIDINE ISETHIONATE 300 MG/300MG
300 INHALANT RESPIRATORY (INHALATION)
Status: CANCELLED
Start: 2023-11-02 | End: 2023-11-02

## 2023-10-06 RX ADMIN — PENTAMIDINE ISETHIONATE 300 MG: 300 INHALANT RESPIRATORY (INHALATION) at 11:38

## 2023-10-06 ASSESSMENT — PAIN SCALES - GENERAL: PAINLEVEL: NO PAIN (0)

## 2023-10-06 NOTE — NURSING NOTE
Chief Complaint   Patient presents with    Skin Check     Patient reports no new lesions of concern. The patient would like a full body skin check.      Bela Triplett LPN

## 2023-10-06 NOTE — LETTER
10/6/2023       RE: Tom Saini  [address]  [facility] [address]     Dear Colleague,    Thank you for referring your patient, Tom Saini, to the [facility] [place] [facility]. Please see a copy of my visit note below.    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 Tx; Chemotherapy.   1. SKs  - Large symptomatic SK, R Bahai - cryo 11/07/2019, repeat on 12/03/2019.   2. AKs, vertex scalp s/p cryo 10/6/23    ____________________________________________    Assessment & Plan:     #Ak, vertex scalp x2  - Cryotherapy performed today (see procedure note(s) below).    # Lesion to monitor,  left medial shin   - Unchanged since 4/7/23. Suspect this is a benign DF.  - No treatment needed.      # Benign lesions - SKs, cherry angiomas, lentigenes.  - No treatment required    # DLBCL, recently started on chemotherapy.  - Noted      Procedures Performed:   - Cryotherapy procedure note, location(s): See above. After verbal consent and discussion of risks and benefits including, but not limited to, dyspigmentation/scar, blister, and pain, 2 lesion(s) was(were) treated with 1-2 mm freeze border for 1-2 cycles with liquid nitrogen. Post cryotherapy instructions were provided.    Follow-up: 1 year(s) in-person, or earlier for new or changing lesions    Staff and Scribe:     Scribe disclosure:  Scribe Disclosure:   I, RAS HODGSON, am serving as a scribe; to document services personally performed by Pauline Freed MD -based on data collection and the provider's statements to me.       Provider Disclosure:   The documentation recorded by the scribe accurately reflects the services I personally performed and the decisions made by me.    Pauline Freed MD    Department of  Dermatology  [facility]: Phone: [phone], Fax: [phone]  10/10/2023     ____________________________________________    CC: Skin Check (Patient reports no new lesions of concern. The patient would like a full body skin check. )    HPI:  Mr. Tom Saini is a(n) 88 year old male who presents today [place] [facility]     Today. Patient reported a newly diagnosed lymphoma since 06/2023. Had surgery June 25th 2023. He reports that he is tolerating chemotherapy well.     Patient is otherwise feeling well, without additional skin concerns.     Labs Reviewed:  N/A    Physical Exam:  Vitals: There were no vitals taken for this visit.  SKIN: Full body skin exam excluding the genitals was performed including face, scalp, neck, ears, chest, back, bilateral arms, hands, bilateral legs, feet, and buttocks.   - There are dome shaped bright red papules on the trunk.   - Multiple regular brown pigmented macules and papules are identified on the trunk and extremities.   - Scattered brown macules on sun exposed areas.  - There are waxy stuck on tan to brown papules on the trunk.    -  6 mm slightly firm pink/tan papule on the left medial shin, unchanged from last year.   - There are erythematous macule with overyling adherent scale on the vertex scalp.  - No other lesions of concern on areas examined.     Medications:  Current Outpatient Medications   Medication    acyclovir (ZOVIRAX) 400 MG tablet    calcium carbonate (OS-DAVIDE) 1500 (600 Ca) MG tablet    Cholecalciferol (VITAMIN D-3) 25 MCG (1000 UT) CAPS    lipase-protease-amylase (CREON) 31469-07328-893518 units CPEP per EC capsule    multivitamin (OCUVITE) TABS    ondansetron (ZOFRAN) 8 MG tablet    predniSONE (DELTASONE) 20 MG tablet    prochlorperazine (COMPAZINE) 10 MG tablet    sodium chloride (LAXMI 128) 5 % ophthalmic ointment    acyclovir (ZOVIRAX) 400 MG tablet     No current  facility-administered medications for this visit.      Past Medical History:   Patient Active Problem List   Diagnosis    Eczema    Pure hypercholesterolemia    Male erectile disorder    Dermatofibroma    Seborrheic keratoses, inflamed    Age-related osteoporosis without current pathological fracture    Infection due to 2019 novel coronavirus    Diarrhea, unspecified type    Colonic mass    Diffuse large B-cell lymphoma of extranodal site (H)    Prevention of chemotherapy-induced neutropenia    Encounter for antineoplastic chemotherapy     Past Medical History:   Diagnosis Date    Aortic insufficiency     Erectile dysfunction     History of deep venous thrombosis 05/2019    distal DVT with extension, completed 6 months of AC (failed eliquis)    History of pelvic fracture     Hyperlipidemia     Low back pain     disc disease s/p laminectomy in past    Macular degeneration     on Avastin, R eye    Pancreatic cyst 07/2018    next MR due 12/23    Radius fracture 2018    after fall from wall

## 2023-10-06 NOTE — PATIENT INSTRUCTIONS
Cryotherapy    What is it?  Use of a very cold liquid, such as liquid nitrogen, to freeze and destroy abnormal skin cells that need to be removed    What should I expect?  Tenderness and redness  A small blister that might grow and fill with dark purple blood. There may be crusting.  More than one treatment may be needed if the lesions do not go away.    How do I care for the treated area?  Gently wash the area with your hands when bathing.  Use a thin layer of Vaseline to help with healing. You may use a Band-Aid.   The area should heal within 7-10 days and may leave behind a pink or lighter color.   Do not use an antibiotic or Neosporin ointment.   You may take acetaminophen (Tylenol) for pain.     Call your doctor if you have:  Severe pain  Signs of infection (warmth, redness, cloudy yellow drainage, and or a bad smell)  Questions or concerns    Who should I call with questions?      [facility]: [phone]      [facility]: [phone]      For urgent needs outside of business hours call the [facility] at [phone] and ask for the dermatology resident on call

## 2023-10-06 NOTE — PROGRESS NOTES
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 Tx; Chemotherapy.   1. SKs  - Large symptomatic SK, R Hoahaoism - cryo 11/07/2019, repeat on 12/03/2019.   2. AKs, vertex scalp s/p cryo 10/6/23    ____________________________________________    Assessment & Plan:     #Ak, vertex scalp x2  - Cryotherapy performed today (see procedure note(s) below).    # Lesion to monitor,  left medial shin   - Unchanged since 4/7/23. Suspect this is a benign DF.  - No treatment needed.      # Benign lesions - SKs, cherry angiomas, lentigenes.  - No treatment required    # DLBCL, recently started on chemotherapy.  - Noted      Procedures Performed:   - Cryotherapy procedure note, location(s): See above. After verbal consent and discussion of risks and benefits including, but not limited to, dyspigmentation/scar, blister, and pain, 2 lesion(s) was(were) treated with 1-2 mm freeze border for 1-2 cycles with liquid nitrogen. Post cryotherapy instructions were provided.    Follow-up: 1 year(s) in-person, or earlier for new or changing lesions    Staff and Scribe:     Scribe disclosure:  Scribe Disclosure:   I, RAS HODGSON, am serving as a scribe; to document services personally performed by Pauline Freed MD -based on data collection and the provider's statements to me.       Provider Disclosure:   The documentation recorded by the scribe accurately reflects the services I personally performed and the decisions made by me.    Pauline Freed MD    Department of Dermatology  [facility]: Phone: [phone], Fax: [phone]  10/10/2023     ____________________________________________    CC: Skin Check (Patient reports no new lesions of concern. The patient would like a full body skin check. )    HPI:    Tom Saini is a(n) 88 year old male who presents today as a return patient [place] [facility].     Today. Patient reported a newly diagnosed lymphoma since 06/2023. Had surgery June 25th 2023. He reports that he is tolerating chemotherapy well.     Patient is otherwise feeling well, without additional skin concerns.     Labs Reviewed:  N/A    Physical Exam:  Vitals: There were no vitals taken for this visit.  SKIN: Full body skin exam excluding the genitals was performed including face, scalp, neck, ears, chest, back, bilateral arms, hands, bilateral legs, feet, and buttocks.   - There are dome shaped bright red papules on the trunk.   - Multiple regular brown pigmented macules and papules are identified on the trunk and extremities.   - Scattered brown macules on sun exposed areas.  - There are waxy stuck on tan to brown papules on the trunk.    -  6 mm slightly firm pink/tan papule on the left medial shin, unchanged from last year.   - There are erythematous macule with overyling adherent scale on the vertex scalp.  - No other lesions of concern on areas examined.     Medications:  Current Outpatient Medications   Medication    acyclovir (ZOVIRAX) 400 MG tablet    calcium carbonate (OS-DAVIDE) 1500 (600 Ca) MG tablet    Cholecalciferol (VITAMIN D-3) 25 MCG (1000 UT) CAPS    lipase-protease-amylase (CREON) 58637-88243-013296 units CPEP per EC capsule    multivitamin (OCUVITE) TABS    ondansetron (ZOFRAN) 8 MG tablet    predniSONE (DELTASONE) 20 MG tablet    prochlorperazine (COMPAZINE) 10 MG tablet    sodium chloride (LAXMI 128) 5 % ophthalmic ointment    acyclovir (ZOVIRAX) 400 MG tablet     No current facility-administered medications for this visit.      Past Medical History:   Patient Active Problem List   Diagnosis    Eczema    Pure hypercholesterolemia    Male erectile disorder    Dermatofibroma    Seborrheic keratoses, inflamed    Age-related osteoporosis without current pathological fracture    Infection due to 2019  novel coronavirus    Diarrhea, unspecified type    Colonic mass    Diffuse large B-cell lymphoma of extranodal site (H)    Prevention of chemotherapy-induced neutropenia    Encounter for antineoplastic chemotherapy     Past Medical History:   Diagnosis Date    Aortic insufficiency     Erectile dysfunction     History of deep venous thrombosis 05/2019    distal DVT with extension, completed 6 months of AC (failed eliquis)    History of pelvic fracture     Hyperlipidemia     Low back pain     disc disease s/p laminectomy in past    Macular degeneration     on Avastin, R eye    Pancreatic cyst 07/2018    next MR due 12/23    Radius fracture 2018    after fall from wall        No referring provider defined for this encounter. on close of this encounter.

## 2023-10-06 NOTE — PROGRESS NOTES
Patient was seen today for a Pentamidine nebulizer tx ordered by Dr. Buitrago.    Patient was first given 4 puffs of Albuterol via spacer (due to shortage) after which Pentamidine 300 mg (Lot # [id number]) mixed with 6cc Sterile H20 was administered through a filtered nebulizer.    Pre-treatment: SpO2 = 96%   HR = 90   BBS = Clear   Post-treatment: SpO2 = 998%  HR = 83  BBS = Clear    No adverse side effects noted by the patient.    This service today was provided under the direct supervision of Dr. Perlman, who was available if needed.     Procedure was completed by Beverly Angel. ESTER

## 2023-10-06 NOTE — PROGRESS NOTES
PHYSICAL THERAPY EVALUATION  Type of Visit: Evaluation    See electronic medical record for Abuse and Falls Screening details.    Subjective       Presenting condition or subjective complaint:    Pt reports that he has has ulnar nerve numbness since he had colon surgery. He isn't able to flex his 4th and 5th digit in his left hand.  He had an EMG which identified poor ulnar nerve signalling. He is left handed and has trouble writing. Opening bottles and doors is challenging.    Date of onset: 07/25/23    Relevant medical history:     Dates & types of surgery:   July 2023 colon surgery    Prior diagnostic imaging/testing results:     EMG  Prior therapy history for the same diagnosis, illness or injury:     none    Prior Level of Function  Transfers: Independent  Ambulation: Independent  ADL: Independent  IADL:  IND    Living Environment  Social support:     Type of home:   house  Stairs to enter the home:       27 steps, railing  Ramp:     Stairs inside the home:       18 steps, railing  Help at home:   wife at home  Equipment owned:   none    Employment:     retired, taught at [employer]  Hobbies/Interests:   photography, traveling    Patient goals for therapy:   to be able to write and  things    Pain assessment: no pain, just numbness and tingling     Objective   Cognitive Status Examination  Orientation: Oriented to person, place and time   Level of Consciousness: Alert  Follows Commands and Answers Questions: 100% of the time  Personal Safety and Judgement: Intact  Memory: Intact    OBSERVATION: Pleasant male presenting appropriately for PT.  INTEGUMENTARY: Intact  POSTURE: Sitting Posture: Rounded shoulders, Forward head  PALPATION:   RANGE OF MOTION: UE ROM WFL  STRENGTH:   Pain: - none + mild ++ moderate +++ severe  Strength Scale: 0-5/5 Left Right   Shoulder Flexion 4+ 4+   Shoulder Extension 4+ 4+   Shoulder Abduction 4+ 4+   Shoulder Internal Rotation 5 5   Shoulder External Rotation 4+ 4+   Elbow Flexion 5  5   Elbow Extension 5 5       BED MOBILITY: Independent      Assessment & Plan   CLINICAL IMPRESSIONS  Medical Diagnosis: Numbness and tingling in left arm (R20.0, R20.2)    Treatment Diagnosis: Ulnar nerve neuropathy   Impression/Assessment: Patient is a 88 year old male with left arm complaints.  The following significant findings have been identified: Decreased ROM/flexibility, Decreased strength, Impaired muscle performance, and Impaired posture. These impairments interfere with their ability to perform self care tasks and recreational activities as compared to previous level of function.     Clinical Decision Making (Complexity):  Clinical Presentation: Stable/Uncomplicated  Clinical Presentation Rationale: based on medical and personal factors listed in PT evaluation  Clinical Decision Making (Complexity): Low complexity    PLAN OF CARE  Treatment Interventions:  Interventions: Neuromuscular Re-education, Therapeutic Exercise, Self-Care/Home Management    Long Term Goals     PT Goal 1  Goal Identifier: UE function  Goal Description: Pt will be able to open door knob and bottles without restrictions.  Target Date: 01/03/24      Frequency of Treatment: 1x/week  Duration of Treatment: 90 days    Recommended Referrals to Other Professionals:     Education Assessment:   Learner/Method: Patient    Risks and benefits of evaluation/treatment have been explained.   Patient/Family/caregiver agrees with Plan of Care.     Evaluation Time:     PT Eval, Low Complexity Minutes (04224): 20       Signing Clinician: Jordon Johnston, PT      [facility]                                                                                   OUTPATIENT PHYSICAL THERAPY      PLAN OF TREATMENT FOR OUTPATIENT REHABILITATION   Patient's Last Name, First Name, Tom Garcia YOB: 1935   Provider's Name   [facility]   Medical Record No.  [id number]     Onset  Date: 07/25/23  Start of Care Date: 10/06/23     Medical Diagnosis:  Numbness and tingling in left arm (R20.0, R20.2)      PT Treatment Diagnosis:  Ulnar nerve neuropathy Plan of Treatment  Frequency/Duration: 1x/week/ 90 days    Certification date from 10/06/23 to 01/03/24         See note for plan of treatment details and functional goals     Jordon Johnston, PT                         I CERTIFY THE NEED FOR THESE SERVICES FURNISHED UNDER        THIS PLAN OF TREATMENT AND WHILE UNDER MY CARE     (Physician attestation of this document indicates review and certification of the therapy plan).                Referring Provider:  Jhonatan Cowart      Initial Assessment  See Epic Evaluation- Start of Care Date: 10/06/23

## 2023-10-09 ENCOUNTER — OFFICE VISIT (OUTPATIENT)
Dept: INTERNAL MEDICINE | Facility: CLINIC | Age: 88
End: 2023-10-09
Payer: MEDICARE

## 2023-10-09 ENCOUNTER — NURSE TRIAGE (OUTPATIENT)
Dept: NURSING | Facility: CLINIC | Age: 88
End: 2023-10-09

## 2023-10-09 VITALS
HEART RATE: 102 BPM | SYSTOLIC BLOOD PRESSURE: 119 MMHG | TEMPERATURE: 98.5 F | DIASTOLIC BLOOD PRESSURE: 67 MMHG | OXYGEN SATURATION: 97 %

## 2023-10-09 DIAGNOSIS — Z23 NEED FOR COVID-19 VACCINE: ICD-10-CM

## 2023-10-09 DIAGNOSIS — G56.22 ULNAR NEUROPATHY AT ELBOW, LEFT: Primary | ICD-10-CM

## 2023-10-09 DIAGNOSIS — Z23 NEED FOR INFLUENZA VACCINATION: ICD-10-CM

## 2023-10-09 PROCEDURE — 90480 ADMN SARSCOV2 VAC 1/ONLY CMP: CPT | Performed by: INTERNAL MEDICINE

## 2023-10-09 PROCEDURE — 99284 EMERGENCY DEPT VISIT MOD MDM: CPT | Mod: 25 | Performed by: EMERGENCY MEDICINE

## 2023-10-09 PROCEDURE — 99284 EMERGENCY DEPT VISIT MOD MDM: CPT | Performed by: EMERGENCY MEDICINE

## 2023-10-09 PROCEDURE — 90662 IIV NO PRSV INCREASED AG IM: CPT | Performed by: INTERNAL MEDICINE

## 2023-10-09 PROCEDURE — G0008 ADMIN INFLUENZA VIRUS VAC: HCPCS | Performed by: INTERNAL MEDICINE

## 2023-10-09 PROCEDURE — 90472 IMMUNIZATION ADMIN EACH ADD: CPT | Performed by: INTERNAL MEDICINE

## 2023-10-09 PROCEDURE — 99214 OFFICE O/P EST MOD 30 MIN: CPT | Mod: 25 | Performed by: INTERNAL MEDICINE

## 2023-10-09 NOTE — NURSING NOTE
Tom Saini is a 88 year old male that presents in clinic today for the following:     Chief Complaint   Patient presents with    Follow Up     Constant runny nose this weekend, wants a covid and flu shot        The patient's allergies and medications were reviewed. The patient's vitals were obtained without incident. The patient does not have any other questions for the provider.     Lanie Marques, MAVERICK at 8:15 AM on 10/9/2023   Primary Care Clinic: [phone]

## 2023-10-09 NOTE — PROGRESS NOTES
History of Present Illness:  Mr. Saini is a 88 year old male who presents for  Chief Complaint   Patient presents with    Follow Up     Constant runny nose this weekend, wants a covid and flu shot      Unfortunately, diagnosed with DLBL of colon, s/p sigmoid resection 7/23, has started chemo  Dealing with L hand neuropathy    Juaquin is able to eat more foods now that he's on Creon, was getting diarrhea, now resolved  Stools are a bit hard now  He states he's tolerating chemo okay, next infusion later next week  He had EMG of L arm 9/7 which showed L sided ulnar neuropathy at elbow, chronic C7-8 radiculopathy, has appt with ortho end of month    Detailed Medical History:  Macular degeneration, Fuchs dystrophy  Elevated PSA: up to 8.4 in 2021, th en repeat 2.7 3/22, MRI ws low risk 3/21  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  All health questionnaires were reviewed, verified and relevant information documented above.      Past Medical History:  Past Medical History:   Diagnosis Date    Aortic insufficiency     Erectile dysfunction     History of deep venous thrombosis 05/2019    distal DVT with extension, completed 6 months of AC (failed eliquis)    History of pelvic fracture     Hyperlipidemia     Low back pain     disc disease s/p laminectomy in past    Macular degeneration     on Avastin, R eye    Pancreatic cyst 07/2018    next MR due 12/23    Radius fracture 2018    after fall from wall       Active Meds:  Current Outpatient Medications   Medication    acyclovir (ZOVIRAX) 400 MG tablet    calcium carbonate (OS-DAVIDE) 1500 (600 Ca) MG tablet    Cholecalciferol (VITAMIN D-3) 25 MCG (1000 UT) CAPS     lipase-protease-amylase (CREON) 03044-12940-819227 units CPEP per EC capsule    multivitamin (OCUVITE) TABS    ondansetron (ZOFRAN) 8 MG tablet    predniSONE (DELTASONE) 20 MG tablet    prochlorperazine (COMPAZINE) 10 MG tablet    sodium chloride (LAXMI 128) 5 % ophthalmic ointment    acyclovir (ZOVIRAX) 400 MG tablet     No current facility-administered medications for this visit.        Allergies:  Reviewed, refer to EMR    Relevant Social History:  Social History     Tobacco Use    Smoking status: Former     Packs/day: 2.00     Years: 2.00     Pack years: 4.00     Types: Cigarettes     Quit date: 1954     Years since quittin.3     Passive exposure: Past    Smokeless tobacco: Never   Substance Use Topics    Alcohol use: Yes     Alcohol/week: 3.0 standard drinks of alcohol     Types: 3 Glasses of wine per week     Comment: glass of wine with dinner    Drug use: Never        Physical Exam:  Vitals: /67 (BP Location: Right arm, Patient Position: Sitting, Cuff Size: Adult Regular)   Pulse 102   Temp 98.5  F (36.9  C)   SpO2 97%   Constitutional: Alert, oriented, pleasant, no acute distress  Head: Normocephalic, atraumatic  Eyes: Extra-ocular movements intact, pupils equally round bilaterally, no scleral icterus  ENT: Oropharynx clear, moist mucus membranes  Neck: Supple, no lymphadenopathy  Cardiovascular: Regular rate and rhythm, no murmurs, rubs or gallops, peripheral pulses full/symmetric  Respiratory: Good air movement bilaterally, lungs clear, no wheezes/rales/rhonchi  GI: Abdomen soft, bowel sounds present, nondistended, nontender, large well healed ventral incision, no organomegaly or masses, no rebound/guarding  Musculoskeletal: No edema, normal muscle tone, normal gait, weakness of 4/5th digits on L  Neurologic: Alert and oriented, cranial nerves 2-12 intact, grossly non-focal  Psychiatric: normal mentation, affect and mood      Diagnostics:  Labs reviewed in Epic          Assessment and  Plan:  Tom was seen today for follow up.    Diagnoses and all orders for this visit:    Ulnar neuropathy at elbow, left  May benefit from Hand/occ therapy and consideration of splinting pending ortho eval  -     Occupational Therapy Referral; Future    Need for influenza vaccination  -     INFLUENZA VACCINE 65+ (FLUZONE HD)    Need for COVID-19 vaccine  -     COVID-19 12+ (2023-24) (PFIZER)            Angeli Robertson MD  Internal Medicine    >30 minutes spent today performing chart review, history and exam, counseling, care coordination, documentation and further activities as noted above exclusive of any procedures or EKG interpretation

## 2023-10-09 NOTE — NURSING NOTE
Chief Complaint   Patient presents with    Follow Up     Constant runny nose this weekend, wants a covid and flu shot        Tom Saini received the Fluzone HD Quadrivalent and COVID-19 2023-24 Comirnaty Pfizer immunizations in clinic today at the request of Dr. Robertson.   Prior to injections, verified patient identity using patient's name and date of birth.  The immunization site was cleaned with an alcohol prep wipe.   Patient has no history of reaction to vaccines.    Prior to immunization administration, verified patients identity using patient s name and date of birth. Please see Immunization Activity for additional information.     The following medication was given:     MEDICATION:  FLuzone HD  ROUTE: IM  SITE: Deltoid - Right  DOSE: 0.7 mL  LOT #: [id number]  : [employer]  EXPIRATION DATE: 06/2024  NDC#: 68878-940-68    Was there drug waste? No    The immunization was given without incident--see immunization list for administration details.   No swelling or redness was observed at the site of injection after the immunization was given.  Due to injection administration, patient instructed to remain in clinic for 15 minutes  afterwards, and to report any adverse reaction to me immediately.    Drug Amount Wasted:  None.  Vial/Syringe: Syringe    The following medication was given:     MEDICATION: COVID-19 2023-24 Comirnaty Pfizer immunization   ROUTE: IM  SITE: Deltoid - Left  DOSE: 0.3 mL  LOT #: [id number]  : Pfizer  EXPIRATION DATE: 12/12/23  NDC#: 3003-6480-51   Was there drug waste? No    The immunization was given without incident--see immunization list for administration details.   No swelling or redness was observed at the site of injection after the immunization was given.  Due to injection administration, patient instructed to remain in clinic for 15 minutes  afterwards, and to report any adverse reaction to me immediately.    Drug Amount Wasted:  None.  Vial/Syringe: Single  dose vial    Kay Huntley, YOHANN  October 9, 2023  9:33 AM

## 2023-10-10 ENCOUNTER — APPOINTMENT (OUTPATIENT)
Dept: GENERAL RADIOLOGY | Facility: CLINIC | Age: 88
End: 2023-10-10
Attending: EMERGENCY MEDICINE
Payer: MEDICARE

## 2023-10-10 ENCOUNTER — TELEPHONE (OUTPATIENT)
Dept: OPHTHALMOLOGY | Facility: CLINIC | Age: 88
End: 2023-10-10

## 2023-10-10 ENCOUNTER — NURSE TRIAGE (OUTPATIENT)
Dept: ONCOLOGY | Facility: CLINIC | Age: 88
End: 2023-10-10

## 2023-10-10 ENCOUNTER — HOSPITAL ENCOUNTER (EMERGENCY)
Facility: CLINIC | Age: 88
Discharge: HOME | End: 2023-10-10
Attending: EMERGENCY MEDICINE | Admitting: EMERGENCY MEDICINE
Payer: MEDICARE

## 2023-10-10 VITALS
SYSTOLIC BLOOD PRESSURE: 128 MMHG | DIASTOLIC BLOOD PRESSURE: 69 MMHG | WEIGHT: 160 LBS | HEART RATE: 78 BPM | TEMPERATURE: 98.7 F | OXYGEN SATURATION: 95 % | RESPIRATION RATE: 18 BRPM | BODY MASS INDEX: 23.7 KG/M2 | HEIGHT: 69 IN

## 2023-10-10 DIAGNOSIS — U07.1 COVID-19 VIRUS INFECTION: ICD-10-CM

## 2023-10-10 LAB
ALBUMIN SERPL BCG-MCNC: 3.8 G/DL (ref 3.5–5.2)
ALBUMIN UR-MCNC: NEGATIVE MG/DL
ALP SERPL-CCNC: 189 U/L (ref 40–129)
ALT SERPL W P-5'-P-CCNC: 8 U/L (ref 0–70)
ANION GAP SERPL CALCULATED.3IONS-SCNC: 11 MMOL/L (ref 7–15)
APPEARANCE UR: CLEAR
AST SERPL W P-5'-P-CCNC: 17 U/L (ref 0–45)
BASO+EOS+MONOS # BLD AUTO: ABNORMAL 10*3/UL
BASO+EOS+MONOS NFR BLD AUTO: ABNORMAL %
BASOPHILS # BLD AUTO: ABNORMAL 10*3/UL
BASOPHILS # BLD MANUAL: 0.3 10E3/UL (ref 0–0.2)
BASOPHILS NFR BLD AUTO: ABNORMAL %
BASOPHILS NFR BLD MANUAL: 1 %
BILIRUB SERPL-MCNC: 0.2 MG/DL
BILIRUB UR QL STRIP: NEGATIVE
BUN SERPL-MCNC: 13.2 MG/DL (ref 8–23)
CALCIUM SERPL-MCNC: 9.4 MG/DL (ref 8.8–10.2)
CHLORIDE SERPL-SCNC: 100 MMOL/L (ref 98–107)
COLOR UR AUTO: ABNORMAL
CREAT SERPL-MCNC: 1.03 MG/DL (ref 0.67–1.17)
DEPRECATED HCO3 PLAS-SCNC: 24 MMOL/L (ref 22–29)
EGFRCR SERPLBLD CKD-EPI 2021: 70 ML/MIN/1.73M2
EOSINOPHIL # BLD AUTO: ABNORMAL 10*3/UL
EOSINOPHIL # BLD MANUAL: 0 10E3/UL (ref 0–0.7)
EOSINOPHIL NFR BLD AUTO: ABNORMAL %
EOSINOPHIL NFR BLD MANUAL: 0 %
ERYTHROCYTE [DISTWIDTH] IN BLOOD BY AUTOMATED COUNT: 16.4 % (ref 10–15)
FLUAV RNA SPEC QL NAA+PROBE: NEGATIVE
FLUBV RNA RESP QL NAA+PROBE: NEGATIVE
GLUCOSE SERPL-MCNC: 118 MG/DL (ref 70–99)
GLUCOSE UR STRIP-MCNC: NEGATIVE MG/DL
HCT VFR BLD AUTO: 35.4 % (ref 40–53)
HGB BLD-MCNC: 11.2 G/DL (ref 13.3–17.7)
HGB UR QL STRIP: NEGATIVE
HOLD SPECIMEN: NORMAL
HOLD SPECIMEN: NORMAL
IMM GRANULOCYTES # BLD: ABNORMAL 10*3/UL
IMM GRANULOCYTES NFR BLD: ABNORMAL %
KETONES UR STRIP-MCNC: NEGATIVE MG/DL
LACTATE SERPL-SCNC: 1.1 MMOL/L (ref 0.7–2)
LEUKOCYTE ESTERASE UR QL STRIP: NEGATIVE
LYMPHOCYTES # BLD AUTO: ABNORMAL 10*3/UL
LYMPHOCYTES # BLD MANUAL: 0.8 10E3/UL (ref 0.8–5.3)
LYMPHOCYTES NFR BLD AUTO: ABNORMAL %
LYMPHOCYTES NFR BLD MANUAL: 3 %
MCH RBC QN AUTO: 30.3 PG (ref 26.5–33)
MCHC RBC AUTO-ENTMCNC: 31.6 G/DL (ref 31.5–36.5)
MCV RBC AUTO: 96 FL (ref 78–100)
MONOCYTES # BLD AUTO: ABNORMAL 10*3/UL
MONOCYTES # BLD MANUAL: 1.1 10E3/UL (ref 0–1.3)
MONOCYTES NFR BLD AUTO: ABNORMAL %
MONOCYTES NFR BLD MANUAL: 4 %
MUCOUS THREADS #/AREA URNS LPF: PRESENT /LPF
MYELOCYTES # BLD MANUAL: 0.5 10E3/UL
MYELOCYTES NFR BLD MANUAL: 2 %
NEUTROPHILS # BLD AUTO: ABNORMAL 10*3/UL
NEUTROPHILS # BLD MANUAL: 23.9 10E3/UL (ref 1.6–8.3)
NEUTROPHILS NFR BLD AUTO: ABNORMAL %
NEUTROPHILS NFR BLD MANUAL: 90 %
NITRATE UR QL: NEGATIVE
NRBC # BLD AUTO: 0.1 10E3/UL
NRBC # BLD AUTO: 0.3 10E3/UL
NRBC BLD AUTO-RTO: 0 /100
NRBC BLD MANUAL-RTO: 1 %
PH UR STRIP: 8 [PH] (ref 5–7)
PLAT MORPH BLD: ABNORMAL
PLATELET # BLD AUTO: 241 10E3/UL (ref 150–450)
POTASSIUM SERPL-SCNC: 4.4 MMOL/L (ref 3.4–5.3)
PROT SERPL-MCNC: 6.6 G/DL (ref 6.4–8.3)
RBC # BLD AUTO: 3.7 10E6/UL (ref 4.4–5.9)
RBC MORPH BLD: ABNORMAL
RBC URINE: 1 /HPF
RSV RNA SPEC NAA+PROBE: NEGATIVE
SARS-COV-2 RNA RESP QL NAA+PROBE: POSITIVE
SODIUM SERPL-SCNC: 135 MMOL/L (ref 135–145)
SP GR UR STRIP: 1.01 (ref 1–1.03)
UROBILINOGEN UR STRIP-MCNC: NORMAL MG/DL
WBC # BLD AUTO: 26.5 10E3/UL (ref 4–11)
WBC URINE: 1 /HPF

## 2023-10-10 PROCEDURE — 81001 URINALYSIS AUTO W/SCOPE: CPT | Performed by: EMERGENCY MEDICINE

## 2023-10-10 PROCEDURE — 36415 COLL VENOUS BLD VENIPUNCTURE: CPT | Performed by: EMERGENCY MEDICINE

## 2023-10-10 PROCEDURE — 250N000013 HC RX MED GY IP 250 OP 250 PS 637: Performed by: EMERGENCY MEDICINE

## 2023-10-10 PROCEDURE — 85027 COMPLETE CBC AUTOMATED: CPT | Performed by: EMERGENCY MEDICINE

## 2023-10-10 PROCEDURE — 258N000003 HC RX IP 258 OP 636: Performed by: EMERGENCY MEDICINE

## 2023-10-10 PROCEDURE — 87040 BLOOD CULTURE FOR BACTERIA: CPT | Performed by: EMERGENCY MEDICINE

## 2023-10-10 PROCEDURE — 96360 HYDRATION IV INFUSION INIT: CPT | Performed by: EMERGENCY MEDICINE

## 2023-10-10 PROCEDURE — 83605 ASSAY OF LACTIC ACID: CPT | Performed by: EMERGENCY MEDICINE

## 2023-10-10 PROCEDURE — 71046 X-RAY EXAM CHEST 2 VIEWS: CPT | Mod: 26 | Performed by: RADIOLOGY

## 2023-10-10 PROCEDURE — 85007 BL SMEAR W/DIFF WBC COUNT: CPT | Performed by: EMERGENCY MEDICINE

## 2023-10-10 PROCEDURE — 71046 X-RAY EXAM CHEST 2 VIEWS: CPT

## 2023-10-10 PROCEDURE — 80053 COMPREHEN METABOLIC PANEL: CPT | Performed by: EMERGENCY MEDICINE

## 2023-10-10 PROCEDURE — 87637 SARSCOV2&INF A&B&RSV AMP PRB: CPT | Performed by: EMERGENCY MEDICINE

## 2023-10-10 RX ORDER — ACETAMINOPHEN 500 MG
1000 TABLET ORAL ONCE
Status: COMPLETED | OUTPATIENT
Start: 2023-10-10 | End: 2023-10-10

## 2023-10-10 RX ADMIN — SODIUM CHLORIDE 500 ML: 9 INJECTION, SOLUTION INTRAVENOUS at 00:44

## 2023-10-10 RX ADMIN — ACETAMINOPHEN 1000 MG: 500 TABLET ORAL at 00:39

## 2023-10-10 ASSESSMENT — ACTIVITIES OF DAILY LIVING (ADL)
ADLS_ACUITY_SCORE: 35
ADLS_ACUITY_SCORE: 35

## 2023-10-10 NOTE — TELEPHONE ENCOUNTER
Nurse Triage SBAR    Is this a 2nd Level Triage? NO    Situation:  cancer/fever    Background:  patient has had a runny nose for a few days.  Today he had an office visit with his PCP and was given a COVID and a flu vaccination.  This evening patient states that he is weak, fatigued, and has a temperature of 101.0 oral.  Patient is currently undergoing chemotherapy.    Assessment:  weakness fatigue and a fever    Protocol Recommended Disposition:   Go to ED Now    Recommendation: Patient verbalized understanding of care advice.       Venecia Gallagher RN on 10/9/2023 at 10:26 PM      Does the patient meet one of the following criteria for ADS visit consideration? No    Reason for Disposition   [1] Neutropenia known or suspected (e.g., recent cancer chemotherapy) AND [2] fever > 100.4 F (38.0 C)    Additional Information   Negative: Shock suspected (e.g., cold/pale/clammy skin, too weak to stand, low BP, rapid pulse)   Negative: Difficult to awaken or acting confused (e.g., disoriented, slurred speech)   Negative: Bluish (or gray) lips or face now   Negative: New-onset rash with many purple (or blood-colored) spots or dots   Negative: Sounds like a life-threatening emergency to the triager   Negative: Fever > 103 F (39.4 C)    Protocols used: Cancer - Fever-A-

## 2023-10-10 NOTE — ED PROVIDER NOTES
[facility] ([facility])    10/10/23       ED PROVIDER NOTE    History     Chief Complaint   Patient presents with    Cough     Hx lymphoma. Receives chemo infusions. X3 days of runny nose. Cough. Fever at home.     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 Patient is accompanied by wife. Patient presents today with concerns of cough and rhinorrhea after he measured his temperature and it was 102 F. His wife states that she told her  to take his temperature due to cough and cold symptoms. Patient also complains of fatigue and weakness. He is able to ambulate though slower than normal. Patient states that his last chemo session was around 10 days ago. He reports that he has not had any adverse reactions to the chemo. He does not complain of diarrhea or dysuria. No body aches. Patient had one episode of chills prior to arrival. Patient took his temperature before calling nurse triage line, he was advised to come in due to temperature above 100.4 F. Patient reports that he got a flu and Covid vaccine this morning. He had felt fine before today. He feels significantly weaker than usual. He has not had any recent falls or injuries. Patient denies any cardiac history. He has not taken any OTC medications as he was instructed not to when he called the nurse triage line.     Past Medical History  Past Medical History:   Diagnosis Date    Aortic insufficiency     Erectile dysfunction     History of deep venous thrombosis 05/2019    distal DVT with extension, completed 6 months of AC (failed eliquis)    History of pelvic fracture     Hyperlipidemia     Low back pain     disc disease s/p laminectomy in past    Macular degeneration     on  Avastin, R eye    Pancreatic cyst 07/2018    next MR due 12/23    Radius fracture 2018    after fall from wall     Past Surgical History:   Procedure Laterality Date    CATARACT IOL, RT/LT  01/01/2001    COLONOSCOPY N/A 06/28/2023    Procedure: COLONOSCOPY, WITH BIOPSY;  Surgeon: Jhonatan Cowart MD;  Location: [facility] OR    COMBINED REPAIR PTOSIS WITH BLEPHAROPLASTY BILATERAL Bilateral 04/06/2018    Procedure: COMBINED REPAIR PTOSIS WITH BLEPHAROPLASTY BILATERAL;  BILATERAL UPPER EYELIDS BLEPHAROPLASTY AND PTOSIS REPAIR ;  Surgeon: Anuj Good MD;  Location: [facility]    EYE SURGERY      INSERT PORT VASCULAR ACCESS N/A 8/24/2023    Procedure: Insert port vascular access;  Surgeon: Cesar Negro MD;  [place] [facility] [place]    IR CHEST PORT PLACEMENT > 5 YRS OF AGE  8/24/2023    LAMINECTOMY LUMBAR TWO LEVELS  01/01/1993    L4-L5    LAPAROSCOPIC ASSISTED SIGMOID COLECTOMY N/A 7/27/2023    Procedure: COLECTOMY, SIGMOID, LAPAROSCOPIC assisted;  Surgeon: Jhonatan Cowart MD;  Location: [facility] OR    ORIF RADIAL SHAFT FRACTURE Right 2018    RECESSION RESECTION (REPAIR STRABISMUS)  01/01/1949    SIGMOIDOSCOPY FLEXIBLE N/A 7/27/2023    Procedure: Sigmoidoscopy flexible;  Surgeon: Jhonatan Cowart MD;  Location: [facility] OR     acyclovir (ZOVIRAX) 400 MG tablet  acyclovir (ZOVIRAX) 400 MG tablet  calcium carbonate (OS-DAVIDE) 1500 (600 Ca) MG tablet  Cholecalciferol (VITAMIN D-3) 25 MCG (1000 UT) CAPS  lipase-protease-amylase (CREON) 85175-52333-977441 units CPEP per EC capsule  multivitamin (OCUVITE) TABS  ondansetron (ZOFRAN) 8 MG tablet  predniSONE (DELTASONE) 20 MG tablet  prochlorperazine (COMPAZINE) 10 MG tablet  sodium chloride (LAXMI 128) 5 % ophthalmic ointment      Allergies   Allergen Reactions    Codeine Sulfate Nausea and Vomiting     Family History  Family History   Problem Relation Age of Onset    Cancer Mother         unknown skin cancer    Skin Cancer Mother     Cancer Father         bladder     Breast Cancer Sister     Glaucoma No family hx of     Macular Degeneration No family hx of     Diabetes No family hx of     Hypertension No family hx of     Anesthesia Reaction No family hx of     Thrombosis No family hx of      Social History   Social History     Tobacco Use    Smoking status: Former     Packs/day: 2.00     Years: 2.00     Pack years: 4.00     Types: Cigarettes     Quit date: 1954     Years since quittin.3     Passive exposure: Past    Smokeless tobacco: Never   Substance Use Topics    Alcohol use: Yes     Alcohol/week: 3.0 standard drinks of alcohol     Types: 3 Glasses of wine per week     Comment: glass of wine with dinner    Drug use: Never      Past medical history, past surgical history, medications, allergies, family history, and social history were reviewed with the patient. No additional pertinent items.      A medically appropriate review of systems was performed with pertinent positives and negatives noted in the HPI, and all other systems negative.    Physical Exam     BP: 131/68  Pulse: 98  Temp: (!) 101.9  F (38.8  C)  Resp: 20  Weight: 73 kg (161 lb)  SpO2: 97 %    Physical Exam  Physical Exam   Constitutional: oriented to person, place, and time. appears well-developed and well-nourished.   HENT:   Head: Normocephalic and atraumatic.   Neck: Normal range of motion.   Pulmonary/Chest: Effort normal. No respiratory distress.  Clear lungs bilaterally.  Cardiac: No murmurs, rubs, gallops. RRR.  Abdominal: Abdomen soft, nontender, nondistended. No rebound tenderness.  MSK: Long bones without deformity or evidence of trauma.  No extreme edema.  Neurological: alert and oriented to person, place, and time.   Skin: Skin is warm and dry.  No rashes noted.  Psychiatric:  normal mood and affect.  behavior is normal. Thought content normal.       ED Course, Procedures, & Data        Procedures            Results for orders placed or performed during the hospital encounter of  10/10/23   XR Chest 2 Views     Status: None    Narrative    EXAM: XR CHEST 2 VIEWS  LOCATION: [facility]  DATE: 10/10/2023    INDICATION: cough, fever  COMPARISON: 03/19/2019      Impression    IMPRESSION: There is a Port-A-Cath with tip projected over the distal SVC. Mildly calcified thoracic aortic arch with slight tortuosity of the descending thoracic aorta. Mild scattered degenerative changes of the bony skeleton. Healed right seventh rib   fracture posteriorly The chest is otherwise normal with no acute cardiopulmonary abnormalities seen to explain patient's fever/cough clinically.   Symptomatic Influenza A/B, RSV, & SARS-CoV2 PCR (COVID-19) Nasopharyngeal     Status: Abnormal    Specimen: Nasopharyngeal; Swab   Result Value Ref Range    Influenza A PCR Negative Negative    Influenza B PCR Negative Negative    RSV PCR Negative Negative    SARS CoV2 PCR Positive (A) Negative    Narrative    Testing was performed using the Xpert Xpress CoV2/Flu/RSV Assay on the [employer] GeneXpert Instrument. This test should be ordered for the detection of SARS-CoV-2, influenza, and RSV viruses in individuals who meet clinical and/or epidemiological criteria. Test performance is unknown in asymptomatic patients. This test is for in vitro diagnostic use under the FDA EUA for laboratories certified under CLIA to perform high or moderate complexity testing. This test has not been FDA cleared or approved. A negative result does not rule out the presence of PCR inhibitors in the specimen or target RNA in concentration below the limit of detection for the assay. If only one viral target is positive but coinfection with multiple targets is suspected, the sample should be re-tested with another FDA cleared, approved, or authorized test, if coinfection would change clinical management. This test was validated by the [facility]. These laboratories are certified under the  Clinical Laboratory Improvement Amendments of 1988 (CLIA-88) as qualified to perform high complexity laboratory testing.   [place] Draw     Status: None (In process)    Narrative    The following orders were created for panel order [place] Draw.  Procedure                               Abnormality         Status                     ---------                               -----------         ------                     Extra Blood Culture Bottle[[id number]]                                                  Extra Blue Top Tube[[id number]]                              Final result               Extra Red Top Tube[[id number]]                               Final result               Extra Green Top (Lithium...[[id number]]                                                 Extra Purple Top Tube[[id number]]                                                       Extra Urine Collection[[id number]]                                                        Please view results for these tests on the individual orders.   Extra Blue Top Tube     Status: None   Result Value Ref Range    Hold Specimen JI    Extra Red Top Tube     Status: None   Result Value Ref Range    Hold Specimen [facility]    Comprehensive metabolic panel     Status: Abnormal   Result Value Ref Range    Sodium 135 135 - 145 mmol/L    Potassium 4.4 3.4 - 5.3 mmol/L    Carbon Dioxide (CO2) 24 22 - 29 mmol/L    Anion Gap 11 7 - 15 mmol/L    Urea Nitrogen 13.2 8.0 - 23.0 mg/dL    Creatinine 1.03 0.67 - 1.17 mg/dL    GFR Estimate 70 >60 mL/min/1.73m2    Calcium 9.4 8.8 - 10.2 mg/dL    Chloride 100 98 - 107 mmol/L    Glucose 118 (H) 70 - 99 mg/dL    Alkaline Phosphatase 189 (H) 40 - 129 U/L    AST 17 0 - 45 U/L    ALT 8 0 - 70 U/L    Protein Total 6.6 6.4 - 8.3 g/dL    Albumin 3.8 3.5 - 5.2 g/dL    Bilirubin Total 0.2 <=1.2 mg/dL   Lactic acid whole blood     Status: Normal   Result Value Ref Range    Lactic Acid 1.1 0.7 - 2.0 mmol/L   UA with Microscopic reflex to Culture     Status:  Abnormal    Specimen: Urine, Clean Catch   Result Value Ref Range    Color Urine Light Yellow Colorless, Straw, Light Yellow, Yellow    Appearance Urine Clear Clear    Glucose Urine Negative Negative mg/dL    Bilirubin Urine Negative Negative    Ketones Urine Negative Negative mg/dL    Specific Gravity Urine 1.014 1.003 - 1.035    Blood Urine Negative Negative    pH Urine 8.0 (H) 5.0 - 7.0    Protein Albumin Urine Negative Negative mg/dL    Urobilinogen Urine Normal Normal, 2.0 mg/dL    Nitrite Urine Negative Negative    Leukocyte Esterase Urine Negative Negative    Mucus Urine Present (A) None Seen /LPF    RBC Urine 1 <=2 /HPF    WBC Urine 1 <=5 /HPF    Narrative    Urine Culture not indicated   [place] Draw     Status: None ()    Narrative    The following orders were created for panel order [place] Draw.  Procedure                               Abnormality         Status                     ---------                               -----------         ------                     Extra Blood Culture Bottle[[id number]]                                                    Please view results for these tests on the individual orders.   CBC with platelets and differential     Status: Abnormal   Result Value Ref Range    WBC Count 26.5 (H) 4.0 - 11.0 10e3/uL    RBC Count 3.70 (L) 4.40 - 5.90 10e6/uL    Hemoglobin 11.2 (L) 13.3 - 17.7 g/dL    Hematocrit 35.4 (L) 40.0 - 53.0 %    MCV 96 78 - 100 fL    MCH 30.3 26.5 - 33.0 pg    MCHC 31.6 31.5 - 36.5 g/dL    RDW 16.4 (H) 10.0 - 15.0 %    Platelet Count 241 150 - 450 10e3/uL    % Neutrophils      % Lymphocytes      % Monocytes      Mids % (Monos, Eos, Basos)      % Eosinophils      % Basophils      % Immature Granulocytes      NRBCs per 100 WBC 0 <1 /100    Absolute Neutrophils      Absolute Lymphocytes      Absolute Monocytes      Mids Abs (Monos, Eos, Basos)      Absolute Eosinophils      Absolute Basophils      Absolute Immature Granulocytes      Absolute NRBCs 0.1 10e3/uL    Manual Differential     Status: Abnormal   Result Value Ref Range    % Neutrophils 90 %    % Lymphocytes 3 %    % Monocytes 4 %    % Eosinophils 0 %    % Basophils 1 %    % Myelocytes 2 %    NRBCs per 100 WBC 1 (H) <=0 %    Absolute Neutrophils 23.9 (H) 1.6 - 8.3 10e3/uL    Absolute Lymphocytes 0.8 0.8 - 5.3 10e3/uL    Absolute Monocytes 1.1 0.0 - 1.3 10e3/uL    Absolute Eosinophils 0.0 0.0 - 0.7 10e3/uL    Absolute Basophils 0.3 (H) 0.0 - 0.2 10e3/uL    Absolute Myelocytes 0.5 (H) <=0.0 10e3/uL    Absolute NRBCs 0.3 (H) <=0.0 10e3/uL    RBC Morphology Confirmed RBC Indices     Platelet Assessment  Automated Count Confirmed. Platelet morphology is normal.     Automated Count Confirmed. Platelet morphology is normal.   CBC with platelets differential     Status: Abnormal    Narrative    The following orders were created for panel order CBC with platelets differential.  Procedure                               Abnormality         Status                     ---------                               -----------         ------                     CBC with platelets and d...[[id number]]  Abnormal            Final result               Manual Differential[[id number]]          Abnormal            Final result                 Please view results for these tests on the individual orders.     Medications   acetaminophen (TYLENOL) tablet 1,000 mg (has no administration in time range)   sodium chloride 0.9% BOLUS 500 mL (has no administration in time range)     Labs Ordered and Resulted from Time of ED Arrival to Time of ED Departure   LACTIC ACID WHOLE BLOOD - Normal       Result Value    Lactic Acid 1.1     INFLUENZA A/B, RSV, & SARS-COV2 PCR   COMPREHENSIVE METABOLIC PANEL   ROUTINE UA WITH MICROSCOPIC REFLEX TO CULTURE   CBC WITH PLATELETS AND DIFFERENTIAL   BLOOD CULTURE   BLOOD CULTURE     XR Chest 2 Views    (Results Pending)          Critical care was not performed.     Medical Decision Making  The patient's presentation was  of moderate complexity (an acute illness with systemic symptoms).    The patient's evaluation involved:  review of external note(s) from 1 sources (prior infusion clinic visit)  ordering and/or review of 3+ test(s) in this encounter (see separate area of note for details)  independent interpretation of testing performed by another health professional (chest x-ray without obvious infiltrate)  discussion of management or test interpretation with another health professional (oncology fellow)    The patient's management necessitated moderate risk (prescription drug management including medications given in the ED).    Assessment & Plan      MDM  Patient presenting with 3 days of runny nose, cough now having a fever.  He does have COVID.  He also got a vaccine which may have contributed to the fever.  He is not on oxygen.  He has minimal symptoms at this point.  Labs otherwise show elevated white blood count likely related to Neulasta given at his last infusion center visit.  Blood cultures are also sent.  Urinalysis without infection.  Patient is otherwise largely comfortable and appears quite well.  He is a benign abdomen.  No evidence of skin infection.  Patient does feel comfortable going home.  Did offer admission however he declined this.  Paxlovid sent.  Does not appear he has any drug drug interactions when cross checking online.  Did discuss return precautions and he will call oncology for follow-up appointment and to discuss upcoming PET scan    I have reviewed the nursing notes. I have reviewed the findings, diagnosis, plan and need for follow up with the patient.    New Prescriptions    No medications on file       Final diagnoses:   COVID-19 virus infection     I, Autumn Villareal, am serving as a trained medical scribe to document services personally performed by Royce Navarro MD based on the provider's statements to me on October 10, 2023.  This document has been checked and approved by the  attending provider.    I, Royce Navarro MD, was physically present and have reviewed and verified the accuracy of this note documented by Autumn Villareal, medical scribe.      Royce Navarro MD  [facility] EMERGENCY DEPARTMENT  10/9/2023     Royce Navarro MD  10/10/23 0235

## 2023-10-10 NOTE — TELEPHONE ENCOUNTER
Had flu and COVID immunizations on Monday, then diagnosed with COVID while in ED 10/9/23- originally presented with a fever, did not get home until after 0330 today. Pt was prescribed Paxlovid, but has not picked up from pharmacy, spouse is going to  today. He is very tired, plans to nap today.   Pt is scheduled for PET scan on 10/12/23, which he will likely need to reschedule. Writer informed will send message to scheduling to have them call pt directly to reschedule and informed pt to plan to mask when he comes to clinic for labs/VINAYAK visit/infusion on 10/20. Pt voiced understanding.   1212 IB message sent to scheduling.

## 2023-10-10 NOTE — TELEPHONE ENCOUNTER
[facility] Call Center    Phone Message    May a detailed message be left on voicemail: yes     Reason for Call: Other: Pt tested positive for covid today and would like to know if 10/17 appt will need to be canceled. Per protocol writer to send te for pt that test positive for covid for a appointment review     Action Taken: Message routed to:  [facility] eye    Travel Screening: Positive: unable to schedule an appointment, due to positive travel screen.  Informed patient/caller that a message will be sent to the clinic; who will then call them back to discuss next steps.     Patient screened positive for: Coronavirus

## 2023-10-10 NOTE — TELEPHONE ENCOUNTER
[facility] Call Center    Phone Message    May a detailed message be left on voicemail: yes     Reason for Call: Other: Pt had an appt with Dr. Laura on 10/17 but was cancelled due to him testing positive for covid. He is r/s to 1/9/24 which falls outside of the 3 month follow up timeframe. Please review to see if pt needs to be seen sooner. Thank you.     Action Taken: Message routed to:  [facility] EYE    Travel Screening: Not Applicable

## 2023-10-10 NOTE — TELEPHONE ENCOUNTER
Called and left a voicemail    Canceled 10/17 appointment due to Juaquin having covid - ok to reschedule 21 days from today with Karuna Gonzalez Communication Facilitator on 10/10/2023 at 1:04 PM

## 2023-10-10 NOTE — DISCHARGE INSTRUCTIONS
Please call your oncology team to discuss your upcoming appointments    If you are still taking atorvastatin, please pause this while you are taking Paxlovid    Return to the emergency department if you develop worsening symptoms including worsening breathing, chest pain, lethargy or if you have any further concerns

## 2023-10-15 LAB
BACTERIA BLD CULT: NO GROWTH
BACTERIA BLD CULT: NO GROWTH

## 2023-10-18 NOTE — PROGRESS NOTES
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  He has had diarrhea with particular food for several years and recently was investigated for possible pancreatic insufficiency.  He also has a history of pancreatic pseudocyst.  On recent MRI done in June 2023 to follow-up on pancreatic pseudocyst showed a colonic mass.  Subsequent CT scan of chest abdomen pelvis showed Mild anemia, no cytopenias 7.9 x 6.9 x 5.6 cm large soft tissue mass in sigmoid colon obliterating the lumen and significant extramural soft tissue extension superiorly in the sigmoid mesocolon.  Enlarged bilateral inguinal lymph node, as few small prominent lymph nodes along superior rectal/inferior mesenteric artery distribution.  Diffuse main pancreatic duct dilatation with large intraductal calculus in the head was seen as well.  Multiple cystic lesions and dilated sidebranches were seen in the pancreas.  He underwent colonoscopy but due to obliterated lumen, colonoscopy failed.  Subsequently CT colonography was done which showed similar results to CT chest abdomen pelvis.  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  FISH showed Bcl-2 rearrangement but no MYC rearrangement.  Bcl-2 IgH rearrangement [translocation (14;18)] is consistent with transformation of DLBCL from follicular lymphoma.  Postoperative course was complicated by postoperative nausea vomiting which resolved, single episode of suicidal ideation which resolved.  He was discharged to TCU.  He has been regaining his strength slowly, his performance status has improved to 1.  He is able to walk independently for short  "distances, can do basic activities of daily living himself.    PET scan revealed persistent hypermetabolic retroperitoneal lymph nodes, possibly reactive but lymphoma cannot be ruled out. ECHOcardiogram reveals EF >50%. LDH within normal limits. Overall IPI 2. Low-intermediate risk GCB DLBCL.     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  We reviewed his recent ED visit on 10/10 for evaluation of rhinorrhea and cough where he tested positive for covid and was started on paxlovid.   Reports his symptoms have resolved and he is feeling well today.      He is tolerating therapy will without significant toxicities.  He is energy levels are good and reports he is eating and drinking well.  He is eating 3 meals per day with a small lunch.      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  Reports this has not improved or worsened.      Denies fevers/chills, night sweats, N/V/D/C, recurring infections, bleeding issues, rashes/sores.       Objective    /64   Pulse 95   Temp 98.2  F (36.8  C)   Resp 16   Ht 1.753 m (5' 9.02\")   SpO2 95%   BMI 23.62 kg/m    Body mass index is 23.62 kg/m .  Physical Exam   General: patient appears well in no acute distress, alert and oriented, speech clear and fluid  Skin: no visualized rash or lesions on visualized skin  Resp: Appears to be breathing comfortably without accessory muscle usage, speaking in full sentences, no audible wheezes or cough.  Psych: Coherent speech, normal rate and volume, able to articulate logical thoughts, able to abstract reason, no tangential thoughts, no hallucinations or delusions  Patient's affect is " appropriate.      Labs:  Most Recent 3 CBC's:  Recent Labs   Lab Test 10/20/23  0928 10/10/23  0009 09/28/23  0908 09/12/23  0926 09/08/23  1133   WBC 10.6 26.5* 10.1   < > 9.0   HGB 11.1* 11.2* 11.9*   < > 11.8*   MCV 95 96 95   < > 93    241 329   < > 281   ANEUTAUTO 8.5*  --  7.8  --  6.7    < > = values in this interval not displayed.     Most Recent 3 BMP's:  Recent Labs   Lab Test 10/20/23  0928 10/10/23  0009 09/28/23  0908    135 141   POTASSIUM 4.3 4.4 4.1   CHLORIDE 106 100 105   CO2 26 24 26   BUN 18.7 13.2 21.2   CR 0.89 1.03 0.88   ANIONGAP 9 11 10   DAVIDE 9.3 9.4 9.3   * 118* 186*   PROTTOTAL 6.6 6.6 6.7   ALBUMIN 3.8 3.8 3.8    Most Recent 3 LFT's:  Recent Labs   Lab Test 10/20/23  0928 10/10/23  0009 09/28/23  0908   AST 16 17 12   ALT 8 8 8   ALKPHOS 105 189* 117   BILITOTAL 0.3 0.2 0.2    Most Recent 2 TSH and T4:  Recent Labs   Lab Test 02/27/21  1149   TSH 0.90     I reviewed the above labs today.    Assessment and Plan:    1) Diffuse large B cell lymphoma:  - First dose will be divided into two doses a week apart: Rituximab plus steroids followed by CHO.   - PET scan will be after 3 cycles and 6 cycles.  - PET scan was scheduled for 10/12 and rescheduled due to patient having covid   - Received C1D1 in 2 divided doses on 8/31 and 9/8  - S/p C2 and and is tolerating treatment well without toxicities.  He reports since recovering from covid he is overall feeling well. He was due to his PET scan prior to C3 but due to being covid positive it was pushed back to  prior to C4.  - Proceed with Mini-R-CHOP C3D1 today (10/20)  - Follow-up with VINAYAK prior to each cycle    Ppx:  - Acyclovir 400 mg BID- patient unable to determine if he is taking or not    2) Pancreatic insufficiency:  - Continue Creon.   - We will monitor for diarrhea.    3) R hand neuropathy:  Patient vocalized frustrations today of his baseline neuropathy in his R hand 4th and 5th fingers.  He was seen by his PCP for the  issue and referred for a EMG.  The EMG was performed on 9/7 by [facility].  Patient wanted to review the results of his EMG and discuss the need for a brace.  - Scheduled with Hand/Wrist Specialist 10/31  - Is going to start working with PT    4) Covid + (10/10/23)  Patient was seen in the ED 10/10/23 for fevers, rhinorrhea, fatigue, and cough.  He tested positive for covid and was started on Paxlovid.  His symptoms have resolved and he feels back to his baseline.  - He received his covid and flu shot on 10/10  - Will reswab for covid today (10/20)    23 minutes spent on the date of the encounter doing chart review, review of test results, interpretation of tests, patient visit, and documentation       HEYDI Perkins CNP

## 2023-10-20 ENCOUNTER — ONCOLOGY VISIT (OUTPATIENT)
Dept: ONCOLOGY | Facility: CLINIC | Age: 88
End: 2023-10-20
Attending: STUDENT IN AN ORGANIZED HEALTH CARE EDUCATION/TRAINING PROGRAM
Payer: MEDICARE

## 2023-10-20 ENCOUNTER — APPOINTMENT (OUTPATIENT)
Dept: LAB | Facility: CLINIC | Age: 88
End: 2023-10-20
Attending: STUDENT IN AN ORGANIZED HEALTH CARE EDUCATION/TRAINING PROGRAM
Payer: MEDICARE

## 2023-10-20 VITALS
HEART RATE: 95 BPM | SYSTOLIC BLOOD PRESSURE: 125 MMHG | DIASTOLIC BLOOD PRESSURE: 64 MMHG | OXYGEN SATURATION: 95 % | TEMPERATURE: 98.2 F | BODY MASS INDEX: 23.62 KG/M2 | RESPIRATION RATE: 16 BRPM | HEIGHT: 69 IN

## 2023-10-20 VITALS — WEIGHT: 158.3 LBS | BODY MASS INDEX: 23.44 KG/M2 | HEIGHT: 69 IN

## 2023-10-20 DIAGNOSIS — C83.398 DIFFUSE LARGE B-CELL LYMPHOMA OF EXTRANODAL SITE: Primary | ICD-10-CM

## 2023-10-20 DIAGNOSIS — Z76.89 PREVENTION OF CHEMOTHERAPY-INDUCED NEUTROPENIA: ICD-10-CM

## 2023-10-20 DIAGNOSIS — Z51.11 ENCOUNTER FOR ANTINEOPLASTIC CHEMOTHERAPY: ICD-10-CM

## 2023-10-20 DIAGNOSIS — U07.1 INFECTION DUE TO 2019 NOVEL CORONAVIRUS: ICD-10-CM

## 2023-10-20 LAB
ALBUMIN SERPL BCG-MCNC: 3.8 G/DL (ref 3.5–5.2)
ALP SERPL-CCNC: 105 U/L (ref 40–129)
ALT SERPL W P-5'-P-CCNC: 8 U/L (ref 0–70)
ANION GAP SERPL CALCULATED.3IONS-SCNC: 9 MMOL/L (ref 7–15)
AST SERPL W P-5'-P-CCNC: 16 U/L (ref 0–45)
BASO+EOS+MONOS # BLD AUTO: ABNORMAL 10*3/UL
BASO+EOS+MONOS NFR BLD AUTO: ABNORMAL %
BASOPHILS # BLD AUTO: 0.2 10E3/UL (ref 0–0.2)
BASOPHILS NFR BLD AUTO: 2 %
BILIRUB SERPL-MCNC: 0.3 MG/DL
BUN SERPL-MCNC: 18.7 MG/DL (ref 8–23)
CALCIUM SERPL-MCNC: 9.3 MG/DL (ref 8.8–10.2)
CHLORIDE SERPL-SCNC: 106 MMOL/L (ref 98–107)
CREAT SERPL-MCNC: 0.89 MG/DL (ref 0.67–1.17)
DEPRECATED HCO3 PLAS-SCNC: 26 MMOL/L (ref 22–29)
EGFRCR SERPLBLD CKD-EPI 2021: 83 ML/MIN/1.73M2
EOSINOPHIL # BLD AUTO: 0.1 10E3/UL (ref 0–0.7)
EOSINOPHIL NFR BLD AUTO: 1 %
ERYTHROCYTE [DISTWIDTH] IN BLOOD BY AUTOMATED COUNT: 16 % (ref 10–15)
GLUCOSE SERPL-MCNC: 124 MG/DL (ref 70–99)
HCT VFR BLD AUTO: 33.7 % (ref 40–53)
HGB BLD-MCNC: 11.1 G/DL (ref 13.3–17.7)
IMM GRANULOCYTES # BLD: 0.1 10E3/UL
IMM GRANULOCYTES NFR BLD: 1 %
LYMPHOCYTES # BLD AUTO: 0.7 10E3/UL (ref 0.8–5.3)
LYMPHOCYTES NFR BLD AUTO: 6 %
MCH RBC QN AUTO: 31.2 PG (ref 26.5–33)
MCHC RBC AUTO-ENTMCNC: 32.9 G/DL (ref 31.5–36.5)
MCV RBC AUTO: 95 FL (ref 78–100)
MONOCYTES # BLD AUTO: 1 10E3/UL (ref 0–1.3)
MONOCYTES NFR BLD AUTO: 10 %
NEUTROPHILS # BLD AUTO: 8.5 10E3/UL (ref 1.6–8.3)
NEUTROPHILS NFR BLD AUTO: 80 %
NRBC # BLD AUTO: 0 10E3/UL
NRBC BLD AUTO-RTO: 0 /100
PLATELET # BLD AUTO: 290 10E3/UL (ref 150–450)
POTASSIUM SERPL-SCNC: 4.3 MMOL/L (ref 3.4–5.3)
PROT SERPL-MCNC: 6.6 G/DL (ref 6.4–8.3)
RBC # BLD AUTO: 3.56 10E6/UL (ref 4.4–5.9)
SARS-COV-2 RNA RESP QL NAA+PROBE: POSITIVE
SODIUM SERPL-SCNC: 141 MMOL/L (ref 135–145)
WBC # BLD AUTO: 10.6 10E3/UL (ref 4–11)

## 2023-10-20 PROCEDURE — 250N000011 HC RX IP 250 OP 636: Mod: JZ

## 2023-10-20 PROCEDURE — 36591 DRAW BLOOD OFF VENOUS DEVICE: CPT

## 2023-10-20 PROCEDURE — 85025 COMPLETE CBC W/AUTO DIFF WBC: CPT

## 2023-10-20 PROCEDURE — 250N000013 HC RX MED GY IP 250 OP 250 PS 637

## 2023-10-20 PROCEDURE — 96417 CHEMO IV INFUS EACH ADDL SEQ: CPT

## 2023-10-20 PROCEDURE — 96375 TX/PRO/DX INJ NEW DRUG ADDON: CPT

## 2023-10-20 PROCEDURE — 80053 COMPREHEN METABOLIC PANEL: CPT

## 2023-10-20 PROCEDURE — 87635 SARS-COV-2 COVID-19 AMP PRB: CPT

## 2023-10-20 PROCEDURE — 96413 CHEMO IV INFUSION 1 HR: CPT

## 2023-10-20 PROCEDURE — 96367 TX/PROPH/DG ADDL SEQ IV INF: CPT

## 2023-10-20 PROCEDURE — 258N000003 HC RX IP 258 OP 636

## 2023-10-20 PROCEDURE — 96415 CHEMO IV INFUSION ADDL HR: CPT

## 2023-10-20 PROCEDURE — 96377 APPLICATON ON-BODY INJECTOR: CPT | Mod: 59

## 2023-10-20 PROCEDURE — 96411 CHEMO IV PUSH ADDL DRUG: CPT

## 2023-10-20 PROCEDURE — 99213 OFFICE O/P EST LOW 20 MIN: CPT

## 2023-10-20 PROCEDURE — 96372 THER/PROPH/DIAG INJ SC/IM: CPT

## 2023-10-20 PROCEDURE — G0463 HOSPITAL OUTPT CLINIC VISIT: HCPCS

## 2023-10-20 RX ORDER — DIPHENHYDRAMINE HCL 25 MG
50 CAPSULE ORAL ONCE
Status: CANCELLED
Start: 2023-10-20

## 2023-10-20 RX ORDER — HEPARIN SODIUM (PORCINE) LOCK FLUSH IV SOLN 100 UNIT/ML 100 UNIT/ML
5 SOLUTION INTRAVENOUS
Status: DISCONTINUED | OUTPATIENT
Start: 2023-10-20 | End: 2023-10-20 | Stop reason: HOSPADM

## 2023-10-20 RX ORDER — PALONOSETRON 0.05 MG/ML
0.25 INJECTION, SOLUTION INTRAVENOUS ONCE
Status: CANCELLED
Start: 2023-10-20

## 2023-10-20 RX ORDER — METHYLPREDNISOLONE SODIUM SUCCINATE 125 MG/2ML
125 INJECTION, POWDER, LYOPHILIZED, FOR SOLUTION INTRAMUSCULAR; INTRAVENOUS
Status: CANCELLED
Start: 2023-10-20

## 2023-10-20 RX ORDER — PALONOSETRON 0.05 MG/ML
0.25 INJECTION, SOLUTION INTRAVENOUS ONCE
Status: COMPLETED | OUTPATIENT
Start: 2023-10-20 | End: 2023-10-20

## 2023-10-20 RX ORDER — ALBUTEROL SULFATE 90 UG/1
1-2 AEROSOL, METERED RESPIRATORY (INHALATION)
Status: CANCELLED
Start: 2023-10-20

## 2023-10-20 RX ORDER — HEPARIN SODIUM (PORCINE) LOCK FLUSH IV SOLN 100 UNIT/ML 100 UNIT/ML
5 SOLUTION INTRAVENOUS EVERY 8 HOURS
Status: DISCONTINUED | OUTPATIENT
Start: 2023-10-20 | End: 2023-10-20 | Stop reason: HOSPADM

## 2023-10-20 RX ORDER — ACETAMINOPHEN 325 MG/1
650 TABLET ORAL ONCE
Status: COMPLETED | OUTPATIENT
Start: 2023-10-20 | End: 2023-10-20

## 2023-10-20 RX ORDER — LORAZEPAM 2 MG/ML
0.5 INJECTION INTRAMUSCULAR EVERY 4 HOURS PRN
Status: CANCELLED | OUTPATIENT
Start: 2023-10-20

## 2023-10-20 RX ORDER — EPINEPHRINE 1 MG/ML
0.3 INJECTION, SOLUTION INTRAMUSCULAR; SUBCUTANEOUS EVERY 5 MIN PRN
Status: CANCELLED | OUTPATIENT
Start: 2023-10-20

## 2023-10-20 RX ORDER — ALBUTEROL SULFATE 0.83 MG/ML
2.5 SOLUTION RESPIRATORY (INHALATION)
Status: CANCELLED | OUTPATIENT
Start: 2023-10-20

## 2023-10-20 RX ORDER — MEPERIDINE HYDROCHLORIDE 25 MG/ML
25 INJECTION INTRAMUSCULAR; INTRAVENOUS; SUBCUTANEOUS EVERY 30 MIN PRN
Status: CANCELLED | OUTPATIENT
Start: 2023-10-20

## 2023-10-20 RX ORDER — MEPERIDINE HYDROCHLORIDE 25 MG/ML
25 INJECTION INTRAMUSCULAR; INTRAVENOUS; SUBCUTANEOUS
Status: CANCELLED
Start: 2023-10-20

## 2023-10-20 RX ORDER — DIPHENHYDRAMINE HCL 25 MG
50 CAPSULE ORAL ONCE
Status: COMPLETED | OUTPATIENT
Start: 2023-10-20 | End: 2023-10-20

## 2023-10-20 RX ORDER — DIPHENHYDRAMINE HYDROCHLORIDE 50 MG/ML
50 INJECTION INTRAMUSCULAR; INTRAVENOUS
Status: CANCELLED
Start: 2023-10-20

## 2023-10-20 RX ORDER — DOXORUBICIN HYDROCHLORIDE 2 MG/ML
25 INJECTION, SOLUTION INTRAVENOUS ONCE
Status: COMPLETED | OUTPATIENT
Start: 2023-10-20 | End: 2023-10-20

## 2023-10-20 RX ORDER — DOXORUBICIN HYDROCHLORIDE 2 MG/ML
25 INJECTION, SOLUTION INTRAVENOUS ONCE
Status: CANCELLED | OUTPATIENT
Start: 2023-10-20

## 2023-10-20 RX ORDER — ACETAMINOPHEN 325 MG/1
650 TABLET ORAL ONCE
Status: CANCELLED
Start: 2023-10-20

## 2023-10-20 RX ORDER — HEPARIN SODIUM,PORCINE 10 UNIT/ML
5-20 VIAL (ML) INTRAVENOUS DAILY PRN
Status: CANCELLED | OUTPATIENT
Start: 2023-10-20

## 2023-10-20 RX ORDER — HEPARIN SODIUM (PORCINE) LOCK FLUSH IV SOLN 100 UNIT/ML 100 UNIT/ML
5 SOLUTION INTRAVENOUS
Status: CANCELLED | OUTPATIENT
Start: 2023-10-20

## 2023-10-20 RX ADMIN — PALONOSETRON HYDROCHLORIDE 0.25 MG: 0.25 INJECTION INTRAVENOUS at 10:42

## 2023-10-20 RX ADMIN — ACETAMINOPHEN 650 MG: 325 TABLET ORAL at 11:37

## 2023-10-20 RX ADMIN — RITUXIMAB-ABBS 700 MG: 10 INJECTION, SOLUTION INTRAVENOUS at 12:10

## 2023-10-20 RX ADMIN — PEGFILGRASTIM 6 MG: KIT SUBCUTANEOUS at 12:50

## 2023-10-20 RX ADMIN — SODIUM CHLORIDE 250 ML: 9 INJECTION, SOLUTION INTRAVENOUS at 10:41

## 2023-10-20 RX ADMIN — FOSAPREPITANT DIMEGLUMINE 150 MG: 150 INJECTION, POWDER, LYOPHILIZED, FOR SOLUTION INTRAVENOUS at 10:41

## 2023-10-20 RX ADMIN — CYCLOPHOSPHAMIDE 750 MG: 1 INJECTION, POWDER, FOR SOLUTION INTRAVENOUS; ORAL at 11:36

## 2023-10-20 RX ADMIN — DIPHENHYDRAMINE HYDROCHLORIDE 50 MG: 25 CAPSULE ORAL at 11:37

## 2023-10-20 RX ADMIN — DOXORUBICIN HYDROCHLORIDE 45 MG: 2 INJECTION, SOLUTION INTRAVENOUS at 11:16

## 2023-10-20 RX ADMIN — Medication 5 ML: at 14:54

## 2023-10-20 RX ADMIN — Medication 5 ML: at 09:29

## 2023-10-20 RX ADMIN — VINCRISTINE SULFATE 1 MG: 1 INJECTION, SOLUTION INTRAVENOUS at 11:27

## 2023-10-20 ASSESSMENT — PAIN SCALES - GENERAL: PAINLEVEL: NO PAIN (0)

## 2023-10-20 NOTE — PATIENT INSTRUCTIONS
[facility] Triage and after hours / weekends / holidays:  [phone]    Please call the triage or after hours line if you experience a temperature greater than or equal to 100.4, shaking chills, have uncontrolled nausea, vomiting and/or diarrhea, dizziness, shortness of breath, chest pain, bleeding, unexplained bruising, or if you have any other new/concerning symptoms, questions or concerns.      If you are having any concerning symptoms or wish to speak to a provider before your next infusion visit, please call triage to notify them so we can adequately serve you.     If you need a refill on a narcotic prescription or other medication, please call before your infusion appointment.                 October 2023 Sunday Monday Tuesday Wednesday Thursday Friday Saturday   1     2     3     4     5     6    EXTREMITY EVALUATION   7:25 AM   (40 min.)   Jordon Johnston PT   [facility]    RETURN  10:15 AM   (15 min.)   Pauline Freed MD   [facility] Dermatology [facility]  11:15 AM   (60 min.)   [facility] PFL PENT   [facility] Pulmonary Function Testing [place] 7       8     9    [facility] RETURN   8:15 AM   (30 min.)   Angeli Robertson MD   [facility] Internal Medicine [place] 10    Admission  12:15 AM   Royce Navarro MD   [facility]   (Discharge: 10/10/2023)    XR CHEST 2 VIEWS  12:55 AM   (20 min.)   [id number]   [facility] Imaging 11     12     13     14       15     16     17     18     19     20    LAB CENTRAL   9:00 AM   (15 min.)    [facility] DRAW   [facility]    RETURN CCSL   9:15 AM   (45 min.)   Miriam Rouse APRN CNP   [facility]    ONC INFUSION 5 HR (300 MIN)  10:30 AM   (300 min.)    ONC INFUSION NURSE   [facility] 21       22     23     24     25    HAND  THERAPY EVAL   9:45 AM   (60 min.)   Melodie Joseph OT   [facility] 26     27    EXTREMITY TREATMENT    7:40 AM   (40 min.)   Jordon Johnston PT   [facility] 28       29     30     31    NEW HAND/WRIST   8:55 AM   (20 min.)   Deniz Alfred MD   [facility] [place]                                 November 2023 Sunday Monday Tuesday Wednesday Thursday Friday Saturday                  1    HAND THERAPY TREATMENT  11:30 AM   (30 min.)   Melodie Joseph OT   [facility] 2     3    EXTREMITY TREATMENT    9:40 AM   (40 min.)   Jordon Johnston PT   [facility]    PENTAMADINE NEB  10:45 AM   (60 min.)   [facility] PFL PENT   [facility] Pulmonary Function Testing [place] 4       5     6     7    PET ONCOLOGY WHOLE BODY   9:00 AM   (30 min.)   [id number]   [facility] 8    HAND THERAPY TREATMENT  10:30 AM   (30 min.)   Melodie Joseph OT   [facility] 9    LAB CENTRAL  10:00 AM   (15 min.)    [facility] DRAW   [facility]    RETURN CCSL  10:15 AM   (45 min.)   Miriam Rouse APRN CNP   [facility]    ONC INFUSION 5 HR (300 MIN)  11:30 AM   (300 min.)    ONC INFUSION NURSE   [facility] 10    EXTREMITY TREATMENT    9:40 AM   (40 min.)   Jordon Johnston PT   [facility] 11       12     13     14     15     16     17    EXTREMITY TREATMENT    9:40 AM   (40 min.)   Jordon Johnston PT   [facility] 18       19     20     21     22     23     24     25       26     27     28     29     30    LAB CENTRAL  11:00 AM   (15  min.)   [facility] LAB DRAW   [facility]    ONC INFUSION 5 HR (300 MIN)  11:30 AM   (300 min.)    ONC INFUSION NURSE   [facility]    RETURN CCSL  12:00 PM   (30 min.)   Holly Buitrago MD   [facility]                           Lab Results:  Recent Results (from the past 12 hour(s))   Comprehensive metabolic panel    Collection Time: 10/20/23  9:28 AM   Result Value Ref Range    Sodium 141 135 - 145 mmol/L    Potassium 4.3 3.4 - 5.3 mmol/L    Carbon Dioxide (CO2) 26 22 - 29 mmol/L    Anion Gap 9 7 - 15 mmol/L    Urea Nitrogen 18.7 8.0 - 23.0 mg/dL    Creatinine 0.89 0.67 - 1.17 mg/dL    GFR Estimate 83 >60 mL/min/1.73m2    Calcium 9.3 8.8 - 10.2 mg/dL    Chloride 106 98 - 107 mmol/L    Glucose 124 (H) 70 - 99 mg/dL    Alkaline Phosphatase 105 40 - 129 U/L    AST 16 0 - 45 U/L    ALT 8 0 - 70 U/L    Protein Total 6.6 6.4 - 8.3 g/dL    Albumin 3.8 3.5 - 5.2 g/dL    Bilirubin Total 0.3 <=1.2 mg/dL   CBC with platelets and differential    Collection Time: 10/20/23  9:28 AM   Result Value Ref Range    WBC Count 10.6 4.0 - 11.0 10e3/uL    RBC Count 3.56 (L) 4.40 - 5.90 10e6/uL    Hemoglobin 11.1 (L) 13.3 - 17.7 g/dL    Hematocrit 33.7 (L) 40.0 - 53.0 %    MCV 95 78 - 100 fL    MCH 31.2 26.5 - 33.0 pg    MCHC 32.9 31.5 - 36.5 g/dL    RDW 16.0 (H) 10.0 - 15.0 %    Platelet Count 290 150 - 450 10e3/uL    % Neutrophils 80 %    % Lymphocytes 6 %    % Monocytes 10 %    Mids % (Monos, Eos, Basos)      % Eosinophils 1 %    % Basophils 2 %    % Immature Granulocytes 1 %    NRBCs per 100 WBC 0 <1 /100    Absolute Neutrophils 8.5 (H) 1.6 - 8.3 10e3/uL    Absolute Lymphocytes 0.7 (L) 0.8 - 5.3 10e3/uL    Absolute Monocytes 1.0 0.0 - 1.3 10e3/uL    Mids Abs (Monos, Eos, Basos)      Absolute Eosinophils 0.1 0.0 - 0.7 10e3/uL    Absolute Basophils 0.2 0.0 - 0.2 10e3/uL    Absolute Immature Granulocytes 0.1 <=0.4 10e3/uL    Absolute NRBCs 0.0 10e3/uL    Asymptomatic COVID-19 Virus (Coronavirus) by PCR Nasopharyngeal    Collection Time: 10/20/23  9:52 AM    Specimen: Nasopharyngeal; Swab   Result Value Ref Range    SARS CoV2 PCR Positive (A) Negative

## 2023-10-20 NOTE — PROGRESS NOTES
Infusion Nursing Note:  Tom Saini presents today for Day 1 Cycle 3 Adriamycin, Vincristine, Cytoxan, Rituxan, Neulasta On-pro application  Patient seen by provider today: Yes: Miriam Rouse CNP   present during visit today: Not Applicable.    Note: Patient presents to infusion feeling ok. Pt denies new acute discomfort and states no acute complaints or concerns not addressed by VINAYAK today. Pt took first dose Prednisone prior to Cytoxan administration.      Intravenous Access:  Implanted Port.    Treatment Conditions:  Lab Results   Component Value Date    HGB 11.1 (L) 10/20/2023    WBC 10.6 10/20/2023    ANEU 23.9 (H) 10/10/2023    ANEUTAUTO 8.5 (H) 10/20/2023     10/20/2023        Lab Results   Component Value Date     10/20/2023    POTASSIUM 4.3 10/20/2023    MAG 2.2 2023    CR 0.89 10/20/2023    DAVIDE 9.3 10/20/2023    BILITOTAL 0.3 10/20/2023    ALBUMIN 3.8 10/20/2023    ALT 8 10/20/2023    AST 16 10/20/2023       Results reviewed, labs MET treatment parameters, ok to proceed with treatment.  ECHO/MUGA completed 8/10/23  EF 55-60%.      Post Infusion Assessment:  Patient tolerated infusion without incident.  Rituxan infused per the followinmls/hour x30 mins  200mls/hour x30 mins  300mls/hour x30 mins  400mls/hour x remainder of infusion  Blood return noted pre and post infusion.  Blood return noted during administration every 2 ml of Adriamycin and every couple seconds of Vincristine.  Site patent and intact, free from redness, edema or discomfort.  No evidence of extravasations.  Access discontinued per protocol.     Neulasta Onpro On-Body injector applied to back of right arm at 1250 with light facing down.  Writer discussed Neulasta injection would start on 10/21 at 3:50pm, approximately 27 hours after application applied today.  Written and Verbal instruction reviewed with patient.  Pt instructed when the dose delivery starts, it will take about 45 minutes to  complete.  Pt aware Neulasta Onpro On-Body should have green flashing light and to call triage or on-call MD if injector flashes red or appears to be leaking. Pt aware to keep Onpro On-Body Neualsta 4 inches away from electrical equipment and to avoid showering 4 hours prior to injection.   Neulasta Onpro Lot number:     Discharge Plan:   Prescription refills given for Prednisone.  Discharge instructions reviewed with: Patient.  Patient and/or family verbalized understanding of discharge instructions and all questions answered.  AVS to patient via [employer].  Patient will return 11/9 for next appointment.   Patient discharged in stable condition accompanied by: self.  Departure Mode: Ambulatory.      Masoud Kuo RN     96

## 2023-10-20 NOTE — NURSING NOTE
Nasopharyngeal COVID Swab collected without complication per Miriam Rouse NP.   Patient tolerated well and was discharged.    Tati Campblel LPN  10/20/2023

## 2023-10-20 NOTE — NURSING NOTE
Chief Complaint   Patient presents with    Port Draw     Power  needle heparin locked, vitals checked     Ellen Luis RN on 10/20/2023 at 9:31 AM

## 2023-10-20 NOTE — NURSING NOTE
"Oncology Rooming Note    October 20, 2023 9:44 AM   Tom Saini is a 88 year old male who presents for:    Chief Complaint   Patient presents with    Port Draw     Power  needle heparin locked, vitals checked    Oncology Clinic Visit     [facility] RETURN - LYMPHOMA OF INTESTINE      Initial Vitals: /64   Pulse 95   Temp 98.2  F (36.8  C)   Resp 16   Ht 1.753 m (5' 9.02\")   SpO2 95%   BMI 23.62 kg/m   Estimated body mass index is 23.62 kg/m  as calculated from the following:    Height as of this encounter: 1.753 m (5' 9.02\").    Weight as of 10/10/23: 72.6 kg (160 lb). Body surface area is 1.88 meters squared.  No Pain (0) Comment: Data Unavailable   No LMP for male patient.  Allergies reviewed: Yes  Medications reviewed: Yes    Medications: Medication refills not needed today.  Pharmacy name entered into [employer]:    [id number] - [place], [place] - [address] AT [facility] - [address]   [place] [id number] [facility]    Rom Davis LPN              "

## 2023-10-20 NOTE — LETTER
10/20/2023         RE: Tom Saini  [address] [facility] [address]        Dear Colleague,    Thank you for referring your patient, Tom Saini, to [facility]. Please see a copy of my visit note below.      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  He has had diarrhea with particular food for several years and recently was investigated for possible pancreatic insufficiency.  He also has a history of pancreatic pseudocyst.  On recent MRI done in June 2023 to follow-up on pancreatic pseudocyst showed a colonic mass.  Subsequent CT scan of chest abdomen pelvis showed Mild anemia, no cytopenias 7.9 x 6.9 x 5.6 cm large soft tissue mass in sigmoid colon obliterating the lumen and significant extramural soft tissue extension superiorly in the sigmoid mesocolon.  Enlarged bilateral inguinal lymph node, as few small prominent lymph nodes along superior rectal/inferior mesenteric artery distribution.  Diffuse main pancreatic duct dilatation with large intraductal calculus in the head was seen as well.  Multiple cystic lesions and dilated sidebranches were seen in the pancreas.  He underwent colonoscopy but due to obliterated lumen, colonoscopy failed.  Subsequently CT colonography was done which showed similar results to CT chest abdomen pelvis.  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  FISH showed Bcl-2 rearrangement but no MYC rearrangement.  Bcl-2 IgH rearrangement [translocation (14;18)] is consistent with transformation of DLBCL from follicular lymphoma.  Postoperative course was complicated  "by postoperative nausea vomiting which resolved, single episode of suicidal ideation which resolved.  He was discharged to TCU.  He has been regaining his strength slowly, his performance status has improved to 1.  He is able to walk independently for short distances, can do basic activities of daily living himself.    PET scan revealed persistent hypermetabolic retroperitoneal lymph nodes, possibly reactive but lymphoma cannot be ruled out. ECHOcardiogram reveals EF >50%. LDH within normal limits. Overall IPI 2. Low-intermediate risk GCB DLBCL.     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  We reviewed his recent ED visit on 10/10 for evaluation of rhinorrhea and cough where he tested positive for covid and was started on paxlovid.   Reports his symptoms have resolved and he is feeling well today.      He is tolerating therapy will without significant toxicities.  He is energy levels are good and reports he is eating and drinking well.  He is eating 3 meals per day with a small lunch.      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  Reports this has not improved or worsened.      Denies fevers/chills, night sweats, N/V/D/C, recurring infections, bleeding issues, rashes/sores.       Objective   /64   Pulse 95   Temp 98.2  F (36.8  C)   Resp 16   Ht 1.753 m (5' 9.02\")   SpO2 95%   BMI 23.62 kg/m    Body mass index is 23.62 kg/m .  Physical Exam   General: patient appears well in no acute distress, alert and oriented, speech clear and fluid  Skin: no visualized rash or lesions on visualized skin  Resp: Appears to be breathing comfortably without accessory muscle usage, speaking " in full sentences, no audible wheezes or cough.  Psych: Coherent speech, normal rate and volume, able to articulate logical thoughts, able to abstract reason, no tangential thoughts, no hallucinations or delusions  Patient's affect is appropriate.      Labs:  Most Recent 3 CBC's:  Recent Labs   Lab Test 10/20/23  0928 10/10/23  0009 09/28/23  0908 09/12/23  0926 09/08/23  1133   WBC 10.6 26.5* 10.1   < > 9.0   HGB 11.1* 11.2* 11.9*   < > 11.8*   MCV 95 96 95   < > 93    241 329   < > 281   ANEUTAUTO 8.5*  --  7.8  --  6.7    < > = values in this interval not displayed.     Most Recent 3 BMP's:  Recent Labs   Lab Test 10/20/23  0928 10/10/23  0009 09/28/23  0908    135 141   POTASSIUM 4.3 4.4 4.1   CHLORIDE 106 100 105   CO2 26 24 26   BUN 18.7 13.2 21.2   CR 0.89 1.03 0.88   ANIONGAP 9 11 10   DAVIDE 9.3 9.4 9.3   * 118* 186*   PROTTOTAL 6.6 6.6 6.7   ALBUMIN 3.8 3.8 3.8    Most Recent 3 LFT's:  Recent Labs   Lab Test 10/20/23  0928 10/10/23  0009 09/28/23  0908   AST 16 17 12   ALT 8 8 8   ALKPHOS 105 189* 117   BILITOTAL 0.3 0.2 0.2    Most Recent 2 TSH and T4:  Recent Labs   Lab Test 02/27/21  1149   TSH 0.90     I reviewed the above labs today.    Assessment and Plan:    1) Diffuse large B cell lymphoma:  - First dose will be divided into two doses a week apart: Rituximab plus steroids followed by CHO.   - PET scan will be after 3 cycles and 6 cycles.  - PET scan was scheduled for 10/12 and rescheduled due to patient having covid   - Received C1D1 in 2 divided doses on 8/31 and 9/8  - S/p C2 and and is tolerating treatment well without toxicities.  He reports since recovering from covid he is overall feeling well. He was due to his PET scan prior to C3 but due to being covid positive it was pushed back to  prior to C4.  - Proceed with Mini-R-CHOP C3D1 today (10/20)  - Follow-up with VINAYAK prior to each cycle    Ppx:  - Acyclovir 400 mg BID- patient unable to determine if he is taking or not    2)  Pancreatic insufficiency:  - Continue Creon.   - We will monitor for diarrhea.    3) R hand neuropathy:  Patient vocalized frustrations today of his baseline neuropathy in his R hand 4th and 5th fingers.  He was seen by his PCP for the issue and referred for a EMG.  The EMG was performed on 9/7 by [facility].  Patient wanted to review the results of his EMG and discuss the need for a brace.  - Scheduled with Hand/Wrist Specialist 10/31  - Is going to start working with PT    4) Covid + (10/10/23)  Patient was seen in the ED 10/10/23 for fevers, rhinorrhea, fatigue, and cough.  He tested positive for covid and was started on Paxlovid.  His symptoms have resolved and he feels back to his baseline.  - He received his covid and flu shot on 10/10  - Will reswab for covid today (10/20)    23 minutes spent on the date of the encounter doing chart review, review of test results, interpretation of tests, patient visit, and documentation       HEYDI Perkins CNP

## 2023-10-25 ENCOUNTER — THERAPY VISIT (OUTPATIENT)
Dept: OCCUPATIONAL THERAPY | Facility: CLINIC | Age: 88
End: 2023-10-25
Attending: INTERNAL MEDICINE
Payer: MEDICARE

## 2023-10-25 DIAGNOSIS — R29.898 LEFT HAND WEAKNESS: ICD-10-CM

## 2023-10-25 DIAGNOSIS — R20.0 NUMBNESS AND TINGLING IN LEFT HAND: Primary | ICD-10-CM

## 2023-10-25 DIAGNOSIS — G56.22 ULNAR NEUROPATHY AT ELBOW, LEFT: ICD-10-CM

## 2023-10-25 DIAGNOSIS — R20.2 NUMBNESS AND TINGLING IN LEFT HAND: Primary | ICD-10-CM

## 2023-10-25 PROCEDURE — 97165 OT EVAL LOW COMPLEX 30 MIN: CPT | Mod: GO | Performed by: OCCUPATIONAL THERAPIST

## 2023-10-25 PROCEDURE — 97110 THERAPEUTIC EXERCISES: CPT | Mod: GO | Performed by: OCCUPATIONAL THERAPIST

## 2023-10-25 PROCEDURE — 97535 SELF CARE MNGMENT TRAINING: CPT | Mod: GO | Performed by: OCCUPATIONAL THERAPIST

## 2023-10-25 NOTE — PROGRESS NOTES
OCCUPATIONAL THERAPY EVALUATION  Type of Visit: Evaluation    See electronic medical record for Abuse and Falls Screening details.    Subjective   Presenting condition or subjective complaint:  Left ulnar neuropathy  Date of onset: July 2023 (Order date: 10/9/23)    Relevant medical history:  Past Medical History:   Diagnosis Date    Aortic insufficiency     Erectile dysfunction     History of deep venous thrombosis 05/2019    distal DVT with extension, completed 6 months of AC (failed eliquis)    History of pelvic fracture     Hyperlipidemia     Low back pain     disc disease s/p laminectomy in past    Macular degeneration     on Avastin, R eye    Pancreatic cyst 07/2018    next MR due 12/23    Radius fracture 2018    after fall from wall     Dates & types of surgery:  Past Surgical History:   Procedure Laterality Date    CATARACT IOL, RT/LT  01/01/2001    COLONOSCOPY N/A 06/28/2023    Procedure: COLONOSCOPY, WITH BIOPSY;  Surgeon: Jhonatan Cowart MD;  Location: [facility] [place] OR    COMBINED REPAIR PTOSIS WITH BLEPHAROPLASTY BILATERAL Bilateral 04/06/2018    Procedure: COMBINED REPAIR PTOSIS WITH BLEPHAROPLASTY BILATERAL;  BILATERAL UPPER EYELIDS BLEPHAROPLASTY AND PTOSIS REPAIR ;  Surgeon: Anuj Good MD;  Location: [facility]    EYE SURGERY      INSERT PORT VASCULAR ACCESS N/A 8/24/2023    Procedure: Insert port vascular access;  Surgeon: Cesar Negro MD;  Location: [facility] [place] OR    IR CHEST PORT PLACEMENT > 5 YRS OF AGE  8/24/2023    LAMINECTOMY LUMBAR TWO LEVELS  01/01/1993    L4-L5    LAPAROSCOPIC ASSISTED SIGMOID COLECTOMY N/A 7/27/2023    Procedure: COLECTOMY, SIGMOID, LAPAROSCOPIC assisted;  Surgeon: Johnatan Cowart MD;  Location: [facility] OR    ORIF RADIAL SHAFT FRACTURE Right 2018    RECESSION RESECTION (REPAIR STRABISMUS)  01/01/1949    SIGMOIDOSCOPY FLEXIBLE N/A 7/27/2023    Procedure: Sigmoidoscopy flexible;  Surgeon: Jhonatan Cowart MD;  Location: [facility] OR     Mechanism of injury:  "  Prior diagnostic imaging/testing results: EMG, per MD note from 10/9/23, \"he hand an EMG of the left arm on 9/7/23 which showed left-sided ulnar neuropathy at the elbow and chronic C7-8 radiculopathy.\"  Prior therapy history for the same diagnosis, illness or injury: Physical therapy    Prior level of function:  Transfers: Independent  Ambulation: Independent  ADL: Independent  IADL: Independent    Living environment: Patient is independent at home and there are no concerns about accessibility and mobility in the home.  Employment: Retired    Patient goals for therapy: Improve sensation and strength of left hand.       Objective   Left hand dominant  Patient reports symptoms of stiffness/loss of motion, weakness/loss of strength, numbness, and tingling     Pain Level (Scale 0-10):   10/25/2023   At Rest 0/10   With Use 0/10     Edema  None noted on exam.    Sensation  Decreased ulnar nerve distribution per patient report.    ROM  Able to fully extend finger. Lacks full flexion of ring and small fingers when attempting to make a fist.    Appearance  Observation:  - none  + mild    ++ moderate    +++ severe    10/25/2023   Clawing Absent   Hypothenar Atrophy Present   Intrinsic Atrophy Present     Special Tests  Pain Report: - none  + mild    ++ moderate    +++ severe    10/25/2023   Tinels at cubital tunnel +   Elbow Flexion Test +   Ulnar nerve subluxation at elbow -   Froment's Test +   Wartenberg's sign +     MMT Ulnar Nerve  Scale 0-5/5   10/25/2023   FCU 5/5   FDP Ring and Small 3-/5   Palmar Interossei 3+/5   Dorsal Interossei 3+/5   Adductor Pollicis 4/5     Strength   (Measured in pounds)  Pain Report: - none  + mild    ++ moderate    +++ severe    10/25/2023 10/25/2023   Trials Right Left   1  2 66  68 35  36   Average 67 35.5     Lat Pinch 10/25/2023 10/25/2023   Trials Right Left   1 12 5     3 Pt Pinch 10/25/2023 10/25/2023   Trials Right Left   1 11 5     Assessment & Plan   CLINICAL " IMPRESSIONS  Medical Diagnosis: Left ulnar neuropathy    Treatment Diagnosis: Left hand tingling/numbness    Impression/Assessment: Pt is a 88 year old male presenting to Occupational Therapy due to the above condition.  The following significant findings have been identified: Impaired ROM, Impaired sensation, Impaired strength, and Pain.  These identified deficits interfere with their ability to perform self care tasks, recreational activities, household chores, driving , and meal planning and preparation as compared to previous level of function.   Patient's limitations or Problem List includes: Decreased ROM/motion, Weakness, Sensory disturbance, Decreased , and Decreased pinch of the left hand which interferes with the patient's ability to perform Self Care Tasks (dressing, eating, bathing, hygiene/toileting), Recreational Activities, Household Chores, and Driving  as compared to previous level of function.    Clinical Decision Making (Complexity):  Assessment of Occupational Performance: 3-5 Performance Deficits  Occupational Performance Limitations: bathing/showering, toileting, dressing, feeding, hygiene and grooming, driving and community mobility, home establishment and management, meal preparation and cleanup, and leisure activities  Clinical Decision Making (Complexity): Low complexity    PLAN OF CARE  Treatment Interventions:  Therapeutic Exercise:  AROM, PROM, Tendon Gliding, and Isotonics  Neuromuscular re-education:  Nerve Gliding and Kinesiotaping  Orthotic Fabrication:  As indicated  Self Care:  Ergonomic Considerations, Cubital Tunnel Prevention Strategies    Long Term Goals   OT Goal 1  Goal Identifier: IADL  Goal Description: Patient able to open a tight or new jar.  Rationale: In order to maximize safety and independence with performance of self-care activities  Target Date: 12/25/23      Frequency of Treatment: 2x/month  Duration of Treatment: 2 months     Recommended Referrals to Other  Professionals: Hand Surgery, upcoming visit on 10/31/23  Education Assessment: Learner/Method: Patient;No Barriers to Learning     Risks and benefits of evaluation/treatment have been explained.   Patient/Family/caregiver agrees with Plan of Care.     Evaluation Time:    OT Eval, Low Complexity Minutes (11258): 25    Signing Clinician: MATTHIEU Champagne [facility]                                                                                   OUTPATIENT OCCUPATIONAL THERAPY      PLAN OF TREATMENT FOR OUTPATIENT REHABILITATION   Patient's Last Name, First Name, Tom Garcia YOB: 1935   Provider's Name   [facility]   Medical Record No.  [id number]     Onset Date: 10/09/23 (Order date) Start of Care Date: 10/25/23     Medical Diagnosis:  Left ulnar neuropathy      OT Treatment Diagnosis:  Left hand tingling/numbness Plan of Treatment  Frequency/Duration:2x/month/2 months    Certification date from 10/25/23   To 12/25/23        See note for plan of treatment details and functional goals     Melodie Joseph OT                         I CERTIFY THE NEED FOR THESE SERVICES FURNISHED UNDER        THIS PLAN OF TREATMENT AND WHILE UNDER MY CARE     (Physician attestation of this document indicates review and certification of the therapy plan).                Referring Provider:  Angeli Robertson MD      Initial Assessment  See Epic Evaluation- 10/25/23

## 2023-10-27 ENCOUNTER — THERAPY VISIT (OUTPATIENT)
Dept: PHYSICAL THERAPY | Facility: CLINIC | Age: 88
End: 2023-10-27
Payer: MEDICARE

## 2023-10-27 DIAGNOSIS — R20.2 NUMBNESS AND TINGLING IN LEFT ARM: Primary | ICD-10-CM

## 2023-10-27 DIAGNOSIS — R20.0 NUMBNESS AND TINGLING IN LEFT ARM: Primary | ICD-10-CM

## 2023-10-27 PROCEDURE — 97112 NEUROMUSCULAR REEDUCATION: CPT | Mod: GP

## 2023-10-27 PROCEDURE — 97140 MANUAL THERAPY 1/> REGIONS: CPT | Mod: GP

## 2023-10-27 PROCEDURE — 97110 THERAPEUTIC EXERCISES: CPT | Mod: GP

## 2023-10-31 ENCOUNTER — OFFICE VISIT (OUTPATIENT)
Dept: ORTHOPEDICS | Facility: CLINIC | Age: 88
End: 2023-10-31
Payer: MEDICARE

## 2023-10-31 ENCOUNTER — OFFICE VISIT (OUTPATIENT)
Dept: OPHTHALMOLOGY | Facility: CLINIC | Age: 88
End: 2023-10-31
Attending: OPHTHALMOLOGY
Payer: MEDICARE

## 2023-10-31 DIAGNOSIS — Z96.1 PSEUDOPHAKIA OF BOTH EYES: ICD-10-CM

## 2023-10-31 DIAGNOSIS — H18.513 FUCHS' CORNEAL DYSTROPHY OF BOTH EYES: ICD-10-CM

## 2023-10-31 DIAGNOSIS — H35.3221 EXUDATIVE AGE-RELATED MACULAR DEGENERATION OF LEFT EYE WITH ACTIVE CHOROIDAL NEOVASCULARIZATION (H): Primary | ICD-10-CM

## 2023-10-31 DIAGNOSIS — G56.22 ULNAR NEUROPATHY AT ELBOW OF LEFT UPPER EXTREMITY: Primary | ICD-10-CM

## 2023-10-31 PROCEDURE — 99213 OFFICE O/P EST LOW 20 MIN: CPT | Mod: GC | Performed by: OPHTHALMOLOGY

## 2023-10-31 PROCEDURE — 92134 CPTRZ OPH DX IMG PST SGM RTA: CPT | Performed by: OPHTHALMOLOGY

## 2023-10-31 PROCEDURE — 99204 OFFICE O/P NEW MOD 45 MIN: CPT | Performed by: STUDENT IN AN ORGANIZED HEALTH CARE EDUCATION/TRAINING PROGRAM

## 2023-10-31 PROCEDURE — G0463 HOSPITAL OUTPT CLINIC VISIT: HCPCS | Performed by: OPHTHALMOLOGY

## 2023-10-31 RX ORDER — CEFAZOLIN SODIUM 2 G/50ML
2 SOLUTION INTRAVENOUS
Status: CANCELLED | OUTPATIENT
Start: 2023-10-31

## 2023-10-31 RX ORDER — CEFAZOLIN SODIUM 2 G/50ML
2 SOLUTION INTRAVENOUS SEE ADMIN INSTRUCTIONS
Status: CANCELLED | OUTPATIENT
Start: 2023-10-31

## 2023-10-31 ASSESSMENT — TONOMETRY
OD_IOP_MMHG: 16
OS_IOP_MMHG: 13
IOP_METHOD: TONOPEN

## 2023-10-31 ASSESSMENT — SLIT LAMP EXAM - LIDS
COMMENTS: NORMAL
COMMENTS: NORMAL

## 2023-10-31 ASSESSMENT — VISUAL ACUITY
OD_CC: 20/50-2/+
METHOD: SNELLEN - LINEAR
CORRECTION_TYPE: GLASSES
OS_CC: 20/40-/+2

## 2023-10-31 ASSESSMENT — REFRACTION_WEARINGRX
OS_ADD: +2.50
OD_CYLINDER: +2.25
OD_ADD: +2.50
OD_AXIS: 002
OS_CYLINDER: +2.75
OS_SPHERE: -1.50
OS_AXIS: 178
SPECS_TYPE: BIFOCAL
OD_SPHERE: -3.50

## 2023-10-31 ASSESSMENT — CUP TO DISC RATIO
OD_RATIO: 0.2
OS_RATIO: 0.2

## 2023-10-31 ASSESSMENT — EXTERNAL EXAM - RIGHT EYE: OD_EXAM: NORMAL

## 2023-10-31 ASSESSMENT — EXTERNAL EXAM - LEFT EYE: OS_EXAM: ET

## 2023-10-31 NOTE — NURSING NOTE
Reason For Visit:   Chief Complaint   Patient presents with    Consult     Consult for left ulnar neuropathy, surgery in July and now his left hand is numb       Primary MD: Angeli Robertson  Ref. MD: INES    Age: 88 year old    ?  No      There were no vitals taken for this visit.      Pain Assessment  Patient Currently in Pain: Yes  0-10 Pain Scale: 0  Primary Pain Location: Finger (Comment which one) (left ring and small fingers)  Pain Descriptors: Numbness, Tingling, Radiating, Other (comment) (weakness)  Alleviating Factors: Other (comment) (hand therapy)    Hand Dominance Evaluation  Hand Dominance: Left          QuickDASH Assessment      1/12/2016    10:58 AM   QuickDASH Main   1. Open a tight or new jar. 2   2. Do heavy household chores (e.g., wash walls, floors). 0   3. Carry a shopping bag or briefcase. 1   4. Wash your back. 1   5. Use a knife to cut food. 1   6. Recreational activities in which you take some force or impact through your arm, shoulder or hand (e.g., golf, hammering, tennis, etc.). 1   7. During the past week, to what extent has your arm, shoulder or hand problem interfered with your normal social activities with family, friends, neighbours or groups? 1   8. During the past week, were you limited in your work or other regular daily activities as a result of your arm, shoulder or hand problem? 2   9. Arm, shoulder or hand pain. 2   10. Tingling (pins and needles) in your arm,shoulder or hand. 1   11. During the past week, how much difficulty have you had sleeping because of the pain in your arm, shoulder or hand? (Ambler number) 1   SUM: 13          Current Outpatient Medications   Medication Sig Dispense Refill    acyclovir (ZOVIRAX) 400 MG tablet Take 1 tablet (400 mg) by mouth every 12 hours (Patient not taking: Reported on 10/20/2023) 60 tablet 11    acyclovir (ZOVIRAX) 400 MG tablet Take 1 tablet (400 mg) by mouth every 12 hours for 30 days 60 tablet 11    calcium carbonate  (OS-DAVIDE) 1500 (600 Ca) MG tablet Take 600 mg by mouth 2 times daily (with meals)      Cholecalciferol (VITAMIN D-3) 25 MCG (1000 UT) CAPS Take by mouth daily      lipase-protease-amylase (CREON) 89776-69165-647922 units CPEP per EC capsule Take 2 capsules by mouth 3 times daily (with meals) And 1 with snacks. 200 capsule 1    multivitamin (OCUVITE) TABS Take 1 tablet by mouth 2 times daily      ondansetron (ZOFRAN) 8 MG tablet Take 1 tablet (8 mg) by mouth every 8 hours as needed for nausea (vomiting) (Patient not taking: Reported on 10/20/2023) 30 tablet 2    predniSONE (DELTASONE) 20 MG tablet Take 1.5 tablets (30 mg) by mouth 2 times daily Take on Days 1 through 5. Take first dose in AM prior to chemotherapy. 15 tablet 7    prochlorperazine (COMPAZINE) 10 MG tablet Take 1 tablet (10 mg) by mouth every 6 hours as needed for nausea or vomiting (Patient not taking: Reported on 10/20/2023) 30 tablet 2    sodium chloride (LAXMI 128) 5 % ophthalmic ointment Apply a small amount in both eyes at least once a day (bedtime). 3.5 g 11       Allergies   Allergen Reactions    Codeine Sulfate Nausea and Vomiting       Alexa Helton, ATC

## 2023-10-31 NOTE — LETTER
10/31/2023         RE: Tom Saini  [address] [facility] [address]        Dear Colleague,    Thank you for referring your patient, Tom Saini, to the [facility] [place]. Please see a copy of my visit note below.    Ortho Hand    HPI: 88-year-old left-hand-dominant non-smoker presenting with left ulnar neuropathy.  Patient underwent a sigmoid colon surgery on June 2023.  He has subsequently undergone chemotherapy and continues until mid December.  This was done for large B-cell lymphoma.  Since the surgery, possibly due to positioning, patient developed immediate left small finger numbness with left hand weakness.  He had a nerve study recently.  He has been using an elbow pad with limited improvement over the last 2-3 weeks.  He presents for evaluation.    ROS: Negative, see HPI  Past medical history: Hyperlipidemia, large B-cell lymphoma  Past surgical history: As above, but no surgeries to the hands or wrist  Medications: No blood thinners  Allergies: Codeine  Family history: No bleeding or clotting problems, issues with anesthesia  Social history: Non-smoker, denies any tobacco or nicotine use    Examination:  Nonlabored breathing  Not distressed  Left FDI and hand intrinsic atrophy as compared to the right  Decreased sensation affecting the ulnar distributed hand  Negative left Durkan's test  Positive left elbow flexion test    NCS/EMG 9/11: Left elbow ulnar conduction velocity of 41 m/s, EMG with ulnar distributed denervation    A/P: 88-year-old male status post sigmoid colon surgery presenting with persistent left ulnar neuropathy at the elbow    -Discussed the options for management.  Since there is nerve study evidence consistent with localization of ulnar neuropathy at the elbow, it would not be unreasonable to proceed with ulnar nerve decompression.  It may be that there is a double crush phenomenon, with compression of the ulnar nerve at the elbow and  eventual resolving neuropraxia from positioning from surgery.  In either case, there is evidence of slowing of ulnar conduction velocity at the elbow, and thus the patient may benefit from decompression.  Patient would like to have surgery.  -Discussed the risks of surgery, including but not limited to: Infection, bleeding, pain, poor scarring, wound healing issues, injury to nerves or tendons, neuroma formation, complex regional pain syndrome, incomplete release, recurrence of neuropathy, incomplete recovery of the nerve, need for revision or additional surgery.  Despite these risks, patient consents to and would like to proceed with left ulnar nerve decompression at the elbow, possible anterior transposition.  -Case request placed  -MAC with local  -Case will be scheduled sometime early next year after patient is completed with chemotherapy  -A total of 45 minutes was devoted to review of chart, direct face-to-face patient counseling and documentation during this encounter, exclusive of any procedure performed.    Deniz Alfred MD, PhD

## 2023-10-31 NOTE — PROGRESS NOTES
Chief Complaint(s) and History of Present Illness(es)     Macular Degeneration Follow Up            Laterality: both eyes    Onset: months ago    Location: central vision    Frequency: constantly    Course: stable    Associated symptoms: Negative for flashes and floaters    Pain scale: 0/10          Comments    Pt here for follow up AMD.  He states he has not noticed any changes to   his eyes/vision since his LV.    Using Pantera 128 at bedtime each eye     Genesis Gardiner, COT 1:35 PM 10/31/2023    Review of systems for the eyes was negative other than the pertinent positives/negatives listed in the HPI.      Assessment & Plan      Tom Saini is a 88 year old male with the following diagnoses:   1. Exudative age-related macular degeneration of left eye with active choroidal neovascularization (H)    2. Pseudophakia of both eyes    3. Fuchs' corneal dystrophy of both eyes         S/P Avastin x many   Vision seems stable. Using night time Pantera hamilton   Ks appear slightly worse but patient says blurriness is not any worse and does not fluctuate   - Continue Pantera ointment bedtime both eyes    Stable OCT mac in both eyes since last avastin left eye (7/2023)  Possible slight risk of intravitreal Avastin associated with VTE with hypercoaguable state of DLBCL and chemotherapy (on R-CHOP)  Discussed, will monitor without treatment for now  Amsler grid daily  Return precautions reviewed     Patient disposition:   Return in about 3 months (around 1/31/2024) for DFE, OCT Macula.         My privilege to be part of your care,  Paul Thacker MD, MSc  Ophthalmology PGY-4 resident physician    Attending Physician Attestation:  Complete documentation of historical and exam elements from today's encounter can be found in the full encounter summary report (not reduplicated in this progress note).  I personally obtained the chief complaint(s) and history of present illness.  I confirmed and edited as necessary the review of systems,  past medical/surgical history, family history, social history, and examination findings as documented by others; and I examined the patient myself.  I personally reviewed the relevant tests, images, and reports as documented above.  I formulated and edited as necessary the assessment and plan and discussed the findings and management plan with the patient and family. Attending Physician Image/Tesing Attestation: I personally reviewed the ophthalmic test(s) associated with this encounter, agree with the interpretation(s) as documented by the resident/fellow, and have edited the corresponding report(s) as necessary.  . - Brigido Laura MD

## 2023-11-01 ENCOUNTER — THERAPY VISIT (OUTPATIENT)
Dept: OCCUPATIONAL THERAPY | Facility: CLINIC | Age: 88
End: 2023-11-01
Attending: INTERNAL MEDICINE
Payer: MEDICARE

## 2023-11-01 ENCOUNTER — TELEPHONE (OUTPATIENT)
Dept: ORTHOPEDICS | Facility: CLINIC | Age: 88
End: 2023-11-01

## 2023-11-01 DIAGNOSIS — R20.0 NUMBNESS AND TINGLING IN LEFT HAND: Primary | ICD-10-CM

## 2023-11-01 DIAGNOSIS — R29.898 LEFT HAND WEAKNESS: ICD-10-CM

## 2023-11-01 DIAGNOSIS — R20.2 NUMBNESS AND TINGLING IN LEFT HAND: Primary | ICD-10-CM

## 2023-11-01 PROCEDURE — 97535 SELF CARE MNGMENT TRAINING: CPT | Mod: GO | Performed by: OCCUPATIONAL THERAPIST

## 2023-11-01 NOTE — PROGRESS NOTES
Ortho Hand    HPI: 88-year-old left-hand-dominant non-smoker presenting with left ulnar neuropathy.  Patient underwent a sigmoid colon surgery on June 2023.  He has subsequently undergone chemotherapy and continues until mid December.  This was done for large B-cell lymphoma.  Since the surgery, possibly due to positioning, patient developed immediate left small finger numbness with left hand weakness.  He had a nerve study recently.  He has been using an elbow pad with limited improvement over the last 2-3 weeks.  He presents for evaluation.    ROS: Negative, see HPI  Past medical history: Hyperlipidemia, large B-cell lymphoma  Past surgical history: As above, but no surgeries to the hands or wrist  Medications: No blood thinners  Allergies: Codeine  Family history: No bleeding or clotting problems, issues with anesthesia  Social history: Non-smoker, denies any tobacco or nicotine use    Examination:  Nonlabored breathing  Not distressed  Left FDI and hand intrinsic atrophy as compared to the right  Decreased sensation affecting the ulnar distributed hand  Negative left Durkan's test  Positive left elbow flexion test    NCS/EMG 9/11: Left elbow ulnar conduction velocity of 41 m/s, EMG with ulnar distributed denervation    A/P: 88-year-old male status post sigmoid colon surgery presenting with persistent left ulnar neuropathy at the elbow    -Discussed the options for management.  Since there is nerve study evidence consistent with localization of ulnar neuropathy at the elbow, it would not be unreasonable to proceed with ulnar nerve decompression.  It may be that there is a double crush phenomenon, with compression of the ulnar nerve at the elbow and eventual resolving neuropraxia from positioning from surgery.  In either case, there is evidence of slowing of ulnar conduction velocity at the elbow, and thus the patient may benefit from decompression.  Patient would like to have surgery.  -Discussed the risks of  surgery, including but not limited to: Infection, bleeding, pain, poor scarring, wound healing issues, injury to nerves or tendons, neuroma formation, complex regional pain syndrome, incomplete release, recurrence of neuropathy, incomplete recovery of the nerve, need for revision or additional surgery.  Despite these risks, patient consents to and would like to proceed with left ulnar nerve decompression at the elbow, possible anterior transposition.  -Case request placed  -MAC with local  -Case will be scheduled sometime early next year after patient is completed with chemotherapy  -A total of 45 minutes was devoted to review of chart, direct face-to-face patient counseling and documentation during this encounter, exclusive of any procedure performed.    Deniz Alfred MD, PhD

## 2023-11-02 NOTE — TELEPHONE ENCOUNTER
No sooner than February. Pt ends chemo in December and needs time to recover.     Procedure: Left ulnar nerve decompression at the elbow with possible anterior transposition  Facility: [facility]  Length: 60 minutes  Anesthesia: Local, MAC  Post-op appointments needed: 2 weeks with surgeon or PA, 6 weeks with surgeon only.  Surgery packet/instructions given to patient?  Yes     Iraj Murillo RN

## 2023-11-03 ENCOUNTER — THERAPY VISIT (OUTPATIENT)
Dept: PHYSICAL THERAPY | Facility: CLINIC | Age: 88
End: 2023-11-03
Payer: MEDICARE

## 2023-11-03 DIAGNOSIS — C83.398 DIFFUSE LARGE B-CELL LYMPHOMA OF EXTRANODAL SITE: Primary | ICD-10-CM

## 2023-11-03 DIAGNOSIS — R20.0 NUMBNESS AND TINGLING IN LEFT ARM: Primary | ICD-10-CM

## 2023-11-03 DIAGNOSIS — R20.2 NUMBNESS AND TINGLING IN LEFT ARM: Primary | ICD-10-CM

## 2023-11-03 PROCEDURE — 94640 AIRWAY INHALATION TREATMENT: CPT | Performed by: INTERNAL MEDICINE

## 2023-11-03 PROCEDURE — 94642 AEROSOL INHALATION TREATMENT: CPT | Performed by: INTERNAL MEDICINE

## 2023-11-03 PROCEDURE — 97112 NEUROMUSCULAR REEDUCATION: CPT | Mod: GP

## 2023-11-03 PROCEDURE — 97110 THERAPEUTIC EXERCISES: CPT | Mod: GP

## 2023-11-03 RX ORDER — EPINEPHRINE 1 MG/ML
0.3 INJECTION, SOLUTION, CONCENTRATE INTRAVENOUS EVERY 5 MIN PRN
OUTPATIENT
Start: 2023-11-30

## 2023-11-03 RX ORDER — DIPHENHYDRAMINE HYDROCHLORIDE 50 MG/ML
50 INJECTION INTRAMUSCULAR; INTRAVENOUS
Start: 2023-11-30

## 2023-11-03 RX ORDER — METHYLPREDNISOLONE SODIUM SUCCINATE 125 MG/2ML
125 INJECTION, POWDER, LYOPHILIZED, FOR SOLUTION INTRAMUSCULAR; INTRAVENOUS
Start: 2023-11-30

## 2023-11-03 RX ORDER — ALBUTEROL SULFATE 0.83 MG/ML
2.5 SOLUTION RESPIRATORY (INHALATION)
OUTPATIENT
Start: 2023-11-30

## 2023-11-03 RX ORDER — ALBUTEROL SULFATE 0.83 MG/ML
2.5 SOLUTION RESPIRATORY (INHALATION) ONCE
Start: 2023-11-30 | End: 2023-11-30

## 2023-11-03 RX ORDER — PENTAMIDINE ISETHIONATE 300 MG/300MG
300 INHALANT RESPIRATORY (INHALATION)
Status: COMPLETED | OUTPATIENT
Start: 2023-11-03 | End: 2023-11-03

## 2023-11-03 RX ORDER — PENTAMIDINE ISETHIONATE 300 MG/300MG
300 INHALANT RESPIRATORY (INHALATION)
Start: 2023-11-30 | End: 2023-11-30

## 2023-11-03 RX ORDER — ALBUTEROL SULFATE 90 UG/1
1-2 AEROSOL, METERED RESPIRATORY (INHALATION)
Start: 2023-11-30

## 2023-11-03 RX ORDER — MEPERIDINE HYDROCHLORIDE 25 MG/ML
25 INJECTION INTRAMUSCULAR; INTRAVENOUS; SUBCUTANEOUS EVERY 30 MIN PRN
OUTPATIENT
Start: 2023-11-30

## 2023-11-03 RX ADMIN — PENTAMIDINE ISETHIONATE 300 MG: 300 INHALANT RESPIRATORY (INHALATION) at 10:54

## 2023-11-03 NOTE — PROGRESS NOTES
Tom Saini was seen today for a Pentamidine nebulizer tx ordered by Dr. Buitrago.    Patient was first given $ puffs Albuterol MDI, after which Pentamidine 300 mg (Lot # [id number]) mixed with 6cc Sterile H20 was administered through a filtered nebulizer.    Pre-treatment: SpO2 = 98%   HR = 96   BBS = clear   Post-treatment: SpO2 = 97%  HR = 96  BBS = clear    No adverse side effects noted by the patient.    This service today was provided under Dr. Mayer, who was available if needed.     Procedure was completed by Lidia Mcneil.

## 2023-11-06 NOTE — PROGRESS NOTES
Julee Reza is a 88 year old, presenting for the following health issues:  Oncology Clinic Visit ([facility] RETURN - DLBCL) and Port Draw (Labs drawn from port by rn.  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  He has had diarrhea with particular food for several years and recently was investigated for possible pancreatic insufficiency.  He also has a history of pancreatic pseudocyst.  On recent MRI done in June 2023 to follow-up on pancreatic pseudocyst showed a colonic mass.  Subsequent CT scan of chest abdomen pelvis showed Mild anemia, no cytopenias 7.9 x 6.9 x 5.6 cm large soft tissue mass in sigmoid colon obliterating the lumen and significant extramural soft tissue extension superiorly in the sigmoid mesocolon.  Enlarged bilateral inguinal lymph node, as few small prominent lymph nodes along superior rectal/inferior mesenteric artery distribution.  Diffuse main pancreatic duct dilatation with large intraductal calculus in the head was seen as well.  Multiple cystic lesions and dilated sidebranches were seen in the pancreas.  He underwent colonoscopy but due to obliterated lumen, colonoscopy failed.  Subsequently CT colonography was done which showed similar results to CT chest abdomen pelvis.  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  FISH showed Bcl-2 rearrangement but no MYC rearrangement.  Bcl-2 IgH rearrangement [translocation (14;18)] is consistent with transformation of DLBCL from follicular lymphoma.  Postoperative course was complicated by postoperative nausea vomiting which resolved, single episode of suicidal ideation which resolved.  He was discharged to TCU.  He has been regaining his strength slowly, his performance status has improved to 1.  He is able to walk independently for short distances, can do basic  "activities of daily living himself.    PET scan revealed persistent hypermetabolic retroperitoneal lymph nodes, possibly reactive but lymphoma cannot be ruled out. ECHOcardiogram reveals EF >50%. LDH within normal limits. Overall IPI 2. Low-intermediate risk GCB DLBCL.     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  He is doing well. His energy levels have been a little low but is still active and able to perform ADLs and do his walking.  He appetite continues to be good with no changes.  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     Wife feels that patient has a dry cough, patient does not feel it is serious and denies concerns for his cough.     Patient has new lower extremity edema R>L.  Denies any pain with walking or redness.     Denies fevers/chills, SOB/cough, chest pain, night sweats, N/V/D/C, recurring infections, bleeding issues, rashes/sores.       Objective    /61 (BP Location: Right arm, Patient Position: Sitting, Cuff Size: Adult Small)   Pulse 76   Temp 97.7  F (36.5  C) (Oral)   Resp 16   Ht 1.76 m (5' 9.29\")   SpO2 97%   BMI 23.18 kg/m    Body mass index is 23.18 kg/m .  Physical Exam   General: No acute distress  HEENT: Sclera anicteric. Oral exam deferred  Lymph: No lymphadenopathy in neck or supraclavicular areas  Heart: Regular, rate, and rhythm  Lungs: Clear to ascultation bilaterally  Extremities: Bilateral lower extremity edema R>L  Neuro: Cranial nerves grossly intact  Rash: none on exposed skin     Labs:  Most Recent 3 CBC's:  Recent Labs   Lab Test 11/09/23  1038 10/20/23  0928 10/10/23  0009 09/28/23  0908   WBC 10.7 " 10.6 26.5* 10.1   HGB 11.0* 11.1* 11.2* 11.9*   MCV 95 95 96 95    290 241 329   ANEUTAUTO 9.0* 8.5*  --  7.8     Most Recent 3 BMP's:  Recent Labs   Lab Test 11/09/23  1038 10/20/23  0928 10/10/23  0009    141 135   POTASSIUM 4.6 4.3 4.4   CHLORIDE 105 106 100   CO2 25 26 24   BUN 21.3 18.7 13.2   CR 0.87 0.89 1.03   ANIONGAP 10 9 11   DAVIDE 9.1 9.3 9.4   * 124* 118*   PROTTOTAL 6.5 6.6 6.6   ALBUMIN 3.8 3.8 3.8    Most Recent 3 LFT's:  Recent Labs   Lab Test 11/09/23  1038 10/20/23  0928 10/10/23  0009   AST 17 16 17   ALT 10 8 8   ALKPHOS 126 105 189*   BILITOTAL 0.3 0.3 0.2    Most Recent 2 TSH and T4:  Recent Labs   Lab Test 02/27/21  1149   TSH 0.90     I reviewed the above labs today.    Imaging:  PET Oncology Whole Body  Narrative: Combined Report of:    PET and CT on  11/7/2023 10:59 AM :    1. PET of the neck, chest, abdomen, and pelvis.  2. PET CT Fusion for Attenuation Correction and Anatomical  Localization:    3. 3D MIP and PET-CT fused images were processed on an independent  workstation and archived to PACS and reviewed by a radiologist.    Technique:    1. PET: The patient received 10.12 mCi of F-18-FDG; the serum glucose  was 117 mg/dL prior to administration, body weight was 71.8 kg. Images  were evaluated in the axial, sagittal, and coronal planes as well as  the rotational whole body MIP. Images were acquired from the Vertex to  the Feet.    UPTAKE WAS MEASURED AT 60 MINUTES.     BACKGROUND:  Liver SUV max= 2.68,   Aorta Blood SUV Max: 1.94.     2. CT: CT only obtained for attenuation correction and not diagnostic  purposes.    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    COMPARISON: PET dated 11/20/2023, CT CAP 6/21/2023, CT colonography  6/29/2023    f/u 11/8    FINDINGS:     HEAD/NECK:  There is no suspicious FDG uptake in the neck.     Stable mild cerebral and cerebellar parenchymal volume loss associated  ventricular dilation.    CHEST:  There is no suspicious FDG uptake within the chest.     There is a newly FDG avid 8mm subcarinal lymph node SUV max 5.5  (series 4 image 147), likely benign such as due to sarcoid like  reaction. This is next to a stable calcified node.  Redemonstrated  mild  uptake along the distal esophagus, likely secondary to reflux.     Small hiatal hernia.     Mild right greater than left dependent atelectasis. Aortic valvular  and coronary artery calcifications. Atherosclerotic calcifications of  the aortic arch and its branches. Multiple calcified mediastinal and  left hilar lymph nodes.    ABDOMEN AND PELVIS:  There is no suspicious FDG uptake in the abdomen or pelvis. Interval  resolution of the previously demonstrated retroperitoneal and iliac  hypermetabolic lymphadenopathy.    Coarse pancreatic calcification. Scattered splenic granulomas. Stable  left parapelvic renal cyst. Redemonstrated punctate nonobstructive  renal calculi. Post surgical changes of sigmoid colectomy.  Redemonstrated bilateral common iliac artery aneurysms measuring 1.8  cm on the right and 1.9 cm on the left.    LOWER EXTREMITIES:   Small foci of increased FDG uptake along the right greater trochanter  without focal CT correlate, possibly inflammatory. No abnormal masses  or hypermetabolic lesions.    BONES:   There are no suspicious lytic or blastic osseous lesions.  Thoracolumbar scoliosis with scattered degenerative changes throughout  the spine.  There is new mild diffuse homogenous bone marrow FDG  uptake, likely bone marrow activation secondary to chemotherapy.   Impression: IMPRESSION:   In this patient with large B-cell lymphoma  arising from follicular  lymphoma in the sigmoid colon status post colectomy on 7/27/2023 and  currently undergoing chemotherapy:  Interval resolution of the previously demonstrated hypermetabolic  intra-abdominal and pelvic lymph nodes indicative of complete response  to chemotherapy. No new hypermetabolic lymph nodes identified.       I have personally reviewed the examination and initial interpretation  and I agree with the findings.    PARUL BENAVIDES MD         SYSTEM ID:  [id number]    I reviewed the above imaging today.    Assessment and Plan:  1) Diffuse large B cell lymphoma:  - First dose will be divided into two doses a week apart: Rituximab plus steroids followed by CHO.   - PET scan will be after 3 cycles and 6 cycles.  - Received C1D1 in 2 divided doses on 8/31 and 9/8  - 11/7 PET demonstrated a CR  - S/p C3 and and is tolerating treatment well without toxicities.  We reviews labs and recent PET scan during today's visit.    - Proceed with Mini-R-CHOP C4D1 today (11/9)   - Follow-up with VINAYAK prior to each cycle    Ppx:  - Acyclovir 400 mg BID    2) Bilateral lower extremity edema R>L:  Patient presented to clinic today (11/9) with bilateral lower extremity edema R>L.  - US RLE today 11/9    3) Pancreatic insufficiency:  - Continue Creon.   - We will monitor for diarrhea.    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  He was seen by his PCP for the issue and referred for a EMG.  The EMG was performed on 9/7 by [facility].  Patient wanted to review the results of his EMG and discuss the need for a brace.  - Was seen by Hand/Wrist Specialist 10/31    5) Covid + (10/10/23)  Patient was seen in the ED 10/10/23 for fevers, rhinorrhea, fatigue, and cough.  He tested positive for covid and was started on Paxlovid.  His symptoms have resolved and he feels back to his baseline.  - He received his covid and flu shot on  10/10  - Repeat Covid swab today (11/9) if negative can remove covid precautions    Addendum:  Bilateral lower extremity US demonstrated a occlusive lower extremity DVT and was sent to the ED.  Patient has tolerated rivaroxaban in the past for previous DVT.  He was started on Apixaban 11/9.      HEYDI Perkins CNP

## 2023-11-07 ENCOUNTER — HOSPITAL ENCOUNTER (OUTPATIENT)
Dept: PET IMAGING | Facility: CLINIC | Age: 88
Discharge: HOME | End: 2023-11-07
Attending: STUDENT IN AN ORGANIZED HEALTH CARE EDUCATION/TRAINING PROGRAM | Admitting: STUDENT IN AN ORGANIZED HEALTH CARE EDUCATION/TRAINING PROGRAM
Payer: MEDICARE

## 2023-11-07 DIAGNOSIS — C83.398 DIFFUSE LARGE B-CELL LYMPHOMA OF EXTRANODAL SITE: ICD-10-CM

## 2023-11-07 PROCEDURE — A9552 F18 FDG: HCPCS | Performed by: STUDENT IN AN ORGANIZED HEALTH CARE EDUCATION/TRAINING PROGRAM

## 2023-11-07 PROCEDURE — 78816 PET IMAGE W/CT FULL BODY: CPT | Mod: 26 | Performed by: RADIOLOGY

## 2023-11-07 PROCEDURE — 343N000001 HC RX 343: Performed by: STUDENT IN AN ORGANIZED HEALTH CARE EDUCATION/TRAINING PROGRAM

## 2023-11-07 PROCEDURE — G1010 CDSM STANSON: HCPCS | Performed by: RADIOLOGY

## 2023-11-07 PROCEDURE — G1010 CDSM STANSON: HCPCS | Mod: PS

## 2023-11-07 RX ADMIN — FLUDEOXYGLUCOSE F-18 10.12 MILLICURIE: 500 INJECTION, SOLUTION INTRAVENOUS at 09:28

## 2023-11-09 ENCOUNTER — ANCILLARY PROCEDURE (OUTPATIENT)
Dept: ULTRASOUND IMAGING | Facility: CLINIC | Age: 88
End: 2023-11-09
Payer: MEDICARE

## 2023-11-09 ENCOUNTER — INFUSION THERAPY VISIT (OUTPATIENT)
Dept: ONCOLOGY | Facility: CLINIC | Age: 88
End: 2023-11-09
Attending: STUDENT IN AN ORGANIZED HEALTH CARE EDUCATION/TRAINING PROGRAM
Payer: MEDICARE

## 2023-11-09 ENCOUNTER — APPOINTMENT (OUTPATIENT)
Dept: LAB | Facility: CLINIC | Age: 88
End: 2023-11-09
Attending: STUDENT IN AN ORGANIZED HEALTH CARE EDUCATION/TRAINING PROGRAM
Payer: MEDICARE

## 2023-11-09 ENCOUNTER — HOSPITAL ENCOUNTER (EMERGENCY)
Facility: CLINIC | Age: 88
Discharge: HOME | End: 2023-11-09
Attending: EMERGENCY MEDICINE | Admitting: EMERGENCY MEDICINE
Payer: MEDICARE

## 2023-11-09 VITALS — WEIGHT: 163.1 LBS | BODY MASS INDEX: 23.88 KG/M2

## 2023-11-09 VITALS
HEIGHT: 69 IN | SYSTOLIC BLOOD PRESSURE: 105 MMHG | HEART RATE: 76 BPM | RESPIRATION RATE: 16 BRPM | DIASTOLIC BLOOD PRESSURE: 61 MMHG | BODY MASS INDEX: 23.18 KG/M2 | TEMPERATURE: 97.7 F | OXYGEN SATURATION: 97 %

## 2023-11-09 VITALS
DIASTOLIC BLOOD PRESSURE: 67 MMHG | HEART RATE: 82 BPM | OXYGEN SATURATION: 97 % | RESPIRATION RATE: 18 BRPM | SYSTOLIC BLOOD PRESSURE: 129 MMHG | TEMPERATURE: 97.9 F

## 2023-11-09 DIAGNOSIS — C83.398 DIFFUSE LARGE B-CELL LYMPHOMA OF EXTRANODAL SITE: Primary | ICD-10-CM

## 2023-11-09 DIAGNOSIS — Z76.89 PREVENTION OF CHEMOTHERAPY-INDUCED NEUTROPENIA: ICD-10-CM

## 2023-11-09 DIAGNOSIS — R60.0 LOWER EXTREMITY EDEMA: ICD-10-CM

## 2023-11-09 DIAGNOSIS — Z51.11 ENCOUNTER FOR ANTINEOPLASTIC CHEMOTHERAPY: ICD-10-CM

## 2023-11-09 DIAGNOSIS — Z20.822 EXPOSURE TO 2019 NOVEL CORONAVIRUS: ICD-10-CM

## 2023-11-09 DIAGNOSIS — I82.4Y1 DEEP VEIN THROMBOSIS (DVT) OF PROXIMAL VEIN OF RIGHT LOWER EXTREMITY, UNSPECIFIED CHRONICITY (H): ICD-10-CM

## 2023-11-09 LAB
ALBUMIN SERPL BCG-MCNC: 3.8 G/DL (ref 3.5–5.2)
ALP SERPL-CCNC: 126 U/L (ref 40–129)
ALT SERPL W P-5'-P-CCNC: 10 U/L (ref 0–70)
ANION GAP SERPL CALCULATED.3IONS-SCNC: 10 MMOL/L (ref 7–15)
AST SERPL W P-5'-P-CCNC: 17 U/L (ref 0–45)
BASOPHILS # BLD AUTO: 0.2 10E3/UL (ref 0–0.2)
BASOPHILS NFR BLD AUTO: 2 %
BILIRUB SERPL-MCNC: 0.3 MG/DL
BUN SERPL-MCNC: 21.3 MG/DL (ref 8–23)
CALCIUM SERPL-MCNC: 9.1 MG/DL (ref 8.8–10.2)
CHLORIDE SERPL-SCNC: 105 MMOL/L (ref 98–107)
CREAT SERPL-MCNC: 0.87 MG/DL (ref 0.67–1.17)
DEPRECATED HCO3 PLAS-SCNC: 25 MMOL/L (ref 22–29)
EGFRCR SERPLBLD CKD-EPI 2021: 84 ML/MIN/1.73M2
EOSINOPHIL # BLD AUTO: 0.1 10E3/UL (ref 0–0.7)
EOSINOPHIL NFR BLD AUTO: 1 %
ERYTHROCYTE [DISTWIDTH] IN BLOOD BY AUTOMATED COUNT: 17 % (ref 10–15)
GLUCOSE SERPL-MCNC: 153 MG/DL (ref 70–99)
HCT VFR BLD AUTO: 33.4 % (ref 40–53)
HGB BLD-MCNC: 11 G/DL (ref 13.3–17.7)
IMM GRANULOCYTES # BLD: 0.1 10E3/UL
IMM GRANULOCYTES NFR BLD: 1 %
LDH SERPL L TO P-CCNC: 192 U/L (ref 0–250)
LYMPHOCYTES # BLD AUTO: 0.6 10E3/UL (ref 0.8–5.3)
LYMPHOCYTES NFR BLD AUTO: 6 %
MCH RBC QN AUTO: 31.3 PG (ref 26.5–33)
MCHC RBC AUTO-ENTMCNC: 32.9 G/DL (ref 31.5–36.5)
MCV RBC AUTO: 95 FL (ref 78–100)
MONOCYTES # BLD AUTO: 0.8 10E3/UL (ref 0–1.3)
MONOCYTES NFR BLD AUTO: 7 %
NEUTROPHILS # BLD AUTO: 9 10E3/UL (ref 1.6–8.3)
NEUTROPHILS NFR BLD AUTO: 83 %
NRBC # BLD AUTO: 0 10E3/UL
NRBC BLD AUTO-RTO: 0 /100
PLATELET # BLD AUTO: 284 10E3/UL (ref 150–450)
POTASSIUM SERPL-SCNC: 4.6 MMOL/L (ref 3.4–5.3)
PROT SERPL-MCNC: 6.5 G/DL (ref 6.4–8.3)
RADIOLOGIST FLAGS: ABNORMAL
RBC # BLD AUTO: 3.52 10E6/UL (ref 4.4–5.9)
SARS-COV-2 RNA RESP QL NAA+PROBE: NEGATIVE
SODIUM SERPL-SCNC: 140 MMOL/L (ref 135–145)
URATE SERPL-MCNC: 6.5 MG/DL (ref 3.4–7)
WBC # BLD AUTO: 10.7 10E3/UL (ref 4–11)

## 2023-11-09 PROCEDURE — 250N000013 HC RX MED GY IP 250 OP 250 PS 637: Performed by: EMERGENCY MEDICINE

## 2023-11-09 PROCEDURE — 99283 EMERGENCY DEPT VISIT LOW MDM: CPT | Performed by: EMERGENCY MEDICINE

## 2023-11-09 PROCEDURE — 96372 THER/PROPH/DIAG INJ SC/IM: CPT

## 2023-11-09 PROCEDURE — 258N000003 HC RX IP 258 OP 636

## 2023-11-09 PROCEDURE — 96375 TX/PRO/DX INJ NEW DRUG ADDON: CPT

## 2023-11-09 PROCEDURE — 96411 CHEMO IV PUSH ADDL DRUG: CPT

## 2023-11-09 PROCEDURE — 96377 APPLICATON ON-BODY INJECTOR: CPT | Mod: 59

## 2023-11-09 PROCEDURE — 84550 ASSAY OF BLOOD/URIC ACID: CPT

## 2023-11-09 PROCEDURE — 96413 CHEMO IV INFUSION 1 HR: CPT

## 2023-11-09 PROCEDURE — 93971 EXTREMITY STUDY: CPT | Mod: RT | Performed by: RADIOLOGY

## 2023-11-09 PROCEDURE — 85025 COMPLETE CBC W/AUTO DIFF WBC: CPT

## 2023-11-09 PROCEDURE — 80053 COMPREHEN METABOLIC PANEL: CPT

## 2023-11-09 PROCEDURE — 250N000011 HC RX IP 250 OP 636: Mod: JZ

## 2023-11-09 PROCEDURE — 99215 OFFICE O/P EST HI 40 MIN: CPT

## 2023-11-09 PROCEDURE — 36591 DRAW BLOOD OFF VENOUS DEVICE: CPT

## 2023-11-09 PROCEDURE — 96417 CHEMO IV INFUS EACH ADDL SEQ: CPT

## 2023-11-09 PROCEDURE — 96415 CHEMO IV INFUSION ADDL HR: CPT

## 2023-11-09 PROCEDURE — 96367 TX/PROPH/DG ADDL SEQ IV INF: CPT

## 2023-11-09 PROCEDURE — 250N000013 HC RX MED GY IP 250 OP 250 PS 637

## 2023-11-09 PROCEDURE — 87635 SARS-COV-2 COVID-19 AMP PRB: CPT

## 2023-11-09 PROCEDURE — 99283 EMERGENCY DEPT VISIT LOW MDM: CPT | Mod: 25 | Performed by: EMERGENCY MEDICINE

## 2023-11-09 PROCEDURE — G0463 HOSPITAL OUTPT CLINIC VISIT: HCPCS | Mod: 25

## 2023-11-09 PROCEDURE — 83615 LACTATE (LD) (LDH) ENZYME: CPT

## 2023-11-09 RX ORDER — HEPARIN SODIUM (PORCINE) LOCK FLUSH IV SOLN 100 UNIT/ML 100 UNIT/ML
5 SOLUTION INTRAVENOUS
Status: CANCELLED | OUTPATIENT
Start: 2023-11-09

## 2023-11-09 RX ORDER — LORAZEPAM 2 MG/ML
0.5 INJECTION INTRAMUSCULAR EVERY 4 HOURS PRN
Status: CANCELLED | OUTPATIENT
Start: 2023-11-09

## 2023-11-09 RX ORDER — ALBUTEROL SULFATE 0.83 MG/ML
2.5 SOLUTION RESPIRATORY (INHALATION)
Status: CANCELLED | OUTPATIENT
Start: 2023-11-09

## 2023-11-09 RX ORDER — ALBUTEROL SULFATE 90 UG/1
1-2 AEROSOL, METERED RESPIRATORY (INHALATION)
Status: CANCELLED
Start: 2023-11-09

## 2023-11-09 RX ORDER — DOXORUBICIN HYDROCHLORIDE 2 MG/ML
25 INJECTION, SOLUTION INTRAVENOUS ONCE
Status: CANCELLED | OUTPATIENT
Start: 2023-11-09

## 2023-11-09 RX ORDER — PALONOSETRON 0.05 MG/ML
0.25 INJECTION, SOLUTION INTRAVENOUS ONCE
Status: CANCELLED
Start: 2023-11-09

## 2023-11-09 RX ORDER — MEPERIDINE HYDROCHLORIDE 25 MG/ML
25 INJECTION INTRAMUSCULAR; INTRAVENOUS; SUBCUTANEOUS EVERY 30 MIN PRN
Status: CANCELLED | OUTPATIENT
Start: 2023-11-09

## 2023-11-09 RX ORDER — ACETAMINOPHEN 325 MG/1
650 TABLET ORAL ONCE
Status: COMPLETED | OUTPATIENT
Start: 2023-11-09 | End: 2023-11-09

## 2023-11-09 RX ORDER — DIPHENHYDRAMINE HYDROCHLORIDE 50 MG/ML
50 INJECTION INTRAMUSCULAR; INTRAVENOUS
Status: CANCELLED
Start: 2023-11-09

## 2023-11-09 RX ORDER — HEPARIN SODIUM,PORCINE 10 UNIT/ML
5-20 VIAL (ML) INTRAVENOUS DAILY PRN
Status: CANCELLED | OUTPATIENT
Start: 2023-11-09

## 2023-11-09 RX ORDER — PALONOSETRON 0.05 MG/ML
0.25 INJECTION, SOLUTION INTRAVENOUS ONCE
Status: COMPLETED | OUTPATIENT
Start: 2023-11-09 | End: 2023-11-09

## 2023-11-09 RX ORDER — METHYLPREDNISOLONE SODIUM SUCCINATE 125 MG/2ML
125 INJECTION, POWDER, LYOPHILIZED, FOR SOLUTION INTRAMUSCULAR; INTRAVENOUS
Status: CANCELLED
Start: 2023-11-09

## 2023-11-09 RX ORDER — EPINEPHRINE 1 MG/ML
0.3 INJECTION, SOLUTION INTRAMUSCULAR; SUBCUTANEOUS EVERY 5 MIN PRN
Status: CANCELLED | OUTPATIENT
Start: 2023-11-09

## 2023-11-09 RX ORDER — DIPHENHYDRAMINE HCL 25 MG
50 CAPSULE ORAL ONCE
Status: CANCELLED
Start: 2023-11-09

## 2023-11-09 RX ORDER — HEPARIN SODIUM (PORCINE) LOCK FLUSH IV SOLN 100 UNIT/ML 100 UNIT/ML
5 SOLUTION INTRAVENOUS
Status: DISCONTINUED | OUTPATIENT
Start: 2023-11-09 | End: 2023-11-09 | Stop reason: HOSPADM

## 2023-11-09 RX ORDER — APIXABAN 5 MG (74)
KIT ORAL
Qty: 1 EACH | Refills: 0 | Status: SHIPPED | OUTPATIENT
Start: 2023-11-09 | End: 2023-12-09

## 2023-11-09 RX ORDER — DIPHENHYDRAMINE HCL 25 MG
50 CAPSULE ORAL ONCE
Status: COMPLETED | OUTPATIENT
Start: 2023-11-09 | End: 2023-11-09

## 2023-11-09 RX ORDER — ACETAMINOPHEN 325 MG/1
650 TABLET ORAL ONCE
Status: CANCELLED
Start: 2023-11-09

## 2023-11-09 RX ORDER — DOXORUBICIN HYDROCHLORIDE 2 MG/ML
25 INJECTION, SOLUTION INTRAVENOUS ONCE
Status: COMPLETED | OUTPATIENT
Start: 2023-11-09 | End: 2023-11-09

## 2023-11-09 RX ORDER — MEPERIDINE HYDROCHLORIDE 25 MG/ML
25 INJECTION INTRAMUSCULAR; INTRAVENOUS; SUBCUTANEOUS
Status: CANCELLED
Start: 2023-11-09

## 2023-11-09 RX ORDER — HEPARIN SODIUM (PORCINE) LOCK FLUSH IV SOLN 100 UNIT/ML 100 UNIT/ML
5 SOLUTION INTRAVENOUS
Status: COMPLETED | OUTPATIENT
Start: 2023-11-09 | End: 2023-11-09

## 2023-11-09 RX ADMIN — SODIUM CHLORIDE 150 MG: 9 INJECTION, SOLUTION INTRAVENOUS at 11:59

## 2023-11-09 RX ADMIN — PEGFILGRASTIM 6 MG: KIT SUBCUTANEOUS at 13:53

## 2023-11-09 RX ADMIN — CYCLOPHOSPHAMIDE 750 MG: 1 INJECTION, POWDER, FOR SOLUTION INTRAVENOUS; ORAL at 12:48

## 2023-11-09 RX ADMIN — RITUXIMAB-ABBS 700 MG: 10 INJECTION, SOLUTION INTRAVENOUS at 13:38

## 2023-11-09 RX ADMIN — DOXORUBICIN HYDROCHLORIDE 45 MG: 2 INJECTION, SOLUTION INTRAVENOUS at 12:29

## 2023-11-09 RX ADMIN — APIXABAN 10 MG: 5 TABLET, FILM COATED ORAL at 21:21

## 2023-11-09 RX ADMIN — VINCRISTINE SULFATE 1 MG: 1 INJECTION, SOLUTION INTRAVENOUS at 12:39

## 2023-11-09 RX ADMIN — Medication 5 ML: at 10:25

## 2023-11-09 RX ADMIN — DIPHENHYDRAMINE HYDROCHLORIDE 50 MG: 25 CAPSULE ORAL at 12:55

## 2023-11-09 RX ADMIN — ACETAMINOPHEN 650 MG: 325 TABLET ORAL at 12:55

## 2023-11-09 RX ADMIN — PALONOSETRON HYDROCHLORIDE 0.25 MG: 0.25 INJECTION INTRAVENOUS at 11:32

## 2023-11-09 RX ADMIN — Medication 5 ML: at 16:10

## 2023-11-09 RX ADMIN — SODIUM CHLORIDE 250 ML: 9 INJECTION, SOLUTION INTRAVENOUS at 11:32

## 2023-11-09 ASSESSMENT — PAIN SCALES - GENERAL: PAINLEVEL: NO PAIN (0)

## 2023-11-09 ASSESSMENT — ACTIVITIES OF DAILY LIVING (ADL): ADLS_ACUITY_SCORE: 33

## 2023-11-09 NOTE — PATIENT INSTRUCTIONS
You received an anti-nausea medication called Aloxi today. Aloxi is in the same drug class as one of your take home as needed anti-nausea medications called Ondanestron or Zofran. Do not take your Zofran prescription for the next three days because you are covered by the Aloxi you received today. You can begin to take your Zofran prescription as needed starting on Sunday, November 12th. If you need coverage for your nausea before then, feel free to use your compazine prescription.     [facility] Triage and after hours / weekends / holidays:  [phone]    Please call the triage or after hours line if you experience a temperature greater than or equal to 100.4, shaking chills, have uncontrolled nausea, vomiting and/or diarrhea, dizziness, shortness of breath, chest pain, bleeding, unexplained bruising, or if you have any other new/concerning symptoms, questions or concerns.      If you are having any concerning symptoms or wish to speak to a provider before your next infusion visit, please call triage to notify them so we can adequately serve you.     If you need a refill on a narcotic prescription or other medication, please call before your infusion appointment.

## 2023-11-09 NOTE — NURSING NOTE
"Chief Complaint   Patient presents with    Oncology Clinic Visit     [facility] RETURN - DLBCL    Port Draw     Labs drawn from port by rn.  VS taken.     Port accessed with 20 gauge 3/4\" Power needle and labs drawn by rn.  Port flushed with NS and heparin.  Pt tolerated well.  VS taken.  Pt checked in for next appt.    Arcelia Pitt RN      "

## 2023-11-09 NOTE — NURSING NOTE
"Oncology Rooming Note    November 9, 2023 10:43 AM   Tom Saini is a 88 year old male who presents for:    Chief Complaint   Patient presents with    Oncology Clinic Visit     [facility] RETURN - DLBCL     Initial Vitals: /61 (BP Location: Right arm, Patient Position: Sitting, Cuff Size: Adult Small)   Pulse 76   Temp 97.7  F (36.5  C) (Oral)   Resp 16   Ht 1.76 m (5' 9.29\")   SpO2 97%   BMI 23.18 kg/m   Estimated body mass index is 23.18 kg/m  as calculated from the following:    Height as of this encounter: 1.76 m (5' 9.29\").    Weight as of 10/20/23: 71.8 kg (158 lb 4.8 oz). Body surface area is 1.87 meters squared.  No Pain (0) Comment: Data Unavailable   No LMP for male patient.  Allergies reviewed: Yes  Medications reviewed: Yes    Medications: Medication refills not needed today.  Pharmacy name entered into [facility]:    [id number] [place], [place] - [address] AT [facility] - [address] [place] [address]    Rom Davis LPN              "

## 2023-11-09 NOTE — PROGRESS NOTES
Infusion Nursing Note:  Tom Saini presents today for Cycle 4 Day 1 Doxorubicin, Vincristine, Cyclophosphamide, and Rituxan + Neulasta OnPro.    Patient seen by provider today: Yes: Miriam Rouse CNP   present during visit today: Not Applicable.    Note: Patient denies any signs or symptoms of infection. Patient was seen and assessed by Miriam Rouse CNP in clinic prior to infusion. Patient offers no additional complaints or concerns.    Patient took his first dose of Prednisone in infusion prior to the initiation of chemotherapy.    Rituxan administered as follows:  100mg/hr for 30 minutes.  200mg/hr for 30 minutes.  300mg/hr for 30 minutes.  400mg/hr for the rest of the infusion.    Intravenous Access:  Implanted Port.    Treatment Conditions:  Lab Results   Component Value Date    HGB 11.0 (L) 11/09/2023    WBC 10.7 11/09/2023    ANEU 23.9 (H) 10/10/2023    ANEUTAUTO 9.0 (H) 11/09/2023     11/09/2023        Lab Results   Component Value Date     11/09/2023    POTASSIUM 4.6 11/09/2023    MAG 2.2 07/31/2023    CR 0.87 11/09/2023    DAVIDE 9.1 11/09/2023    BILITOTAL 0.3 11/09/2023    ALBUMIN 3.8 11/09/2023    ALT 10 11/09/2023    AST 17 11/09/2023     Results reviewed, labs MET treatment parameters, ok to proceed with treatment.  ECHO/MUGA completed 08/10/23  EF 55-60%.    Post Infusion Assessment:  Patient tolerated infusion without incident.  Blood return noted pre and post infusion.  Blood return noted during Doxorubicin administration every 2-3 cc.  Site patent and intact, free from redness, edema or discomfort.  No evidence of extravasations.  Access discontinued per protocol.     Neulasta On Pro- On Body injector applied to patient today on the back of his right arm at 2pm with light facing down. Writer discussed Neulasta injection would start tomorrow at 5pm approximately 27 hours after application applied today.  Written and Verbal instruction reviewed with patient.  Pt instructed  when the dose delivery starts, it will take about 45 minutes to complete. Pt aware Neulasta Onpro On-Body should have green flashing light and to call triage or on-call MD if injector flashes red or appears to be leaking. Pt aware to keep Onpro On-Body Neualsta 4 inches away from electrical equipment and to avoid showering 4 hours prior to injection.     Discharge Plan:   Prescription refills given for Prednisone.  Discharge instructions reviewed with: Patient and Family.  Patient and/or family verbalized understanding of discharge instructions and all questions answered.  AVS to patient via [employer].  Patient will return 11/30/23 for next appointment.   Patient discharged in stable condition accompanied by: self and wife.  Departure Mode: Ambulatory.      Chanell Mcnamara RN

## 2023-11-09 NOTE — LETTER
11/9/2023         RE: Tom Saini  [address] [facility] [id number]        Dear Colleague,    Thank you for referring your patient, Tom Saini, to the [facility]. Please see a copy of my visit note below.    Julee Reza is a 88 year old, presenting for the following health issues:  Oncology Clinic Visit ([facility] RETURN - DLBCL) and Port Draw (Labs drawn from port by rn.  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  He has had diarrhea with particular food for several years and recently was investigated for possible pancreatic insufficiency.  He also has a history of pancreatic pseudocyst.  On recent MRI done in June 2023 to follow-up on pancreatic pseudocyst showed a colonic mass.  Subsequent CT scan of chest abdomen pelvis showed Mild anemia, no cytopenias 7.9 x 6.9 x 5.6 cm large soft tissue mass in sigmoid colon obliterating the lumen and significant extramural soft tissue extension superiorly in the sigmoid mesocolon.  Enlarged bilateral inguinal lymph node, as few small prominent lymph nodes along superior rectal/inferior mesenteric artery distribution.  Diffuse main pancreatic duct dilatation with large intraductal calculus in the head was seen as well.  Multiple cystic lesions and dilated sidebranches were seen in the pancreas.  He underwent colonoscopy but due to obliterated lumen, colonoscopy failed.  Subsequently CT colonography was done which showed similar results to CT chest abdomen pelvis.  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  FISH showed Bcl-2 rearrangement but no MYC rearrangement.  Bcl-2 IgH rearrangement [translocation (14;18)] is consistent with transformation of DLBCL from follicular lymphoma.  Postoperative course was complicated by postoperative nausea  "vomiting which resolved, single episode of suicidal ideation which resolved.  He was discharged to TCU.  He has been regaining his strength slowly, his performance status has improved to 1.  He is able to walk independently for short distances, can do basic activities of daily living himself.    PET scan revealed persistent hypermetabolic retroperitoneal lymph nodes, possibly reactive but lymphoma cannot be ruled out. ECHOcardiogram reveals EF >50%. LDH within normal limits. Overall IPI 2. Low-intermediate risk GCB DLBCL.     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  He is doing well. His energy levels have been a little low but is still active and able to perform ADLs and do his walking.  He appetite continues to be good with no changes.  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     Wife feels that patient has a dry cough, patient does not feel it is serious and denies concerns for his cough.     Patient has new lower extremity edema R>L.  Denies any pain with walking or redness.     Denies fevers/chills, SOB/cough, chest pain, night sweats, N/V/D/C, recurring infections, bleeding issues, rashes/sores.       Objective   /61 (BP Location: Right arm, Patient Position: Sitting, Cuff Size: Adult Small)   Pulse 76   Temp 97.7  F (36.5  C) (Oral)   Resp 16   Ht 1.76 m (5' 9.29\")   SpO2 97%   BMI 23.18 kg/m    Body mass index is 23.18 kg/m .  Physical Exam   General: No acute distress  HEENT: Sclera anicteric. Oral exam deferred  Lymph: No lymphadenopathy in neck or supraclavicular areas  Heart: Regular, rate, and rhythm  Lungs: Clear to ascultation " bilaterally  Extremities: Bilateral lower extremity edema R>L  Neuro: Cranial nerves grossly intact  Rash: none on exposed skin     Labs:  Most Recent 3 CBC's:  Recent Labs   Lab Test 11/09/23  1038 10/20/23  0928 10/10/23  0009 09/28/23  0908   WBC 10.7 10.6 26.5* 10.1   HGB 11.0* 11.1* 11.2* 11.9*   MCV 95 95 96 95    290 241 329   ANEUTAUTO 9.0* 8.5*  --  7.8     Most Recent 3 BMP's:  Recent Labs   Lab Test 11/09/23  1038 10/20/23  0928 10/10/23  0009    141 135   POTASSIUM 4.6 4.3 4.4   CHLORIDE 105 106 100   CO2 25 26 24   BUN 21.3 18.7 13.2   CR 0.87 0.89 1.03   ANIONGAP 10 9 11   DAVIDE 9.1 9.3 9.4   * 124* 118*   PROTTOTAL 6.5 6.6 6.6   ALBUMIN 3.8 3.8 3.8    Most Recent 3 LFT's:  Recent Labs   Lab Test 11/09/23  1038 10/20/23  0928 10/10/23  0009   AST 17 16 17   ALT 10 8 8   ALKPHOS 126 105 189*   BILITOTAL 0.3 0.3 0.2    Most Recent 2 TSH and T4:  Recent Labs   Lab Test 02/27/21  1149   TSH 0.90     I reviewed the above labs today.    Imaging:  PET Oncology Whole Body  Narrative: Combined Report of:    PET and CT on  11/7/2023 10:59 AM :    1. PET of the neck, chest, abdomen, and pelvis.  2. PET CT Fusion for Attenuation Correction and Anatomical  Localization:    3. 3D MIP and PET-CT fused images were processed on an independent  workstation and archived to PACS and reviewed by a radiologist.    Technique:    1. PET: The patient received 10.12 mCi of F-18-FDG; the serum glucose  was 117 mg/dL prior to administration, body weight was 71.8 kg. Images  were evaluated in the axial, sagittal, and coronal planes as well as  the rotational whole body MIP. Images were acquired from the Vertex to  the Feet.    UPTAKE WAS MEASURED AT 60 MINUTES.     BACKGROUND:  Liver SUV max= 2.68,   Aorta Blood SUV Max: 1.94.     2. CT: CT only obtained for attenuation correction and not diagnostic  purposes.    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    COMPARISON: PET dated 11/20/2023, CT CAP 6/21/2023, CT colonography  6/29/2023    f/u 11/8    FINDINGS:     HEAD/NECK:  There is no suspicious FDG uptake in the neck.     Stable mild cerebral and cerebellar parenchymal volume loss associated  ventricular dilation.    CHEST:  There is no suspicious FDG uptake within the chest.     There is a newly FDG avid 8mm subcarinal lymph node SUV max 5.5  (series 4 image 147), likely benign such as due to sarcoid like  reaction. This is next to a stable calcified node.  Redemonstrated  mild  uptake along the distal esophagus, likely secondary to reflux.     Small hiatal hernia.     Mild right greater than left dependent atelectasis. Aortic valvular  and coronary artery calcifications. Atherosclerotic calcifications of  the aortic arch and its branches. Multiple calcified mediastinal and  left hilar lymph nodes.    ABDOMEN AND PELVIS:  There is no suspicious FDG uptake in the abdomen or pelvis. Interval  resolution of the previously demonstrated retroperitoneal and iliac  hypermetabolic lymphadenopathy.    Coarse pancreatic calcification. Scattered splenic granulomas. Stable  left parapelvic renal cyst. Redemonstrated punctate nonobstructive  renal calculi. Post surgical changes of sigmoid colectomy.  Redemonstrated bilateral common iliac artery aneurysms measuring 1.8  cm on the right and 1.9 cm on the left.    LOWER EXTREMITIES:   Small foci of increased FDG uptake along the right greater trochanter  without focal CT correlate, possibly inflammatory. No abnormal masses  or hypermetabolic lesions.    BONES:   There are no suspicious lytic or blastic osseous lesions.  Thoracolumbar scoliosis  with scattered degenerative changes throughout  the spine.  There is new mild diffuse homogenous bone marrow FDG  uptake, likely bone marrow activation secondary to chemotherapy.   Impression: IMPRESSION:   In this patient with large B-cell lymphoma arising from follicular  lymphoma in the sigmoid colon status post colectomy on 7/27/2023 and  currently undergoing chemotherapy:  Interval resolution of the previously demonstrated hypermetabolic  intra-abdominal and pelvic lymph nodes indicative of complete response  to chemotherapy. No new hypermetabolic lymph nodes identified.       I have personally reviewed the examination and initial interpretation  and I agree with the findings.    PARUL BENAVIDES MD         SYSTEM ID:  [id number]    I reviewed the above imaging today.    Assessment and Plan:  1) Diffuse large B cell lymphoma:  - First dose will be divided into two doses a week apart: Rituximab plus steroids followed by CHO.   - PET scan will be after 3 cycles and 6 cycles.  - Received C1D1 in 2 divided doses on 8/31 and 9/8  - 11/7 PET demonstrated a CR  - S/p C3 and and is tolerating treatment well without toxicities.  We reviews labs and recent PET scan during today's visit.    - Proceed with Mini-R-CHOP C4D1 today (11/9)   - Follow-up with VINAYAK prior to each cycle    Ppx:  - Acyclovir 400 mg BID    2) Bilateral lower extremity edema R>L:  Patient presented to clinic today (11/9) with bilateral lower extremity edema R>L.  - US RLE today 11/9    3) Pancreatic insufficiency:  - Continue Creon.   - We will monitor for diarrhea.    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  He was seen by his PCP for the issue and referred for a EMG.  The EMG was performed on 9/7 by [facility].  Patient wanted to review the results of his EMG and discuss the need for a brace.  - Was seen by Hand/Wrist Specialist 10/31    5) Covid +  (10/10/23)  Patient was seen in the ED 10/10/23 for fevers, rhinorrhea, fatigue, and cough.  He tested positive for covid and was started on Paxlovid.  His symptoms have resolved and he feels back to his baseline.  - He received his covid and flu shot on 10/10  - Repeat Covid swab today (11/9) if negative can remove covid precautions    HEYDI Perkins CNP

## 2023-11-10 ENCOUNTER — PATIENT OUTREACH (OUTPATIENT)
Dept: CARE COORDINATION | Facility: CLINIC | Age: 88
End: 2023-11-10

## 2023-11-10 ENCOUNTER — THERAPY VISIT (OUTPATIENT)
Dept: PHYSICAL THERAPY | Facility: CLINIC | Age: 88
End: 2023-11-10
Payer: MEDICARE

## 2023-11-10 DIAGNOSIS — R20.0 NUMBNESS AND TINGLING IN LEFT ARM: Primary | ICD-10-CM

## 2023-11-10 DIAGNOSIS — R20.2 NUMBNESS AND TINGLING IN LEFT ARM: Primary | ICD-10-CM

## 2023-11-10 PROCEDURE — 97110 THERAPEUTIC EXERCISES: CPT | Mod: GP

## 2023-11-10 PROCEDURE — 97112 NEUROMUSCULAR REEDUCATION: CPT | Mod: GP

## 2023-11-10 NOTE — ED TRIAGE NOTES
"      Patient presents to ED with CC of \"blood clot in right leg\" and needing blood thinners. Patient states he was in clinic all day and had ultrasound done and just needs medications for blood thinners. Denies CP and SOB.   "

## 2023-11-10 NOTE — PROGRESS NOTES
RN called and spoke with patient, who reports that he is supposed to see the dentist, and is concerned about now being on Eliquis for the blood clot. Patient reports that he sees the dentist at 10am in [place] on Thursday. RN helped patient schedule an appt with Dr. Donnelly for video visit on Tuesday, 11/14. Patient wants to know if he should be on antibiotics before this appointment, RN told patient should happen during visit on Tuesday if necessary.     Patient states that he does not need any further help- he is finishing up chemo, the PET scan is showing improvement, and he is doing well. No further concerns or help needed.     TIFF PARSON RN on 11/10/2023 at 3:34 PM

## 2023-11-10 NOTE — ED PROVIDER NOTES
ED Provider Note  [facility]      History     Chief Complaint   Patient presents with    Leg Swelling     HPI  Tom Saini is a 88 year old male PMH of HLD, diffuse large B cell lymphoma, who presents to the ER with findings of DVT on outpatient imaging.  Patient has been having right lower extremity swelling, he is unable to tell exactly how long this has been going on.  No pain with this.  He had an ultrasound earlier today which showed an occlusive right lower extremity DVT.  He does have a history of DVT in the other leg.  He was previously on Dexilant for this.  He is no chest pain or shortness of breath.  No other symptoms noted.    Per chart review, patient was seen on 10/10 for COVid 19 infection. Labs showed elevated white count. Was offered admission but refused. Given a prescription for paxlovid.  Past Medical History  Past Medical History:   Diagnosis Date    Aortic insufficiency     Erectile dysfunction     History of deep venous thrombosis 05/2019    distal DVT with extension, completed 6 months of AC (failed eliquis)    History of pelvic fracture     Hyperlipidemia     Low back pain     disc disease s/p laminectomy in past    Macular degeneration     on Avastin, R eye    Pancreatic cyst 07/2018    next MR due 12/23    Radius fracture 2018    after fall from wall     Past Surgical History:   Procedure Laterality Date    CATARACT IOL, RT/LT  01/01/2001    COLONOSCOPY N/A 06/28/2023    Procedure: COLONOSCOPY, WITH BIOPSY;  Surgeon: Jhonatan Cowart MD;  Location: [facility] OR    COMBINED REPAIR PTOSIS WITH BLEPHAROPLASTY BILATERAL Bilateral 04/06/2018    Procedure: COMBINED REPAIR PTOSIS WITH BLEPHAROPLASTY BILATERAL;  BILATERAL UPPER EYELIDS BLEPHAROPLASTY AND PTOSIS REPAIR ;  Surgeon: Anuj Good MD;  Location: [facility]    EYE SURGERY      INSERT PORT VASCULAR ACCESS N/A 8/24/2023    Procedure: Insert port vascular access;  Surgeon: Cesar Negro MD;   Location: [facility] OR    IR CHEST PORT PLACEMENT > 5 YRS OF AGE  2023    LAMINECTOMY LUMBAR TWO LEVELS  1993    L4-L5    LAPAROSCOPIC ASSISTED SIGMOID COLECTOMY N/A 2023    Procedure: COLECTOMY, SIGMOID, LAPAROSCOPIC assisted;  Surgeon: Jhonatan Cowart MD;  Location: [facility] OR    ORIF RADIAL SHAFT FRACTURE Right 2018    RECESSION RESECTION (REPAIR STRABISMUS)  1949    SIGMOIDOSCOPY FLEXIBLE N/A 2023    Procedure: Sigmoidoscopy flexible;  Surgeon: Jhonatan Cowart MD;  Location: [facility] OR     acyclovir (ZOVIRAX) 400 MG tablet  acyclovir (ZOVIRAX) 400 MG tablet  calcium carbonate (OS-DAVIDE) 1500 (600 Ca) MG tablet  Cholecalciferol (VITAMIN D-3) 25 MCG (1000 UT) CAPS  lipase-protease-amylase (CREON) 27832-24149-231620 units CPEP per EC capsule  multivitamin (OCUVITE) TABS  ondansetron (ZOFRAN) 8 MG tablet  predniSONE (DELTASONE) 20 MG tablet  prochlorperazine (COMPAZINE) 10 MG tablet  sodium chloride (LAXMI 128) 5 % ophthalmic ointment      Allergies   Allergen Reactions    Codeine Sulfate Nausea and Vomiting     Family History  Family History   Problem Relation Age of Onset    Cancer Mother         unknown skin cancer    Skin Cancer Mother     Cancer Father         bladder    Breast Cancer Sister     Glaucoma No family hx of     Macular Degeneration No family hx of     Diabetes No family hx of     Hypertension No family hx of     Anesthesia Reaction No family hx of     Thrombosis No family hx of      Social History   Social History     Tobacco Use    Smoking status: Former     Packs/day: 2.00     Years: 2.00     Additional pack years: 0.00     Total pack years: 4.00     Types: Cigarettes     Quit date: 1954     Years since quittin.4     Passive exposure: Past    Smokeless tobacco: Never   Substance Use Topics    Alcohol use: Yes     Alcohol/week: 3.0 standard drinks of alcohol     Types: 3 Glasses of wine per week     Comment: glass of wine with dinner    Drug use: Never          A medically appropriate review of systems was performed with pertinent positives and negatives noted in the HPI, and all other systems negative.    Physical Exam   BP: 129/67  Pulse: 82  Temp: 97.9  F (36.6  C)  Resp: 16  SpO2: 97 %  Physical Exam  Constitutional:       General: He is not in acute distress.     Appearance: He is well-developed. He is not diaphoretic.   HENT:      Head: Normocephalic and atraumatic.      Mouth/Throat:      Pharynx: No oropharyngeal exudate.   Eyes:      General: No scleral icterus.        Right eye: No discharge.         Left eye: No discharge.      Pupils: Pupils are equal, round, and reactive to light.   Cardiovascular:      Rate and Rhythm: Normal rate and regular rhythm.      Heart sounds: Normal heart sounds. No murmur heard.     No friction rub. No gallop.   Pulmonary:      Effort: Pulmonary effort is normal. No respiratory distress.      Breath sounds: Normal breath sounds. No wheezing.   Chest:      Chest wall: No tenderness.   Abdominal:      General: Bowel sounds are normal. There is no distension.      Palpations: Abdomen is soft.      Tenderness: There is no abdominal tenderness.   Musculoskeletal:         General: No tenderness or deformity. Normal range of motion.      Cervical back: Normal range of motion and neck supple.      Right lower leg: Edema present.   Skin:     General: Skin is warm and dry.      Coloration: Skin is not pale.      Findings: No erythema or rash.   Neurological:      Mental Status: He is alert and oriented to person, place, and time.      Cranial Nerves: No cranial nerve deficit.           ED Course, Procedures, & Data      Procedures                      Results for orders placed or performed in visit on 11/09/23   US Lower Extremity Venous Duplex Right     Status: Abnormal   Result Value Ref Range    Radiologist flags Occlusive DVT (Urgent)     Narrative    EXAMINATION: DOPPLER VENOUS ULTRASOUND OF BILATERAL LOWER EXTREMITIES,  11/9/2023  5:55 PM     COMPARISON: Left lower extremity venous ultrasound 7/15/2021.    HISTORY: Lower extremity edema    TECHNIQUE:  Gray-scale evaluation with compression, spectral flow and  color Doppler assessment of the deep venous system of both legs from  groin to knee, and then at the ankles.    FINDINGS:  Partially compressible right popliteal vein just superior to the knee  joint, becoming noncompressible at the joint line and just below the  joint line. Also no appreciable Doppler flow just below the joint  line. The popliteal vein appears to be duplicated or possible normal  variant accessory vein. This may account for the typical location of  the peroneal vein not being visualized the proximal-mid calf. The  posterior tibial veins demonstrate one partially compressible vein and  one noncompressible vein. Patent left common femoral vein.    Mildly prominent bilateral inguinal lymph nodes otherwise normal  appearance and presumed reactive.      Impression    IMPRESSION:  1.  Occlusive right popliteal vein DVT, nonocclusive portion just  proximal above the knee joint line, with nonocclusive clot extension  into one of the posterior tibial veins. Possible variant anatomy noted  above.    [Urgent Result: Occlusive DVT]    Finding was identified on 11/9/2023 6:06 PM.     Multiple attempts were taken to contact the provided pager number,  ordering provider, and ordering department/clinic number without  success. Dr. Zapien spoke with the performing ultrasound technician at  [phone] who understood recommendation to send patient promptly to the  emergency room for further direction.    I have personally reviewed the examination and initial interpretation  and I agree with the findings.    ALMA ROSA MCMULLEN MD         SYSTEM ID:  [id number]   Results for orders placed or performed in visit on 11/09/23   Comprehensive metabolic panel     Status: Abnormal   Result Value Ref Range    Sodium 140 135 - 145 mmol/L    Potassium 4.6  3.4 - 5.3 mmol/L    Carbon Dioxide (CO2) 25 22 - 29 mmol/L    Anion Gap 10 7 - 15 mmol/L    Urea Nitrogen 21.3 8.0 - 23.0 mg/dL    Creatinine 0.87 0.67 - 1.17 mg/dL    GFR Estimate 84 >60 mL/min/1.73m2    Calcium 9.1 8.8 - 10.2 mg/dL    Chloride 105 98 - 107 mmol/L    Glucose 153 (H) 70 - 99 mg/dL    Alkaline Phosphatase 126 40 - 129 U/L    AST 17 0 - 45 U/L    ALT 10 0 - 70 U/L    Protein Total 6.5 6.4 - 8.3 g/dL    Albumin 3.8 3.5 - 5.2 g/dL    Bilirubin Total 0.3 <=1.2 mg/dL   CBC with platelets and differential     Status: Abnormal   Result Value Ref Range    WBC Count 10.7 4.0 - 11.0 10e3/uL    RBC Count 3.52 (L) 4.40 - 5.90 10e6/uL    Hemoglobin 11.0 (L) 13.3 - 17.7 g/dL    Hematocrit 33.4 (L) 40.0 - 53.0 %    MCV 95 78 - 100 fL    MCH 31.3 26.5 - 33.0 pg    MCHC 32.9 31.5 - 36.5 g/dL    RDW 17.0 (H) 10.0 - 15.0 %    Platelet Count 284 150 - 450 10e3/uL    % Neutrophils 83 %    % Lymphocytes 6 %    % Monocytes 7 %    % Eosinophils 1 %    % Basophils 2 %    % Immature Granulocytes 1 %    NRBCs per 100 WBC 0 <1 /100    Absolute Neutrophils 9.0 (H) 1.6 - 8.3 10e3/uL    Absolute Lymphocytes 0.6 (L) 0.8 - 5.3 10e3/uL    Absolute Monocytes 0.8 0.0 - 1.3 10e3/uL    Absolute Eosinophils 0.1 0.0 - 0.7 10e3/uL    Absolute Basophils 0.2 0.0 - 0.2 10e3/uL    Absolute Immature Granulocytes 0.1 <=0.4 10e3/uL    Absolute NRBCs 0.0 10e3/uL   CBC with platelets differential     Status: Abnormal    Narrative    The following orders were created for panel order CBC with platelets differential.  Procedure                               Abnormality         Status                     ---------                               -----------         ------                     CBC with platelets and d...[[id number]]  Abnormal            Final result                 Please view results for these tests on the individual orders.   Results for orders placed or performed in visit on 11/09/23   Lactate Dehydrogenase     Status: Normal   Result  Value Ref Range    Lactate Dehydrogenase 192 0 - 250 U/L   Uric acid     Status: Normal   Result Value Ref Range    Uric Acid 6.5 3.4 - 7.0 mg/dL   Asymptomatic COVID-19 Virus (Coronavirus) by PCR Nose     Status: Normal    Specimen: Nose; Swab   Result Value Ref Range    SARS CoV2 PCR Negative Negative    Narrative    Testing was performed using the Aptima SARS-CoV-2 Assay on the  [employer] Instrument System. Additional information about this  Emergency Use Authorization (EUA) assay can be found via the Lab  Guide. This test should be ordered for the detection of SARS-CoV-2 in  individuals who meet SARS-CoV-2 clinical and/or epidemiological  criteria. Test performance is unknown in asymptomatic patients. This  test is for in vitro diagnostic use under the FDA EUA for  laboratories certified under CLIA to perform high complexity testing.  This test has not been FDA cleared or approved. A negative result  does not rule out the presence of PCR inhibitors in the specimen or  target RNA in concentration below the limit of detection for the  assay. The possibility of a false negative should be considered if  the patient's recent exposure or clinical presentation suggests  COVID-19. This test was validated by the [facility]. This laboratory is certified under  the Clinical Laboratory Improvement Amendments of 1988 (CLIA-88) as  qualified to perform high complexity laboratory testing.     Medications - No data to display  Labs Ordered and Resulted from Time of ED Arrival to Time of ED Departure - No data to display  No orders to display              Assessment & Plan    Is an 88-year-old male with a history of B-cell lymphoma and previous DVT who presents due to right lower extremity DVT that was found on outpatient imaging.  He has right lower extremity swelling has been ongoing for an unknown determined amount of time.  No associated pain with this.  Patient was found to  have DVT and was sent to this facility.  On exam he has some right lower extremity edema.  Patient has tolerated rivaroxaban in the past for previous DVT.  We will restart patient on his medication after discussion of risks and signs and symptoms to monitor.  We will discharge with return precautions.    I have reviewed the nursing notes. I have reviewed the findings, diagnosis, plan and need for follow up with the patient.    New Prescriptions    No medications on file       Final diagnoses:   None       Jose Wayne DO  [facility] EMERGENCY DEPARTMENT  11/9/2023     Jose Wayne DO  11/09/23 221

## 2023-11-10 NOTE — PROGRESS NOTES
Clinic Care Coordination Contact  [facility]/Voicemail    Clinical Data: Care Coordinator Outreach    Outreach Documentation Number of Outreach Attempt   11/10/2023  11:17 AM 1     Situation: Patient chart reviewed by care coordinator.    Background: Patient identified via recently discharged list. for having DVT at ED visit on 11/9 and presenting with numbness and tingling of left arm at PT appointment.     Assessment: RN called patient in regards to following up on patient's ED visit for DVT as well as PT visit for symptoms patient is having. RN encouraged patient to make an appointment in clinic to be evaluated and to make sure things are going okay on new medication prescribed.     Left message on patient's voicemail with call back information and requested return call.    Plan: Care Coordinator will try to reach patient again in 1-2 business days.    TIFF PARSON RN on 11/10/2023 at 11:17 AM

## 2023-11-14 ENCOUNTER — VIRTUAL VISIT (OUTPATIENT)
Dept: INTERNAL MEDICINE | Facility: CLINIC | Age: 88
End: 2023-11-14
Payer: MEDICARE

## 2023-11-14 ENCOUNTER — PATIENT OUTREACH (OUTPATIENT)
Dept: ONCOLOGY | Facility: CLINIC | Age: 88
End: 2023-11-14

## 2023-11-14 DIAGNOSIS — I89.0 LYMPHEDEMA: Primary | ICD-10-CM

## 2023-11-14 DIAGNOSIS — I82.4Y1 ACUTE DEEP VEIN THROMBOSIS (DVT) OF PROXIMAL VEIN OF RIGHT LOWER EXTREMITY (H): ICD-10-CM

## 2023-11-14 PROCEDURE — 99213 OFFICE O/P EST LOW 20 MIN: CPT | Mod: 95 | Performed by: INTERNAL MEDICINE

## 2023-11-14 NOTE — PROGRESS NOTES
"Virtual Visit Details    Type of service:  Video Visit   Video Start Time: {video visit start/end time for provider to select:689184}  Video End Time:{video visit start/end time for provider to select:735546}    Originating Location (pt. Location): {video visit patient location:818694::\"Home\"}  {PROVIDER LOCATION On-site should be selected for visits conducted from your clinic location or adjoining [facility] hospital, academic office, or other nearby [facility] building. Off-site should be selected for all other provider locations, including home:131063}  Distant Location (provider location):  {virtual location provider:682360}  Platform used for Video Visit: {Virtual Visit Platforms:148254::\"[employer]\"}  "

## 2023-11-14 NOTE — NURSING NOTE
Is the patient currently in the state of [place]? YES    Visit mode:VIDEO    If the visit is dropped, the patient can be reconnected by: VIDEO VISIT: Send to e-mail at: [email or web address]    Will anyone else be joining the visit? NO  (If patient encounters technical issues they should call [phone] :[phone])    How would you like to obtain your AVS? MyChart    Are changes needed to the allergy or medication list? Pt stated no changes to allergies and Pt stated no med changes    Reason for visit: RECHECK and Upcoming dental appt    Quick check in due to time, unable to complete vitals, qnrs, tob history.    Genoveva KESSLERF

## 2023-11-14 NOTE — PROGRESS NOTES
[facility]: Cancer Care Short Note                                                                                          Outgoing Call: RNCC called for a post hospital follow-up phone call with patient after his recent ED visit for lower extremity DVT. No answer, LVM on patient's home phone with clinic call back number and dates/times of upcoming oncology appointments.     Patient to follow up as scheduled at next appt.    NICOLE Valentino, RN  RN Care Coordinator   [phone]

## 2023-11-14 NOTE — PROGRESS NOTES
Juaquin is a 88 year old who is being evaluated via a billable video visit.      How would you like to obtain your AVS? MyChart  If the video visit is dropped, the invitation should be resent by: Text to cell phone: [phone]  Will anyone else be joining your video visit? No        Assessment & Plan     Lymphedema  Reviewed management of lower extremity edema with patient.  Advised on compression stockings.  Patient had difficulties putting compression stockings on.  Recommend evaluation by lymphedema clinic.  Referral was placed today.  - Lymphedema Therapy Referral; Future    Acute deep vein thrombosis (DVT) of proximal vein of right lower extremity (H)  Reviewed precautions for anticoagulation with patient.  He was advised that given low risk for bleeding with dental clinic, interruption of anticoagulation therapy is not required.  He does not need antibiotic prophylaxis given no joint replacement in his surgical history and no significant valvular heart disease.  He was encouraged to proceed with dental cleaning as currently scheduled.  - apixaban ANTICOAGULANT (ELIQUIS) 5 MG tablet; Take 1 tablet (5 mg) by mouth 2 times daily      I spent a total of 26 minutes on the day of the visit.   Time spent by me doing chart review, history and exam, documentation and further activities per the note           No follow-ups on file.    Klarissa Donnelly MD  [facility] INTERNAL MEDICINE [facility]   Juaquin is a 88 year old, presenting for the following health issues:  RECHECK and Upcoming dental appt      HPI     Patient reports that he is currently on chemotherapy for lymphoma. He reports that he was recently diagnosed with a DVT in the right leg. He was seen in the ER and was started on Eliquis. He has a dentist visit on Thursday for dental cleaning. He is wondering if he needs to stop the medication prior to her dental procedure. He is also wondering if he needs antibiotics prior to the  procedure.   He is also worried about bruising and swelling in his left foot. He has tried using compression stocking, however, he was not able to put them on.       Review of Systems   Constitutional, HEENT, cardiovascular, pulmonary, GI, , musculoskeletal, neuro, skin, endocrine and psych systems are negative, except as otherwise noted.      Objective           Vitals:  No vitals were obtained today due to virtual visit.    Physical Exam   GENERAL: Healthy, alert and no distress  EYES: Eyes grossly normal to inspection.  No discharge or erythema, or obvious scleral/conjunctival abnormalities.  RESP: No audible wheeze, cough, or visible cyanosis.  No visible retractions or increased work of breathing.    SKIN: Visible skin clear. No significant rash, abnormal pigmentation or lesions.  NEURO: Cranial nerves grossly intact.  Mentation and speech appropriate for age.  PSYCH: Mentation appears normal, affect normal/bright, judgement and insight intact, normal speech and appearance well-groomed.                Video-Visit Details    Type of service:  Video Visit   Video Start Time: 9:30 AM  Video End Time:9:55 AM    Originating Location (pt. Location): Home    Distant Location (provider location):  Off-site  Platform used for Video Visit: [address]

## 2023-11-14 NOTE — PATIENT INSTRUCTIONS
Thank you for visiting the Primary Care Center today at the [facility]! The following is some information about our clinic:     Primary Care Center Frequently-Asked Questions    (1) How do I schedule appointments at the [facility]?     Primary Care--to schedule or make changes to an existing appointment, please call our primary care line at [phone].    Labs--to schedule a lab appointment at the [facility] you can use [employer] or call [phone]. If you have a [place] location that is closer to home, you can reach out to that location for scheduling options.     Imaging--if you need to schedule a CT, X-ray, MRI, ultrasound, or other imaging study you can call [phone] to schedule at the [facility] or any other [facility] imaging location.     Referrals--if a referral to another specialty was ordered you can expect a phone call from their scheduling team. If you have not heard from them in a week, please call us or send us a [employer] message to check the status or get a scheduling number. Please note that this only applies to internal [facility] referrals. If the referral is external you would need to contact their office for scheduling.     (2) I have a question about my visit, who do I contact?     You can call us at the primary care line at [phone] to ask questions about your visit. You can also send a secure message through [employer], which is reviewed by clinic staff. Please note that [employer] messages have a twenty-four to forty-eight business hour turnaround time and should not be used for urgent concerns.    (3) How will I get the results of my tests?    If you are signed up for [employer] all tests will be released to you within twenty-four hours of resulting. Please allow three to five days for your doctor to review your results and place a note interpreting the results. If you do not have [employer] you will receive your  results through mail seven to ten business days following the return of the tests. Please note that if there should be any urgent or concerning results that your doctor or their registered nurse will reach out to you the same day as the tests come back. If you have follow up questions about your results or would like to discuss the results in detail please schedule a follow up with your provider either in person or virtually.     (4) How do I get refills of my prescriptions?     You should always first contact your pharmacy for refills of your medications. If submitting a refill request on [employer], please be sure to submit the request only once--repeat requests can cause delays in refill. If you are requesting a NEW medication or a medication related to new symptoms you will need to schedule an appointment with a provider prior to approval. Please note: Routine medication refills have up to one to three business day turnaround whereas controlled substances refills have up to five to seven business day turnaround.    (5) I have new symptoms, what do I do?     If you are having an immediate medical emergency, you should dial 911 for assistance.   For anything urgent that needs to be seen within a few hours to one day you should visit a local urgent care for assistance.  For non-urgent symptoms that need to be seen within a few days to a week you can schedule with an available provider in primary care by going to [employer] or calling [phone].   If you are not sure how serious your symptoms are or you would like to receive medical advice you can always call [phone] to speak with a triage nurse.

## 2023-11-17 ENCOUNTER — THERAPY VISIT (OUTPATIENT)
Dept: PHYSICAL THERAPY | Facility: CLINIC | Age: 88
End: 2023-11-17
Payer: MEDICARE

## 2023-11-17 DIAGNOSIS — R20.2 NUMBNESS AND TINGLING IN LEFT ARM: Primary | ICD-10-CM

## 2023-11-17 DIAGNOSIS — R20.0 NUMBNESS AND TINGLING IN LEFT ARM: Primary | ICD-10-CM

## 2023-11-17 PROCEDURE — 97110 THERAPEUTIC EXERCISES: CPT | Mod: GP

## 2023-11-17 PROCEDURE — 97112 NEUROMUSCULAR REEDUCATION: CPT | Mod: GP

## 2023-11-30 ENCOUNTER — ONCOLOGY VISIT (OUTPATIENT)
Dept: ONCOLOGY | Facility: CLINIC | Age: 88
End: 2023-11-30
Attending: STUDENT IN AN ORGANIZED HEALTH CARE EDUCATION/TRAINING PROGRAM
Payer: MEDICARE

## 2023-11-30 ENCOUNTER — APPOINTMENT (OUTPATIENT)
Dept: LAB | Facility: CLINIC | Age: 88
End: 2023-11-30
Attending: STUDENT IN AN ORGANIZED HEALTH CARE EDUCATION/TRAINING PROGRAM
Payer: MEDICARE

## 2023-11-30 VITALS
OXYGEN SATURATION: 98 % | DIASTOLIC BLOOD PRESSURE: 61 MMHG | SYSTOLIC BLOOD PRESSURE: 105 MMHG | TEMPERATURE: 98 F | WEIGHT: 163.3 LBS | RESPIRATION RATE: 18 BRPM | HEART RATE: 103 BPM | BODY MASS INDEX: 23.91 KG/M2

## 2023-11-30 DIAGNOSIS — Z51.11 ENCOUNTER FOR ANTINEOPLASTIC CHEMOTHERAPY: ICD-10-CM

## 2023-11-30 DIAGNOSIS — C83.398 DIFFUSE LARGE B-CELL LYMPHOMA OF EXTRANODAL SITE: Primary | ICD-10-CM

## 2023-11-30 DIAGNOSIS — C83.398 DIFFUSE LARGE B-CELL LYMPHOMA OF EXTRANODAL SITE: ICD-10-CM

## 2023-11-30 DIAGNOSIS — Z76.89 PREVENTION OF CHEMOTHERAPY-INDUCED NEUTROPENIA: ICD-10-CM

## 2023-11-30 DIAGNOSIS — Z76.89 PREVENTION OF CHEMOTHERAPY-INDUCED NEUTROPENIA: Primary | ICD-10-CM

## 2023-11-30 LAB
ALBUMIN SERPL BCG-MCNC: 3.8 G/DL (ref 3.5–5.2)
ALP SERPL-CCNC: 105 U/L (ref 40–150)
ALT SERPL W P-5'-P-CCNC: 9 U/L (ref 0–70)
ANION GAP SERPL CALCULATED.3IONS-SCNC: 8 MMOL/L (ref 7–15)
AST SERPL W P-5'-P-CCNC: 17 U/L (ref 0–45)
BASOPHILS # BLD AUTO: 0.2 10E3/UL (ref 0–0.2)
BASOPHILS NFR BLD AUTO: 2 %
BILIRUB SERPL-MCNC: 0.3 MG/DL
BUN SERPL-MCNC: 20 MG/DL (ref 8–23)
CALCIUM SERPL-MCNC: 9.5 MG/DL (ref 8.8–10.2)
CHLORIDE SERPL-SCNC: 104 MMOL/L (ref 98–107)
CREAT SERPL-MCNC: 0.88 MG/DL (ref 0.67–1.17)
DEPRECATED HCO3 PLAS-SCNC: 28 MMOL/L (ref 22–29)
EGFRCR SERPLBLD CKD-EPI 2021: 83 ML/MIN/1.73M2
EOSINOPHIL # BLD AUTO: 0.1 10E3/UL (ref 0–0.7)
EOSINOPHIL NFR BLD AUTO: 1 %
ERYTHROCYTE [DISTWIDTH] IN BLOOD BY AUTOMATED COUNT: 16.9 % (ref 10–15)
GLUCOSE SERPL-MCNC: 80 MG/DL (ref 70–99)
HCT VFR BLD AUTO: 33.7 % (ref 40–53)
HGB BLD-MCNC: 10.8 G/DL (ref 13.3–17.7)
IMM GRANULOCYTES # BLD: 0 10E3/UL
IMM GRANULOCYTES NFR BLD: 0 %
LDH SERPL L TO P-CCNC: 191 U/L (ref 0–250)
LYMPHOCYTES # BLD AUTO: 0.8 10E3/UL (ref 0.8–5.3)
LYMPHOCYTES NFR BLD AUTO: 11 %
MCH RBC QN AUTO: 31.3 PG (ref 26.5–33)
MCHC RBC AUTO-ENTMCNC: 32 G/DL (ref 31.5–36.5)
MCV RBC AUTO: 98 FL (ref 78–100)
MONOCYTES # BLD AUTO: 0.8 10E3/UL (ref 0–1.3)
MONOCYTES NFR BLD AUTO: 11 %
NEUTROPHILS # BLD AUTO: 6 10E3/UL (ref 1.6–8.3)
NEUTROPHILS NFR BLD AUTO: 75 %
NRBC # BLD AUTO: 0 10E3/UL
NRBC BLD AUTO-RTO: 0 /100
PLATELET # BLD AUTO: 293 10E3/UL (ref 150–450)
POTASSIUM SERPL-SCNC: 4.8 MMOL/L (ref 3.4–5.3)
PROT SERPL-MCNC: 6.6 G/DL (ref 6.4–8.3)
RBC # BLD AUTO: 3.45 10E6/UL (ref 4.4–5.9)
SODIUM SERPL-SCNC: 140 MMOL/L (ref 135–145)
WBC # BLD AUTO: 7.9 10E3/UL (ref 4–11)

## 2023-11-30 PROCEDURE — 96372 THER/PROPH/DIAG INJ SC/IM: CPT | Performed by: STUDENT IN AN ORGANIZED HEALTH CARE EDUCATION/TRAINING PROGRAM

## 2023-11-30 PROCEDURE — 250N000011 HC RX IP 250 OP 636: Mod: JZ | Performed by: STUDENT IN AN ORGANIZED HEALTH CARE EDUCATION/TRAINING PROGRAM

## 2023-11-30 PROCEDURE — 250N000013 HC RX MED GY IP 250 OP 250 PS 637: Performed by: STUDENT IN AN ORGANIZED HEALTH CARE EDUCATION/TRAINING PROGRAM

## 2023-11-30 PROCEDURE — 99214 OFFICE O/P EST MOD 30 MIN: CPT | Performed by: STUDENT IN AN ORGANIZED HEALTH CARE EDUCATION/TRAINING PROGRAM

## 2023-11-30 PROCEDURE — 85004 AUTOMATED DIFF WBC COUNT: CPT | Performed by: STUDENT IN AN ORGANIZED HEALTH CARE EDUCATION/TRAINING PROGRAM

## 2023-11-30 PROCEDURE — 96411 CHEMO IV PUSH ADDL DRUG: CPT

## 2023-11-30 PROCEDURE — 96375 TX/PRO/DX INJ NEW DRUG ADDON: CPT

## 2023-11-30 PROCEDURE — 96413 CHEMO IV INFUSION 1 HR: CPT

## 2023-11-30 PROCEDURE — 96417 CHEMO IV INFUS EACH ADDL SEQ: CPT

## 2023-11-30 PROCEDURE — 96367 TX/PROPH/DG ADDL SEQ IV INF: CPT

## 2023-11-30 PROCEDURE — 82374 ASSAY BLOOD CARBON DIOXIDE: CPT | Performed by: STUDENT IN AN ORGANIZED HEALTH CARE EDUCATION/TRAINING PROGRAM

## 2023-11-30 PROCEDURE — G0463 HOSPITAL OUTPT CLINIC VISIT: HCPCS | Mod: 25 | Performed by: STUDENT IN AN ORGANIZED HEALTH CARE EDUCATION/TRAINING PROGRAM

## 2023-11-30 PROCEDURE — 96377 APPLICATON ON-BODY INJECTOR: CPT | Mod: 59 | Performed by: STUDENT IN AN ORGANIZED HEALTH CARE EDUCATION/TRAINING PROGRAM

## 2023-11-30 PROCEDURE — 83615 LACTATE (LD) (LDH) ENZYME: CPT

## 2023-11-30 PROCEDURE — 36415 COLL VENOUS BLD VENIPUNCTURE: CPT | Performed by: STUDENT IN AN ORGANIZED HEALTH CARE EDUCATION/TRAINING PROGRAM

## 2023-11-30 PROCEDURE — 96415 CHEMO IV INFUSION ADDL HR: CPT

## 2023-11-30 PROCEDURE — 258N000003 HC RX IP 258 OP 636: Performed by: STUDENT IN AN ORGANIZED HEALTH CARE EDUCATION/TRAINING PROGRAM

## 2023-11-30 RX ORDER — METHYLPREDNISOLONE SODIUM SUCCINATE 125 MG/2ML
125 INJECTION, POWDER, LYOPHILIZED, FOR SOLUTION INTRAMUSCULAR; INTRAVENOUS
Status: CANCELLED
Start: 2023-11-30

## 2023-11-30 RX ORDER — ACETAMINOPHEN 325 MG/1
650 TABLET ORAL ONCE
Status: COMPLETED | OUTPATIENT
Start: 2023-11-30 | End: 2023-11-30

## 2023-11-30 RX ORDER — DIPHENHYDRAMINE HCL 25 MG
50 CAPSULE ORAL ONCE
Status: CANCELLED
Start: 2023-11-30

## 2023-11-30 RX ORDER — MEPERIDINE HYDROCHLORIDE 25 MG/ML
25 INJECTION INTRAMUSCULAR; INTRAVENOUS; SUBCUTANEOUS EVERY 30 MIN PRN
Status: CANCELLED | OUTPATIENT
Start: 2023-11-30

## 2023-11-30 RX ORDER — PALONOSETRON 0.05 MG/ML
0.25 INJECTION, SOLUTION INTRAVENOUS ONCE
Status: CANCELLED
Start: 2023-11-30

## 2023-11-30 RX ORDER — HEPARIN SODIUM (PORCINE) LOCK FLUSH IV SOLN 100 UNIT/ML 100 UNIT/ML
5 SOLUTION INTRAVENOUS
Status: DISCONTINUED | OUTPATIENT
Start: 2023-11-30 | End: 2023-11-30 | Stop reason: HOSPADM

## 2023-11-30 RX ORDER — EPINEPHRINE 1 MG/ML
0.3 INJECTION, SOLUTION INTRAMUSCULAR; SUBCUTANEOUS EVERY 5 MIN PRN
Status: CANCELLED | OUTPATIENT
Start: 2023-11-30

## 2023-11-30 RX ORDER — DIPHENHYDRAMINE HCL 25 MG
50 CAPSULE ORAL ONCE
Status: COMPLETED | OUTPATIENT
Start: 2023-11-30 | End: 2023-11-30

## 2023-11-30 RX ORDER — HEPARIN SODIUM (PORCINE) LOCK FLUSH IV SOLN 100 UNIT/ML 100 UNIT/ML
5 SOLUTION INTRAVENOUS ONCE
Status: COMPLETED | OUTPATIENT
Start: 2023-11-30 | End: 2023-11-30

## 2023-11-30 RX ORDER — PALONOSETRON 0.05 MG/ML
0.25 INJECTION, SOLUTION INTRAVENOUS ONCE
Status: COMPLETED | OUTPATIENT
Start: 2023-11-30 | End: 2023-11-30

## 2023-11-30 RX ORDER — HEPARIN SODIUM,PORCINE 10 UNIT/ML
5-20 VIAL (ML) INTRAVENOUS DAILY PRN
Status: CANCELLED | OUTPATIENT
Start: 2023-11-30

## 2023-11-30 RX ORDER — MEPERIDINE HYDROCHLORIDE 25 MG/ML
25 INJECTION INTRAMUSCULAR; INTRAVENOUS; SUBCUTANEOUS
Status: CANCELLED
Start: 2023-11-30

## 2023-11-30 RX ORDER — HEPARIN SODIUM,PORCINE 10 UNIT/ML
5-20 VIAL (ML) INTRAVENOUS DAILY PRN
Status: DISCONTINUED | OUTPATIENT
Start: 2023-11-30 | End: 2023-11-30 | Stop reason: HOSPADM

## 2023-11-30 RX ORDER — LORAZEPAM 2 MG/ML
0.5 INJECTION INTRAMUSCULAR EVERY 4 HOURS PRN
Status: CANCELLED | OUTPATIENT
Start: 2023-11-30

## 2023-11-30 RX ORDER — HEPARIN SODIUM (PORCINE) LOCK FLUSH IV SOLN 100 UNIT/ML 100 UNIT/ML
5 SOLUTION INTRAVENOUS
Status: CANCELLED | OUTPATIENT
Start: 2023-11-30

## 2023-11-30 RX ORDER — DOXORUBICIN HYDROCHLORIDE 2 MG/ML
25 INJECTION, SOLUTION INTRAVENOUS ONCE
Status: COMPLETED | OUTPATIENT
Start: 2023-11-30 | End: 2023-11-30

## 2023-11-30 RX ORDER — DIPHENHYDRAMINE HYDROCHLORIDE 50 MG/ML
50 INJECTION INTRAMUSCULAR; INTRAVENOUS
Status: CANCELLED
Start: 2023-11-30

## 2023-11-30 RX ORDER — DOXORUBICIN HYDROCHLORIDE 2 MG/ML
25 INJECTION, SOLUTION INTRAVENOUS ONCE
Status: CANCELLED | OUTPATIENT
Start: 2023-11-30

## 2023-11-30 RX ORDER — ACETAMINOPHEN 325 MG/1
650 TABLET ORAL ONCE
Status: CANCELLED
Start: 2023-11-30

## 2023-11-30 RX ORDER — ALBUTEROL SULFATE 90 UG/1
1-2 AEROSOL, METERED RESPIRATORY (INHALATION)
Status: CANCELLED
Start: 2023-11-30

## 2023-11-30 RX ORDER — ALBUTEROL SULFATE 0.83 MG/ML
2.5 SOLUTION RESPIRATORY (INHALATION)
Status: CANCELLED | OUTPATIENT
Start: 2023-11-30

## 2023-11-30 RX ADMIN — FOSAPREPITANT DIMEGLUMINE 150 MG: 150 INJECTION, POWDER, LYOPHILIZED, FOR SOLUTION INTRAVENOUS at 13:57

## 2023-11-30 RX ADMIN — PEGFILGRASTIM 6 MG: KIT SUBCUTANEOUS at 15:15

## 2023-11-30 RX ADMIN — ACETAMINOPHEN 650 MG: 325 TABLET ORAL at 13:50

## 2023-11-30 RX ADMIN — Medication 5 ML: at 15:31

## 2023-11-30 RX ADMIN — VINCRISTINE SULFATE 1 MG: 1 INJECTION, SOLUTION INTRAVENOUS at 14:37

## 2023-11-30 RX ADMIN — DOXORUBICIN HYDROCHLORIDE 45 MG: 2 INJECTION, SOLUTION INTRAVENOUS at 14:27

## 2023-11-30 RX ADMIN — DIPHENHYDRAMINE HYDROCHLORIDE 50 MG: 25 CAPSULE ORAL at 13:50

## 2023-11-30 RX ADMIN — SODIUM CHLORIDE 250 ML: 9 INJECTION, SOLUTION INTRAVENOUS at 13:52

## 2023-11-30 RX ADMIN — RITUXIMAB-ABBS 700 MG: 10 INJECTION, SOLUTION INTRAVENOUS at 15:09

## 2023-11-30 RX ADMIN — PALONOSETRON HYDROCHLORIDE 0.25 MG: 0.25 INJECTION INTRAVENOUS at 14:24

## 2023-11-30 RX ADMIN — FAMOTIDINE 20 MG: 10 INJECTION, SOLUTION INTRAVENOUS at 14:24

## 2023-11-30 RX ADMIN — CYCLOPHOSPHAMIDE 750 MG: 1 INJECTION, POWDER, FOR SOLUTION INTRAVENOUS; ORAL at 14:41

## 2023-11-30 RX ADMIN — Medication 5 ML: at 11:55

## 2023-11-30 ASSESSMENT — PAIN SCALES - GENERAL: PAINLEVEL: NO PAIN (0)

## 2023-11-30 NOTE — PROGRESS NOTES
Infusion Nursing Note:  Tom Saini presents today for C5D1 C-[id number]-Ifspn Neulasta.    Patient seen by provider today: Yes: Dr Buitrago   present during visit today: Not Applicable.    Note:  Pt saw provider prior to infusion, ok for treatment.    PRN Pepcid given prior to infusion.    Rituxan Rates:  100cc-30min  200cc-30min  300cc-30min  400cc for the rest of the infusion.    Intravenous Access:  Implanted Port.    Treatment Conditions:  ECHO/MUGA completed 8/10  EF 55-60%.   Latest Reference Range & Units 11/30/23 12:02   Sodium 135 - 145 mmol/L 140   Potassium 3.4 - 5.3 mmol/L 4.8   Chloride 98 - 107 mmol/L 104   Carbon Dioxide (CO2) 22 - 29 mmol/L 28   Urea Nitrogen 8.0 - 23.0 mg/dL 20.0   Creatinine 0.67 - 1.17 mg/dL 0.88   GFR Estimate >60 mL/min/1.73m2 83   Calcium 8.8 - 10.2 mg/dL 9.5   Anion Gap 7 - 15 mmol/L 8   Albumin 3.5 - 5.2 g/dL 3.8   Protein Total 6.4 - 8.3 g/dL 6.6   Alkaline Phosphatase 40 - 150 U/L 105   ALT 0 - 70 U/L 9   AST 0 - 45 U/L 17   Bilirubin Total <=1.2 mg/dL 0.3   Glucose 70 - 99 mg/dL 80   Lactate Dehydrogenase 0 - 250 U/L 191   WBC 4.0 - 11.0 10e3/uL 7.9   Hemoglobin 13.3 - 17.7 g/dL 10.8 (L)   Hematocrit 40.0 - 53.0 % 33.7 (L)   Platelet Count 150 - 450 10e3/uL 293   RBC Count 4.40 - 5.90 10e6/uL 3.45 (L)   MCV 78 - 100 fL 98   MCH 26.5 - 33.0 pg 31.3   MCHC 31.5 - 36.5 g/dL 32.0   RDW 10.0 - 15.0 % 16.9 (H)   % Neutrophils % 75   % Lymphocytes % 11   % Monocytes % 11   % Eosinophils % 1   % Basophils % 2   Absolute Basophils 0.0 - 0.2 10e3/uL 0.2   Absolute Eosinophils 0.0 - 0.7 10e3/uL 0.1   Absolute Immature Granulocytes <=0.4 10e3/uL 0.0   Absolute Lymphocytes 0.8 - 5.3 10e3/uL 0.8   Absolute Monocytes 0.0 - 1.3 10e3/uL 0.8   % Immature Granulocytes % 0   Absolute Neutrophils 1.6 - 8.3 10e3/uL 6.0   Absolute NRBCs 10e3/uL 0.0   NRBCs per 100 WBC <1 /100 0     Post Infusion Assessment:  Patient tolerated infusion without incident.  Blood return noted pre and post  infusion.  Blood return noted during Doxorubicin administration every 5 ml over 10 minutes per MD order.  Blood return noted during Vincristine administration per protocol.  Site patent and intact, free from redness, edema or discomfort.  No evidence of extravasations.  Access discontinued per protocol.   Neulasta Onpro On-Body injector applied to R arm at 1515.  Writer discussed Neulasta injection would start on 12/1 at 1815, approximately 27 hours after application applied today.  Written and Verbal instruction reviewed with patient.  Pt instructed when the dose delivery starts, it will take about 45 minutes to complete.  Pt aware Neulasta Onpro On-Body should have green flashing light and to call triage or on-call MD if injector flashes red or appears to be leaking. Pt aware to keep Onpro On-Body Neulasta 4 inches away from electrical equipment and to avoid showering 4 hours prior to injection.           Discharge Plan:   Prescription refills given for Prednisone.  Discharge instructions reviewed with: Patient and Family.  Patient and/or family verbalized understanding of discharge instructions and all questions answered.  AVS to patient via [employer].  Patient will return 12/21 for next appointment.   Patient discharged in stable condition accompanied by: self and wife.  Departure Mode: Ambulatory.    Stacey Andrews RN

## 2023-11-30 NOTE — NURSING NOTE
"Oncology Rooming Note    November 30, 2023 12:14 PM   Tom Saini is a 88 year old male who presents for:    Chief Complaint   Patient presents with    Port Draw     Labs drawn via port by RN. Vitals taken.    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    Weight as of this encounter: 74.1 kg (163 lb 4.8 oz). Body surface area is 1.9 meters squared.  No Pain (0) Comment: Data Unavailable   No LMP for male patient.  Allergies reviewed: Yes  Medications reviewed: Yes    Medications: Medication refills not needed today.  Pharmacy name entered into [facility]:    [id number] [place], [place] - [address] AT [facility] - [address] [place] [facility] - [address]    Clinical concerns:        Maame Bell              "

## 2023-11-30 NOTE — NURSING NOTE
"Chief Complaint   Patient presents with    Port Draw     Labs drawn via port by RN. Vitals taken.     Labs drawn via port by RN. Port accessed with 20G 3/4\" power needle. Flushed with NS and heparin. Pt tolerated well. Vitals taken. Pt checked in for next appointment.    Jolly Fernandez, RN  "

## 2023-11-30 NOTE — LETTER
11/30/2023         RE: Tom Saini  [address] [facility] [address]        Dear Colleague,    Thank you for referring your patient, Tom Saini, to the [facility]. Please see a copy of my visit note below.        Julee Reza is a 88 year old, presenting for the following health issues:  Port Draw (Labs drawn via port by RN. 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  He has had diarrhea with particular food for several years and recently was investigated for possible pancreatic insufficiency.  He also has a history of pancreatic pseudocyst.  On recent MRI done in June 2023 to follow-up on pancreatic pseudocyst showed a colonic mass.  Subsequent CT scan of chest abdomen pelvis showed Mild anemia, no cytopenias 7.9 x 6.9 x 5.6 cm large soft tissue mass in sigmoid colon obliterating the lumen and significant extramural soft tissue extension superiorly in the sigmoid mesocolon.  Enlarged bilateral inguinal lymph node, as few small prominent lymph nodes along superior rectal/inferior mesenteric artery distribution.  Diffuse main pancreatic duct dilatation with large intraductal calculus in the head was seen as well.  Multiple cystic lesions and dilated sidebranches were seen in the pancreas.  He underwent colonoscopy but due to obliterated lumen, colonoscopy failed.  Subsequently CT colonography was done which showed similar results to CT chest abdomen pelvis.  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  FISH showed Bcl-2 rearrangement but no MYC rearrangement.  Bcl-2 IgH rearrangement [translocation (14;18)] is consistent with transformation of DLBCL from follicular lymphoma.  Postoperative course was  complicated by postoperative nausea vomiting which resolved, single episode of suicidal ideation which resolved.  He was discharged to TCU.  He has been regaining his strength slowly, his performance status has improved to 1.  He is able to walk independently for short distances, can do basic activities of daily living himself.    PET scan revealed persistent hypermetabolic retroperitoneal lymph nodes, possibly reactive but lymphoma cannot be ruled out. ECHOcardiogram reveals EF >50%. LDH within normal limits. Overall IPI 2. Low-intermediate risk GCB DLBCL.    He  began treatment with RminiCHP. First cycle was split into prephase steroid+rituxmab followed by miniCHP. He tolerated treatment very well without any long term toxicities. PET scan after 3 cycles showed CR.      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 No fevers, chills, night sweats or weight loss, no lymphadenopathy. No pain.        Review of Systems         Objective   /61 (BP Location: Right arm, Patient Position: Sitting, Cuff Size: Adult Regular)   Pulse 103   Temp 98  F (36.7  C) (Oral)   Resp 18   Wt 74.1 kg (163 lb 4.8 oz)   SpO2 98%   BMI 23.91 kg/m    Body mass index is 23.91 kg/m .  Physical Exam   GENERAL:  Alert oriented well nourished  HEAD: normocephalic atraumatic  SKIN:  no rash, hives, other lesions.  LYMPHATIC: no abnormal lymph nodes palable in cervical, axillary, supraclavicular or inguinal area.  RESP:  No rales or rhonchi, breath sounds bilaterally equal and vesicular  CV:  No tachycardia, S1 S2 normal No murmur.  GI:  Abdomen soft nontender no hepato or splenomegaly  MUSCULOSKELETAL:  No visible joint redness or  swelling.  NEURO:  No gross weakness gait normal  PSYCH: pleasant affect    Labs: I personally reviewed complete blood count, differential, renal function test, liver function tests LDH.  No critical cytopenias, LFTs within normal limits, renal function test within normal limits and LDH is normal as well. Moderate chemotherapy induced anemia.    Imaging: I personally reviewed PETCT neck/chest/abdomen/pelvis and discussed with the patient.  Complete response to treatment so far. Small mediastinal mildly hypermetabolic lymph node which is non enlarged is favored reactive.    Assessment and Plan:    1) Diffuse large B cell lymphoma:  - He is CR after 3 cycles. We will continue treatment for total 6 cycles since he is receiving reduced dose treatment.  - Continue cycle 5 and 6 and PET scan after cycle 6.  - Return visit with me after cycle 6. To review PET scan results.    Total time spent on date of service in review of medical records, review of labs, history taking, physical exam, discussion of assessment and plan, counseling and patient education is 30 minutes.    Holly Buitrago MD  Attending Physician  Pager [phone]

## 2023-12-01 ENCOUNTER — THERAPY VISIT (OUTPATIENT)
Dept: PHYSICAL THERAPY | Facility: CLINIC | Age: 88
End: 2023-12-01
Attending: INTERNAL MEDICINE
Payer: MEDICARE

## 2023-12-01 DIAGNOSIS — I89.0 LYMPHEDEMA: ICD-10-CM

## 2023-12-01 PROCEDURE — 97535 SELF CARE MNGMENT TRAINING: CPT | Mod: GP | Performed by: PHYSICAL THERAPIST

## 2023-12-01 PROCEDURE — 97162 PT EVAL MOD COMPLEX 30 MIN: CPT | Mod: GP | Performed by: PHYSICAL THERAPIST

## 2023-12-01 NOTE — PATIENT INSTRUCTIONS
Leg Edema Management  1. Elevate yourlegs whenever possible including raising feet when sitting and   taking opportunity to lie down for short periods during the day if able.   2. Avoid long time standing or sitting. If standing try to sit and elevate, if sitting   get up to walk around for about 1 minute for every 30 minutes seated. Even   if you are in the recliner, try to get up every 30minutes to move  3. Drink lots of water to flush edema, about 8 glasses or more daily   throughout the day unless otherwise instructed by medical team.   4. Avoid excess salt in diet.   5. Try to eat protein with each meal and snack  6. Take all medications, especially diuretics as prescribed.   7. Avoid constriction of clothing or positioning   8. Heat and cold can increase swelling but causing more blood flow and fluid   filtering into tissues or conversely slowing the flow of lymph in the cold.   9. Take good care of your skin by keeping it clean and dry, use lotion, avoid   scratching  10.Signs of infection: redness, heat, fever, increase in swelling, pain. Call   your doctor or go to urgent care if signs are present  11.Exercise with movement and deep breathing to facilitate lymph flow. 150   minutes per week of aerobic exercise (walking, bike at the gym) for heart   health, even if just multiple short sessions

## 2023-12-01 NOTE — PROGRESS NOTES
Julee Reza is a 88 year old, presenting for the following health issues:  Port Draw (Labs drawn via port by RN. 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  He has had diarrhea with particular food for several years and recently was investigated for possible pancreatic insufficiency.  He also has a history of pancreatic pseudocyst.  On recent MRI done in June 2023 to follow-up on pancreatic pseudocyst showed a colonic mass.  Subsequent CT scan of chest abdomen pelvis showed Mild anemia, no cytopenias 7.9 x 6.9 x 5.6 cm large soft tissue mass in sigmoid colon obliterating the lumen and significant extramural soft tissue extension superiorly in the sigmoid mesocolon.  Enlarged bilateral inguinal lymph node, as few small prominent lymph nodes along superior rectal/inferior mesenteric artery distribution.  Diffuse main pancreatic duct dilatation with large intraductal calculus in the head was seen as well.  Multiple cystic lesions and dilated sidebranches were seen in the pancreas.  He underwent colonoscopy but due to obliterated lumen, colonoscopy failed.  Subsequently CT colonography was done which showed similar results to CT chest abdomen pelvis.  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  FISH showed Bcl-2 rearrangement but no MYC rearrangement.  Bcl-2 IgH rearrangement [translocation (14;18)] is consistent with transformation of DLBCL from follicular lymphoma.  Postoperative course was complicated by postoperative nausea vomiting which resolved, single episode of suicidal ideation which resolved.  He was discharged to TCU.  He has been regaining his strength slowly, his performance status has improved to 1.  He is able to walk independently  for short distances, can do basic activities of daily living himself.    PET scan revealed persistent hypermetabolic retroperitoneal lymph nodes, possibly reactive but lymphoma cannot be ruled out. ECHOcardiogram reveals EF >50%. LDH within normal limits. Overall IPI 2. Low-intermediate risk GCB DLBCL.    He  began treatment with RminiCHP. First cycle was split into prephase steroid+rituxmab followed by miniCHP. He tolerated treatment very well without any long term toxicities. PET scan after 3 cycles showed CR.      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 No fevers, chills, night sweats or weight loss, no lymphadenopathy. No pain.        Review of Systems         Objective    /61 (BP Location: Right arm, Patient Position: Sitting, Cuff Size: Adult Regular)   Pulse 103   Temp 98  F (36.7  C) (Oral)   Resp 18   Wt 74.1 kg (163 lb 4.8 oz)   SpO2 98%   BMI 23.91 kg/m    Body mass index is 23.91 kg/m .  Physical Exam   GENERAL:  Alert oriented well nourished  HEAD: normocephalic atraumatic  SKIN:  no rash, hives, other lesions.  LYMPHATIC: no abnormal lymph nodes palable in cervical, axillary, supraclavicular or inguinal area.  RESP:  No rales or rhonchi, breath sounds bilaterally equal and vesicular  CV:  No tachycardia, S1 S2 normal No murmur.  GI:  Abdomen soft nontender no hepato or splenomegaly  MUSCULOSKELETAL:  No visible joint redness or swelling.  NEURO:  No gross weakness gait normal  PSYCH: pleasant affect    Labs: I personally reviewed complete blood count, differential, renal function test, liver function tests LDH.  No critical cytopenias, LFTs within normal limits, renal function test  within normal limits and LDH is normal as well. Moderate chemotherapy induced anemia.    Imaging: I personally reviewed PETCT neck/chest/abdomen/pelvis and discussed with the patient.  Complete response to treatment so far. Small mediastinal mildly hypermetabolic lymph node which is non enlarged is favored reactive.    Assessment and Plan:    1) Diffuse large B cell lymphoma:  - He is CR after 3 cycles. We will continue treatment for total 6 cycles since he is receiving reduced dose treatment.  - Continue cycle 5 and 6 and PET scan after cycle 6.  - Return visit with me after cycle 6. To review PET scan results.    Total time spent on date of service in review of medical records, review of labs, history taking, physical exam, discussion of assessment and plan, counseling and patient education is 30 minutes.    Holly Buitrago MD  Attending Physician  Pager [phone]

## 2023-12-01 NOTE — PROGRESS NOTES
"PHYSICAL THERAPY EVALUATION  Type of Visit: Evaluation    See electronic medical record for Abuse and Falls Screening details.    Subjective       Presenting condition or subjective complaint:  legs  Date of onset: 12/01/18    Relevant medical history:   Lymphoma diagnosed July 2023  and pt so far handling treatment remarkably  well, R Popliteal DVT 11/10/23 started Eliquis 11/14/23  Dates & types of surgery:  colon resection July 2023    Prior diagnostic imaging/testing results: US 11/10/23      Prior therapy history for the same diagnosis, illness or injury:  No      Prior Level of Function  Transfers: Independent  Ambulation: Independent  ADL: Independent, except a few things due to L hand weakness  IADL: Driving, Finances    Living Environment  Social support:  spouse   Type of home:   condo, no concern       Hobbies/Interests:  \"I am a computer grace\"    Patient goals for therapy:  find out what else to do about swelling     Pain assessment: Pain denied     Objective       EDEMA EVALUATION  Additional history:  Body part affected by edema:  legs  If cancer related, treatment:    If not cancer related, problems with veins or cause of swelling:    Distance able to walk:  > 2 blocks  Time able to stand:  >5min  Sensation problems in hands/feet:   L hand   Edema etiology: DVT, Insidious    FUNCTIONAL SCALES  Lower Extremity Functional Scale (score out of 80). A lower score indicates greater impairment:    Shoulder Pain and Disability Index (score out of 100).  A higher score indicates greater impairment:      Cognitive Status Examination  Orientation: Oriented to person, place and time   Level of Consciousness: Alert  Follows Commands and Answers Questions: 100% of the time  Personal Safety and Judgement: Intact  Memory: Intact    EDEMA  Skin Condition: Dryness, Intact, Pitting, Venous distention, R LE darker pinkish hue  Scar:   Capillary Refill:   Radial Pulse:   Dorsal Pedal Pulse:   Stemmer Sign: + on the " R  Ulceration:     GIRTH MEASUREMENTS: Refer to separate girth measurement flowsheet.     VOLUME LE  Right LE (mL) 3901.33    Left LE (mL) 3482.12    LE Volume Comparison RLE volume greater than LLE volume   % Difference 12%     RANGE OF MOTION: LE ROM WFL  STRENGTH:  L hand weakness limiting getting tight socks on potentially  POSTURE:  kyphosis, denies pain  PALPATION: unremarkable  ACTIVITIES OF DAILY LIVING: independent except for cutting R finger nails    Assessment & Plan   CLINICAL IMPRESSIONS  Medical Diagnosis: Lymphedema    Treatment Diagnosis: Lymphedema   Impression/Assessment: Patient is a 88 year old male with B LE, R>L edema complaints.  The following significant findings have been identified: Inflammation, Edema, and risk infection . These impairments put patient at risk of infection, wound and worsening condition that may affect mobility, independence, and overall health as compared to previous level of function.     Clinical Decision Making (Complexity):  Clinical Presentation: Evolving/Changing  Clinical Presentation Rationale: based on medical and personal factors listed in PT evaluation  Clinical Decision Making (Complexity): Moderate complexity    PLAN OF CARE  Treatment Interventions:  Interventions: Manual Therapy, Therapeutic Exercise, Self-Care/Home Management, Gradient Compression Bandaging    Long Term Goals     PT Goal 2  Goal Identifier: Volume  Goal Description: Pt will have 3% reduction in L LE volume and 5% reduction in the R LE volume for improved tissue integrity, decreased risk of infection, and improved fit of clothing  Target Date: 02/28/24  PT Goal 3  Goal Identifier: Maintenance  Goal Description: Pt will use compression garments as instructed AND home management tools/program for long term management of chronic condition to prevent tissue fibrosis, infection, wound and other secondary sequelae  Target Date: 02/28/24      Frequency of Treatment: 6 visits, 2x/mo  Duration of  Treatment: 90 days    Recommended Referrals to Other Professionals:  mary ellen plasencia  Education Assessment:   Learner/Method: Patient;No Barriers to Learning  Education Comments: plan, use of elastic stockinette    Risks and benefits of evaluation/treatment have been explained.   Patient/Family/caregiver agrees with Plan of Care.     Evaluation Time:     PT Eval, Moderate Complexity Minutes (52802): 15       Signing Clinician: Cynthia Gill, PT      [facility]                                                                                   OUTPATIENT PHYSICAL THERAPY      PLAN OF TREATMENT FOR OUTPATIENT REHABILITATION   Patient's Last Name, First Name, Tom Garcia YOB: 1935   Provider's Name   [facility]   Medical Record No.  [id number]     Onset Date: 12/01/18  Start of Care Date: 12/01/23     Medical Diagnosis:  Lymphedema      PT Treatment Diagnosis:  Lymphedema Plan of Treatment  Frequency/Duration: 6 visits, 2x/mo/ 90 days    Certification date from 12/01/23 to 02/28/24         See note for plan of treatment details and functional goals     Cynthia Gill, PT                         I CERTIFY THE NEED FOR THESE SERVICES FURNISHED UNDER        THIS PLAN OF TREATMENT AND WHILE UNDER MY CARE     (Physician attestation of this document indicates review and certification of the therapy plan).              Referring Provider:  Klarissa Donnelly    Initial Assessment  See Epic Evaluation- Start of Care Date: 12/01/23

## 2023-12-04 DIAGNOSIS — K86.89 PANCREATIC INSUFFICIENCY: ICD-10-CM

## 2023-12-06 ENCOUNTER — MYC REFILL (OUTPATIENT)
Dept: INTERNAL MEDICINE | Facility: CLINIC | Age: 88
End: 2023-12-06
Payer: MEDICARE

## 2023-12-06 DIAGNOSIS — K86.89 PANCREATIC INSUFFICIENCY: ICD-10-CM

## 2023-12-07 NOTE — TELEPHONE ENCOUNTER
lipase-protease-amylase (CREON) 46111-85865-037606 units CPEP per EC capsule     Last Written Prescription Date:  6/29/23  Last Fill Quantity: 200,   # refills: 1  Last Office Visit : 11/14/23  Future Office visit:  12/15/23    Routing refill request to provider for review/approval because:  Drug not on the [facility], [facility] or [facility] refill protocol     Patient  following up on medication request below, stated he was told it would be ready on 12/5/23, still not at the pharmacy, please call patient when this script is sent for refill. Thank you

## 2023-12-08 ENCOUNTER — TELEPHONE (OUTPATIENT)
Dept: INTERNAL MEDICINE | Facility: CLINIC | Age: 88
End: 2023-12-08
Payer: MEDICARE

## 2023-12-08 ENCOUNTER — TELEPHONE (OUTPATIENT)
Dept: PLASTIC SURGERY | Facility: CLINIC | Age: 88
End: 2023-12-08
Payer: MEDICARE

## 2023-12-08 PROBLEM — G56.22 ULNAR NEUROPATHY AT ELBOW OF LEFT UPPER EXTREMITY: Status: ACTIVE | Noted: 2023-10-31

## 2023-12-08 NOTE — TELEPHONE ENCOUNTER
RN Care Coordinator: Iraj Murillo; [phone]    Surgery is scheduled with Dr. Alfred  Date: 2/19/2024   Location: [facility]        H&P to be completed by: Primary Care team - patient instructed to schedule. Patient states they will schedule with Angeli Robertson MD     Post-op: 3/5/2024 at 9:20am with Dr. Alfred  Additional visits: N/A - no additional visits requested        Patient will receive a phone call from pre-admission nurses 1-2 days prior to surgery with arrival time and NPO instructions. Patient aware times are subject to change up until day before surgery.        Spoke with the patient and was able to confirm all scheduled information.       Patient questions/concerns: N/A       Surgery packet to be sent via US mail    __    Devi Mcknight, on 12/8/2023 at 1:31 PM  P: [phone]

## 2023-12-08 NOTE — TELEPHONE ENCOUNTER
The patient got an email from  about his Care plan which he has no clue about and wanted to discuss this with his PCC, please review and follow up thank you.

## 2023-12-15 ENCOUNTER — ANCILLARY PROCEDURE (OUTPATIENT)
Dept: ULTRASOUND IMAGING | Facility: CLINIC | Age: 88
End: 2023-12-15
Attending: INTERNAL MEDICINE
Payer: MEDICARE

## 2023-12-15 ENCOUNTER — OFFICE VISIT (OUTPATIENT)
Dept: INTERNAL MEDICINE | Facility: CLINIC | Age: 88
End: 2023-12-15
Payer: MEDICARE

## 2023-12-15 VITALS — SYSTOLIC BLOOD PRESSURE: 106 MMHG | DIASTOLIC BLOOD PRESSURE: 63 MMHG | OXYGEN SATURATION: 97 % | HEART RATE: 90 BPM

## 2023-12-15 DIAGNOSIS — Z29.11 NEED FOR VACCINATION AGAINST RESPIRATORY SYNCYTIAL VIRUS: ICD-10-CM

## 2023-12-15 DIAGNOSIS — I82.431 ACUTE DEEP VEIN THROMBOSIS (DVT) OF POPLITEAL VEIN OF RIGHT LOWER EXTREMITY (H): ICD-10-CM

## 2023-12-15 DIAGNOSIS — K59.00 CONSTIPATION, UNSPECIFIED CONSTIPATION TYPE: Primary | ICD-10-CM

## 2023-12-15 DIAGNOSIS — R60.0 BILATERAL LEG EDEMA: ICD-10-CM

## 2023-12-15 PROCEDURE — 99214 OFFICE O/P EST MOD 30 MIN: CPT | Performed by: INTERNAL MEDICINE

## 2023-12-15 PROCEDURE — 93970 EXTREMITY STUDY: CPT | Mod: GC | Performed by: RADIOLOGY

## 2023-12-15 RX ORDER — RESPIRATORY SYNCYTIAL VIRUS VACCINE 120MCG/0.5
0.5 KIT INTRAMUSCULAR ONCE
Qty: 1 EACH | Refills: 0 | Status: CANCELLED | OUTPATIENT
Start: 2023-12-15 | End: 2023-12-15

## 2023-12-15 NOTE — PROGRESS NOTES
Juaquin is a 88 year old that presents in clinic today for the following:     Chief Complaint   Patient presents with    Follow Up     Pt here for follow up and wants to discuss clot in leg           12/15/2023     9:18 AM   Additional Questions   Roomed by MJL, EMT       Screenings from encounters over the past 10 days    No data recorded       Gus Quintero at 9:23 AM on 12/15/2023      History of Present Illness:  Mr. Saini is a 88 year old male who presents for  Chief Complaint   Patient presents with    Follow Up     Pt here for follow up and wants to discuss clot in leg       Unfortunately, diagnosed with DLBL of colon, s/p sigmoid resection 7/23, has started chemo, will get 6 sessions mini-RCHOP, has CR thus far  Dealing with L hand neuropathy, scheduled for surgery  Diabnosed with R popliteal DVT 11/9  Previously 2019 had L clot which progressed on eliquis, switched to pradaxa, this was in contect of fall/pelvic fracture    Juaquin is able to eat more foods now that he's on Creon, was getting diarrhea, now resolved  Stools are a bit hard now    He had EMG of L arm 9/7 which showed 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  All health questionnaires were reviewed, verified and relevant information documented above.      Past Medical History:  Past Medical History:   Diagnosis Date    Aortic insufficiency     Erectile dysfunction     History of deep  venous thrombosis 2019    distal DVT with extension, completed 6 months of AC (failed eliquis)    History of pelvic fracture     Hyperlipidemia     Low back pain     disc disease s/p laminectomy in past    Macular degeneration     on Avastin, R eye    Pancreatic cyst 2018    next MR due     Radius fracture     after fall from wall       Active Meds:  Current Outpatient Medications   Medication    apixaban ANTICOAGULANT (ELIQUIS) 5 MG tablet    calcium carbonate (OS-DAVIDE) 1500 (600 Ca) MG tablet    Cholecalciferol (VITAMIN D-3) 25 MCG (1000 UT) CAPS    lipase-protease-amylase (CREON) [id number] units CPEP per EC capsule    multivitamin (OCUVITE) TABS    sodium chloride (LAXMI 128) 5 % ophthalmic ointment    acyclovir (ZOVIRAX) 400 MG tablet    acyclovir (ZOVIRAX) 400 MG tablet    ondansetron (ZOFRAN) 8 MG tablet    predniSONE (DELTASONE) 20 MG tablet    prochlorperazine (COMPAZINE) 10 MG tablet     No current facility-administered medications for this visit.        Allergies:  Reviewed, refer to EMR    Relevant Social History:  Social History     Tobacco Use    Smoking status: Former     Packs/day: 2.00     Years: 2.00     Additional pack years: 0.00     Total pack years: 4.00     Types: Cigarettes     Quit date: 1954     Years since quittin.5     Passive exposure: Past    Smokeless tobacco: Never   Substance Use Topics    Alcohol use: Yes     Alcohol/week: 3.0 standard drinks of alcohol     Types: 3 Glasses of wine per week     Comment: glass of wine with dinner    Drug use: Never        Physical Exam:  Vitals: /63 (BP Location: Right arm, Patient Position: Sitting, Cuff Size: Adult Regular)   Pulse 90   SpO2 97%   Constitutional: Alert, oriented, pleasant, no acute distress  Head: Normocephalic, atraumatic  Eyes: Extra-ocular movements intact, pupils equally round bilaterally, no scleral icterus  ENT: Oropharynx clear, moist mucus membranes, good dentition  Neck: Supple, no  lymphadenopathy  Cardiovascular: Regular rate and rhythm, no murmurs, rubs or gallops, peripheral pulses full/symmetric  Respiratory: Good air movement bilaterally, lungs clear, no wheezes/rales/rhonchi  Musculoskeletal: Bilat LE edema to knees, 1+, R>L, normal muscle tone, normal gait  Neurologic: Alert and oriented, cranial nerves 2-12 intact, grossly non-focal, L 4/5th fingers with limited flexion  Psychiatric: normal mentation, affect and mood      Diagnostics:  Labs reviewed in Epic          Assessment and Plan:  Juaquin was seen today for follow up.    Diagnoses and all orders for this visit:    Constipation, unspecified constipation type  Advised miralax/colace    Need for vaccination against respiratory syncytial virus  Advised at pharm    Bilateral leg edema, likely related to chemo, post-thrombotic syndrome  Will reach out to PT but I recommended he get compression stockings    Acute deep vein thrombosis (DVT) of popliteal vein of right lower extremity (H)  Given edema and extension of last clot on eliquis, will repeat US to ensure no extension  -     US Lower Extremity Venous Duplex Bilateral; Future    Other orders  -     REVIEW OF HEALTH MAINTENANCE PROTOCOL ORDERS            Angeli Robertson MD  Internal Medicine    >30 minutes spent today performing chart review, history and exam, counseling, care coordination, documentation and further activities as noted above exclusive of any procedures or EKG interpretation

## 2023-12-19 ENCOUNTER — OFFICE VISIT (OUTPATIENT)
Dept: ORTHOPEDICS | Facility: CLINIC | Age: 88
End: 2023-12-19
Payer: MEDICARE

## 2023-12-19 ENCOUNTER — MYC MEDICAL ADVICE (OUTPATIENT)
Dept: INTERNAL MEDICINE | Facility: CLINIC | Age: 88
End: 2023-12-19

## 2023-12-19 DIAGNOSIS — G56.22 ULNAR NEUROPATHY AT ELBOW OF LEFT UPPER EXTREMITY: Primary | ICD-10-CM

## 2023-12-19 DIAGNOSIS — I82.4Y1 ACUTE DEEP VEIN THROMBOSIS (DVT) OF PROXIMAL VEIN OF RIGHT LOWER EXTREMITY (H): Primary | ICD-10-CM

## 2023-12-19 PROCEDURE — 99213 OFFICE O/P EST LOW 20 MIN: CPT | Performed by: STUDENT IN AN ORGANIZED HEALTH CARE EDUCATION/TRAINING PROGRAM

## 2023-12-19 NOTE — LETTER
12/19/2023         RE: Tom Saini  [address] [facility] [id number]        Dear Colleague,    Thank you for referring your patient, Tom Saini, to the [facility] [place]. Please see a copy of my visit note below.    Ortho Hand    No changes in history or examination, except patient developed a DVT and has been started on Eliquis, which has not been effective at reducing the clot.    On exam, continues with left hand intrinsic atrophy, and positive elbow flexion test.    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  -For now, we will keep the surgery scheduled but we did discuss possibility of postponement in the setting of this blood clot.  Patient is agreeable to plan.  -A total of 20 minutes was devoted to review of chart, direct face-to-face patient counseling and documentation during this encounter, exclusive of any procedure performed.    Deniz Alfred MD, PhD

## 2023-12-19 NOTE — PROGRESS NOTES
[facility] [place]  Date of visit: 12/21/2023  Julee Reza is a 88 year old, presenting for the following health issues:  Port Draw (Labs drawn from port by RN in lab. VS taken. 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  He has had diarrhea with particular food for several years and recently was investigated for possible pancreatic insufficiency.  He also has a history of pancreatic pseudocyst.  On recent MRI done in June 2023 to follow-up on pancreatic pseudocyst showed a colonic mass.  Subsequent CT scan of chest abdomen pelvis showed Mild anemia, no cytopenias 7.9 x 6.9 x 5.6 cm large soft tissue mass in sigmoid colon obliterating the lumen and significant extramural soft tissue extension superiorly in the sigmoid mesocolon.  Enlarged bilateral inguinal lymph node, as few small prominent lymph nodes along superior rectal/inferior mesenteric artery distribution.  Diffuse main pancreatic duct dilatation with large intraductal calculus in the head was seen as well.  Multiple cystic lesions and dilated sidebranches were seen in the pancreas.  He underwent colonoscopy but due to obliterated lumen, colonoscopy failed.  Subsequently CT colonography was done which showed similar results to CT chest abdomen pelvis.  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  FISH showed Bcl-2 rearrangement but no MYC rearrangement.  Bcl-2 IgH rearrangement [translocation (14;18)] is consistent with transformation of DLBCL from follicular lymphoma.  Postoperative course was complicated by postoperative nausea vomiting which resolved, single episode of suicidal ideation which resolved.  He was discharged to TCU.  He has been regaining his strength slowly,  his performance status has improved to 1.  He is able to walk independently for short distances, can do basic activities of daily living himself.    PET scan revealed persistent hypermetabolic retroperitoneal lymph nodes, possibly reactive but lymphoma cannot be ruled out. ECHOcardiogram reveals EF >50%. LDH within normal limits. Overall IPI 2. Low-intermediate risk GCB DLBCL.    He  began treatment with RminiCHP. First cycle was split into prephase steroid+rituxmab followed by miniCHP. He tolerated treatment very well without any long term toxicities. PET scan after 3 cycles showed CR.      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  He is overall doing well.  His energy levels are good and he is eating and drinking well.      Denies fevers/chills, night sweats, N/V/D/C, rashes/sores, new pains, new lumps/bumps, new neuropathy, mouth sores, bleeding issues, any other acute issues.       Objective    /63 (BP Location: Right arm, Patient Position: Sitting, Cuff Size: Adult Regular)   Pulse 87   Temp 97.7  F (36.5  C) (Oral)   Resp 18   Wt 73.6 kg (162 lb 4.8 oz)   SpO2 97%   BMI 23.77 kg/m    Body mass index is 23.77 kg/m .  Physical Exam   General: No acute distress  HEENT: Sclera anicteric. Oral exam deferred  Lymph: No lymphadenopathy in neck  Heart: Regular, rate, and rhythm  Lungs: Clear to ascultation bilaterally  Extremities: no lower extremity edema  Neuro: Cranial nerves grossly intact  Rash: none on exposed skin     Labs:  Most Recent 3 CBC's:  Recent Labs   Lab Test 12/21/23  0833 11/30/23  1202 11/09/23  1038   WBC 5.3 7.9 10.7   HGB 11.1*  10.8* 11.0*   MCV 99 98 95    293 284   ANEUTAUTO 3.8 6.0 9.0*     Most Recent 3 BMP's:  Recent Labs   Lab Test 12/21/23  0833 11/30/23  1202 11/09/23  1038    140 140   POTASSIUM 4.2 4.8 4.6   CHLORIDE 106 104 105   CO2 27 28 25   BUN 23.9* 20.0 21.3   CR 0.89 0.88 0.87   ANIONGAP 9 8 10   DAVIDE 9.4 9.5 9.1   * 80 153*   PROTTOTAL 6.3* 6.6 6.5   ALBUMIN 3.8 3.8 3.8    Most Recent 3 LFT's:  Recent Labs   Lab Test 12/21/23  0833 11/30/23  1202 11/09/23  1038   AST 15 17 17   ALT 9 9 10   ALKPHOS 91 105 126   BILITOTAL 0.2 0.3 0.3    Most Recent 2 TSH and T4:  Recent Labs   Lab Test 02/27/21  1149   TSH 0.90     I reviewed the above labs today.      Assessment and Plan:    1) Diffuse large B cell lymphoma:  - He is CR after 3 cycles. We will continue treatment for total 6 cycles since he is receiving reduced dose treatment.  - S/p C5 and tolerating treatment well without side-effects  - Repeat PET scan after cycle 6- scheduled 1/25  - Dr. Buitrago to review imaging 2/1  - Proceed with C6D1 today (12/21)      Ppx:  - Acyclovir 400 mg BID     2) Occlusive RLE DVT 11/9:  Patient presented to clinic 11/9  with bilateral lower extremity edema R>L.  - 11/9 US RLE- occlusive lower extremity DVT and was started on Apixaban 11/9.       3) Pancreatic insufficiency:  - Continue Creon.   - We will monitor for diarrhea.     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  He was seen by his PCP for the issue and referred for a EMG.  The EMG was performed on 9/7 by [facility].  Patient wanted to review the results of his EMG and discuss the need for a brace.  - Was seen by Hand/Wrist Specialist 10/31      17 minutes spent on the date of the encounter doing chart review, review of test results, interpretation of tests, patient visit, and documentation     HEYDI Perkins CNP

## 2023-12-19 NOTE — NURSING NOTE
Reason For Visit:   Chief Complaint   Patient presents with    RECHECK     Follow up on left ulnar neuropathy       Primary MD: Angeli Robertson  Ref. MD: adelina    Age: 88 year old    ?  No      There were no vitals taken for this visit.      Pain Assessment  Patient Currently in Pain: Yes  0-10 Pain Scale: 0  Primary Pain Location: Finger (Comment which one) (last left two fingers)  Pain Descriptors: Numbness, Tingling, Constant    Hand Dominance Evaluation  Hand Dominance: Left          QuickDASH Assessment      12/19/2023    10:03 AM   QuickDASH Main   1. Open a tight or new jar Unable   2. Do heavy household chores (e.g., wash walls, floors) Unable to answer   3. Carry a shopping bag or briefcase No difficulty   4. Wash your back No difficulty   5. Use a knife to cut food No difficulty   6. Recreational activities in which you take some force or impact through your arm, shoulder or hand (e.g., golf, hammering, tennis, etc.) Unable to answer   7. During the past week, to what extent has your arm, shoulder or hand problem interfered with your normal social activities with family, friends, neighbours or groups Not at all   8. During the past week, were you limited in your work or other regular daily activities as a result of your arm, shoulder or hand problem Not limited at all   9. Arm, shoulder or hand pain None   10.Tingling (pins and needles) in your arm,shoulder or hand Moderate   11. During the past week, how much difficulty have you had sleeping because of the pain in your arm, shoulder or hand No difficulty   Quickdash Ability Score 9.09          Current Outpatient Medications   Medication Sig Dispense Refill    acyclovir (ZOVIRAX) 400 MG tablet Take 1 tablet (400 mg) by mouth every 12 hours for 30 days 60 tablet 11    apixaban ANTICOAGULANT (ELIQUIS) 5 MG tablet Take 1 tablet (5 mg) by mouth 2 times daily 60 tablet 1    calcium carbonate (OS-DAVIDE) 1500 (600 Ca) MG tablet Take 600 mg by mouth 2 times  daily (with meals)      Cholecalciferol (VITAMIN D-3) 25 MCG (1000 UT) CAPS Take by mouth daily      lipase-protease-amylase (CREON) 56476-80246-359735 units CPEP per EC capsule Take 2 capsules by mouth 3 times daily (with meals) And 1 with snacks. 200 capsule 4    multivitamin (OCUVITE) TABS Take 1 tablet by mouth 2 times daily      predniSONE (DELTASONE) 20 MG tablet Take 1.5 tablets (30 mg) by mouth 2 times daily Take on Days 1 through 5. Take first dose in AM prior to chemotherapy. 15 tablet 7    prochlorperazine (COMPAZINE) 10 MG tablet Take 1 tablet (10 mg) by mouth every 6 hours as needed for nausea or vomiting (Patient not taking: Reported on 10/20/2023) 30 tablet 2    sodium chloride (LAXMI 128) 5 % ophthalmic ointment Apply a small amount in both eyes at least once a day (bedtime). 3.5 g 11       Allergies   Allergen Reactions    Codeine Sulfate Nausea and Vomiting       Alexa Helton, ATC

## 2023-12-19 NOTE — PROGRESS NOTES
Ortho Hand    No changes in history or examination, except patient developed a DVT and has been started on Eliquis, which has not been effective at reducing the clot.    On exam, continues with left hand intrinsic atrophy, and positive elbow flexion test.    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  -For now, we will keep the surgery scheduled but we did discuss possibility of postponement in the setting of this blood clot.  Patient is agreeable to plan.  -A total of 20 minutes was devoted to review of chart, direct face-to-face patient counseling and documentation during this encounter, exclusive of any procedure performed.    Deniz Alfred MD, PhD

## 2023-12-21 ENCOUNTER — INFUSION THERAPY VISIT (OUTPATIENT)
Dept: ONCOLOGY | Facility: CLINIC | Age: 88
End: 2023-12-21
Attending: STUDENT IN AN ORGANIZED HEALTH CARE EDUCATION/TRAINING PROGRAM
Payer: MEDICARE

## 2023-12-21 ENCOUNTER — APPOINTMENT (OUTPATIENT)
Dept: LAB | Facility: CLINIC | Age: 88
End: 2023-12-21
Attending: STUDENT IN AN ORGANIZED HEALTH CARE EDUCATION/TRAINING PROGRAM
Payer: MEDICARE

## 2023-12-21 VITALS
HEART RATE: 87 BPM | SYSTOLIC BLOOD PRESSURE: 108 MMHG | DIASTOLIC BLOOD PRESSURE: 63 MMHG | RESPIRATION RATE: 18 BRPM | OXYGEN SATURATION: 97 % | TEMPERATURE: 97.7 F | WEIGHT: 162.3 LBS | BODY MASS INDEX: 23.77 KG/M2

## 2023-12-21 DIAGNOSIS — I82.401 DEEP VEIN THROMBOSIS (DVT) OF RIGHT LOWER EXTREMITY, UNSPECIFIED CHRONICITY, UNSPECIFIED VEIN (H): ICD-10-CM

## 2023-12-21 DIAGNOSIS — Z51.11 ENCOUNTER FOR ANTINEOPLASTIC CHEMOTHERAPY: ICD-10-CM

## 2023-12-21 DIAGNOSIS — C83.398 DIFFUSE LARGE B-CELL LYMPHOMA OF EXTRANODAL SITE: Primary | ICD-10-CM

## 2023-12-21 DIAGNOSIS — G62.9 NEUROPATHY: ICD-10-CM

## 2023-12-21 DIAGNOSIS — Z76.89 PREVENTION OF CHEMOTHERAPY-INDUCED NEUTROPENIA: ICD-10-CM

## 2023-12-21 LAB
ALBUMIN SERPL BCG-MCNC: 3.8 G/DL (ref 3.5–5.2)
ALP SERPL-CCNC: 91 U/L (ref 40–150)
ALT SERPL W P-5'-P-CCNC: 9 U/L (ref 0–70)
ANION GAP SERPL CALCULATED.3IONS-SCNC: 9 MMOL/L (ref 7–15)
AST SERPL W P-5'-P-CCNC: 15 U/L (ref 0–45)
BASOPHILS # BLD AUTO: 0.2 10E3/UL (ref 0–0.2)
BASOPHILS NFR BLD AUTO: 3 %
BILIRUB SERPL-MCNC: 0.2 MG/DL
BUN SERPL-MCNC: 23.9 MG/DL (ref 8–23)
CALCIUM SERPL-MCNC: 9.4 MG/DL (ref 8.8–10.2)
CHLORIDE SERPL-SCNC: 106 MMOL/L (ref 98–107)
CREAT SERPL-MCNC: 0.89 MG/DL (ref 0.67–1.17)
DEPRECATED HCO3 PLAS-SCNC: 27 MMOL/L (ref 22–29)
EGFRCR SERPLBLD CKD-EPI 2021: 83 ML/MIN/1.73M2
EOSINOPHIL # BLD AUTO: 0.1 10E3/UL (ref 0–0.7)
EOSINOPHIL NFR BLD AUTO: 2 %
ERYTHROCYTE [DISTWIDTH] IN BLOOD BY AUTOMATED COUNT: 15.8 % (ref 10–15)
GLUCOSE SERPL-MCNC: 163 MG/DL (ref 70–99)
HCT VFR BLD AUTO: 34.5 % (ref 40–53)
HGB BLD-MCNC: 11.1 G/DL (ref 13.3–17.7)
IMM GRANULOCYTES # BLD: 0 10E3/UL
IMM GRANULOCYTES NFR BLD: 1 %
LYMPHOCYTES # BLD AUTO: 0.7 10E3/UL (ref 0.8–5.3)
LYMPHOCYTES NFR BLD AUTO: 13 %
MCH RBC QN AUTO: 31.8 PG (ref 26.5–33)
MCHC RBC AUTO-ENTMCNC: 32.2 G/DL (ref 31.5–36.5)
MCV RBC AUTO: 99 FL (ref 78–100)
MONOCYTES # BLD AUTO: 0.5 10E3/UL (ref 0–1.3)
MONOCYTES NFR BLD AUTO: 10 %
NEUTROPHILS # BLD AUTO: 3.8 10E3/UL (ref 1.6–8.3)
NEUTROPHILS NFR BLD AUTO: 71 %
NRBC # BLD AUTO: 0 10E3/UL
NRBC BLD AUTO-RTO: 0 /100
PLATELET # BLD AUTO: 278 10E3/UL (ref 150–450)
POTASSIUM SERPL-SCNC: 4.2 MMOL/L (ref 3.4–5.3)
PROT SERPL-MCNC: 6.3 G/DL (ref 6.4–8.3)
RBC # BLD AUTO: 3.49 10E6/UL (ref 4.4–5.9)
SODIUM SERPL-SCNC: 142 MMOL/L (ref 135–145)
WBC # BLD AUTO: 5.3 10E3/UL (ref 4–11)

## 2023-12-21 PROCEDURE — 258N000003 HC RX IP 258 OP 636

## 2023-12-21 PROCEDURE — 96375 TX/PRO/DX INJ NEW DRUG ADDON: CPT

## 2023-12-21 PROCEDURE — 96367 TX/PROPH/DG ADDL SEQ IV INF: CPT

## 2023-12-21 PROCEDURE — 96377 APPLICATON ON-BODY INJECTOR: CPT | Mod: 59

## 2023-12-21 PROCEDURE — 250N000011 HC RX IP 250 OP 636: Mod: JZ | Performed by: STUDENT IN AN ORGANIZED HEALTH CARE EDUCATION/TRAINING PROGRAM

## 2023-12-21 PROCEDURE — 82374 ASSAY BLOOD CARBON DIOXIDE: CPT

## 2023-12-21 PROCEDURE — 96411 CHEMO IV PUSH ADDL DRUG: CPT

## 2023-12-21 PROCEDURE — G0463 HOSPITAL OUTPT CLINIC VISIT: HCPCS | Mod: 25

## 2023-12-21 PROCEDURE — 99214 OFFICE O/P EST MOD 30 MIN: CPT

## 2023-12-21 PROCEDURE — 36591 DRAW BLOOD OFF VENOUS DEVICE: CPT

## 2023-12-21 PROCEDURE — 96372 THER/PROPH/DIAG INJ SC/IM: CPT

## 2023-12-21 PROCEDURE — 250N000011 HC RX IP 250 OP 636: Mod: JZ

## 2023-12-21 PROCEDURE — 96413 CHEMO IV INFUSION 1 HR: CPT

## 2023-12-21 PROCEDURE — 96415 CHEMO IV INFUSION ADDL HR: CPT

## 2023-12-21 PROCEDURE — 96417 CHEMO IV INFUS EACH ADDL SEQ: CPT

## 2023-12-21 PROCEDURE — 250N000013 HC RX MED GY IP 250 OP 250 PS 637

## 2023-12-21 PROCEDURE — 85025 COMPLETE CBC W/AUTO DIFF WBC: CPT

## 2023-12-21 RX ORDER — ALBUTEROL SULFATE 90 UG/1
1-2 AEROSOL, METERED RESPIRATORY (INHALATION)
Status: CANCELLED
Start: 2023-12-21

## 2023-12-21 RX ORDER — MEPERIDINE HYDROCHLORIDE 25 MG/ML
25 INJECTION INTRAMUSCULAR; INTRAVENOUS; SUBCUTANEOUS
Status: CANCELLED
Start: 2023-12-21

## 2023-12-21 RX ORDER — LORAZEPAM 2 MG/ML
0.5 INJECTION INTRAMUSCULAR EVERY 4 HOURS PRN
Status: CANCELLED | OUTPATIENT
Start: 2023-12-21

## 2023-12-21 RX ORDER — DIPHENHYDRAMINE HCL 25 MG
50 CAPSULE ORAL ONCE
Status: CANCELLED
Start: 2023-12-21

## 2023-12-21 RX ORDER — METHYLPREDNISOLONE SODIUM SUCCINATE 125 MG/2ML
125 INJECTION, POWDER, LYOPHILIZED, FOR SOLUTION INTRAMUSCULAR; INTRAVENOUS
Status: CANCELLED
Start: 2023-12-21

## 2023-12-21 RX ORDER — HEPARIN SODIUM (PORCINE) LOCK FLUSH IV SOLN 100 UNIT/ML 100 UNIT/ML
5 SOLUTION INTRAVENOUS
Status: DISCONTINUED | OUTPATIENT
Start: 2023-12-21 | End: 2023-12-21 | Stop reason: HOSPADM

## 2023-12-21 RX ORDER — HEPARIN SODIUM,PORCINE 10 UNIT/ML
5-20 VIAL (ML) INTRAVENOUS DAILY PRN
Status: CANCELLED | OUTPATIENT
Start: 2023-12-21

## 2023-12-21 RX ORDER — PALONOSETRON 0.05 MG/ML
0.25 INJECTION, SOLUTION INTRAVENOUS ONCE
Status: CANCELLED
Start: 2023-12-21

## 2023-12-21 RX ORDER — DOXORUBICIN HYDROCHLORIDE 2 MG/ML
25 INJECTION, SOLUTION INTRAVENOUS ONCE
Status: CANCELLED | OUTPATIENT
Start: 2023-12-21

## 2023-12-21 RX ORDER — DIPHENHYDRAMINE HYDROCHLORIDE 50 MG/ML
50 INJECTION INTRAMUSCULAR; INTRAVENOUS
Status: CANCELLED
Start: 2023-12-21

## 2023-12-21 RX ORDER — ACETAMINOPHEN 325 MG/1
650 TABLET ORAL ONCE
Status: CANCELLED
Start: 2023-12-21

## 2023-12-21 RX ORDER — PALONOSETRON 0.05 MG/ML
0.25 INJECTION, SOLUTION INTRAVENOUS ONCE
Status: COMPLETED | OUTPATIENT
Start: 2023-12-21 | End: 2023-12-21

## 2023-12-21 RX ORDER — ALBUTEROL SULFATE 0.83 MG/ML
2.5 SOLUTION RESPIRATORY (INHALATION)
Status: CANCELLED | OUTPATIENT
Start: 2023-12-21

## 2023-12-21 RX ORDER — ACETAMINOPHEN 325 MG/1
650 TABLET ORAL ONCE
Status: COMPLETED | OUTPATIENT
Start: 2023-12-21 | End: 2023-12-21

## 2023-12-21 RX ORDER — DOXORUBICIN HYDROCHLORIDE 2 MG/ML
25 INJECTION, SOLUTION INTRAVENOUS ONCE
Status: COMPLETED | OUTPATIENT
Start: 2023-12-21 | End: 2023-12-21

## 2023-12-21 RX ORDER — HEPARIN SODIUM (PORCINE) LOCK FLUSH IV SOLN 100 UNIT/ML 100 UNIT/ML
5 SOLUTION INTRAVENOUS ONCE
Status: COMPLETED | OUTPATIENT
Start: 2023-12-21 | End: 2023-12-21

## 2023-12-21 RX ORDER — EPINEPHRINE 1 MG/ML
0.3 INJECTION, SOLUTION INTRAMUSCULAR; SUBCUTANEOUS EVERY 5 MIN PRN
Status: CANCELLED | OUTPATIENT
Start: 2023-12-21

## 2023-12-21 RX ORDER — MEPERIDINE HYDROCHLORIDE 25 MG/ML
25 INJECTION INTRAMUSCULAR; INTRAVENOUS; SUBCUTANEOUS EVERY 30 MIN PRN
Status: CANCELLED | OUTPATIENT
Start: 2023-12-21

## 2023-12-21 RX ORDER — HEPARIN SODIUM (PORCINE) LOCK FLUSH IV SOLN 100 UNIT/ML 100 UNIT/ML
5 SOLUTION INTRAVENOUS
Status: CANCELLED | OUTPATIENT
Start: 2023-12-21

## 2023-12-21 RX ORDER — DIPHENHYDRAMINE HCL 25 MG
50 CAPSULE ORAL ONCE
Status: COMPLETED | OUTPATIENT
Start: 2023-12-21 | End: 2023-12-21

## 2023-12-21 RX ADMIN — DIPHENHYDRAMINE HYDROCHLORIDE 50 MG: 25 CAPSULE ORAL at 10:50

## 2023-12-21 RX ADMIN — RITUXIMAB-ABBS 700 MG: 10 INJECTION, SOLUTION INTRAVENOUS at 11:19

## 2023-12-21 RX ADMIN — DOXORUBICIN HYDROCHLORIDE 45 MG: 2 INJECTION, SOLUTION INTRAVENOUS at 10:33

## 2023-12-21 RX ADMIN — FOSAPREPITANT DIMEGLUMINE 150 MG: 150 INJECTION, POWDER, LYOPHILIZED, FOR SOLUTION INTRAVENOUS at 09:38

## 2023-12-21 RX ADMIN — Medication 5 ML: at 08:27

## 2023-12-21 RX ADMIN — CYCLOPHOSPHAMIDE 750 MG: 1 INJECTION, POWDER, FOR SOLUTION INTRAVENOUS; ORAL at 10:48

## 2023-12-21 RX ADMIN — PALONOSETRON HYDROCHLORIDE 0.25 MG: 0.25 INJECTION INTRAVENOUS at 09:37

## 2023-12-21 RX ADMIN — Medication 5 ML: at 13:52

## 2023-12-21 RX ADMIN — SODIUM CHLORIDE 250 ML: 9 INJECTION, SOLUTION INTRAVENOUS at 09:37

## 2023-12-21 RX ADMIN — VINCRISTINE SULFATE 1 MG: 1 INJECTION, SOLUTION INTRAVENOUS at 10:42

## 2023-12-21 RX ADMIN — ACETAMINOPHEN 650 MG: 325 TABLET ORAL at 10:50

## 2023-12-21 RX ADMIN — PEGFILGRASTIM 6 MG: KIT SUBCUTANEOUS at 11:20

## 2023-12-21 ASSESSMENT — PAIN SCALES - GENERAL: PAINLEVEL: NO PAIN (0)

## 2023-12-21 NOTE — NURSING NOTE
Chief Complaint   Patient presents with    Port Draw     Labs drawn from port by RN in lab. VS taken.      Port accessed and labs drawn by RN. Pt tolerated well.  VS taken.  Pt checked in for next appt.    Cecy Billingsley RN

## 2023-12-21 NOTE — PROGRESS NOTES
Infusion Nursing Note:  Tom Saini presents today for Cycle 6, day 1 Adriamycin, Vincristine, Cytoxan and Rituximab-abbs.    Patient seen by provider today: Yes: Miriam Rouse NP    Note: Patient presents to clinic today feeling well with no questions.  Pt did not request or require any intervention for pain today.    Intravenous Access:  Implanted Port.    Treatment Conditions:  Lab Results   Component Value Date    HGB 11.1 (L) 12/21/2023    WBC 5.3 12/21/2023    ANEU 23.9 (H) 10/10/2023    ANEUTAUTO 3.8 12/21/2023     12/21/2023        Lab Results   Component Value Date     12/21/2023    POTASSIUM 4.2 12/21/2023    MAG 2.2 07/31/2023    CR 0.89 12/21/2023    DAVIDE 9.4 12/21/2023    BILITOTAL 0.2 12/21/2023    ALBUMIN 3.8 12/21/2023    ALT 9 12/21/2023    AST 15 12/21/2023     Results reviewed, labs MET treatment parameters, ok to proceed with treatment.  ECHO/MUGA completed 8/10/2023 EF 55-60%    Post Infusion Assessment:  Patient tolerated infusion without incident.  Blood return noted pre and post infusion.  Blood return noted during Adriamycin administration every 2 cc.  Blood return noted prior to/during/post Vincristine administration.  Site patent and intact, free from redness, edema or discomfort.  No evidence of extravasations.  Access discontinued per protocol.    Neulasta Onpro On-Body injector applied to RUE at 1125 with light facing down/toward elbow.  Writer discussed Neulasta injection would start on 12/22/23 at 1125, approximately 27 hours after application applied today.  Written and Verbal instruction reviewed with patient.  Pt instructed when the dose delivery starts, it will take about 45 minutes to complete.  Pt aware Neulasta Onpro On-Body should have green flashing light and to call triage or on-call MD if injector flashes red or appears to be leaking. Pt aware to keep Onpro On-Body Neualsta 4 inches away from electrical equipment and to avoid showering 4 hours prior to  injection.   Neulasta Onpro Lot number: [id number]    Discharge Plan:   Prescription refills given for Prednisone.  Discharge instructions reviewed with: Patient.  Patient and/or family verbalized understanding of discharge instructions and all questions answered.  AVS to patient via [employer].  Patient will return 2/1/2024 for next appointment with MD.   Patient discharged in stable condition accompanied by: wife.  Departure Mode: Ambulatory.    Rosangela Kerns RN

## 2023-12-21 NOTE — LETTER
12/21/2023         RE: Tom Saini  [address] [facility] [address]        Dear Colleague,    Thank you for referring your patient, Tom Saini, to the [facility]. Please see a copy of my visit note below.    [facility]  Date of visit: 12/21/2023  Julee Reza is a 88 year old, presenting for the following health issues:  Port Draw (Labs drawn from port by RN in lab. VS taken. 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  He has had diarrhea with particular food for several years and recently was investigated for possible pancreatic insufficiency.  He also has a history of pancreatic pseudocyst.  On recent MRI done in June 2023 to follow-up on pancreatic pseudocyst showed a colonic mass.  Subsequent CT scan of chest abdomen pelvis showed Mild anemia, no cytopenias 7.9 x 6.9 x 5.6 cm large soft tissue mass in sigmoid colon obliterating the lumen and significant extramural soft tissue extension superiorly in the sigmoid mesocolon.  Enlarged bilateral inguinal lymph node, as few small prominent lymph nodes along superior rectal/inferior mesenteric artery distribution.  Diffuse main pancreatic duct dilatation with large intraductal calculus in the head was seen as well.  Multiple cystic lesions and dilated sidebranches were seen in the pancreas.  He underwent colonoscopy but due to obliterated lumen, colonoscopy failed.  Subsequently CT colonography was done which showed similar results to CT chest abdomen pelvis.  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  FISH showed Bcl-2 rearrangement but no MYC rearrangement.  Bcl-2 IgH rearrangement [translocation (14;18)] is consistent with  transformation of DLBCL from follicular lymphoma.  Postoperative course was complicated by postoperative nausea vomiting which resolved, single episode of suicidal ideation which resolved.  He was discharged to TCU.  He has been regaining his strength slowly, his performance status has improved to 1.  He is able to walk independently for short distances, can do basic activities of daily living himself.    PET scan revealed persistent hypermetabolic retroperitoneal lymph nodes, possibly reactive but lymphoma cannot be ruled out. ECHOcardiogram reveals EF >50%. LDH within normal limits. Overall IPI 2. Low-intermediate risk GCB DLBCL.    He  began treatment with RminiCHP. First cycle was split into prephase steroid+rituxmab followed by miniCHP. He tolerated treatment very well without any long term toxicities. PET scan after 3 cycles showed CR.      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  He is overall doing well.  His energy levels are good and he is eating and drinking well.      Denies fevers/chills, night sweats, N/V/D/C, rashes/sores, new pains, new lumps/bumps, new neuropathy, mouth sores, bleeding issues, any other acute issues.       Objective   /63 (BP Location: Right arm, Patient Position: Sitting, Cuff Size: Adult Regular)   Pulse 87   Temp 97.7  F (36.5  C) (Oral)   Resp 18   Wt 73.6 kg (162 lb 4.8 oz)   SpO2 97%   BMI 23.77 kg/m    Body mass index is 23.77 kg/m .  Physical Exam   General: No acute distress  HEENT: Sclera anicteric. Oral exam deferred  Lymph: No lymphadenopathy in neck  Heart: Regular, rate, and rhythm  Lungs: Clear to  ascultation bilaterally  Extremities: no lower extremity edema  Neuro: Cranial nerves grossly intact  Rash: none on exposed skin     Labs:  Most Recent 3 CBC's:  Recent Labs   Lab Test 12/21/23  0833 11/30/23  1202 11/09/23  1038   WBC 5.3 7.9 10.7   HGB 11.1* 10.8* 11.0*   MCV 99 98 95    293 284   ANEUTAUTO 3.8 6.0 9.0*     Most Recent 3 BMP's:  Recent Labs   Lab Test 12/21/23  0833 11/30/23  1202 11/09/23  1038    140 140   POTASSIUM 4.2 4.8 4.6   CHLORIDE 106 104 105   CO2 27 28 25   BUN 23.9* 20.0 21.3   CR 0.89 0.88 0.87   ANIONGAP 9 8 10   DAVIDE 9.4 9.5 9.1   * 80 153*   PROTTOTAL 6.3* 6.6 6.5   ALBUMIN 3.8 3.8 3.8    Most Recent 3 LFT's:  Recent Labs   Lab Test 12/21/23  0833 11/30/23  1202 11/09/23  1038   AST 15 17 17   ALT 9 9 10   ALKPHOS 91 105 126   BILITOTAL 0.2 0.3 0.3    Most Recent 2 TSH and T4:  Recent Labs   Lab Test 02/27/21  1149   TSH 0.90     I reviewed the above labs today.      Assessment and Plan:    1) Diffuse large B cell lymphoma:  - He is CR after 3 cycles. We will continue treatment for total 6 cycles since he is receiving reduced dose treatment.  - S/p C5 and tolerating treatment well without side-effects  - Repeat PET scan after cycle 6- scheduled 1/25  - Dr. Buitrago to review imaging 2/1  - Proceed with C6D1 today (12/21)      Ppx:  - Acyclovir 400 mg BID     2) Occlusive RLE DVT 11/9:  Patient presented to clinic 11/9  with bilateral lower extremity edema R>L.  - 11/9 US RLE- occlusive lower extremity DVT and was started on Apixaban 11/9.       3) Pancreatic insufficiency:  - Continue Creon.   - We will monitor for diarrhea.     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  He was seen by his PCP for the issue and referred for a EMG.  The EMG was performed on 9/7 by [facility].  Patient wanted to review the results of his EMG and discuss the need for a brace.  - Was seen  by Hand/Wrist Specialist 10/31      17 minutes spent on the date of the encounter doing chart review, review of test results, interpretation of tests, patient visit, and documentation     HEYDI Perkins CNP

## 2023-12-21 NOTE — NURSING NOTE
"Oncology Rooming Note    December 21, 2023 8:46 AM   Tom Saini is a 88 year old male who presents for:    Chief Complaint   Patient presents with    Port Draw     Labs drawn from port by RN in lab. VS taken.     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    Weight as of this encounter: 73.6 kg (162 lb 4.8 oz). Body surface area is 1.9 meters squared.  No Pain (0) Comment: Data Unavailable   No LMP for male patient.  Allergies reviewed: Yes  Medications reviewed: Yes    Medications: Medication refills not needed today.  Pharmacy name entered into [employer]:    [id number] [place], [place] - [address] AT [facility] - [address] [place] [facility] - [address]    Frailty Screening:   Is the patient here for a new oncology consult visit in cancer care? 2. No      Clinical concerns: Pt is wondering if he should continue to take Acyclovir.    Sebastian Magana              "

## 2023-12-27 NOTE — PROGRESS NOTES
[facility]  [address] [place] [id number]  Main: [phone], Fax: [phone]    Patient seen at: [facility] at [address] [place] [address]    Outpatient Visit Note:    Patient: Tom Saini  MRN: [id number]  : 1935  KEN: 2023    Reason for visit:  History of distal left leg DVT in May 2019. Newly diagnosed colonic mass (B-cell lymphoma) in 2023. Newly found right leg DVT in 2023.     Clinical History Summary:  This is a 88 year old male with a history of hyperlipidemia, aortic insufficiency, who was diagnosed with a left distal lower extremity DVT back on 2019, who found to have an extension of his left leg DVT despite being on 3 months of apixaban, who was placed on dabigatran in Aug 2019 where his DVT was cleared after 3 months of dabigatran, for which his anticoagulation was discontinued in 2019 by this writer,  returns to clinic today after he was found to have a new acute right leg DVT in 2023 in the setting of newly diagnosed B-cell lymphoma. Currently he is on apixaban at 5 mg PO BID dosing.     Thrombosis History Summary:  Back in 2019, he suffered a one week history of left calf pain for which he was eventually diagnosed with left distal leg DVT (nonocclusive DVT of one of the paired peroneal veins in the mid calf and a Baker's cyst measuring 4.4 x 1.8 x 3.4 cm. This DVT was seemingly unprovoked. He was placed on apixaban.    2019, he was seen by this writer and recommended that he should complete 3 months of anticoagulation therapy with apixaban and then repeat left leg venous ultrasound.   Aug 2019, a repeat ultrasound showed an unexpected clear extension of his thrombus within the left peroneal vein. Thus we have determined that he had failed apixaban and this writer (after discussion with Dr. Mayes, staff hematologist), switched the patient to dabigatran.   Nov  2019, repeat left leg venous ultrasound showed complete resolution of his DVT and thus anticoagulation therapy was discontinued. It was also the last time I have seen this patient.      Interim History:  In June 2023, he was found to have pancreatic insufficiency. 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 Attempted colonoscopy was done but failed due to obliterated lumen. 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   He begun chemotherapy back at the end of Aug 2023 and is still currently undergoing chemotherapy.   11/9/2023, he presented to the emergency department with right leg pain and swelling. Right leg venous ultrasound showed: Occlusive right popliteal vein DVT, nonocclusive portion just proximal above the knee joint line, with nonocclusive clot extension into one of the posterior tibial veins. Possible variant anatomy noted above. He was started on apixaban.   12/15/2023, he had a repeat right leg venous ultrasound done which showed: 1. Overall, no significant change right lower extremity DVT compared with prior ultrasound dated 11/19/2023. There is currently nonocclusive thrombus in the right popliteal vein, occlusive thrombus within one of the paired posterior tibial veins and nonocclusive thrombus in the other paired posterior tibial vein. 2. No evidence of deep venous thrombosis in the left lower extremity.    Currently he is on apixaban at 5 mg PO BID dosing. He denies any bleeding issues. He has been using graduated compression stockings to the right leg and his swelling has significantly improved. He is being seen by outpatient physical therapy.     According to Juaquin, he has completed 6 cycle of chemotherapy and currently scheduled to get a PET scan done in Jan 2024 and hopefully he no longer will need further chemotherapy. He reports that he is also currently scheduled to  "have left elbow ulnar nerve decompression / transposition surgery done in 2/19/2024 as he suffered some ulnar nerve damage during his colectomy surgery back in the Summer of 2023.      ROS:  Denies any bleeding complications. Specifically, no frequent epistaxis. No issues with oral mucosal bleeding. Denies any hematuria or blood in stools. Denies any shortness of breath. No chest pain. No cough. No fever.    Medications:  Current Outpatient Medications   Medication    acyclovir (ZOVIRAX) 400 MG tablet    apixaban ANTICOAGULANT (ELIQUIS) 5 MG tablet    calcium carbonate (OS-DAVIDE) 1500 (600 Ca) MG tablet    Cholecalciferol (VITAMIN D-3) 25 MCG (1000 UT) CAPS    lipase-protease-amylase (CREON) 04821-69582-127899 units CPEP per EC capsule    multivitamin (OCUVITE) TABS    predniSONE (DELTASONE) 20 MG tablet    prochlorperazine (COMPAZINE) 10 MG tablet    sodium chloride (LAXMI 128) 5 % ophthalmic ointment     No current facility-administered medications for this visit.     Allergies:  Allergies   Allergen Reactions    Codeine Sulfate Nausea and Vomiting     PmHx:  Past Medical History:   Diagnosis Date    Aortic insufficiency     DLBCL (diffuse large B cell lymphoma) (H) 07/2023    colon/abdomen, s/p mini-R-CHOP x 6    DVT (deep venous thrombosis) (H) 11/2023    R popliteal    Erectile dysfunction     History of deep venous thrombosis 05/2019    distal L DVT with extension, completed 6 months of AC (failed eliquis)    History of pelvic fracture     Hyperlipidemia     Low back pain     disc disease s/p laminectomy in past    Macular degeneration     on Avastin, R eye    Pancreatic cyst 07/2018    next MR due 12/23    Pancreatic insufficiency     Radius fracture 2018    after fall from wall     Social History:   Deferred.    Family History:  Deferred.    Objective:  Vitals: /64   Pulse 96   Temp 97.8  F (36.6  C) (Oral)   Ht 1.791 m (5' 10.5\")   Wt 74.6 kg (164 lb 8 oz)   SpO2 98%   BMI 23.27 kg/m    Exam: "   Complete exam is not performed today.     Labs:  Component      Latest Ref Rn 12/21/2023  8:33 AM   WBC      4.0 - 11.0 10e3/uL 5.3    RBC Count      4.40 - 5.90 10e6/uL 3.49 (L)    Hemoglobin      13.3 - 17.7 g/dL 11.1 (L)    Hematocrit      40.0 - 53.0 % 34.5 (L)    MCV      78 - 100 fL 99    MCH      26.5 - 33.0 pg 31.8    MCHC      31.5 - 36.5 g/dL 32.2    RDW      10.0 - 15.0 % 15.8 (H)    Platelet Count      150 - 450 10e3/uL 278    % Neutrophils      % 71    % Lymphocytes      % 13    % Monocytes      % 10    % Eosinophils      % 2    % Basophils      % 3    % Immature Granulocytes      % 1    NRBCs per 100 WBC      <1 /100 0    Absolute Neutrophils      1.6 - 8.3 10e3/uL 3.8    Absolute Lymphocytes      0.8 - 5.3 10e3/uL 0.7 (L)    Absolute Monocytes      0.0 - 1.3 10e3/uL 0.5    Absolute Eosinophils      0.0 - 0.7 10e3/uL 0.1    Absolute Basophils      0.0 - 0.2 10e3/uL 0.2    Absolute Immature Granulocytes      <=0.4 10e3/uL 0.0    Absolute NRBCs      10e3/uL 0.0    Sodium      135 - 145 mmol/L 142    Potassium      3.4 - 5.3 mmol/L 4.2    Carbon Dioxide (CO2)      22 - 29 mmol/L 27    Anion Gap      7 - 15 mmol/L 9    Urea Nitrogen      8.0 - 23.0 mg/dL 23.9 (H)    Creatinine      0.67 - 1.17 mg/dL 0.89    GFR Estimate      >60 mL/min/1.73m2 83    Calcium      8.8 - 10.2 mg/dL 9.4    Chloride      98 - 107 mmol/L 106    Glucose      70 - 99 mg/dL 163 (H)    Alkaline Phosphatase      40 - 150 U/L 91    AST      0 - 45 U/L 15    ALT      0 - 70 U/L 9    Protein Total      6.4 - 8.3 g/dL 6.3 (L)    Albumin      3.5 - 5.2 g/dL 3.8    Bilirubin Total      <=1.2 mg/dL 0.2       Imaging:  Reviewed and are as described above.     Assessment:  In summary, Tom is an 88 year old male who has a history of aortic insufficiency, who is found to have an unprovoked left distal lower extremity DVT on 5/29/2019, who was found to have an extension of his DVT despite being on 3 months of apixaban, ultimately completed  dabigatran for another 3 months, returns to clinic today for a re-consultation after he is recently found to have a new acute DVT of the right leg back in Nov 2023.     Back in 2019, he had failed apixaban as his left distal leg DVT clearly had extended despite being on apixaban at the time for 3 months. Then his DVT has completely resolved after 3 months of dabigatran.     He was found to have lymphoma back in June 2023 and eventually underwent sigmoid colectomy. His right leg DVT found on 11/9/2023 was an active cancer and cancer treatment (chemotherapy) provoked DVT.      Diagnosis:  Isolated distal left lower extremity DVT found on 5/29/2019 (unprovoked event).  Extension of left leg DVT found on 8/27/2019 despite being on Eliquis.   S/P 3 months of Pradaxa and repeat ultrasound showed resolved DVT.   Diffuse large B-cell lymphoma found in June 2023. S/P sigmoid colectomy.   Cancer and cancer treatment provoked right leg DVT in Nov 2023.   Pancreatic insufficiency.     Plan:  As mentioned, his recent right leg DVT was a cancer and cancer treatment provoked DVT. He will need at least 3 months of full intensity anticoagulation therapy and if he is able to achieve complete remission from his lymphoma, then he might not need ongoing anticoagulation therapy beyond the 3 months treatment.     As mentioned, currently he is on apixaban. This patient had failed apixaban back in 2019 for his distal left leg DVT as documented above. Additionally, he is status post sigmoid colectomy. Apixaban is mostly absorbed via the colon and thus I do not feel that apixaban is the best anticoagulation therapy for this patient.     I reviewed the literature and dabigatran is belief to be absorbed at the distal stomach and small intestine and also considering the fact that it had proven back in 2019 that dabigatran was effective in this patient as he had cleared his left leg DVT after 3 months of dabigatran. Thus I will stop his apixaban  today and switch him over to dabigatran at 150 mg PO BID dosing. The patient is in agreement with this plan.    My plan summary is as below:  Discontinue apixaban and start dabigatran at 150 mg PO Q 12 hours dosing.   Repeat right leg venous ultrasound in the beginning of Feb 2024 and before his 2/19/2024 scheduled left elbow ulnar nerve decompression / transposition surgery.   Return to see me after his repeat right leg ultrasound and before his left elbow surgery on 2/19/2024.       Ahmet Márquez PA-C, MPAS  Physician Assistant  [facility].     50 minutes spent by me on the date of the encounter doing chart review, history and exam, documentation and further activities per the note.    Time IN: 13:04  Time OUT: 13:30

## 2023-12-28 ENCOUNTER — OFFICE VISIT (OUTPATIENT)
Dept: HEMATOLOGY | Facility: CLINIC | Age: 88
End: 2023-12-28
Attending: INTERNAL MEDICINE
Payer: MEDICARE

## 2023-12-28 ENCOUNTER — THERAPY VISIT (OUTPATIENT)
Dept: PHYSICAL THERAPY | Facility: CLINIC | Age: 88
End: 2023-12-28
Attending: INTERNAL MEDICINE
Payer: MEDICARE

## 2023-12-28 VITALS
DIASTOLIC BLOOD PRESSURE: 64 MMHG | SYSTOLIC BLOOD PRESSURE: 109 MMHG | HEIGHT: 71 IN | WEIGHT: 164.5 LBS | TEMPERATURE: 97.8 F | HEART RATE: 96 BPM | BODY MASS INDEX: 23.03 KG/M2 | OXYGEN SATURATION: 98 %

## 2023-12-28 DIAGNOSIS — C83.398 DIFFUSE LARGE B-CELL LYMPHOMA OF EXTRANODAL SITE: Primary | ICD-10-CM

## 2023-12-28 DIAGNOSIS — I89.0 LYMPHEDEMA: Primary | ICD-10-CM

## 2023-12-28 DIAGNOSIS — I82.4Y1 ACUTE DEEP VEIN THROMBOSIS (DVT) OF PROXIMAL VEIN OF RIGHT LOWER EXTREMITY (H): ICD-10-CM

## 2023-12-28 PROCEDURE — 97535 SELF CARE MNGMENT TRAINING: CPT | Mod: GP | Performed by: PHYSICAL THERAPIST

## 2023-12-28 PROCEDURE — 97140 MANUAL THERAPY 1/> REGIONS: CPT | Mod: GP | Performed by: PHYSICAL THERAPIST

## 2023-12-28 PROCEDURE — 99214 OFFICE O/P EST MOD 30 MIN: CPT | Performed by: PHYSICIAN ASSISTANT

## 2023-12-28 PROCEDURE — G0463 HOSPITAL OUTPT CLINIC VISIT: HCPCS | Performed by: PHYSICIAN ASSISTANT

## 2023-12-28 RX ORDER — DABIGATRAN ETEXILATE 150 MG/1
150 CAPSULE ORAL 2 TIMES DAILY
Qty: 60 CAPSULE | Refills: 2 | Status: SHIPPED | OUTPATIENT
Start: 2023-12-28 | End: 2024-02-23

## 2023-12-28 NOTE — PATIENT INSTRUCTIONS
[facility]  [facility]  [facility], [place] [address]  Main: [phone], Fax: [phone]    It was a pleasure seeing you today.  Thank you for allowing us to be involved in your care. Please let us know if there is anything else we can do for you, so that we can be sure you are leaving completely satisfied with your care experience.    Please stay on your blood thinner:  Pradaxa  Please call us with any bleeding issues that you may have.    We would like you to repeat your ultrasound in February 2024. You are scheduled at the [facility] ([address] [place] [address]) with a 945am check in time.    Wear compression stockings if you have swelling in your leg. Take them off while you are sleeping. You may need to get new stockings every 3-6 months with regular wear.    Patient Resources:   For additional information, please see the following web link:  [email or web address]    Call the [facility]  at [phone].     -If surgeries or procedures are planned (for holding instructions).     -If off anticoagulation, please call during high risk times (long-distance travel, broken bones or trauma, immobilization, surgery, pregnancy, or taking estrogen).     -Any new symptoms of DVT (deep vein thrombosis) or PE (pulmonary embolism)    -pain     -swelling     -redness    -warmth    -shortness of breath    -chest pain    -coughing up blood    We would like a provider on our team to see you at least annually for optimal care and to allow us to continue to prescribe for you.  Ahmet Márquez PA-C, Rain Horn, MPH, PAMaryC  and Heaven Marinelli PA-C are our physician assistants that are specialized in bleeding and clotting disorders that you may be able to see more readily.    Return to clinic in on February 9th at 1230pm with Ahmet Márquez PA-C    Your nurse clinician is Yissel Anglin RN, BSN, [phone] .   If she is unavailable and you have  immediate concerns, please call [phone] and ask for a nurse.

## 2023-12-28 NOTE — LETTER
[facility]  [address] [place] [address]  Main: [phone], Fax: [phone]    Patient seen at: [facility] at [address] [place] [address]    Outpatient Visit Note:    Patient: Tom Saini  MRN: [id number]  : 1935  KEN: 2023    Reason for visit:  History of distal left leg DVT in May 2019. Newly diagnosed colonic mass (B-cell lymphoma) in 2023. Newly found right leg DVT in 2023.     Clinical History Summary:  This is a 88 year old male with a history of hyperlipidemia, aortic insufficiency, who was diagnosed with a left distal lower extremity DVT back on 2019, who found to have an extension of his left leg DVT despite being on 3 months of apixaban, who was placed on dabigatran in Aug 2019 where his DVT was cleared after 3 months of dabigatran, for which his anticoagulation was discontinued in 2019 by this writer,  returns to clinic today after he was found to have a new acute right leg DVT in 2023 in the setting of newly diagnosed B-cell lymphoma. Currently he is on apixaban at 5 mg PO BID dosing.     Thrombosis History Summary:  Back in 2019, he suffered a one week history of left calf pain for which he was eventually diagnosed with left distal leg DVT (nonocclusive DVT of one of the paired peroneal veins in the mid calf and a Baker's cyst measuring 4.4 x 1.8 x 3.4 cm. This DVT was seemingly unprovoked. He was placed on apixaban.    2019, he was seen by this writer and recommended that he should complete 3 months of anticoagulation therapy with apixaban and then repeat left leg venous ultrasound.   Aug 2019, a repeat ultrasound showed an unexpected clear extension of his thrombus within the left peroneal vein. Thus we have determined that he had failed apixaban and this writer (after discussion with Dr. Mayes, staff hematologist), switched the patient to dabigatran.    Nov 2019, repeat left leg venous ultrasound showed complete resolution of his DVT and thus anticoagulation therapy was discontinued. It was also the last time I have seen this patient.      Interim History:  In June 2023, he was found to have pancreatic insufficiency. 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 Attempted colonoscopy was done but failed due to obliterated lumen. 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   He begun chemotherapy back at the end of Aug 2023 and is still currently undergoing chemotherapy.   11/9/2023, he presented to the emergency department with right leg pain and swelling. Right leg venous ultrasound showed: Occlusive right popliteal vein DVT, nonocclusive portion just proximal above the knee joint line, with nonocclusive clot extension into one of the posterior tibial veins. Possible variant anatomy noted above. He was started on apixaban.   12/15/2023, he had a repeat right leg venous ultrasound done which showed: 1. Overall, no significant change right lower extremity DVT compared with prior ultrasound dated 11/19/2023. There is currently nonocclusive thrombus in the right popliteal vein, occlusive thrombus within one of the paired posterior tibial veins and nonocclusive thrombus in the other paired posterior tibial vein. 2. No evidence of deep venous thrombosis in the left lower extremity.    Currently he is on apixaban at 5 mg PO BID dosing. He denies any bleeding issues. He has been using graduated compression stockings to the right leg and his swelling has significantly improved. He is being seen by outpatient physical therapy.     According to Juaquin, he has completed 6 cycle of chemotherapy and currently scheduled to get a PET scan done in Jan 2024 and hopefully he no longer will need further chemotherapy. He reports that he is also currently scheduled  "to have left elbow ulnar nerve decompression / transposition surgery done in 2/19/2024 as he suffered some ulnar nerve damage during his colectomy surgery back in the Summer of 2023.      ROS:  Denies any bleeding complications. Specifically, no frequent epistaxis. No issues with oral mucosal bleeding. Denies any hematuria or blood in stools. Denies any shortness of breath. No chest pain. No cough. No fever.    Medications:  Current Outpatient Medications   Medication     acyclovir (ZOVIRAX) 400 MG tablet     apixaban ANTICOAGULANT (ELIQUIS) 5 MG tablet     calcium carbonate (OS-DAVIDE) 1500 (600 Ca) MG tablet     Cholecalciferol (VITAMIN D-3) 25 MCG (1000 UT) CAPS     lipase-protease-amylase (CREON) [id number] units CPEP per EC capsule     multivitamin (OCUVITE) TABS     predniSONE (DELTASONE) 20 MG tablet     prochlorperazine (COMPAZINE) 10 MG tablet     sodium chloride (LAXMI 128) 5 % ophthalmic ointment     No current facility-administered medications for this visit.     Allergies:  Allergies   Allergen Reactions     Codeine Sulfate Nausea and Vomiting     PmHx:  Past Medical History:   Diagnosis Date     Aortic insufficiency      DLBCL (diffuse large B cell lymphoma) (H) 07/2023    colon/abdomen, s/p mini-R-CHOP x 6     DVT (deep venous thrombosis) (H) 11/2023    R popliteal     Erectile dysfunction      History of deep venous thrombosis 05/2019    distal L DVT with extension, completed 6 months of AC (failed eliquis)     History of pelvic fracture      Hyperlipidemia      Low back pain     disc disease s/p laminectomy in past     Macular degeneration     on Avastin, R eye     Pancreatic cyst 07/2018    next MR due 12/23     Pancreatic insufficiency      Radius fracture 2018    after fall from wall     Social History:   Deferred.    Family History:  Deferred.    Objective:  Vitals: /64   Pulse 96   Temp 97.8  F (36.6  C) (Oral)   Ht 1.791 m (5' 10.5\")   Wt 74.6 kg (164 lb 8 oz)   SpO2 98%   BMI " 23.27 kg/m    Exam:   Complete exam is not performed today.     Labs:  Component      Latest Ref Rn 12/21/2023  8:33 AM   WBC      4.0 - 11.0 10e3/uL 5.3    RBC Count      4.40 - 5.90 10e6/uL 3.49 (L)    Hemoglobin      13.3 - 17.7 g/dL 11.1 (L)    Hematocrit      40.0 - 53.0 % 34.5 (L)    MCV      78 - 100 fL 99    MCH      26.5 - 33.0 pg 31.8    MCHC      31.5 - 36.5 g/dL 32.2    RDW      10.0 - 15.0 % 15.8 (H)    Platelet Count      150 - 450 10e3/uL 278    % Neutrophils      % 71    % Lymphocytes      % 13    % Monocytes      % 10    % Eosinophils      % 2    % Basophils      % 3    % Immature Granulocytes      % 1    NRBCs per 100 WBC      <1 /100 0    Absolute Neutrophils      1.6 - 8.3 10e3/uL 3.8    Absolute Lymphocytes      0.8 - 5.3 10e3/uL 0.7 (L)    Absolute Monocytes      0.0 - 1.3 10e3/uL 0.5    Absolute Eosinophils      0.0 - 0.7 10e3/uL 0.1    Absolute Basophils      0.0 - 0.2 10e3/uL 0.2    Absolute Immature Granulocytes      <=0.4 10e3/uL 0.0    Absolute NRBCs      10e3/uL 0.0    Sodium      135 - 145 mmol/L 142    Potassium      3.4 - 5.3 mmol/L 4.2    Carbon Dioxide (CO2)      22 - 29 mmol/L 27    Anion Gap      7 - 15 mmol/L 9    Urea Nitrogen      8.0 - 23.0 mg/dL 23.9 (H)    Creatinine      0.67 - 1.17 mg/dL 0.89    GFR Estimate      >60 mL/min/1.73m2 83    Calcium      8.8 - 10.2 mg/dL 9.4    Chloride      98 - 107 mmol/L 106    Glucose      70 - 99 mg/dL 163 (H)    Alkaline Phosphatase      40 - 150 U/L 91    AST      0 - 45 U/L 15    ALT      0 - 70 U/L 9    Protein Total      6.4 - 8.3 g/dL 6.3 (L)    Albumin      3.5 - 5.2 g/dL 3.8    Bilirubin Total      <=1.2 mg/dL 0.2       Imaging:  Reviewed and are as described above.     Assessment:  In summary, Tom is an 88 year old male who has a history of aortic insufficiency, who is found to have an unprovoked left distal lower extremity DVT on 5/29/2019, who was found to have an extension of his DVT despite being on 3 months of apixaban,  ultimately completed dabigatran for another 3 months, returns to clinic today for a re-consultation after he is recently found to have a new acute DVT of the right leg back in Nov 2023.     Back in 2019, he had failed apixaban as his left distal leg DVT clearly had extended despite being on apixaban at the time for 3 months. Then his DVT has completely resolved after 3 months of dabigatran.     He was found to have lymphoma back in June 2023 and eventually underwent sigmoid colectomy. His right leg DVT found on 11/9/2023 was an active cancer and cancer treatment (chemotherapy) provoked DVT.      Diagnosis:  Isolated distal left lower extremity DVT found on 5/29/2019 (unprovoked event).  Extension of left leg DVT found on 8/27/2019 despite being on Eliquis.   S/P 3 months of Pradaxa and repeat ultrasound showed resolved DVT.   Diffuse large B-cell lymphoma found in June 2023. S/P sigmoid colectomy.   Cancer and cancer treatment provoked right leg DVT in Nov 2023.   Pancreatic insufficiency.     Plan:  As mentioned, his recent right leg DVT was a cancer and cancer treatment provoked DVT. He will need at least 3 months of full intensity anticoagulation therapy and if he is able to achieve complete remission from his lymphoma, then he might not need ongoing anticoagulation therapy beyond the 3 months treatment.     As mentioned, currently he is on apixaban. This patient had failed apixaban back in 2019 for his distal left leg DVT as documented above. Additionally, he is status post sigmoid colectomy. Apixaban is mostly absorbed via the colon and thus I do not feel that apixaban is the best anticoagulation therapy for this patient.     I reviewed the literature and dabigatran is belief to be absorbed at the distal stomach and small intestine and also considering the fact that it had proven back in 2019 that dabigatran was effective in this patient as he had cleared his left leg DVT after 3 months of dabigatran. Thus I  will stop his apixaban today and switch him over to dabigatran at 150 mg PO BID dosing. The patient is in agreement with this plan.    My plan summary is as below:  Discontinue apixaban and start dabigatran at 150 mg PO Q 12 hours dosing.   Repeat right leg venous ultrasound in the beginning of Feb 2024 and before his 2/19/2024 scheduled left elbow ulnar nerve decompression / transposition surgery.   Return to see me after his repeat right leg ultrasound and before his left elbow surgery on 2/19/2024.       Ahmet Márquez PA-C, MPAS  Physician Assistant  [facility].     50 minutes spent by me on the date of the encounter doing chart review, history and exam, documentation and further activities per the note.    Time IN: 13:04  Time OUT: 13:30

## 2024-01-03 DIAGNOSIS — I89.0 LYMPHEDEMA: Primary | ICD-10-CM

## 2024-01-05 ENCOUNTER — DOCUMENTATION ONLY (OUTPATIENT)
Dept: PHYSICAL THERAPY | Facility: CLINIC | Age: 89
End: 2024-01-05
Payer: MEDICARE

## 2024-01-05 NOTE — PROGRESS NOTES
DISCHARGE  Reason for Discharge: Patient chooses to discontinue therapy.    Equipment Issued:     Discharge Plan: Patient to continue home program.  Patient participating in physical therapy for lymphedema management of UE.    Referring Provider:  No ref. provider found

## 2024-01-06 ENCOUNTER — OFFICE VISIT (OUTPATIENT)
Dept: URGENT CARE | Facility: URGENT CARE | Age: 89
End: 2024-01-06
Payer: MEDICARE

## 2024-01-06 ENCOUNTER — ANCILLARY PROCEDURE (OUTPATIENT)
Dept: GENERAL RADIOLOGY | Facility: CLINIC | Age: 89
End: 2024-01-06
Attending: FAMILY MEDICINE
Payer: MEDICARE

## 2024-01-06 VITALS
TEMPERATURE: 97.5 F | DIASTOLIC BLOOD PRESSURE: 66 MMHG | SYSTOLIC BLOOD PRESSURE: 108 MMHG | OXYGEN SATURATION: 99 % | RESPIRATION RATE: 16 BRPM | HEART RATE: 80 BPM

## 2024-01-06 DIAGNOSIS — M25.561 ACUTE PAIN OF RIGHT KNEE: Primary | ICD-10-CM

## 2024-01-06 DIAGNOSIS — M25.561 ACUTE PAIN OF RIGHT KNEE: ICD-10-CM

## 2024-01-06 PROCEDURE — 99214 OFFICE O/P EST MOD 30 MIN: CPT | Performed by: FAMILY MEDICINE

## 2024-01-06 PROCEDURE — 73562 X-RAY EXAM OF KNEE 3: CPT | Mod: TC | Performed by: RADIOLOGY

## 2024-01-06 NOTE — PATIENT INSTRUCTIONS
Set up appointment with sports medicine for follow-up in 1-2 weeks if symptoms haven't improved with ice/tylenol/rest      Take tylenol 500mg every 4 hours for discomfort      Ice the area on/off for the next couple days      If you have worsening leg pain/swelling please seek help immediately

## 2024-01-06 NOTE — PROGRESS NOTES
Assessment & Plan     Acute pain of right knee  - XR Knee Right 3 Views  - Orthopedic  Referral     Xray showing effusion suggesting this may be from mild arthritis of the knee joint, referral to sports medicine provided.     Patient understands if symptoms worsen will need to be evaluated and may need follow-up ultrasound to investigate further for possible expansion/worsening thrombosis.    Should be noted he changed blood thinners recently from eliquis to dabigatran. Both of which are absorbed in the upper GI.     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 He wakes up this morning with increased right knee pain and possible swelling. His primary discomfort is in the back of his knee. He has no chest pain or shortness of breath.    Denies hx of knee injuries/arthritis.     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) 08/21/2023     Priority: Medium    Prevention of chemotherapy-induced neutropenia 08/21/2023     Priority: Medium    Colonic mass 07/27/2023      Priority: Medium    Diarrhea, unspecified type 06/15/2023     Priority: Medium    Infection due to 2019 novel coronavirus 05/11/2023     Priority: Medium    Age-related osteoporosis without current pathological fracture 02/01/2021     Priority: Medium    Dermatofibroma 10/15/2017     Priority: Medium    Seborrheic keratoses, inflamed 10/15/2017     Priority: Medium    Eczema 08/01/2011     Priority: Medium    Pure hypercholesterolemia 08/01/2011     Priority: Medium    Male erectile disorder 08/01/2011     Priority: Medium       Current Outpatient Medications   Medication    acyclovir (ZOVIRAX) 400 MG tablet    calcium carbonate (OS-DAVIDE) 1500 (600 Ca) MG tablet    Cholecalciferol (VITAMIN D-3) 25 MCG (1000 UT) CAPS    dabigatran ANTICOAGULANT (PRADAXA) 150 MG capsule    lipase-protease-amylase (CREON) 40882-37980-576842 units CPEP per EC capsule    multivitamin (OCUVITE) TABS    sodium chloride (LAXMI 128) 5 % ophthalmic ointment    predniSONE (DELTASONE) 20 MG tablet    prochlorperazine (COMPAZINE) 10 MG tablet     No current facility-administered medications for this visit.           Objective    /66   Pulse 80   Temp 97.5  F (36.4  C) (Temporal)   Resp 16   SpO2 99%   Physical Exam       R leg: able to extend knee to 150 degrees  CV: HDS  Pulm: non-labored  Gait: stable, straight, slightly antalgic favoring left side, good pace for age    Results for orders placed or performed in visit on 01/06/24   XR Knee Right 3 Views     Status: None    Narrative    EXAM: XR KNEE RIGHT 3 VIEWS  LOCATION: [facility]  DATE: 1/6/2024    INDICATION: cancer patient, hx of dVT. worsening knee pain and limited flexion  COMPARISON: None.      Impression    IMPRESSION: No acute fracture or malalignment. Mild medial and patellofemoral compartment degenerative changes. Small knee joint effusion. Multiple small bodies posteriorly, may be within a popliteal cyst. Chondrocalcinosis.          The use of  [employer] dictation services may have been used to construct the content in this note; any grammatical or spelling errors are non-intentional. Please contact the author of this note directly if you are in need of any clarification.

## 2024-01-19 DIAGNOSIS — I89.0 LYMPHEDEMA: Primary | ICD-10-CM

## 2024-01-25 ENCOUNTER — LAB (OUTPATIENT)
Dept: LAB | Facility: CLINIC | Age: 89
End: 2024-01-25
Payer: MEDICARE

## 2024-01-25 DIAGNOSIS — C83.398 DIFFUSE LARGE B-CELL LYMPHOMA OF EXTRANODAL SITE: ICD-10-CM

## 2024-01-25 LAB
ALBUMIN SERPL BCG-MCNC: 3.8 G/DL (ref 3.5–5.2)
ALP SERPL-CCNC: 66 U/L (ref 40–150)
ALT SERPL W P-5'-P-CCNC: 8 U/L (ref 0–70)
ANION GAP SERPL CALCULATED.3IONS-SCNC: 9 MMOL/L (ref 7–15)
AST SERPL W P-5'-P-CCNC: 17 U/L (ref 0–45)
BASOPHILS # BLD AUTO: 0.1 10E3/UL (ref 0–0.2)
BASOPHILS NFR BLD AUTO: 2 %
BILIRUB SERPL-MCNC: 0.2 MG/DL
BUN SERPL-MCNC: 19.5 MG/DL (ref 8–23)
CALCIUM SERPL-MCNC: 9.3 MG/DL (ref 8.8–10.2)
CHLORIDE SERPL-SCNC: 106 MMOL/L (ref 98–107)
CREAT SERPL-MCNC: 1.06 MG/DL (ref 0.67–1.17)
DEPRECATED HCO3 PLAS-SCNC: 27 MMOL/L (ref 22–29)
EGFRCR SERPLBLD CKD-EPI 2021: 68 ML/MIN/1.73M2
EOSINOPHIL # BLD AUTO: 0.2 10E3/UL (ref 0–0.7)
EOSINOPHIL NFR BLD AUTO: 3 %
ERYTHROCYTE [DISTWIDTH] IN BLOOD BY AUTOMATED COUNT: 14 % (ref 10–15)
GLUCOSE SERPL-MCNC: 139 MG/DL (ref 70–99)
HCT VFR BLD AUTO: 35.3 % (ref 40–53)
HGB BLD-MCNC: 11.2 G/DL (ref 13.3–17.7)
IMM GRANULOCYTES # BLD: 0 10E3/UL
IMM GRANULOCYTES NFR BLD: 0 %
LYMPHOCYTES # BLD AUTO: 0.7 10E3/UL (ref 0.8–5.3)
LYMPHOCYTES NFR BLD AUTO: 10 %
MCH RBC QN AUTO: 32.5 PG (ref 26.5–33)
MCHC RBC AUTO-ENTMCNC: 31.7 G/DL (ref 31.5–36.5)
MCV RBC AUTO: 102 FL (ref 78–100)
MONOCYTES # BLD AUTO: 0.8 10E3/UL (ref 0–1.3)
MONOCYTES NFR BLD AUTO: 11 %
NEUTROPHILS # BLD AUTO: 5.4 10E3/UL (ref 1.6–8.3)
NEUTROPHILS NFR BLD AUTO: 74 %
NRBC # BLD AUTO: 0 10E3/UL
NRBC BLD AUTO-RTO: 0 /100
PLATELET # BLD AUTO: 251 10E3/UL (ref 150–450)
POTASSIUM SERPL-SCNC: 4.6 MMOL/L (ref 3.4–5.3)
PROT SERPL-MCNC: 6.1 G/DL (ref 6.4–8.3)
RBC # BLD AUTO: 3.45 10E6/UL (ref 4.4–5.9)
SODIUM SERPL-SCNC: 142 MMOL/L (ref 135–145)
WBC # BLD AUTO: 7.3 10E3/UL (ref 4–11)

## 2024-01-25 PROCEDURE — 85025 COMPLETE CBC W/AUTO DIFF WBC: CPT

## 2024-01-25 PROCEDURE — 82040 ASSAY OF SERUM ALBUMIN: CPT

## 2024-01-25 PROCEDURE — 36415 COLL VENOUS BLD VENIPUNCTURE: CPT

## 2024-01-29 ENCOUNTER — OFFICE VISIT (OUTPATIENT)
Dept: INTERNAL MEDICINE | Facility: CLINIC | Age: 89
End: 2024-01-29
Payer: MEDICARE

## 2024-01-29 VITALS
HEIGHT: 71 IN | HEART RATE: 82 BPM | SYSTOLIC BLOOD PRESSURE: 105 MMHG | DIASTOLIC BLOOD PRESSURE: 64 MMHG | WEIGHT: 164.2 LBS | OXYGEN SATURATION: 97 % | BODY MASS INDEX: 22.99 KG/M2

## 2024-01-29 DIAGNOSIS — G56.22 ULNAR NEUROPATHY AT ELBOW, LEFT: ICD-10-CM

## 2024-01-29 DIAGNOSIS — Z51.11 ENCOUNTER FOR ANTINEOPLASTIC CHEMOTHERAPY: ICD-10-CM

## 2024-01-29 DIAGNOSIS — Z76.89 PREVENTION OF CHEMOTHERAPY-INDUCED NEUTROPENIA: ICD-10-CM

## 2024-01-29 DIAGNOSIS — I82.431 ACUTE DEEP VEIN THROMBOSIS (DVT) OF POPLITEAL VEIN OF RIGHT LOWER EXTREMITY (H): ICD-10-CM

## 2024-01-29 DIAGNOSIS — Z01.818 PREOPERATIVE EXAMINATION: Primary | ICD-10-CM

## 2024-01-29 DIAGNOSIS — C83.398 DIFFUSE LARGE B-CELL LYMPHOMA OF EXTRANODAL SITE: ICD-10-CM

## 2024-01-29 DIAGNOSIS — K86.89 PANCREATIC INSUFFICIENCY: ICD-10-CM

## 2024-01-29 PROCEDURE — 99214 OFFICE O/P EST MOD 30 MIN: CPT | Performed by: INTERNAL MEDICINE

## 2024-01-29 RX ORDER — PREDNISONE 20 MG/1
30 TABLET ORAL 2 TIMES DAILY
COMMUNITY
Start: 2024-01-29 | End: 2024-02-23

## 2024-01-29 NOTE — PROGRESS NOTES
Surgeon (please enter first and last name): Deniz Diamond MD  Fax number for Preop Evaluation: N/A  Location of Surgery: OR 07  Date of Surgery: 02/19/2024  Procedure: LEFT ULNAR NERVE DECOMPRESSION AT THE ELBOW WITH POSSIBLE ANTERIOR TRANSPOSITION   History of reaction to anesthesia? No    MAVERICK Joshua at 10:44 AM on 1/29/2024.

## 2024-01-29 NOTE — PROGRESS NOTES
Preoperative Evaluation  [facility] [address]  [address] [facility] [address]  Phone: [phone]  Fax: [phone]  Primary Provider: Jordy Robertson  Pre-op Performing Provider: JORDY ROBERTSON  Jan 29, 2024       Juaquin is a 89 year old, presenting for the following:  Pre-Op Exam (Pt here for pre op exam)        1/29/2024    10:51 AM   Additional Questions   Roomed by MVO   Accompanied by Annamarie Wife     Surgical Information  Surgery/Procedure: Left Ulnar Nerve Decompression at the Elbow with Possible Anterior Transportation  Surgery Location: Left Arm  Surgeon: Dr Alfred  Surgery Date: 02/19/2024  Time of Surgery: TBD  Where patient plans to recover: At home with family  Fax number for surgical facility: Note does not need to be faxed, will be available electronically in Epic.    Assessment & Plan     The proposed surgical procedure is considered LOW-INTERMEDIATE risk.    Preoperative examination  Patient here for a pre-operative physical.  Denies SOB, chest pain.  Able to walk up 15 stairs daily without excretion.  Denies cold/flu symptoms, states that her feels pretty good overall and is hoping that the surgery brings back some sensation in his left hand and fingers.      Ulnar neuropathy at elbow, left  Ulnar neuropathy is persistent since his abdominal surgery for Lymphoma.  Ulnar neuropathy resulted from OR positioning on the left arm.  Working with PT was helpful but did not resolve the ulnar neuropathy fully.  Surgery scheduled 2/19    Acute deep vein thrombosis (DVT) of popliteal vein of right lower extremity (H)  Ankle edema present bilaterally, slightly worse at the ankle on the left.  Pedal pulses diminished but present.  CMS intact bilaterally in LE.  US and follow up scheduled on 2/8.  Hematology to follow results of US and advise on dosage and/or bridging of Pradaxa.    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  PET scan scheduled for 1/30/24  - predniSONE (DELTASONE) 20 MG tablet      Pancreatic insufficiency  Patient using 2 tabs of Creon daily.  Rx adjusted.  - lipase-protease-amylase (CREON) [id number] units CPEP per EC capsule  Dispense: 180 capsule; Refill: 4              - No identified additional risk factors other than previously addressed    Antiplatelet or Anticoagulation Medication Instructions   - dabigatran (Pradaxa): Bleeding risk is low for this procedure AND CrCl (>=) 80 mL/min. Hematology to follow up on US results related to current blood clot on RLL.      Additional Medication Instructions       Recommendation  APPROVAL GIVEN to proceed with proposed procedure, without further diagnostic evaluation.        Subjective       HPI related to upcoming procedure:   Numbness in left arm.  Pinkie and ring fingers are numb, thumb, pointer finger and middle finger have normal sensation.  Limited mobility in all 5 fingers on left side        1/29/2024    10:56 AM   Preop Questions   1. Have you ever had a heart attack or stroke? No   2. Have you ever had surgery on your heart or blood vessels, such as a stent placement, a coronary artery bypass, or surgery on an artery in your head, neck, heart, or legs? No   3. Do you have chest pain with activity? No   4. Do you have a history of  heart failure? No   5. Do you currently have a cold, bronchitis or symptoms of other infection? No   6. Do you have a cough, shortness of breath, or wheezing? No   7. Do you or anyone in your family have previous history of blood clots? YES - behind right knee   8. Do you or does anyone in your family have a serious bleeding problem such as prolonged bleeding following surgeries or cuts? No   9. Have you ever had problems with anemia or been told to take iron pills? No   10. Have you had any abnormal blood loss such as black, tarry or bloody stools? No   11. Have you ever had a blood transfusion? No   12. Are  you willing to have a blood transfusion if it is medically needed before, during, or after your surgery? Yes   13. Have you or any of your relatives ever had problems with anesthesia? No   14. Do you have sleep apnea, excessive snoring or daytime drowsiness? No   15. Do you have any artifical heart valves or other implanted medical devices like a pacemaker, defibrillator, or continuous glucose monitor? No   16. Do you have artificial joints? No   17. Are you allergic to latex? No       Health Care Directive  Patient has a Health Care Directive on file      Preoperative Review of    reviewed - controlled substances reflected in medication list.          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) 08/21/2023     Priority: Medium    Prevention of chemotherapy-induced neutropenia 08/21/2023     Priority: Medium    Colonic mass 07/27/2023     Priority: Medium    Diarrhea, unspecified type 06/15/2023     Priority: Medium    Infection due to 2019 novel coronavirus 05/11/2023     Priority: Medium    Age-related osteoporosis without current pathological fracture 02/01/2021     Priority: Medium    Dermatofibroma 10/15/2017     Priority: Medium    Seborrheic keratoses, inflamed 10/15/2017     Priority: Medium    Eczema 08/01/2011     Priority: Medium    Pure hypercholesterolemia 08/01/2011     Priority: Medium    Male erectile disorder 08/01/2011     Priority: Medium      Past Medical History:   Diagnosis Date    Aortic insufficiency     DLBCL (diffuse large B cell lymphoma) (H) 07/2023    colon/abdomen, s/p mini-R-CHOP x 6    DVT (deep venous thrombosis) (H) 11/2023    R popliteal    Erectile dysfunction     History of deep venous thrombosis 05/2019     distal L DVT with extension, completed 6 months of AC (failed eliquis)    History of pelvic fracture     Hyperlipidemia     Low back pain     disc disease s/p laminectomy in past    Macular degeneration     on Avastin, R eye    Pancreatic cyst 07/2018    next MR due 12/23    Pancreatic insufficiency     Radius fracture 2018    after fall from wall     Past Surgical History:   Procedure Laterality Date    CATARACT IOL, RT/LT  01/01/2001    COLONOSCOPY N/A 06/28/2023    Procedure: COLONOSCOPY, WITH BIOPSY;  Surgeon: Jhonatan Cowart MD;  Location: [facility] OR    COMBINED REPAIR PTOSIS WITH BLEPHAROPLASTY BILATERAL Bilateral 04/06/2018    Procedure: COMBINED REPAIR PTOSIS WITH BLEPHAROPLASTY BILATERAL;  BILATERAL UPPER EYELIDS BLEPHAROPLASTY AND PTOSIS REPAIR ;  Surgeon: Anuj Good MD;  Location: [facility]    EYE SURGERY      INSERT PORT VASCULAR ACCESS N/A 8/24/2023    Procedure: Insert port vascular access;  Surgeon: Cesar Negro MD;  Location: [facility] OR    IR CHEST PORT PLACEMENT > 5 YRS OF AGE  8/24/2023    LAMINECTOMY LUMBAR TWO LEVELS  01/01/1993    L4-L5    LAPAROSCOPIC ASSISTED SIGMOID COLECTOMY N/A 7/27/2023    Procedure: COLECTOMY, SIGMOID, LAPAROSCOPIC assisted;  Surgeon: Jhonatan Cowart MD;  Location: [facility] OR    ORIF RADIAL SHAFT FRACTURE Right 2018    RECESSION RESECTION (REPAIR STRABISMUS)  01/01/1949    SIGMOIDOSCOPY FLEXIBLE N/A 7/27/2023    Procedure: Sigmoidoscopy flexible;  Surgeon: Jhonatan Cowart MD;  Location: [facility] OR     Current Outpatient Medications   Medication Sig Dispense Refill    acyclovir (ZOVIRAX) 400 MG tablet Take 1 tablet (400 mg) by mouth every 12 hours for 30 days 60 tablet 11    calcium carbonate (OS-DAVIDE) 1500 (600 Ca) MG tablet Take 600 mg by mouth 2 times daily (with meals)      Cholecalciferol (VITAMIN D-3) 25 MCG (1000 UT) CAPS Take by mouth daily      dabigatran ANTICOAGULANT (PRADAXA) 150 MG capsule Take 1 capsule (150 mg) by mouth 2 times  daily Store in original 's bottle or blister pack; use within 120 days of opening. 60 capsule 2    lipase-protease-amylase (CREON) [id number] units CPEP per EC capsule Take 2 capsules by mouth 3 times daily (with meals) And 1 with snacks. 200 capsule 4    multivitamin (OCUVITE) TABS Take 1 tablet by mouth 2 times daily      predniSONE (DELTASONE) 20 MG tablet Take 1.5 tablets (30 mg) by mouth 2 times daily Take on Days 1 through 5. Take first dose in AM prior to chemotherapy. 15 tablet 7    prochlorperazine (COMPAZINE) 10 MG tablet Take 1 tablet (10 mg) by mouth every 6 hours as needed for nausea or vomiting (Patient not taking: Reported on 10/20/2023) 30 tablet 2    sodium chloride (LAXMI 128) 5 % ophthalmic ointment Apply a small amount in both eyes at least once a day (bedtime). 3.5 g 11       Allergies   Allergen Reactions    Codeine Sulfate Nausea and Vomiting        Social History     Tobacco Use    Smoking status: Former     Packs/day: 2.00     Years: 2.00     Additional pack years: 0.00     Total pack years: 4.00     Types: Cigarettes     Quit date: 1954     Years since quittin.6     Passive exposure: Past    Smokeless tobacco: Never   Substance Use Topics    Alcohol use: Yes     Alcohol/week: 3.0 standard drinks of alcohol     Types: 3 Glasses of wine per week     Comment: glass of wine with dinner       History   Drug Use Unknown         Review of Systems    Review of Systems  CONSTITUTIONAL: NEGATIVE for fever, chills, change in weight  INTEGUMENTARY/SKIN: NEGATIVE for worrisome rashes, moles or lesions  EYES: NEGATIVE for vision changes or irritation  ENT/MOUTH: NEGATIVE for ear, mouth and throat problems  RESP: NEGATIVE for significant cough or SOB  BREAST: NEGATIVE for masses, tenderness or discharge  CV: NEGATIVE for chest pain, palpitations or peripheral edema  GI: NEGATIVE for nausea, abdominal pain, heartburn, or change in bowel habits  : NEGATIVE for frequency,  "dysuria, or hematuria  MUSCULOSKELETAL: NEGATIVE for significant arthralgias or myalgia  NEURO: NEGATIVE for weakness, dizziness or paresthesias.  Decreased strength and sensation on left hand  ENDOCRINE: NEGATIVE for temperature intolerance, skin/hair changes  HEME: NEGATIVE for bleeding problems  PSYCHIATRIC: NEGATIVE for changes in mood or affect  Objective    /64 (BP Location: Right arm, Patient Position: Sitting, Cuff Size: Adult Regular)   Pulse 82   Ht 1.791 m (5' 10.5\")   Wt 74.5 kg (164 lb 3.2 oz)   SpO2 97%   BMI 23.23 kg/m     Estimated body mass index is 23.23 kg/m  as calculated from the following:    Height as of this encounter: 1.791 m (5' 10.5\").    Weight as of this encounter: 74.5 kg (164 lb 3.2 oz).  Physical Exam  GENERAL: alert and no distress  EYES: Eyes grossly normal to inspection, PERRL and conjunctivae and sclerae normal  HENT: ear canals and TM's normal, nose and mouth without ulcers or lesions  NECK: no adenopathy, no asymmetry, masses, or scars  RESP: lungs clear to auscultation - no rales, rhonchi or wheezes  CV: regular rate and rhythm, normal S1 S2, no S3 or S4, no murmur, click or rub, no peripheral edema  ABDOMEN: soft, nontender, no hepatosplenomegaly, no masses and bowel sounds normal  MS: no gross musculoskeletal defects noted, Bilateral ankle edema +1, slightly worse on right foot.  Pedal pulses +1.  CMS intact in feet bilaterally.    SKIN: no suspicious lesions or rashes.  Multiple seborrheic keratosis throughout chest and abdomen.    NEURO: Normal strength and tone, mentation intact and speech normal.  Decreased ROM and strength on left hand.  PSYCH: mentation appears normal, affect normal/bright  LYMPH: no cervical, supraclavicular, axillary, or inguinal adenopathy    Recent Labs   Lab Test 01/25/24  0912 12/21/23  0833 07/20/23  1041 07/10/23  1515   HGB 11.2* 11.1*   < > 12.9*    278   < > 305   INR  --   --   --  1.01    142   < > 140   POTASSIUM " 4.6 4.2   < > 5.0   CR 1.06 0.89   < > 1.03    < > = values in this interval not displayed.        Diagnostics    No EKG required for low risk surgery (cataract, skin procedure, breast biopsy, etc).  Had echo and EKG this summer. Additionally underwent colon resection surgery this summer with gen anesthesia and did well.    Revised Cardiac Risk Index (RCRI)  The patient has the following serious cardiovascular risks for perioperative complications:   - No serious cardiac risks = 0 points     RCRI Interpretation: 0 points: Class I (very low risk - 0.4% complication rate)       Maggie Miramontes NP student    I was present with the student who participated in the service and in the documentation of the note. I have verified the history and personally performed the physical exam and medical decision making. I agree with the assessment and plan of care as documented in the note.  Signed Electronically by: Angeli Robertson MD  Copy of this evaluation report is provided to requesting physician.

## 2024-01-30 ENCOUNTER — HOSPITAL ENCOUNTER (OUTPATIENT)
Dept: PET IMAGING | Facility: CLINIC | Age: 89
Discharge: HOME | End: 2024-01-30
Attending: STUDENT IN AN ORGANIZED HEALTH CARE EDUCATION/TRAINING PROGRAM | Admitting: STUDENT IN AN ORGANIZED HEALTH CARE EDUCATION/TRAINING PROGRAM
Payer: MEDICARE

## 2024-01-30 PROCEDURE — A9552 F18 FDG: HCPCS | Performed by: STUDENT IN AN ORGANIZED HEALTH CARE EDUCATION/TRAINING PROGRAM

## 2024-01-30 PROCEDURE — 78816 PET IMAGE W/CT FULL BODY: CPT | Mod: 26 | Performed by: STUDENT IN AN ORGANIZED HEALTH CARE EDUCATION/TRAINING PROGRAM

## 2024-01-30 PROCEDURE — G1010 CDSM STANSON: HCPCS | Performed by: STUDENT IN AN ORGANIZED HEALTH CARE EDUCATION/TRAINING PROGRAM

## 2024-01-30 PROCEDURE — 78816 PET IMAGE W/CT FULL BODY: CPT | Mod: MG,PS

## 2024-01-30 PROCEDURE — 343N000001 HC RX 343: Performed by: STUDENT IN AN ORGANIZED HEALTH CARE EDUCATION/TRAINING PROGRAM

## 2024-01-30 RX ADMIN — FLUDEOXYGLUCOSE F-18 10.15 MILLICURIE: 500 INJECTION, SOLUTION INTRAVENOUS at 13:17

## 2024-01-31 NOTE — PROGRESS NOTES
[facility]  [address] [place] [id number]  Main: [phone], Fax: [phone]    Patient seen at: [facility] at [address] [place] [address]    Outpatient Visit Note:    Patient: Tom Saini  MRN: [id number]  : 1935  KEN: 2024  Location of this writer at the time of this clinic visit was conducted: [facility].  Location of the patient at the time of this clinic visit was conducted: [facility].     Reason for visit:  History of distal left leg DVT in May 2019. Newly diagnosed colonic mass (B-cell lymphoma) in 2023. Newly found right leg DVT in 2023.      Clinical History Summary:  This is a 89 year old male with a history of hyperlipidemia, aortic insufficiency, who was diagnosed with a left distal lower extremity DVT back on 2019, who found to have an extension of his left leg DVT despite being on 3 months of apixaban, who was placed on dabigatran in Aug 2019 where his DVT was cleared after 3 months of dabigatran, for which his anticoagulation was discontinued in 2019 by this writer, who then was found to have a new acute right leg DVT in 2023 in the setting of newly diagnosed B-cell lymphoma, currently on dabigatran at 150 mg PO BID, returns to clinic today for his follow up visit after he was last seen by this writer on 2023.      Thrombosis History Summary:  Back in 2019, he suffered a one week history of left calf pain for which he was eventually diagnosed with left distal leg DVT (nonocclusive DVT of one of the paired peroneal veins in the mid calf and a Baker's cyst measuring 4.4 x 1.8 x 3.4 cm. This DVT was seemingly unprovoked. He was placed on apixaban.    2019, he was seen by this writer and recommended that he should complete 3 months of anticoagulation  therapy with apixaban and then repeat left leg venous ultrasound.   Aug 2019, a repeat ultrasound showed an unexpected clear extension of his thrombus within the left peroneal vein. Thus we have determined that he had failed apixaban and this writer (after discussion with Dr. Mayes, staff hematologist), switched the patient to dabigatran.   Nov 2019, repeat left leg venous ultrasound showed complete resolution of his DVT and thus anticoagulation therapy was discontinued. It was also the last time I have seen this patient.   In June 2023, he was found to have pancreatic insufficiency. 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 Attempted colonoscopy was done but failed due to obliterated lumen. 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   He begun chemotherapy at the end of Aug 2023.  11/9/2023, he presented to the emergency department with right leg pain and swelling. Right leg venous ultrasound showed: Occlusive right popliteal vein DVT, nonocclusive portion just proximal above the knee joint line, with nonocclusive clot extension into one of the posterior tibial veins. Possible variant anatomy noted above. He was started on apixaban.   12/15/2023, he had a repeat right leg venous ultrasound done which showed: 1. Overall, no significant change right lower extremity DVT compared with prior ultrasound dated 11/19/2023. There is currently nonocclusive thrombus in the right popliteal vein, occlusive thrombus within one of the paired posterior tibial veins and nonocclusive thrombus in the other paired posterior tibial vein. 2. No evidence of deep venous thrombosis in the left lower extremity.     Interim History:  12/28/2023, he re-established care with this writer due to new acute DVT while undergoing active lymphoma treatment at the time. During his visit with me on 12/28/2023, I had  "recommended that he discontinue apixaban and re-start dabigatran at 150 mg PO BID dosing.   1/30/2024, PET scan showed: In this patient with diffuse large B-cell lymphoma chemotherapy: Complete response to chemotherapy. No suspicious hypermetabolic focus.   2/1/2024, he had a follow up visit with Dr. Buitrago who stated that the patient is in complete remission and recommend stopping treatment and proceed to lymphoma surveillance.   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 Resolution of previously noted posterior tibial vein thrombus.   2/19/2024, he is currently scheduled for left elbow ulnar nerve decompression / transposition surgery.     Currently Juaquin is on dabigatran 150 mg PO BID dosing. He denies any bleeding issues. He presents to the office today with his wife. He does endorse occasional right leg swelling. He does use knee high graduated compression stockings. He denies any lower extremity pain.     ROS:  Denies any bleeding complications. Specifically, no frequent epistaxis. No issues with oral mucosal bleeding. Denies any hematuria or blood in stools. Denies any shortness of breath. No chest pain. No cough. No fever.    Medications, Allergies and PmHx:  All are reviewed by this writer today via electronic medical records    Social History:   Deferred.    Family History:  Deferred.    Objective:  Vitals: /66   Pulse 85   Temp 97.6  F (36.4  C) (Oral)   Ht 1.791 m (5' 10.5\")   Wt 73.8 kg (162 lb 12.8 oz)   SpO2 96%   BMI 23.03 kg/m    Exam:   Complete exam is not performed today.       Labs:  Component      Latest Ref Rng 1/25/2024  9:12 AM   WBC      4.0 - 11.0 10e3/uL 7.3    RBC Count      4.40 - 5.90 10e6/uL 3.45 (L)    Hemoglobin      13.3 - 17.7 g/dL 11.2 (L)    Hematocrit      40.0 - 53.0 % 35.3 (L)    MCV      78 - 100 fL 102 (H)    MCH      26.5 - 33.0 pg 32.5    MCHC      31.5 - 36.5 g/dL 31.7    RDW      10.0 - 15.0 % " 14.0    Platelet Count      150 - 450 10e3/uL 251    % Neutrophils      % 74    % Lymphocytes      % 10    % Monocytes      % 11    % Eosinophils      % 3    % Basophils      % 2    % Immature Granulocytes      % 0    NRBCs per 100 WBC      <1 /100 0    Absolute Neutrophils      1.6 - 8.3 10e3/uL 5.4    Absolute Lymphocytes      0.8 - 5.3 10e3/uL 0.7 (L)    Absolute Monocytes      0.0 - 1.3 10e3/uL 0.8    Absolute Eosinophils      0.0 - 0.7 10e3/uL 0.2    Absolute Basophils      0.0 - 0.2 10e3/uL 0.1    Absolute Immature Granulocytes      <=0.4 10e3/uL 0.0    Absolute NRBCs      10e3/uL 0.0    Sodium      135 - 145 mmol/L 142    Potassium      3.4 - 5.3 mmol/L 4.6    Carbon Dioxide (CO2)      22 - 29 mmol/L 27    Anion Gap      7 - 15 mmol/L 9    Urea Nitrogen      8.0 - 23.0 mg/dL 19.5    Creatinine      0.67 - 1.17 mg/dL 1.06    GFR Estimate      >60 mL/min/1.73m2 68    Calcium      8.8 - 10.2 mg/dL 9.3    Chloride      98 - 107 mmol/L 106    Glucose      70 - 99 mg/dL 139 (H)    Alkaline Phosphatase      40 - 150 U/L 66    AST      0 - 45 U/L 17    ALT      0 - 70 U/L 8    Protein Total      6.4 - 8.3 g/dL 6.1 (L)    Albumin      3.5 - 5.2 g/dL 3.8    Bilirubin Total      <=1.2 mg/dL 0.2       Imaging:  Reviewed and are as described above.     Assessment:  In summary, Tom is a 89 year old male with a history of hyperlipidemia, aortic insufficiency, who was diagnosed with a left distal lower extremity DVT back on 5/29/2019, who found to have an extension of his left leg DVT despite being on 3 months of apixaban, who was placed on dabigatran in Aug 2019 where his DVT was cleared after 3 months of dabigatran, for which his anticoagulation was discontinued in Nov 2019 by this writer, who then was found to have a new acute right leg DVT in Nov 2023 in the setting of newly diagnosed B-cell lymphoma, currently on dabigatran at 150 mg PO BID, returns to clinic today for his follow up visit after he was last seen by  this writer on 12/28/2023.      Back in 2019, he had failed apixaban as his left distal leg DVT clearly had extended despite being on apixaban at the time for 3 months. Then his DVT has completely resolved after 3 months of dabigatran.      He was found to have lymphoma back in June 2023 and eventually underwent sigmoid colectomy. His right leg DVT found on 11/9/2023 was an active cancer and cancer treatment (chemotherapy) provoked DVT. Repeat ultrasound done earlier today (2/9/2024) showed small amount of nonocclusive thrombus remaining at the popliteal vein.      Diagnosis:  Isolated distal left lower extremity DVT found on 5/29/2019 (unprovoked event).  Extension of left leg DVT found on 8/27/2019 despite being on Eliquis.   S/P 3 months of Pradaxa and repeat ultrasound showed resolved DVT.   Diffuse large B-cell lymphoma found in June 2023. S/P sigmoid colectomy.   Cancer and cancer treatment provoked right leg DVT in Nov 2023.   Pancreatic insufficiency.     Plan:  I congratulates the patient about the result of his recent PET scan confirming that he is in complete remission from his lymphoma. Currently there are no further treatment is planned and he is scheduled to have the Port-a-cath removed on 2/23/2024.     Considering that his right leg DVT has improved on today's repeat ultrasound and considering that he is now in complete remission from his lymphoma and no longer need any cancer treatments, I provided him with the following options in regard to his anticoagulation therapy:  Option #1: Discontinue all anticoagulation therapy at this time considering that ongoing full intensity anticoagulation therapy with dabigatran at his age does carry a risk of bleeding.   Option #2: Continue dabigatran at 150 mg PO BID for another 3 months as technically that he still has a residual thrombus and technically (if we do belief that apixaban was not effective in this patient), he only had about 1.5 months of effective  anticoagulation since I have switched him from apixaban to dabigatran back on 12/28/2023. If he choose this option, then I will have him hold his dabigatran for 48 hours prior to his upcoming elbow surgery on 2/19/2023 and keep him off of the medication through 2/23/2024 when he has his port-a-cath removal surgery and restart his dabigatran on 2/24/2024 and continue dabigatran until 5/9/2024. Then we will repeat another right leg venous ultrasound at that time. I explain to him that this approach will not guarantee that his remaining right leg thrombus will completely resolve.     After answering all Juaquin and his wife's questions to their satisfaction, Juaquin is opting to discontinue all anticoagulation therapy after he has finished his current supply of dabigatran on hand, which is about 7-10 days. I have instructed him to basically stop his dabigatran on 2/17/2024 in preparation of his elbow surgery on 2/19/2024.     If he should found to have recurrent lymphoma or cancer, I am recommending that he should be placed on dabigatran at 150 mg PO BID dosing for secondary pharmacological DVT/PE prophylaxis.     At this time, I have no further plans to see this patient back on a routine basis.     Thank you for letting us to participate in this patient's care.       Ahmet Márquez PA-C, MPAS  Physician Assistant  [facility].     31 minutes spent by me on the date of the encounter doing chart review, history and exam, documentation and further activities per the note.    Time IN: 12:38  Time OUT: 13:02

## 2024-02-01 ENCOUNTER — ONCOLOGY VISIT (OUTPATIENT)
Dept: ONCOLOGY | Facility: CLINIC | Age: 89
End: 2024-02-01
Attending: STUDENT IN AN ORGANIZED HEALTH CARE EDUCATION/TRAINING PROGRAM
Payer: MEDICARE

## 2024-02-01 VITALS
DIASTOLIC BLOOD PRESSURE: 61 MMHG | TEMPERATURE: 97.5 F | OXYGEN SATURATION: 97 % | SYSTOLIC BLOOD PRESSURE: 116 MMHG | BODY MASS INDEX: 23.17 KG/M2 | RESPIRATION RATE: 20 BRPM | HEART RATE: 81 BPM | WEIGHT: 163.8 LBS

## 2024-02-01 DIAGNOSIS — C83.398 DIFFUSE LARGE B-CELL LYMPHOMA OF EXTRANODAL SITE: ICD-10-CM

## 2024-02-01 DIAGNOSIS — C83.30 DIFFUSE LARGE B-CELL LYMPHOMA, UNSPECIFIED BODY REGION (H): Primary | ICD-10-CM

## 2024-02-01 PROCEDURE — G0463 HOSPITAL OUTPT CLINIC VISIT: HCPCS | Performed by: STUDENT IN AN ORGANIZED HEALTH CARE EDUCATION/TRAINING PROGRAM

## 2024-02-01 PROCEDURE — 99214 OFFICE O/P EST MOD 30 MIN: CPT | Performed by: STUDENT IN AN ORGANIZED HEALTH CARE EDUCATION/TRAINING PROGRAM

## 2024-02-01 RX ORDER — ACYCLOVIR 400 MG/1
400 TABLET ORAL EVERY 12 HOURS
Qty: 60 TABLET | Refills: 11 | Status: SHIPPED | OUTPATIENT
Start: 2024-02-01 | End: 2024-05-31

## 2024-02-01 ASSESSMENT — PAIN SCALES - GENERAL: PAINLEVEL: NO PAIN (0)

## 2024-02-01 NOTE — PROGRESS NOTES
Julee Reza is a 89 year old, presenting for the following health issues:  Oncology Clinic Visit (Lymphoma of intestine )    HPI     Oncology History Overview Note   This is an 88-year-old gentleman coming in with newly diagnosed DLBCL composite with follicular lymphoma found in colon mass.  He has had diarrhea with particular food for several years and recently was investigated for possible pancreatic insufficiency.  He also has a history of pancreatic pseudocyst.  On recent MRI done in June 2023 to follow-up on pancreatic pseudocyst showed a colonic mass.  Subsequent CT scan of chest abdomen pelvis showed Mild anemia, no cytopenias 7.9 x 6.9 x 5.6 cm large soft tissue mass in sigmoid colon obliterating the lumen and significant extramural soft tissue extension superiorly in the sigmoid mesocolon.  Enlarged bilateral inguinal lymph node, as few small prominent lymph nodes along superior rectal/inferior mesenteric artery distribution.  Diffuse main pancreatic duct dilatation with large intraductal calculus in the head was seen as well.  Multiple cystic lesions and dilated sidebranches were seen in the pancreas.  He underwent colonoscopy but due to obliterated lumen, colonoscopy failed.  Subsequently CT colonography was done which showed similar results to CT chest abdomen pelvis.  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  FISH showed Bcl-2 rearrangement but no MYC rearrangement.  Bcl-2 IgH rearrangement [translocation (14;18)] is consistent with transformation of DLBCL from follicular lymphoma.  Postoperative course was complicated by postoperative nausea vomiting which resolved, single episode of suicidal ideation which resolved.  He was discharged to TCU.  He has been regaining his strength slowly, his performance status has improved to 1.  He is able to walk independently for short distances, can do basic activities of daily living himself.    PET scan  revealed persistent hypermetabolic retroperitoneal lymph nodes, possibly reactive but lymphoma cannot be ruled out. ECHOcardiogram reveals EF >50%. LDH within normal limits. Overall IPI 2. Low-intermediate risk GCB DLBCL.    He  began treatment with RminiCHP. First cycle was split into prephase steroid+rituxmab followed by miniCHP. He tolerated treatment very well without any long term toxicities. PET scan after 3 cycles showed CR.      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 No fevers, chills, night sweats or weight loss, no lymphadenopathy. No pain.            Objective    /61 (BP Location: Right arm, Patient Position: Sitting, Cuff Size: Adult Regular)   Pulse 81   Temp 97.5  F (36.4  C) (Oral)   Resp 20   Wt 74.3 kg (163 lb 12.8 oz)   SpO2 97%   BMI 23.17 kg/m    Body mass index is 23.17 kg/m .  Physical Exam   GENERAL:  Alert oriented well nourished  HEAD: normocephalic atraumatic  SKIN:  no rash, hives, other lesions.  LYMPHATIC: no abnormal lymph nodes palable in cervical, axillary, supraclavicular or inguinal area.  RESP:  No rales or rhonchi, breath sounds bilaterally equal and vesicular  CV:  No tachycardia, S1 S2 normal No murmur.  MUSCULOSKELETAL:  No visible joint redness or swelling.  NEURO:  No gross weakness gait normal  PSYCH: pleasant affect    Labs: I personally reviewed complete blood count, differential, renal function test, liver function tests LDH.  No cytopenias, LFTs within normal limits, renal function test within normal limits and LDH is normal as well. Mild anemia    Imaging: I personally reviewed PETCT neck/chest/abdomen/pelvis and discussed with the patient.  Complete  response to treatment so far.    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 We will stop treatment and proceed to lymphoma surveillance.  - I discussed long term plan of lymphoma surveillance with the patient. We will closely monitor for disease relapse for first 2 years. This will include physical exam and labs every 3 months and CT scans every 6 months.  - After 2 year I will extend physical exam/labs  to every 6 months and scans will be deferred unless warranted by clinical exam.    Total time spent on date of service in review of medical records, review of labs, history taking, physical exam, discussion of assessment and plan, counseling and patient education is 30 minutes.    Holly Buitrago MD  Attending Physician  Pager [phone]              Signed Electronically by: Holly Buitrago MD

## 2024-02-01 NOTE — NURSING NOTE
"Oncology Rooming Note    February 1, 2024 4:12 PM   Tom Saini is a 89 year old male who presents for:    Chief Complaint   Patient presents with    Oncology Clinic Visit     Lymphoma of intestine      Initial Vitals: /61 (BP Location: Right arm, Patient Position: Sitting, Cuff Size: Adult Regular)   Pulse 81   Temp 97.5  F (36.4  C) (Oral)   Resp 20   Wt 74.3 kg (163 lb 12.8 oz)   SpO2 97%   BMI 23.17 kg/m   Estimated body mass index is 23.17 kg/m  as calculated from the following:    Height as of 1/29/24: 1.791 m (5' 10.5\").    Weight as of this encounter: 74.3 kg (163 lb 12.8 oz). Body surface area is 1.92 meters squared.  No Pain (0) Comment: Data Unavailable   No LMP for male patient.  Allergies reviewed: Yes  Medications reviewed: Yes    Medications: Medication refills not needed today.  Pharmacy name entered into [facility]:    [id number] [address] [facility] - [address]   [place] [address]    Frailty Screening:   Is the patient here for a new oncology consult visit in cancer care? 2. No      Clinical concerns: none    Taylor Davidson              "

## 2024-02-01 NOTE — LETTER
2/1/2024         RE: Tom Saini  [address] [facility] [id number]        Dear Colleague,    Thank you for referring your patient, Tom Saini, to the [facility]. Please see a copy of my visit note below.        Julee Reza is a 89 year old, presenting for the following health issues:  Oncology Clinic Visit (Lymphoma of intestine )    HPI     Oncology History Overview Note   This is an 88-year-old gentleman coming in with newly diagnosed DLBCL composite with follicular lymphoma found in colon mass.  He has had diarrhea with particular food for several years and recently was investigated for possible pancreatic insufficiency.  He also has a history of pancreatic pseudocyst.  On recent MRI done in June 2023 to follow-up on pancreatic pseudocyst showed a colonic mass.  Subsequent CT scan of chest abdomen pelvis showed Mild anemia, no cytopenias 7.9 x 6.9 x 5.6 cm large soft tissue mass in sigmoid colon obliterating the lumen and significant extramural soft tissue extension superiorly in the sigmoid mesocolon.  Enlarged bilateral inguinal lymph node, as few small prominent lymph nodes along superior rectal/inferior mesenteric artery distribution.  Diffuse main pancreatic duct dilatation with large intraductal calculus in the head was seen as well.  Multiple cystic lesions and dilated sidebranches were seen in the pancreas.  He underwent colonoscopy but due to obliterated lumen, colonoscopy failed.  Subsequently CT colonography was done which showed similar results to CT chest abdomen pelvis.  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  FISH showed Bcl-2 rearrangement but no MYC rearrangement.  Bcl-2 IgH rearrangement [translocation (14;18)] is consistent with transformation of DLBCL from follicular lymphoma.  Postoperative course was complicated by postoperative nausea vomiting which resolved, single episode of suicidal  ideation which resolved.  He was discharged to TCU.  He has been regaining his strength slowly, his performance status has improved to 1.  He is able to walk independently for short distances, can do basic activities of daily living himself.    PET scan revealed persistent hypermetabolic retroperitoneal lymph nodes, possibly reactive but lymphoma cannot be ruled out. ECHOcardiogram reveals EF >50%. LDH within normal limits. Overall IPI 2. Low-intermediate risk GCB DLBCL.    He  began treatment with RminiCHP. First cycle was split into prephase steroid+rituxmab followed by miniCHP. He tolerated treatment very well without any long term toxicities. PET scan after 3 cycles showed CR.      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 No fevers, chills, night sweats or weight loss, no lymphadenopathy. No pain.            Objective   /61 (BP Location: Right arm, Patient Position: Sitting, Cuff Size: Adult Regular)   Pulse 81   Temp 97.5  F (36.4  C) (Oral)   Resp 20   Wt 74.3 kg (163 lb 12.8 oz)   SpO2 97%   BMI 23.17 kg/m    Body mass index is 23.17 kg/m .  Physical Exam   GENERAL:  Alert oriented well nourished  HEAD: normocephalic atraumatic  SKIN:  no rash, hives, other lesions.  LYMPHATIC: no abnormal lymph nodes palable in cervical, axillary, supraclavicular or inguinal area.  RESP:  No rales or rhonchi, breath sounds bilaterally equal and vesicular  CV:  No tachycardia, S1 S2 normal No murmur.  MUSCULOSKELETAL:  No visible joint redness or swelling.  NEURO:  No gross weakness gait normal  PSYCH: pleasant affect    Labs: I personally reviewed complete blood count, differential, renal function test, liver  function tests LDH.  No cytopenias, LFTs within normal limits, renal function test within normal limits and LDH is normal as well. Mild anemia    Imaging: I personally reviewed PETCT neck/chest/abdomen/pelvis and discussed with the patient.  Complete response to treatment so far.    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 We will stop treatment and proceed to lymphoma surveillance.  - I discussed long term plan of lymphoma surveillance with the patient. We will closely monitor for disease relapse for first 2 years. This will include physical exam and labs every 3 months and CT scans every 6 months.  - After 2 year I will extend physical exam/labs  to every 6 months and scans will be deferred unless warranted by clinical exam.    Total time spent on date of service in review of medical records, review of labs, history taking, physical exam, discussion of assessment and plan, counseling and patient education is 30 minutes.    Holly Buitrago MD  Attending Physician  Pager [phone]              Signed Electronically by: Holly Buitrago MD

## 2024-02-08 ENCOUNTER — NURSE TRIAGE (OUTPATIENT)
Dept: NURSING | Facility: CLINIC | Age: 89
End: 2024-02-08

## 2024-02-08 NOTE — TELEPHONE ENCOUNTER
Called patient and wife to follow up- Paramedics came and he ended up feeling better after resting and they cleared him, did not take him to ER. Dry heaves stopped, has tolerated a meal, and been trying to rest and push fluids. He feels fine now. They are worried this might delay surgery. He did say that he isn't good at remembering to drink water and eat a full meal, and often takes very hot showers. We talked about this might have been a component of the dizziness and nausea this morning, and encouraged them to continue adequate fluid and food intake while taking more lukewarm showers, if possible. He has not had something like this happen before. If this happens again, follow what they had done today and please let us know. If he were to fall would recommend calling 911 to ER as he is on blood thinners. Will follow up with DVT US tomorrow and hematology appointment. Wondering if Dr. Robertson has any other recs before his surgery.      Peter Hi RN on 2/8/2024 at 6:03 PM

## 2024-02-08 NOTE — TELEPHONE ENCOUNTER
Nurse Triage SBAR    Is this a 2nd Level Triage?  Yes    Situation:    Dizziness/Lightheaded/Dry Heaves     Background/Assessment:     Wife reporting this morning.    Pt got in the shower to get ready for an appointment this morning.           Was getting out of the shower.   Felt light headed and laid down on the floor in the bathroom for a little bit.     Was able to get back up and sit on the toilet seat.    But then started dry heaving and has not stop.        Pt still feels really light headed, dizzy, very tired and dry heaves.    Not feeling good at all.   No breathing issues or headaches noted.    But can not get rid of the dry heaves and just not feeling good.    Not able to move or walk on his own.  He feels extremely weak.     I suggested calling 911 and taking to the ER for an evaluation.    Wife was going to call 911 after we got off the phone.  I offered.   But she said she could call 911 for Pt care    Nabila Khan RN  Central Triage Red Flags/Med Refills      Protocol Recommended Disposition:   Go to ED Now        Reason for Disposition   Unable to walk, or can only walk with assistance (e.g., requires support)    Additional Information   Negative: Shock suspected (e.g., cold/pale/clammy skin, too weak to stand, low BP, rapid pulse)   Negative: Sounds like a life-threatening emergency to the triager   Negative: Nausea or vomiting and pregnancy < 20 weeks   Negative: Menstrual Period - Missed or Late (i.e., pregnancy suspected)   Negative: Heat exhaustion suspected (i.e., dehydration from heat exposure)   Negative: Anxiety or stress suspected (i.e., nausea with anxiety attacks or stressful situations)   Negative: Traumatic Brain Injury (TBI) suspected   Negative: Vomiting occurs   Negative: Other symptom is present, see that guideline.  (e.g., chest pain, headache, dizziness, abdominal pain, colds, sore throat, etc.).    Protocols used: Nausea-A-OH

## 2024-02-09 ENCOUNTER — OFFICE VISIT (OUTPATIENT)
Dept: HEMATOLOGY | Facility: CLINIC | Age: 89
End: 2024-02-09
Attending: PHYSICIAN ASSISTANT
Payer: MEDICARE

## 2024-02-09 ENCOUNTER — ANCILLARY PROCEDURE (OUTPATIENT)
Dept: ULTRASOUND IMAGING | Facility: CLINIC | Age: 89
End: 2024-02-09
Attending: PHYSICIAN ASSISTANT
Payer: MEDICARE

## 2024-02-09 VITALS
HEIGHT: 71 IN | WEIGHT: 162.8 LBS | TEMPERATURE: 97.6 F | DIASTOLIC BLOOD PRESSURE: 66 MMHG | BODY MASS INDEX: 22.79 KG/M2 | SYSTOLIC BLOOD PRESSURE: 112 MMHG | HEART RATE: 85 BPM | OXYGEN SATURATION: 96 %

## 2024-02-09 DIAGNOSIS — I82.4Y1 ACUTE DEEP VEIN THROMBOSIS (DVT) OF PROXIMAL VEIN OF RIGHT LOWER EXTREMITY (H): ICD-10-CM

## 2024-02-09 DIAGNOSIS — Z86.718 PERSONAL HISTORY OF DVT (DEEP VEIN THROMBOSIS): Primary | ICD-10-CM

## 2024-02-09 DIAGNOSIS — C83.398 DIFFUSE LARGE B-CELL LYMPHOMA OF EXTRANODAL SITE: ICD-10-CM

## 2024-02-09 PROCEDURE — 93971 EXTREMITY STUDY: CPT | Mod: RT | Performed by: RADIOLOGY

## 2024-02-09 PROCEDURE — G0463 HOSPITAL OUTPT CLINIC VISIT: HCPCS | Performed by: PHYSICIAN ASSISTANT

## 2024-02-09 PROCEDURE — 99214 OFFICE O/P EST MOD 30 MIN: CPT | Performed by: PHYSICIAN ASSISTANT

## 2024-02-09 NOTE — TELEPHONE ENCOUNTER
Called wife again- Juaquin is doing well today and has had good news from hematology. Let them know no follow up needed from us, but to always call with questions.     Peter Hi RN on 2/9/2024 at 4:20 PM

## 2024-02-09 NOTE — LETTER
[facility]  [address] [place] [id number]  Main: [phone], Fax: [phone]    Patient seen at: [facility] at [address] [place] [address]    Outpatient Visit Note:    Patient: Tom Saini  MRN: [id number]  : 1935  KEN: 2024  Location of this writer at the time of this clinic visit was conducted: [facility].  Location of the patient at the time of this clinic visit was conducted: [facility].     Reason for visit:  History of distal left leg DVT in May 2019. Newly diagnosed colonic mass (B-cell lymphoma) in 2023. Newly found right leg DVT in 2023.      Clinical History Summary:  This is a 89 year old male with a history of hyperlipidemia, aortic insufficiency, who was diagnosed with a left distal lower extremity DVT back on 2019, who found to have an extension of his left leg DVT despite being on 3 months of apixaban, who was placed on dabigatran in Aug 2019 where his DVT was cleared after 3 months of dabigatran, for which his anticoagulation was discontinued in 2019 by this writer, who then was found to have a new acute right leg DVT in 2023 in the setting of newly diagnosed B-cell lymphoma, currently on dabigatran at 150 mg PO BID, returns to clinic today for his follow up visit after he was last seen by this writer on 2023.      Thrombosis History Summary:  Back in 2019, he suffered a one week history of left calf pain for which he was eventually diagnosed with left distal leg DVT (nonocclusive DVT of one of the paired peroneal veins in the mid calf and a Baker's cyst measuring 4.4 x 1.8 x 3.4 cm. This DVT was seemingly unprovoked. He was placed on apixaban.    2019, he was seen by this writer and recommended that he should complete 3 months of  anticoagulation therapy with apixaban and then repeat left leg venous ultrasound.   Aug 2019, a repeat ultrasound showed an unexpected clear extension of his thrombus within the left peroneal vein. Thus we have determined that he had failed apixaban and this writer (after discussion with Dr. Mayes, staff hematologist), switched the patient to dabigatran.   Nov 2019, repeat left leg venous ultrasound showed complete resolution of his DVT and thus anticoagulation therapy was discontinued. It was also the last time I have seen this patient.   In June 2023, he was found to have pancreatic insufficiency. 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 Attempted colonoscopy was done but failed due to obliterated lumen. 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   He begun chemotherapy at the end of Aug 2023.  11/9/2023, he presented to the emergency department with right leg pain and swelling. Right leg venous ultrasound showed: Occlusive right popliteal vein DVT, nonocclusive portion just proximal above the knee joint line, with nonocclusive clot extension into one of the posterior tibial veins. Possible variant anatomy noted above. He was started on apixaban.   12/15/2023, he had a repeat right leg venous ultrasound done which showed: 1. Overall, no significant change right lower extremity DVT compared with prior ultrasound dated 11/19/2023. There is currently nonocclusive thrombus in the right popliteal vein, occlusive thrombus within one of the paired posterior tibial veins and nonocclusive thrombus in the other paired posterior tibial vein. 2. No evidence of deep venous thrombosis in the left lower extremity.     Interim History:  12/28/2023, he re-established care with this writer due to new acute DVT while undergoing active lymphoma treatment at the time. During his visit with me on 12/28/2023,  "I had recommended that he discontinue apixaban and re-start dabigatran at 150 mg PO BID dosing.   1/30/2024, PET scan showed: In this patient with diffuse large B-cell lymphoma chemotherapy: Complete response to chemotherapy. No suspicious hypermetabolic focus.   2/1/2024, he had a follow up visit with Dr. Buitrago who stated that the patient is in complete remission and recommend stopping treatment and proceed to lymphoma surveillance.   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 Resolution of previously noted posterior tibial vein thrombus.   2/19/2024, he is currently scheduled for left elbow ulnar nerve decompression / transposition surgery.     Currently Juaquin is on dabigatran 150 mg PO BID dosing. He denies any bleeding issues. He presents to the office today with his wife. He does endorse occasional right leg swelling. He does use knee high graduated compression stockings. He denies any lower extremity pain.     ROS:  Denies any bleeding complications. Specifically, no frequent epistaxis. No issues with oral mucosal bleeding. Denies any hematuria or blood in stools. Denies any shortness of breath. No chest pain. No cough. No fever.    Medications, Allergies and PmHx:  All are reviewed by this writer today via electronic medical records    Social History:   Deferred.    Family History:  Deferred.    Objective:  Vitals: /66   Pulse 85   Temp 97.6  F (36.4  C) (Oral)   Ht 1.791 m (5' 10.5\")   Wt 73.8 kg (162 lb 12.8 oz)   SpO2 96%   BMI 23.03 kg/m    Exam:   Complete exam is not performed today.       Labs:  Component      Latest Ref Rng 1/25/2024  9:12 AM   WBC      4.0 - 11.0 10e3/uL 7.3    RBC Count      4.40 - 5.90 10e6/uL 3.45 (L)    Hemoglobin      13.3 - 17.7 g/dL 11.2 (L)    Hematocrit      40.0 - 53.0 % 35.3 (L)    MCV      78 - 100 fL 102 (H)    MCH      26.5 - 33.0 pg 32.5    MCHC      31.5 - 36.5 g/dL 31.7    RDW      10.0 - " 15.0 % 14.0    Platelet Count      150 - 450 10e3/uL 251    % Neutrophils      % 74    % Lymphocytes      % 10    % Monocytes      % 11    % Eosinophils      % 3    % Basophils      % 2    % Immature Granulocytes      % 0    NRBCs per 100 WBC      <1 /100 0    Absolute Neutrophils      1.6 - 8.3 10e3/uL 5.4    Absolute Lymphocytes      0.8 - 5.3 10e3/uL 0.7 (L)    Absolute Monocytes      0.0 - 1.3 10e3/uL 0.8    Absolute Eosinophils      0.0 - 0.7 10e3/uL 0.2    Absolute Basophils      0.0 - 0.2 10e3/uL 0.1    Absolute Immature Granulocytes      <=0.4 10e3/uL 0.0    Absolute NRBCs      10e3/uL 0.0    Sodium      135 - 145 mmol/L 142    Potassium      3.4 - 5.3 mmol/L 4.6    Carbon Dioxide (CO2)      22 - 29 mmol/L 27    Anion Gap      7 - 15 mmol/L 9    Urea Nitrogen      8.0 - 23.0 mg/dL 19.5    Creatinine      0.67 - 1.17 mg/dL 1.06    GFR Estimate      >60 mL/min/1.73m2 68    Calcium      8.8 - 10.2 mg/dL 9.3    Chloride      98 - 107 mmol/L 106    Glucose      70 - 99 mg/dL 139 (H)    Alkaline Phosphatase      40 - 150 U/L 66    AST      0 - 45 U/L 17    ALT      0 - 70 U/L 8    Protein Total      6.4 - 8.3 g/dL 6.1 (L)    Albumin      3.5 - 5.2 g/dL 3.8    Bilirubin Total      <=1.2 mg/dL 0.2       Imaging:  Reviewed and are as described above.     Assessment:  In summary, Tom is a 89 year old male with a history of hyperlipidemia, aortic insufficiency, who was diagnosed with a left distal lower extremity DVT back on 5/29/2019, who found to have an extension of his left leg DVT despite being on 3 months of apixaban, who was placed on dabigatran in Aug 2019 where his DVT was cleared after 3 months of dabigatran, for which his anticoagulation was discontinued in Nov 2019 by this writer, who then was found to have a new acute right leg DVT in Nov 2023 in the setting of newly diagnosed B-cell lymphoma, currently on dabigatran at 150 mg PO BID, returns to clinic today for his follow up visit after he was last  seen by this writer on 12/28/2023.      Back in 2019, he had failed apixaban as his left distal leg DVT clearly had extended despite being on apixaban at the time for 3 months. Then his DVT has completely resolved after 3 months of dabigatran.      He was found to have lymphoma back in June 2023 and eventually underwent sigmoid colectomy. His right leg DVT found on 11/9/2023 was an active cancer and cancer treatment (chemotherapy) provoked DVT. Repeat ultrasound done earlier today (2/9/2024) showed small amount of nonocclusive thrombus remaining at the popliteal vein.      Diagnosis:  Isolated distal left lower extremity DVT found on 5/29/2019 (unprovoked event).  Extension of left leg DVT found on 8/27/2019 despite being on Eliquis.   S/P 3 months of Pradaxa and repeat ultrasound showed resolved DVT.   Diffuse large B-cell lymphoma found in June 2023. S/P sigmoid colectomy.   Cancer and cancer treatment provoked right leg DVT in Nov 2023.   Pancreatic insufficiency.     Plan:  I congratulates the patient about the result of his recent PET scan confirming that he is in complete remission from his lymphoma. Currently there are no further treatment is planned and he is scheduled to have the Port-a-cath removed on 2/23/2024.     Considering that his right leg DVT has improved on today's repeat ultrasound and considering that he is now in complete remission from his lymphoma and no longer need any cancer treatments, I provided him with the following options in regard to his anticoagulation therapy:  Option #1: Discontinue all anticoagulation therapy at this time considering that ongoing full intensity anticoagulation therapy with dabigatran at his age does carry a risk of bleeding.   Option #2: Continue dabigatran at 150 mg PO BID for another 3 months as technically that he still has a residual thrombus and technically (if we do belief that apixaban was not effective in this patient), he only had about 1.5 months of  effective anticoagulation since I have switched him from apixaban to dabigatran back on 12/28/2023. If he choose this option, then I will have him hold his dabigatran for 48 hours prior to his upcoming elbow surgery on 2/19/2023 and keep him off of the medication through 2/23/2024 when he has his port-a-cath removal surgery and restart his dabigatran on 2/24/2024 and continue dabigatran until 5/9/2024. Then we will repeat another right leg venous ultrasound at that time. I explain to him that this approach will not guarantee that his remaining right leg thrombus will completely resolve.     After answering all uJaquin and his wife's questions to their satisfaction, Juaquin is opting to discontinue all anticoagulation therapy after he has finished his current supply of dabigatran on hand, which is about 7-10 days. I have instructed him to basically stop his dabigatran on 2/17/2024 in preparation of his elbow surgery on 2/19/2024.     If he should found to have recurrent lymphoma or cancer, I am recommending that he should be placed on dabigatran at 150 mg PO BID dosing for secondary pharmacological DVT/PE prophylaxis.     At this time, I have no further plans to see this patient back on a routine basis.     Thank you for letting us to participate in this patient's care.       Ahmet Márquez PA-C, MPAS  Physician Assistant  [facility].     31 minutes spent by me on the date of the encounter doing chart review, history and exam, documentation and further activities per the note.    Time IN: 12:38  Time OUT: 13:02

## 2024-02-16 ENCOUNTER — ANESTHESIA EVENT (OUTPATIENT)
Dept: SURGERY | Facility: AMBULATORY SURGERY CENTER | Age: 89
End: 2024-02-16
Payer: MEDICARE

## 2024-02-18 ASSESSMENT — LIFESTYLE VARIABLES: TOBACCO_USE: 1

## 2024-02-18 NOTE — ANESTHESIA PREPROCEDURE EVALUATION
Anesthesia Pre-Procedure Evaluation    Patient: Tom Saini   MRN: [id number] : 1935        Procedure : Procedure(s):  LEFT ULNAR NERVE DECOMPRESSION AT THE ELBOW WITH POSSIBLE ANTERIOR TRANSPOSITION          Past Medical History:   Diagnosis Date     Aortic insufficiency      DLBCL (diffuse large B cell lymphoma) (H) 2023    colon/abdomen, s/p mini-R-CHOP x 6     DVT (deep venous thrombosis) (H) 2023    R popliteal     Erectile dysfunction      History of deep venous thrombosis 2019    distal L DVT with extension, completed 6 months of AC (failed eliquis)     History of pelvic fracture      Hyperlipidemia      Low back pain     disc disease s/p laminectomy in past     Macular degeneration     on Avastin, R eye     Pancreatic cyst 2018    next MR due      Pancreatic insufficiency      Radius fracture     after fall from wall      Past Surgical History:   Procedure Laterality Date     CATARACT IOL, RT/LT  2001     COLONOSCOPY N/A 2023    Procedure: COLONOSCOPY, WITH BIOPSY;  Surgeon: Jhonatan Cowart MD;  Location: [facility] OR     COMBINED REPAIR PTOSIS WITH BLEPHAROPLASTY BILATERAL Bilateral 2018    Procedure: COMBINED REPAIR PTOSIS WITH BLEPHAROPLASTY BILATERAL;  BILATERAL UPPER EYELIDS BLEPHAROPLASTY AND PTOSIS REPAIR ;  Surgeon: Anuj Good MD;  Location: [facility]     EYE SURGERY       INSERT PORT VASCULAR ACCESS N/A 2023    Procedure: Insert port vascular access;  Surgeon: Cesar Negro MD;  [place] [facility] [place]     IR CHEST PORT PLACEMENT > 5 YRS OF AGE  2023     LAMINECTOMY LUMBAR TWO LEVELS  1993    L4-L5     LAPAROSCOPIC ASSISTED SIGMOID COLECTOMY N/A 2023    Procedure: COLECTOMY, SIGMOID, LAPAROSCOPIC assisted;  Surgeon: Jhonatan Cowart MD;  Location: [facility] OR     ORIF RADIAL SHAFT FRACTURE Right 2018     RECESSION RESECTION (REPAIR STRABISMUS)  1949     SIGMOIDOSCOPY FLEXIBLE N/A 2023    Procedure:  Sigmoidoscopy flexible;  Surgeon: Jhonatan Cowart MD;  Location: [facility] OR      Allergies   Allergen Reactions     Codeine Sulfate Nausea and Vomiting      Social History     Tobacco Use     Smoking status: Former     Packs/day: 2.00     Years: 2.00     Additional pack years: 0.00     Total pack years: 4.00     Types: Cigarettes     Quit date: 1954     Years since quittin.6     Passive exposure: Past     Smokeless tobacco: Never   Substance Use Topics     Alcohol use: Yes     Alcohol/week: 3.0 standard drinks of alcohol     Types: 3 Glasses of wine per week     Comment: glass of wine with dinner      Wt Readings from Last 1 Encounters:   24 73.8 kg (162 lb 12.8 oz)        Anesthesia Evaluation   Pt has had prior anesthetic. Type: General and MAC.    History of anesthetic complications  - PONV.      ROS/MED HX  ENT/Pulmonary:     (+)                tobacco use, Past use,                       Neurologic:  - neg neurologic ROS     Cardiovascular: Comment: Aortic insufficiency. Able to climb 15 stairs without SOB or chest pain    (+) Dyslipidemia - -   -  - -                                 Previous cardiac testing   Echo: Date: 2023 Results:  Global and regional left ventricular function is normal with an EF of 55-60%.  Global peak LV longitudinal strain is averaged at -20.0%. This is within  reported normal limits (normal <-18%).  Global right ventricular function is normal.  Mild aortic insufficiency.  The inferior vena cava is normal.    Stress Test:  Date: Results:    ECG Reviewed:  Date: Results:    Cath:  Date: Results:      METS/Exercise Tolerance: 4 - Raking leaves, gardening    Hematologic: Comments: Hb 11.2    (+) History of blood clots,    pt is anticoagulated, anemia,          Musculoskeletal: Comment: Cubital tunnel syndrome  (+)  arthritis,             GI/Hepatic: Comment: Pancreatic insufficiency      Renal/Genitourinary:       Endo:       Psychiatric/Substance Use:     (+)  psychiatric history depression       Infectious Disease:       Malignancy:   (+) Malignancy, History of Lymphoma/Leukemia and GI.GI CA  Remission status post Surgery and Chemo.  Lymph CA Remission status post Surgery and Chemo.      Other:  - neg other ROS          Physical Exam    Airway        Mallampati: II   TM distance: > 3 FB   Neck ROM: full   Mouth opening: > 3 cm    Respiratory Devices and Support         Dental       (+) Minor Abnormalities - some fillings, tiny chips      Cardiovascular   cardiovascular exam normal          Pulmonary   pulmonary exam normal            Other findings: Diminished  strength and pinky finger strength in left hand. Per patient, he has 50% decreased strength in left hand. He has numbness in the 4th and 5th finger, medial hand, and medial forearm (ulnar nerve distribution).     OUTSIDE LABS:  CBC:   Lab Results   Component Value Date    WBC 7.3 01/25/2024    WBC 5.3 12/21/2023    HGB 11.2 (L) 01/25/2024    HGB 11.1 (L) 12/21/2023    HCT 35.3 (L) 01/25/2024    HCT 34.5 (L) 12/21/2023     01/25/2024     12/21/2023     BMP:   Lab Results   Component Value Date     01/25/2024     12/21/2023    POTASSIUM 4.6 01/25/2024    POTASSIUM 4.2 12/21/2023    CHLORIDE 106 01/25/2024    CHLORIDE 106 12/21/2023    CO2 27 01/25/2024    CO2 27 12/21/2023    BUN 19.5 01/25/2024    BUN 23.9 (H) 12/21/2023    CR 1.06 01/25/2024    CR 0.89 12/21/2023     (H) 01/25/2024     (H) 12/21/2023     COAGS:   Lab Results   Component Value Date    PTT 52 (H) 09/19/2019    INR 1.01 07/10/2023     POC:   Lab Results   Component Value Date    BGM 71 03/20/2019     HEPATIC:   Lab Results   Component Value Date    ALBUMIN 3.8 01/25/2024    PROTTOTAL 6.1 (L) 01/25/2024    ALT 8 01/25/2024    AST 17 01/25/2024    ALKPHOS 66 01/25/2024    BILITOTAL 0.2 01/25/2024     OTHER:   Lab Results   Component Value Date    LACT 1.1 10/10/2023    A1C 5.7 (H) 09/19/2019    DAVIDE 9.3  01/25/2024    PHOS 2.6 07/28/2023    MAG 2.2 07/31/2023    TSH 0.90 02/27/2021    SED 33 (H) 02/27/2021       Anesthesia Plan    ASA Status:  3    NPO Status:  NPO Appropriate    Anesthesia Type: MAC.     - Reason for MAC: straight local not clinically adequate              Consents    Anesthesia Plan(s) and associated risks, benefits, and realistic alternatives discussed. Questions answered and patient/representative(s) expressed understanding.     - Discussed:     - Discussed with:  Patient      - Extended Intubation/Ventilatory Support Discussed: No.      - Patient is DNR/DNI Status: No     Use of blood products discussed: No .     Postoperative Care    Pain management: Peripheral nerve block (Single Shot).   PONV prophylaxis: Ondansetron (or other 5HT-3), Background Propofol Infusion     Comments:    Other Comments: Patient informed that he's at an elevated risk of nerve injuries in left hand with nerve block.           Myesha Hamilton MD    I have reviewed the pertinent notes and labs in the chart from the past 30 days and (re)examined the patient.  Any updates or changes from those notes are reflected in this note.

## 2024-02-19 ENCOUNTER — HOSPITAL ENCOUNTER (OUTPATIENT)
Facility: AMBULATORY SURGERY CENTER | Age: 89
Discharge: HOME | End: 2024-02-19
Attending: STUDENT IN AN ORGANIZED HEALTH CARE EDUCATION/TRAINING PROGRAM
Payer: MEDICARE

## 2024-02-19 ENCOUNTER — ANESTHESIA (OUTPATIENT)
Dept: SURGERY | Facility: AMBULATORY SURGERY CENTER | Age: 89
End: 2024-02-19
Payer: MEDICARE

## 2024-02-19 ENCOUNTER — TELEPHONE (OUTPATIENT)
Dept: ORTHOPEDICS | Facility: CLINIC | Age: 89
End: 2024-02-19

## 2024-02-19 VITALS
HEIGHT: 71 IN | BODY MASS INDEX: 22.96 KG/M2 | TEMPERATURE: 97.8 F | SYSTOLIC BLOOD PRESSURE: 125 MMHG | OXYGEN SATURATION: 99 % | RESPIRATION RATE: 12 BRPM | WEIGHT: 164 LBS | HEART RATE: 75 BPM | DIASTOLIC BLOOD PRESSURE: 66 MMHG

## 2024-02-19 DIAGNOSIS — G56.22 ULNAR NEUROPATHY AT ELBOW OF LEFT UPPER EXTREMITY: Primary | ICD-10-CM

## 2024-02-19 PROCEDURE — 64718 REVISE ULNAR NERVE AT ELBOW: CPT | Mod: LT

## 2024-02-19 PROCEDURE — 99100 ANES PT EXTEME AGE<1 YR&>70: CPT | Performed by: NURSE ANESTHETIST, CERTIFIED REGISTERED

## 2024-02-19 PROCEDURE — 99100 ANES PT EXTEME AGE<1 YR&>70: CPT | Performed by: ANESTHESIOLOGY

## 2024-02-19 PROCEDURE — 64718 REVISE ULNAR NERVE AT ELBOW: CPT | Performed by: ANESTHESIOLOGY

## 2024-02-19 PROCEDURE — 64718 REVISE ULNAR NERVE AT ELBOW: CPT | Performed by: NURSE ANESTHETIST, CERTIFIED REGISTERED

## 2024-02-19 RX ORDER — ACETAMINOPHEN 325 MG/1
975 TABLET ORAL ONCE
Status: COMPLETED | OUTPATIENT
Start: 2024-02-19 | End: 2024-02-19

## 2024-02-19 RX ORDER — CEFAZOLIN SODIUM 2 G/50ML
2 SOLUTION INTRAVENOUS
Status: DISCONTINUED | OUTPATIENT
Start: 2024-02-19 | End: 2024-02-20 | Stop reason: HOSPADM

## 2024-02-19 RX ORDER — CEPHALEXIN 500 MG/1
500 CAPSULE ORAL 4 TIMES DAILY
Qty: 12 CAPSULE | Refills: 0 | Status: SHIPPED | OUTPATIENT
Start: 2024-02-19 | End: 2024-02-23

## 2024-02-19 RX ORDER — ONDANSETRON 2 MG/ML
INJECTION INTRAMUSCULAR; INTRAVENOUS PRN
Status: DISCONTINUED | OUTPATIENT
Start: 2024-02-19 | End: 2024-02-19

## 2024-02-19 RX ORDER — NALOXONE HYDROCHLORIDE 0.4 MG/ML
0.2 INJECTION, SOLUTION INTRAMUSCULAR; INTRAVENOUS; SUBCUTANEOUS
Status: DISCONTINUED | OUTPATIENT
Start: 2024-02-19 | End: 2024-02-20 | Stop reason: HOSPADM

## 2024-02-19 RX ORDER — FLUMAZENIL 0.1 MG/ML
0.2 INJECTION, SOLUTION INTRAVENOUS
Status: DISCONTINUED | OUTPATIENT
Start: 2024-02-19 | End: 2024-02-20 | Stop reason: HOSPADM

## 2024-02-19 RX ORDER — LIDOCAINE 40 MG/G
CREAM TOPICAL
Status: DISCONTINUED | OUTPATIENT
Start: 2024-02-19 | End: 2024-02-20 | Stop reason: HOSPADM

## 2024-02-19 RX ORDER — PROPOFOL 10 MG/ML
INJECTION, EMULSION INTRAVENOUS PRN
Status: DISCONTINUED | OUTPATIENT
Start: 2024-02-19 | End: 2024-02-19

## 2024-02-19 RX ORDER — BUPIVACAINE HYDROCHLORIDE 5 MG/ML
INJECTION, SOLUTION EPIDURAL; INTRACAUDAL
Status: COMPLETED | OUTPATIENT
Start: 2024-02-19 | End: 2024-02-19

## 2024-02-19 RX ORDER — OXYCODONE HYDROCHLORIDE 5 MG/1
5 TABLET ORAL EVERY 6 HOURS PRN
Qty: 12 TABLET | Refills: 0 | Status: SHIPPED | OUTPATIENT
Start: 2024-02-19 | End: 2024-02-23

## 2024-02-19 RX ORDER — SODIUM CHLORIDE, SODIUM LACTATE, POTASSIUM CHLORIDE, CALCIUM CHLORIDE 600; 310; 30; 20 MG/100ML; MG/100ML; MG/100ML; MG/100ML
INJECTION, SOLUTION INTRAVENOUS CONTINUOUS
Status: DISCONTINUED | OUTPATIENT
Start: 2024-02-19 | End: 2024-02-20 | Stop reason: HOSPADM

## 2024-02-19 RX ORDER — OXYCODONE HYDROCHLORIDE 5 MG/1
10 TABLET ORAL
Status: DISCONTINUED | OUTPATIENT
Start: 2024-02-19 | End: 2024-02-20 | Stop reason: HOSPADM

## 2024-02-19 RX ORDER — ONDANSETRON 2 MG/ML
4 INJECTION INTRAMUSCULAR; INTRAVENOUS EVERY 30 MIN PRN
Status: DISCONTINUED | OUTPATIENT
Start: 2024-02-19 | End: 2024-02-20 | Stop reason: HOSPADM

## 2024-02-19 RX ORDER — ONDANSETRON 4 MG/1
4 TABLET, FILM COATED ORAL EVERY 6 HOURS PRN
Qty: 12 TABLET | Refills: 0 | Status: SHIPPED | OUTPATIENT
Start: 2024-02-19 | End: 2024-02-23

## 2024-02-19 RX ORDER — CEFAZOLIN SODIUM 2 G/50ML
2 SOLUTION INTRAVENOUS
Status: COMPLETED | OUTPATIENT
Start: 2024-02-19 | End: 2024-02-19

## 2024-02-19 RX ORDER — NALOXONE HYDROCHLORIDE 0.4 MG/ML
0.4 INJECTION, SOLUTION INTRAMUSCULAR; INTRAVENOUS; SUBCUTANEOUS
Status: DISCONTINUED | OUTPATIENT
Start: 2024-02-19 | End: 2024-02-20 | Stop reason: HOSPADM

## 2024-02-19 RX ORDER — CEFAZOLIN SODIUM 2 G/50ML
2 SOLUTION INTRAVENOUS SEE ADMIN INSTRUCTIONS
Status: DISCONTINUED | OUTPATIENT
Start: 2024-02-19 | End: 2024-02-20 | Stop reason: HOSPADM

## 2024-02-19 RX ORDER — HEPARIN SODIUM,PORCINE 10 UNIT/ML
5-20 VIAL (ML) INTRAVENOUS EVERY 24 HOURS
Status: DISCONTINUED | OUTPATIENT
Start: 2024-02-19 | End: 2024-02-20 | Stop reason: HOSPADM

## 2024-02-19 RX ORDER — PROPOFOL 10 MG/ML
INJECTION, EMULSION INTRAVENOUS CONTINUOUS PRN
Status: DISCONTINUED | OUTPATIENT
Start: 2024-02-19 | End: 2024-02-19

## 2024-02-19 RX ORDER — HEPARIN SODIUM,PORCINE 10 UNIT/ML
5-20 VIAL (ML) INTRAVENOUS
Status: DISCONTINUED | OUTPATIENT
Start: 2024-02-19 | End: 2024-02-20 | Stop reason: HOSPADM

## 2024-02-19 RX ORDER — FENTANYL CITRATE 50 UG/ML
25-50 INJECTION, SOLUTION INTRAMUSCULAR; INTRAVENOUS
Status: DISCONTINUED | OUTPATIENT
Start: 2024-02-19 | End: 2024-02-20 | Stop reason: HOSPADM

## 2024-02-19 RX ORDER — OXYCODONE HYDROCHLORIDE 5 MG/1
5 TABLET ORAL
Status: DISCONTINUED | OUTPATIENT
Start: 2024-02-19 | End: 2024-02-20 | Stop reason: HOSPADM

## 2024-02-19 RX ORDER — ONDANSETRON 4 MG/1
4 TABLET, ORALLY DISINTEGRATING ORAL EVERY 30 MIN PRN
Status: DISCONTINUED | OUTPATIENT
Start: 2024-02-19 | End: 2024-02-20 | Stop reason: HOSPADM

## 2024-02-19 RX ADMIN — SODIUM CHLORIDE, SODIUM LACTATE, POTASSIUM CHLORIDE, CALCIUM CHLORIDE: 600; 310; 30; 20 INJECTION, SOLUTION INTRAVENOUS at 07:21

## 2024-02-19 RX ADMIN — Medication 5 ML: at 08:53

## 2024-02-19 RX ADMIN — PROPOFOL 30 MG: 10 INJECTION, EMULSION INTRAVENOUS at 07:26

## 2024-02-19 RX ADMIN — FENTANYL CITRATE 50 MCG: 50 INJECTION, SOLUTION INTRAMUSCULAR; INTRAVENOUS at 07:04

## 2024-02-19 RX ADMIN — BUPIVACAINE HYDROCHLORIDE 20 ML: 5 INJECTION, SOLUTION EPIDURAL; INTRACAUDAL at 07:15

## 2024-02-19 RX ADMIN — CEFAZOLIN SODIUM 2 G: 2 SOLUTION INTRAVENOUS at 07:26

## 2024-02-19 RX ADMIN — ONDANSETRON 4 MG: 2 INJECTION INTRAMUSCULAR; INTRAVENOUS at 07:55

## 2024-02-19 RX ADMIN — PROPOFOL 75 MCG/KG/MIN: 10 INJECTION, EMULSION INTRAVENOUS at 07:24

## 2024-02-19 RX ADMIN — Medication 50 MCG: at 07:33

## 2024-02-19 NOTE — OR NURSING
Patient received left side Brachial Plexus nerve block  with Exparel.  Fentanyl 50mcg and Versed 1mg given. Tolerated procedure well.

## 2024-02-19 NOTE — OP NOTE
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 After trial of conservative management, patient has persistent symptoms.     Discussed the risks of surgery, including but not limited to: Infection, bleeding, pain, poor scarring, wound healing issues, injury to nerves or tendons, neuroma formation, complex regional pain syndrome, incomplete release, recurrence of neuropathy, incomplete recovery of the nerve, need for revision or additional surgery, anesthesia-related complication, DVT/PE, death.  Despite these risks, patient consents to and would like to proceed with left ulnar nerve decompression at the elbow, possible anterior transposition. All questions answered.      Intraoperative findings: Compression underneath Bhat's ligament as well as proximal flexor-pronator fascia and medial brachial septum. No subluxation of the ulnar nerve with passive extreme elbow flexion. MABC branches preserved.     Description of Procedure: Patient was seen in the preoperative holding area.  Consent was verified.  The left elbow was marked.  All additional questions were answered.  The risks of the surgery were reiterated, including (but not limited to): infection, bleeding, scarring, pain, injury to surrounding structures including nerves and tendons, need for additional revision surgery, neuroma  formation, complex regional pain syndrome, stiffness.  Following discussion of all these risks, the patient again agreed to proceed with surgery.  Patient was then transferred to the operating room and placed supine on the operating table.  All pressure points were padded.  Sequential compression devices were placed on bilateral lower extremities and verified to be operational.  IV antibiotic prophylaxis was given.  Preinduction timeout was performed.  Monitored anesthesia care was commenced.  At the start of the case, the left upper extremity was prepped and draped in usual sterile fashion. A sterile tourniquet was placed on the left arm.The extremity was exsanguinated and the tourniquet was inflated to 250 mm Hg. Next, a 6-7 cm incision was made, centered over the interval between the medial humeral epicondyle and olecranon, extending proximally and distally. Once through skin, a MABC branches were identified over the deep fascia, dissected with tenotomies and protected. Next, the ulnar nerve was found proximally between the triceps and medial brachial septum. The deep fascia was unroofed over the nerve and dissection carried proximally until a thickened [place] was encountered, and which was divided. Next, the dissection was carried distally and the thickened Bhat's ligament was divided. Next, the flexor-pronator fascia was sharply divided - at the interval between the two heads of the FCU. Once done, a gentle spread was done to expose the ulnar nerve. The ulnar nerve was fully decompressed without over-doing a neurolysis. Next, the elbow was passively flexed to reveal no subluxation of the nerve about the medial humeral epicondyle. The tourniquet was deflated. Hemostasis was obtained with bipolar electrocautery. Next, the incision was closed with 3-0 Vicryl and 3-0 Monocryl deep dermal suture, as well as a running 4-0 Monocryl intracuticular suture. The incision was then dressed with skin adhesive  "and steri-strips. A 4 x 4\" gauze and ABD was used to pad the elbow, and gently wrapped with an ACE bandage. The patient was woken up and transferred to the post-anesthesia care unit with no events.      Postoperative plan: Clinic in 10-14 days.      Deniz Alfred MD, PhD   "

## 2024-02-19 NOTE — PROGRESS NOTES
Ortho Hand    No changes in history or exam. He did meet with hematologist. He decided to stop his Dabigatran 2 days ago as instructed by hematology team, after discussing several options for ongoing anticoagulation in the setting of resolving unprovoked R leg DVT.     OR today for LEFT ulnar nerve decompression at the elbow with possible anterior transposition.    Discussed the risks of surgery, including but not limited to: Infection, bleeding, pain, poor scarring, wound healing issues, injury to nerves or tendons, neuroma formation, complex regional pain syndrome, incomplete release, recurrence of neuropathy, incomplete recovery of the nerve, need for revision or additional surgery, anesthesia-related complication, DVT/PE, death.  Despite these risks, patient consents to and would like to proceed with left ulnar nerve decompression at the elbow, possible anterior transposition. All questions answered.     Deniz Alfred MD, PhD

## 2024-02-19 NOTE — ANESTHESIA PROCEDURE NOTES
Brachial plexus Procedure Note    Pre-Procedure   Staff -        Anesthesiologist:  Myesha Hamilton MD       Resident/Fellow: Franky Manjarrez MD       Performed By: resident       Location: pre-op       Procedure Start/Stop Times: 2/19/2024 7:05 AM and 2/19/2024 7:15 AM       Pre-Anesthestic Checklist: patient identified, IV checked, site marked, risks and benefits discussed, informed consent, monitors and equipment checked, pre-op evaluation, at physician/surgeon's request and post-op pain management  Timeout:       Correct Patient: Yes        Correct Procedure: Yes        Correct Site: Yes        Correct Position: Yes        Correct Laterality: Yes        Site Marked: Yes  Procedure Documentation  Procedure: Brachial plexus       Laterality: left       Patient Position: sitting       Patient Prep/Sterile Barriers: sterile gloves, mask       Skin prep: Chloraprep (supraclavicular approach).       Needle Type: short bevel       Needle Gauge: 21.        Needle Length (millimeters): 100        Ultrasound guided       1. Ultrasound was used to identify targeted nerve, plexus, vascular marker, or fascial plane and place a needle adjacent to it in real-time.       2. Ultrasound was used to visualize the spread of anesthetic in close proximity to the above referenced structure.       3. A permanent image is entered into the patient's record.    Assessment/Narrative         The placement was negative for: blood aspirated, painful injection and site bleeding       Paresthesias: No.       Bolus given via needle..        Secured via.        Insertion/Infusion Method: Single Shot       Complications: none    Medication(s) Administered   Bupivacaine 0.5% PF (Infiltration) - Infiltration   20 mL - 2/19/2024 7:15:00 AM  Bupivacaine liposome (Exparel) 1.3% LA inj susp (Infiltration) - Infiltration   10 mL - 2/19/2024 7:15:00 AM  Medication Administration Time: 2/19/2024 7:05 AM      FOR [facility] ONLY:    "Pain Team Contact information: please page the Pain Team Via [facility]. Search \"Pain\". During daytime hours, please page the attending first. At night please page the resident first.      "

## 2024-02-19 NOTE — ANESTHESIA POSTPROCEDURE EVALUATION
Patient: Tom Saini    Procedure: Procedure(s):  LEFT ULNAR NERVE DECOMPRESSION AT THE ELBOW       Anesthesia Type:  MAC    Note:  Disposition: Outpatient   Postop Pain Control: Uneventful            Sign Out: Well controlled pain   PONV: No   Neuro/Psych: Uneventful            Sign Out: Acceptable/Baseline neuro status   Airway/Respiratory: Uneventful            Sign Out: Acceptable/Baseline resp. status   CV/Hemodynamics: Uneventful            Sign Out: Acceptable CV status; No obvious hypovolemia; No obvious fluid overload   Other NRE: NONE   DID A NON-ROUTINE EVENT OCCUR? No       Last vitals:  Vitals Value Taken Time   /66 02/19/24 0854   Temp 36.6  C (97.8  F) 02/19/24 0854   Pulse 75 02/19/24 0854   Resp 12 02/19/24 0854   SpO2 99 % 02/19/24 0854       Electronically Signed By: Myesha Hamilton MD  February 19, 2024  9:20 AM

## 2024-02-19 NOTE — ANESTHESIA CARE TRANSFER NOTE
Patient: Tom Saini    Procedure: Procedure(s):  LEFT ULNAR NERVE DECOMPRESSION AT THE ELBOW       Diagnosis: Ulnar neuropathy at elbow of left upper extremity [G56.22]  Diagnosis Additional Information: No value filed.    Anesthesia Type:   MAC     Note:    Oropharynx: oropharynx clear of all foreign objects and spontaneously breathing  Level of Consciousness: awake  Oxygen Supplementation: room air    Independent Airway: airway patency satisfactory and stable  Dentition: dentition unchanged  Vital Signs Stable: post-procedure vital signs reviewed and stable  Report to RN Given: handoff report given  Patient transferred to: Phase II    Handoff Report: Identifed the Patient, Identified the Reponsible Provider, Reviewed the pertinent medical history, Discussed the surgical course, Reviewed Intra-OP anesthesia mangement and issues during anesthesia, Set expectations for post-procedure period and Allowed opportunity for questions and acknowledgement of understanding      Vitals:  Vitals Value Taken Time   /67 02/19/24 0841   Temp 36.6  C (97.8  F) 02/19/24 0841   Pulse 68 02/19/24 0841   Resp 12 02/19/24 0841   SpO2 98 % 02/19/24 0841       Electronically Signed By: HEYDI Vega CRNA  February 19, 2024  8:51 AM

## 2024-02-19 NOTE — TELEPHONE ENCOUNTER
[facility] Call Center    Phone Message    May a detailed message be left on voicemail: yes     Reason for Call: Other: Patient stated that the 14 pages that were given to him does not say anything about how to take care of his arm that was operated on. Patient is requesting a call back at [phone]   .  Action Taken: Message routed to:  [facility] ([facility] Orthopedics    Travel Screening: Not Applicable

## 2024-02-19 NOTE — DISCHARGE INSTRUCTIONS
Hand Surgery Discharge Instructions    Patient has been treated for left ulnar neuropathy with Dr. Alfred on 2/19/2024.     Care: Please keep the splint/cast/dressing clean and dry at all times. Should it get wet, please call the clinic to schedule an appointment - as it may need to be replaced. Do not hesitate to use the sling to help protect the affected extremity and in particular in the setting of a nerve block.     Medications: You can take Ibuprofen 400-800 mg and Tylenol 650 mg for pain relief. Please take each every 6 hours, and for optimal pain relief - please stagger the medications so that you are taking one or the other every 3 hours. If you had a nerve block, the effects may last 8-12 hours. If you have been prescribed additional pain medications, please take as instructed and as needed. If you are taking additional pain medications, please do not exceed 4000 mg of Tylenol daily from all sources. Also, if you are taking narcotic medications, please do not operate heavy machinery or drive. If you have been prescribed antibiotics, please also take as instructed.     Diet: Start with clear liquids and slow down if nauseated. Advance the diet as tolerated.    Weight-bearing/Activities: Move your fingers to prevent stiffness. Elevate the affected extremity to improve throbbing sensation or pain. No strenuous activities and do not raise your heart rate above 100 bpm for the first few weeks. Limit your weight-bearing with the affected extremity to 2-3 pounds unless otherwise specified.    ER Instructions: Please call the office and/or consider return to the ER if you experience worsening pain not relieved by medications, increased swelling, redness or high fevers >101F or if there are unexpected problems like shortness of breath.    Post-op follow-up: Clinic in 10-14 days with Dr. Alfred or PA at [place] or [facility]. Please call our Ortho triage line if you have any questions or concerns  before your clinic visit: [phone].    Deniz Alfred MD, PhD [facility]  Home Care Following Anesthesia  For 24 hours after surgery:  Get plenty of rest.  A responsible adult must stay with you for at least 24 hours after you leave the surgery center.  Do not drive or use heavy equipment.  If you have weakness or tingling, don't drive or use heavy equipment until this feeling goes away.   Do not drink alcohol.   Avoid strenuous or risky activities.  Ask for help when climbing stairs.  You may feel lightheaded.  IF so, sit for a few minutes before standing.  Have someone help you get up.   If you have nausea (feel sick to your stomach): Drink only clear liquids such as apple juice, ginger ale, broth or 7-Up.  Rest may also help.  Be sure to drink enough fluids.  Move to a regular diet as you feel able.   You may have a slight fever.  Call the doctor if your fever is over 100 F (37.7 C) (taken under the tongue) or lasts longer than 24 hours.  You may have a dry mouth, a sore throat, muscle aches or trouble sleeping. These should go away after 24 hours.  Do not make important or legal decisions.   It is recommended to avoid smoking.               Tips for taking pain medications  To get the best pain relief possible, remember these points:  Take pain medications as directed, before pain becomes severe.  Pain medication can upset your stomach: taking it with food may help.  Constipation is a common side effect of pain medication. Drink plenty of  fluids.  Eat foods high in fiber. Take a stool softener if recommended by your doctor or pharmacist.  Do not drink alcohol, drive or operate machinery while taking pain medications.  Ask about other ways to control pain, such as with heat, ice or relaxation.    Tylenol/Acetaminophen Consumption    If you feel your pain relief is insufficient, you may take Tylenol/Acetaminophen in addition to your narcotic pain medication.   Be  careful not to exceed 4,000 mg of Tylenol/Acetaminophen in a 24 hour period from all sources.  If you are taking extra strength Tylenol/acetaminophen (500 mg), the maximum dose is 8 tablets in 24 hours.  If you are taking regular strength acetaminophen (325 mg), the maximum dose is 12 tablets in 24 hours.    Call a doctor for any of the following:  Signs of infection (fever, growing tenderness at the surgery site, a large amount of drainage or bleeding, severe pain, foul-smelling drainage, redness, swelling).  It has been over 8 to 10 hours since surgery and you are still not able to urinate (pass water).  Headache for over 24 hours.  Numbness, tingling or weakness the day after surgery (if you had spinal anesthesia).  Signs of Covid-19 infection (temperature over 100 degrees, shortness of breath, cough, loss of taste/smell, generalized body aches, persistent headache, chills, sore throat, nausea/vomiting/diarrhea)  Your doctor is:       Dr. Deniz Alfred, Plastic Surgery: [phone]               Or dial [phone] and ask for the resident on call for:  Plastics  For emergency care, call the:  [facility]:  [phone] (TTY for hearing impaired: [phone])

## 2024-02-20 NOTE — TELEPHONE ENCOUNTER
S/p Left ulnar nerve decompression at the elbow with possible anterior transposition, DOS: 2/19/24    Called Pt back to discuss. Just went over post op instructions, incision care as needed, long term healing goals. All questions answered at this time.    Iraj Murillo, RNCC

## 2024-02-21 ENCOUNTER — OFFICE VISIT (OUTPATIENT)
Dept: OPHTHALMOLOGY | Facility: CLINIC | Age: 89
End: 2024-02-21
Attending: OPHTHALMOLOGY
Payer: MEDICARE

## 2024-02-21 DIAGNOSIS — H35.3221 EXUDATIVE AGE-RELATED MACULAR DEGENERATION OF LEFT EYE WITH ACTIVE CHOROIDAL NEOVASCULARIZATION (H): Primary | ICD-10-CM

## 2024-02-21 DIAGNOSIS — Z96.1 PSEUDOPHAKIA OF BOTH EYES: ICD-10-CM

## 2024-02-21 DIAGNOSIS — H18.513 FUCHS' CORNEAL DYSTROPHY OF BOTH EYES: ICD-10-CM

## 2024-02-21 PROCEDURE — 92134 CPTRZ OPH DX IMG PST SGM RTA: CPT | Performed by: OPHTHALMOLOGY

## 2024-02-21 PROCEDURE — G0463 HOSPITAL OUTPT CLINIC VISIT: HCPCS | Mod: 25 | Performed by: OPHTHALMOLOGY

## 2024-02-21 PROCEDURE — 99207 FLUORESCEIN ANGIOGRAPHY OU (BOTH EYES): CPT | Mod: 26 | Performed by: OPHTHALMOLOGY

## 2024-02-21 PROCEDURE — 92235 FLUORESCEIN ANGRPH MLTIFRAME: CPT | Performed by: OPHTHALMOLOGY

## 2024-02-21 PROCEDURE — 99215 OFFICE O/P EST HI 40 MIN: CPT | Mod: GC | Performed by: OPHTHALMOLOGY

## 2024-02-21 PROCEDURE — 92235 FLUORESCEIN ANGRPH MLTIFRAME: CPT | Mod: 26 | Performed by: OPHTHALMOLOGY

## 2024-02-21 ASSESSMENT — SLIT LAMP EXAM - LIDS
COMMENTS: NORMAL
COMMENTS: NORMAL

## 2024-02-21 ASSESSMENT — CUP TO DISC RATIO
OS_RATIO: 0.2
OD_RATIO: 0.2

## 2024-02-21 ASSESSMENT — VISUAL ACUITY
METHOD: SNELLEN - LINEAR
OS_CC: 20/40
OS_CC+: -1
OD_CC+: -1
OD_CC: 20/40
CORRECTION_TYPE: GLASSES

## 2024-02-21 ASSESSMENT — EXTERNAL EXAM - LEFT EYE: OS_EXAM: ET

## 2024-02-21 ASSESSMENT — TONOMETRY
IOP_METHOD: TONOPEN
OS_IOP_MMHG: 15
OD_IOP_MMHG: 18

## 2024-02-21 ASSESSMENT — EXTERNAL EXAM - RIGHT EYE: OD_EXAM: NORMAL

## 2024-02-21 NOTE — PROGRESS NOTES
Chief Complaint(s) and History of Present Illness(es)     Follow Up            Laterality: both eyes    Course: stable    Associated symptoms: Negative for eye pain, flashes and floaters          Comments    Here for follow up. VA is about the same. No flashes or floaters. No eye   pain.    Jt Ross COT 1:29 PM February 21, 2024           Review of systems for the eyes was negative other than the pertinent positives/negatives listed in the HPI.      Assessment & Plan      Tom Saini is a 89 year old male with the following diagnoses:   1. Exudative age-related macular degeneration of left eye with active choroidal neovascularization (H)    2. Fuchs' corneal dystrophy of both eyes    3. Pseudophakia of both eyes         Here for recheck of eAMD both eyes  Currently in cancer remission!   Has persistent right leg DVT but is improving in size and is now off of all anticoagulant therapy since 2/24/24. Port to be removed. 2/23/24.    S/P Avastin x many (last Right eye 10/2019; left eye 5/2023)  Vision seems stable. Using night time Pantera hamilton   Ks appear slightly worse but patient says blurriness is not any worse and does not fluctuate   - Continue Pantera ointment bedtime both eyes     New IRF near FVPED left eye - on FA appears to have staining but no obvious area of leakage  Possible slight risk of intravitreal Avastin associated with VTE  Monitor without injections for now  Encouraged to resume Amsler grid daily  Return precautions reviewed     Patient disposition:   Return in about 3 months (around 5/21/2024) for VT only, OCT Macula.    Thank you for entrusting us with your care  Jackie Hannon MD, PGY3  Ophthalmology Resident  [facility]    Attending Physician Attestation:  Complete documentation of historical and exam elements from today's encounter can be found in the full encounter summary report (not reduplicated in this progress note).  I personally obtained the chief complaint(s) and history of  present illness.  I confirmed and edited as necessary the review of systems, past medical/surgical history, family history, social history, and examination findings as documented by others; and I examined the patient myself.  I personally reviewed the relevant tests, images, and reports as documented above.  I formulated and edited as necessary the assessment and plan and discussed the findings and management plan with the patient and family. Attending Physician Image/Tesing Attestation: I personally reviewed the ophthalmic test(s) associated with this encounter, agree with the interpretation(s) as documented by the resident/fellow, and have edited the corresponding report(s) as necessary. I spent a total of 45 minutes reviewing chart, interpreting testing, documenting and face to face with the patient.  Over 50% of this time was spent counseling and coordinating care regarding their diagnosis and management.   . - Brigido Laura MD

## 2024-02-21 NOTE — NURSING NOTE
Chief Complaints and History of Present Illnesses   Patient presents with    Follow Up     Chief Complaint(s) and History of Present Illness(es)       Follow Up              Laterality: both eyes    Course: stable    Associated symptoms: Negative for eye pain, flashes and floaters              Comments    Here for follow up. VA is about the same. No flashes or floaters. No eye pain.    Jt Wrighto COT 1:29 PM February 21, 2024

## 2024-02-22 ENCOUNTER — ANESTHESIA EVENT (OUTPATIENT)
Dept: SURGERY | Facility: AMBULATORY SURGERY CENTER | Age: 89
End: 2024-02-22
Payer: MEDICARE

## 2024-02-23 ENCOUNTER — ANESTHESIA (OUTPATIENT)
Dept: SURGERY | Facility: AMBULATORY SURGERY CENTER | Age: 89
End: 2024-02-23
Payer: MEDICARE

## 2024-02-23 ENCOUNTER — HOSPITAL ENCOUNTER (OUTPATIENT)
Facility: AMBULATORY SURGERY CENTER | Age: 89
Discharge: HOME | End: 2024-02-23
Attending: STUDENT IN AN ORGANIZED HEALTH CARE EDUCATION/TRAINING PROGRAM | Admitting: STUDENT IN AN ORGANIZED HEALTH CARE EDUCATION/TRAINING PROGRAM
Payer: MEDICARE

## 2024-02-23 ENCOUNTER — ANCILLARY PROCEDURE (OUTPATIENT)
Dept: RADIOLOGY | Facility: AMBULATORY SURGERY CENTER | Age: 89
End: 2024-02-23
Attending: STUDENT IN AN ORGANIZED HEALTH CARE EDUCATION/TRAINING PROGRAM
Payer: MEDICARE

## 2024-02-23 VITALS
WEIGHT: 164.02 LBS | TEMPERATURE: 97.6 F | OXYGEN SATURATION: 98 % | DIASTOLIC BLOOD PRESSURE: 74 MMHG | HEIGHT: 71 IN | BODY MASS INDEX: 22.96 KG/M2 | HEART RATE: 70 BPM | RESPIRATION RATE: 16 BRPM | SYSTOLIC BLOOD PRESSURE: 123 MMHG

## 2024-02-23 DIAGNOSIS — C83.30 DIFFUSE LARGE B-CELL LYMPHOMA, UNSPECIFIED BODY REGION (H): ICD-10-CM

## 2024-02-23 LAB — INR PPP: 1.06 (ref 0.85–1.15)

## 2024-02-23 PROCEDURE — 36590 REMOVAL TUNNELED CV CATH: CPT | Mod: RT | Performed by: STUDENT IN AN ORGANIZED HEALTH CARE EDUCATION/TRAINING PROGRAM

## 2024-02-23 PROCEDURE — 85610 PROTHROMBIN TIME: CPT | Performed by: PATHOLOGY

## 2024-02-23 RX ORDER — OXYCODONE HYDROCHLORIDE 5 MG/1
10 TABLET ORAL
Status: DISCONTINUED | OUTPATIENT
Start: 2024-02-23 | End: 2024-02-24 | Stop reason: HOSPADM

## 2024-02-23 RX ORDER — SODIUM CHLORIDE, SODIUM LACTATE, POTASSIUM CHLORIDE, CALCIUM CHLORIDE 600; 310; 30; 20 MG/100ML; MG/100ML; MG/100ML; MG/100ML
INJECTION, SOLUTION INTRAVENOUS CONTINUOUS
Status: DISCONTINUED | OUTPATIENT
Start: 2024-02-23 | End: 2024-02-24 | Stop reason: HOSPADM

## 2024-02-23 RX ORDER — ACETAMINOPHEN 325 MG/1
975 TABLET ORAL ONCE
Status: COMPLETED | OUTPATIENT
Start: 2024-02-23 | End: 2024-02-23

## 2024-02-23 RX ORDER — ONDANSETRON 4 MG/1
4 TABLET, ORALLY DISINTEGRATING ORAL EVERY 30 MIN PRN
Status: DISCONTINUED | OUTPATIENT
Start: 2024-02-23 | End: 2024-02-24 | Stop reason: HOSPADM

## 2024-02-23 RX ORDER — ONDANSETRON 2 MG/ML
4 INJECTION INTRAMUSCULAR; INTRAVENOUS EVERY 30 MIN PRN
Status: DISCONTINUED | OUTPATIENT
Start: 2024-02-23 | End: 2024-02-24 | Stop reason: HOSPADM

## 2024-02-23 RX ORDER — OXYCODONE HYDROCHLORIDE 5 MG/1
5 TABLET ORAL
Status: DISCONTINUED | OUTPATIENT
Start: 2024-02-23 | End: 2024-02-24 | Stop reason: HOSPADM

## 2024-02-23 RX ORDER — LIDOCAINE 40 MG/G
CREAM TOPICAL
Status: DISCONTINUED | OUTPATIENT
Start: 2024-02-23 | End: 2024-02-24 | Stop reason: HOSPADM

## 2024-02-23 RX ORDER — SODIUM CHLORIDE 9 MG/ML
INJECTION, SOLUTION INTRAVENOUS CONTINUOUS
Status: DISCONTINUED | OUTPATIENT
Start: 2024-02-23 | End: 2024-02-24 | Stop reason: HOSPADM

## 2024-02-23 RX ORDER — NALOXONE HYDROCHLORIDE 0.4 MG/ML
0.1 INJECTION, SOLUTION INTRAMUSCULAR; INTRAVENOUS; SUBCUTANEOUS
Status: DISCONTINUED | OUTPATIENT
Start: 2024-02-23 | End: 2024-02-24 | Stop reason: HOSPADM

## 2024-02-23 RX ADMIN — SODIUM CHLORIDE, SODIUM LACTATE, POTASSIUM CHLORIDE, CALCIUM CHLORIDE: 600; 310; 30; 20 INJECTION, SOLUTION INTRAVENOUS at 08:06

## 2024-02-23 ASSESSMENT — LIFESTYLE VARIABLES: TOBACCO_USE: 1

## 2024-02-23 NOTE — BRIEF OP NOTE
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  Implants:   Implant Name Type Inv. Item Serial No.  Lot No. LRB No. Used Action   6F SLIM POWER PORT     [id number] Right [id number] Explanted

## 2024-02-23 NOTE — DISCHARGE INSTRUCTIONS
A collaboration between [facility] and [facility]  Experts in minimally invasive, targeted treatments performed using imaging guidance    Venous Access Device, Port Catheter or Tunneled Central Line Removal    Today you had your existing venous access device removed, either because it was no longer needed or because there was malfunction or infection issues.    After you go home:  Drink plenty of fluids.  Generally 6-8 (8 ounce) glasses a day is recommended.  Resume your regular diet unless otherwise ordered by a medical provider.  Keep any applied tape/gauze dressings clean and dry.  Change tape/gauze dressings if they get wet or soiled.  You may shower the following day after procedure, however cover and protect from moisture any tape/gauze dressings.  You may let water hit and run over dried skin glue, but do not scrub.  Pat the area dry after showering.  Port removal incisions are closed with absorbable suture, meaning they do not need to be removed at a later date, and a topical skin adhesive (skin glue).  This glue will wear off in 7-14 days.  Do not remove before this time.  If 14 days have passed and residual glue is present, you may gently remove it.  You may remove tape/gauze dressings after 5 days if the site looks closed and in the process of healing.  Do not apply gels, lotions, or ointments to the glue site for the first 10 days as this may cause the glue to prematurely soften and fail.  Do not perform strenuous activities or lift greater than 10 pounds for the next three days.  If there is bleeding or oozing from the procedure site, apply firm pressure to the area for 5-10 minutes.  If the bleeding continues seek medical advice at the numbers below.  Mild procedure site discomfort can be treated with an ice pack and over-the-counter pain relievers.              For 24 hours after any sedation used:  Relax and take it easy.  No strenuous  activities.  Do not drive or operate machines at home or at work.  No alcohol consumption.  Do not make any important or legal decisions.    Call our Interventional Radiology (IR) service if:  If you start bleeding from the procedure site.  If you do start to bleed from the site, lie down and hold some pressure on the site.  Our radiology provider can help you decide if you need to return to the hospital.  If you have new or worsening pain related to the procedure.  If you have concerning swelling at the procedure site.  If you develop persistent nausea or vomiting.  If you develop hives or a rash or any unexplained itching.  If you have a fever of greater than 100.5  F and chills in the first 5 days after procedure.  Any other concerns related to your procedure.      [facility]  Interventional Radiology (IR)  [address]  [address] [facility] [place] [address]    Contact Number:  [phone]  (IR Nurse Triage)  Monday - Friday 7am - 4pm    After hours for urgent concerns:  [phone]  After 4pm Monday - Friday, Weekends and Holidays.   Ask for Interventional Radiology on-call.  Someone is available 24 hours a day.  [facility] toll free number:  [phone]

## 2024-02-23 NOTE — ANESTHESIA PREPROCEDURE EVALUATION
Anesthesia Pre-Procedure Evaluation    Patient: Tom Saini   MRN: [id number] : 1935        Procedure : Procedure(s):  Remove port vascular access right          Past Medical History:   Diagnosis Date     Aortic insufficiency      DLBCL (diffuse large B cell lymphoma) (H) 2023    colon/abdomen, s/p mini-R-CHOP x 6     DVT (deep venous thrombosis) (H) 2023    R popliteal     Erectile dysfunction      History of deep venous thrombosis 2019    distal L DVT with extension, completed 6 months of AC (failed eliquis)     History of pelvic fracture      Hyperlipidemia      Low back pain     disc disease s/p laminectomy in past     Macular degeneration     on Avastin, R eye     Pancreatic cyst 2018    next MR due      Pancreatic insufficiency      Radius fracture     after fall from wall      Past Surgical History:   Procedure Laterality Date     CATARACT IOL, RT/LT  2001     COLONOSCOPY N/A 2023    Procedure: COLONOSCOPY, WITH BIOPSY;  Surgeon: Jhonatan Cowart MD;  Location: [facility] OR     COMBINED REPAIR PTOSIS WITH BLEPHAROPLASTY BILATERAL Bilateral 2018    Procedure: COMBINED REPAIR PTOSIS WITH BLEPHAROPLASTY BILATERAL;  BILATERAL UPPER EYELIDS BLEPHAROPLASTY AND PTOSIS REPAIR ;  Surgeon: Anuj Good MD;  Location: [facility]     EYE SURGERY       INSERT PORT VASCULAR ACCESS N/A 2023    Procedure: Insert port vascular access;  Surgeon: Cesar Negro MD;  Location: [facility] OR     IR CHEST PORT PLACEMENT > 5 YRS OF AGE  2023     LAMINECTOMY LUMBAR TWO LEVELS  1993    L4-L5     LAPAROSCOPIC ASSISTED SIGMOID COLECTOMY N/A 2023    Procedure: COLECTOMY, SIGMOID, LAPAROSCOPIC assisted;  Surgeon: Jhonatan Cowart MD;  Location: [facility] OR     ORIF RADIAL SHAFT FRACTURE Right 2018     RECESSION RESECTION (REPAIR STRABISMUS)  1949     RELEASE ULNAR NERVE (ELBOW) Left 2024    Procedure: LEFT ULNAR NERVE DECOMPRESSION AT THE ELBOW;   Surgeon: Deniz Alfred MD;  Location: [facility] OR     SIGMOIDOSCOPY FLEXIBLE N/A 2023    Procedure: Sigmoidoscopy flexible;  Surgeon: Jhonatan Cowart MD;  Location: [facility] OR      Allergies   Allergen Reactions     Codeine Sulfate Nausea and Vomiting      Social History     Tobacco Use     Smoking status: Former     Packs/day: 2.00     Years: 2.00     Additional pack years: 0.00     Total pack years: 4.00     Types: Cigarettes     Quit date: 1954     Years since quittin.7     Passive exposure: Past     Smokeless tobacco: Never   Substance Use Topics     Alcohol use: Yes     Alcohol/week: 3.0 standard drinks of alcohol     Types: 3 Glasses of wine per week     Comment: glass of wine with dinner      Wt Readings from Last 1 Encounters:   24 74.4 kg (164 lb)        Anesthesia Evaluation   Pt has had prior anesthetic. Type: General and MAC.    History of anesthetic complications  - PONV.      ROS/MED HX  ENT/Pulmonary:     (+)                tobacco use, Past use,                       Neurologic:  - neg neurologic ROS     Cardiovascular: Comment: Aortic insufficiency. Able to climb 15 stairs without SOB or chest pain    (+) Dyslipidemia - -   -  - -                                 Previous cardiac testing   Echo: Date: 2023 Results:  Global and regional left ventricular function is normal with an EF of 55-60%.  Global peak LV longitudinal strain is averaged at -20.0%. This is within  reported normal limits (normal <-18%).  Global right ventricular function is normal.  Mild aortic insufficiency.  The inferior vena cava is normal.    Stress Test:  Date: Results:    ECG Reviewed:  Date: Results:    Cath:  Date: Results:      METS/Exercise Tolerance: 4 - Raking leaves, gardening    Hematologic: Comments: Hb 11.2    (+) History of blood clots,    pt is anticoagulated, anemia,          Musculoskeletal: Comment: Cubital tunnel syndrome s/release.  (+)  arthritis,             GI/Hepatic:  Comment: Pancreatic insufficiency      Renal/Genitourinary:       Endo:       Psychiatric/Substance Use:     (+) psychiatric history depression       Infectious Disease:       Malignancy: Comment: Here to have port removed today 2/23/24  (+) Malignancy, History of Lymphoma/Leukemia and GI.GI CA  Remission status post Surgery and Chemo.  Lymph CA Remission status post Surgery and Chemo.      Other:  - neg other ROS        Physical Exam    Airway        Mallampati: II   TM distance: > 3 FB   Neck ROM: full   Mouth opening: > 3 cm    Respiratory Devices and Support         Dental       (+) Minor Abnormalities - some fillings, tiny chips      Cardiovascular   cardiovascular exam normal          Pulmonary   pulmonary exam normal            Other findings: Diminished  strength and pinky finger strength in left hand. Per patient, he has 50% decreased strength in left hand. He has numbness in the 4th and 5th finger, medial hand, and medial forearm (ulnar nerve distribution).   OUTSIDE LABS:  CBC:   Lab Results   Component Value Date    WBC 7.3 01/25/2024    WBC 5.3 12/21/2023    HGB 11.2 (L) 01/25/2024    HGB 11.1 (L) 12/21/2023    HCT 35.3 (L) 01/25/2024    HCT 34.5 (L) 12/21/2023     01/25/2024     12/21/2023     BMP:   Lab Results   Component Value Date     01/25/2024     12/21/2023    POTASSIUM 4.6 01/25/2024    POTASSIUM 4.2 12/21/2023    CHLORIDE 106 01/25/2024    CHLORIDE 106 12/21/2023    CO2 27 01/25/2024    CO2 27 12/21/2023    BUN 19.5 01/25/2024    BUN 23.9 (H) 12/21/2023    CR 1.06 01/25/2024    CR 0.89 12/21/2023     (H) 01/25/2024     (H) 12/21/2023     COAGS:   Lab Results   Component Value Date    PTT 52 (H) 09/19/2019    INR 1.01 07/10/2023     POC:   Lab Results   Component Value Date    BGM 71 03/20/2019     HEPATIC:   Lab Results   Component Value Date    ALBUMIN 3.8 01/25/2024    PROTTOTAL 6.1 (L) 01/25/2024    ALT 8 01/25/2024    AST 17 01/25/2024    DESTIN  66 01/25/2024    BILITOTAL 0.2 01/25/2024     OTHER:   Lab Results   Component Value Date    LACT 1.1 10/10/2023    A1C 5.7 (H) 09/19/2019    DAVIDE 9.3 01/25/2024    PHOS 2.6 07/28/2023    MAG 2.2 07/31/2023    TSH 0.90 02/27/2021    SED 33 (H) 02/27/2021       Anesthesia Plan    ASA Status:  3    NPO Status:  NPO Appropriate    Anesthesia Type: MAC.     - Reason for MAC: straight local not clinically adequate              Consents    Anesthesia Plan(s) and associated risks, benefits, and realistic alternatives discussed. Questions answered and patient/representative(s) expressed understanding.     - Discussed: Risks, Benefits and Alternatives for BOTH SEDATION and the PROCEDURE were discussed     - Discussed with:  Patient      - Extended Intubation/Ventilatory Support Discussed: No.      - Patient is DNR/DNI Status: No     Use of blood products discussed: No .     Postoperative Care    Pain management: IV analgesics, Oral pain medications.   PONV prophylaxis: Background Propofol Infusion     Comments:             Myesha Hamilton MD    I have reviewed the pertinent notes and labs in the chart from the past 30 days and (re)examined the patient.  Any updates or changes from those notes are reflected in this note.

## 2024-02-26 ENCOUNTER — TELEPHONE (OUTPATIENT)
Dept: ORTHOPEDICS | Facility: CLINIC | Age: 89
End: 2024-02-26
Payer: MEDICARE

## 2024-02-26 NOTE — TELEPHONE ENCOUNTER
RN Telephone note:    Spoke with 89 yr old Tom who is calling the Orthopedic Clinic at [facility] for the second time today as he has not heard back after leaving a message earlier this morning.      Patient state he had ulnar nerve surgery on his L elbow on 2/19/24.  Patient has follow up next week and was instructed to leave the dressing on until then.  Dressing is starting to come fall apart and the incision is exposed.  Elbow padding has come off the wound.    PLAN:  Transferred caller to [facility]i at [facility] who will connect/message team about bandage.    Cynthia Perez RN  [facility]

## 2024-02-26 NOTE — TELEPHONE ENCOUNTER
[facility] Call Center    Phone Message    May a detailed message be left on voicemail: Requesting c/b- Wrapping on elbow came loose can see the skin, happened last night-need to know what to do

## 2024-02-26 NOTE — TELEPHONE ENCOUNTER
Called patient back and discussed postop dressing.  We discussed this last week as well patient did not remember.  He said his dressing is starting to come off.  I advised that he remove his dressing and that it was okay to wash with soap and water but not to soak or submerge the incision and do not scrub it directly.  Once dry he should keep it covered at all times with a piece of gauze and tape.  Patient verbalized understanding and all questions answered at this time.    Iraj Murillo, RNCC

## 2024-03-05 ENCOUNTER — OFFICE VISIT (OUTPATIENT)
Dept: ORTHOPEDICS | Facility: CLINIC | Age: 89
End: 2024-03-05
Payer: MEDICARE

## 2024-03-05 DIAGNOSIS — G56.22 ULNAR NEUROPATHY AT ELBOW OF LEFT UPPER EXTREMITY: Primary | ICD-10-CM

## 2024-03-05 PROCEDURE — 99024 POSTOP FOLLOW-UP VISIT: CPT | Performed by: STUDENT IN AN ORGANIZED HEALTH CARE EDUCATION/TRAINING PROGRAM

## 2024-03-05 NOTE — LETTER
3/5/2024         RE: Tom Saini  [address] [facility] [address]        Dear Colleague,    Thank you for referring your patient, Tom Saini, to the [facility] [place]. Please see a copy of my visit note below.    Ortho Hand    No issues.  No fevers or chills.  Still has weakness in the left hand.    On exam, left elbow incision is intact with Steri-Strips in place.  No signs of infection.  Left hand intrinsic function remains intact.  Left ulnar distributed sensation remains stable.    A/P: 89-year-old male 2 weeks status post left ulnar nerve decompression at the elbow with no anterior transposition    -Reiterated expectations following surgery.  Since the compression was at the level of the elbow, the ulnar nerve has many months of recovery prior to seeing any appreciable benefit at the level of the hand.  Reiterated that nerves recover at a rate of 1 mm/day, so it may be 9-12 months prior to any meaningful improvement in the left hand strength and sensation.  Patient understands this.  -Patient can initiate scar treatment with silicone-based ointment or Bio-oil once the Steri-Strips fall off within the next 1-2 weeks.  If the Steri-Strips remain in place at 4 weeks postoperatively, patient can remove them gently.  -Limit lifting to 7-10 pounds for the next 2 weeks, followed by 15-20 pounds for postoperative weeks 5-6  -Instructed patient to use the fingers to prevent stiffness  -Return to clinic in 4 weeks for reevaluation    Deniz Alfred MD, PhD

## 2024-03-05 NOTE — PROGRESS NOTES
Ortho Hand    No issues.  No fevers or chills.  Still has weakness in the left hand.    On exam, left elbow incision is intact with Steri-Strips in place.  No signs of infection.  Left hand intrinsic function remains intact.  Left ulnar distributed sensation remains stable.    A/P: 89-year-old male 2 weeks status post left ulnar nerve decompression at the elbow with no anterior transposition    -Reiterated expectations following surgery.  Since the compression was at the level of the elbow, the ulnar nerve has many months of recovery prior to seeing any appreciable benefit at the level of the hand.  Reiterated that nerves recover at a rate of 1 mm/day, so it may be 9-12 months prior to any meaningful improvement in the left hand strength and sensation.  Patient understands this.  -Patient can initiate scar treatment with silicone-based ointment or Bio-oil once the Steri-Strips fall off within the next 1-2 weeks.  If the Steri-Strips remain in place at 4 weeks postoperatively, patient can remove them gently.  -Limit lifting to 7-10 pounds for the next 2 weeks, followed by 15-20 pounds for postoperative weeks 5-6  -Instructed patient to use the fingers to prevent stiffness  -Return to clinic in 4 weeks for reevaluation    Deniz Alfred MD, PhD

## 2024-03-05 NOTE — NURSING NOTE
Reason For Visit:   Chief Complaint   Patient presents with    Surgical Followup     Post op Left ulnar nerve decompression at the elbow DOS 2/19/24       Primary MD: Angeli Robertson  Ref. MD: adelina    Age: 89 year old    ?  No      There were no vitals taken for this visit.      Pain Assessment  Patient Currently in Pain: Yes  0-10 Pain Scale: 4  Primary Pain Location: Elbow (left)  Pain Descriptors: Numbness, Tingling, Aching, Constant    Hand Dominance Evaluation  Hand Dominance: Left          QuickDASH Assessment      3/5/2024     9:09 AM   QuickDASH Main   1. Open a tight or new jar No difficulty   2. Do heavy household chores (e.g., wash walls, floors) No difficulty   3. Carry a shopping bag or briefcase No difficulty   4. Wash your back Mild difficulty   5. Use a knife to cut food No difficulty   6. Recreational activities in which you take some force or impact through your arm, shoulder or hand (e.g., golf, hammering, tennis, etc.) Moderate difficulty   7. During the past week, to what extent has your arm, shoulder or hand problem interfered with your normal social activities with family, friends, neighbours or groups Not at all   8. During the past week, were you limited in your work or other regular daily activities as a result of your arm, shoulder or hand problem Slightly limited   9. Arm, shoulder or hand pain Mild   10.Tingling (pins and needles) in your arm,shoulder or hand Severe   11. During the past week, how much difficulty have you had sleeping because of the pain in your arm, shoulder or hand No difficulty   Quickdash Ability Score 18.18          Current Outpatient Medications   Medication Sig Dispense Refill    acyclovir (ZOVIRAX) 400 MG tablet Take 1 tablet (400 mg) by mouth every 12 hours for 30 days 60 tablet 11    calcium carbonate (OS-DAVIDE) 1500 (600 Ca) MG tablet Take 600 mg by mouth 2 times daily (with meals)      Cholecalciferol (VITAMIN D-3) 25 MCG (1000 UT) CAPS Take by mouth  daily      lipase-protease-amylase (CREON) 27328-97513-808479 units CPEP per EC capsule Take 1 capsule by mouth 3 times daily (with meals) And 1 with snacks. 180 capsule 4    multivitamin (OCUVITE) TABS Take 1 tablet by mouth 2 times daily      sodium chloride (LAXMI 128) 5 % ophthalmic ointment Apply a small amount in both eyes at least once a day (bedtime). 3.5 g 11       Allergies   Allergen Reactions    Codeine Sulfate Nausea and Vomiting       Alexa Helton, ATC

## 2024-03-11 NOTE — PROGRESS NOTES
DISCHARGE  Reason for Discharge: Patient has met all goals.    Equipment Issued: Pt got corespun stockings, he did later message about having difficulty getting Medicare to cover these. Therapist unable to assist pt in coverage but pt encouraged to present to [facility] home medical for styles of compression that would be covered or purchase online to self pay.      Discharge Plan: Patient to continue home program.    Referring Provider:  Klarissa Donnelly     12/28/23 0500   Appointment Info   Signing clinician's name / credentials Cynthia Gill, PT,CLT-SANDI   Visits Used 2   Medical Diagnosis Lymphedema   PT Tx Diagnosis Lymphedema   Quick Adds Certification   Progress Note/Certification   Start of Care Date 12/01/23   Onset of illness/injury or Date of Surgery 12/01/18   Therapy Frequency 6 visits, 2x/mo   Predicted Duration 90 days   Certification date from 12/01/23   Certification date to 02/28/24   Progress Note Completed Date 12/01/23       Present No   PT Goal 2   Goal Identifier Volume   Goal Description Pt will have 3% reduction in L LE volume and 5% reduction in the R LE volume for improved tissue integrity, decreased risk of infection, and improved fit of clothing   Target Date 02/28/24   Date Met 12/28/23   PT Goal 3   Goal Identifier Maintenance   Goal Description Pt will use compression garments as instructed AND home management tools/program for long term management of chronic condition to prevent tissue fibrosis, infection, wound and other secondary sequelae   Target Date 02/28/24   Goal Progress able to don and doff his stockings, did not return after 12/28/23   Subjective Report   Subjective Report Note from pt's MD to therapist a couple weeks ago, pt didn't know what compression to get.  Pt was sent [employer] message with follow-up phone call on how/where to order stockings online.  Same info noted in previous visit.   Objective Measure 1   Objective Measure Volume R LE 10-50cm  "  Details 3198.96mL, -18% (measurements taken at 10am vs 3pm)   Objective Measure 2   Objective Measure Volume L LE 10-20cm   Details 3106.01mL, -10.8%   Manual Therapy   Manual Therapy: Mobilization, MFR, MLD, friction massage minutes (24532) 12   Manual Therapy 1 asssessment   Manual Therapy 1 - Details Swelling noted with 1+ pitting above the top of the stockings. Stockings sit about 2\" below knee crease.  Pt also with mid lower leg pre-tib edema on the R 1+.  Measurements demonstrate remarkable reduction considering pt has not been able to incorporate more elevation. He is measured much earlier today than previously at his 3pm visit.   Self Care/home Management   ADL/Home Mgmt Training (18647) 67   Self Care 1 home management and risk reduction education   Self Care 1 - Details Pt reminded of staying hydrated, and avoiding excess salt.  He has not been elevating more but does take increased standing and walking breaks when seated at the computer for long days.   Self Care 2 Compression   Self Care 2 - Details Pt arrived with his compression socks on. .He doffs with increased time due to neuropathy. Fit of stocks is good in the feet but they are a little short at the top though better than too long since DVT present in R popliteal vein chronically.  PT was educated on use of gloves but reports gloves will not fit his large hands.  He previously is show donning devices.  Today shown how to fold inside out to heel and then pull on over toes and foot first, then up to leg. PT demonstrates independence with this. He is having elbow nerve surgery in late Jan/early Feb and may need some assist for use of stockings at that time.  PT planning to get more stockings, CLT suggested local medical supply store.   Education   Learner/Method Patient;No Barriers to Learning   Education Comments donning socks, schedule for use   Plan   Home program continue to use stockings, take walk breaks from computer, do try to elevate "   Updates to plan of care no further appt scheduled. Pt to call if needs, CLT to place prescription and fax to [employer] for Core-Spun stockings size L white 20-30mmHg   Plan for next session assess needs, otherwise dc at end of cert period if not returning   Comments   Comments Pt with new R thigh pain, quad? mostly when sitting and leaning on toilet. CLT showed hip flexor stretch but pt only feels in the heelcords. He will mention to MD in January if pain persists. Currently is resolves after getting up to move.   Total Session Time   Timed Code Treatment Minutes 53   Total Treatment Time (sum of timed and untimed services) 53

## 2024-03-21 ENCOUNTER — OFFICE VISIT (OUTPATIENT)
Dept: INTERNAL MEDICINE | Facility: CLINIC | Age: 89
End: 2024-03-21
Payer: MEDICARE

## 2024-03-21 ENCOUNTER — NURSE TRIAGE (OUTPATIENT)
Dept: NURSING | Facility: CLINIC | Age: 89
End: 2024-03-21

## 2024-03-21 VITALS
OXYGEN SATURATION: 98 % | WEIGHT: 160.1 LBS | TEMPERATURE: 98 F | HEART RATE: 79 BPM | DIASTOLIC BLOOD PRESSURE: 67 MMHG | BODY MASS INDEX: 22.64 KG/M2 | SYSTOLIC BLOOD PRESSURE: 113 MMHG

## 2024-03-21 DIAGNOSIS — T73.2XXA FATIGUE DUE TO EXPOSURE, INITIAL ENCOUNTER: ICD-10-CM

## 2024-03-21 DIAGNOSIS — E78.00 PURE HYPERCHOLESTEROLEMIA: Primary | ICD-10-CM

## 2024-03-21 DIAGNOSIS — R53.1 WEAKNESS: ICD-10-CM

## 2024-03-21 DIAGNOSIS — C83.398 DIFFUSE LARGE B-CELL LYMPHOMA OF EXTRANODAL SITE: ICD-10-CM

## 2024-03-21 LAB
FLUAV RNA SPEC QL NAA+PROBE: NEGATIVE
FLUBV RNA RESP QL NAA+PROBE: NEGATIVE
RSV RNA SPEC NAA+PROBE: POSITIVE
SARS-COV-2 RNA RESP QL NAA+PROBE: NEGATIVE

## 2024-03-21 PROCEDURE — 99213 OFFICE O/P EST LOW 20 MIN: CPT | Mod: 24

## 2024-03-21 PROCEDURE — 99000 SPECIMEN HANDLING OFFICE-LAB: CPT | Performed by: PATHOLOGY

## 2024-03-21 PROCEDURE — 87637 SARSCOV2&INF A&B&RSV AMP PRB: CPT | Performed by: INTERNAL MEDICINE

## 2024-03-21 NOTE — PROGRESS NOTES
Assessment & Plan       Fatigue, generalized weakness  Two days of generalized weakness and fatigue without any specific infectious signs/symptoms. One episode of emesis but otherwise no vomiting or diarrhea. No evidence of bleeding. Suspect he could still be recovering from his viral URI from last week vs another subacute viral illness. Recommend increased rest, hydration, and PO intake. Advised patient to reach out to us if he worsens or develops any new symptoms. Will defer labs at this time. Recommend that he does not take very hot showers as this can worsen presyncopal symptoms.   - Symptomatic Influenza A/B, RSV, & SARS-CoV2 PCR (COVID-19) Nose; Future    Patient discussed with Dr. Donnelly.    Subjective   Juaquin is a 89 year old, presenting for the following health issues:  Follow Up (General fatigue onset yesterday )      3/21/2024     2:00 PM   Additional Questions   Roomed by      History of Present Illness       Reason for visit:  Tired  Symptom onset:  1-3 days ago    He eats 2-3 servings of fruits and vegetables daily.He consumes 1 sweetened beverage(s) daily.He exercises with enough effort to increase his heart rate 9 or less minutes per day.  He exercises with enough effort to increase his heart rate 3 or less days per week.   He is taking medications regularly.       Juaquin presents due to weakness and fatigue since yesterday. He reports having symptoms of a viral URI 5-6 days. He reports mostly improving from this, however yesterday morning he woke up feeling very weak and fatigued. He took a very hot shower, and then felt so tired that he ended up lying on the bathroom floor for one hour. He also had one episode of nonbloody non-bilious emesis. Subsequently, he rested for a few hours and then was able to complete some activities around the house. He then woke up today and still felt fatigued and weak. He has had no further episodes of emesis. No diarrhea. He denies fevers, chills, myalgias, chest  pain, cough, SOB, abdominal pain, dysuria, headaches. His wife has not been sick. Denies any other sick exposures.       Objective    /67 (BP Location: Right arm, Patient Position: Sitting, Cuff Size: Adult Regular)   Pulse 79   Temp 98  F (36.7  C)   Wt 72.6 kg (160 lb 1.6 oz)   SpO2 98%   BMI 22.64 kg/m    Body mass index is 22.64 kg/m .  Physical Exam  Vitals reviewed.   Constitutional:       General: He is not in acute distress.     Appearance: He is not toxic-appearing.   Eyes:      Extraocular Movements: Extraocular movements intact.      Conjunctiva/sclera: Conjunctivae normal.   Cardiovascular:      Rate and Rhythm: Normal rate and regular rhythm.      Pulses: Normal pulses.      Heart sounds: Normal heart sounds.   Pulmonary:      Effort: Pulmonary effort is normal. No respiratory distress.      Breath sounds: Normal breath sounds. No wheezing or rales.   Abdominal:      General: Abdomen is flat.      Palpations: Abdomen is soft.   Musculoskeletal:         General: Normal range of motion.   Skin:     General: Skin is warm and dry.   Neurological:      Mental Status: He is alert and oriented to person, place, and time.   Psychiatric:         Mood and Affect: Mood normal.         Behavior: Behavior normal.                Signed Electronically by: Gabriela Osborn MD

## 2024-03-21 NOTE — TELEPHONE ENCOUNTER
89 year old calls with weakness and fatigue  He states it started yesterday/  He was in the shower and got out and vomited and ended up laying on the floor for an hour because he felt exhausted and weak.  He then napped x 2 and slept a lot longer last night than normal  He denies chest pain,SOB,cough,congestion, runny nose or fever .  He has had no recent medication changes   He states today he is not nauseated  and has not vomited since yesterday. He is eating a normal diet  He may not be drinking well   He is unable to check his BP but states it usually runs low.      Scheduled him an appt today at 1:30pm to be evaluated...          Reason for Disposition   MODERATE weakness (i.e., interferes with work, school, normal activities) and cause unknown (Exceptions: Weakness from acute minor illness or from poor fluid intake; weakness is chronic and not worse.)    Additional Information   Negative: SEVERE difficulty breathing (e.g., struggling for each breath, speaks in single words)   Negative: Shock suspected (e.g., cold/pale/clammy skin, too weak to stand, low BP, rapid pulse)   Negative: Difficult to awaken or acting confused (e.g., disoriented, slurred speech)   Negative: Fainted > 15 minutes ago and still feels too weak or dizzy to stand   Negative: SEVERE weakness (i.e., unable to walk or barely able to walk, requires support) and new-onset or worsening   Negative: Sounds like a life-threatening emergency to the triager   Negative: Difficulty breathing   Negative: Heart beating < 50 beats per minute OR > 140 beats per minute   Negative: Extra heartbeats, irregular heart beating, or heart is beating very fast (i.e., 'palpitations')   Negative: Follows large amount of bleeding (e.g., from vomiting, rectum, vagina) (Exception: Small transient weakness from sight of a small amount blood.)   Negative: Black or tarry bowel movements   Negative: MODERATE weakness or fatigue from poor fluid intake with no improvement  "after 2 hours of rest and fluids   Negative: Drinking very little and dehydration suspected (e.g., no urine > 12 hours, very dry mouth, very lightheaded)   Negative: Patient sounds very sick or weak to the triager   Negative: Weakness of the face, arm or leg on one side of the body   Negative: Has diabetes mellitus and weakness from low blood sugar (i.e., < 60 mg/dL or 3.5 mmol/L)   Negative: Recent heat exposure, suspected cause of weakness   Negative: Vomiting is main symptom   Negative: Diarrhea is main symptom    Answer Assessment - Initial Assessment Questions  1. DESCRIPTION: \"Describe how you are feeling.\"      Fatigued no energy  2. SEVERITY: \"How bad is it?\"  \"Can you stand and walk?\"    - MILD (0-3): Feels weak or tired, but does not interfere with work, school or normal activities.    - MODERATE (4-7): Able to stand and walk; weakness interferes with work, school, or normal activities.    - SEVERE (8-10): Unable to stand or walk; unable to do usual activities.      moderate  3. ONSET: \"When did these symptoms begin?\" (e.g., hours, days, weeks, months)      yesterday  4. CAUSE: \"What do you think is causing the weakness or fatigue?\" (e.g., not drinking enough fluids, medical problem, trouble sleeping)      Not sure  5. NEW MEDICINES:  \"Have you started on any new medicines recently?\" (e.g., opioid pain medicines, benzodiazepines, muscle relaxants, antidepressants, antihistamines, neuroleptics, beta blockers)      no  6. OTHER SYMPTOMS: \"Do you have any other symptoms?\" (e.g., chest pain, fever, cough, SOB, vomiting, diarrhea, bleeding, other areas of pain)      Vomited x 1 yesterday  no nausea since  7. PREGNANCY: \"Is there any chance you are pregnant?\" \"When was your last menstrual period?\"      no    Protocols used: Weakness (Generalized) and Fatigue-A-OH    "

## 2024-04-02 ENCOUNTER — OFFICE VISIT (OUTPATIENT)
Dept: ORTHOPEDICS | Facility: CLINIC | Age: 89
End: 2024-04-02
Payer: COMMERCIAL

## 2024-04-02 DIAGNOSIS — G56.22 ULNAR NEUROPATHY AT ELBOW OF LEFT UPPER EXTREMITY: Primary | ICD-10-CM

## 2024-04-02 PROCEDURE — 99024 POSTOP FOLLOW-UP VISIT: CPT | Performed by: STUDENT IN AN ORGANIZED HEALTH CARE EDUCATION/TRAINING PROGRAM

## 2024-04-02 NOTE — NURSING NOTE
Reason For Visit:   Chief Complaint   Patient presents with    Surgical Followup     Follow-up Post op Left ulnar nerve decompression at the elbow DOS 2/19/24           Primary MD: Angeli Robertson  Ref. MD: Mariluz    Age: 89 year old    ?  No      There were no vitals taken for this visit.      Pain Assessment  Patient Currently in Pain: Yes  0-10 Pain Scale: 1  Primary Pain Location: Elbow (left)  Pain Descriptors: Constant, Aching, Dull    Hand Dominance Evaluation  Hand Dominance: Left          QuickDASH Assessment      4/2/2024    10:18 AM   QuickDASH Main   1. Open a tight or new jar Moderate difficulty   2. Do heavy household chores (e.g., wash walls, floors) Moderate difficulty   3. Carry a shopping bag or briefcase Mild difficulty   4. Wash your back Mild difficulty   5. Use a knife to cut food No difficulty   6. Recreational activities in which you take some force or impact through your arm, shoulder or hand (e.g., golf, hammering, tennis, etc.) Unable to answer   7. During the past week, to what extent has your arm, shoulder or hand problem interfered with your normal social activities with family, friends, neighbours or groups Slightly   8. During the past week, were you limited in your work or other regular daily activities as a result of your arm, shoulder or hand problem Slightly limited   9. Arm, shoulder or hand pain Mild   10.Tingling (pins and needles) in your arm,shoulder or hand Severe   11. During the past week, how much difficulty have you had sleeping because of the pain in your arm, shoulder or hand No difficulty   Quickdash Ability Score 25          Current Outpatient Medications   Medication Sig Dispense Refill    acyclovir (ZOVIRAX) 400 MG tablet Take 1 tablet (400 mg) by mouth every 12 hours for 30 days 60 tablet 11    calcium carbonate (OS-DAVIDE) 1500 (600 Ca) MG tablet Take 600 mg by mouth 2 times daily (with meals)      Cholecalciferol (VITAMIN D-3) 25 MCG (1000 UT) CAPS Take by  mouth daily      lipase-protease-amylase (CREON) 59993-93718-214562 units CPEP per EC capsule Take 1 capsule by mouth 3 times daily (with meals) And 1 with snacks. 180 capsule 4    multivitamin (OCUVITE) TABS Take 1 tablet by mouth 2 times daily      sodium chloride (LAXMI 128) 5 % ophthalmic ointment Apply a small amount in both eyes at least once a day (bedtime). 3.5 g 11       Allergies   Allergen Reactions    Codeine Sulfate Nausea and Vomiting       JESSY DOVER, ATC

## 2024-04-02 NOTE — LETTER
4/2/2024         RE: Tom Saini  [address] [facility] [address]        Dear Colleague,    Thank you for referring your patient, Tom Saini, to the [facility] [place]. Please see a copy of my visit note below.    Ortho Hand    Doing well. States that the surgery has not helped him yet. Still has numbness and weakness in hand  strength like before surgery. He states that before surgery his L hand  strength was half of his R side. Has some soreness in the left elbow.    On exam, well-healed L medial elbow scar with no Tinel's. L hand intrinsic function remains intact. L ulnar dist sensation including dorsal ulnar sensory function is intact. L  strength of 45 pounds compared with 62.5 pounds on the R.     A/P: 89M 6 weeks s/p L CuTR, doing well    -Offered patient OT for strengthening  -Reiterated that nerve recovery is slow and may take many months to notice a difference in the left hand, especially given how severe the ulnar nerve was compressed. We will continue to monitor.   -Cleared for all activities  -Return to clinic in 6-8 weeks    Deniz Alfred MD, PhD

## 2024-04-03 NOTE — PROGRESS NOTES
Ortho Hand    Doing well. States that the surgery has not helped him yet. Still has numbness and weakness in hand  strength like before surgery. He states that before surgery his L hand  strength was half of his R side. Has some soreness in the left elbow.    On exam, well-healed L medial elbow scar with no Tinel's. L hand intrinsic function remains intact. L ulnar dist sensation including dorsal ulnar sensory function is intact. L  strength of 45 pounds compared with 62.5 pounds on the R.     A/P: 89M 6 weeks s/p L CuTR, doing well    -Offered patient OT for strengthening  -Reiterated that nerve recovery is slow and may take many months to notice a difference in the left hand, especially given how severe the ulnar nerve was compressed. We will continue to monitor.   -Cleared for all activities  -Return to clinic in 6-8 weeks    Deniz lAfred MD, PhD

## 2024-04-09 ENCOUNTER — THERAPY VISIT (OUTPATIENT)
Dept: OCCUPATIONAL THERAPY | Facility: CLINIC | Age: 89
End: 2024-04-09
Payer: MEDICARE

## 2024-04-09 DIAGNOSIS — Z47.89 AFTERCARE FOLLOWING SURGERY OF THE MUSCULOSKELETAL SYSTEM: ICD-10-CM

## 2024-04-09 DIAGNOSIS — G56.22 ULNAR NEUROPATHY AT ELBOW OF LEFT UPPER EXTREMITY: ICD-10-CM

## 2024-04-09 DIAGNOSIS — R29.898 LEFT HAND WEAKNESS: Primary | ICD-10-CM

## 2024-04-09 PROCEDURE — 97165 OT EVAL LOW COMPLEX 30 MIN: CPT | Mod: GO | Performed by: OCCUPATIONAL THERAPIST

## 2024-04-09 PROCEDURE — 97110 THERAPEUTIC EXERCISES: CPT | Mod: GO | Performed by: OCCUPATIONAL THERAPIST

## 2024-04-09 NOTE — PROGRESS NOTES
OCCUPATIONAL THERAPY EVALUATION  Type of Visit: Evaluation    See electronic medical record for Abuse and Falls Screening details.    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    colon/abdomen, s/p mini-R-CHOP x 6    DVT (deep venous thrombosis) (H) 11/2023    R popliteal    Erectile dysfunction     History of deep venous thrombosis 05/2019    distal L DVT with extension, completed 6 months of AC (failed eliquis)    History of pelvic fracture     Hyperlipidemia     Low back pain     disc disease s/p laminectomy in past    Macular degeneration     on Avastin, R eye    Pancreatic cyst 07/2018    next MR due 12/23    Pancreatic insufficiency     Radius fracture 2018    after fall from wall     Dates & types of surgery:  Past Surgical History:   Procedure Laterality Date    CATARACT IOL, RT/LT  01/01/2001    COLONOSCOPY N/A 06/28/2023    Procedure: COLONOSCOPY, WITH BIOPSY;  Surgeon: Jhonatan Cowart MD;  Location: [facility] OR    COMBINED REPAIR PTOSIS WITH BLEPHAROPLASTY BILATERAL Bilateral 04/06/2018    Procedure: COMBINED REPAIR PTOSIS WITH BLEPHAROPLASTY BILATERAL;  BILATERAL UPPER EYELIDS BLEPHAROPLASTY AND PTOSIS REPAIR ;  Surgeon: Anuj Good MD;  Location: [facility]    EYE SURGERY      INSERT PORT VASCULAR ACCESS N/A 8/24/2023    Procedure: Insert port vascular access;  Surgeon: Cesar Negro MD;  Location: [facility] OR    IR CHEST PORT PLACEMENT > 5 YRS OF AGE  8/24/2023    IR PORT REMOVAL RIGHT  2/23/2024    LAMINECTOMY LUMBAR TWO LEVELS  01/01/1993    L4-L5    LAPAROSCOPIC ASSISTED SIGMOID COLECTOMY N/A 7/27/2023    Procedure: COLECTOMY, SIGMOID, LAPAROSCOPIC assisted;  Surgeon: Jhonatan Cowart MD;  Location: [facility] OR    ORIF RADIAL SHAFT  "FRACTURE Right 2018    RECESSION RESECTION (REPAIR STRABISMUS)  01/01/1949    RELEASE ULNAR NERVE (ELBOW) Left 2/19/2024    Procedure: LEFT ULNAR NERVE DECOMPRESSION AT THE ELBOW;  Surgeon: Deniz Alfred MD;  Location: [facility] OR    REMOVE PORT VASCULAR ACCESS Right 2/23/2024    Procedure: Remove port vascular access right;  Surgeon: Valdemar Herbert MD;  Location: [facility] OR    SIGMOIDOSCOPY FLEXIBLE N/A 7/27/2023    Procedure: Sigmoidoscopy flexible;  Surgeon: Jhonatan Cowart MD;  Location: [facility] OR     Prior diagnostic imaging/testing results: EMG, per MD note from 10/9/23, \"he hand an EMG of the left arm on 9/7/23 which showed left-sided ulnar neuropathy at the elbow and chronic C7-8 radiculopathy.\"     Prior therapy history for the same diagnosis, illness or injury: Hand therapy, physical therpay    Prior level of function:  Transfers: Independent  Ambulation: Independent  ADL: Independent  IADL: Independent    Living environment: Patient is independent at home and there are no concerns about accessibility and mobility in the home.    Employment: Retired     Patient goals for therapy: To improve strength and sensation of the left hand.       Objective   Left hand dominant  Patient reports symptoms of pain, stiffness/loss of motion, weakness/loss of strength, numbness, and tingling     Pain Level (Scale 0-10):   4/9/2024   At Rest 1/10   With Use 1/10     Pain Description:  Date 4/9/2024   Location Medial elbow   Pain Quality Sore, achy   Frequency Constant     Pain is worst Daytime   Exacerbated by Unknown   Relieved by None   Progression Unchanged since surgery     Wound/Scar  Well-healed, mobile. Slightly tender to palpation.    Edema  None noted on exam.    Sensation  Decreased ulnar nerve distribution per patient report.    ROM  Patient is able to fully extend fingers. Lacks terminal flexion of the small finger when attempting to make a fist.    Appearance  Observation:  - none  + mild    ++ " moderate    +++ severe    4/9/2024   Clawing Absent   Hypothenar Atrophy Present   Intrinsic Atrophy Present   Wartenburg's sign Present     MMT Ulnar Nerve  Scale 0-5/5   4/9/2024   FCU 5/5   FDP Ring and Small Ring: 3+/5  Small: 3-/5   Palmar Interossei 3+/5   Dorsal Interossei 3+/4   Adductor Pollicis 5/5     Strength   (Measured in pounds)  Pain Report: - none  + mild    ++ moderate    +++ severe    4/9/2024 4/9/2024   Trials Right Left   1  2  3 60  58 31  35  34   Average 59 33.3     Lat Pinch 4/9/2024 4/9/2024   Trials Right Left   1 10 6     3 Pt Pinch 4/9/2024 4/9/2024   Trials Right Left   1 10 7       Assessment & Plan   CLINICAL IMPRESSIONS  Medical Diagnosis: Left ulnar neuropathy    Treatment Diagnosis: Left hand weakness, tingling/numbness of left hand    Impression/Assessment: Pt is a 89 year old male presenting to Occupational Therapy due to the above condition.  The following significant findings have been identified: Impaired activity tolerance, Impaired ROM, Impaired sensation, Impaired strength, and Pain.  These identified deficits interfere with their ability to perform self care tasks, recreational activities, household chores, and meal planning and preparation as compared to previous level of function.   Patient's limitations or Problem List includes: Pain, Decreased ROM/motion, Sensory disturbance, Decreased , and Decreased pinch of the left elbow and hand which interferes with the patient's ability to perform Self Care Tasks (dressing, eating, bathing, hygiene/toileting), Recreational Activities, and Household Chores as compared to previous level of function.    Clinical Decision Making (Complexity):  Assessment of Occupational Performance: 5 or more Performance Deficits  Occupational Performance Limitations: bathing/showering, toileting, dressing, feeding, hygiene and grooming, home establishment and management, meal preparation and cleanup, shopping, and leisure  activities  Clinical Decision Making (Complexity): Low complexity    PLAN OF CARE  Treatment Interventions:  Therapeutic Exercise:  AROM, PROM, and Isotonics  Neuromuscular re-education:  Nerve Gliding  Manual Techniques:  Scar mobilization and Myofascial release    Long Term Goals   OT Goal 1  Goal Identifier: IADL  Goal Description: Patient demonstrates a 10 lb increase in left hand strength to faciliate jar opening.  Rationale: In order to maximize safety and independence with ADL/IADLs  Target Date: 06/09/24      Frequency of Treatment: 2x/month  Duration of Treatment: 2 months     Recommended Referrals to Other Professionals:  N/A  Education Assessment: Learner/Method: Patient;Demonstration;Pictures/Video;No Barriers to Learning     Risks and benefits of evaluation/treatment have been explained.   Patient/Family/caregiver agrees with Plan of Care.     Evaluation Time:    OT Eval, Low Complexity Minutes (64481): 25      Signing Clinician: MATTHIEU Champagne [facility]                                                                                   OUTPATIENT OCCUPATIONAL THERAPY      PLAN OF TREATMENT FOR OUTPATIENT REHABILITATION   Patient's Last Name, First Name, Tom Garcia YOB: 1935   Provider's Name   [facility]   Medical Record No.  [id number]     Onset Date: 02/19/24 Start of Care Date: 04/09/24     Medical Diagnosis:  Left ulnar neuropathy      OT Treatment Diagnosis:  Left hand weakness, tingling/numbness of left hand Plan of Treatment  Frequency/Duration:2x/month/2 months    Certification date from 04/09/24   To 06/09/24        See note for plan of treatment details and functional goals     Melodie Joseph OT                         I CERTIFY THE NEED FOR THESE SERVICES FURNISHED UNDER        THIS PLAN OF TREATMENT AND WHILE UNDER MY CARE     (Physician attestation of this document indicates review and certification  of the therapy plan).              Referring Provider:  Deniz Alfred MD, PhD    Initial Assessment  See Epic Evaluation- 04/09/24

## 2024-04-29 ENCOUNTER — APPOINTMENT (OUTPATIENT)
Dept: LAB | Facility: CLINIC | Age: 89
End: 2024-04-29
Attending: STUDENT IN AN ORGANIZED HEALTH CARE EDUCATION/TRAINING PROGRAM
Payer: MEDICARE

## 2024-04-29 ENCOUNTER — ONCOLOGY VISIT (OUTPATIENT)
Dept: ONCOLOGY | Facility: CLINIC | Age: 89
End: 2024-04-29
Attending: STUDENT IN AN ORGANIZED HEALTH CARE EDUCATION/TRAINING PROGRAM
Payer: MEDICARE

## 2024-04-29 VITALS
SYSTOLIC BLOOD PRESSURE: 106 MMHG | DIASTOLIC BLOOD PRESSURE: 69 MMHG | TEMPERATURE: 97.4 F | BODY MASS INDEX: 22.88 KG/M2 | RESPIRATION RATE: 16 BRPM | WEIGHT: 161.8 LBS | OXYGEN SATURATION: 97 % | HEART RATE: 80 BPM

## 2024-04-29 DIAGNOSIS — C83.30 DIFFUSE LARGE B-CELL LYMPHOMA, UNSPECIFIED BODY REGION (H): ICD-10-CM

## 2024-04-29 DIAGNOSIS — C83.398 DIFFUSE LARGE B-CELL LYMPHOMA OF EXTRANODAL SITE: ICD-10-CM

## 2024-04-29 LAB
ALBUMIN SERPL BCG-MCNC: 4.1 G/DL (ref 3.5–5.2)
ALP SERPL-CCNC: 87 U/L (ref 40–150)
ALT SERPL W P-5'-P-CCNC: 7 U/L (ref 0–70)
ANION GAP SERPL CALCULATED.3IONS-SCNC: 10 MMOL/L (ref 7–15)
AST SERPL W P-5'-P-CCNC: 18 U/L (ref 0–45)
BASOPHILS # BLD AUTO: 0.1 10E3/UL (ref 0–0.2)
BASOPHILS NFR BLD AUTO: 2 %
BILIRUB SERPL-MCNC: 0.3 MG/DL
BUN SERPL-MCNC: 20.5 MG/DL (ref 8–23)
CALCIUM SERPL-MCNC: 9.5 MG/DL (ref 8.8–10.2)
CHLORIDE SERPL-SCNC: 104 MMOL/L (ref 98–107)
CREAT SERPL-MCNC: 0.99 MG/DL (ref 0.67–1.17)
DEPRECATED HCO3 PLAS-SCNC: 27 MMOL/L (ref 22–29)
EGFRCR SERPLBLD CKD-EPI 2021: 73 ML/MIN/1.73M2
EOSINOPHIL # BLD AUTO: 0.2 10E3/UL (ref 0–0.7)
EOSINOPHIL NFR BLD AUTO: 2 %
ERYTHROCYTE [DISTWIDTH] IN BLOOD BY AUTOMATED COUNT: 13 % (ref 10–15)
GLUCOSE SERPL-MCNC: 115 MG/DL (ref 70–99)
HCT VFR BLD AUTO: 40 % (ref 40–53)
HGB BLD-MCNC: 12.9 G/DL (ref 13.3–17.7)
IMM GRANULOCYTES # BLD: 0 10E3/UL
IMM GRANULOCYTES NFR BLD: 0 %
LDH SERPL L TO P-CCNC: 150 U/L (ref 0–250)
LYMPHOCYTES # BLD AUTO: 1 10E3/UL (ref 0.8–5.3)
LYMPHOCYTES NFR BLD AUTO: 14 %
MCH RBC QN AUTO: 31.1 PG (ref 26.5–33)
MCHC RBC AUTO-ENTMCNC: 32.3 G/DL (ref 31.5–36.5)
MCV RBC AUTO: 96 FL (ref 78–100)
MONOCYTES # BLD AUTO: 0.7 10E3/UL (ref 0–1.3)
MONOCYTES NFR BLD AUTO: 10 %
NEUTROPHILS # BLD AUTO: 5.3 10E3/UL (ref 1.6–8.3)
NEUTROPHILS NFR BLD AUTO: 72 %
NRBC # BLD AUTO: 0 10E3/UL
NRBC BLD AUTO-RTO: 0 /100
PLATELET # BLD AUTO: 266 10E3/UL (ref 150–450)
POTASSIUM SERPL-SCNC: 4.4 MMOL/L (ref 3.4–5.3)
PROT SERPL-MCNC: 6.9 G/DL (ref 6.4–8.3)
RBC # BLD AUTO: 4.15 10E6/UL (ref 4.4–5.9)
SODIUM SERPL-SCNC: 141 MMOL/L (ref 135–145)
URATE SERPL-MCNC: 5.9 MG/DL (ref 3.4–7)
WBC # BLD AUTO: 7.2 10E3/UL (ref 4–11)

## 2024-04-29 PROCEDURE — 36415 COLL VENOUS BLD VENIPUNCTURE: CPT | Performed by: STUDENT IN AN ORGANIZED HEALTH CARE EDUCATION/TRAINING PROGRAM

## 2024-04-29 PROCEDURE — 83615 LACTATE (LD) (LDH) ENZYME: CPT | Performed by: STUDENT IN AN ORGANIZED HEALTH CARE EDUCATION/TRAINING PROGRAM

## 2024-04-29 PROCEDURE — 85025 COMPLETE CBC W/AUTO DIFF WBC: CPT | Performed by: STUDENT IN AN ORGANIZED HEALTH CARE EDUCATION/TRAINING PROGRAM

## 2024-04-29 PROCEDURE — G0463 HOSPITAL OUTPT CLINIC VISIT: HCPCS | Performed by: STUDENT IN AN ORGANIZED HEALTH CARE EDUCATION/TRAINING PROGRAM

## 2024-04-29 PROCEDURE — 80053 COMPREHEN METABOLIC PANEL: CPT | Performed by: STUDENT IN AN ORGANIZED HEALTH CARE EDUCATION/TRAINING PROGRAM

## 2024-04-29 PROCEDURE — 84550 ASSAY OF BLOOD/URIC ACID: CPT | Performed by: STUDENT IN AN ORGANIZED HEALTH CARE EDUCATION/TRAINING PROGRAM

## 2024-04-29 PROCEDURE — 99214 OFFICE O/P EST MOD 30 MIN: CPT | Performed by: STUDENT IN AN ORGANIZED HEALTH CARE EDUCATION/TRAINING PROGRAM

## 2024-04-29 ASSESSMENT — PAIN SCALES - GENERAL: PAINLEVEL: NO PAIN (0)

## 2024-04-29 NOTE — NURSING NOTE
Chief Complaint   Patient presents with    Blood Draw     Vpt blood draw by lab RN     Venipuncture labs drawn by lab RN, vitals taken and appointment arrived.    Orquidea Mae RN

## 2024-04-29 NOTE — LETTER
4/29/2024         RE: Tom Saini  [address] [facility] [address]        Dear Colleague,    Thank you for referring your patient, Tom Saini, to the [facility]. Please see a copy of my visit note below.    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  He has had diarrhea with particular food for several years and recently was investigated for possible pancreatic insufficiency.  He also has a history of pancreatic pseudocyst.  On recent MRI done in June 2023 to follow-up on pancreatic pseudocyst showed a colonic mass.  Subsequent CT scan of chest abdomen pelvis showed Mild anemia, no cytopenias 7.9 x 6.9 x 5.6 cm large soft tissue mass in sigmoid colon obliterating the lumen and significant extramural soft tissue extension superiorly in the sigmoid mesocolon.  Enlarged bilateral inguinal lymph node, as few small prominent lymph nodes along superior rectal/inferior mesenteric artery distribution.  Diffuse main pancreatic duct dilatation with large intraductal calculus in the head was seen as well.  Multiple cystic lesions and dilated sidebranches were seen in the pancreas.  He underwent colonoscopy but due to obliterated lumen, colonoscopy failed.  Subsequently CT colonography was done which showed similar results to CT chest abdomen pelvis.  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  FISH showed Bcl-2 rearrangement but no MYC rearrangement.  Bcl-2 IgH rearrangement [translocation (14;18)] is consistent with transformation of DLBCL from follicular lymphoma.  Postoperative course was complicated by  postoperative nausea vomiting which resolved, single episode of suicidal ideation which resolved.  He was discharged to TCU.  He has been regaining his strength slowly, his performance status has improved to 1.  He is able to walk independently for short distances, can do basic activities of daily living himself.    PET scan revealed persistent hypermetabolic retroperitoneal lymph nodes, possibly reactive but lymphoma cannot be ruled out. ECHOcardiogram reveals EF >50%. LDH within normal limits. Overall IPI 2. Low-intermediate risk GCB DLBCL.    He  began treatment with RminiCHP. First cycle was split into prephase steroid+rituxmab followed by miniCHP. He tolerated treatment very well without any long term toxicities. PET scan after 3 cycles showed CR.     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 No fevers, chills, night sweats or weight loss, no lymphadenopathy. No pain.          Objective   /69   Pulse 80   Temp 97.4  F (36.3  C) (Oral)   Resp 16   Wt 73.4 kg (161 lb 12.8 oz)   SpO2 97%   BMI 22.88 kg/m    Body mass index is 22.88 kg/m .  Physical Exam   GENERAL:  Alert oriented well nourished  HEAD: normocephalic atraumatic  SKIN:  no rash, hives, other lesions.  LYMPHATIC: no abnormal lymph nodes palable in cervical, axillary, supraclavicular or inguinal area.  RESP:  No rales or rhonchi, breath sounds bilaterally equal and vesicular  CV:  No tachycardia, S1 S2 normal No murmur.  GI:  Abdomen soft nontender no hepato or splenomegaly  MUSCULOSKELETAL:  No visible joint redness or swelling.  NEURO:  No gross weakness gait normal  PSYCH:  pleasant affect    Labs: I personally reviewed complete blood count, differential, renal function test, liver function tests LDH.  No cytopenias, LFTs within normal limits, renal function test within normal limits and LDH is normal as well. Mild anemia    Assessment and Plan:    1) Diffuse large B cell lymphoma:  - He does not have any clinical or laboratory criteria of lymphoma recurrence or progression.   - I will see him back in 3 months with labs and scans.  - Discussed stopping acyclovir now that he is 3 months out.    Total time spent on date of service in review of medical records, review of labs, history taking, physical exam, discussion of assessment and plan, counseling and patient education is 30 minutes.    Holly Buitrago MD  Attending Physician  Pager [phone]                Signed Electronically by: Holly Buitrago MD

## 2024-05-07 ENCOUNTER — THERAPY VISIT (OUTPATIENT)
Dept: OCCUPATIONAL THERAPY | Facility: CLINIC | Age: 89
End: 2024-05-07
Payer: MEDICARE

## 2024-05-07 DIAGNOSIS — R29.898 LEFT HAND WEAKNESS: Primary | ICD-10-CM

## 2024-05-07 DIAGNOSIS — Z47.89 AFTERCARE FOLLOWING SURGERY OF THE MUSCULOSKELETAL SYSTEM: ICD-10-CM

## 2024-05-07 PROCEDURE — 97110 THERAPEUTIC EXERCISES: CPT | Mod: GO | Performed by: OCCUPATIONAL THERAPIST

## 2024-05-13 ENCOUNTER — OFFICE VISIT (OUTPATIENT)
Dept: INTERNAL MEDICINE | Facility: CLINIC | Age: 89
End: 2024-05-13
Payer: MEDICARE

## 2024-05-13 VITALS
SYSTOLIC BLOOD PRESSURE: 96 MMHG | DIASTOLIC BLOOD PRESSURE: 55 MMHG | BODY MASS INDEX: 23.01 KG/M2 | WEIGHT: 162.7 LBS | HEART RATE: 86 BPM | OXYGEN SATURATION: 95 %

## 2024-05-13 DIAGNOSIS — L98.9 SKIN LESION: ICD-10-CM

## 2024-05-13 DIAGNOSIS — R21 RASH: Primary | ICD-10-CM

## 2024-05-13 DIAGNOSIS — E78.00 PURE HYPERCHOLESTEROLEMIA: ICD-10-CM

## 2024-05-13 PROCEDURE — 91320 SARSCV2 VAC 30MCG TRS-SUC IM: CPT | Performed by: INTERNAL MEDICINE

## 2024-05-13 PROCEDURE — 90480 ADMN SARSCOV2 VAC 1/ONLY CMP: CPT | Performed by: INTERNAL MEDICINE

## 2024-05-13 PROCEDURE — 17000 DESTRUCT PREMALG LESION: CPT | Performed by: INTERNAL MEDICINE

## 2024-05-13 PROCEDURE — 99213 OFFICE O/P EST LOW 20 MIN: CPT | Mod: 25 | Performed by: INTERNAL MEDICINE

## 2024-05-13 RX ORDER — TRIAMCINOLONE ACETONIDE 1 MG/G
CREAM TOPICAL 2 TIMES DAILY
Qty: 15 G | Refills: 1 | Status: SHIPPED | OUTPATIENT
Start: 2024-05-13 | End: 2024-05-31

## 2024-05-13 NOTE — PATIENT INSTRUCTIONS
Use both the coconut oil and the triamcinolone on the skin twice per day.  Call if not improved after two weeks.      If the spot by the left eye doesn't improve, schedule with dermatology earlier.

## 2024-05-13 NOTE — PROGRESS NOTES
Assessment & Plan     Rash    Juaquin presents with a scaly red patch distal to the R patella that he noticed 4-5 days ago when it became itching.  May be a patch of eczema, less likely fungal as he doesn't have risks for exposure.  We'll treat with the coconut oil that he already has at home plus triamcinolone for up to 2 weeks.  Follow-up as needed if not improving.     - triamcinolone (KENALOG) 0.1 % external cream; Apply topically 2 times daily Use the triamcinolone for a maximum of 14 days in a row- more can thin out the skin.  You can resume after a one week break.    Skin lesion    Small horn shaped papule in lateral left eyebrow of unknown duration.  He does follow with dermatology but doesn't have an appointment with them until October, so we decided to treat this with LN2 in case it is premalignant or malignant.  2 rounds of a few seconds each applied to the lesion.  Advised him to follow-up with dermatology sooner if this spot does not resolve.     - DESTRUC BENIGN/PREMAL,1ST LESION [73672]    Pure hypercholesterolemia    Juaquin's HM list reports he is due for a cholesterol check.  He stopped taking a statin a couple of years ago due to questionable benefit vs risk given his age.  Discussed rechecking the lab, but he would not be interested in resuming medication, so we discussed there would not be reason to check the lab in this case.  I removed the lab from his HM list.     *    COVID booster today.       Subjective   Juaquin is a 89 year old, presenting for the following health issues:  Sore (Sore on right leg. Sore was itchy 4-5 days ago.)    History of Present Illness       Reason for visit:  Sore on right leg near the knee.  Symptom onset:  3-7 days ago  Symptom intensity:  Mild  Symptom progression:  Staying the same  Had these symptoms before:  No    He eats 0-1 servings of fruits and vegetables daily.He consumes 0 sweetened beverage(s) daily.He exercises with enough effort to increase his heart rate 9  or less minutes per day.  He exercises with enough effort to increase his heart rate 3 or less days per week.   He is taking medications regularly.       Rash  Onset/Duration: Unsure, became itchy 4-5 days ago  Description  Location: R leg near the knee  Character: flakey, red.  No pain  Itching: yes, 4-5 days ago- improved now  Intensity:  mild  Progression of Symptoms:  same  Accompanying signs and symptoms:   Fever: No  Body aches or joint pain: No  Sore throat symptoms: No  Recent cold symptoms: No  History:           Previous episodes of similar rash: None  New exposures:  None  Recent travel: No  Exposure to similar rash: No  Precipitating or alleviating factors: none  Therapies tried and outcome: none      Constitutional, derm systems are negative, except as otherwise noted.       Objective    BP 96/55 (BP Location: Right arm, Patient Position: Sitting, Cuff Size: Adult Regular)   Pulse 86   Wt 73.8 kg (162 lb 11.2 oz)   SpO2 95%   BMI 23.01 kg/m    Body mass index is 23.01 kg/m .  Physical Exam   GENERAL: alert and no distress  SKIN: raised 2mm firm horn-like lesion at lateral left eyebrow.  1.5cm scaly red patch distal to right patella            Signed Electronically by: Leon Gonzalez MD

## 2024-05-21 ENCOUNTER — OFFICE VISIT (OUTPATIENT)
Dept: ORTHOPEDICS | Facility: CLINIC | Age: 89
End: 2024-05-21
Payer: MEDICARE

## 2024-05-21 DIAGNOSIS — H35.3221 EXUDATIVE AGE-RELATED MACULAR DEGENERATION OF LEFT EYE WITH ACTIVE CHOROIDAL NEOVASCULARIZATION (H): Primary | ICD-10-CM

## 2024-05-21 DIAGNOSIS — G56.22 ULNAR NEUROPATHY AT ELBOW OF LEFT UPPER EXTREMITY: Primary | ICD-10-CM

## 2024-05-21 PROCEDURE — 99212 OFFICE O/P EST SF 10 MIN: CPT | Performed by: STUDENT IN AN ORGANIZED HEALTH CARE EDUCATION/TRAINING PROGRAM

## 2024-05-21 NOTE — LETTER
5/21/2024         RE: Tom Saini  [address] [facility] [address]        Dear Colleague,    Thank you for referring your patient, Tom Saini, to the [facility] [place]. Please see a copy of my visit note below.    Ortho Hand    Doing better.  He has noticed strengthening of his left ring and small finger flexion.    On exam, left elbow incision is well remodeling with no Tinel's.  Can make a full left composite fist.  Left hand intrinsic strength is improving.  Still with decreased sensation in the ulnar distributed left hand.    A/P: 89-year-old male 3 months status post left ulnar nerve decompression at the elbow, doing well    -Continue strengthening activities  -Reiterated that nerves recover over many months.  Since he has increased extrinsic flexor function, this is likely attributable to early recovery of his ulnar innervated FDP muscle tendon unit.  -Return to clinic as needed    Deniz Alfred MD, PhD

## 2024-05-21 NOTE — PROGRESS NOTES
Ortho Hand    Doing better.  He has noticed strengthening of his left ring and small finger flexion.    On exam, left elbow incision is well remodeling with no Tinel's.  Can make a full left composite fist.  Left hand intrinsic strength is improving.  Still with decreased sensation in the ulnar distributed left hand.    A/P: 89-year-old male 3 months status post left ulnar nerve decompression at the elbow, doing well    -Continue strengthening activities  -Reiterated that nerves recover over many months.  Since he has increased extrinsic flexor function, this is likely attributable to early recovery of his ulnar innervated FDP muscle tendon unit.  -Return to clinic as needed    Deniz Alfred MD, PhD

## 2024-05-21 NOTE — NURSING NOTE
Reason For Visit:   Chief Complaint   Patient presents with    Surgical Followup     Follow-up Post op Left ulnar nerve decompression at the elbow DOS 2/19/24       Primary MD: Angeli Robertson  Ref. MD: Mariluz    Age: 89 year old    ?  No      There were no vitals taken for this visit.      Pain Assessment  Patient Currently in Pain: Yes  0-10 Pain Scale: 1  Primary Pain Location: Elbow  Pain Descriptors: Intermittent    Hand Dominance Evaluation  Hand Dominance: Left          QuickDASH Assessment      5/21/2024    10:16 AM   QuickDASH Main   1. Open a tight or new jar Mild difficulty   2. Do heavy household chores (e.g., wash walls, floors) Mild difficulty   3. Carry a shopping bag or briefcase No difficulty   4. Wash your back No difficulty   5. Use a knife to cut food No difficulty   6. Recreational activities in which you take some force or impact through your arm, shoulder or hand (e.g., golf, hammering, tennis, etc.) Unable to answer   7. During the past week, to what extent has your arm, shoulder or hand problem interfered with your normal social activities with family, friends, neighbours or groups Not at all   8. During the past week, were you limited in your work or other regular daily activities as a result of your arm, shoulder or hand problem Not limited at all   9. Arm, shoulder or hand pain Mild   10.Tingling (pins and needles) in your arm,shoulder or hand None   11. During the past week, how much difficulty have you had sleeping because of the pain in your arm, shoulder or hand No difficulty   Quickdash Ability Score 4.55          Current Outpatient Medications   Medication Sig Dispense Refill    acyclovir (ZOVIRAX) 400 MG tablet Take 1 tablet (400 mg) by mouth every 12 hours for 30 days 60 tablet 11    calcium carbonate (OS-DAVIDE) 1500 (600 Ca) MG tablet Take 600 mg by mouth 2 times daily (with meals)      Cholecalciferol (VITAMIN D-3) 25 MCG (1000 UT) CAPS Take by mouth daily       lipase-protease-amylase (CREON) 61513-03467-684859 units CPEP per EC capsule Take 1 capsule by mouth 3 times daily (with meals) And 1 with snacks. 180 capsule 4    multivitamin (OCUVITE) TABS Take 1 tablet by mouth 2 times daily      sodium chloride (LAXMI 128) 5 % ophthalmic ointment Apply a small amount in both eyes at least once a day (bedtime). 3.5 g 11    triamcinolone (KENALOG) 0.1 % external cream Apply topically 2 times daily Use the triamcinolone for a maximum of 14 days in a row- more can thin out the skin.  You can resume after a one week break. 15 g 1       Allergies   Allergen Reactions    Codeine Sulfate Nausea and Vomiting       JESSY DOVER, ATC

## 2024-05-22 ENCOUNTER — OFFICE VISIT (OUTPATIENT)
Dept: OPHTHALMOLOGY | Facility: CLINIC | Age: 89
End: 2024-05-22
Attending: OPHTHALMOLOGY
Payer: MEDICARE

## 2024-05-22 DIAGNOSIS — H35.3221 EXUDATIVE AGE-RELATED MACULAR DEGENERATION OF LEFT EYE WITH ACTIVE CHOROIDAL NEOVASCULARIZATION (H): ICD-10-CM

## 2024-05-22 DIAGNOSIS — Z96.1 PSEUDOPHAKIA OF BOTH EYES: ICD-10-CM

## 2024-05-22 DIAGNOSIS — H35.3211 EXUDATIVE AGE-RELATED MACULAR DEGENERATION, RIGHT EYE, WITH ACTIVE CHOROIDAL NEOVASCULARIZATION (H): Primary | ICD-10-CM

## 2024-05-22 DIAGNOSIS — H18.513 FUCHS' CORNEAL DYSTROPHY OF BOTH EYES: ICD-10-CM

## 2024-05-22 PROCEDURE — 92134 CPTRZ OPH DX IMG PST SGM RTA: CPT | Performed by: OPHTHALMOLOGY

## 2024-05-22 PROCEDURE — 67028 INJECTION EYE DRUG: CPT | Mod: RT | Performed by: OPHTHALMOLOGY

## 2024-05-22 PROCEDURE — 99214 OFFICE O/P EST MOD 30 MIN: CPT | Mod: 25 | Performed by: OPHTHALMOLOGY

## 2024-05-22 PROCEDURE — 250N000011 HC RX IP 250 OP 636: Performed by: OPHTHALMOLOGY

## 2024-05-22 PROCEDURE — G0463 HOSPITAL OUTPT CLINIC VISIT: HCPCS | Mod: 25

## 2024-05-22 RX ADMIN — Medication 1.25 MG: at 15:18

## 2024-05-22 ASSESSMENT — TONOMETRY
OS_IOP_MMHG: 12
IOP_METHOD: TONOPEN
OD_IOP_MMHG: 16

## 2024-05-22 ASSESSMENT — SLIT LAMP EXAM - LIDS
COMMENTS: NORMAL
COMMENTS: NORMAL

## 2024-05-22 ASSESSMENT — VISUAL ACUITY
OD_CC+: -1
OS_CC: 20/40
METHOD: SNELLEN - LINEAR
CORRECTION_TYPE: GLASSES
OD_CC: 20/60

## 2024-05-22 ASSESSMENT — EXTERNAL EXAM - LEFT EYE: OS_EXAM: ET

## 2024-05-22 ASSESSMENT — EXTERNAL EXAM - RIGHT EYE: OD_EXAM: NORMAL

## 2024-05-22 NOTE — PROGRESS NOTES
"HPI       Exudative Macular Degeneration Follow Up    In left eye. Additional comments: \"Things have been the same since the last time I was in\"  Using ointment at night  Curry Solis  12:42 PM May 22, 2024             Last edited by Curry Solis on 5/22/2024 12:42 PM.          Review of systems for the eyes was negative other than the pertinent positives/negatives listed in the HPI.      Assessment & Plan      Tom Saini is a 89 year old male with the following diagnoses:   1. Exudative age-related macular degeneration, right eye, with active choroidal neovascularization (H)    2. Exudative age-related macular degeneration of left eye with active choroidal neovascularization (H)    3. Fuchs' corneal dystrophy of both eyes    4. Pseudophakia of both eyes         Here for recheck of eAMD both eyes  Cancer continues to be in remission and DVT is resolved.  Wearing compression socks prophylactically     S/P Avastin x many (last Right eye 10/2019; left eye 5/2023)  Vision seems slowly worsening, unsure which eye  Using night time Pantera hamilton      New SRF right eye   FVPED stable left eye     RBA to avastin right eye discussed, consent obtained, will proceed today.     Encouraged to resume Amsler grid daily  Continue Pantera ointment bedtime both eyes  Return precautions reviewed       Patient disposition:   Return in about 6 weeks (around 7/3/2024) for VT only, OCT Macula, Ravindra.           Attending Physician Attestation:  Complete documentation of historical and exam elements from today's encounter can be found in the full encounter summary report (not reduplicated in this progress note).  I personally obtained the chief complaint(s) and history of present illness.  I confirmed and edited as necessary the review of systems, past medical/surgical history, family history, social history, and examination findings as documented by others; and I examined the patient myself.  I personally reviewed the relevant tests, images, " and reports as documented above.  I formulated and edited as necessary the assessment and plan and discussed the findings and management plan with the patient and family. . - Brigido Laura MD

## 2024-05-30 ENCOUNTER — TELEPHONE (OUTPATIENT)
Dept: INTERNAL MEDICINE | Facility: CLINIC | Age: 89
End: 2024-05-30
Payer: MEDICARE

## 2024-05-30 NOTE — TELEPHONE ENCOUNTER
[facility] Call Center    Phone Message    May a detailed message be left on voicemail: yes     Reason for Call: Symptoms or Concerns     If patient has red-flag symptoms, warm transfer to triage line    Current symptom or concern: possible UTI, pt stated he has pain with urination, would like to get orders for a urine test, requesting call back to discuss

## 2024-05-30 NOTE — TELEPHONE ENCOUNTER
"The RN called and spoke to the patient.     The patient reported that he has had, \"pain in my penis for several weeks.\" The patient describes the penile pain as \"[its] stinging all the time\" but that this is worse when he needs to urinate.     With this, the patient has also had urinary frequency and occasional urinary urgency.     Yesterday morning the patient had an episode of vomiting after taking a shower. The patient stated that after this episode of vomiting he intentionally laid down on the bathroom floor (the patient denies having fallen or injured himself) and took a nap. The patient then got up and got into bed to take a nap. After the patient got into bed, he felt chilled and measured his temperature (Temp=96 F).     The patient denies having any recurrent episodes of vomiting since yesterday morning.     The patient denies nausea, back pain, flank pain, or abdominal pain.     When prompted if the patient had other concerns, the patient shared that he was recently seen in clinic for his left eyebrow. Per review of the chart, the patient was seen on 5/13/2024 by Dr. Gonzalez for evaluation of a lateral left eyebrow lesion. The patient stated that the \"left side\" of his left eyebrow \"feels like there is fluid under it.\"     The patient denies other acute concerns.     The RN recommended that the patient would be best served by seeking care in person for further evaluation and treatment. The RN educated the patient that he could seek care by scheduling an appointment at the [facility] or by going to Urgent Care or the Emergency Department. The RN scheduled the patient for an appointment with Dr. Bocanegra on 5/31/2024 7:00 AM.   "

## 2024-05-31 ENCOUNTER — OFFICE VISIT (OUTPATIENT)
Dept: FAMILY MEDICINE | Facility: CLINIC | Age: 89
End: 2024-05-31
Payer: MEDICARE

## 2024-05-31 VITALS
TEMPERATURE: 98.3 F | OXYGEN SATURATION: 98 % | RESPIRATION RATE: 16 BRPM | WEIGHT: 161.5 LBS | HEART RATE: 83 BPM | BODY MASS INDEX: 22.61 KG/M2 | HEIGHT: 71 IN | DIASTOLIC BLOOD PRESSURE: 55 MMHG | SYSTOLIC BLOOD PRESSURE: 96 MMHG

## 2024-05-31 DIAGNOSIS — R55 NEAR SYNCOPE: ICD-10-CM

## 2024-05-31 DIAGNOSIS — L98.9 SKIN LESION: ICD-10-CM

## 2024-05-31 DIAGNOSIS — R30.0 DYSURIA: Primary | ICD-10-CM

## 2024-05-31 LAB
ALBUMIN UR-MCNC: NEGATIVE MG/DL
APPEARANCE UR: ABNORMAL
BACTERIA #/AREA URNS HPF: ABNORMAL /HPF
BILIRUB UR QL STRIP: NEGATIVE
COLOR UR AUTO: YELLOW
GLUCOSE UR STRIP-MCNC: NEGATIVE MG/DL
HGB UR QL STRIP: NEGATIVE
KETONES UR STRIP-MCNC: NEGATIVE MG/DL
LEUKOCYTE ESTERASE UR QL STRIP: ABNORMAL
MUCOUS THREADS #/AREA URNS LPF: PRESENT /LPF
NITRATE UR QL: NEGATIVE
PH UR STRIP: 7 [PH] (ref 5–7)
RBC URINE: 0 /HPF
SP GR UR STRIP: 1.01 (ref 1–1.03)
SQUAMOUS EPITHELIAL: <1 /HPF
UROBILINOGEN UR STRIP-MCNC: NORMAL MG/DL
WBC CLUMPS #/AREA URNS HPF: PRESENT /HPF
WBC URINE: >182 /HPF

## 2024-05-31 PROCEDURE — 81001 URINALYSIS AUTO W/SCOPE: CPT | Performed by: PATHOLOGY

## 2024-05-31 PROCEDURE — 99000 SPECIMEN HANDLING OFFICE-LAB: CPT | Performed by: PATHOLOGY

## 2024-05-31 PROCEDURE — 87186 SC STD MICRODIL/AGAR DIL: CPT | Performed by: FAMILY MEDICINE

## 2024-05-31 PROCEDURE — 87086 URINE CULTURE/COLONY COUNT: CPT | Performed by: FAMILY MEDICINE

## 2024-05-31 PROCEDURE — 99214 OFFICE O/P EST MOD 30 MIN: CPT | Performed by: FAMILY MEDICINE

## 2024-05-31 PROCEDURE — G2211 COMPLEX E/M VISIT ADD ON: HCPCS | Performed by: FAMILY MEDICINE

## 2024-05-31 RX ORDER — NITROFURANTOIN 25; 75 MG/1; MG/1
100 CAPSULE ORAL 2 TIMES DAILY
Qty: 14 CAPSULE | Refills: 0 | Status: SHIPPED | OUTPATIENT
Start: 2024-05-31 | End: 2024-06-03 | Stop reason: ALTCHOICE

## 2024-05-31 NOTE — PROGRESS NOTES
Assessment & Plan     Dysuria  Possible UTI  - nitroFURantoin macrocrystal-monohydrate (MACROBID) 100 MG capsule; Take 1 capsule (100 mg) by mouth 2 times daily  - UA Macroscopic with reflex to Microscopic and Culture - Clinic Collect  - Urine Culture    Skin lesion  Likely needs bx possible scc  - Adult Dermatology  Referral; Future    Near syncope  One time. In hot shower first thing in am. Follow-up if recurs.    The longitudinal plan of care for the diagnosis(es)/condition(s) as documented were addressed during this visit. Due to the added complexity in care, I will continue to support Juaquin in the subsequent management and with ongoing continuity of care.  33 minutes spent by me on the date of the encounter doing chart review, history and exam, documentation and further activities per the note    No follow-ups on file.    Subjective   Juaquin is a 89 year old, presenting for the following health issues:  Penis/Scrotum Problem and Derm Problem (Follow-up on left eyebrow, saw Dr. Gonzalez)      5/31/2024     6:56 AM   Additional Questions   Roomed by ms emt     History of Present Illness       Reason for visit:  Pain in my penis; enlargement on my left eyebrow  Symptom onset:  3-4 weeks ago  Symptom intensity:  Mild  Symptom progression:  Staying the same  Had these symptoms before:  No    He eats 2-3 servings of fruits and vegetables daily.He consumes 0 sweetened beverage(s) daily.He exercises with enough effort to increase his heart rate 9 or less minutes per day.  He exercises with enough effort to increase his heart rate 3 or less days per week.   He is taking medications regularly.   1-L lateral eyebrow. Growing lesion. No itch hurt bleed.   2-2 weeks dysuria, urgency. No blood in urine. No scrotal pain. No flank pain fever or systemic sx.  No h/o UTI.  No GI sx - pancreas enzyme replacement controls his loose stool  No cancer treatments since end 2023  [facility]  "[facility].    A few weeks ago, one episode, took hot shower, felt lightheaded, vomited once, lay on floor, then felt well after a nap. This happened first thing in am.        Objective    BP 96/55 (BP Location: Right arm, Patient Position: Sitting, Cuff Size: Adult Regular)   Pulse 83   Temp 98.3  F (36.8  C) (Oral)   Resp 16   Ht 1.803 m (5' 11\")   Wt 73.3 kg (161 lb 8 oz)   SpO2 98%   BMI 22.52 kg/m    Body mass index is 22.52 kg/m .  Physical Exam   GENERAL: alert and no distress  MS: no gross musculoskeletal defects noted, no edema  Normal  exam  Skin: L lateral eyebrow cutaneious horn            Signed Electronically by: Fantasma Bocanegra MD    "

## 2024-06-02 LAB
BACTERIA UR CULT: ABNORMAL
BACTERIA UR CULT: ABNORMAL

## 2024-06-03 ENCOUNTER — OFFICE VISIT (OUTPATIENT)
Dept: DERMATOLOGY | Facility: CLINIC | Age: 89
End: 2024-06-03
Attending: FAMILY MEDICINE
Payer: MEDICARE

## 2024-06-03 DIAGNOSIS — R30.0 DYSURIA: Primary | ICD-10-CM

## 2024-06-03 DIAGNOSIS — L57.0 ACTINIC KERATOSES: ICD-10-CM

## 2024-06-03 DIAGNOSIS — D49.2 NEOPLASM OF UNSPECIFIED BEHAVIOR OF BONE, SOFT TISSUE, AND SKIN: Primary | ICD-10-CM

## 2024-06-03 DIAGNOSIS — L82.1 SEBORRHEIC KERATOSES: ICD-10-CM

## 2024-06-03 PROCEDURE — 17000 DESTRUCT PREMALG LESION: CPT | Mod: XS | Performed by: PHYSICIAN ASSISTANT

## 2024-06-03 PROCEDURE — 99213 OFFICE O/P EST LOW 20 MIN: CPT | Mod: 25 | Performed by: PHYSICIAN ASSISTANT

## 2024-06-03 PROCEDURE — 88305 TISSUE EXAM BY PATHOLOGIST: CPT | Mod: 26 | Performed by: DERMATOLOGY

## 2024-06-03 PROCEDURE — 88305 TISSUE EXAM BY PATHOLOGIST: CPT | Mod: TC | Performed by: PHYSICIAN ASSISTANT

## 2024-06-03 PROCEDURE — 17003 DESTRUCT PREMALG LES 2-14: CPT | Mod: XS | Performed by: PHYSICIAN ASSISTANT

## 2024-06-03 PROCEDURE — 11102 TANGNTL BX SKIN SINGLE LES: CPT | Performed by: PHYSICIAN ASSISTANT

## 2024-06-03 RX ORDER — SULFAMETHOXAZOLE/TRIMETHOPRIM 800-160 MG
1 TABLET ORAL 2 TIMES DAILY
Qty: 14 TABLET | Refills: 0 | Status: SHIPPED | OUTPATIENT
Start: 2024-06-03 | End: 2024-08-20

## 2024-06-03 ASSESSMENT — PAIN SCALES - GENERAL: PAINLEVEL: NO PAIN (0)

## 2024-06-03 NOTE — NURSING NOTE
Dermatology Rooming Note    Tom Saini's goals for this visit include:   Chief Complaint   Patient presents with    Derm Problem     Juaquin is here today for a growth on his left brow. He states he noticed this growth a few weeks ago.       Curry CALDERON CMA

## 2024-06-03 NOTE — LETTER
6/3/2024       RE: Tom Saini  [address] [facility] [address]     Dear Colleague,    Thank you for referring your patient, Tom Saini, to the [facility] [place] [facility]. Please see a copy of my visit note below.    [facility] Dermatology Note  Encounter Date: Kaleb 3, 2024  Office Visit     Reviewed patients past medical history and pertinent chart review prior to patients visit today.     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 Tx; Chemotherapy.   1. SKs  - Large symptomatic SK, R Jehovah's witness - cryo 11/07/2019, repeat on 12/03/2019.   2. AKs, vertex scalp s/p cryo 10/6/23    # NUB, left temple, shave biopsy 6/3/2024   ____________________________________________    Assessment & Plan:     # Neoplasm of uncertain behavior:  right temple  DDx includes SCC vs HAK vs ISK. Shave biopsy today.    Procedure Note: Biopsy by shave technique  The risks and benefits of the procedure were described to the patient. These include but are not limited to bleeding, infection, scar, incomplete removal, and non-diagnostic biopsy. Verbal informed consent was obtained. The above site(s) was cleansed with an alcohol pad and injected with 1% lidocaine with epinephrine. Once anesthesia was obtained, a biopsy(ies) was performed with Gilette blade. The tissue(s) was placed in a labeled container(s) with formalin and sent to pathology. Hemostasis was achieved with aluminum chloride. Vaseline and a bandage were applied to the wound(s). The patient tolerated the procedure well and was given post biopsy care instructions.    #Actinic keratosis x2  - We discussed the precancerous nature of the skin lesions.  I recommended liquid nitrogen cryotherapy and the patient was agreeable.      Cryotherapy procedure note, location(s): left temple x1, nasal dorsum x1 After verbal consent and  discussion of risks and benefits including, but not limited to, dyspigmentation/scar, blister, and pain, the lesion(s) was(were) treated with 1-2 mm freeze border for 1-2 cycles with liquid nitrogen. Post cryotherapy instructions were provided.    #Seborrheic keratoses  - We discussed the benign nature of the skin lesions. No treatment is required. I recommend continued observation with follow up should any concerning changes arise.       All risks, benefits and alternatives were discussed with patient.  Patient is in agreement and understands the assessment and plan.  All questions were answered.  Kay Albert PA-C  [facility]  _______________________________________    CC: Derm Problem (Juaquin is here today for a growth on his left brow. He states he noticed this growth a few weeks ago. )    HPI:  Mr. Tom Saini is a(n) 89 year old male who presents today as a return patient for a lesion on the left temple. The lesion has been present for weeks and has grown over time. No pain, itching, or bleeding at the site. Patient is otherwise feeling well, without additional skin concerns.      Physical Exam:  SKIN: Focused examination of face was performed.  - Involving the left temple is a 0.4 x 0.4 cm pink papule with cutaneous horn.   - Pink macule(s) with gritty scale involving the left temple x1, nasal dorsum x1, consistent with actinic keratosis.   - Waxy, stuck on appearing papule(s) throughout exam, consistent with seborrheic keratoses.     - No other lesions of concern on areas examined.     Medications:  Current Outpatient Medications   Medication Sig Dispense Refill    calcium carbonate (OS-DAVIDE) 1500 (600 Ca) MG tablet Take 600 mg by mouth 2 times daily (with meals)      Cholecalciferol (VITAMIN D-3) 25 MCG (1000 UT) CAPS Take by mouth daily      lipase-protease-amylase (CREON) [id number] units CPEP per EC capsule Take 1 capsule by mouth 3 times daily (with meals) And 1 with  snacks. 180 capsule 4    multivitamin (OCUVITE) TABS Take 1 tablet by mouth 2 times daily      nitroFURantoin macrocrystal-monohydrate (MACROBID) 100 MG capsule Take 1 capsule (100 mg) by mouth 2 times daily 14 capsule 0    sodium chloride (LAXMI 128) 5 % ophthalmic ointment Apply a small amount in both eyes at least once a day (bedtime). 3.5 g 11    sulfamethoxazole-trimethoprim (BACTRIM DS) 800-160 MG tablet Take 1 tablet by mouth 2 times daily 14 tablet 0     Current Facility-Administered Medications   Medication Dose Route Frequency Provider Last Rate Last Admin    bevacizumab (AVASTIN) intravitreal inj 1.25 mg  1.25 mg Intravitreal Q28 Days Brigido Laura MD   1.25 mg at 05/22/24 1518      Past Medical History:   Patient Active Problem List   Diagnosis    Eczema    Pure hypercholesterolemia    Male erectile disorder    Dermatofibroma    Seborrheic keratoses, inflamed    Age-related osteoporosis without current pathological fracture    Infection due to 2019 novel coronavirus    Diarrhea, unspecified type    Colonic mass    Diffuse large B-cell lymphoma of extranodal site (H)    Prevention of chemotherapy-induced neutropenia    Encounter for antineoplastic chemotherapy    Lymphedema    Ulnar neuropathy at elbow of left upper extremity    Left hand weakness    Aftercare following surgery of the musculoskeletal system     Past Medical History:   Diagnosis Date    Aortic insufficiency     DLBCL (diffuse large B cell lymphoma) (H) 07/2023    colon/abdomen, s/p mini-R-CHOP x 6    DVT (deep venous thrombosis) (H) 11/2023    R popliteal    Erectile dysfunction     History of deep venous thrombosis 05/2019    distal L DVT with extension, completed 6 months of AC (failed eliquis)    History of pelvic fracture     Hyperlipidemia     Low back pain     disc disease s/p laminectomy in past    Macular degeneration     on Avastin, R eye    Pancreatic cyst 07/2018    next MR due 12/23    Pancreatic insufficiency     Radius  fracture 2018    after fall from wall       CC Fantasma Bocanegra MD  [address] [place] [address]

## 2024-06-03 NOTE — NURSING NOTE
Lidocaine-epinephrine 1-1:969485 % injection   0.1 mL once for one use, starting 6/3/2024 ending 6/3/2024,  2mL disp, R-0, injection  Injected by Curry CALDERON CMA

## 2024-06-03 NOTE — PATIENT INSTRUCTIONS
Patient Education        Proper skin care from [facility]:     -Eliminate harsh soaps as they strip the natural oils from the skin, often resulting in dry itchy skin ( i.e. Dial, Zest, Lao Spring)  -Use mild soaps such as Cetaphil or Dove Sensitive Skin in the shower. You do not need to use soap on arms, legs, and trunk every time you shower unless visibly soiled.   -Avoid hot or cold showers.  -After showering, lightly dry off and apply moisturizing within 2-3 minutes. This will help trap moisture in the skin.   -Aggressive use of a moisturizer at least 1-2 times a day to the entire body (including -Vanicream, Cetaphil, Aquaphor or Cerave) and moisturize hands after every washing.  -We recommend using moisturizers that come in a tub that needs to be scooped out, not a pump. This has more of an oil base. It will hold moisture in your skin much better than a water base moisturizer. The above recommended are non-pore clogging.        Wear a sunscreen with at least SPF 30 on your face, ears, neck and V of the chest daily. Wear sunscreen on other areas of the body if those areas are exposed to the sun throughout the day. Sunscreens can contain physical and/or chemical blockers. Physical blockers are less likely to clog pores, these include zinc oxide and titanium dioxide. Reapply every two hour and after swimming.      Sunscreen examples: https://www.ewg.org/sunscreen/     UV radiation  UVA radiation remains constant throughout the day and throughout the year. It is a longer wavelength than UVB and therefore penetrates deeper into the skin leading to immediate and delayed tanning, photoaging, and skin cancer. 70-80% of UVA and UVB radiation occurs between the hours of 10am-2pm.  UVB radiation  UVB radiation causes the most harmful effects and is more significant during the summer months. However, snow and ice can reflect UVB radiation leading to skin damage during the winter months as well. UVB radiation is  responsible for tanning, burning, inflammation, delayed erythema (pinkness), pigmentation (brown spots), and skin cancer.      I recommend self monthly full body exams and yearly full body exams with a dermatology provider. If you develop a new or changing lesion please follow up for examination. Most skin cancers are pink and scaly or pink and pearly. However, we do see blue/brown/black skin cancers.  Consider the ABCDEs of melanoma when giving yourself your monthly full body exam ( don't forget the groin, buttocks, feet, toes, etc). A-asymmetry, B-borders, C-color, D-diameter, E-elevation or evolving. If you see any of these changes please follow up in clinic. If you cannot see your back I recommend purchasing a hand held mirror to use with a larger wall mirror.       Checking for Skin Cancer  You can find cancer early by checking your skin each month. There are 3 kinds of skin cancer. They are melanoma, basal cell carcinoma, and squamous cell carcinoma. Doing monthly skin checks is the best way to find new marks or skin changes. Follow the instructions below for checking your skin.   The ABCDEs of checking moles for melanoma   Check your moles or growths for signs of melanoma using ABCDE:   Asymmetry: the sides of the mole or growth don t match  Border: the edges are ragged, notched, or blurred  Color: the color within the mole or growth varies  Diameter: the mole or growth is larger than 6 mm (size of a pencil eraser)  Evolving: the size, shape, or color of the mole or growth is changing (evolving is not shown in the images below)    Checking for other types of skin cancer  Basal cell carcinoma or squamous cell carcinoma have symptoms such as:      A spot or mole that looks different from all other marks on your skin  Changes in how an area feels, such as itching, tenderness, or pain  Changes in the skin's surface, such as oozing, bleeding, or scaliness  A sore that does not heal  New swelling or redness beyond  the border of a mole     Who s at risk?  Anyone can get skin cancer. But you are at greater risk if you have:   Fair skin, light-colored hair, or light-colored eyes  Many moles or abnormal moles on your skin  A history of sunburns from sunlight or tanning beds  A family history of skin cancer  A history of exposure to radiation or chemicals  A weakened immune system  If you have had skin cancer in the past, you are at risk for recurring skin cancer.   How to check your skin  Do your monthly skin checkups in front of a full-length mirror. Check all parts of your body, including your:   Head (ears, face, neck, and scalp)  Torso (front, back, and sides)  Arms (tops, undersides, upper, and lower armpits)  Hands (palms, backs, and fingers, including under the nails)  Buttocks and genitals  Legs (front, back, and sides)  Feet (tops, soles, toes, including under the nails, and between toes)  If you have a lot of moles, take digital photos of them each month. Make sure to take photos both up close and from a distance. These can help you see if any moles change over time.   Most skin changes are not cancer. But if you see any changes in your skin, call your doctor right away. Only he or she can diagnose a problem. If you have skin cancer, seeing your doctor can be the first step toward getting the treatment that could save your life.   [employer] last reviewed this educational content on 4/1/2019 2000-2020 [employer]. [address] [place] [address], [place] [address]. All rights reserved. This information is not intended as a substitute for professional medical care. Always follow your healthcare professional's instructions.        When should I call my doctor?  If you are worsening or not improving, please, contact us or seek urgent care as noted below.      Who should I call with questions (adults)?  [facility] (adult and pediatric): [phone]  [facility]  [place] (adult): [phone]  [facility] ([place], [place], [place] and [place]) [phone]  For urgent needs outside of business hours call the [facility] at [phone] and ask for the dermatology resident on call to be paged  If this is a medical emergency and you are unable to reach an ER, Call 911        If you need a prescription refill, please contact your pharmacy. Refills are approved or denied by our Physicians during normal business hours, Monday through Fridays  Per office policy, refills will not be granted if you have not been seen within the past year (or sooner depending on your child's condition)     Wound Care After a Biopsy    What is a skin biopsy?  A skin biopsy allows the doctor to examine a very small piece of tissue under the microscope to determine the diagnosis and the best treatment for the skin condition. A local anesthetic (numbing medicine) is injected with a very small needle into the skin area to be tested. A small piece of skin is taken from the area. Sometimes a suture (stitch) is used.     What are the risks of a skin biopsy?  I will experience scar, bleeding, swelling, pain, crusting and redness. I may experience incomplete removal or recurrence. Risks of this procedure are excessive bleeding, bruising, infection, nerve damage, numbness, thick (hypertrophic or keloidal) scar and non-diagnostic biopsy.    How should I care for my wound for the first 24 hours?  Keep the wound dry and covered for 24 hours  If it bleeds, hold direct pressure on the area for 15 minutes. If bleeding does not stop, call us or go to the emergency room  Avoid strenuous exercise the first 1-2 days or as your doctor instructs you    How should I care for the wound after 24 hours?  After 24 hours, remove the bandage  You may bathe or shower as normal  If you had a scalp biopsy, you can shampoo as usual and can use shower water to clean the biopsy site  daily  Clean the wound once a day with gentle soap and water  Do not scrub, be gentle  Apply white petroleum/Vaseline after cleaning the wound with a cotton swab or a clean finger, and keep the site covered with a Bandaid /bandage. Bandages are not necessary with a scalp biopsy  If you are unable to cover the site with a Bandaid /bandage, re-apply ointment 2-3 times a day to keep the site moist. Moisture will help with healing  Avoid strenuous activity for first 1-2 days  Avoid lakes, rivers, pools, and oceans until the stitches are removed or the site is healed    How do I clean my wound?  Wash hands thoroughly with soap or use hand  before all wound care  Clean the wound with gentle soap and water  Apply white petroleum/Vaseline  to wound after it is clean  Replace the Bandaid /bandage to keep the wound covered for the first few days or as instructed by your doctor  If you had a scalp biopsy, warm shower water to the area on a daily basis should suffice    What should I use to clean my wound?   Cotton-tipped applicators (Qtips )  White petroleum jelly (Vaseline ). Use a clean new container and use Q-tips to apply.  Bandaids  as needed  Gentle soap     How should I care for my wound long term?  Do not get your wound dirty  Keep up with wound care for one week or until the area is healed.  If you have stitches, stitches need to be removed in 14 days. You may return to our clinic for this or you may have it done locally at your doctor s office.  A small scab will form and fall off by itself when the area is completely healed. The area will be red and will become pink in color as it heals. Sun protection is very important for how your scar will turn out. Sunscreen with an SPF 30 or greater is recommended once the area is healed.  You should have some soreness but it should be mild and slowly go away over several days. Talk to your doctor about using tylenol for pain,    When should I call my doctor?  If you  have increased:   Pain or swelling  Pus or drainage (clear or slightly yellow drainage is ok)  Temperature over 100F  Spreading redness or warmth around wound    When will I hear about my results?  The biopsy results can take 2 weeks to come back.  Your results will automatically release to DIY before your provider has even reviewed them.  The clinic will call you with the results, send you a DIY message, or have you schedule a follow-up clinic or phone time to discuss the results.  Contact our clinics if you do not hear from us in 2 weeks.    Who should I call with questions?  [facility]: [phone]  [facility]: [phone]  For urgent needs outside of business hours call the [facility] at [phone] and ask for the dermatology resident on call

## 2024-06-03 NOTE — PROGRESS NOTES
[facility]ology Note  Encounter Date: Kaleb 3, 2024  Office Visit     Reviewed patients past medical history and pertinent chart review prior to patients visit today.     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 Tx; Chemotherapy.   1. SKs  - Large symptomatic SK, R Amish - cryo 11/07/2019, repeat on 12/03/2019.   2. AKs, vertex scalp s/p cryo 10/6/23    # NUB, left temple, shave biopsy 6/3/2024   ____________________________________________    Assessment & Plan:     # Neoplasm of uncertain behavior:  right temple  DDx includes SCC vs HAK vs ISK. Shave biopsy today.    Procedure Note: Biopsy by shave technique  The risks and benefits of the procedure were described to the patient. These include but are not limited to bleeding, infection, scar, incomplete removal, and non-diagnostic biopsy. Verbal informed consent was obtained. The above site(s) was cleansed with an alcohol pad and injected with 1% lidocaine with epinephrine. Once anesthesia was obtained, a biopsy(ies) was performed with Gilette blade. The tissue(s) was placed in a labeled container(s) with formalin and sent to pathology. Hemostasis was achieved with aluminum chloride. Vaseline and a bandage were applied to the wound(s). The patient tolerated the procedure well and was given post biopsy care instructions.    #Actinic keratosis x2  - We discussed the precancerous nature of the skin lesions.  I recommended liquid nitrogen cryotherapy and the patient was agreeable.      Cryotherapy procedure note, location(s): left temple x1, nasal dorsum x1 After verbal consent and discussion of risks and benefits including, but not limited to, dyspigmentation/scar, blister, and pain, the lesion(s) was(were) treated with 1-2 mm freeze border for 1-2 cycles with liquid nitrogen. Post cryotherapy instructions were provided.    #Seborrheic keratoses  - We discussed the benign nature of the skin  lesions. No treatment is required. I recommend continued observation with follow up should any concerning changes arise.       All risks, benefits and alternatives were discussed with patient.  Patient is in agreement and understands the assessment and plan.  All questions were answered.  Kay Albert PA-C  [facility]  _______________________________________    CC: Derm Problem (Juaquin is here today for a growth on his left brow. He states he noticed this growth a few weeks ago. )    HPI:  Mr. Tom Saini is a(n) 89 year old male who presents today as a return patient for a lesion on the left temple. The lesion has been present for weeks and has grown over time. No pain, itching, or bleeding at the site. Patient is otherwise feeling well, without additional skin concerns.      Physical Exam:  SKIN: Focused examination of face was performed.  - Involving the left temple is a 0.4 x 0.4 cm pink papule with cutaneous horn.   - Pink macule(s) with gritty scale involving the left temple x1, nasal dorsum x1, consistent with actinic keratosis.   - Waxy, stuck on appearing papule(s) throughout exam, consistent with seborrheic keratoses.     - No other lesions of concern on areas examined.     Medications:  Current Outpatient Medications   Medication Sig Dispense Refill    calcium carbonate (OS-DAVIDE) 1500 (600 Ca) MG tablet Take 600 mg by mouth 2 times daily (with meals)      Cholecalciferol (VITAMIN D-3) 25 MCG (1000 UT) CAPS Take by mouth daily      lipase-protease-amylase (CREON) 80904-68823-128971 units CPEP per EC capsule Take 1 capsule by mouth 3 times daily (with meals) And 1 with snacks. 180 capsule 4    multivitamin (OCUVITE) TABS Take 1 tablet by mouth 2 times daily      nitroFURantoin macrocrystal-monohydrate (MACROBID) 100 MG capsule Take 1 capsule (100 mg) by mouth 2 times daily 14 capsule 0    sodium chloride (LAXMI 128) 5 % ophthalmic ointment Apply a small amount in both eyes at least once  a day (bedtime). 3.5 g 11    sulfamethoxazole-trimethoprim (BACTRIM DS) 800-160 MG tablet Take 1 tablet by mouth 2 times daily 14 tablet 0     Current Facility-Administered Medications   Medication Dose Route Frequency Provider Last Rate Last Admin    bevacizumab (AVASTIN) intravitreal inj 1.25 mg  1.25 mg Intravitreal Q28 Days Brigido Laura MD   1.25 mg at 05/22/24 1518      Past Medical History:   Patient Active Problem List   Diagnosis    Eczema    Pure hypercholesterolemia    Male erectile disorder    Dermatofibroma    Seborrheic keratoses, inflamed    Age-related osteoporosis without current pathological fracture    Infection due to 2019 novel coronavirus    Diarrhea, unspecified type    Colonic mass    Diffuse large B-cell lymphoma of extranodal site (H)    Prevention of chemotherapy-induced neutropenia    Encounter for antineoplastic chemotherapy    Lymphedema    Ulnar neuropathy at elbow of left upper extremity    Left hand weakness    Aftercare following surgery of the musculoskeletal system     Past Medical History:   Diagnosis Date    Aortic insufficiency     DLBCL (diffuse large B cell lymphoma) (H) 07/2023    colon/abdomen, s/p mini-R-CHOP x 6    DVT (deep venous thrombosis) (H) 11/2023    R popliteal    Erectile dysfunction     History of deep venous thrombosis 05/2019    distal L DVT with extension, completed 6 months of AC (failed eliquis)    History of pelvic fracture     Hyperlipidemia     Low back pain     disc disease s/p laminectomy in past    Macular degeneration     on Avastin, R eye    Pancreatic cyst 07/2018    next MR due 12/23    Pancreatic insufficiency     Radius fracture 2018    after fall from wall        Fantasma Bocanegra MD  [address] [place] [address] on close of this encounter.

## 2024-06-04 ENCOUNTER — THERAPY VISIT (OUTPATIENT)
Dept: OCCUPATIONAL THERAPY | Facility: CLINIC | Age: 89
End: 2024-06-04
Payer: MEDICARE

## 2024-06-04 DIAGNOSIS — Z47.89 AFTERCARE FOLLOWING SURGERY OF THE MUSCULOSKELETAL SYSTEM: ICD-10-CM

## 2024-06-04 DIAGNOSIS — R29.898 LEFT HAND WEAKNESS: Primary | ICD-10-CM

## 2024-06-04 PROCEDURE — 97110 THERAPEUTIC EXERCISES: CPT | Mod: GO | Performed by: OCCUPATIONAL THERAPIST

## 2024-06-05 LAB
PATH REPORT.COMMENTS IMP SPEC: NORMAL
PATH REPORT.COMMENTS IMP SPEC: NORMAL
PATH REPORT.FINAL DX SPEC: NORMAL
PATH REPORT.GROSS SPEC: NORMAL
PATH REPORT.MICROSCOPIC SPEC OTHER STN: NORMAL
PATH REPORT.RELEVANT HX SPEC: NORMAL

## 2024-06-24 DIAGNOSIS — H35.3221 EXUDATIVE AGE-RELATED MACULAR DEGENERATION OF LEFT EYE WITH ACTIVE CHOROIDAL NEOVASCULARIZATION (H): Primary | ICD-10-CM

## 2024-06-24 NOTE — TELEPHONE ENCOUNTER
Spoke with patient and wife.  Patient is recovering in a TCU with a broken thumb, radius and fractured ribs.  He is having episodes of orthostatic hypotension and is working with staff MD and PA to improve.  They will follow up in clinic after discharge.    Katlin Baptiste LPN      Detail Level: Detailed Negative

## 2024-07-03 ENCOUNTER — OFFICE VISIT (OUTPATIENT)
Dept: OPHTHALMOLOGY | Facility: CLINIC | Age: 89
End: 2024-07-03
Attending: OPHTHALMOLOGY
Payer: MEDICARE

## 2024-07-03 DIAGNOSIS — H35.3211 EXUDATIVE AGE-RELATED MACULAR DEGENERATION, RIGHT EYE, WITH ACTIVE CHOROIDAL NEOVASCULARIZATION (H): Primary | ICD-10-CM

## 2024-07-03 DIAGNOSIS — Z96.1 PSEUDOPHAKIA OF BOTH EYES: ICD-10-CM

## 2024-07-03 DIAGNOSIS — H35.3221 EXUDATIVE AGE-RELATED MACULAR DEGENERATION OF LEFT EYE WITH ACTIVE CHOROIDAL NEOVASCULARIZATION (H): ICD-10-CM

## 2024-07-03 DIAGNOSIS — H18.513 FUCHS' CORNEAL DYSTROPHY OF BOTH EYES: ICD-10-CM

## 2024-07-03 PROCEDURE — G0463 HOSPITAL OUTPT CLINIC VISIT: HCPCS | Performed by: OPHTHALMOLOGY

## 2024-07-03 PROCEDURE — 99214 OFFICE O/P EST MOD 30 MIN: CPT | Performed by: OPHTHALMOLOGY

## 2024-07-03 PROCEDURE — 92134 CPTRZ OPH DX IMG PST SGM RTA: CPT | Performed by: OPHTHALMOLOGY

## 2024-07-03 ASSESSMENT — EXTERNAL EXAM - LEFT EYE: OS_EXAM: ET

## 2024-07-03 ASSESSMENT — SLIT LAMP EXAM - LIDS
COMMENTS: NORMAL
COMMENTS: NORMAL

## 2024-07-03 ASSESSMENT — VISUAL ACUITY
OS_CC+: +2
METHOD: SNELLEN - LINEAR
OS_CC: 20/40
CORRECTION_TYPE: GLASSES
OS_PH_CC: 20/30
OD_PH_CC: 20/60
OD_PH_CC+: -2
OD_CC: 20/70

## 2024-07-03 ASSESSMENT — REFRACTION_WEARINGRX
OS_AXIS: 178
OD_ADD: +2.50
OD_CYLINDER: +2.25
OS_SPHERE: -1.50
OS_CYLINDER: +2.75
OS_ADD: +2.50
OD_AXIS: 002
SPECS_TYPE: BIFOCAL
OD_SPHERE: -3.50

## 2024-07-03 ASSESSMENT — TONOMETRY
IOP_METHOD: TONOPEN
OS_IOP_MMHG: 14
OD_IOP_MMHG: 15

## 2024-07-03 ASSESSMENT — EXTERNAL EXAM - RIGHT EYE: OD_EXAM: NORMAL

## 2024-07-03 NOTE — PROGRESS NOTES
HPI       Follow Up    In both eyes.  Since onset it is stable.  Associated symptoms include Negative for eye pain, flashes and floaters.  Treatments tried include ointment.             Comments    Here for follow up. VA is about the same. Uses hamilton at bedtime as instructed. No eye pain. No flashes or floaters.    Jt Ross COT 10:58 AM July 3, 2024             Last edited by Jt Ross on 7/3/2024 10:58 AM.          Review of systems for the eyes was negative other than the pertinent positives/negatives listed in the HPI.      Assessment & Plan      Tom Saini is a 89 year old male with the following diagnoses:   1. Exudative age-related macular degeneration, right eye, with active choroidal neovascularization (H)    2. Exudative age-related macular degeneration of left eye with active choroidal neovascularization (H)    3. Pseudophakia of both eyes    4. Fuchs' corneal dystrophy of both eyes       Here for recheck of eAMD both eyes  Cancer continues to be in remission and DVT is resolved.  Wearing compression socks prophylactically  His wife was just diagnosed with breast CA and is determining treatment plans for later in the month     S/P Avastin x many (last Right eye 5/2024; left eye 5/2023)  Improving SRF right eye, new intraretinal fluid and subretinal fluid now left eye also       RBA to avastin Both eyes discussed, consent obtained  Will schedule to return next week for treatment as he drove himself today and doesn't feel comfortable driving home after both eyes are injected   Continue Amsler grid daily  Continue Pantera ointment bedtime both eyes  Return precautions reviewed         Patient disposition:   1 week for bilateral avastin  Return in about 6 weeks (around 8/14/2024) for VT only, OCT Macula.           Attending Physician Attestation:  Complete documentation of historical and exam elements from today's encounter can be found in the full encounter summary report (not reduplicated in this progress  note).  I personally obtained the chief complaint(s) and history of present illness.  I confirmed and edited as necessary the review of systems, past medical/surgical history, family history, social history, and examination findings as documented by others; and I examined the patient myself.  I personally reviewed the relevant tests, images, and reports as documented above.  I formulated and edited as necessary the assessment and plan and discussed the findings and management plan with the patient and family. . - Brigido Laura MD

## 2024-07-09 ENCOUNTER — OFFICE VISIT (OUTPATIENT)
Dept: OPHTHALMOLOGY | Facility: CLINIC | Age: 89
End: 2024-07-09
Attending: OPHTHALMOLOGY
Payer: MEDICARE

## 2024-07-09 DIAGNOSIS — H35.3211 EXUDATIVE AGE-RELATED MACULAR DEGENERATION, RIGHT EYE, WITH ACTIVE CHOROIDAL NEOVASCULARIZATION (H): Primary | ICD-10-CM

## 2024-07-09 DIAGNOSIS — H35.3221 EXUDATIVE AGE-RELATED MACULAR DEGENERATION OF LEFT EYE WITH ACTIVE CHOROIDAL NEOVASCULARIZATION (H): ICD-10-CM

## 2024-07-09 PROCEDURE — 67028 INJECTION EYE DRUG: CPT | Mod: 50 | Performed by: OPHTHALMOLOGY

## 2024-07-09 PROCEDURE — 250N000011 HC RX IP 250 OP 636: Performed by: OPHTHALMOLOGY

## 2024-07-09 PROCEDURE — 67028 INJECTION EYE DRUG: CPT | Mod: RT | Performed by: OPHTHALMOLOGY

## 2024-07-09 RX ADMIN — Medication 1.25 MG: at 15:40

## 2024-07-09 RX ADMIN — Medication 1.25 MG: at 15:39

## 2024-07-09 ASSESSMENT — REFRACTION_WEARINGRX
OD_SPHERE: -3.50
SPECS_TYPE: BIFOCAL
OS_ADD: +2.50
OD_ADD: +2.50
OS_SPHERE: -1.50
OS_CYLINDER: +2.75
OD_AXIS: 002
OD_CYLINDER: +2.25
OS_AXIS: 178

## 2024-07-09 ASSESSMENT — VISUAL ACUITY
CORRECTION_TYPE: GLASSES
OD_PH_CC: 20/60+
METHOD: SNELLEN - LINEAR

## 2024-07-09 ASSESSMENT — TONOMETRY
OD_IOP_MMHG: 17
IOP_METHOD: TONOPEN
OS_IOP_MMHG: 14

## 2024-07-09 NOTE — PROGRESS NOTES
HPI    Here for bilateral Avastin today   He denies new changes/concerns since LV here     Oc meds:  Pantera hamilton at bedtime each eye     Genesis Gardiner, BRI 2:58 PM 07/09/2024      Last edited by Genesis Gardiner on 7/9/2024  2:58 PM.          Review of systems for the eyes was negative other than the pertinent positives/negatives listed in the HPI.      Assessment & Plan      Tom Saini is a 89 year old male with the following diagnoses:   1. Exudative age-related macular degeneration, right eye, with active choroidal neovascularization (H)    2. Exudative age-related macular degeneration of left eye with active choroidal neovascularization (H)         Found to have new exudative changes in both eyes last week.  Here for injections today H  RBA to avastin Both eyes discussed, consent obtained  Continue Amsler grid daily  Continue Pantera ointment bedtime both eyes  Return precautions reviewed       Patient disposition:   Return in about 6 weeks (around 8/20/2024) for VT only, OCT Macula.           Attending Physician Attestation:  Complete documentation of historical and exam elements from today's encounter can be found in the full encounter summary report (not reduplicated in this progress note).  I personally obtained the chief complaint(s) and history of present illness.  I confirmed and edited as necessary the review of systems, past medical/surgical history, family history, social history, and examination findings as documented by others; and I examined the patient myself.  I personally reviewed the relevant tests, images, and reports as documented above.  I formulated and edited as necessary the assessment and plan and discussed the findings and management plan with the patient and family. . - Brigido Laura MD

## 2024-07-10 ENCOUNTER — TELEPHONE (OUTPATIENT)
Dept: OPHTHALMOLOGY | Facility: CLINIC | Age: 89
End: 2024-07-10
Payer: MEDICARE

## 2024-07-10 NOTE — TELEPHONE ENCOUNTER
Health Call Center    Phone Message    May a detailed message be left on voicemail: yes     Reason for Call: Appointment Intake    Referring Provider Name: n/a  Diagnosis and/or Symptoms: Teary Eyes, and possible blocked tear duct. Left eye    Pt is requesting a same day visit with  regarding some concerns post injection. States he got an injection done yesterday and noticed his left eye is very teary and taking longer to process the injections. Pt thinks it may be related to a blocked tear duct.    States he needs to be seen urgently this afternoon.    Please reach out to pt and advise    Thank You!  Action Taken: Message routed to:  [facility]    Travel Screening: Not Applicable     Date of Service:

## 2024-07-10 NOTE — TELEPHONE ENCOUNTER
Spoke to pt at 1625    Bilateral intravitreal injections yesterday    Pt with left eye increase tearing today.    Pt not using artificial tears and also been having redness since injection.    Pt reporting some improvement this afternoon.    No noted vision changes    Recommended starting preservative free artificial tears and may use every couple hours.    Reviewed should have improvement by tomorrow.    Reviewed if not having any improvement or any worsening symptoms to contact clinic tomorrow.    Pt verbally demonstrated understanding and seemed comfortable with information.    Josh Araiza RN 4:36 PM 07/10/24

## 2024-07-25 ENCOUNTER — ANCILLARY PROCEDURE (OUTPATIENT)
Dept: CT IMAGING | Facility: CLINIC | Age: 89
End: 2024-07-25
Attending: STUDENT IN AN ORGANIZED HEALTH CARE EDUCATION/TRAINING PROGRAM
Payer: MEDICARE

## 2024-07-25 ENCOUNTER — LAB (OUTPATIENT)
Dept: LAB | Facility: CLINIC | Age: 89
End: 2024-07-25
Payer: MEDICARE

## 2024-07-25 DIAGNOSIS — C83.30 DIFFUSE LARGE B-CELL LYMPHOMA, UNSPECIFIED BODY REGION (H): ICD-10-CM

## 2024-07-25 LAB
ALBUMIN SERPL BCG-MCNC: 4.1 G/DL (ref 3.5–5.2)
ALP SERPL-CCNC: 79 U/L (ref 40–150)
ALT SERPL W P-5'-P-CCNC: 7 U/L (ref 0–70)
ANION GAP SERPL CALCULATED.3IONS-SCNC: 10 MMOL/L (ref 7–15)
AST SERPL W P-5'-P-CCNC: 20 U/L (ref 0–45)
BASOPHILS # BLD AUTO: 0.1 10E3/UL (ref 0–0.2)
BASOPHILS NFR BLD AUTO: 2 %
BILIRUB SERPL-MCNC: 0.5 MG/DL
BUN SERPL-MCNC: 22.2 MG/DL (ref 8–23)
CALCIUM SERPL-MCNC: 9.5 MG/DL (ref 8.8–10.4)
CHLORIDE SERPL-SCNC: 104 MMOL/L (ref 98–107)
CREAT BLD-MCNC: 1.1 MG/DL (ref 0.7–1.3)
CREAT SERPL-MCNC: 1.02 MG/DL (ref 0.67–1.17)
EGFRCR SERPLBLD CKD-EPI 2021: 71 ML/MIN/1.73M2
EGFRCR SERPLBLD CKD-EPI 2021: >60 ML/MIN/1.73M2
EOSINOPHIL # BLD AUTO: 0.2 10E3/UL (ref 0–0.7)
EOSINOPHIL NFR BLD AUTO: 4 %
ERYTHROCYTE [DISTWIDTH] IN BLOOD BY AUTOMATED COUNT: 13.1 % (ref 10–15)
GLUCOSE SERPL-MCNC: 194 MG/DL (ref 70–99)
HCO3 SERPL-SCNC: 27 MMOL/L (ref 22–29)
HCT VFR BLD AUTO: 40.8 % (ref 40–53)
HGB BLD-MCNC: 13.4 G/DL (ref 13.3–17.7)
IMM GRANULOCYTES # BLD: 0 10E3/UL
IMM GRANULOCYTES NFR BLD: 0 %
LDH SERPL L TO P-CCNC: 181 U/L (ref 0–250)
LYMPHOCYTES # BLD AUTO: 0.9 10E3/UL (ref 0.8–5.3)
LYMPHOCYTES NFR BLD AUTO: 16 %
MCH RBC QN AUTO: 31.3 PG (ref 26.5–33)
MCHC RBC AUTO-ENTMCNC: 32.8 G/DL (ref 31.5–36.5)
MCV RBC AUTO: 95 FL (ref 78–100)
MONOCYTES # BLD AUTO: 0.6 10E3/UL (ref 0–1.3)
MONOCYTES NFR BLD AUTO: 11 %
NEUTROPHILS # BLD AUTO: 3.5 10E3/UL (ref 1.6–8.3)
NEUTROPHILS NFR BLD AUTO: 67 %
NRBC # BLD AUTO: 0 10E3/UL
NRBC BLD AUTO-RTO: 0 /100
PLATELET # BLD AUTO: 238 10E3/UL (ref 150–450)
POTASSIUM SERPL-SCNC: 4.5 MMOL/L (ref 3.4–5.3)
PROT SERPL-MCNC: 7 G/DL (ref 6.4–8.3)
RBC # BLD AUTO: 4.28 10E6/UL (ref 4.4–5.9)
SODIUM SERPL-SCNC: 141 MMOL/L (ref 135–145)
WBC # BLD AUTO: 5.3 10E3/UL (ref 4–11)

## 2024-07-25 PROCEDURE — 82565 ASSAY OF CREATININE: CPT | Mod: 91 | Performed by: PATHOLOGY

## 2024-07-25 PROCEDURE — 71260 CT THORAX DX C+: CPT | Mod: GC | Performed by: RADIOLOGY

## 2024-07-25 PROCEDURE — 83615 LACTATE (LD) (LDH) ENZYME: CPT | Performed by: PATHOLOGY

## 2024-07-25 PROCEDURE — 85025 COMPLETE CBC W/AUTO DIFF WBC: CPT | Performed by: PATHOLOGY

## 2024-07-25 PROCEDURE — 74177 CT ABD & PELVIS W/CONTRAST: CPT | Mod: GC | Performed by: RADIOLOGY

## 2024-07-25 PROCEDURE — 80053 COMPREHEN METABOLIC PANEL: CPT | Performed by: PATHOLOGY

## 2024-07-25 PROCEDURE — 36415 COLL VENOUS BLD VENIPUNCTURE: CPT | Performed by: PATHOLOGY

## 2024-07-25 RX ORDER — IOPAMIDOL 755 MG/ML
85 INJECTION, SOLUTION INTRAVASCULAR ONCE
Status: COMPLETED | OUTPATIENT
Start: 2024-07-25 | End: 2024-07-25

## 2024-07-25 RX ADMIN — IOPAMIDOL 85 ML: 755 INJECTION, SOLUTION INTRAVASCULAR at 09:47

## 2024-07-25 NOTE — DISCHARGE INSTRUCTIONS

## 2024-08-01 ENCOUNTER — PATIENT OUTREACH (OUTPATIENT)
Dept: ONCOLOGY | Facility: CLINIC | Age: 89
End: 2024-08-01
Payer: MEDICARE

## 2024-08-01 ENCOUNTER — ONCOLOGY VISIT (OUTPATIENT)
Dept: ONCOLOGY | Facility: CLINIC | Age: 89
End: 2024-08-01
Attending: STUDENT IN AN ORGANIZED HEALTH CARE EDUCATION/TRAINING PROGRAM
Payer: MEDICARE

## 2024-08-01 VITALS
BODY MASS INDEX: 22.9 KG/M2 | TEMPERATURE: 98.5 F | OXYGEN SATURATION: 96 % | HEART RATE: 79 BPM | WEIGHT: 164.2 LBS | SYSTOLIC BLOOD PRESSURE: 116 MMHG | RESPIRATION RATE: 16 BRPM | DIASTOLIC BLOOD PRESSURE: 65 MMHG

## 2024-08-01 DIAGNOSIS — C83.398 DIFFUSE LARGE B-CELL LYMPHOMA OF EXTRANODAL SITE: Primary | ICD-10-CM

## 2024-08-01 PROCEDURE — 99214 OFFICE O/P EST MOD 30 MIN: CPT | Performed by: STUDENT IN AN ORGANIZED HEALTH CARE EDUCATION/TRAINING PROGRAM

## 2024-08-01 PROCEDURE — G0463 HOSPITAL OUTPT CLINIC VISIT: HCPCS | Performed by: STUDENT IN AN ORGANIZED HEALTH CARE EDUCATION/TRAINING PROGRAM

## 2024-08-01 ASSESSMENT — PAIN SCALES - GENERAL: PAINLEVEL: NO PAIN (0)

## 2024-08-01 NOTE — NURSING NOTE
"Oncology Rooming Note    August 1, 2024 1:37 PM   Tom Saini is a 89 year old male who presents for:    Chief Complaint   Patient presents with    Oncology Clinic Visit     Diffuse large B-cell lymphoma, unspecified body        Initial Vitals: /65 (BP Location: Right arm, Patient Position: Sitting, Cuff Size: Adult Regular)   Pulse 79   Temp 98.5  F (36.9  C) (Oral)   Resp 16   Wt 74.5 kg (164 lb 3.2 oz)   SpO2 96%   BMI 22.90 kg/m   Estimated body mass index is 22.9 kg/m  as calculated from the following:    Height as of 5/31/24: 1.803 m (5' 11\").    Weight as of this encounter: 74.5 kg (164 lb 3.2 oz). Body surface area is 1.93 meters squared.  No Pain (0) Comment: Data Unavailable   No LMP for male patient.  Allergies reviewed: Yes  Medications reviewed: Yes    Medications: Medication refills not needed today.  Pharmacy name entered into [employer]: [id number] [address] AT [facility]    Frailty Screening:   Is the patient here for a new oncology consult visit in cancer care? 2. No      Clinical concerns: none.       Brigido Sullivan"

## 2024-08-01 NOTE — PROGRESS NOTES
[facility]: Cancer Care Chart Review                                                                                        Situation: Patient chart reviewed by care coordinator.     Background: Pt seen by Dr. Buitrago on 8/1/24.     Assessment: No coordination needed at this time by RNCC. Status set as Maintenance for enrollment.     Plan/Recommendations: Follow up with Dr. Buitrago in 3 months.     ALEXANDRO ValentinoN, RN  RN Care Coordinator   [phone]

## 2024-08-01 NOTE — PROGRESS NOTES
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  He has had diarrhea with particular food for several years and recently was investigated for possible pancreatic insufficiency.  He also has a history of pancreatic pseudocyst.  On recent MRI done in June 2023 to follow-up on pancreatic pseudocyst showed a colonic mass.  Subsequent CT scan of chest abdomen pelvis showed Mild anemia, no cytopenias 7.9 x 6.9 x 5.6 cm large soft tissue mass in sigmoid colon obliterating the lumen and significant extramural soft tissue extension superiorly in the sigmoid mesocolon.  Enlarged bilateral inguinal lymph node, as few small prominent lymph nodes along superior rectal/inferior mesenteric artery distribution.  Diffuse main pancreatic duct dilatation with large intraductal calculus in the head was seen as well.  Multiple cystic lesions and dilated sidebranches were seen in the pancreas.  He underwent colonoscopy but due to obliterated lumen, colonoscopy failed.  Subsequently CT colonography was done which showed similar results to CT chest abdomen pelvis.  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  FISH showed Bcl-2 rearrangement but no MYC rearrangement.  Bcl-2 IgH rearrangement [translocation (14;18)] is consistent with transformation of DLBCL from follicular lymphoma.  Postoperative course was complicated by postoperative nausea vomiting which resolved, single episode of suicidal ideation which resolved.  He was discharged to TCU.  He has been regaining his strength slowly, his performance status has improved to 1.  He is able to walk independently for short distances, can do basic activities of daily  living himself.    PET scan revealed persistent hypermetabolic retroperitoneal lymph nodes, possibly reactive but lymphoma cannot be ruled out. ECHOcardiogram reveals EF >50%. LDH within normal limits. Overall IPI 2. Low-intermediate risk GCB DLBCL.    He  began treatment with RminiCHP. First cycle was split into prephase steroid+rituxmab followed by miniCHP. He tolerated treatment very well without any long term toxicities. PET scan after 3 cycles showed CR.     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 No fevers, chills, night sweats or weight loss, no lymphadenopathy. No pain.        Objective    /65 (BP Location: Right arm, Patient Position: Sitting, Cuff Size: Adult Regular)   Pulse 79   Temp 98.5  F (36.9  C) (Oral)   Resp 16   Wt 74.5 kg (164 lb 3.2 oz)   SpO2 96%   BMI 22.90 kg/m    Body mass index is 22.9 kg/m .  Physical Exam   GENERAL:  Alert oriented well nourished  HEAD: normocephalic atraumatic  SKIN:  no rash, hives, other lesions.  LYMPHATIC: no abnormal lymph nodes palable in cervical, axillary, supraclavicular or inguinal area.  RESP:  No rales or rhonchi, breath sounds bilaterally equal and vesicular  CV:  No tachycardia, S1 S2 normal No murmur.  GI:  Abdomen soft nontender no hepato or splenomegaly  MUSCULOSKELETAL:  No visible joint redness or swelling.  NEURO:  No gross weakness gait normal  PSYCH: pleasant affect    Labs: I personally reviewed complete blood count, differential, renal function test, liver function tests LDH.  No cytopenias, LFTs within normal limits, renal function test within normal limits and LDH is  normal as well. Hyperglycemia noted.    Imaging: Imaging: I personally reviewed Ct chest/abdomen/pelvis and discussed with the patient., no concern for progression/recurrence of lymphoma    Assessment and Plan:    1) Diffuse large B cell lymphoma:  - He does not have any evidence of lymphoma recurrence or progression.  - I will see him back in 3 months with labs.    Total time spent on date of service in review of medical records, review of labs, history taking, physical exam, discussion of assessment and plan, counseling and patient education is 30 minutes.    Holly Buitrago MD  Attending Physician  Pager [phone]                Signed Electronically by: Holly Buitrago MD

## 2024-08-01 NOTE — LETTER
8/1/2024      Tom Saini  [address] [facility] [address]      Dear Colleague,    Thank you for referring your patient, Tom Saini, to the [facility]. Please see a copy of my visit note below.      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  He has had diarrhea with particular food for several years and recently was investigated for possible pancreatic insufficiency.  He also has a history of pancreatic pseudocyst.  On recent MRI done in June 2023 to follow-up on pancreatic pseudocyst showed a colonic mass.  Subsequent CT scan of chest abdomen pelvis showed Mild anemia, no cytopenias 7.9 x 6.9 x 5.6 cm large soft tissue mass in sigmoid colon obliterating the lumen and significant extramural soft tissue extension superiorly in the sigmoid mesocolon.  Enlarged bilateral inguinal lymph node, as few small prominent lymph nodes along superior rectal/inferior mesenteric artery distribution.  Diffuse main pancreatic duct dilatation with large intraductal calculus in the head was seen as well.  Multiple cystic lesions and dilated sidebranches were seen in the pancreas.  He underwent colonoscopy but due to obliterated lumen, colonoscopy failed.  Subsequently CT colonography was done which showed similar results to CT chest abdomen pelvis.  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  FISH showed Bcl-2 rearrangement but no MYC rearrangement.  Bcl-2 IgH rearrangement [translocation (14;18)] is consistent with transformation of DLBCL from follicular lymphoma.  Postoperative course was complicated by postoperative nausea vomiting which resolved, single episode  of suicidal ideation which resolved.  He was discharged to TCU.  He has been regaining his strength slowly, his performance status has improved to 1.  He is able to walk independently for short distances, can do basic activities of daily living himself.    PET scan revealed persistent hypermetabolic retroperitoneal lymph nodes, possibly reactive but lymphoma cannot be ruled out. ECHOcardiogram reveals EF >50%. LDH within normal limits. Overall IPI 2. Low-intermediate risk GCB DLBCL.    He  began treatment with RminiCHP. First cycle was split into prephase steroid+rituxmab followed by miniCHP. He tolerated treatment very well without any long term toxicities. PET scan after 3 cycles showed CR.     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 No fevers, chills, night sweats or weight loss, no lymphadenopathy. No pain.        Objective   /65 (BP Location: Right arm, Patient Position: Sitting, Cuff Size: Adult Regular)   Pulse 79   Temp 98.5  F (36.9  C) (Oral)   Resp 16   Wt 74.5 kg (164 lb 3.2 oz)   SpO2 96%   BMI 22.90 kg/m    Body mass index is 22.9 kg/m .  Physical Exam   GENERAL:  Alert oriented well nourished  HEAD: normocephalic atraumatic  SKIN:  no rash, hives, other lesions.  LYMPHATIC: no abnormal lymph nodes palable in cervical, axillary, supraclavicular or inguinal area.  RESP:  No rales or rhonchi, breath sounds bilaterally equal and vesicular  CV:  No tachycardia, S1 S2 normal No murmur.  GI:  Abdomen soft nontender no hepato or splenomegaly  MUSCULOSKELETAL:  No visible joint redness or swelling.  NEURO:  No gross weakness gait  normal  PSYCH: pleasant affect    Labs: I personally reviewed complete blood count, differential, renal function test, liver function tests LDH.  No cytopenias, LFTs within normal limits, renal function test within normal limits and LDH is normal as well. Hyperglycemia noted.    Imaging: Imaging: I personally reviewed Ct chest/abdomen/pelvis and discussed with the patient., no concern for progression/recurrence of lymphoma    Assessment and Plan:    1) Diffuse large B cell lymphoma:  - He does not have any evidence of lymphoma recurrence or progression.  - I will see him back in 3 months with labs.    Total time spent on date of service in review of medical records, review of labs, history taking, physical exam, discussion of assessment and plan, counseling and patient education is 30 minutes.    Holly Buitrago MD  Attending Physician  Pager [phone]                Signed Electronically by: Holly Buitrago MD      Again, thank you for allowing me to participate in the care of your patient.        Sincerely,        Holly Buitrago MD

## 2024-08-10 ENCOUNTER — HEALTH MAINTENANCE LETTER (OUTPATIENT)
Age: 89
End: 2024-08-10

## 2024-08-20 ENCOUNTER — OFFICE VISIT (OUTPATIENT)
Dept: OPHTHALMOLOGY | Facility: CLINIC | Age: 89
End: 2024-08-20
Attending: OPHTHALMOLOGY
Payer: MEDICARE

## 2024-08-20 DIAGNOSIS — H35.3221 EXUDATIVE AGE-RELATED MACULAR DEGENERATION OF LEFT EYE WITH ACTIVE CHOROIDAL NEOVASCULARIZATION (H): ICD-10-CM

## 2024-08-20 DIAGNOSIS — Z96.1 PSEUDOPHAKIA OF BOTH EYES: ICD-10-CM

## 2024-08-20 DIAGNOSIS — H35.3211 EXUDATIVE AGE-RELATED MACULAR DEGENERATION, RIGHT EYE, WITH ACTIVE CHOROIDAL NEOVASCULARIZATION (H): Primary | ICD-10-CM

## 2024-08-20 PROCEDURE — 250N000011 HC RX IP 250 OP 636: Performed by: OPHTHALMOLOGY

## 2024-08-20 PROCEDURE — G0463 HOSPITAL OUTPT CLINIC VISIT: HCPCS | Mod: 25 | Performed by: OPHTHALMOLOGY

## 2024-08-20 PROCEDURE — 92134 CPTRZ OPH DX IMG PST SGM RTA: CPT | Performed by: OPHTHALMOLOGY

## 2024-08-20 PROCEDURE — 67028 INJECTION EYE DRUG: CPT | Mod: RT | Performed by: OPHTHALMOLOGY

## 2024-08-20 RX ADMIN — Medication 1.25 MG: at 12:26

## 2024-08-20 ASSESSMENT — VISUAL ACUITY
OD_CC+: +1
OS_CC+: +1
OD_PH_CC: 20/50
OD_PH_CC+: -2
METHOD: SNELLEN - LINEAR
OD_CC: 20/60 ECC
OS_CC: 20/40 SEARCHING

## 2024-08-20 ASSESSMENT — PACHYMETRY
OD_CT(UM): 625
OS_CT(UM): 580

## 2024-08-20 ASSESSMENT — TONOMETRY
IOP_METHOD: TONOPEN
OS_IOP_MMHG: 15
OD_IOP_MMHG: 18

## 2024-08-20 NOTE — NURSING NOTE
"Chief Complaints and History of Present Illnesses   Patient presents with    Follow Up     1. Exudative age-related macular degeneration, right eye, with active choroidal neovascularization (H)   2. Exudative age-related macular degeneration of left eye with active choroidal neovascularization (H)          Chief Complaint(s) and History of Present Illness(es)       Follow Up              Associated symptoms: Negative for eye pain, flashes and floaters    Pain scale: 0/10    Comments: 1. Exudative age-related macular degeneration, right eye, with active choroidal neovascularization (H)   2. Exudative age-related macular degeneration of left eye with active choroidal neovascularization (H)                   Comments    Pt reports his vision is slowly \"going downhill.\"   Pantera ointment bedtime both eyes    Silvia CASTRO 11:43 AM August 20, 2024                        "

## 2024-08-20 NOTE — PROGRESS NOTES
"HPI       Follow Up    Associated symptoms include Negative for eye pain, flashes and floaters.  Pain was noted as 0/10. Additional comments: 1. Exudative age-related macular degeneration, right eye, with active choroidal neovascularization (H)   2. Exudative age-related macular degeneration of left eye with active choroidal neovascularization (H)                  Comments    Pt reports his vision is slowly \"going downhill.\"   Pantera ointment bedtime both eyes    Silvia Yanez OA 11:43 AM August 20, 2024               Last edited by Silvia Yanez on 8/20/2024 11:43 AM.          Review of systems for the eyes was negative other than the pertinent positives/negatives listed in the HPI.      Assessment & Plan      Tom Saini is a 89 year old male with the following diagnoses:   1. Exudative age-related macular degeneration, right eye, with active choroidal neovascularization (H)    2. Exudative age-related macular degeneration of left eye with active choroidal neovascularization (H)    3. Pseudophakia of both eyes           S/P avastin both eyes 7/9/2024  S/P Avastin x many prior (last Right eye 5/2024; left eye 5/2023)     Right eye c minimal change today, left eye improved, now only rare small intraretinal fluid as prior  RBA to avastin RIGHT eye discussed, consent obtained  Continue Amsler grid daily  Return precautions reviewed     Pachmetry stable right eye, improved left eye   Continue Pantera ointment bedtime both eyes    Patient disposition:   Return in about 6 weeks (around 10/1/2024) for VT only, OCT Macula.  Last seen with Maninder in 2019.  Would benefit from returning for repeat low vision exam         Attending Physician Attestation:  Complete documentation of historical and exam elements from today's encounter can be found in the full encounter summary report (not reduplicated in this progress note).  I personally obtained the chief complaint(s) and history of present illness.  I confirmed and " edited as necessary the review of systems, past medical/surgical history, family history, social history, and examination findings as documented by others; and I examined the patient myself.  I personally reviewed the relevant tests, images, and reports as documented above.  I formulated and edited as necessary the assessment and plan and discussed the findings and management plan with the patient and family. . - Brigido Laura MD

## 2024-08-21 ENCOUNTER — TELEPHONE (OUTPATIENT)
Dept: OPHTHALMOLOGY | Facility: CLINIC | Age: 89
End: 2024-08-21
Payer: MEDICARE

## 2024-09-16 ENCOUNTER — TELEPHONE (OUTPATIENT)
Dept: OPHTHALMOLOGY | Facility: CLINIC | Age: 89
End: 2024-09-16
Payer: MEDICARE

## 2024-09-18 ENCOUNTER — OFFICE VISIT (OUTPATIENT)
Dept: OPHTHALMOLOGY | Facility: CLINIC | Age: 89
End: 2024-09-18
Payer: MEDICARE

## 2024-09-18 DIAGNOSIS — H35.3211 EXUDATIVE AGE-RELATED MACULAR DEGENERATION, RIGHT EYE, WITH ACTIVE CHOROIDAL NEOVASCULARIZATION (H): Primary | ICD-10-CM

## 2024-09-18 DIAGNOSIS — H35.3221 EXUDATIVE AGE-RELATED MACULAR DEGENERATION OF LEFT EYE WITH ACTIVE CHOROIDAL NEOVASCULARIZATION (H): ICD-10-CM

## 2024-09-18 DIAGNOSIS — H52.4 PRESBYOPIA: ICD-10-CM

## 2024-09-18 DIAGNOSIS — Z96.1 PSEUDOPHAKIA OF BOTH EYES: ICD-10-CM

## 2024-09-18 PROCEDURE — 92015 DETERMINE REFRACTIVE STATE: CPT | Mod: GY | Performed by: OPTOMETRIST

## 2024-09-18 PROCEDURE — 99214 OFFICE O/P EST MOD 30 MIN: CPT | Performed by: OPTOMETRIST

## 2024-09-18 ASSESSMENT — VISUAL ACUITY
OD_PH_CC: 20/60
OS_CC: 20/50
METHOD: SNELLEN - LINEAR
OD_PH_CC+: +1
CORRECTION_TYPE: GLASSES
OD_CC: 20/60
OS_CC+: +2

## 2024-09-18 ASSESSMENT — REFRACTION_MANIFEST
OS_AXIS: 003
OD_CYLINDER: +2.25
OS_ADD: +2.75
OS_CYLINDER: +2.50
OD_ADD: +2.75
OD_SPHERE: -3.25
OS_SPHERE: -1.75
OD_AXIS: 180

## 2024-09-18 ASSESSMENT — CONF VISUAL FIELD
OD_INFERIOR_NASAL_RESTRICTION: 0
METHOD: COUNTING FINGERS
OS_INFERIOR_TEMPORAL_RESTRICTION: 0
OD_SUPERIOR_NASAL_RESTRICTION: 0
OD_NORMAL: 1
OS_INFERIOR_NASAL_RESTRICTION: 0
OD_SUPERIOR_TEMPORAL_RESTRICTION: 0
OS_SUPERIOR_NASAL_RESTRICTION: 0
OS_NORMAL: 1
OS_SUPERIOR_TEMPORAL_RESTRICTION: 0
OD_INFERIOR_TEMPORAL_RESTRICTION: 0

## 2024-09-18 ASSESSMENT — TONOMETRY
IOP_METHOD: ICARE
OS_IOP_MMHG: 12
OD_IOP_MMHG: 11

## 2024-09-18 ASSESSMENT — EXTERNAL EXAM - RIGHT EYE: OD_EXAM: NORMAL

## 2024-09-18 ASSESSMENT — SLIT LAMP EXAM - LIDS
COMMENTS: NORMAL
COMMENTS: NORMAL

## 2024-09-18 ASSESSMENT — REFRACTION_WEARINGRX
SPECS_TYPE: BIFOCAL
OS_AXIS: 178
OS_ADD: +2.50
OD_CYLINDER: +2.25
OD_AXIS: 002
OS_SPHERE: -1.50
OS_CYLINDER: +2.75
OD_ADD: +2.50
OD_SPHERE: -3.50

## 2024-09-18 ASSESSMENT — EXTERNAL EXAM - LEFT EYE: OS_EXAM: ET

## 2024-09-18 NOTE — PROGRESS NOTES
- Trouble reading newspaper  - Letters get more clear when pulling down on left eyelid  - Has issues with Pantera ointment at night because it makes things blurry when going to the bathroom  - Uses magnifiers sometimes which helps but he has to close his right eye

## 2024-09-18 NOTE — PROGRESS NOTES
Assessment/Plan  (H30.4563) Exudative age-related macular degeneration, right eye, with active choroidal neovascularization (H)  (primary encounter diagnosis)  Comment: Patient follows with [facility].   Plan: Continue care with ophthalmology. Advised patient that no significant changes to Rx were found today. A slight bump in near add should improve reading function. Emphasized the importance of good lighting and contrast for reading printed text. Recommend use of adaptive software for computer use. Return to clinic with prolonged adaptation concerns to new glasses.     (H34.8207) Exudative age-related macular degeneration of left eye with active choroidal neovascularization (H)  Plan: See above note.     (Z96.1) Pseudophakia of both eyes  Plan: Monitor.    (H52.4) Presbyopia  Plan: REFRACTION [[id number]]        Discussed findings with patient. New spectacle prescription dispensed to patient. Patient is welcome to return to clinic with prolonged adaptation difficulties.           30 minutes were spent on the date of the encounter doing chart review, history and exam, documentation, and further activities as noted above.    Complete documentation of historical and exam elements from today's encounter can  be found in the full encounter summary report (not reduplicated in this progress  note). I personally obtained the chief complaint(s) and history of present illness. I  confirmed and edited as necessary the review of systems, past medical/surgical  history, family history, social history, and examination findings as documented by  others; and I examined the patient myself. I personally reviewed the relevant tests,  images, and reports as documented above. I formulated and edited as necessary the  assessment and plan and discussed the findings and management plan with the  patient and family.    Ayan Dickens OD

## 2024-09-18 NOTE — NURSING NOTE
Chief Complaints and History of Present Illnesses   Patient presents with    Consult For     Low vision refraction due to macular degeneration     Chief Complaint(s) and History of Present Illness(es)       Consult For              Laterality: both eyes    Onset: 2    Comments: Low vision refraction due to macular degeneration              Comments    Right eye is amblyopic.  Left eye has been worsening.  Mostly having problems reading the newspaper and photography things on the computer.  Easier to read paper at night than it is in the morning.  Easier to read if pulls down on the eye. Has been using ointment at night but tries to get it out by wiping across eyes with toilet paper.  It would be nice to be able to read signs when driving better.  Needs brighter light to see better.      Missy Mir on 9/18/2024 at 9:47 AM

## 2024-10-02 DIAGNOSIS — H35.3221 EXUDATIVE AGE-RELATED MACULAR DEGENERATION OF LEFT EYE WITH ACTIVE CHOROIDAL NEOVASCULARIZATION (H): ICD-10-CM

## 2024-10-02 DIAGNOSIS — H35.3211 EXUDATIVE AGE-RELATED MACULAR DEGENERATION, RIGHT EYE, WITH ACTIVE CHOROIDAL NEOVASCULARIZATION (H): Primary | ICD-10-CM

## 2024-10-03 ENCOUNTER — OFFICE VISIT (OUTPATIENT)
Dept: OPHTHALMOLOGY | Facility: CLINIC | Age: 89
End: 2024-10-03
Attending: OPHTHALMOLOGY
Payer: MEDICARE

## 2024-10-03 DIAGNOSIS — H35.3211 EXUDATIVE AGE-RELATED MACULAR DEGENERATION, RIGHT EYE, WITH ACTIVE CHOROIDAL NEOVASCULARIZATION (H): ICD-10-CM

## 2024-10-03 DIAGNOSIS — H35.3221 EXUDATIVE AGE-RELATED MACULAR DEGENERATION OF LEFT EYE WITH ACTIVE CHOROIDAL NEOVASCULARIZATION (H): ICD-10-CM

## 2024-10-03 PROCEDURE — 250N000011 HC RX IP 250 OP 636: Performed by: OPHTHALMOLOGY

## 2024-10-03 PROCEDURE — G0463 HOSPITAL OUTPT CLINIC VISIT: HCPCS | Mod: 25 | Performed by: OPHTHALMOLOGY

## 2024-10-03 PROCEDURE — 67028 INJECTION EYE DRUG: CPT | Mod: RT | Performed by: OPHTHALMOLOGY

## 2024-10-03 PROCEDURE — 99214 OFFICE O/P EST MOD 30 MIN: CPT | Mod: 25 | Performed by: OPHTHALMOLOGY

## 2024-10-03 PROCEDURE — 92134 CPTRZ OPH DX IMG PST SGM RTA: CPT | Performed by: OPHTHALMOLOGY

## 2024-10-03 RX ADMIN — Medication 1.25 MG: at 12:43

## 2024-10-03 ASSESSMENT — REFRACTION_WEARINGRX
OS_SPHERE: -1.50
OD_SPHERE: -3.25
OD_CYLINDER: +2.25
OD_SPHERE: -3.50
OD_ADD: +2.50
SPECS_TYPE: BIFOCAL
OS_CYLINDER: +2.75
OS_SPHERE: -1.75
OD_AXIS: 180
OS_AXIS: 178
OD_AXIS: 002
OS_ADD: +2.50
OD_ADD: +2.75
OS_AXIS: 003
OD_CYLINDER: +2.25
OS_ADD: +2.75
SPECS_TYPE: BIFOCAL
OS_CYLINDER: +2.50

## 2024-10-03 ASSESSMENT — TONOMETRY
OS_IOP_MMHG: 11
OD_IOP_MMHG: 13
IOP_METHOD: TONOPEN

## 2024-10-03 ASSESSMENT — VISUAL ACUITY
OD_PH_CC: 20/60
OS_CC: 20/40
OS_CC+: +1
METHOD: SNELLEN - LINEAR
OD_CC: 20/70
OD_CC+: -1
OD_PH_CC+: -1
CORRECTION_TYPE: GLASSES

## 2024-10-03 NOTE — NURSING NOTE
Chief Complaints and History of Present Illnesses   Patient presents with    Follow Up     Exudative age-related macular degeneration, right eye, with active choroidal neovascularization      Chief Complaint(s) and History of Present Illness(es)       Follow Up              Comments: Exudative age-related macular degeneration, right eye, with active choroidal neovascularization               Comments    Pt states no change in VA since last visit  States no flashes or floaters  No eye pain or redness    Tati GREGORY 12:00 PM October 3, 2024

## 2024-10-03 NOTE — PROGRESS NOTES
HPI       Follow Up     Additional comments: Exudative age-related macular degeneration, right eye, with active choroidal neovascularization              Comments    Pt states no change in VA since last visit  States no flashes or floaters  No eye pain or redness    Tati Laura COT 12:00 PM October 3, 2024               Last edited by Tati Laura on 10/3/2024 12:00 PM.          Review of systems for the eyes was negative other than the pertinent positives/negatives listed in the HPI.      Assessment & Plan      Tom Saini is a 89 year old male with the following diagnoses:   1. Exudative age-related macular degeneration, right eye, with active choroidal neovascularization (H)    2. Exudative age-related macular degeneration of left eye with active choroidal neovascularization (H)         S/P avastin both eyes 7/9/2024  S/P Avastin x many prior (last Right eye 5/2024; left eye 5/2023)      Right eye c nearly resolved subretinal fluid today, left eye stable c rare small intraretinal fluid as prior  RBA to avastin RIGHT eye discussed, consent obtained (Q6W to continue for now)  Continue Amsler grid daily  Return precautions reviewed   Continue Pantera ointment bedtime both eyes         Patient disposition:   Return in about 6 weeks (around 11/14/2024) for VT only, pachy, OCT Macula.           Attending Physician Attestation:  Complete documentation of historical and exam elements from today's encounter can be found in the full encounter summary report (not reduplicated in this progress note).  I personally obtained the chief complaint(s) and history of present illness.  I confirmed and edited as necessary the review of systems, past medical/surgical history, family history, social history, and examination findings as documented by others; and I examined the patient myself.  I personally reviewed the relevant tests, images, and reports as documented above.  I formulated and edited as necessary the assessment and plan  and discussed the findings and management plan with the patient and family. . - Brigido Laura MD

## 2024-10-03 NOTE — PROGRESS NOTES
[facility] Dermatology Note  Encounter Date: Oct 4, 2024  Office Visit     Reviewed patients past medical history and pertinent chart review prior to patients visit today.     Dermatology Problem List:  # Diffuse large B-cell lymphoma.  1. SKs  - Large symptomatic SK, R Jehovah's witness - cryo 11/07/2019, repeat on 12/03/2019.   2. AKs, vertex scalp s/p cryo 10/6/23  # Benign bx:  - Seborrheic keratosis, inflamed right temple, s/p shave bx 06/03/2024  ____________________________________________    Assessment & Plan:     # Benign lesions - SKs, cherry angiomas, lentigenes.  - No treatment required     # Pruritus of legs. Advised unclear why compression stockings seem to exacerbate this. Advised to try a daily moisturizer with bland emollient. Best applied after bathing.      Follow up: 1 year, sooner if concerns.     Staff and Scribe:    Scribe Disclosure:   I, Iqra Mc, am serving as a scribe; to document services personally performed by Pauline Freed MD -based on data collection and the provider's statements to me.     Provider Disclosure:   The documentation recorded by the scribe accurately reflects the services I personally performed and the decisions made by me.    Pauline Freed MD    Department of Dermatology  [facility]: Phone: [phone], Fax: [phone]  10/10/2024     _______________________________________    CC: Skin Check (Here today for a skin check. Couple spots of concern, compression socks making legs itch. )    HPI:  Mr. Tom Saini is a(n) 89 year old male who presents today as a return patient for a skin check.    The patient reports that he has been doing well with his chemo. He notes that he has one spot that has been getting bigger. He has another spot that when he vela his hair he notices. He also shares that he is supposed to wear compression socks, but they make his  legs feel itchy. He notes that he doesn't use any moisturizer on his legs to help with the itch.       Physical exam:  Vitals: There were no vitals taken for this visit.  GEN: This is a well developed, well-nourished male in no acute distress, in a pleasant mood.    SKIN: Total skin excluding the undergarment areas was performed. The exam included the head/face, neck, both arms, chest, back, abdomen, both legs, digits and/or nails.   - There are dome shaped bright red papules on the trunk and extremities .   - Scattered brown macules on sun exposed areas.  - Waxy stuck on papules and plaques on trunk and extremities.    -   - No other lesions of concern on areas examined.      Medications:  Current Outpatient Medications   Medication Sig Dispense Refill    calcium carbonate (OS-DAVIDE) 1500 (600 Ca) MG tablet Take 600 mg by mouth 2 times daily (with meals)      Cholecalciferol (VITAMIN D-3) 25 MCG (1000 UT) CAPS Take by mouth daily      lipase-protease-amylase (CREON) 87771-19156-914824 units CPEP per EC capsule Take 1 capsule by mouth 3 times daily (with meals) And 1 with snacks. 180 capsule 4    multivitamin (OCUVITE) TABS Take 1 tablet by mouth 2 times daily      sodium chloride (LAXMI 128) 5 % ophthalmic ointment Apply a small amount in both eyes at least once a day (bedtime). 3.5 g 11     Current Facility-Administered Medications   Medication Dose Route Frequency Provider Last Rate Last Admin    bevacizumab (AVASTIN) intravitreal inj 1.25 mg  1.25 mg Intravitreal Q28 Days Brigido Laura MD   1.25 mg at 10/03/24 1243    bevacizumab (AVASTIN) intravitreal inj 1.25 mg  1.25 mg Intravitreal Q28 Days Brigido Laura MD   1.25 mg at 07/09/24 1539      Past Medical History:   Patient Active Problem List   Diagnosis    Eczema    Pure hypercholesterolemia    Male erectile disorder    Dermatofibroma    Seborrheic keratoses, inflamed    Age-related osteoporosis without current pathological fracture    Infection  due to 2019 novel coronavirus    Diarrhea, unspecified type    Colonic mass    Diffuse large B-cell lymphoma of extranodal site    Prevention of chemotherapy-induced neutropenia    Encounter for antineoplastic chemotherapy    Lymphedema    Ulnar neuropathy at elbow of left upper extremity    Left hand weakness    Aftercare following surgery of the musculoskeletal system     Past Medical History:   Diagnosis Date    Aortic insufficiency     DLBCL (diffuse large B cell lymphoma) (H) 07/2023    colon/abdomen, s/p mini-R-CHOP x 6    DVT (deep venous thrombosis) (H) 11/2023    R popliteal    Erectile dysfunction     History of deep venous thrombosis 05/2019    distal L DVT with extension, completed 6 months of AC (failed eliquis)    History of pelvic fracture     Hyperlipidemia     Low back pain     disc disease s/p laminectomy in past    Macular degeneration     on Avastin, R eye    Pancreatic cyst 07/2018    next MR due 12/23    Pancreatic insufficiency     Radius fracture 2018    after fall from wall       CC Fantasma Bocanegra MD  [address] [place] [address] on close of this encounter.

## 2024-10-04 ENCOUNTER — OFFICE VISIT (OUTPATIENT)
Dept: DERMATOLOGY | Facility: CLINIC | Age: 89
End: 2024-10-04
Payer: MEDICARE

## 2024-10-04 DIAGNOSIS — L81.4 SOLAR LENTIGO: ICD-10-CM

## 2024-10-04 DIAGNOSIS — D18.01 CHERRY ANGIOMA: ICD-10-CM

## 2024-10-04 DIAGNOSIS — L29.9 PRURITUS: ICD-10-CM

## 2024-10-04 DIAGNOSIS — L82.1 SEBORRHEIC KERATOSES: ICD-10-CM

## 2024-10-04 DIAGNOSIS — Z12.83 SKIN CANCER SCREENING: Primary | ICD-10-CM

## 2024-10-04 PROCEDURE — 99213 OFFICE O/P EST LOW 20 MIN: CPT | Performed by: DERMATOLOGY

## 2024-10-04 ASSESSMENT — PAIN SCALES - GENERAL: PAINLEVEL: NO PAIN (0)

## 2024-10-04 NOTE — PATIENT INSTRUCTIONS

## 2024-10-04 NOTE — NURSING NOTE
Dermatology Rooming Note    Tom Saini's goals for this visit include:   Chief Complaint   Patient presents with    Skin Check     Here today for a skin check. Couple spots of concern, compression socks making legs itch.      Mari Mancuso RN

## 2024-10-04 NOTE — LETTER
10/4/2024       RE: Tom Saini  [address] [facility] [address]     Dear Colleague,    Thank you for referring your patient, Tom Saini, to the [facility] [place] at [facility]. Please see a copy of my visit note below.    [facility] Dermatology Note  Encounter Date: Oct 4, 2024  Office Visit     Reviewed patients past medical history and pertinent chart review prior to patients visit today.     Dermatology Problem List:  # Diffuse large B-cell lymphoma.  1. SKs  - Large symptomatic SK, R Yarsani - cryo 11/07/2019, repeat on 12/03/2019.   2. AKs, vertex scalp s/p cryo 10/6/23  # Benign bx:  - Seborrheic keratosis, inflamed right temple, s/p shave bx 06/03/2024  ____________________________________________    Assessment & Plan:     # Benign lesions - SKs, cherry angiomas, lentigenes.  - No treatment required     # Pruritus of legs. Advised unclear why compression stockings seem to exacerbate this. Advised to try a daily moisturizer with bland emollient. Best applied after bathing.      Follow up: 1 year, sooner if concerns.     Staff and Scribe:    Scribe Disclosure:   I, Iqra Mc, am serving as a scribe; to document services personally performed by Pauline Freed MD -based on data collection and the provider's statements to me.     Provider Disclosure:   The documentation recorded by the scribe accurately reflects the services I personally performed and the decisions made by me.    Pauline Freed MD    Department of Dermatology  [facility]: Phone: [phone], Fax: [phone]  10/10/2024     _______________________________________    CC: Skin Check (Here today for a skin check. Couple spots of concern, compression socks making legs itch. )    HPI:  Mr. Tom Saini is a(n) 89 year old male who  presents today as a return patient for a skin check.    The patient reports that he has been doing well with his chemo. He notes that he has one spot that has been getting bigger. He has another spot that when he vela his hair he notices. He also shares that he is supposed to wear compression socks, but they make his legs feel itchy. He notes that he doesn't use any moisturizer on his legs to help with the itch.       Physical exam:  Vitals: There were no vitals taken for this visit.  GEN: This is a well developed, well-nourished male in no acute distress, in a pleasant mood.    SKIN: Total skin excluding the undergarment areas was performed. The exam included the head/face, neck, both arms, chest, back, abdomen, both legs, digits and/or nails.   - There are dome shaped bright red papules on the trunk and extremities .   - Scattered brown macules on sun exposed areas.  - Waxy stuck on papules and plaques on trunk and extremities.    -   - No other lesions of concern on areas examined.      Medications:  Current Outpatient Medications   Medication Sig Dispense Refill     calcium carbonate (OS-DAVIDE) 1500 (600 Ca) MG tablet Take 600 mg by mouth 2 times daily (with meals)       Cholecalciferol (VITAMIN D-3) 25 MCG (1000 UT) CAPS Take by mouth daily       lipase-protease-amylase (CREON) [id number] units CPEP per EC capsule Take 1 capsule by mouth 3 times daily (with meals) And 1 with snacks. 180 capsule 4     multivitamin (OCUVITE) TABS Take 1 tablet by mouth 2 times daily       sodium chloride (LAXMI 128) 5 % ophthalmic ointment Apply a small amount in both eyes at least once a day (bedtime). 3.5 g 11     Current Facility-Administered Medications   Medication Dose Route Frequency Provider Last Rate Last Admin     bevacizumab (AVASTIN) intravitreal inj 1.25 mg  1.25 mg Intravitreal Q28 Days Brigido Laura MD   1.25 mg at 10/03/24 1243     bevacizumab (AVASTIN) intravitreal inj 1.25 mg  1.25 mg Intravitreal  Q28 Days Brigido Laura MD   1.25 mg at 07/09/24 1539      Past Medical History:   Patient Active Problem List   Diagnosis     Eczema     Pure hypercholesterolemia     Male erectile disorder     Dermatofibroma     Seborrheic keratoses, inflamed     Age-related osteoporosis without current pathological fracture     Infection due to 2019 novel coronavirus     Diarrhea, unspecified type     Colonic mass     Diffuse large B-cell lymphoma of extranodal site     Prevention of chemotherapy-induced neutropenia     Encounter for antineoplastic chemotherapy     Lymphedema     Ulnar neuropathy at elbow of left upper extremity     Left hand weakness     Aftercare following surgery of the musculoskeletal system     Past Medical History:   Diagnosis Date     Aortic insufficiency      DLBCL (diffuse large B cell lymphoma) (H) 07/2023    colon/abdomen, s/p mini-R-CHOP x 6     DVT (deep venous thrombosis) (H) 11/2023    R popliteal     Erectile dysfunction      History of deep venous thrombosis 05/2019    distal L DVT with extension, completed 6 months of AC (failed eliquis)     History of pelvic fracture      Hyperlipidemia      Low back pain     disc disease s/p laminectomy in past     Macular degeneration     on Avastin, R eye     Pancreatic cyst 07/2018    next MR due 12/23     Pancreatic insufficiency      Radius fracture 2018    after fall from wall       CC Fantasma Bocanegra MD  [address] [place] [address] on close of this encounter.       Again, thank you for allowing me to participate in the care of your patient.      Sincerely,    Pauline Freed MD

## 2024-10-28 ENCOUNTER — APPOINTMENT (OUTPATIENT)
Dept: LAB | Facility: CLINIC | Age: 89
End: 2024-10-28
Attending: STUDENT IN AN ORGANIZED HEALTH CARE EDUCATION/TRAINING PROGRAM
Payer: MEDICARE

## 2024-10-28 ENCOUNTER — ONCOLOGY VISIT (OUTPATIENT)
Dept: ONCOLOGY | Facility: CLINIC | Age: 89
End: 2024-10-28
Attending: STUDENT IN AN ORGANIZED HEALTH CARE EDUCATION/TRAINING PROGRAM
Payer: MEDICARE

## 2024-10-28 VITALS
OXYGEN SATURATION: 96 % | RESPIRATION RATE: 16 BRPM | SYSTOLIC BLOOD PRESSURE: 124 MMHG | BODY MASS INDEX: 23.61 KG/M2 | HEART RATE: 80 BPM | TEMPERATURE: 98.2 F | DIASTOLIC BLOOD PRESSURE: 53 MMHG | WEIGHT: 169.3 LBS

## 2024-10-28 DIAGNOSIS — C83.398 DIFFUSE LARGE B-CELL LYMPHOMA OF EXTRANODAL SITE: ICD-10-CM

## 2024-10-28 LAB
ALBUMIN SERPL BCG-MCNC: 4 G/DL (ref 3.5–5.2)
ALP SERPL-CCNC: 71 U/L (ref 40–150)
ALT SERPL W P-5'-P-CCNC: 11 U/L (ref 0–70)
ANION GAP SERPL CALCULATED.3IONS-SCNC: 11 MMOL/L (ref 7–15)
AST SERPL W P-5'-P-CCNC: 20 U/L (ref 0–45)
BASOPHILS # BLD AUTO: 0.1 10E3/UL (ref 0–0.2)
BASOPHILS NFR BLD AUTO: 1 %
BILIRUB SERPL-MCNC: 0.4 MG/DL
BUN SERPL-MCNC: 20.1 MG/DL (ref 8–23)
CALCIUM SERPL-MCNC: 9.6 MG/DL (ref 8.8–10.4)
CHLORIDE SERPL-SCNC: 105 MMOL/L (ref 98–107)
CREAT SERPL-MCNC: 0.9 MG/DL (ref 0.67–1.17)
EGFRCR SERPLBLD CKD-EPI 2021: 82 ML/MIN/1.73M2
EOSINOPHIL # BLD AUTO: 0.2 10E3/UL (ref 0–0.7)
EOSINOPHIL NFR BLD AUTO: 3 %
ERYTHROCYTE [DISTWIDTH] IN BLOOD BY AUTOMATED COUNT: 13.5 % (ref 10–15)
GLUCOSE SERPL-MCNC: 146 MG/DL (ref 70–99)
HCO3 SERPL-SCNC: 25 MMOL/L (ref 22–29)
HCT VFR BLD AUTO: 39.4 % (ref 40–53)
HGB BLD-MCNC: 13 G/DL (ref 13.3–17.7)
IMM GRANULOCYTES # BLD: 0 10E3/UL
IMM GRANULOCYTES NFR BLD: 0 %
LDH SERPL L TO P-CCNC: 163 U/L (ref 0–250)
LYMPHOCYTES # BLD AUTO: 1 10E3/UL (ref 0.8–5.3)
LYMPHOCYTES NFR BLD AUTO: 15 %
MCH RBC QN AUTO: 31.1 PG (ref 26.5–33)
MCHC RBC AUTO-ENTMCNC: 33 G/DL (ref 31.5–36.5)
MCV RBC AUTO: 94 FL (ref 78–100)
MONOCYTES # BLD AUTO: 0.6 10E3/UL (ref 0–1.3)
MONOCYTES NFR BLD AUTO: 10 %
NEUTROPHILS # BLD AUTO: 4.5 10E3/UL (ref 1.6–8.3)
NEUTROPHILS NFR BLD AUTO: 71 %
NRBC # BLD AUTO: 0 10E3/UL
NRBC BLD AUTO-RTO: 0 /100
PLATELET # BLD AUTO: 211 10E3/UL (ref 150–450)
POTASSIUM SERPL-SCNC: 4.4 MMOL/L (ref 3.4–5.3)
PROT SERPL-MCNC: 6.9 G/DL (ref 6.4–8.3)
RBC # BLD AUTO: 4.18 10E6/UL (ref 4.4–5.9)
SODIUM SERPL-SCNC: 141 MMOL/L (ref 135–145)
WBC # BLD AUTO: 6.4 10E3/UL (ref 4–11)

## 2024-10-28 PROCEDURE — 83615 LACTATE (LD) (LDH) ENZYME: CPT | Performed by: STUDENT IN AN ORGANIZED HEALTH CARE EDUCATION/TRAINING PROGRAM

## 2024-10-28 PROCEDURE — 82040 ASSAY OF SERUM ALBUMIN: CPT | Performed by: STUDENT IN AN ORGANIZED HEALTH CARE EDUCATION/TRAINING PROGRAM

## 2024-10-28 PROCEDURE — 36415 COLL VENOUS BLD VENIPUNCTURE: CPT | Performed by: STUDENT IN AN ORGANIZED HEALTH CARE EDUCATION/TRAINING PROGRAM

## 2024-10-28 PROCEDURE — 85014 HEMATOCRIT: CPT | Performed by: STUDENT IN AN ORGANIZED HEALTH CARE EDUCATION/TRAINING PROGRAM

## 2024-10-28 PROCEDURE — G0463 HOSPITAL OUTPT CLINIC VISIT: HCPCS | Performed by: STUDENT IN AN ORGANIZED HEALTH CARE EDUCATION/TRAINING PROGRAM

## 2024-10-28 PROCEDURE — 85004 AUTOMATED DIFF WBC COUNT: CPT | Performed by: STUDENT IN AN ORGANIZED HEALTH CARE EDUCATION/TRAINING PROGRAM

## 2024-10-28 PROCEDURE — 99214 OFFICE O/P EST MOD 30 MIN: CPT | Performed by: STUDENT IN AN ORGANIZED HEALTH CARE EDUCATION/TRAINING PROGRAM

## 2024-10-28 ASSESSMENT — PAIN SCALES - GENERAL: PAINLEVEL_OUTOF10: NO PAIN (0)

## 2024-10-28 NOTE — NURSING NOTE
"Oncology Rooming Note    October 28, 2024 2:03 PM   Tom Saini is a 89 year old male who presents for:    Chief Complaint   Patient presents with    Blood Draw     Vitals, blood drawn via VPT by LPN. Pt checked into appt.     Oncology Clinic Visit     Diffuse large B-Cell Lymphoma     Initial Vitals: /53   Pulse 80   Temp 98.2  F (36.8  C) (Oral)   Resp 16   Wt 76.8 kg (169 lb 4.8 oz)   SpO2 96%   BMI 23.61 kg/m   Estimated body mass index is 23.61 kg/m  as calculated from the following:    Height as of 5/31/24: 1.803 m (5' 11\").    Weight as of this encounter: 76.8 kg (169 lb 4.8 oz). Body surface area is 1.96 meters squared.  No Pain (0) Comment: Data Unavailable   No LMP for male patient.  Allergies reviewed: Yes  Medications reviewed: Yes    Medications: Medication refills not needed today.  Pharmacy name entered into [facility]: [id number] [address] AT [facility]    Frailty Screening:   Is the patient here for a new oncology consult visit in cancer care? 2. No      Clinical concerns: None       Tati Campbell LPN  10/28/2024              "

## 2024-10-28 NOTE — PROGRESS NOTES
Julee Reza is a 89 year old, presenting for the following health issues:  Blood Draw (Vitals, blood drawn via VPT by LPN. Pt checked into appt. 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  He has had diarrhea with particular food for several years and recently was investigated for possible pancreatic insufficiency.  He also has a history of pancreatic pseudocyst.  On recent MRI done in June 2023 to follow-up on pancreatic pseudocyst showed a colonic mass.  Subsequent CT scan of chest abdomen pelvis showed Mild anemia, no cytopenias 7.9 x 6.9 x 5.6 cm large soft tissue mass in sigmoid colon obliterating the lumen and significant extramural soft tissue extension superiorly in the sigmoid mesocolon.  Enlarged bilateral inguinal lymph node, as few small prominent lymph nodes along superior rectal/inferior mesenteric artery distribution.  Diffuse main pancreatic duct dilatation with large intraductal calculus in the head was seen as well.  Multiple cystic lesions and dilated sidebranches were seen in the pancreas.  He underwent colonoscopy but due to obliterated lumen, colonoscopy failed.  Subsequently CT colonography was done which showed similar results to CT chest abdomen pelvis.  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  FISH showed Bcl-2 rearrangement but no MYC rearrangement.  Bcl-2 IgH rearrangement [translocation (14;18)] is consistent with transformation of DLBCL from follicular lymphoma.  Postoperative course was complicated by postoperative nausea vomiting which resolved, single episode of suicidal ideation which resolved.  He was discharged to TCU.  He has been regaining his strength slowly, his performance status has improved to 1.  He is able to walk independently  for short distances, can do basic activities of daily living himself.    PET scan revealed persistent hypermetabolic retroperitoneal lymph nodes, possibly reactive but lymphoma cannot be ruled out. ECHOcardiogram reveals EF >50%. LDH within normal limits. Overall IPI 2. Low-intermediate risk GCB DLBCL.    He  began treatment with RminiCHP. First cycle was split into prephase steroid+rituxmab followed by miniCHP. He tolerated treatment very well without any long term toxicities. PET scan after 3 cycles showed CR.     PET scan after 6 cycles shows complete response (1/26/2024)     Diffuse large B-cell lymphoma of extranodal site   8/21/2023 Initial Diagnosis    Diffuse large B-cell lymphoma of extranodal site (H)     8/31/2023 -  Chemotherapy    OP ONC Non-Hodgkin's Lymphoma - mini-R-CHOP  Plan Provider: Holly Buitrago MD  Treatment goal: Curative  Line of treatment: First Line       Patient comes today for follow up. No fevers, chills, night sweats or weight loss, no lymphadenopathy. No pain.        Objective    /53   Pulse 80   Temp 98.2  F (36.8  C) (Oral)   Resp 16   Wt 76.8 kg (169 lb 4.8 oz)   SpO2 96%   BMI 23.61 kg/m    Body mass index is 23.61 kg/m .  Physical Exam   GENERAL:  Alert oriented well nourished  HEAD: normocephalic atraumatic  SKIN:  no rash, hives, other lesions.  LYMPHATIC: no abnormal lymph nodes palable in cervical, axillary, supraclavicular or inguinal area.  RESP:  No rales or rhonchi, breath sounds bilaterally equal and vesicular  CV:  No tachycardia, S1 S2 normal No murmur.  GI:  Abdomen soft nontender no hepato or splenomegaly  MUSCULOSKELETAL:  No visible joint redness or swelling.  NEURO:  No gross weakness gait normal  PSYCH: pleasant affect    Labs: I personally reviewed complete blood count, differential, renal function test, liver function tests LDH.  No cytopenias, LFTs within normal limits, renal function test within normal limits and LDH is normal as well. Mild  anemia present    Assessment and Plan:    1) Diffuse large B cell lymphoma:  - He does not have any clinical or laboratory evidence of lymphoma recurrence or progression.  - I will see him back in 3 months with labs and scans.    Total time spent on date of service in review of medical records, review of labs, history taking, physical exam, discussion of assessment and plan, counseling and patient education is 30 minutes.    Holly Buitrago MD  Attending Physician  Pager [phone]            Signed Electronically by: Holly Buitrago MD

## 2024-10-28 NOTE — NURSING NOTE
Chief Complaint   Patient presents with    Blood Draw     Vitals, blood drawn via VPT by LPN. Pt checked into appt.      ANURADHA Rojo LPN

## 2024-10-28 NOTE — LETTER
10/28/2024      Tom Saini  [address] [facility] [address]      Dear Colleague,    Thank you for referring your patient, Tom Saini, to the [facility]. Please see a copy of my visit note below.      Julee Reza is a 89 year old, presenting for the following health issues:  Blood Draw (Vitals, blood drawn via VPT by LPN. Pt checked into appt. 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  He has had diarrhea with particular food for several years and recently was investigated for possible pancreatic insufficiency.  He also has a history of pancreatic pseudocyst.  On recent MRI done in June 2023 to follow-up on pancreatic pseudocyst showed a colonic mass.  Subsequent CT scan of chest abdomen pelvis showed Mild anemia, no cytopenias 7.9 x 6.9 x 5.6 cm large soft tissue mass in sigmoid colon obliterating the lumen and significant extramural soft tissue extension superiorly in the sigmoid mesocolon.  Enlarged bilateral inguinal lymph node, as few small prominent lymph nodes along superior rectal/inferior mesenteric artery distribution.  Diffuse main pancreatic duct dilatation with large intraductal calculus in the head was seen as well.  Multiple cystic lesions and dilated sidebranches were seen in the pancreas.  He underwent colonoscopy but due to obliterated lumen, colonoscopy failed.  Subsequently CT colonography was done which showed similar results to CT chest abdomen pelvis.  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  FISH showed Bcl-2 rearrangement but no MYC rearrangement.  Bcl-2 IgH rearrangement [translocation (14;18)] is consistent with transformation of DLBCL from follicular lymphoma.  Postoperative course was complicated by  postoperative nausea vomiting which resolved, single episode of suicidal ideation which resolved.  He was discharged to TCU.  He has been regaining his strength slowly, his performance status has improved to 1.  He is able to walk independently for short distances, can do basic activities of daily living himself.    PET scan revealed persistent hypermetabolic retroperitoneal lymph nodes, possibly reactive but lymphoma cannot be ruled out. ECHOcardiogram reveals EF >50%. LDH within normal limits. Overall IPI 2. Low-intermediate risk GCB DLBCL.    He  began treatment with RminiCHP. First cycle was split into prephase steroid+rituxmab followed by miniCHP. He tolerated treatment very well without any long term toxicities. PET scan after 3 cycles showed CR.     PET scan after 6 cycles shows complete response (1/26/2024)     Diffuse large B-cell lymphoma of extranodal site   8/21/2023 Initial Diagnosis    Diffuse large B-cell lymphoma of extranodal site (H)     8/31/2023 -  Chemotherapy    OP ONC Non-Hodgkin's Lymphoma - mini-R-CHOP  Plan Provider: Holly Buitrago MD  Treatment goal: Curative  Line of treatment: First Line       Patient comes today for follow up. No fevers, chills, night sweats or weight loss, no lymphadenopathy. No pain.        Objective   /53   Pulse 80   Temp 98.2  F (36.8  C) (Oral)   Resp 16   Wt 76.8 kg (169 lb 4.8 oz)   SpO2 96%   BMI 23.61 kg/m    Body mass index is 23.61 kg/m .  Physical Exam   GENERAL:  Alert oriented well nourished  HEAD: normocephalic atraumatic  SKIN:  no rash, hives, other lesions.  LYMPHATIC: no abnormal lymph nodes palable in cervical, axillary, supraclavicular or inguinal area.  RESP:  No rales or rhonchi, breath sounds bilaterally equal and vesicular  CV:  No tachycardia, S1 S2 normal No murmur.  GI:  Abdomen soft nontender no hepato or splenomegaly  MUSCULOSKELETAL:  No visible joint redness or swelling.  NEURO:  No gross weakness gait normal  PSYCH:  pleasant affect    Labs: I personally reviewed complete blood count, differential, renal function test, liver function tests LDH.  No cytopenias, LFTs within normal limits, renal function test within normal limits and LDH is normal as well. Mild anemia present    Assessment and Plan:    1) Diffuse large B cell lymphoma:  - He does not have any clinical or laboratory evidence of lymphoma recurrence or progression.  - I will see him back in 3 months with labs and scans.    Total time spent on date of service in review of medical records, review of labs, history taking, physical exam, discussion of assessment and plan, counseling and patient education is 30 minutes.    Holly Buitrago MD  Attending Physician  Pager [phone]            Signed Electronically by: Holly Buitrago MD      Again, thank you for allowing me to participate in the care of your patient.        Sincerely,        Holly Buitrago MD

## 2024-11-13 ENCOUNTER — OFFICE VISIT (OUTPATIENT)
Dept: OPHTHALMOLOGY | Facility: CLINIC | Age: 89
End: 2024-11-13
Attending: OPHTHALMOLOGY
Payer: MEDICARE

## 2024-11-13 DIAGNOSIS — H35.3221 EXUDATIVE AGE-RELATED MACULAR DEGENERATION OF LEFT EYE WITH ACTIVE CHOROIDAL NEOVASCULARIZATION (H): ICD-10-CM

## 2024-11-13 DIAGNOSIS — H35.3211 EXUDATIVE AGE-RELATED MACULAR DEGENERATION, RIGHT EYE, WITH ACTIVE CHOROIDAL NEOVASCULARIZATION (H): Primary | ICD-10-CM

## 2024-11-13 DIAGNOSIS — H18.513 FUCHS' CORNEAL DYSTROPHY OF BOTH EYES: ICD-10-CM

## 2024-11-13 DIAGNOSIS — Z96.1 PSEUDOPHAKIA OF BOTH EYES: ICD-10-CM

## 2024-11-13 PROCEDURE — G0463 HOSPITAL OUTPT CLINIC VISIT: HCPCS | Performed by: OPHTHALMOLOGY

## 2024-11-13 PROCEDURE — 92134 CPTRZ OPH DX IMG PST SGM RTA: CPT | Performed by: OPHTHALMOLOGY

## 2024-11-13 ASSESSMENT — PACHYMETRY
OS_CT(UM): 594
OD_CT(UM): 603

## 2024-11-13 ASSESSMENT — VISUAL ACUITY
OS_CC+: -2
OS_PH_CC: 20/40
OS_PH_CC+: -2
OD_PH_CC: 20/50
OD_CC+: -2
METHOD: SNELLEN - LINEAR
OD_PH_CC+: -2
OS_CC: 20/50
OD_CC: 20/60

## 2024-11-13 ASSESSMENT — SLIT LAMP EXAM - LIDS
COMMENTS: NORMAL
COMMENTS: NORMAL

## 2024-11-13 ASSESSMENT — TONOMETRY
OS_IOP_MMHG: 11
OD_IOP_MMHG: 12
IOP_METHOD: ICARE

## 2024-11-13 ASSESSMENT — EXTERNAL EXAM - RIGHT EYE: OD_EXAM: NORMAL

## 2024-11-13 ASSESSMENT — EXTERNAL EXAM - LEFT EYE: OS_EXAM: ET

## 2024-11-13 NOTE — PROGRESS NOTES
HPI       Macular Degeneration Follow Up    In both eyes.  Since onset it is stable.  Associated symptoms include Negative for eye pain, flashes and floaters.             Comments    Here for AMD follow up. VA is about the same. No flashes or floaters. No pain.    Jt Ross COT 11:49 AM November 13, 2024             Last edited by Jt Ross on 11/13/2024 11:49 AM.          Review of systems for the eyes was negative other than the pertinent positives/negatives listed in the HPI.      Assessment & Plan      Tom Saini is a 89 year old male with the following diagnoses:   1. Exudative age-related macular degeneration, right eye, with active choroidal neovascularization (H)    2. Exudative age-related macular degeneration of left eye with active choroidal neovascularization (H)    3. Fuchs' corneal dystrophy of both eyes    4. Pseudophakia of both eyes           Here for recheck.  Vision slightly lessened.  Improves later in the day.  Minimal refractive change with Dickens  Pachmetry stable to improved    S/P avastin both eyes 7/9/2024  S/P Avastin x many prior (last Right eye 10/3/2024; left eye 5/2023)      Right eye c nearly resolved subretinal fluid today, left eye stable c rare small intraretinal fluid as prior  Extend to Q8W  Continue Amsler grid daily  Return precautions reviewed   Continue Pantera ointment bedtime both eyes    Patient disposition:   Return in about 2 weeks (around 11/27/2024) for VT only, OCT Macula, Avastin RIGHT eye.           Attending Physician Attestation:  Complete documentation of historical and exam elements from today's encounter can be found in the full encounter summary report (not reduplicated in this progress note).  I personally obtained the chief complaint(s) and history of present illness.  I confirmed and edited as necessary the review of systems, past medical/surgical history, family history, social history, and examination findings as documented by others; and I examined the  patient myself.  I personally reviewed the relevant tests, images, and reports as documented above.  I formulated and edited as necessary the assessment and plan and discussed the findings and management plan with the patient and family. . - Brigido Laura MD

## 2024-12-04 ENCOUNTER — OFFICE VISIT (OUTPATIENT)
Dept: OPHTHALMOLOGY | Facility: CLINIC | Age: 89
End: 2024-12-04
Attending: OPHTHALMOLOGY
Payer: MEDICARE

## 2024-12-04 DIAGNOSIS — H35.3211 EXUDATIVE AGE-RELATED MACULAR DEGENERATION, RIGHT EYE, WITH ACTIVE CHOROIDAL NEOVASCULARIZATION (H): Primary | ICD-10-CM

## 2024-12-04 PROCEDURE — 250N000011 HC RX IP 250 OP 636: Performed by: OPHTHALMOLOGY

## 2024-12-04 PROCEDURE — 92134 CPTRZ OPH DX IMG PST SGM RTA: CPT | Performed by: OPHTHALMOLOGY

## 2024-12-04 PROCEDURE — 67028 INJECTION EYE DRUG: CPT | Mod: RT | Performed by: OPHTHALMOLOGY

## 2024-12-04 RX ADMIN — Medication 1.25 MG: at 12:49

## 2024-12-04 ASSESSMENT — TONOMETRY
OS_IOP_MMHG: 11
OD_IOP_MMHG: 12
IOP_METHOD: TONOPEN

## 2024-12-04 ASSESSMENT — VISUAL ACUITY
OD_PH_CC: 20/60+
METHOD: SNELLEN - LINEAR
OS_CC: 20/40+2
CORRECTION_TYPE: GLASSES
OD_CC: 20/70-

## 2024-12-04 NOTE — PROGRESS NOTES
HPI       Macular Degeneration Follow Up    In both eyes.  Pain was noted as 0/10.             Comments    Denies changes since LV here.  Denies flashes/floaters.    Oc meds:  Pantera 128 [place] at bedtime each eye     RBI Mcclellan 11:56 AM 12/04/2024              Last edited by Genesis Gardiner on 12/4/2024 11:56 AM.          Review of systems for the eyes was negative other than the pertinent positives/negatives listed in the HPI.      Assessment & Plan      Tom Saini is a 89 year old male with the following diagnoses:   1. Exudative age-related macular degeneration, right eye, with active choroidal neovascularization (H)         Here for recheck.  Vision slightly lessened.  Improves later in the day.  Minimal refractive change with Dickens  Pachmetry stable to improved    S/P avastin both eyes 7/9/2024  S/P Avastin x many prior (last Right eye 10/3/2024; left eye 5/2023)      Right eye stable on OCT today  Extend to Q8W (#1/3) today  RBA to avastin discussed, consent obtained  Continue Amsler grid daily  Return precautions reviewed   Continue Pantera ointment bedtime both eyes      Patient disposition:   Return in about 2 months (around 2/4/2025) for VT only, avastin RIGHT.           Attending Physician Attestation:  Complete documentation of historical and exam elements from today's encounter can be found in the full encounter summary report (not reduplicated in this progress note).  I personally obtained the chief complaint(s) and history of present illness.  I confirmed and edited as necessary the review of systems, past medical/surgical history, family history, social history, and examination findings as documented by others; and I examined the patient myself.  I personally reviewed the relevant tests, images, and reports as documented above.  I formulated and edited as necessary the assessment and plan and discussed the findings and management plan with the patient and family. . - Brigido Laura MD

## 2025-01-23 ENCOUNTER — LAB (OUTPATIENT)
Dept: LAB | Facility: CLINIC | Age: 89
End: 2025-01-23
Attending: STUDENT IN AN ORGANIZED HEALTH CARE EDUCATION/TRAINING PROGRAM
Payer: MEDICARE

## 2025-01-23 ENCOUNTER — ANCILLARY PROCEDURE (OUTPATIENT)
Dept: CT IMAGING | Facility: CLINIC | Age: 89
End: 2025-01-23
Attending: STUDENT IN AN ORGANIZED HEALTH CARE EDUCATION/TRAINING PROGRAM
Payer: COMMERCIAL

## 2025-01-23 DIAGNOSIS — C83.398 DIFFUSE LARGE B-CELL LYMPHOMA OF EXTRANODAL SITE: ICD-10-CM

## 2025-01-23 LAB
ALBUMIN SERPL BCG-MCNC: 4 G/DL (ref 3.5–5.2)
ALP SERPL-CCNC: 75 U/L (ref 40–150)
ALT SERPL W P-5'-P-CCNC: 10 U/L (ref 0–70)
ANION GAP SERPL CALCULATED.3IONS-SCNC: 10 MMOL/L (ref 7–15)
AST SERPL W P-5'-P-CCNC: 21 U/L (ref 0–45)
BASOPHILS # BLD AUTO: 0.1 10E3/UL (ref 0–0.2)
BASOPHILS NFR BLD AUTO: 2 %
BILIRUB SERPL-MCNC: 0.3 MG/DL
BUN SERPL-MCNC: 19.5 MG/DL (ref 8–23)
CALCIUM SERPL-MCNC: 9.2 MG/DL (ref 8.8–10.4)
CHLORIDE SERPL-SCNC: 104 MMOL/L (ref 98–107)
CREAT BLD-MCNC: 1.1 MG/DL (ref 0.7–1.3)
CREAT SERPL-MCNC: 0.97 MG/DL (ref 0.67–1.17)
EGFRCR SERPLBLD CKD-EPI 2021: 75 ML/MIN/1.73M2
EGFRCR SERPLBLD CKD-EPI 2021: >60 ML/MIN/1.73M2
EOSINOPHIL # BLD AUTO: 0.2 10E3/UL (ref 0–0.7)
EOSINOPHIL NFR BLD AUTO: 4 %
ERYTHROCYTE [DISTWIDTH] IN BLOOD BY AUTOMATED COUNT: 13.1 % (ref 10–15)
GLUCOSE SERPL-MCNC: 120 MG/DL (ref 70–99)
HCO3 SERPL-SCNC: 26 MMOL/L (ref 22–29)
HCT VFR BLD AUTO: 40.1 % (ref 40–53)
HGB BLD-MCNC: 12.8 G/DL (ref 13.3–17.7)
IMM GRANULOCYTES # BLD: 0 10E3/UL
IMM GRANULOCYTES NFR BLD: 0 %
LYMPHOCYTES # BLD AUTO: 1.1 10E3/UL (ref 0.8–5.3)
LYMPHOCYTES NFR BLD AUTO: 18 %
MCH RBC QN AUTO: 30.8 PG (ref 26.5–33)
MCHC RBC AUTO-ENTMCNC: 31.9 G/DL (ref 31.5–36.5)
MCV RBC AUTO: 96 FL (ref 78–100)
MONOCYTES # BLD AUTO: 0.7 10E3/UL (ref 0–1.3)
MONOCYTES NFR BLD AUTO: 12 %
NEUTROPHILS # BLD AUTO: 4 10E3/UL (ref 1.6–8.3)
NEUTROPHILS NFR BLD AUTO: 65 %
NRBC # BLD AUTO: 0 10E3/UL
NRBC BLD AUTO-RTO: 0 /100
PLATELET # BLD AUTO: 224 10E3/UL (ref 150–450)
POTASSIUM SERPL-SCNC: 4.6 MMOL/L (ref 3.4–5.3)
PROT SERPL-MCNC: 6.9 G/DL (ref 6.4–8.3)
RBC # BLD AUTO: 4.16 10E6/UL (ref 4.4–5.9)
SODIUM SERPL-SCNC: 140 MMOL/L (ref 135–145)
WBC # BLD AUTO: 6.2 10E3/UL (ref 4–11)

## 2025-01-23 PROCEDURE — 74177 CT ABD & PELVIS W/CONTRAST: CPT | Performed by: STUDENT IN AN ORGANIZED HEALTH CARE EDUCATION/TRAINING PROGRAM

## 2025-01-23 PROCEDURE — 84155 ASSAY OF PROTEIN SERUM: CPT

## 2025-01-23 PROCEDURE — 36415 COLL VENOUS BLD VENIPUNCTURE: CPT

## 2025-01-23 PROCEDURE — 82565 ASSAY OF CREATININE: CPT | Performed by: PATHOLOGY

## 2025-01-23 PROCEDURE — 85004 AUTOMATED DIFF WBC COUNT: CPT

## 2025-01-23 PROCEDURE — 85041 AUTOMATED RBC COUNT: CPT

## 2025-01-23 PROCEDURE — 71260 CT THORAX DX C+: CPT | Performed by: STUDENT IN AN ORGANIZED HEALTH CARE EDUCATION/TRAINING PROGRAM

## 2025-01-23 PROCEDURE — 85014 HEMATOCRIT: CPT

## 2025-01-23 PROCEDURE — 84075 ASSAY ALKALINE PHOSPHATASE: CPT

## 2025-01-23 RX ORDER — IOPAMIDOL 755 MG/ML
87 INJECTION, SOLUTION INTRAVASCULAR ONCE
Status: COMPLETED | OUTPATIENT
Start: 2025-01-23 | End: 2025-01-23

## 2025-01-23 RX ADMIN — IOPAMIDOL 87 ML: 755 INJECTION, SOLUTION INTRAVASCULAR at 10:13

## 2025-01-23 NOTE — DISCHARGE INSTRUCTIONS

## 2025-01-23 NOTE — NURSING NOTE
Chief Complaint   Patient presents with    Labs Only     Blood drawn and PIV placed by LPN.      ANURADHA Rojo LPN

## 2025-02-12 NOTE — NURSING NOTE
Chief Complaints and History of Present Illnesses   Patient presents with     Macular Degeneration Follow Up     1 month follow up, injection only RE.     HPI    Affected eye(s):  Both   Symptoms:     No floaters   No flashes   No redness   No Dryness         Do you have eye pain now?:  No      Comments:  Pt states that it is getting harder to read since last visit.    Alma WHYTE July 13, 2017 1:42 PM                breast exam

## 2025-02-19 ENCOUNTER — OFFICE VISIT (OUTPATIENT)
Dept: OPHTHALMOLOGY | Facility: CLINIC | Age: 89
End: 2025-02-19
Attending: OPHTHALMOLOGY
Payer: MEDICARE

## 2025-02-19 DIAGNOSIS — H35.3222 EXUDATIVE AGE-RELATED MACULAR DEGENERATION OF LEFT EYE WITH INACTIVE CHOROIDAL NEOVASCULARIZATION (H): ICD-10-CM

## 2025-02-19 DIAGNOSIS — H35.3211 EXUDATIVE AGE-RELATED MACULAR DEGENERATION, RIGHT EYE, WITH ACTIVE CHOROIDAL NEOVASCULARIZATION (H): Primary | ICD-10-CM

## 2025-02-19 PROCEDURE — 250N000011 HC RX IP 250 OP 636: Performed by: OPHTHALMOLOGY

## 2025-02-19 PROCEDURE — 67028 INJECTION EYE DRUG: CPT | Mod: RT | Performed by: OPHTHALMOLOGY

## 2025-02-19 RX ADMIN — Medication 1.25 MG: at 13:06

## 2025-02-19 ASSESSMENT — CONF VISUAL FIELD
METHOD: COUNTING FINGERS
OS_INFERIOR_NASAL_RESTRICTION: 3
OS_SUPERIOR_NASAL_RESTRICTION: 3
OD_SUPERIOR_NASAL_RESTRICTION: 3

## 2025-02-19 ASSESSMENT — REFRACTION_WEARINGRX
OS_CYLINDER: +2.50
OS_AXIS: 178
OD_SPHERE: -3.25
SPECS_TYPE: BIFOCAL
OD_CYLINDER: +2.25
OD_SPHERE: -3.50
OS_ADD: +2.75
OS_ADD: +2.50
OS_SPHERE: -1.50
OD_ADD: +2.75
OD_ADD: +2.50
OS_CYLINDER: +2.75
OS_SPHERE: -1.75
OD_AXIS: 002
OD_AXIS: 180
OS_AXIS: 003
OD_CYLINDER: +2.25
SPECS_TYPE: BIFOCAL

## 2025-02-19 ASSESSMENT — VISUAL ACUITY
METHOD: SNELLEN - LINEAR
OS_CC: 20/40
OS_CC+: -2
OD_CC: 20/70
OD_CC+: -2
OD_PH_CC: 20/60
OD_PH_CC+: +2
CORRECTION_TYPE: GLASSES

## 2025-02-19 ASSESSMENT — TONOMETRY
OS_IOP_MMHG: 13
IOP_METHOD: TONOPEN
OD_IOP_MMHG: 14

## 2025-02-19 NOTE — PROGRESS NOTES
HPI    Patient states that his vision is slowly decreasing. Notices he needs more magnifying glasses to read. No pain and irritation. No flashes of light. No floaters.     Ocular Meds:Pantera ointment bedtime both eyes    Tim BECKFORD, February 19, 2025 12:35 PM        Last edited by Tim Taylor on 2/19/2025 12:37 PM.          Review of systems for the eyes was negative other than the pertinent positives/negatives listed in the HPI.      Assessment & Plan      Tom Saini is a 89 year old male with the following diagnoses:   1. Exudative age-related macular degeneration, right eye, with active choroidal neovascularization (H)    2. Exudative age-related macular degeneration of left eye with inactive choroidal neovascularization (H)           Here for injection.  Vision slightly lessened with reading  S/P avastin both eyes 7/9/2024  S/P Avastin x many prior (last Right eye 10/3/2024; left eye 5/2023)      Right eye stable on OCT today  Extend to Q8W (#2/3) today  RBA to avastin discussed, consent obtained  Continue Amsler grid daily  Return precautions reviewed   Continue Pantera ointment bedtime both eyes    Patient disposition:   Return in about 2 months (around 4/19/2025) for VT only, OCT Macula, pachy.  If stable, will inject and extend to Q10 weeks           Attending Physician Attestation:  Complete documentation of historical and exam elements from today's encounter can be found in the full encounter summary report (not reduplicated in this progress note).  I personally obtained the chief complaint(s) and history of present illness.  I confirmed and edited as necessary the review of systems, past medical/surgical history, family history, social history, and examination findings as documented by others; and I examined the patient myself.  I personally reviewed the relevant tests, images, and reports as documented above.  I formulated and edited as necessary the assessment and plan and discussed the findings  and management plan with the patient and family. . - Brigido Laura MD

## 2025-02-19 NOTE — NURSING NOTE
Chief Complaints and History of Present Illnesses   Patient presents with    Macular Degeneration Follow Up     Chief Complaint(s) and History of Present Illness(es)       Macular Degeneration Follow Up               Comments    Patient states that his vision is slowly decreasing. Notices he needs more magnifying glasses to read. No pain and irritation. No flashes of light. No floaters.     Ocular Meds:Pantera ointment bedtime both eyes    Tim BECKFORD, February 19, 2025 12:35 PM

## 2025-03-06 ENCOUNTER — MYC REFILL (OUTPATIENT)
Dept: INTERNAL MEDICINE | Facility: CLINIC | Age: 89
End: 2025-03-06
Payer: MEDICARE

## 2025-03-06 DIAGNOSIS — K86.89 PANCREATIC INSUFFICIENCY: ICD-10-CM

## 2025-03-24 ENCOUNTER — OFFICE VISIT (OUTPATIENT)
Dept: INTERNAL MEDICINE | Facility: CLINIC | Age: 89
End: 2025-03-24
Payer: COMMERCIAL

## 2025-03-24 VITALS
HEART RATE: 97 BPM | TEMPERATURE: 98.5 F | HEIGHT: 70 IN | OXYGEN SATURATION: 96 % | BODY MASS INDEX: 23.96 KG/M2 | WEIGHT: 167.4 LBS | RESPIRATION RATE: 14 BRPM | DIASTOLIC BLOOD PRESSURE: 54 MMHG | SYSTOLIC BLOOD PRESSURE: 94 MMHG

## 2025-03-24 DIAGNOSIS — D64.9 ANEMIA, UNSPECIFIED TYPE: Primary | ICD-10-CM

## 2025-03-24 PROCEDURE — 3078F DIAST BP <80 MM HG: CPT | Performed by: INTERNAL MEDICINE

## 2025-03-24 PROCEDURE — 3074F SYST BP LT 130 MM HG: CPT | Performed by: INTERNAL MEDICINE

## 2025-03-24 PROCEDURE — G0439 PPPS, SUBSEQ VISIT: HCPCS | Mod: GC | Performed by: INTERNAL MEDICINE

## 2025-03-24 SDOH — HEALTH STABILITY: PHYSICAL HEALTH: ON AVERAGE, HOW MANY MINUTES DO YOU ENGAGE IN EXERCISE AT THIS LEVEL?: 0 MIN

## 2025-03-24 SDOH — HEALTH STABILITY: PHYSICAL HEALTH: ON AVERAGE, HOW MANY DAYS PER WEEK DO YOU ENGAGE IN MODERATE TO STRENUOUS EXERCISE (LIKE A BRISK WALK)?: 0 DAYS

## 2025-03-24 ASSESSMENT — SOCIAL DETERMINANTS OF HEALTH (SDOH): HOW OFTEN DO YOU GET TOGETHER WITH FRIENDS OR RELATIVES?: TWICE A WEEK

## 2025-03-24 NOTE — PROGRESS NOTES
Preventive Care Visit  [facility]  Angeli Robertson MD, Internal Medicine  Mar 24, 2025      Assessment & Plan     Health Maintenance  Doing well currently, no acute complaints. Advised balanced diet, exercise, and compression socks use.  - Follow-up in 1 year    Anemia, unspecified type  Ongoing mild, normocytic anemia. Hgb most recently 12.8 in Jan 2025, MCV 96, RDW 13.1. No s/s of bleeding on H/P this visit. Unclear etiology at this time but likely MICA vs AoCD. Will order labs to be drawn at next lab appt (prior to heme/onc visit in May 2025)  - Iron and iron binding capacity; Future  - Ferritin; Future  - CBC with platelets; Future  - Vitamin B12; Future    Diffuse Large B cell lymphoma - in remission  Follows with heme onc. Next visit in May 2025.  - Labs as above    Counseling  Appropriate preventive services were addressed with this patient via screening, questionnaire, or discussion as appropriate for fall prevention, nutrition, physical activity, Tobacco-use cessation, social engagement, weight loss and cognition.  Checklist reviewing preventive services available has been given to the patient.  Reviewed patient's diet, addressing concerns and/or questions.     This patient has been staffed with Dr. Robertson.    David Gallegos  PGY-1  Internal Medicine  [facility]    Attending Addendum:  Patient seen and examined with resident in clinic today.  Pertinent portions of history and exam were independently verified by myself.  I agree with the exam and plan as outlined above with the following modifications: none.  Angeli Robertson MD  Internal Medicine      No follow-ups on file.    Julee Reza is a 89 year old, presenting for the following:  Physical        3/24/2025     8:57 AM   Additional Questions   Roomed by alise           Pt here for routine follow-up. Pt denies any acute complaints at this time. Eating/drinking well, no chest pain, SOB, abd pain, LE pain. No recent  illnesses, falls, or trauma.    States he has been taking his creon as one pill daily which works well for him (no diarrhea currently, has constipation with 2 pills daily). States he continues to have LE edema worse on R than L. Says this is stable for him and never painful/red/hot. Has compression stockings but rarely wears them bc they are uncomfortable.    Asymptomatic with SBP of 94 today.     Notably wife was recently diagnosed with breast cancer and is undergoing treatment. Pt is dealing with logistics issues of her meds with a recent upcoming trip to [place] for a family event.               Advance Care Planning  Patient has a Health Care Directive on file  Advance care planning document is on file and is current.      3/24/2025   General Health   How would you rate your overall physical health? Excellent   Feel stress (tense, anxious, or unable to sleep) Only a little   (!) STRESS CONCERN      3/24/2025   Nutrition   Diet: Regular (no restrictions)         3/24/2025   Exercise   Days per week of moderate/strenous exercise 0 days   Average minutes spent exercising at this level 0 min   (!) EXERCISE CONCERN      3/24/2025   Social Factors   Frequency of gathering with friends or relatives Twice a week   Worry food won't last until get money to buy more No   Food not last or not have enough money for food? No   Do you have housing? (Housing is defined as stable permanent housing and does not include staying ouside in a car, in a tent, in an abandoned building, in an overnight shelter, or couch-surfing.) Yes   Are you worried about losing your housing? No   Lack of transportation? No   Unable to get utilities (heat,electricity)? No         3/24/2025   Fall Risk   Fallen 2 or more times in the past year? No    Trouble with walking or balance? No        Proxy-reported          3/24/2025   Activities of Daily Living- Home Safety   Needs help with the following daily activites None of the above   Safety  concerns in the home None of the above         3/24/2025   Dental   Dentist two times every year? Yes         3/24/2025   Hearing Screening   Hearing concerns? None of the above         3/24/2025   Driving Risk Screening   Patient/family members have concerns about driving No         3/24/2025   General Alertness/Fatigue Screening   Have you been more tired than usual lately? No         3/24/2025   Urinary Incontinence Screening   Bothered by leaking urine in past 6 months No             Today's PHQ-2 Score:       2025    12:37 PM   PHQ-2 (  [employer])   Q1: Little interest or pleasure in doing things 0   Q2: Feeling down, depressed or hopeless 0   PHQ-2 Score 0         3/24/2025   Substance Use   Alcohol more than 3/day or more than 7/wk No   Do you have a current opioid prescription? No   How severe/bad is pain from 1 to 10? 0/10 (No Pain)   Do you use any other substances recreationally? No     Social History     Tobacco Use    Smoking status: Former     Current packs/day: 0.00     Average packs/day: 2.0 packs/day for 3.1 years (6.1 ttl pk-yrs)     Types: Cigarettes     Start date: 1951     Quit date: 1954     Years since quittin.7     Passive exposure: Past    Smokeless tobacco: Never   Vaping Use    Vaping status: Never Used   Substance Use Topics    Alcohol use: Yes     Alcohol/week: 3.0 standard drinks of alcohol     Types: 3 Glasses of wine per week     Comment: glass of wine with dinner    Drug use: Never             Reviewed and updated as needed this visit by Provider                      Current providers sharing in care for this patient include:  Patient Care Team:  Angeli Robertson MD as PCP - General (Internal Medicine)  Jignesh Ruiz MD as MD (Dermatology)  Haider Macedo MD as Referring Physician (Family Practice)  Kalyan Suero MD as MD (Internal Medicine)  Lacie Boswell MD as MD (Ophthalmology)  Cecy Polanco MD as MD (Orthopedics)  Vera  "Briseida Hernandez MD as MD (Internal Medicine)  Brigido Laura MD as MD (Ophthalmology)  King Oscar MD as MD (Dermapathology)  Anuj Good MD as Resident (Ophthalmology)  Marlene Levin APRN CNP as Nurse Practitioner (Nurse Practitioner - Family)  Brigido Laura MD as Assigned Surgical Provider  Angeli Robertson MD as Assigned PCP  Alejandro Wright MD as Resident (Student in organized health care education/training program)  Pauline Freed MD as MD (Dermatology)  Pauline Freed MD as MD (Dermatology)  Dee Philip, RN as Specialty Care Coordinator (Hematology & Oncology)  Holly Buitrago MD as MD (Hematology & Oncology)  Carlos Molina MD as Physician (Neurology)  Cynthia Gill PT as Physical Therapist (Physical Therapy)  Holly Buitrago MD as Assigned Cancer Care Provider    The following health maintenance items are reviewed in Epic and correct as of today:  Health Maintenance   Topic Date Due    MEDICARE ANNUAL WELLNESS VISIT  04/07/2023    ANNUAL REVIEW OF HM ORDERS  12/15/2024    COVID-19 Vaccine (9 - [employer] risk 2024-25 season) 04/12/2025    FALL RISK ASSESSMENT  03/24/2026    ADVANCE CARE PLANNING  03/24/2030    DTAP/TDAP/TD IMMUNIZATION (3 - Td or Tdap) 05/31/2034    PHQ-2 (once per calendar year)  Completed    INFLUENZA VACCINE  Completed    Pneumococcal Vaccine: 50+ Years  Completed    ZOSTER IMMUNIZATION  Completed    RSV VACCINE  Completed    HPV IMMUNIZATION  Aged Out    MENINGITIS IMMUNIZATION  Aged Out    LIPID  Discontinued            Objective    Exam  BP 94/54 (BP Location: Right arm, Patient Position: Sitting, Cuff Size: Adult Regular)   Pulse 97   Temp 98.5  F (36.9  C) (Oral)   Resp 14   Ht 1.77 m (5' 9.69\")   Wt 75.9 kg (167 lb 6.4 oz)   SpO2 96%   BMI 24.24 kg/m     Estimated body mass index is 24.24 kg/m  as calculated from the following:    Height as of this encounter: 1.77 m (5' 9.69\").    Weight as of this encounter: 75.9 kg (167 " lb 6.4 oz).    Physical Exam  GENERAL: alert and no distress, well nourished  NECK: no adenopathy, no asymmetry, masses, or scars  RESP: lungs clear to auscultation - no rales, rhonchi or wheezes  CV: regular rate and rhythm, normal S1 S2, no S3 or S4, no murmur, click or rubs.  ABDOMEN: soft, nontender, no hepatosplenomegaly, no masses and bowel sounds normal  MS: no gross musculoskeletal defects noted, 2+ R>L le edema without signs of infection or DVT.        3/24/2025   Mini Cog   Clock Draw Score 2 Normal   3 Item Recall 2 objects recalled   Mini Cog Total Score 4              Signed Electronically by: Angeli Robertson MD

## 2025-04-15 DIAGNOSIS — H18.513 FUCHS' CORNEAL DYSTROPHY OF BOTH EYES: Primary | ICD-10-CM

## 2025-04-15 DIAGNOSIS — H35.3211 EXUDATIVE AGE-RELATED MACULAR DEGENERATION, RIGHT EYE, WITH ACTIVE CHOROIDAL NEOVASCULARIZATION (H): ICD-10-CM

## 2025-04-15 DIAGNOSIS — H35.3222 EXUDATIVE AGE-RELATED MACULAR DEGENERATION OF LEFT EYE WITH INACTIVE CHOROIDAL NEOVASCULARIZATION (H): ICD-10-CM

## 2025-04-16 ENCOUNTER — OFFICE VISIT (OUTPATIENT)
Dept: OPHTHALMOLOGY | Facility: CLINIC | Age: 89
End: 2025-04-16
Attending: OPHTHALMOLOGY
Payer: MEDICARE

## 2025-04-16 DIAGNOSIS — H35.3211 EXUDATIVE AGE-RELATED MACULAR DEGENERATION, RIGHT EYE, WITH ACTIVE CHOROIDAL NEOVASCULARIZATION (H): ICD-10-CM

## 2025-04-16 DIAGNOSIS — H18.513 FUCHS' CORNEAL DYSTROPHY OF BOTH EYES: ICD-10-CM

## 2025-04-16 DIAGNOSIS — H35.3222 EXUDATIVE AGE-RELATED MACULAR DEGENERATION OF LEFT EYE WITH INACTIVE CHOROIDAL NEOVASCULARIZATION (H): ICD-10-CM

## 2025-04-16 PROCEDURE — 92134 CPTRZ OPH DX IMG PST SGM RTA: CPT | Performed by: OPHTHALMOLOGY

## 2025-04-16 PROCEDURE — 250N000011 HC RX IP 250 OP 636: Performed by: OPHTHALMOLOGY

## 2025-04-16 PROCEDURE — 92015 DETERMINE REFRACTIVE STATE: CPT | Mod: GY

## 2025-04-16 PROCEDURE — 76514 ECHO EXAM OF EYE THICKNESS: CPT | Performed by: OPHTHALMOLOGY

## 2025-04-16 PROCEDURE — 67028 INJECTION EYE DRUG: CPT | Mod: RT | Performed by: OPHTHALMOLOGY

## 2025-04-16 PROCEDURE — G0463 HOSPITAL OUTPT CLINIC VISIT: HCPCS | Performed by: OPHTHALMOLOGY

## 2025-04-16 RX ADMIN — Medication 1.25 MG: at 13:26

## 2025-04-16 ASSESSMENT — VISUAL ACUITY
OS_CC: 20/30
OD_CC+: -1
METHOD: SNELLEN - LINEAR
OS_CC+: -2
CORRECTION_TYPE: GLASSES
OD_CC: 20/60

## 2025-04-16 ASSESSMENT — REFRACTION_WEARINGRX
OD_ADD: +2.50
SPECS_TYPE: BIFOCAL
OD_SPHERE: -3.50
OS_ADD: +2.75
OS_AXIS: 178
OS_SPHERE: -1.50
OD_ADD: +2.75
OD_AXIS: 002
OD_SPHERE: -3.25
OS_CYLINDER: +2.50
OD_CYLINDER: +2.25
OS_ADD: +2.50
OD_AXIS: 180
OS_AXIS: 003
OS_CYLINDER: +2.75
OD_CYLINDER: +2.25
SPECS_TYPE: BIFOCAL
OS_SPHERE: -1.75

## 2025-04-16 ASSESSMENT — REFRACTION_MANIFEST
OD_CYLINDER: +2.75
OD_ADD: +2.75
OS_ADD: +2.75
OD_AXIS: 180
OS_AXIS: 175
OD_SPHERE: -3.75
OS_CYLINDER: +2.50
OS_SPHERE: -1.75

## 2025-04-16 ASSESSMENT — TONOMETRY
IOP_METHOD: TONOPEN
OS_IOP_MMHG: 12
OD_IOP_MMHG: 15

## 2025-04-16 ASSESSMENT — PACHYMETRY
OD_CT(UM): 577
OS_CT(UM): 585

## 2025-04-16 NOTE — PROGRESS NOTES
HPI       Follow Up    In right eye.  Since onset it is stable.  Associated symptoms include Negative for dryness, eye pain, headache and photophobia.  Treatments tried include artificial tears and ointment.  Pain was noted as 0/10. Additional comments: Exudative age-related macular degeneration, right eye, with active choroidal neovascularization             Comments    He states that his vision has seemed stable in both eyes, since his last eye exam.  He finds that he sees better if he pulls down his left lower lid.    He uses Pantera ointment at night in each eye.    BRI Solomon 12:10 PM  April 16, 2025                Last edited by Melodie Kirk COT on 4/16/2025 12:10 PM.          Review of systems for the eyes was negative other than the pertinent positives/negatives listed in the HPI.      Assessment & Plan      Tom Saini is a 89 year old male with the following diagnoses:   1. Fuchs' corneal dystrophy of both eyes    2. Exudative age-related macular degeneration, right eye, with active choroidal neovascularization (H)    3. Exudative age-related macular degeneration of left eye with inactive choroidal neovascularization (H)         Here for injection.  Vision slightly lessened with reading  S/P avastin both eyes 7/9/2024  S/P Avastin x many prior (last Right eye 2/19/2025; left eye 7/2024)      Right eye stable on OCT today  Extend to Q8W (#3/3) today  RBA to avastin discussed, consent obtained  Continue Amsler grid daily  Return precautions reviewed   Continue Pantera ointment bedtime both eyes    No change on refraction today  Recommend increased artificial tears and monitoring left eye vision for now  Return to [facility] as needed for repeat LV refraction    Patient disposition:   Return in about 10 weeks (around 6/25/2025) for VT only, Ravindra, OCT mac.           Attending Physician Attestation:  Complete documentation of historical and exam elements from today's encounter can be found in the full  encounter summary report (not reduplicated in this progress note).  I personally obtained the chief complaint(s) and history of present illness.  I confirmed and edited as necessary the review of systems, past medical/surgical history, family history, social history, and examination findings as documented by others; and I examined the patient myself.  I personally reviewed the relevant tests, images, and reports as documented above.  I formulated and edited as necessary the assessment and plan and discussed the findings and management plan with the patient and family. . - Brigido Laura MD

## 2025-04-16 NOTE — NURSING NOTE
Chief Complaints and History of Present Illnesses   Patient presents with    Follow Up     Exudative age-related macular degeneration, right eye, with active choroidal neovascularization     Chief Complaint(s) and History of Present Illness(es)       Follow Up              Laterality: right eye    Course: stable    Associated symptoms: Negative for dryness, eye pain, headache and photophobia    Treatments tried: artificial tears and ointment    Pain scale: 0/10    Comments: Exudative age-related macular degeneration, right eye, with active choroidal neovascularization              Comments    He states that his vision has seemed stable in both eyes, since his last eye exam.  He finds that he sees better if he pulls down his left lower lid.    He uses Pantera ointment at night in each eye.    Melodie Kirk, COT 12:10 PM  April 16, 2025

## 2025-05-22 ENCOUNTER — ONCOLOGY VISIT (OUTPATIENT)
Dept: ONCOLOGY | Facility: CLINIC | Age: 89
End: 2025-05-22
Attending: STUDENT IN AN ORGANIZED HEALTH CARE EDUCATION/TRAINING PROGRAM
Payer: MEDICARE

## 2025-05-22 ENCOUNTER — APPOINTMENT (OUTPATIENT)
Dept: LAB | Facility: CLINIC | Age: 89
End: 2025-05-22
Attending: STUDENT IN AN ORGANIZED HEALTH CARE EDUCATION/TRAINING PROGRAM
Payer: MEDICARE

## 2025-05-22 VITALS
HEART RATE: 77 BPM | DIASTOLIC BLOOD PRESSURE: 64 MMHG | OXYGEN SATURATION: 97 % | WEIGHT: 163.7 LBS | BODY MASS INDEX: 23.7 KG/M2 | TEMPERATURE: 98.1 F | RESPIRATION RATE: 16 BRPM | SYSTOLIC BLOOD PRESSURE: 109 MMHG

## 2025-05-22 DIAGNOSIS — C85.9A: ICD-10-CM

## 2025-05-22 DIAGNOSIS — C83.35 DIFFUSE LARGE B-CELL LYMPHOMA OF LYMPH NODES OF INGUINAL REGION (H): Primary | ICD-10-CM

## 2025-05-22 LAB
ALBUMIN SERPL BCG-MCNC: 3.9 G/DL (ref 3.5–5.2)
ALP SERPL-CCNC: 94 U/L (ref 40–150)
ALT SERPL W P-5'-P-CCNC: 10 U/L (ref 0–70)
ANION GAP SERPL CALCULATED.3IONS-SCNC: 11 MMOL/L (ref 7–15)
AST SERPL W P-5'-P-CCNC: 16 U/L (ref 0–45)
BASOPHILS # BLD AUTO: 0.1 10E3/UL (ref 0–0.2)
BASOPHILS NFR BLD AUTO: 1 %
BILIRUB SERPL-MCNC: 0.2 MG/DL
BUN SERPL-MCNC: 24 MG/DL (ref 8–23)
CALCIUM SERPL-MCNC: 9.7 MG/DL (ref 8.8–10.4)
CHLORIDE SERPL-SCNC: 104 MMOL/L (ref 98–107)
CREAT SERPL-MCNC: 0.96 MG/DL (ref 0.67–1.17)
EGFRCR SERPLBLD CKD-EPI 2021: 76 ML/MIN/1.73M2
EOSINOPHIL # BLD AUTO: 0.2 10E3/UL (ref 0–0.7)
EOSINOPHIL NFR BLD AUTO: 3 %
ERYTHROCYTE [DISTWIDTH] IN BLOOD BY AUTOMATED COUNT: 12.7 % (ref 10–15)
GLUCOSE SERPL-MCNC: 119 MG/DL (ref 70–99)
HCO3 SERPL-SCNC: 24 MMOL/L (ref 22–29)
HCT VFR BLD AUTO: 38.6 % (ref 40–53)
HGB BLD-MCNC: 12.6 G/DL (ref 13.3–17.7)
IMM GRANULOCYTES # BLD: 0 10E3/UL
IMM GRANULOCYTES NFR BLD: 0 %
LDH SERPL L TO P-CCNC: 150 U/L (ref 0–250)
LYMPHOCYTES # BLD AUTO: 1 10E3/UL (ref 0.8–5.3)
LYMPHOCYTES NFR BLD AUTO: 17 %
MCH RBC QN AUTO: 30.7 PG (ref 26.5–33)
MCHC RBC AUTO-ENTMCNC: 32.6 G/DL (ref 31.5–36.5)
MCV RBC AUTO: 94 FL (ref 78–100)
MONOCYTES # BLD AUTO: 0.6 10E3/UL (ref 0–1.3)
MONOCYTES NFR BLD AUTO: 11 %
NEUTROPHILS # BLD AUTO: 3.9 10E3/UL (ref 1.6–8.3)
NEUTROPHILS NFR BLD AUTO: 68 %
NRBC # BLD AUTO: 0 10E3/UL
NRBC BLD AUTO-RTO: 0 /100
PLATELET # BLD AUTO: 339 10E3/UL (ref 150–450)
POTASSIUM SERPL-SCNC: 4.9 MMOL/L (ref 3.4–5.3)
PROT SERPL-MCNC: 6.9 G/DL (ref 6.4–8.3)
RBC # BLD AUTO: 4.11 10E6/UL (ref 4.4–5.9)
SODIUM SERPL-SCNC: 139 MMOL/L (ref 135–145)
WBC # BLD AUTO: 5.8 10E3/UL (ref 4–11)

## 2025-05-22 PROCEDURE — 36415 COLL VENOUS BLD VENIPUNCTURE: CPT | Performed by: STUDENT IN AN ORGANIZED HEALTH CARE EDUCATION/TRAINING PROGRAM

## 2025-05-22 PROCEDURE — 83615 LACTATE (LD) (LDH) ENZYME: CPT | Performed by: STUDENT IN AN ORGANIZED HEALTH CARE EDUCATION/TRAINING PROGRAM

## 2025-05-22 PROCEDURE — G0463 HOSPITAL OUTPT CLINIC VISIT: HCPCS | Performed by: STUDENT IN AN ORGANIZED HEALTH CARE EDUCATION/TRAINING PROGRAM

## 2025-05-22 PROCEDURE — 99214 OFFICE O/P EST MOD 30 MIN: CPT | Performed by: STUDENT IN AN ORGANIZED HEALTH CARE EDUCATION/TRAINING PROGRAM

## 2025-05-22 PROCEDURE — 80053 COMPREHEN METABOLIC PANEL: CPT | Performed by: STUDENT IN AN ORGANIZED HEALTH CARE EDUCATION/TRAINING PROGRAM

## 2025-05-22 PROCEDURE — 85004 AUTOMATED DIFF WBC COUNT: CPT | Performed by: STUDENT IN AN ORGANIZED HEALTH CARE EDUCATION/TRAINING PROGRAM

## 2025-05-22 ASSESSMENT — PAIN SCALES - GENERAL: PAINLEVEL_OUTOF10: NO PAIN (0)

## 2025-05-22 NOTE — Clinical Note
5/22/2025      Tom Saini  [address] [facility] [id number]      Dear Colleague,    Thank you for referring your patient, Tom Saini, to the [facility]. Please see a copy of my visit note below.      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  He has had diarrhea with particular food for several years and recently was investigated for possible pancreatic insufficiency.  He also has a history of pancreatic pseudocyst.  On recent MRI done in June 2023 to follow-up on pancreatic pseudocyst showed a colonic mass.  Subsequent CT scan of chest abdomen pelvis showed Mild anemia, no cytopenias 7.9 x 6.9 x 5.6 cm large soft tissue mass in sigmoid colon obliterating the lumen and significant extramural soft tissue extension superiorly in the sigmoid mesocolon.  Enlarged bilateral inguinal lymph node, as few small prominent lymph nodes along superior rectal/inferior mesenteric artery distribution.  Diffuse main pancreatic duct dilatation with large intraductal calculus in the head was seen as well.  Multiple cystic lesions and dilated sidebranches were seen in the pancreas.  He underwent colonoscopy but due to obliterated lumen, colonoscopy failed.  Subsequently CT colonography was done which showed similar results to CT chest abdomen pelvis.  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  FISH showed Bcl-2 rearrangement but no MYC rearrangement.  Bcl-2 IgH rearrangement [translocation (14;18)] is consistent with transformation of DLBCL from follicular lymphoma.  Postoperative course was complicated by postoperative nausea vomiting which  resolved, single episode of suicidal ideation which resolved.  He was discharged to TCU.  He has been regaining his strength slowly, his performance status has improved to 1.  He is able to walk independently for short distances, can do basic activities of daily living himself.    PET scan revealed persistent hypermetabolic retroperitoneal lymph nodes, possibly reactive but lymphoma cannot be ruled out. ECHOcardiogram reveals EF >50%. LDH within normal limits. Overall IPI 2. Low-intermediate risk GCB DLBCL.    He  began treatment with RminiCHP. First cycle was split into prephase steroid+rituxmab followed by miniCHP. He tolerated treatment very well without any long term toxicities. PET scan after 3 cycles showed CR.     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 He is feeling really well with no major complaints. Denies new lumps/bumps, fevers, chills, night sweats, weight loss, lymphadenopathy or pain.         Objective   /64   Pulse 77   Temp 98.1  F (36.7  C)   Resp 16   Wt 74.3 kg (163 lb 11.2 oz)   SpO2 97%   BMI 23.70 kg/m    Body mass index is 23.7 kg/m .  Physical Exam   GENERAL:  Alert oriented well nourished  HEAD: normocephalic atraumatic  SKIN:  no rash, hives, other lesions.  LYMPHATIC: no abnormal lymph nodes palable in cervical, axillary, supraclavicular or inguinal area.  RESP:  No rales or rhonchi, breath sounds bilaterally equal and vesicular  CV:  No tachycardia, S1 S2 normal No murmur.  GI:  Abdomen soft nontender no hepato or splenomegaly  MUSCULOSKELETAL:  No visible joint redness or swelling.  NEURO:  No gross  weakness gait normal  PSYCH: pleasant affect    Labs: I personally reviewed complete blood count, differential, renal function test, liver function tests LDH.  LFTs within normal limits, renal function test within normal limits and LDH is normal as well. Mild anemia present.     Imaging: CT CAP with no evidence of disease reoccurrence.     Assessment and Plan:    1) Diffuse large B cell lymphoma  He does not have any clinical or laboratory evidence of lymphoma recurrence or progression. CT CAP with no evidence of disease reoccurrence. We will see him back in 3 months with labs.     2) Normocytic Anemia   May be bone marrow suppression vs. Iron deficiency anemia. Iron labs have not been checked since 2021. We are not able to add on iron labs today, so he said he will follow up on this with his primary care doctor who he is scheduled to see soon for his yearly visit. Otherwise we can obtain these labs at his next visit with us.       Patient discussed with Dr. Iftikhar Buitrago MD  Heme/Onc Fellow     I saw and examined the patient with fellow. I discussed assessment and plan. I agree with findings documented in fellows note.    Total time spent on date of service in review of medical records, review of labs, history taking, physical exam, discussion of assessment and plan, counseling and patient education is 30 minutes.    Holly Buitrago MD  Attending Physician  Pager [phone]          Again, thank you for allowing me to participate in the care of your patient.        Sincerely,        Holly Buitrago MD    Electronically signed

## 2025-05-22 NOTE — NURSING NOTE
Chief Complaint   Patient presents with    Oncology Clinic Visit     Lymphoma of intestine     Labs Only     Venipuncture, vitals checked     Ellen Luis RN on 5/22/2025 at 3:33 PM

## 2025-05-22 NOTE — PROGRESS NOTES
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  He has had diarrhea with particular food for several years and recently was investigated for possible pancreatic insufficiency.  He also has a history of pancreatic pseudocyst.  On recent MRI done in June 2023 to follow-up on pancreatic pseudocyst showed a colonic mass.  Subsequent CT scan of chest abdomen pelvis showed Mild anemia, no cytopenias 7.9 x 6.9 x 5.6 cm large soft tissue mass in sigmoid colon obliterating the lumen and significant extramural soft tissue extension superiorly in the sigmoid mesocolon.  Enlarged bilateral inguinal lymph node, as few small prominent lymph nodes along superior rectal/inferior mesenteric artery distribution.  Diffuse main pancreatic duct dilatation with large intraductal calculus in the head was seen as well.  Multiple cystic lesions and dilated sidebranches were seen in the pancreas.  He underwent colonoscopy but due to obliterated lumen, colonoscopy failed.  Subsequently CT colonography was done which showed similar results to CT chest abdomen pelvis.  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  FISH showed Bcl-2 rearrangement but no MYC rearrangement.  Bcl-2 IgH rearrangement [translocation (14;18)] is consistent with transformation of DLBCL from follicular lymphoma.  Postoperative course was complicated by postoperative nausea vomiting which resolved, single episode of suicidal ideation which resolved.  He was discharged to TCU.  He has been regaining his strength slowly, his performance status has improved to 1.  He is able to walk independently for short distances, can do basic  activities of daily living himself.    PET scan revealed persistent hypermetabolic retroperitoneal lymph nodes, possibly reactive but lymphoma cannot be ruled out. ECHOcardiogram reveals EF >50%. LDH within normal limits. Overall IPI 2. Low-intermediate risk GCB DLBCL.    He  began treatment with RminiCHP. First cycle was split into prephase steroid+rituxmab followed by miniCHP. He tolerated treatment very well without any long term toxicities. PET scan after 3 cycles showed CR.     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 He is feeling well with no major complaints. Denies new lumps/bumps, fevers, chills, night sweats, weight loss, lymphadenopathy or pain. Energy and appetite is good.          Objective    /64   Pulse 77   Temp 98.1  F (36.7  C)   Resp 16   Wt 74.3 kg (163 lb 11.2 oz)   SpO2 97%   BMI 23.70 kg/m    Body mass index is 23.7 kg/m .  Physical Exam   GENERAL:  Alert oriented well nourished  HEAD: normocephalic atraumatic  SKIN:  no rash, hives, other lesions.  LYMPHATIC: no abnormal lymph nodes palable in cervical, axillary, supraclavicular or inguinal area.  RESP:  No rales or rhonchi, breath sounds bilaterally equal and vesicular  CV:  No tachycardia, S1 S2 normal No murmur.  GI:  Abdomen soft nontender no hepato or splenomegaly  MUSCULOSKELETAL:  No visible joint redness or swelling.  NEURO:  No gross weakness gait normal  PSYCH: pleasant affect    Labs: I personally reviewed CBC and differential which is within normal limits. Mild anemia, similar to prior. Liver function, LDH and kidney function are within normal limits.      Imaging: no new imaging     Assessment and Plan:    1) Diffuse large B cell lymphoma  He does not have any clinical or laboratory evidence of lymphoma recurrence or progression. We will see him back in 3 months with labs.     Patient discussed with Dr. Iftikhar Buitrago MD  Heme/Onc Fellow

## 2025-05-22 NOTE — NURSING NOTE
"Oncology Rooming Note    May 22, 2025 3:38 PM   Tom Saini is a 89 year old male who presents for:    Chief Complaint   Patient presents with    Oncology Clinic Visit     Lymphoma of intestine     Labs Only     Venipuncture, vitals checked     Initial Vitals: /64   Pulse 77   Temp 98.1  F (36.7  C)   Resp 16   Wt 74.3 kg (163 lb 11.2 oz)   SpO2 97%   BMI 23.70 kg/m   Estimated body mass index is 23.7 kg/m  as calculated from the following:    Height as of 3/24/25: 1.77 m (5' 9.69\").    Weight as of this encounter: 74.3 kg (163 lb 11.2 oz). Body surface area is 1.91 meters squared.  No Pain (0) Comment: Data Unavailable   No LMP for male patient.  Allergies reviewed: Yes  Medications reviewed: Yes    Medications: Medication refills not needed today.  Pharm[facility] entered into [id number] [id number] AT [facility]    Frailty Screening:   Is the patient here for a new oncology consult visit in cancer care? 2. No    PHQ9:  Did this patient require a PHQ9?: No      Clinical concerns: none      Delta Bryant LPN              "

## 2025-05-27 NOTE — Clinical Note
Please review and close encounter.  Thanks,  Lorna Turner COT 1:57 PM January 31, 2018   Unknown if ever smoked

## 2025-06-25 ENCOUNTER — OFFICE VISIT (OUTPATIENT)
Dept: OPHTHALMOLOGY | Facility: CLINIC | Age: 89
End: 2025-06-25
Attending: OPHTHALMOLOGY
Payer: MEDICARE

## 2025-06-25 DIAGNOSIS — Z96.1 PSEUDOPHAKIA OF BOTH EYES: ICD-10-CM

## 2025-06-25 DIAGNOSIS — H35.3211 EXUDATIVE AGE-RELATED MACULAR DEGENERATION, RIGHT EYE, WITH ACTIVE CHOROIDAL NEOVASCULARIZATION (H): ICD-10-CM

## 2025-06-25 DIAGNOSIS — H18.513 FUCHS' CORNEAL DYSTROPHY OF BOTH EYES: Primary | ICD-10-CM

## 2025-06-25 DIAGNOSIS — H35.3222 EXUDATIVE AGE-RELATED MACULAR DEGENERATION OF LEFT EYE WITH INACTIVE CHOROIDAL NEOVASCULARIZATION (H): ICD-10-CM

## 2025-06-25 PROCEDURE — G0463 HOSPITAL OUTPT CLINIC VISIT: HCPCS | Performed by: OPHTHALMOLOGY

## 2025-06-25 PROCEDURE — 67028 INJECTION EYE DRUG: CPT | Mod: LT | Performed by: OPHTHALMOLOGY

## 2025-06-25 PROCEDURE — 250N000011 HC RX IP 250 OP 636: Performed by: OPHTHALMOLOGY

## 2025-06-25 PROCEDURE — 76514 ECHO EXAM OF EYE THICKNESS: CPT | Performed by: OPHTHALMOLOGY

## 2025-06-25 PROCEDURE — 92134 CPTRZ OPH DX IMG PST SGM RTA: CPT | Performed by: OPHTHALMOLOGY

## 2025-06-25 RX ADMIN — Medication 1.25 MG: at 14:32

## 2025-06-25 ASSESSMENT — VISUAL ACUITY
OS_PH_CC: 20/40
CORRECTION_TYPE: GLASSES
OS_PH_CC+: -3
OD_PH_CC+: -3
OS_CC+: +1
OD_PH_CC: 20/60
OS_CC: 20/50
METHOD: SNELLEN - LINEAR
OD_CC: 20/70

## 2025-06-25 ASSESSMENT — CONF VISUAL FIELD
METHOD: COUNTING FINGERS
OD_NORMAL: 1
OD_INFERIOR_NASAL_RESTRICTION: 0
OS_NORMAL: 1
OS_SUPERIOR_TEMPORAL_RESTRICTION: 0
OD_INFERIOR_TEMPORAL_RESTRICTION: 0
OS_INFERIOR_TEMPORAL_RESTRICTION: 0
OS_INFERIOR_NASAL_RESTRICTION: 0
OD_SUPERIOR_TEMPORAL_RESTRICTION: 0
OS_SUPERIOR_NASAL_RESTRICTION: 0
OD_SUPERIOR_NASAL_RESTRICTION: 0

## 2025-06-25 ASSESSMENT — SLIT LAMP EXAM - LIDS
COMMENTS: NORMAL
COMMENTS: NORMAL

## 2025-06-25 ASSESSMENT — TONOMETRY
OS_IOP_MMHG: 8
OD_IOP_MMHG: 10
IOP_METHOD: TONOPEN

## 2025-06-25 ASSESSMENT — REFRACTION_WEARINGRX
OD_CYLINDER: +2.25
OD_ADD: +2.75
OS_SPHERE: -1.75
SPECS_TYPE: BIFOCAL
OS_CYLINDER: +2.50
OS_ADD: +2.75
OS_AXIS: 003
OD_AXIS: 180
OD_SPHERE: -3.25

## 2025-06-25 ASSESSMENT — EXTERNAL EXAM - RIGHT EYE: OD_EXAM: NORMAL

## 2025-06-25 ASSESSMENT — PACHYMETRY
OS_CT(UM): 574
OD_CT(UM): 591

## 2025-06-25 ASSESSMENT — EXTERNAL EXAM - LEFT EYE: OS_EXAM: ET

## 2025-06-25 NOTE — PROGRESS NOTES
HPI       Follow Up    In both eyes.  Characterized as blurred vision.  Severity is mild.  Associated symptoms include Negative for dryness, eye pain, photophobia and floaters.  Treatments tried include ointment.  Pain was noted as 0/10. Additional comments: Fuch's corneal dystrophy and macular degeneration             Comments    He states that his visin left eye is more cloudy since last week. He didn't used ointment about for a week but resumed on Sunday.  Pt use gabriel 128 5% oint in each eye.  Arleen Fitzpatrick 1:27 PM June 25, 2025  And Melodie HEDRICK          Last edited by Arleen Fitzpatrick on 6/25/2025  1:27 PM.          Review of systems for the eyes was negative other than the pertinent positives/negatives listed in the HPI.      Assessment & Plan      Tom Saini is a 89 year old male with the following diagnoses:   1. Fuchs' corneal dystrophy of both eyes    2. Exudative age-related macular degeneration, right eye, with active choroidal neovascularization (H)    3. Exudative age-related macular degeneration of left eye with inactive choroidal neovascularization (H)    4. Pseudophakia of both eyes         Here for recheck of Fuchs and Age related macular degeneration  Vision with haze now in the left eye   S/P avastin both eyes 7/9/2024  S/P Avastin x many prior (last Right eye 2/19/2025; left eye 7/2024)      Right eye stable to improved; Left eye now with recurrent Intraretinal fluid/Subretinal fluid      RBA to avastin discussed for left eye today, consent obtained.  Offered right eye injection v close monitoring.  Prefers to monitor right eye for now  Continue Amsler grid daily  Return precautions reviewed   Continue Gabriel ointment bedtime both eyes              Patient disposition:   Return in about 4 weeks (around 7/23/2025) for VT only, OCT Macula.           Attending Physician Attestation:  Complete documentation of historical and exam elements from today's encounter can be found in the full  encounter summary report (not reduplicated in this progress note).  I personally obtained the chief complaint(s) and history of present illness.  I confirmed and edited as necessary the review of systems, past medical/surgical history, family history, social history, and examination findings as documented by others; and I examined the patient myself.  I personally reviewed the relevant tests, images, and reports as documented above.  I formulated and edited as necessary the assessment and plan and discussed the findings and management plan with the patient and family. . - Brigido Laura MD

## 2025-07-30 ENCOUNTER — OFFICE VISIT (OUTPATIENT)
Dept: OPHTHALMOLOGY | Facility: CLINIC | Age: 89
End: 2025-07-30
Attending: OPHTHALMOLOGY
Payer: MEDICARE

## 2025-07-30 DIAGNOSIS — H35.3222 EXUDATIVE AGE-RELATED MACULAR DEGENERATION OF LEFT EYE WITH INACTIVE CHOROIDAL NEOVASCULARIZATION (H): Primary | ICD-10-CM

## 2025-07-30 DIAGNOSIS — H35.3211 EXUDATIVE AGE-RELATED MACULAR DEGENERATION, RIGHT EYE, WITH ACTIVE CHOROIDAL NEOVASCULARIZATION (H): ICD-10-CM

## 2025-07-30 PROCEDURE — 250N000011 HC RX IP 250 OP 636: Performed by: OPHTHALMOLOGY

## 2025-07-30 PROCEDURE — 92134 CPTRZ OPH DX IMG PST SGM RTA: CPT | Performed by: OPHTHALMOLOGY

## 2025-07-30 PROCEDURE — G0463 HOSPITAL OUTPT CLINIC VISIT: HCPCS | Performed by: OPHTHALMOLOGY

## 2025-07-30 PROCEDURE — 99214 OFFICE O/P EST MOD 30 MIN: CPT | Mod: 25 | Performed by: OPHTHALMOLOGY

## 2025-07-30 PROCEDURE — 67028 INJECTION EYE DRUG: CPT | Mod: LT | Performed by: OPHTHALMOLOGY

## 2025-07-30 RX ADMIN — Medication 1.25 MG: at 13:03

## 2025-07-30 ASSESSMENT — REFRACTION_WEARINGRX
OS_CYLINDER: +2.50
OS_SPHERE: -1.75
OD_AXIS: 180
OD_ADD: +2.75
OS_AXIS: 003
OD_SPHERE: -3.25
SPECS_TYPE: BIFOCAL
OS_ADD: +2.75
OD_CYLINDER: +2.25

## 2025-07-30 ASSESSMENT — VISUAL ACUITY
METHOD: SNELLEN - LINEAR
OS_CC+: +2
OS_PH_CC+: -2
OS_CC: 20/60
CORRECTION_TYPE: GLASSES
OD_CC: 20/60
OS_PH_CC: 20/50
OD_CC+: +1
OD_PH_CC: 20/50

## 2025-07-30 ASSESSMENT — TONOMETRY
OS_IOP_MMHG: 11
OD_IOP_MMHG: 12
IOP_METHOD: ICARE

## 2025-07-30 NOTE — PROGRESS NOTES
HPI       Follow Up    In both eyes.  Since onset it is stable.  Associated symptoms include Negative for eye pain, flashes and floaters.  Treatments tried include ointment.             Comments    Here for follow up. VA is the same. No pain. No flashes or floaters. Compliant with Pantera 128.    PATO Mehta 12:09 PM July 30, 2025             Last edited by Jt Ross COMT on 7/30/2025 12:10 PM.          Review of systems for the eyes was negative other than the pertinent positives/negatives listed in the HPI.      Assessment & Plan      Tom Saini is a 89 year old male with the following diagnoses:   1. Exudative age-related macular degeneration of left eye with inactive choroidal neovascularization (H)    2. Exudative age-related macular degeneration, right eye, with active choroidal neovascularization (H)           Here for recheck of Fuchs and Age related macular degeneration  S/P Avastin x many prior (last Right eye 4/16/2025; left eye 6/25/25)      Right eye stable; Left eye with improving intraretinal fluid/Subretinal fluid       RBA to avastin discussed for left eye today, consent obtained.    Offered right eye injection v close monitoring.  Prefers to monitor right eye for now  Continue Amsler grid daily  Return precautions reviewed   Continue Pantera ointment bedtime both eyes    Patient disposition:   Return in about 6 weeks (around 9/10/2025) for VT only, OCT Macula.           Attending Physician Attestation:  Complete documentation of historical and exam elements from today's encounter can be found in the full encounter summary report (not reduplicated in this progress note).  I personally obtained the chief complaint(s) and history of present illness.  I confirmed and edited as necessary the review of systems, past medical/surgical history, family history, social history, and examination findings as documented by others; and I examined the patient myself.  I personally reviewed the relevant tests,  images, and reports as documented above.  I formulated and edited as necessary the assessment and plan and discussed the findings and management plan with the patient and family. . - Brigido Laura MD

## 2025-07-31 ENCOUNTER — TELEPHONE (OUTPATIENT)
Dept: INTERNAL MEDICINE | Facility: CLINIC | Age: 89
End: 2025-07-31
Payer: MEDICARE

## 2025-08-04 ENCOUNTER — ANCILLARY PROCEDURE (OUTPATIENT)
Dept: CT IMAGING | Facility: CLINIC | Age: 89
End: 2025-08-04
Attending: STUDENT IN AN ORGANIZED HEALTH CARE EDUCATION/TRAINING PROGRAM
Payer: MEDICARE

## 2025-08-04 DIAGNOSIS — C83.35 DIFFUSE LARGE B-CELL LYMPHOMA OF LYMPH NODES OF INGUINAL REGION (H): ICD-10-CM

## 2025-08-04 LAB
CREAT BLD-MCNC: 1.1 MG/DL (ref 0.7–1.2)
EGFRCR SERPLBLD CKD-EPI 2021: >60 ML/MIN/1.73M2

## 2025-08-04 PROCEDURE — 71260 CT THORAX DX C+: CPT | Performed by: STUDENT IN AN ORGANIZED HEALTH CARE EDUCATION/TRAINING PROGRAM

## 2025-08-04 PROCEDURE — 82565 ASSAY OF CREATININE: CPT | Performed by: PATHOLOGY

## 2025-08-04 PROCEDURE — 74177 CT ABD & PELVIS W/CONTRAST: CPT | Performed by: STUDENT IN AN ORGANIZED HEALTH CARE EDUCATION/TRAINING PROGRAM

## 2025-08-04 RX ORDER — IOPAMIDOL 755 MG/ML
85 INJECTION, SOLUTION INTRAVASCULAR ONCE
Status: COMPLETED | OUTPATIENT
Start: 2025-08-04 | End: 2025-08-04

## 2025-08-04 RX ADMIN — IOPAMIDOL 85 ML: 755 INJECTION, SOLUTION INTRAVASCULAR at 09:45

## 2025-08-05 ENCOUNTER — OFFICE VISIT (OUTPATIENT)
Dept: INTERNAL MEDICINE | Facility: CLINIC | Age: 89
End: 2025-08-05
Payer: MEDICARE

## 2025-08-05 ENCOUNTER — ANCILLARY PROCEDURE (OUTPATIENT)
Dept: GENERAL RADIOLOGY | Facility: CLINIC | Age: 89
End: 2025-08-05
Attending: INTERNAL MEDICINE
Payer: MEDICARE

## 2025-08-05 VITALS
SYSTOLIC BLOOD PRESSURE: 99 MMHG | RESPIRATION RATE: 16 BRPM | DIASTOLIC BLOOD PRESSURE: 61 MMHG | HEIGHT: 70 IN | BODY MASS INDEX: 23.11 KG/M2 | OXYGEN SATURATION: 97 % | HEART RATE: 81 BPM | TEMPERATURE: 97.2 F | WEIGHT: 161.4 LBS

## 2025-08-05 DIAGNOSIS — M79.18 GLUTEAL PAIN: Primary | ICD-10-CM

## 2025-08-05 DIAGNOSIS — M79.18 GLUTEAL PAIN: ICD-10-CM

## 2025-08-05 DIAGNOSIS — M47.26 OSTEOARTHRITIS OF SPINE WITH RADICULOPATHY, LUMBAR REGION: ICD-10-CM

## 2025-08-05 PROCEDURE — 3074F SYST BP LT 130 MM HG: CPT | Performed by: INTERNAL MEDICINE

## 2025-08-05 PROCEDURE — 72170 X-RAY EXAM OF PELVIS: CPT | Performed by: RADIOLOGY

## 2025-08-05 PROCEDURE — 72100 X-RAY EXAM L-S SPINE 2/3 VWS: CPT | Performed by: STUDENT IN AN ORGANIZED HEALTH CARE EDUCATION/TRAINING PROGRAM

## 2025-08-05 PROCEDURE — 3078F DIAST BP <80 MM HG: CPT | Performed by: INTERNAL MEDICINE

## 2025-08-05 PROCEDURE — 73562 X-RAY EXAM OF KNEE 3: CPT | Mod: LT | Performed by: RADIOLOGY

## 2025-08-05 PROCEDURE — 99213 OFFICE O/P EST LOW 20 MIN: CPT | Performed by: INTERNAL MEDICINE

## 2025-08-05 RX ORDER — ACETAMINOPHEN 500 MG
1000 TABLET ORAL 3 TIMES DAILY
COMMUNITY
Start: 2025-08-05

## 2025-08-11 ENCOUNTER — ONCOLOGY VISIT (OUTPATIENT)
Dept: ONCOLOGY | Facility: CLINIC | Age: 89
End: 2025-08-11
Attending: STUDENT IN AN ORGANIZED HEALTH CARE EDUCATION/TRAINING PROGRAM
Payer: MEDICARE

## 2025-08-11 VITALS
DIASTOLIC BLOOD PRESSURE: 63 MMHG | OXYGEN SATURATION: 99 % | TEMPERATURE: 98.5 F | RESPIRATION RATE: 16 BRPM | WEIGHT: 161.6 LBS | SYSTOLIC BLOOD PRESSURE: 116 MMHG | BODY MASS INDEX: 23.4 KG/M2 | HEART RATE: 96 BPM

## 2025-08-11 DIAGNOSIS — C83.35 DIFFUSE LARGE B-CELL LYMPHOMA OF LYMPH NODES OF INGUINAL REGION (H): ICD-10-CM

## 2025-08-11 DIAGNOSIS — D64.9 ANEMIA, UNSPECIFIED TYPE: ICD-10-CM

## 2025-08-11 DIAGNOSIS — K86.2 PANCREATIC CYST: Primary | ICD-10-CM

## 2025-08-11 LAB
ALBUMIN SERPL BCG-MCNC: 4 G/DL (ref 3.5–5.2)
ALP SERPL-CCNC: 82 U/L (ref 40–150)
ALT SERPL W P-5'-P-CCNC: <5 U/L (ref 0–70)
ANION GAP SERPL CALCULATED.3IONS-SCNC: 11 MMOL/L (ref 7–15)
AST SERPL W P-5'-P-CCNC: 17 U/L (ref 0–45)
BASOPHILS # BLD AUTO: 0.1 10E3/UL (ref 0–0.2)
BASOPHILS NFR BLD AUTO: 1 %
BILIRUB SERPL-MCNC: 0.4 MG/DL
BUN SERPL-MCNC: 18 MG/DL (ref 8–23)
CALCIUM SERPL-MCNC: 10.1 MG/DL (ref 8.8–10.4)
CHLORIDE SERPL-SCNC: 102 MMOL/L (ref 98–107)
CREAT SERPL-MCNC: 0.96 MG/DL (ref 0.67–1.17)
EGFRCR SERPLBLD CKD-EPI 2021: 76 ML/MIN/1.73M2
EOSINOPHIL # BLD AUTO: 0.2 10E3/UL (ref 0–0.7)
EOSINOPHIL NFR BLD AUTO: 3 %
ERYTHROCYTE [DISTWIDTH] IN BLOOD BY AUTOMATED COUNT: 13.5 % (ref 10–15)
FERRITIN SERPL-MCNC: 365 NG/ML (ref 31–409)
GLUCOSE SERPL-MCNC: 96 MG/DL (ref 70–99)
HCO3 SERPL-SCNC: 25 MMOL/L (ref 22–29)
HCT VFR BLD AUTO: 39.4 % (ref 40–53)
HGB BLD-MCNC: 12.9 G/DL (ref 13.3–17.7)
IMM GRANULOCYTES # BLD: 0 10E3/UL
IMM GRANULOCYTES NFR BLD: 0 %
IRON BINDING CAPACITY (ROCHE): 208 UG/DL (ref 240–430)
IRON SATN MFR SERPL: 47 % (ref 15–46)
IRON SERPL-MCNC: 98 UG/DL (ref 61–157)
LDH SERPL L TO P-CCNC: 161 U/L (ref 0–250)
LYMPHOCYTES # BLD AUTO: 0.9 10E3/UL (ref 0.8–5.3)
LYMPHOCYTES NFR BLD AUTO: 14 %
MCH RBC QN AUTO: 30.7 PG (ref 26.5–33)
MCHC RBC AUTO-ENTMCNC: 32.7 G/DL (ref 31.5–36.5)
MCV RBC AUTO: 94 FL (ref 78–100)
MONOCYTES # BLD AUTO: 0.7 10E3/UL (ref 0–1.3)
MONOCYTES NFR BLD AUTO: 10 %
NEUTROPHILS # BLD AUTO: 4.8 10E3/UL (ref 1.6–8.3)
NEUTROPHILS NFR BLD AUTO: 71 %
NRBC # BLD AUTO: 0 10E3/UL
NRBC BLD AUTO-RTO: 0 /100
PLATELET # BLD AUTO: 246 10E3/UL (ref 150–450)
POTASSIUM SERPL-SCNC: 4.8 MMOL/L (ref 3.4–5.3)
PROT SERPL-MCNC: 6.9 G/DL (ref 6.4–8.3)
RBC # BLD AUTO: 4.2 10E6/UL (ref 4.4–5.9)
SODIUM SERPL-SCNC: 138 MMOL/L (ref 135–145)
VIT B12 SERPL-MCNC: 560 PG/ML (ref 232–1245)
WBC # BLD AUTO: 6.8 10E3/UL (ref 4–11)

## 2025-08-11 PROCEDURE — 36415 COLL VENOUS BLD VENIPUNCTURE: CPT | Performed by: STUDENT IN AN ORGANIZED HEALTH CARE EDUCATION/TRAINING PROGRAM

## 2025-08-11 PROCEDURE — 99207 E-CONSULT TO GASTROENTEROLOGY (ADULT OUTPT PROVIDER TO SPECIALIST WRITTEN QUESTION & RESPONSE): CPT | Performed by: STUDENT IN AN ORGANIZED HEALTH CARE EDUCATION/TRAINING PROGRAM

## 2025-08-11 PROCEDURE — 99215 OFFICE O/P EST HI 40 MIN: CPT | Performed by: STUDENT IN AN ORGANIZED HEALTH CARE EDUCATION/TRAINING PROGRAM

## 2025-08-11 PROCEDURE — 80051 ELECTROLYTE PANEL: CPT | Performed by: STUDENT IN AN ORGANIZED HEALTH CARE EDUCATION/TRAINING PROGRAM

## 2025-08-11 PROCEDURE — 83615 LACTATE (LD) (LDH) ENZYME: CPT | Performed by: STUDENT IN AN ORGANIZED HEALTH CARE EDUCATION/TRAINING PROGRAM

## 2025-08-11 PROCEDURE — G0463 HOSPITAL OUTPT CLINIC VISIT: HCPCS | Performed by: STUDENT IN AN ORGANIZED HEALTH CARE EDUCATION/TRAINING PROGRAM

## 2025-08-11 PROCEDURE — 82728 ASSAY OF FERRITIN: CPT | Performed by: STUDENT IN AN ORGANIZED HEALTH CARE EDUCATION/TRAINING PROGRAM

## 2025-08-11 PROCEDURE — 83550 IRON BINDING TEST: CPT | Performed by: STUDENT IN AN ORGANIZED HEALTH CARE EDUCATION/TRAINING PROGRAM

## 2025-08-11 PROCEDURE — 85025 COMPLETE CBC W/AUTO DIFF WBC: CPT | Performed by: STUDENT IN AN ORGANIZED HEALTH CARE EDUCATION/TRAINING PROGRAM

## 2025-08-11 PROCEDURE — 82607 VITAMIN B-12: CPT | Performed by: STUDENT IN AN ORGANIZED HEALTH CARE EDUCATION/TRAINING PROGRAM

## 2025-08-11 ASSESSMENT — PAIN SCALES - GENERAL: PAINLEVEL_OUTOF10: NO PAIN (0)

## 2025-08-12 ENCOUNTER — E-CONSULT (OUTPATIENT)
Dept: GASTROENTEROLOGY | Facility: CLINIC | Age: 89
End: 2025-08-12
Payer: MEDICARE

## 2025-08-12 PROCEDURE — 99451 NTRPROF PH1/NTRNET/EHR 5/>: CPT | Performed by: INTERNAL MEDICINE

## 2025-08-13 ENCOUNTER — THERAPY VISIT (OUTPATIENT)
Dept: PHYSICAL THERAPY | Facility: CLINIC | Age: 89
End: 2025-08-13
Attending: INTERNAL MEDICINE
Payer: MEDICARE

## 2025-08-13 DIAGNOSIS — M47.26 OSTEOARTHRITIS OF SPINE WITH RADICULOPATHY, LUMBAR REGION: ICD-10-CM

## 2025-08-13 DIAGNOSIS — M25.552 HIP PAIN, LEFT: Primary | ICD-10-CM

## 2025-08-13 DIAGNOSIS — M79.18 GLUTEAL PAIN: ICD-10-CM

## 2025-08-13 PROCEDURE — 97110 THERAPEUTIC EXERCISES: CPT | Mod: GP

## 2025-08-13 PROCEDURE — 97162 PT EVAL MOD COMPLEX 30 MIN: CPT | Mod: GP

## (undated) DEVICE — SPECIMEN CONTAINER 3OZ W/FORMALIN 59901

## (undated) DEVICE — ESU LIGASURE MARYLAND VESSEL LAP 44CM XLONG LF1944

## (undated) DEVICE — KIT INTRODUCER FLUENT MICRO 5FRX10CM ECHO TIP KIT-038-04

## (undated) DEVICE — Device

## (undated) DEVICE — DRSG GAUZE 4X4" TRAY 6939

## (undated) DEVICE — SU PLAIN 6-0 G-1DA 18" 770G

## (undated) DEVICE — STPL RELOAD ECHELON CONTOUR CVD 40MM GREEN GCR40G

## (undated) DEVICE — CATH TRAY FOLEY SURESTEP 16FR W/URNE MTR STLK LATEX A303316A

## (undated) DEVICE — LINEN TOWEL PACK X5 5464

## (undated) DEVICE — SU PROLENE 2-0 SHDA 36" 8523H

## (undated) DEVICE — SU MONOCRYL 4-0 P-3 18" UND Y494G

## (undated) DEVICE — ESU EYE HIGH TEMP 65410-183

## (undated) DEVICE — LINEN TOWEL PACK X6 WHITE 5487

## (undated) DEVICE — CLIP HORIZON MED BLUE 002200

## (undated) DEVICE — SOL NACL 0.9% IRRIG 500ML BOTTLE 2F7123

## (undated) DEVICE — EYE PREP BETADINE 5% SOLUTION 30ML 0065-0411-30

## (undated) DEVICE — DRSG STERI STRIP 1/2X4" R1547

## (undated) DEVICE — KIT PATIENT POSITIONING PIGAZZI LATEX FREE 40580

## (undated) DEVICE — SYSTEM LAPAROVUE VISIBILITY LAPVUE10

## (undated) DEVICE — WIPE PREMOIST CLEANSING WASHCLOTHS 7988

## (undated) DEVICE — SU VICRYL 3-0 SH 27" UND J416H

## (undated) DEVICE — CLIP HORIZON SM RED WIDE SLOT 001201

## (undated) DEVICE — SU DERMABOND ADVANCED .7ML DNX12

## (undated) DEVICE — SUCTION MANIFOLD NEPTUNE 2 SYS 4 PORT 0702-020-000

## (undated) DEVICE — WIPES FOLEY CARE SURESTEP PROVON DFC100

## (undated) DEVICE — KIT ENDO FIRST STEP DISINFECTANT 200ML W/POUCH EP-4

## (undated) DEVICE — LINEN TOWEL PACK X30 5481

## (undated) DEVICE — ENDO TROCAR BLUNT TIP KII BALLOON 12X100MM C0R47

## (undated) DEVICE — LINEN GOWN XLG 5407

## (undated) DEVICE — SYSTEM CLEARIFY VISUALIZATION 21-345

## (undated) DEVICE — GLOVE BIOGEL PI MICRO INDICATOR UNDERGLOVE SZ 7.5 48975

## (undated) DEVICE — DRAPE IOBAN INCISE 23X17" 6650EZ

## (undated) DEVICE — GLOVE BIOGEL PI MICRO SZ 7.5 48575

## (undated) DEVICE — SU PDS II 0 TP-1 60" Z991G

## (undated) DEVICE — GLOVE BIOGEL PI ULTRATOUCH G SZ 7.5 42175

## (undated) DEVICE — GLOVE PROTEXIS W/NEU-THERA 7.5  2D73TE75

## (undated) DEVICE — ENDO TROCAR SLEEVE KII Z-THREADED 05X100MM CTS02

## (undated) DEVICE — PACK HAND CUSTOM ASC

## (undated) DEVICE — GLOVE BIOGEL PI ULTRATOUCH G SZ 8.0 42180

## (undated) DEVICE — PACK CENTRAL LINE INSERTION SAN32CLFCG

## (undated) DEVICE — SOL NACL 0.9% INJ 250ML BAG 2B1322Q

## (undated) DEVICE — KNIFE HANDLE W/15 BLADE 371615

## (undated) DEVICE — ENDO SCOPE WARMER SEAL  C3101

## (undated) DEVICE — BNDG ELASTIC 3"X5YDS UNSTERILE 6611-30

## (undated) DEVICE — SU SILK 0 TIE 6X30" A306H

## (undated) DEVICE — SU SILK 2-0 TIE 12X30" A305H

## (undated) DEVICE — PREP CHLORAPREP 26ML TINTED ORANGE  260815

## (undated) DEVICE — DECANTER BAG 2002S

## (undated) DEVICE — SU VICRYL 4-0 P-3 18" UND  J494H

## (undated) DEVICE — LINEN ORTHO PACK 5446

## (undated) DEVICE — TUBING SUCTION MEDI-VAC 1/4"X20' N620A

## (undated) DEVICE — KIT ENDO TURNOVER/PROCEDURE CARRY-ON 101822

## (undated) DEVICE — STPL ECHELON CONTOUR CUTTER CVD 40MM GREEN GCS40G

## (undated) DEVICE — GOWN IMPERVIOUS 2XL BLUE

## (undated) DEVICE — PACK OCULOPLATIC SEN15OCFSD

## (undated) DEVICE — KIT CONNECTOR FOR OLYMPUS ENDOSCOPES DEFENDO 100310

## (undated) DEVICE — DRAPE STERI TOWEL LG 1010

## (undated) DEVICE — SU MONOCRYL 4-0 PS-2 18" UND Y496G

## (undated) DEVICE — ESU ENDO SCISSORS 5MM CVD 5DCS

## (undated) DEVICE — ESU GROUND PAD ADULT W/CORD E7507

## (undated) DEVICE — PREP CHLORAPREP 26ML TINTED HI-LITE ORANGE 930815

## (undated) DEVICE — SUCTION MANIFOLD NEPTUNE 2 SYS 1 PORT 702-025-000

## (undated) DEVICE — GLOVE BIOGEL PI MICRO SZ 7.0 48570

## (undated) DEVICE — BNDG ELASTIC 2"X5YDS UNSTERILE 6611-20

## (undated) DEVICE — SU SILK 4-0 J-1 18" 734G

## (undated) DEVICE — SU SILK 6-0 C-1 18" 707G

## (undated) DEVICE — SU SILK 3-0 TIE 12X30" A304H

## (undated) DEVICE — SOL NACL 0.9% IRRIG 1000ML BOTTLE 07138-09

## (undated) DEVICE — CAST PADDING 3" STERILE 9043S

## (undated) DEVICE — PAD CHUX UNDERPAD 30X30"

## (undated) DEVICE — GOWN XLG DISP 9545

## (undated) DEVICE — COVER CAMERA IN-LIGHT DISP LT-C02

## (undated) DEVICE — CONNECTOR MALE TO MALE LL

## (undated) DEVICE — SOL WATER IRRIG 1000ML BOTTLE 2F7114

## (undated) DEVICE — DRAPE LEGGINGS CLEAR 8430

## (undated) DEVICE — SOL WATER IRRIG 500ML BOTTLE 2F7113

## (undated) DEVICE — STPL CIRCULAR 29MM CVD CDH29P

## (undated) DEVICE — CAST PADDING 4" STERILE 9044S

## (undated) DEVICE — TUBING SMOKE EVAC PNEUMOCLEAR HIGH FLOW 0620050250

## (undated) DEVICE — ENDO CAP AND TUBING STERILE FOR ENDOGATOR  100130

## (undated) DEVICE — ENDO FORCEP BX CAPTURA PRO SPIKE G50696

## (undated) DEVICE — SUCTION IRR STRYKERFLOW II W/TIP 250-070-520

## (undated) DEVICE — SOL WATER IRRIG 3000ML BAG 2B7117

## (undated) DEVICE — COVER ULTRASOUND PROBE W/GEL FLEXI-FEEL 6"X58" LF  25-FF658

## (undated) DEVICE — SU MONOCRYL 3-0 PS-2 18" UND Y497G

## (undated) RX ORDER — LIDOCAINE HYDROCHLORIDE AND EPINEPHRINE 10; 10 MG/ML; UG/ML
INJECTION, SOLUTION INFILTRATION; PERINEURAL
Status: DISPENSED
Start: 2024-02-19

## (undated) RX ORDER — METHOCARBAMOL 500 MG/1
TABLET, FILM COATED ORAL
Status: DISPENSED
Start: 2023-07-27

## (undated) RX ORDER — ONDANSETRON 2 MG/ML
INJECTION INTRAMUSCULAR; INTRAVENOUS
Status: DISPENSED
Start: 2023-07-28

## (undated) RX ORDER — CEFAZOLIN SODIUM 2 G/50ML
SOLUTION INTRAVENOUS
Status: DISPENSED
Start: 2023-08-24

## (undated) RX ORDER — ENOXAPARIN SODIUM 100 MG/ML
INJECTION SUBCUTANEOUS
Status: DISPENSED
Start: 2023-07-27

## (undated) RX ORDER — ACETAMINOPHEN 325 MG/1
TABLET ORAL
Status: DISPENSED
Start: 2023-07-28

## (undated) RX ORDER — LIDOCAINE HYDROCHLORIDE 10 MG/ML
INJECTION, SOLUTION EPIDURAL; INFILTRATION; INTRACAUDAL; PERINEURAL
Status: DISPENSED
Start: 2024-02-23

## (undated) RX ORDER — ONDANSETRON 2 MG/ML
INJECTION INTRAMUSCULAR; INTRAVENOUS
Status: DISPENSED
Start: 2024-02-19

## (undated) RX ORDER — ACETAMINOPHEN 325 MG/1
TABLET ORAL
Status: DISPENSED
Start: 2023-07-27

## (undated) RX ORDER — CEFAZOLIN SODIUM 1 G/3ML
INJECTION, POWDER, FOR SOLUTION INTRAMUSCULAR; INTRAVENOUS
Status: DISPENSED
Start: 2023-07-27

## (undated) RX ORDER — OXYCODONE HYDROCHLORIDE 5 MG/1
TABLET ORAL
Status: DISPENSED
Start: 2023-07-28

## (undated) RX ORDER — DEXAMETHASONE SODIUM PHOSPHATE 4 MG/ML
INJECTION, SOLUTION INTRA-ARTICULAR; INTRALESIONAL; INTRAMUSCULAR; INTRAVENOUS; SOFT TISSUE
Status: DISPENSED
Start: 2018-04-06

## (undated) RX ORDER — PROPOFOL 10 MG/ML
INJECTION, EMULSION INTRAVENOUS
Status: DISPENSED
Start: 2018-04-06

## (undated) RX ORDER — FENTANYL CITRATE 50 UG/ML
INJECTION, SOLUTION INTRAMUSCULAR; INTRAVENOUS
Status: DISPENSED
Start: 2023-07-27

## (undated) RX ORDER — FENTANYL CITRATE 50 UG/ML
INJECTION, SOLUTION INTRAMUSCULAR; INTRAVENOUS
Status: DISPENSED
Start: 2018-04-06

## (undated) RX ORDER — LIDOCAINE HYDROCHLORIDE 10 MG/ML
INJECTION, SOLUTION EPIDURAL; INFILTRATION; INTRACAUDAL; PERINEURAL
Status: DISPENSED
Start: 2024-02-19

## (undated) RX ORDER — PROPOFOL 10 MG/ML
INJECTION, EMULSION INTRAVENOUS
Status: DISPENSED
Start: 2023-07-27

## (undated) RX ORDER — BUPIVACAINE HYDROCHLORIDE 5 MG/ML
INJECTION, SOLUTION EPIDURAL; INTRACAUDAL
Status: DISPENSED
Start: 2024-02-19

## (undated) RX ORDER — PROPOFOL 10 MG/ML
INJECTION, EMULSION INTRAVENOUS
Status: DISPENSED
Start: 2024-02-19

## (undated) RX ORDER — ACETAMINOPHEN 325 MG/10.15ML
LIQUID ORAL
Status: DISPENSED
Start: 2023-07-27

## (undated) RX ORDER — FENTANYL CITRATE 50 UG/ML
INJECTION, SOLUTION INTRAMUSCULAR; INTRAVENOUS
Status: DISPENSED
Start: 2024-02-19

## (undated) RX ORDER — CEFAZOLIN SODIUM 2 G/50ML
SOLUTION INTRAVENOUS
Status: DISPENSED
Start: 2024-02-19

## (undated) RX ORDER — HYDROMORPHONE HCL IN WATER/PF 6 MG/30 ML
PATIENT CONTROLLED ANALGESIA SYRINGE INTRAVENOUS
Status: DISPENSED
Start: 2023-07-27

## (undated) RX ORDER — ENOXAPARIN SODIUM 100 MG/ML
INJECTION SUBCUTANEOUS
Status: DISPENSED
Start: 2023-07-28

## (undated) RX ORDER — FENTANYL CITRATE-0.9 % NACL/PF 10 MCG/ML
PLASTIC BAG, INJECTION (ML) INTRAVENOUS
Status: DISPENSED
Start: 2024-02-19

## (undated) RX ORDER — ACETAMINOPHEN 500 MG
TABLET ORAL
Status: DISPENSED
Start: 2023-07-27

## (undated) RX ORDER — ONDANSETRON 2 MG/ML
INJECTION INTRAMUSCULAR; INTRAVENOUS
Status: DISPENSED
Start: 2023-07-27

## (undated) RX ORDER — PENTAMIDINE ISETHIONATE 300 MG/300MG
INHALANT RESPIRATORY (INHALATION)
Status: DISPENSED
Start: 2023-11-03

## (undated) RX ORDER — CEFAZOLIN SODIUM/WATER 2 G/20 ML
SYRINGE (ML) INTRAVENOUS
Status: DISPENSED
Start: 2023-07-27

## (undated) RX ORDER — PENTAMIDINE ISETHIONATE 300 MG/300MG
INHALANT RESPIRATORY (INHALATION)
Status: DISPENSED
Start: 2023-10-06

## (undated) RX ORDER — EPHEDRINE SULFATE 50 MG/ML
INJECTION, SOLUTION INTRAMUSCULAR; INTRAVENOUS; SUBCUTANEOUS
Status: DISPENSED
Start: 2023-07-27

## (undated) RX ORDER — DEXAMETHASONE SODIUM PHOSPHATE 4 MG/ML
INJECTION, SOLUTION INTRA-ARTICULAR; INTRALESIONAL; INTRAMUSCULAR; INTRAVENOUS; SOFT TISSUE
Status: DISPENSED
Start: 2023-07-27

## (undated) RX ORDER — ACETAMINOPHEN 325 MG/1
TABLET ORAL
Status: DISPENSED
Start: 2024-02-19

## (undated) RX ORDER — FENTANYL CITRATE 50 UG/ML
INJECTION, SOLUTION INTRAMUSCULAR; INTRAVENOUS
Status: DISPENSED
Start: 2023-06-28

## (undated) RX ORDER — BUPIVACAINE HYDROCHLORIDE 2.5 MG/ML
INJECTION, SOLUTION EPIDURAL; INFILTRATION; INTRACAUDAL
Status: DISPENSED
Start: 2024-02-19

## (undated) RX ORDER — SODIUM CHLORIDE, SODIUM LACTATE, POTASSIUM CHLORIDE, CALCIUM CHLORIDE 600; 310; 30; 20 MG/100ML; MG/100ML; MG/100ML; MG/100ML
INJECTION, SOLUTION INTRAVENOUS
Status: DISPENSED
Start: 2023-07-27

## (undated) RX ORDER — ACETAMINOPHEN 325 MG/1
TABLET ORAL
Status: DISPENSED
Start: 2024-02-23

## (undated) RX ORDER — HEPARIN SODIUM (PORCINE) LOCK FLUSH IV SOLN 100 UNIT/ML 100 UNIT/ML
SOLUTION INTRAVENOUS
Status: DISPENSED
Start: 2024-02-19

## (undated) RX ORDER — LIDOCAINE HYDROCHLORIDE 20 MG/ML
INJECTION, SOLUTION EPIDURAL; INFILTRATION; INTRACAUDAL; PERINEURAL
Status: DISPENSED
Start: 2018-04-06

## (undated) RX ORDER — PENTAMIDINE ISETHIONATE 300 MG/300MG
INHALANT RESPIRATORY (INHALATION)
Status: DISPENSED
Start: 2023-09-07

## (undated) RX ORDER — ONDANSETRON 2 MG/ML
INJECTION INTRAMUSCULAR; INTRAVENOUS
Status: DISPENSED
Start: 2018-04-06

## (undated) RX ORDER — METRONIDAZOLE 500 MG/100ML
INJECTION, SOLUTION INTRAVENOUS
Status: DISPENSED
Start: 2023-07-27